# Patient Record
Sex: FEMALE | Race: BLACK OR AFRICAN AMERICAN | NOT HISPANIC OR LATINO | Employment: OTHER | URBAN - METROPOLITAN AREA
[De-identification: names, ages, dates, MRNs, and addresses within clinical notes are randomized per-mention and may not be internally consistent; named-entity substitution may affect disease eponyms.]

---

## 2017-02-13 ENCOUNTER — TELEPHONE (OUTPATIENT)
Dept: INTERNAL MEDICINE | Facility: CLINIC | Age: 62
End: 2017-02-13

## 2017-02-13 ENCOUNTER — LAB VISIT (OUTPATIENT)
Dept: LAB | Facility: OTHER | Age: 62
End: 2017-02-13
Attending: NURSE PRACTITIONER
Payer: COMMERCIAL

## 2017-02-13 DIAGNOSIS — I10 ESSENTIAL HYPERTENSION: Primary | ICD-10-CM

## 2017-02-13 PROCEDURE — 83036 HEMOGLOBIN GLYCOSYLATED A1C: CPT

## 2017-02-13 PROCEDURE — 36415 COLL VENOUS BLD VENIPUNCTURE: CPT

## 2017-02-13 NOTE — TELEPHONE ENCOUNTER
----- Message from Lucy Rodriguez sent at 2/13/2017  7:48 AM CST -----  Contact: MORAIMA LUCAS [[id number]]  x_  1st Request  _  2nd Request  _  3rd Request        Who: MORAIMA LUCAS [[id number]]    Why: Patient says she is at the lab right now and she would like orders for lipid panel added. Please give patient a call back at your earliest convenience. Thanks!    What Number to Call Back: [phone]    When to Expect a call back: (Before the end of the day)   -- if the call is after 12:00, the call back will be tomorrow.

## 2017-02-13 NOTE — TELEPHONE ENCOUNTER
Signed order for over due health maintenance lipid panel per written order guide lines. Lab informed.    Pt states that she saw he ob/gyn in December of 2016 and was informed by her GYN doctor that she does not need a pap smear due to having a hysterotomy. Placed modifier that pt is not a candidate for pap on pt's health maintenance.

## 2017-02-14 LAB
ESTIMATED AVG GLUCOSE: 186 MG/DL
HBA1C MFR BLD HPLC: 8.1 %

## 2017-02-14 NOTE — TELEPHONE ENCOUNTER
----- Message from Dina Weeks sent at 2/14/2017  8:40 AM CST -----  Contact: [id number]  X  1st Request  _  2nd Request  _  3rd Request        Who: MORAIMA LUCAS [[id number]]    Why: Pt was returning the missed call regarding her results.  Please contact pt to further discuss and advise.    What Number to Call Back: [phone]    When to Expect a call back: (Before the end of the day)   -- if the call is after 12:00, the call back will be tomorrow.

## 2017-02-14 NOTE — TELEPHONE ENCOUNTER
Pt has been informed of advice per PCP on lipids. Pt states verbal understanding. Patient has no further questions or concerns.

## 2017-02-14 NOTE — TELEPHONE ENCOUNTER
Discussed A1c result with pt. Scheduled to see Dr. England 2/17. Has increased Amaryl 6 mg qd and takes it in the morning with first meal and Metformin 1000 mg in AM and 500 mg every other PM, vs. 1000 mg due to diarrhea. Reports fasting BS has been <145. May financially be able to afford Januvia or Tradjenta now, especially if she can stop Metformin altogether. Pt to think about recommendation of adding Januvia or Tradjenta and decide at 2/17 appt.

## 2017-03-02 ENCOUNTER — LAB VISIT (OUTPATIENT)
Dept: LAB | Facility: OTHER | Age: 62
End: 2017-03-02
Attending: INTERNAL MEDICINE
Payer: COMMERCIAL

## 2017-03-02 ENCOUNTER — TELEPHONE (OUTPATIENT)
Dept: INTERNAL MEDICINE | Facility: CLINIC | Age: 62
End: 2017-03-02

## 2017-03-02 ENCOUNTER — OFFICE VISIT (OUTPATIENT)
Dept: INTERNAL MEDICINE | Facility: CLINIC | Age: 62
End: 2017-03-02
Payer: COMMERCIAL

## 2017-03-02 VITALS
BODY MASS INDEX: 38.97 KG/M2 | WEIGHT: 193.31 LBS | SYSTOLIC BLOOD PRESSURE: 126 MMHG | DIASTOLIC BLOOD PRESSURE: 80 MMHG | HEIGHT: 59 IN | HEART RATE: 72 BPM

## 2017-03-02 DIAGNOSIS — E78.2 MIXED HYPERLIPIDEMIA: ICD-10-CM

## 2017-03-02 DIAGNOSIS — I10 ESSENTIAL HYPERTENSION: Chronic | ICD-10-CM

## 2017-03-02 DIAGNOSIS — I15.2 HYPERTENSION ASSOCIATED WITH DIABETES: ICD-10-CM

## 2017-03-02 DIAGNOSIS — E11.59 HYPERTENSION ASSOCIATED WITH DIABETES: ICD-10-CM

## 2017-03-02 DIAGNOSIS — E05.00 GRAVES' DISEASE WITH EXOPHTHALMOS: ICD-10-CM

## 2017-03-02 DIAGNOSIS — E23.6 EMPTY SELLA SYNDROME: ICD-10-CM

## 2017-03-02 DIAGNOSIS — G40.319 GENERALIZED CONVULSIVE EPILEPSY WITH INTRACTABLE EPILEPSY: ICD-10-CM

## 2017-03-02 DIAGNOSIS — E66.9 DIABETES MELLITUS TYPE 2 IN OBESE: ICD-10-CM

## 2017-03-02 DIAGNOSIS — E11.69 DIABETES MELLITUS TYPE 2 IN OBESE: ICD-10-CM

## 2017-03-02 DIAGNOSIS — Z00.00 WELLNESS EXAMINATION: ICD-10-CM

## 2017-03-02 DIAGNOSIS — Z00.00 WELLNESS EXAMINATION: Primary | ICD-10-CM

## 2017-03-02 LAB
ALBUMIN SERPL BCP-MCNC: 3.8 G/DL
ALP SERPL-CCNC: 95 U/L
ALT SERPL W/O P-5'-P-CCNC: 28 U/L
ANION GAP SERPL CALC-SCNC: 5 MMOL/L
AST SERPL-CCNC: 22 U/L
BASOPHILS # BLD AUTO: 0.02 K/UL
BASOPHILS NFR BLD: 0.4 %
BILIRUB SERPL-MCNC: 0.3 MG/DL
BUN SERPL-MCNC: 12 MG/DL
CALCIUM SERPL-MCNC: 9.8 MG/DL
CHLORIDE SERPL-SCNC: 102 MMOL/L
CO2 SERPL-SCNC: 31 MMOL/L
CREAT SERPL-MCNC: 0.8 MG/DL
CREAT UR-MCNC: 108 MG/DL
DIFFERENTIAL METHOD: NORMAL
EOSINOPHIL # BLD AUTO: 0.1 K/UL
EOSINOPHIL NFR BLD: 1.3 %
ERYTHROCYTE [DISTWIDTH] IN BLOOD BY AUTOMATED COUNT: 13.8 %
EST. GFR  (AFRICAN AMERICAN): >60 ML/MIN/1.73 M^2
EST. GFR  (NON AFRICAN AMERICAN): >60 ML/MIN/1.73 M^2
GLUCOSE SERPL-MCNC: 158 MG/DL
HCT VFR BLD AUTO: 37.4 %
HGB BLD-MCNC: 12.4 G/DL
LYMPHOCYTES # BLD AUTO: 2.5 K/UL
LYMPHOCYTES NFR BLD: 45.5 %
MCH RBC QN AUTO: 29.2 PG
MCHC RBC AUTO-ENTMCNC: 33.2 %
MCV RBC AUTO: 88 FL
MICROALBUMIN UR DL<=1MG/L-MCNC: 8 UG/ML
MICROALBUMIN/CREATININE RATIO: 7.4 UG/MG
MONOCYTES # BLD AUTO: 0.4 K/UL
MONOCYTES NFR BLD: 7.2 %
NEUTROPHILS # BLD AUTO: 2.5 K/UL
NEUTROPHILS NFR BLD: 45.4 %
PLATELET # BLD AUTO: 296 K/UL
PMV BLD AUTO: 9.7 FL
POTASSIUM SERPL-SCNC: 4.3 MMOL/L
PROT SERPL-MCNC: 7.9 G/DL
RBC # BLD AUTO: 4.24 M/UL
SODIUM SERPL-SCNC: 138 MMOL/L
TSH SERPL DL<=0.005 MIU/L-ACNC: 1.2 UIU/ML
WBC # BLD AUTO: 5.39 K/UL

## 2017-03-02 PROCEDURE — 82570 ASSAY OF URINE CREATININE: CPT

## 2017-03-02 PROCEDURE — 3079F DIAST BP 80-89 MM HG: CPT | Mod: S$GLB,,, | Performed by: INTERNAL MEDICINE

## 2017-03-02 PROCEDURE — 99396 PREV VISIT EST AGE 40-64: CPT | Mod: S$GLB,,, | Performed by: INTERNAL MEDICINE

## 2017-03-02 PROCEDURE — 80053 COMPREHEN METABOLIC PANEL: CPT

## 2017-03-02 PROCEDURE — 36415 COLL VENOUS BLD VENIPUNCTURE: CPT

## 2017-03-02 PROCEDURE — 3074F SYST BP LT 130 MM HG: CPT | Mod: S$GLB,,, | Performed by: INTERNAL MEDICINE

## 2017-03-02 PROCEDURE — 99999 PR PBB SHADOW E&M-EST. PATIENT-LVL III: CPT | Mod: PBBFAC,,, | Performed by: INTERNAL MEDICINE

## 2017-03-02 PROCEDURE — 85025 COMPLETE CBC W/AUTO DIFF WBC: CPT

## 2017-03-02 PROCEDURE — 84443 ASSAY THYROID STIM HORMONE: CPT

## 2017-03-02 RX ORDER — LOSARTAN POTASSIUM 25 MG/1
25 TABLET ORAL DAILY
Qty: 90 TABLET | Refills: 3 | Status: SHIPPED | OUTPATIENT
Start: 2017-03-02 | End: 2018-03-23 | Stop reason: SDUPTHER

## 2017-03-02 NOTE — PROGRESS NOTES
Subjective:       Patient ID: Katey Cerrato is a 61 y.o. female.    Chief Complaint: Annual Exam    HPI Comments: Pt here for annual exam. Pt's BP is well controlled. Tolerating meds well. Pt denies cp/sob/ha/vision or neuro changes. Checking at home and is well controlled. She is not taking lisinopril as she reports cough. She says she has been off this for a few years.     DM2 is not well controlled. She is on metformin 1000mg AM and 500mg PM; she cannot tolerate higher doses due to GI side effects. Also on amaryl 4mg daily. Last A1C was 8.1. No known end organ issues. Other health maint is up to date. No low sugars. Fasting sugars are 130s-150s. Wt loss and exercise goals d/w pt.     HLD is tx with lipitor to 20mg. Tolerating well.     She has h/o Graves dz with exophthalmos. She sees endocrine annually but is overdue. She will schedule. No hyperthyroid state. Due for labs.    She sees Neuro regularly for h/o sz d/o but is over due. She will schedule. This has been well controlled on keppra. No sz in a long while.    Overactive bladder symptoms are not an issue and is no longer on ditropan which had previously worked well for her.     Hypertension   Pertinent negatives include no chest pain, headaches, palpitations or shortness of breath.   Diabetes   Pertinent negatives for hypoglycemia include no dizziness or headaches. Pertinent negatives for diabetes include no chest pain, no fatigue and no weakness.     Review of Systems   Constitutional: Negative for fatigue, fever and unexpected weight change.   HENT: Negative for congestion and sore throat.    Eyes: Negative for visual disturbance.   Respiratory: Negative for shortness of breath.    Cardiovascular: Negative for chest pain, palpitations and leg swelling.   Gastrointestinal: Negative for abdominal pain, blood in stool and diarrhea.   Genitourinary: Negative for dysuria.   Musculoskeletal: Negative for arthralgias.   Neurological: Negative for dizziness,  syncope, weakness and headaches.   Hematological: Negative for adenopathy.   Psychiatric/Behavioral: Negative for dysphoric mood.       Objective:      Physical Exam   Constitutional: She is oriented to person, place, and time. She appears well-developed and well-nourished.   HENT:   Head: Normocephalic and atraumatic.   Right Ear: Tympanic membrane, external ear and ear canal normal.   Left Ear: Tympanic membrane, external ear and ear canal normal.   Nose: Nose normal.   Mouth/Throat: Uvula is midline and oropharynx is clear and moist.   Eyes: Conjunctivae, EOM and lids are normal. Pupils are equal, round, and reactive to light. Right conjunctiva is not injected. Left conjunctiva is not injected.   Neck: Neck supple. No JVD present. Carotid bruit is not present. No thyroid mass and no thyromegaly present.   Cardiovascular: Normal rate, regular rhythm, S1 normal, S2 normal, normal heart sounds and intact distal pulses.  PMI is not displaced.    No murmur heard.  Pulmonary/Chest: Effort normal and breath sounds normal. She has no wheezes. She has no rhonchi. She has no rales.   Abdominal: Soft. Bowel sounds are normal. She exhibits no distension and no abdominal bruit. There is no hepatosplenomegaly. There is no tenderness.   Musculoskeletal: She exhibits no edema.   Lymphadenopathy:        Head (right side): No preauricular and no posterior auricular adenopathy present.        Head (left side): No preauricular and no posterior auricular adenopathy present.     She has no cervical adenopathy.     She has no axillary adenopathy.   Neurological: She is alert and oriented to person, place, and time. She has normal strength. No cranial nerve deficit or sensory deficit.   Skin: Skin is warm. No rash noted.   Psychiatric: She has a normal mood and affect. Her speech is normal and behavior is normal. Judgment and thought content normal.       Assessment:       1. Wellness examination    2. Essential hypertension    3.  Diabetes mellitus type 2 in obese    4. Mixed hyperlipidemia    5. Hypertension associated with diabetes    6. Uncontrolled type 2 diabetes mellitus without complication, without long-term current use of insulin    7. Empty sella syndrome    8. Generalized convulsive epilepsy with intractable epilepsy    9. Graves' disease with exophthalmos        Plan:       1. Appropriate labs  2. Wt loss/exercise  3. Add losartan 25mg daily; home BP monitoring  4. Start januvia  5. Home BS monitoring and f/u with NP in 4 wks  6. Pt will make f/u appts with neuro and endocrine

## 2017-03-03 NOTE — TELEPHONE ENCOUNTER
Pt has been informed of results and advice. States verbal understanding. Patient has no further questions or concerns.

## 2017-03-06 RX ORDER — LEVETIRACETAM 750 MG/1
TABLET ORAL
Qty: 180 TABLET | Refills: 3 | Status: SHIPPED | OUTPATIENT
Start: 2017-03-06 | End: 2018-04-01 | Stop reason: SDUPTHER

## 2017-03-06 RX ORDER — ATORVASTATIN CALCIUM 20 MG/1
TABLET, FILM COATED ORAL
Qty: 90 TABLET | Refills: 3 | Status: SHIPPED | OUTPATIENT
Start: 2017-03-06 | End: 2018-06-04 | Stop reason: SDUPTHER

## 2017-03-11 ENCOUNTER — HOSPITAL ENCOUNTER (EMERGENCY)
Facility: HOSPITAL | Age: 62
Discharge: HOME | End: 2017-03-11
Attending: EMERGENCY MEDICINE
Payer: COMMERCIAL

## 2017-03-11 VITALS
HEIGHT: 64 IN | TEMPERATURE: 98 F | SYSTOLIC BLOOD PRESSURE: 156 MMHG | BODY MASS INDEX: 37.56 KG/M2 | DIASTOLIC BLOOD PRESSURE: 87 MMHG | WEIGHT: 220 LBS | HEART RATE: 70 BPM | OXYGEN SATURATION: 100 % | RESPIRATION RATE: 18 BRPM

## 2017-03-11 DIAGNOSIS — G40.909 SEIZURE DISORDER: Primary | ICD-10-CM

## 2017-03-11 DIAGNOSIS — E11.59 HYPERTENSION ASSOCIATED WITH DIABETES: ICD-10-CM

## 2017-03-11 DIAGNOSIS — I15.2 HYPERTENSION ASSOCIATED WITH DIABETES: ICD-10-CM

## 2017-03-11 LAB
BUN SERPL-MCNC: 15 MG/DL (ref 6–30)
CHLORIDE SERPL-SCNC: 101 MMOL/L (ref 95–110)
CREAT SERPL-MCNC: 0.5 MG/DL (ref 0.5–1.4)
GLUCOSE SERPL-MCNC: 151 MG/DL (ref 70–110)
HCT VFR BLD CALC: 35 %PCV (ref 36–54)
POC IONIZED CALCIUM: 1.05 MMOL/L (ref 1.06–1.42)
POC TCO2 (MEASURED): 27 MMOL/L (ref 23–29)
POTASSIUM BLD-SCNC: 3.7 MMOL/L (ref 3.5–5.1)
SAMPLE: ABNORMAL
SODIUM BLD-SCNC: 139 MMOL/L (ref 136–145)

## 2017-03-11 PROCEDURE — 99285 EMERGENCY DEPT VISIT HI MDM: CPT | Mod: ,,, | Performed by: EMERGENCY MEDICINE

## 2017-03-11 PROCEDURE — 99284 EMERGENCY DEPT VISIT MOD MDM: CPT

## 2017-03-11 PROCEDURE — 80177 DRUG SCRN QUAN LEVETIRACETAM: CPT

## 2017-03-11 PROCEDURE — 25000003 PHARM REV CODE 250: Performed by: EMERGENCY MEDICINE

## 2017-03-11 RX ORDER — LEVETIRACETAM 750 MG/1
750 TABLET ORAL
Status: COMPLETED | OUTPATIENT
Start: 2017-03-11 | End: 2017-03-11

## 2017-03-11 RX ADMIN — LEVETIRACETAM 750 MG: 750 TABLET ORAL at 06:03

## 2017-03-11 NOTE — ED PROVIDER NOTES
"Encounter Date: 3/11/2017       History     Chief Complaint   Patient presents with    Seizures     hx of seizures, missed keppra dose yesterday. seizure witnessed by  lasted ~ 5 min. at this pt is aaox4, reports her last seizure was "years," ago.      Review of patient's allergies indicates:   Allergen Reactions    Codeine Nausea Only     HPI Comments: 60 yo F with PMHx significant for Type II DM, Grave's disease, HTN, HLD, and history of a prior seizure in 2009 who presents from home following what her  describes as "seizure-like" activity.   states that he awoke to patient making "strange noise" during her sleep.  Says she was drooling, breathing rapid and shallow, and not responding to commands.  Episode lasted for about 5 minutes before patient began to regain function.  EMS was then called, and patient was brought to [facility].  Of note,  states that the character of this seizure is similar to her one prior seizure episode in 2009.  Has been well-controlled on Keppra since then and follows with Dr. Bryant in Neurology.  Patient does report that she missed her daily dose of Keppra yesterday, but that she has done so on other occassions in the past without it resulting in a seizure.  Denies missing any other medications.  Denies associated symptoms, including residual weakness, numbness, or tingling.  Denies CP, SOB, nausea, vomiting.      The history is provided by the patient.     Past Medical History:   Diagnosis Date    Cataract     Empty sella syndrome     GHD (growth hormone deficiency)     Glaucoma     Graves' disease with exophthalmos     History of vitamin D deficiency     Hyperlipidemia     Hypertension     Seizures     Thyroid nodule     Thyroid nodule     Type II or unspecified type diabetes mellitus without mention of complication, uncontrolled      Past Surgical History:   Procedure Laterality Date    CHOLECYSTECTOMY      HYSTERECTOMY      one ovary intact "    KNEE SURGERY      LYMPH NODE BIOPSY  3010     Family History   Problem Relation Age of Onset    Cancer Mother     Cataracts Mother     Glaucoma Mother      shunt    Heart disease Mother     Tremor Mother     Breast cancer Mother 78    Heart disease Sister     Heart disease Father     Hypertension Brother     Breast cancer Sister 60     Social History   Substance Use Topics    Smoking status: Never Smoker    Smokeless tobacco: Never Used    Alcohol use No      Comment: none     Review of Systems   Constitutional: Negative for activity change, appetite change, fatigue and fever.   HENT: Negative for sore throat.    Respiratory: Negative for cough, chest tightness and shortness of breath.    Gastrointestinal: Negative for abdominal distention, abdominal pain, constipation, nausea and vomiting.   Genitourinary: Negative for difficulty urinating.   Musculoskeletal: Negative for neck pain and neck stiffness.   Skin: Negative for pallor.   Neurological: Positive for seizures. Negative for dizziness, syncope, facial asymmetry, speech difficulty, weakness, light-headedness, numbness and headaches.   Psychiatric/Behavioral: Negative for agitation.       Physical Exam   Initial Vitals   BP Pulse Resp Temp SpO2   03/11/17 0223 03/11/17 0223 03/11/17 0223 03/11/17 0223 03/11/17 0223   143/75 101 18 97.9 °F (36.6 °C) 100 %     Physical Exam    Constitutional: She appears well-developed and well-nourished. She is not diaphoretic. No distress.   HENT:   Head: Normocephalic and atraumatic.   Eyes: EOM are normal. Pupils are equal, round, and reactive to light.   Neck: Normal range of motion. No thyromegaly present.   Cardiovascular: Normal rate and regular rhythm.   Pulmonary/Chest: Breath sounds normal. No respiratory distress.   Abdominal: Soft. Bowel sounds are normal. She exhibits no distension.   Musculoskeletal: She exhibits no edema or tenderness.   Neurological: She is alert and oriented to person, place,  and time. She has normal strength. No cranial nerve deficit or sensory deficit.   Skin: Skin is warm and dry.         ED Course   Procedures  Labs Reviewed   ISTAT PROCEDURE - Abnormal; Notable for the following:        Result Value    POC Glucose 151 (*)     POC Ionized Calcium 1.05 (*)     POC Hematocrit 35 (*)     All other components within normal limits   LEVETIRACETAM  (KEPPRA) LEVEL   ISTAT CHEM8                          Attending Attestation:   Physician Attestation Statement for Resident:  As the supervising MD   Physician Attestation Statement: I have personally seen and examined this patient.   I agree with the above history. -: Missed last night's dose  Also, not sleeping well recently and stressed out about work.    Feels back to baseline at this time.     As the supervising MD I agree with the above PE.   -: Nad, wdwn  Perrl, eomi  Mmm  No nuchal rigidity  ctab  Rrr, nl s1/2  Aox3, no gross focal deficits   As the supervising MD I agree with the above treatment, course, plan, and disposition.   -: Imp:  Seizure, hx of same, missed dose and poor sleep the likely triggers.  Will check istat and send keppra level for neuro f/u.  Advised call epilepsy clinic for appt on Monday.  No indication for imaging.  Pt indicates understanding/agreement.    I have reviewed and agree with the residents interpretation of the following: lab data.  I have reviewed the following: old records at this facility.                    ED Course     Clinical Impression:   The primary encounter diagnosis was Seizure disorder. A diagnosis of Hypertension associated with diabetes was also pertinent to this visit.          Shay Silva MD  03/11/17 0609

## 2017-03-11 NOTE — ED AVS SNAPSHOT
[facility]  [address] [address]               Katey Cerrato   3/11/2017  4:32 AM   ED    Description:  Female : 1955   Department:  [facility]           Your Care was Coordinated By:     Provider Role From To    Shay Silva MD Attending Provider 17 [id number] --    Ash Khanna MD Resident 17 [id number] --      Reason for Visit     Seizures           Diagnoses this Visit        Comments    Seizure disorder    -  Primary     Hypertension associated with diabetes           ED Disposition     None           To Do List           Follow-up Information     Schedule an appointment as soon as possible for a visit with Baltazar England MD.    Specialty:  Internal Medicine    Contact information:    [address]  [facility] [id number] [phone]          Follow up with [facility] - Neurology Epilepsy. Schedule an appointment as soon as possible for a visit in 1 week.    Specialty:  Neurology    Contact information:    [address]  [facility] [address] [phone]      [facility]     [facility] Nurse Care Line -  Assistance  Registered nurses in the [facility] provide clinical advisement, health education, appointment booking, and other advisory services.  Call for this free service at [phone].             Medications           Message regarding Medications     Verify the changes and/or additions to your medication regime listed below are the same as discussed with your clinician today.  If any of these changes or additions are incorrect, please notify your healthcare provider.        These medications were administered today        Dose Freq    levetiracetam tablet 750 mg 750 mg ED 1 Time    Sig: Take 1 tablet (750 mg total) by mouth ED 1 Time.    Class: Normal    Route: Oral           Verify that the below list of medications is an accurate  "representation of the medications you are currently taking.  If none reported, the list may be blank. If incorrect, please contact your healthcare provider. Carry this list with you in case of emergency.           Current Medications     amlodipine (NORVASC) 10 MG tablet Take 1 tablet (10 mg total) by mouth once daily.    atorvastatin (LIPITOR) 20 MG tablet TAKE 1 TABLET (20 MG TOTAL) BY MOUTH ONCE DAILY.    blood sugar diagnostic (ONETOUCH ULTRA TEST) Strp 1 strip by Misc.(Non-Drug; Combo Route) route once daily.    chlorhexidine (PERIDEX) 0.12 % solution     diclofenac-misoprostol  mg-mcg (ARTHROTEC 75)  mg-mcg per tablet Take 1 tablet by mouth daily as needed.    glimepiride (AMARYL) 4 MG tablet Take 1.5 tablets (6 mg total) by mouth before breakfast.    lancets (ACCU-CHEK SOFTCLIX LANCETS) Misc 1 each by Misc.(Non-Drug; Combo Route) route once daily.    lancets (ONE TOUCH ULTRASOFT LANCETS) Misc Check sugar qAM fasting    latanoprost 0.005 % ophthalmic solution PLACE 1 DROP INTO BOTH EYES EVERY EVENING.    levetiracetam (KEPPRA) 750 MG Tab TAKE 2 TABLETS BY MOUTH EVERY DAY    levetiracetam tablet 750 mg Take 1 tablet (750 mg total) by mouth ED 1 Time.    losartan (COZAAR) 25 MG tablet Take 1 tablet (25 mg total) by mouth once daily.    metformin (GLUCOPHAGE-XR) 500 MG 24 hr tablet TAKE 2 TABLETS BY MOUTH TWICE A DAY WITH MEALS    metoprolol succinate (TOPROL-XL) 50 MG 24 hr tablet TAKE 1 TABLET BY MOUTH EVERY EVENING.    multivitamin-minerals-lutein (CENTRUM SILVER) Tab Take 1 tablet by mouth once daily once daily.    oxybutynin (DITROPAN) 5 MG Tab TAKE 1 TABLET BY MOUTH TWICE A DAY    SITagliptan (JANUVIA) 100 MG Tab Take 1 tablet (100 mg total) by mouth once daily.           Clinical Reference Information           Your Vitals Were     BP Pulse Temp Resp Height Weight    156/87 70 97.9 °F (36.6 °C) (Oral) 18 5' 4" (1.626 m) 99.8 kg (220 lb)    SpO2 BMI             100% 37.76 kg/m2         Allergies " as of 3/11/2017        Reactions    Codeine Nausea Only      Immunizations Administered on Date of Encounter - 3/11/2017     None      ED Micro, Lab, POCT     Start Ordered       Status Ordering Provider    03/11/17 0552 03/11/17 0552  ISTAT PROCEDURE  Once      Final result     03/11/17 0531 03/11/17 0530  ISTAT CHEM8  Once      Acknowledged     03/11/17 0531 03/11/17 0530  Levetiracetam level  Once      In process       ED Imaging Orders     None      Discharge References/Attachments     SEIZURE, RECURRENT (ADULT) (ENGLISH)      Your Scheduled Appointments     Mar 13, 2017  1:45 PM CDT   [facility] with OSWALD, VISUAL PACK   [facility] - Ophthalmology ([facility] )    [address]  [place] [address]   [phone]            Mar 13, 2017  2:15 PM CDT   Established Patient Visit with CEASAR Mortensen - Optometry ([facility] )    [address]  [place] [address]   [phone]            Mar 30, 2017 10:00 AM CDT   Established Patient Visit with Saira Allen NP   [facility]    [address]  [place] [address]   [phone]            Jun 02, 2017 10:20 AM CDT   Established Patient Visit with Baltazar England MD   [facility] Internal Medicine ([facility])    [address]  [place] [address]   [phone]              [facility] Sign-Up     Activating your [facility] account is as easy as 1-2-3!     1) Visit [email or web address], select Sign Up Now, enter this activation code and your date of birth, then select Next.  [email or web address]  Expires: 4/25/2017  6:24 AM      2) Create a username and password to use when you visit [facility] in the future and select a security question in case you lose your password and select Next.    3) Enter your e-mail address and click Sign Up!    Additional Information  If you have questions, please e-mail [email or web address] or call [phone] to talk to our [facility]  staff. Remember, [facility] is NOT to be used for urgent needs. For medical emergencies, dial 911.          [facility] complies with applicable Federal civil rights laws and does not discriminate on the basis of race, color, national origin, age, disability, or sex.        Language Assistance Services     ATTENTION: Language assistance services are available, free of charge. Please call [phone].      ATENCIÓN: Si habla español, tiene a persaud disposición servicios gratuitos de asistencia lingüística. Llame al [phone].     CHÚ Ý: N?u b?n nói Ti?ng Vi?t, có các d?ch v? h? tr? ngôn ng? mi?n phí dành cho b?n. G?i s? [phone].

## 2017-03-11 NOTE — ED NOTES
Two patient identifiers have been checked and are correct.      Appearance: Pt awake, alert & oriented to person, place & time. Pt in no acute distress at present time. Pt is clean and well groomed with clothes appropriately fastened.   Skin: Skin warm, dry & intact. Color consistent with ethnicity. Mucous membranes moist. No breakdown or brusing noted.   Musculoskeletal: Patient moving all extremities well, no obvious swelling or deformities noted.   Respiratory: Respirations spontaneous, even, and non-labored. Visible chest rise noted. Airway is open and patent. No accessory muscle use noted.   Neurologic: Sensation is intact. Speech is clear and appropriate. Eyes open spontaneously, behavior appropriate to situation, follows commands, facial expression symmetrical, bilateral hand grasp equal and even, purposeful motor response noted.  Cardiac: All peripheral pulses present. No Bilateral lower extremity edema. Cap refill is <3 seconds.  Abdomen: Abdomen soft, non-tender to palpation.   : Pt reports no dysuria or hematuria.

## 2017-03-11 NOTE — ED TRIAGE NOTES
reports seizure-like episode lasting approximately 5 minutes. Pt does not recall event and states that she is on keppra but missed last night's dose. Last seizure in 2009. On arrival, patient is AAO x3 and speaking appropriately.

## 2017-03-13 ENCOUNTER — CLINICAL SUPPORT (OUTPATIENT)
Dept: OPHTHALMOLOGY | Facility: CLINIC | Age: 62
End: 2017-03-13
Payer: COMMERCIAL

## 2017-03-13 ENCOUNTER — TELEPHONE (OUTPATIENT)
Dept: INTERNAL MEDICINE | Facility: CLINIC | Age: 62
End: 2017-03-13

## 2017-03-13 ENCOUNTER — OFFICE VISIT (OUTPATIENT)
Dept: OPTOMETRY | Facility: CLINIC | Age: 62
End: 2017-03-13
Payer: COMMERCIAL

## 2017-03-13 ENCOUNTER — TELEPHONE (OUTPATIENT)
Dept: NEUROLOGY | Facility: CLINIC | Age: 62
End: 2017-03-13

## 2017-03-13 DIAGNOSIS — H40.023 OPEN ANGLE WITH BORDERLINE FINDINGS AND HIGH GLAUCOMA RISK IN BOTH EYES: Primary | ICD-10-CM

## 2017-03-13 DIAGNOSIS — H52.203 MYOPIA WITH ASTIGMATISM AND PRESBYOPIA, BILATERAL: ICD-10-CM

## 2017-03-13 DIAGNOSIS — H52.13 MYOPIA WITH ASTIGMATISM AND PRESBYOPIA, BILATERAL: ICD-10-CM

## 2017-03-13 DIAGNOSIS — H40.023 OPEN ANGLE WITH BORDERLINE FINDINGS AND HIGH GLAUCOMA RISK IN BOTH EYES: ICD-10-CM

## 2017-03-13 DIAGNOSIS — H52.4 MYOPIA WITH ASTIGMATISM AND PRESBYOPIA, BILATERAL: ICD-10-CM

## 2017-03-13 LAB — LEVETIRACETAM SERPL-MCNC: <1 UG/ML (ref 3–60)

## 2017-03-13 PROCEDURE — 92083 EXTENDED VISUAL FIELD XM: CPT | Mod: S$GLB,,, | Performed by: OPTOMETRIST

## 2017-03-13 PROCEDURE — 99999 PR PBB SHADOW E&M-EST. PATIENT-LVL III: CPT | Mod: PBBFAC,,, | Performed by: OPTOMETRIST

## 2017-03-13 PROCEDURE — 92012 INTRM OPH EXAM EST PATIENT: CPT | Mod: S$GLB,,, | Performed by: OPTOMETRIST

## 2017-03-13 NOTE — TELEPHONE ENCOUNTER
----- Message from Priyanka Mathis sent at 3/13/2017  7:58 AM CDT -----  _  1st Request  _  2nd Request  _  3rd Request        Who: Patient    Why: pt needs a back to work notice from Dr England    What Number to Call Back:[phone]    When to Expect a call back: (Before the end of the day)   -- if the call is after 12:00, the call back will be tomorrow.

## 2017-03-13 NOTE — TELEPHONE ENCOUNTER
Spoke to pt and scheduled new appt with Dr Boykin to reestablish care in Neurology clinic. Pt states she had seizure on 3/11, went to ED and was advised to f/u this week. Pt states she continues Keppra 750mg (2 tablets QD) however Keppra level 3/11 was <1.0. Pt stated she missed 3/10 dose however usually takes her medicine. Last fill of Keppra 3/6/17 by pcp. Pt confirmed 740am appt 3/15.

## 2017-03-13 NOTE — TELEPHONE ENCOUNTER
Episode with seizure on Saturday. Pt states Friday night she didn't take med(Keppra). Pt went to ER on Saturday morning via ambulance. Pt states she is in need of a work note for Sat/Sun. Pt states she was off Friday and today. Pt states she will see her neurologist tomorrow morning. Pt would like to know if she can get a note. Pt informed to request RTW note from neurologist. If any issues, contact PCP for advice. Patient has no further questions or concerns.

## 2017-03-13 NOTE — TELEPHONE ENCOUNTER
----- Message from Erna Love sent at 3/13/2017  8:02 AM CDT -----  Contact: Patient [phone]  Patient is calling to speak with nurse regarding getting an appt scheduled with Dr. Bryant for seizures as soon as possible, patient will be at [facility] for an appt at 1:45 pm. Please call

## 2017-03-13 NOTE — PROGRESS NOTES
HPI     Glaucoma    Additional comments: 6 month IOP ck/HVF/OCT           Comments   Last eye exam was 9/14/16 with Dr. Rey.  Patient states no vision changes since last exam. Didn't fill glasses rx   yet.  Patient denies diplopia, headaches, flashes/floaters, and pain.    Latanoprost qHS OU       Last edited by Lilibeth Flowers on 3/13/2017  2:51 PM. (History)            Assessment /Plan     For exam results, see Encounter Report.    Open angle with borderline findings and high glaucoma risk in both eyes    Myopia with astigmatism and presbyopia, bilateral            1.  Due to increased c/d ratio OU.  Continue with Latanoprost QHS OU.  All testing normal.  Continue to monitor every 6 months.  2.  Bifocal rx given        RTC 6 months for dilated exam.

## 2017-03-14 ENCOUNTER — OFFICE VISIT (OUTPATIENT)
Dept: NEUROLOGY | Facility: CLINIC | Age: 62
End: 2017-03-14
Payer: COMMERCIAL

## 2017-03-14 ENCOUNTER — TELEPHONE (OUTPATIENT)
Dept: NEUROLOGY | Facility: CLINIC | Age: 62
End: 2017-03-14

## 2017-03-14 VITALS
HEART RATE: 70 BPM | DIASTOLIC BLOOD PRESSURE: 83 MMHG | WEIGHT: 194.25 LBS | BODY MASS INDEX: 33.16 KG/M2 | SYSTOLIC BLOOD PRESSURE: 139 MMHG | HEIGHT: 64 IN

## 2017-03-14 DIAGNOSIS — G40.319 GENERALIZED CONVULSIVE EPILEPSY WITH INTRACTABLE EPILEPSY: Primary | ICD-10-CM

## 2017-03-14 PROCEDURE — 3079F DIAST BP 80-89 MM HG: CPT | Mod: S$GLB,,, | Performed by: PSYCHIATRY & NEUROLOGY

## 2017-03-14 PROCEDURE — 1160F RVW MEDS BY RX/DR IN RCRD: CPT | Mod: S$GLB,,, | Performed by: PSYCHIATRY & NEUROLOGY

## 2017-03-14 PROCEDURE — 99205 OFFICE O/P NEW HI 60 MIN: CPT | Mod: S$GLB,,, | Performed by: PSYCHIATRY & NEUROLOGY

## 2017-03-14 PROCEDURE — 99999 PR PBB SHADOW E&M-EST. PATIENT-LVL III: CPT | Mod: PBBFAC,,, | Performed by: PSYCHIATRY & NEUROLOGY

## 2017-03-14 PROCEDURE — 3075F SYST BP GE 130 - 139MM HG: CPT | Mod: S$GLB,,, | Performed by: PSYCHIATRY & NEUROLOGY

## 2017-03-14 NOTE — TELEPHONE ENCOUNTER
----- Message from Mona Kyle sent at 3/14/2017 10:18 AM CDT -----  Contact: Pt  Pt would like to be called back regarding a fax she was supposed to recieve.      Pt wants to know if you can fax the papers to her home fax at [phone]      Please call pt at [phone].        Thanks!

## 2017-03-14 NOTE — LETTER
March 14, 2017      MONIK Chaparro MD  [address]  [facility] [address]           [facility]  [address]  [facility] [address]  Phone: [phone]  Fax: [phone]          Patient: Katey Cerrato   MR Number: [id number]   YOB: 1955   Date of Visit: 3/14/2017       Dear Dr. MONIK Chaparro:    Thank you for referring Katey Cerrato to me for evaluation. Attached you will find relevant portions of my assessment and plan of care.    If you have questions, please do not hesitate to call me. I look forward to following Katey Cerrato along with you.    Sincerely,    Bethel Boykin MD    Enclosure  CC:  No Recipients    If you would like to receive this communication electronically, please contact [email or web address] or [phone] to request more information on [employer] Link access.    For providers and/or their staff who would like to refer a patient to [facility], please contact us through our one-stop-shop provider referral line, [facility], at [phone].    If you feel you have received this communication in error or would no longer like to receive these types of communications, please e-mail [email or web address]

## 2017-03-14 NOTE — MR AVS SNAPSHOT
[facility] - Neurology  [address]  [facility] [address]  Phone: [phone]  Fax: [phone]                  Katey Cerrato   3/14/2017 7:40 AM   Office Visit    Description:  Female : 1955   Provider:  Bethel Boykin MD   Department:  [facility] - Neurology           Diagnoses this Visit        Comments    Generalized convulsive epilepsy with intractable epilepsy    -  Primary            To Do List           Future Appointments        Provider Department Dept Phone    3/30/2017 10:00 AM Saira Allen NP [facility] Medicine [phone]    2017 10:20 AM Baltazar England MD [facility] Internal Medicine [phone]      Goals (5 Years of Data)     None      Follow-Up and Disposition     Return in about 6 months (around 2017).      [facility]     [facility] On [facility] Line -  Assistance  Registered nurses in the [facility] Center provide clinical advisement, health education, appointment booking, and other advisory services.  Call for this free service at [phone].             Medications           Message regarding Medications     Verify the changes and/or additions to your medication regime listed below are the same as discussed with your clinician today.  If any of these changes or additions are incorrect, please notify your healthcare provider.             Verify that the below list of medications is an accurate representation of the medications you are currently taking.  If none reported, the list may be blank. If incorrect, please contact your healthcare provider. Carry this list with you in case of emergency.           Current Medications     amlodipine (NORVASC) 10 MG tablet Take 1 tablet (10 mg total) by mouth once daily.    atorvastatin (LIPITOR) 20 MG tablet TAKE 1 TABLET (20 MG TOTAL) BY MOUTH ONCE DAILY.    blood sugar diagnostic (ONETOUCH ULTRA TEST) Strp 1 strip by Misc.(Non-Drug; Combo Route) route once daily.     "chlorhexidine (PERIDEX) 0.12 % solution     diclofenac-misoprostol  mg-mcg (ARTHROTEC 75)  mg-mcg per tablet Take 1 tablet by mouth daily as needed.    glimepiride (AMARYL) 4 MG tablet Take 1.5 tablets (6 mg total) by mouth before breakfast.    lancets (ACCU-CHEK SOFTCLIX LANCETS) Misc 1 each by Misc.(Non-Drug; Combo Route) route once daily.    lancets (ONE TOUCH ULTRASOFT LANCETS) Misc Check sugar qAM fasting    latanoprost 0.005 % ophthalmic solution PLACE 1 DROP INTO BOTH EYES EVERY EVENING.    levetiracetam (KEPPRA) 750 MG Tab TAKE 2 TABLETS BY MOUTH EVERY DAY    losartan (COZAAR) 25 MG tablet Take 1 tablet (25 mg total) by mouth once daily.    metformin (GLUCOPHAGE-XR) 500 MG 24 hr tablet TAKE 2 TABLETS BY MOUTH TWICE A DAY WITH MEALS    metoprolol succinate (TOPROL-XL) 50 MG 24 hr tablet TAKE 1 TABLET BY MOUTH EVERY EVENING.    multivitamin-minerals-lutein (CENTRUM SILVER) Tab Take 1 tablet by mouth once daily once daily.    oxybutynin (DITROPAN) 5 MG Tab TAKE 1 TABLET BY MOUTH TWICE A DAY    SITagliptan (JANUVIA) 100 MG Tab Take 1 tablet (100 mg total) by mouth once daily.           Clinical Reference Information           Your Vitals Were     BP Pulse Height Weight BMI    139/83 70 5' 4" (1.626 m) 88.1 kg (194 lb 3.6 oz) 33.34 kg/m2      Blood Pressure          Most Recent Value    BP  139/83      Allergies as of 3/14/2017     Codeine      Immunizations Administered on Date of Encounter - 3/14/2017     None      Orders Placed During Today's Visit     Future Labs/Procedures Expected by Expires    Levetiracetam level  3/14/2017 5/13/2018      [facility] Sign-Up     Activating your [facility] account is as easy as 1-2-3!     1) Visit [email or web address], select Sign Up Now, enter this activation code and your date of birth, then select Next.  [email or web address]  Expires: 4/25/2017  7:24 AM      2) Create a username and password to use when you visit [facility] in the future and select a security question in " case you lose your password and select Next.    3) Enter your e-mail address and click Sign Up!    Additional Information  If you have questions, please e-mail [email or web address] or call [phone] to talk to our [facility] staff. Remember, [facility] is NOT to be used for urgent needs. For medical emergencies, dial 911.         Language Assistance Services     ATTENTION: Language assistance services are available, free of charge. Please call [phone].      ATENCIÓN: Si habla español, tiene a persaud disposición servicios gratuitos de asistencia lingüística. Llame al [phone].     ALIE Ý: N?u b?n nói Ti?ng Vi?t, có các d?ch v? h? tr? ngôn ng? mi?n phí dành cho b?n. G?i s? [phone].         [facility] complies with applicable Federal civil rights laws and does not discriminate on the basis of race, color, national origin, age, disability, or sex.

## 2017-03-14 NOTE — PROGRESS NOTES
Name: Katey Cerrato  MRN: [id number]   CSN: [id number]      Date: 03/14/2017    HISTORY OF PRESENT ILLNESS (HPI)  The patient is a 61 y.o.  woman with DM, HTN, graves disease evaluated by Dr Luis for an episode of loss of consciousness that occurred in March 2009. She was not feeling well in morning and laid back down. Her  took their little boy to school and when he returned 30 mins later and he could not wake her up and her tongue was bleeding. She was brought to ER with no memory of all of this until after admitted. She felt ok when she awoke in the hospital.  put her Keppra 750mg 3 Q HS. She had a EEG Sept.2009 which was abnormal. She has had no furthers seizures after starting on Keppra XR. Last prescription was for 2 tabs a day. She had done well on that dose so the dose was not increased back to the 3 tabs a day. She also ran out of medications last month and did not taking anything for 5 days without experiencing seizures or other problems  MRI showed subcortical ischemic vascular changes and she has high cholesterol (on simvastatin) and hypertension (not treated). She is followed by her PCP for these and was recently put on a new medication for diabetes.    Interim History  Patient presents after second lifetime seizure with ED presentation on 3/22/2017 which occurred from sleep.  She had missed 2 doses of medication but level at that time was 0.  She reports that for 1 month leading up to this event, she was taking diclofenac-misoprostol on a daily basis for knee pain and had been noticing whole LEV tablets in her stool.  Previously took this medicine only rarely for knee pain.    Seizure Seminology  Seizure Type 1  Classification:   Aura -   Ictus  - Nonconv -  - Conv -  - Duration -   Post-ictal  - Symptoms  - Duration  Age of onset   Current Seizure Frequency -    Last Seizure    sz per month 2015 2016 2017 2018 2019 2020   Johnie         Feb         Mar         Apr         May         Jean          Jul         Aug         Sep         Oct         Nov         Dec         Tot           Seizure Triggers  Sleep Deprivation -  None  Other medications -  None  Psych/stress -  None  Photic stimulation -  None  Hyperventilation -  None  Medical Problems -  None  Menses -   No  Sensory Stimulation    Light  No   Sound  No   Problem Solv No   Other  No  Missed dose of Rx None    AED Treatments  Present regimen  levetiracetam (Keppra, LEV) 1500mg daily  -Lvl 12/1/2010 - 44 ug/ml    Prior treatments      Not tried  acetazolamide (Diamox, AZM)  amantadine  carbamazepine (Tegretol, CBZ)  clobezam (Onfi or Frizium, CLB)  ethosuximide (Zarontin, ESM)  eslicarbazine (Aptiom, ESL)  felbamate (felbatol, FBM)  gabapentin (Neurontin, GPN)  lacosamide (Vimpat, LCS)   lamotrigine (Lamictal, LTG)   methsuximide (Celontin, MSM)  methyphenytoin (Mesantion, MHT)  oxcarbazepine (Trileptal OXC)  perampanel (Fycompa, FCP)   phenobarbital (Pb)  phenytoin (Dilantin, PHT)  pregabalin (Lyrica, PGB)  primidone (Mysoline, PRM)  retigabine (Potiga, RTG)  rufinamide (Banzel, RUF)  tiagabine (Gabatril,  TGB)  topiramate (Topamax, TPM)  viagabatrin, (Sabril, VGB)  vagal nerve stimulator (VNS)  valproic acid (Depakote, VPA)  zonisamide (Zonegran, ZNA)  Benzodiazepines  diazepam - rectal (Diastatl)  diazepam - oral (Valium, DZ)  clonazepam (Klonopin, CZP)  clorazepate (Tranxene, CLZ)  Ativan  Brain Stimulation  Vagal Nerve Stimulation-n/a  DBS- n/a    Adherence/Compliance method  Memory - yes  Mom or Spouse - Yes  Pill Box - no  Rafael calendar - no  Turn over medication bottle - no  Phone alarm - no    Seizure Evaluation  Seizure Evaluation  EEG - 2009-09-15 - abnormal with bilateral temporal slowing and right sided spike and sharp waves.  EEG/Video monitoring -   MRI 2009-04-01 - A few punctate areas of t2/flair signal hyperintensity are present in the parietal white matter which are nonspecific though may be sequelae of chronic microvascular  ischemic change. There is no evidence for acute infarction or enhancing lesion.   CT Scan - no  PET Scan N/A  Neuropsychological evaluation N/A    Potential Epilepsy Risk Factors:   Pregnancy/Labor/Delivery - full term uncomplicated pregnancy labor and vaginal delivery  Febrile seizures - none  Head injury  - none  CNS infection - none     Stroke - none  Family Hx of Sz - none    PAST MEDICAL HISTORY:   Active Ambulatory Problems     Diagnosis Date Noted    Generalized convulsive epilepsy with intractable epilepsy 09/07/2012    Hypertension associated with diabetes 09/07/2012    Diabetes mellitus type 2 in obese 09/07/2012    Obesity 09/07/2012    Essential hypertension     Graves' disease with exophthalmos     Empty sella syndrome     Hyperlipidemia     Uncontrolled type 2 diabetes mellitus without complication, without long-term current use of insulin 03/18/2014     Resolved Ambulatory Problems     Diagnosis Date Noted    Neuropathy 08/07/2013     Past Medical History:   Diagnosis Date    Cataract     Empty sella syndrome     GHD (growth hormone deficiency)     Glaucoma     Graves' disease with exophthalmos     History of vitamin D deficiency     Hyperlipidemia     Hypertension     Seizures     Thyroid nodule     Thyroid nodule     Type II or unspecified type diabetes mellitus without mention of complication, uncontrolled         PAST SURGICAL HISTORY:   Past Surgical History:   Procedure Laterality Date    CHOLECYSTECTOMY      HYSTERECTOMY      one ovary intact    KNEE SURGERY      LYMPH NODE BIOPSY  3010        FAMILY HISTORY:   Family History   Problem Relation Age of Onset    Cancer Mother     Cataracts Mother     Glaucoma Mother      shunt    Heart disease Mother     Tremor Mother     Breast cancer Mother 78    Heart disease Sister     Heart disease Father     Hypertension Brother     Breast cancer Sister 60         SOCIAL HISTORY:   Social History     Social History     "Marital status:      Spouse name: N/A    Number of children: N/A    Years of education: N/A     Occupational History     [facility] [employer]     Social History Main Topics    Smoking status: Never Smoker    Smokeless tobacco: Never Used    Alcohol use No      Comment: none    Drug use: No    Sexual activity: Yes     Partners: Male      Comment: m  works in dietary,  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     Other Topics Concern    Not on file     Social History Narrative        SUBSTANCE USE:  Social History     Social History Main Topics    Smoking status: Never Smoker    Smokeless tobacco: Never Used    Alcohol use No      Comment: none    Drug use: No    Sexual activity: Yes     Partners: Male      Comment: m  works in dietary,  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      Social History   Substance Use Topics    Smoking status: Never Smoker    Smokeless tobacco: Never Used    Alcohol use No      Comment: none        ALLERGIES: Codeine     /83  Pulse 70  Ht 5' 4" (1.626 m)  Wt 88.1 kg (194 lb 3.6 oz)  BMI 33.34 kg/m2    Higher Cortical Function:    Patient is a well developed, pleasant, well groomed individual appearing their stated age  Oriented - intact to person, place and time and followed two step instruction correctly.    Fund of knowledge was appropriate.    R-L Orientation - Intact - Impaired  Language - Speech was fluent without evidence for an aphasia.  Agnosias, agraphesthesia, or astereognosis - not present.   Extinction with double simultaneous stimulation:        Proximal-distal stimulation - Not present        Right-left stimulation - Not present  Cranial Nerves II - XII:    EOMs were intact with normal smooth and no nystagmus.    PERRLA. D/C   Funduscopic exam - disc were flat with normal A/V ratio and no exudates or hemorrhages. Visual fields were full to confrontation.    Motor - facial movement was symmetrical and normal.    Facial sensory - Light touch and pin prick " "sensations were normal.    Hearing was normal to finger rub.  Palate moved well and was symmetrical with normal palatal and oral sensation.    Tongue movement was full & the patient could say "la la la" and "Ka Ka Ka" without  difficulty. Patient repeated Religion and Latter day without difficulty. Normal power and bulk was found in the massiter and rotator muscles of the neck.  Motor: Power, bulk and tone were normal in all extremities.  Sensory: Light touch, pin prick, vibration and position senses were normal in all extremities.    Coordination:       Rapid alternating movements and rapid finger tapping - normal.       Finger to nose - nl.       Arm roll - symmetrical.    Gait:  Station, gait and tandem walking were done without difficulty and Romberg was negative.    Deep tendon reflexes:    Reflex L R   Bicpets 1+ 1+   Tricepts 1+ 1+   Brachio-radialis 1+ 1+   Knee 0+ 0+   Ankle 0+ 0+   Babinski No No     Tremor: resting, postural, intentional - none    Pulses    Carotids - strong without bruits    Peripheral - strong and symmetrical      IMPRESSION  1.  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

## 2017-03-30 ENCOUNTER — OFFICE VISIT (OUTPATIENT)
Dept: INTERNAL MEDICINE | Facility: CLINIC | Age: 62
End: 2017-03-30
Payer: COMMERCIAL

## 2017-03-30 VITALS
BODY MASS INDEX: 38.93 KG/M2 | HEART RATE: 79 BPM | WEIGHT: 193.13 LBS | HEIGHT: 59 IN | DIASTOLIC BLOOD PRESSURE: 78 MMHG | SYSTOLIC BLOOD PRESSURE: 138 MMHG

## 2017-03-30 DIAGNOSIS — E66.9 DIABETES MELLITUS TYPE 2 IN OBESE: Primary | ICD-10-CM

## 2017-03-30 DIAGNOSIS — E11.69 DIABETES MELLITUS TYPE 2 IN OBESE: Primary | ICD-10-CM

## 2017-03-30 DIAGNOSIS — E66.9 OBESITY (BMI 30-39.9): ICD-10-CM

## 2017-03-30 PROCEDURE — 3078F DIAST BP <80 MM HG: CPT | Mod: S$GLB,,, | Performed by: NURSE PRACTITIONER

## 2017-03-30 PROCEDURE — 3045F PR MOST RECENT HEMOGLOBIN A1C LEVEL 7.0-9.0%: CPT | Mod: S$GLB,,, | Performed by: NURSE PRACTITIONER

## 2017-03-30 PROCEDURE — 99213 OFFICE O/P EST LOW 20 MIN: CPT | Mod: S$GLB,,, | Performed by: NURSE PRACTITIONER

## 2017-03-30 PROCEDURE — 1160F RVW MEDS BY RX/DR IN RCRD: CPT | Mod: S$GLB,,, | Performed by: NURSE PRACTITIONER

## 2017-03-30 PROCEDURE — 3075F SYST BP GE 130 - 139MM HG: CPT | Mod: S$GLB,,, | Performed by: NURSE PRACTITIONER

## 2017-03-30 PROCEDURE — 4010F ACE/ARB THERAPY RXD/TAKEN: CPT | Mod: S$GLB,,, | Performed by: NURSE PRACTITIONER

## 2017-03-30 PROCEDURE — 99999 PR PBB SHADOW E&M-EST. PATIENT-LVL IV: CPT | Mod: PBBFAC,,, | Performed by: NURSE PRACTITIONER

## 2017-03-30 NOTE — MR AVS SNAPSHOT
[facility]  [address]  [place] [address]  Phone: [phone]  Fax: [phone]                  Katey Cerrato   3/30/2017 10:00 AM   Office Visit    Description:  Female : 1955   Provider:  Saira Allen NP   Department:  [facility]           Reason for Visit     Diabetes                To Do List           Future Appointments        Provider Department Dept Phone    2017 10:20 AM Blatazar England MD [facility] [phone]      Goals (5 Years of Data)     None      [facility]     [facility] On Call Nurse Care Line -  Assistance  Unless otherwise directed by your provider, please contact [facility], our nurse care line that is available for  assistance.     Registered nurses in the [facility] Center provide: appointment scheduling, clinical advisement, health education, and other advisory services.  Call: [phone] (toll free)               Medications           Message regarding Medications     Verify the changes and/or additions to your medication regime listed below are the same as discussed with your clinician today.  If any of these changes or additions are incorrect, please notify your healthcare provider.             Verify that the below list of medications is an accurate representation of the medications you are currently taking.  If none reported, the list may be blank. If incorrect, please contact your healthcare provider. Carry this list with you in case of emergency.           Current Medications     amlodipine (NORVASC) 10 MG tablet Take 1 tablet (10 mg total) by mouth once daily.    atorvastatin (LIPITOR) 20 MG tablet TAKE 1 TABLET (20 MG TOTAL) BY MOUTH ONCE DAILY.    blood sugar diagnostic (ONETOUCH ULTRA TEST) Strp 1 strip by Misc.(Non-Drug; Combo Route) route once daily.    chlorhexidine (PERIDEX) 0.12 % solution     diclofenac-misoprostol  mg-mcg (ARTHROTEC 75)   "mg-mcg per tablet Take 1 tablet by mouth daily as needed.    glimepiride (AMARYL) 4 MG tablet Take 1.5 tablets (6 mg total) by mouth before breakfast.    lancets (ACCU-CHEK SOFTCLIX LANCETS) Misc 1 each by Misc.(Non-Drug; Combo Route) route once daily.    lancets (ONE TOUCH ULTRASOFT LANCETS) Misc Check sugar qAM fasting    latanoprost 0.005 % ophthalmic solution PLACE 1 DROP INTO BOTH EYES EVERY EVENING.    levetiracetam (KEPPRA) 750 MG Tab TAKE 2 TABLETS BY MOUTH EVERY DAY    losartan (COZAAR) 25 MG tablet Take 1 tablet (25 mg total) by mouth once daily.    metformin (GLUCOPHAGE-XR) 500 MG 24 hr tablet TAKE 2 TABLETS BY MOUTH TWICE A DAY WITH MEALS    metoprolol succinate (TOPROL-XL) 50 MG 24 hr tablet TAKE 1 TABLET BY MOUTH EVERY EVENING.    multivitamin-minerals-lutein (CENTRUM SILVER) Tab Take 1 tablet by mouth once daily once daily.    oxybutynin (DITROPAN) 5 MG Tab TAKE 1 TABLET BY MOUTH TWICE A DAY    SITagliptan (JANUVIA) 100 MG Tab Take 1 tablet (100 mg total) by mouth once daily.           Clinical Reference Information           Your Vitals Were     BP Pulse Height Weight BMI    138/78 (BP Location: Left arm, Patient Position: Sitting, BP Method: Manual) 79 4' 11" (1.499 m) 87.6 kg (193 lb 2 oz) 39.01 kg/m2      Blood Pressure          Most Recent Value    BP  138/78      Allergies as of 3/30/2017     Codeine      Immunizations Administered on Date of Encounter - 3/30/2017     None      [facility] Sign-Up     Activating your [facility] account is as easy as 1-2-3!     1) Visit [email or web address], select Sign Up Now, enter this activation code and your date of birth, then select Next.  [email or web address]  Expires: 4/25/2017  7:24 AM      2) Create a username and password to use when you visit [facility] in the future and select a security question in case you lose your password and select Next.    3) Enter your e-mail address and click Sign Up!    Additional Information  If you have questions, please e-mail " [email or web address] or call [phone] to talk to our [facility] staff. Remember, [facility] is NOT to be used for urgent needs. For medical emergencies, dial 911.         Language Assistance Services     ATTENTION: Language assistance services are available, free of charge. Please call [phone].      ATENCIÓN: Si habla español, tiene a persaud disposición servicios gratuitos de asistencia lingüística. Llame al [phone].     CHÚ Ý: N?u b?n nói Ti?ng Vi?t, có các d?ch v? h? tr? ngôn ng? mi?n phí dành cho b?n. G?i s? [phone].         [facility] - [facility] Medicine complies with applicable Federal civil rights laws and does not discriminate on the basis of race, color, national origin, age, disability, or sex.

## 2017-03-30 NOTE — PROGRESS NOTES
Subjective:       Patient ID: Katey Cerrato is a 61 y.o. female.    Chief Complaint: Diabetes    HPI Comments: Katey Cerrato seen in follow-up for DM management after starting Januvia.    DM2 is not well controlled with recent A1c 8.1.  Fasting sugars <130, with two readings 154 and 160 (indulgent dinner).  Currently pt is taking Metformin 1000 mg AM and 500 mg PM (GI side effects), Amaryl 6 mg, and Januvia 100 mg daily (since 3/2). No known end organ signs/symptoms. Denies low sugars or symptoms. Health maint is up to date.     Have not been exercising as d/w Dr. England. Pt reports that she is very active at work and at home, but no dedicated exercise. Says she has a treadmill at home she can use but time is a barrier.             Diabetes   She presents for her follow-up diabetic visit. She has type 2 diabetes mellitus. Her disease course has been improving. Hypoglycemia symptoms include seizures. Pertinent negatives for hypoglycemia include no dizziness or headaches. Pertinent negatives for diabetes include no blurred vision, no chest pain, no fatigue, no foot ulcerations, no visual change and no weakness.     Review of Systems   Constitutional: Negative for fatigue.   HENT: Negative.    Eyes: Negative.  Negative for blurred vision.   Respiratory: Negative.    Cardiovascular: Negative for chest pain.   Gastrointestinal: Negative for diarrhea, nausea and vomiting.   Endocrine: Negative.    Genitourinary: Negative.    Musculoskeletal: Negative.    Allergic/Immunologic: Negative.    Neurological: Positive for seizures. Negative for dizziness, weakness, light-headedness and headaches.        Had seizure 3 weeks ago    Hematological: Negative.    Psychiatric/Behavioral: Negative for dysphoric mood.       Objective:      Physical Exam   Constitutional: She is oriented to person, place, and time. She appears well-developed and well-nourished.   HENT:   Head: Normocephalic and atraumatic.   Eyes: EOM are normal.  Pupils are equal, round, and reactive to light.   Neck: Normal range of motion. Neck supple.   Cardiovascular: Normal rate, regular rhythm, normal heart sounds and intact distal pulses.    Pulmonary/Chest: Effort normal and breath sounds normal.   Musculoskeletal: Normal range of motion.   Neurological: She is alert and oriented to person, place, and time.   Skin: Skin is warm and dry.   Psychiatric: She has a normal mood and affect. Her behavior is normal.       Assessment:       1. Diabetes mellitus type 2 in obese    2. Obesity (BMI 30-39.9)        Plan:       -Continue home BS checks. Keep log  -Continue current regimen   -Repeat A1c May  -Start walking at least 3 times daily 20 - 30 minutes on treadmill at home, increase as tolerated. Emphasized diet/exercise for DM control  -F/U with Dr. England 06/02/2017

## 2017-04-28 RX ORDER — METOPROLOL SUCCINATE 50 MG/1
TABLET, EXTENDED RELEASE ORAL
Qty: 90 TABLET | Refills: 3 | Status: SHIPPED | OUTPATIENT
Start: 2017-04-28 | End: 2018-09-26 | Stop reason: SDUPTHER

## 2017-05-24 ENCOUNTER — LAB VISIT (OUTPATIENT)
Dept: LAB | Facility: OTHER | Age: 62
End: 2017-05-24
Attending: NURSE PRACTITIONER
Payer: COMMERCIAL

## 2017-05-24 DIAGNOSIS — E11.69 DIABETES MELLITUS TYPE 2 IN OBESE: ICD-10-CM

## 2017-05-24 DIAGNOSIS — E66.9 DIABETES MELLITUS TYPE 2 IN OBESE: ICD-10-CM

## 2017-05-24 PROCEDURE — 83036 HEMOGLOBIN GLYCOSYLATED A1C: CPT

## 2017-05-24 PROCEDURE — 36415 COLL VENOUS BLD VENIPUNCTURE: CPT

## 2017-05-25 ENCOUNTER — TELEPHONE (OUTPATIENT)
Dept: INTERNAL MEDICINE | Facility: CLINIC | Age: 62
End: 2017-05-25

## 2017-05-25 LAB
ESTIMATED AVG GLUCOSE: 151 MG/DL
HBA1C MFR BLD HPLC: 6.9 %

## 2017-05-25 NOTE — TELEPHONE ENCOUNTER
Please notify pt that A1c is at goal which show better control of sugars. A1c is 6.9. No changes to current meds. Remind pt of scheduled appt with Dr. England 06/02 for follow-up.

## 2017-06-02 ENCOUNTER — OFFICE VISIT (OUTPATIENT)
Dept: INTERNAL MEDICINE | Facility: CLINIC | Age: 62
End: 2017-06-02
Payer: COMMERCIAL

## 2017-06-02 ENCOUNTER — LAB VISIT (OUTPATIENT)
Dept: LAB | Facility: OTHER | Age: 62
End: 2017-06-02
Attending: PSYCHIATRY & NEUROLOGY
Payer: COMMERCIAL

## 2017-06-02 VITALS
WEIGHT: 191.38 LBS | HEIGHT: 59 IN | BODY MASS INDEX: 38.58 KG/M2 | SYSTOLIC BLOOD PRESSURE: 130 MMHG | OXYGEN SATURATION: 99 % | DIASTOLIC BLOOD PRESSURE: 82 MMHG | HEART RATE: 77 BPM

## 2017-06-02 DIAGNOSIS — E23.6 EMPTY SELLA SYNDROME: ICD-10-CM

## 2017-06-02 DIAGNOSIS — G40.319 GENERALIZED CONVULSIVE EPILEPSY WITH INTRACTABLE EPILEPSY: Primary | ICD-10-CM

## 2017-06-02 DIAGNOSIS — E11.69 DIABETES MELLITUS TYPE 2 IN OBESE: ICD-10-CM

## 2017-06-02 DIAGNOSIS — M79.671 BILATERAL FOOT PAIN: ICD-10-CM

## 2017-06-02 DIAGNOSIS — M79.672 BILATERAL FOOT PAIN: ICD-10-CM

## 2017-06-02 DIAGNOSIS — E05.00 GRAVES' DISEASE WITH EXOPHTHALMOS: ICD-10-CM

## 2017-06-02 DIAGNOSIS — E78.2 MIXED HYPERLIPIDEMIA: ICD-10-CM

## 2017-06-02 DIAGNOSIS — I10 ESSENTIAL HYPERTENSION: Chronic | ICD-10-CM

## 2017-06-02 DIAGNOSIS — G40.319 GENERALIZED CONVULSIVE EPILEPSY WITH INTRACTABLE EPILEPSY: ICD-10-CM

## 2017-06-02 DIAGNOSIS — E66.9 DIABETES MELLITUS TYPE 2 IN OBESE: ICD-10-CM

## 2017-06-02 PROCEDURE — 4010F ACE/ARB THERAPY RXD/TAKEN: CPT | Mod: S$GLB,,, | Performed by: INTERNAL MEDICINE

## 2017-06-02 PROCEDURE — 36415 COLL VENOUS BLD VENIPUNCTURE: CPT

## 2017-06-02 PROCEDURE — 80177 DRUG SCRN QUAN LEVETIRACETAM: CPT

## 2017-06-02 PROCEDURE — 99214 OFFICE O/P EST MOD 30 MIN: CPT | Mod: S$GLB,,, | Performed by: INTERNAL MEDICINE

## 2017-06-02 PROCEDURE — 99999 PR PBB SHADOW E&M-EST. PATIENT-LVL III: CPT | Mod: PBBFAC,,, | Performed by: INTERNAL MEDICINE

## 2017-06-02 PROCEDURE — 3044F HG A1C LEVEL LT 7.0%: CPT | Mod: S$GLB,,, | Performed by: INTERNAL MEDICINE

## 2017-06-02 RX ORDER — MELOXICAM 15 MG/1
15 TABLET ORAL DAILY PRN
Qty: 30 TABLET | Refills: 3 | Status: SHIPPED | OUTPATIENT
Start: 2017-06-02 | End: 2018-01-10 | Stop reason: SDUPTHER

## 2017-06-02 NOTE — PROGRESS NOTES
Subjective:       Patient ID: aKtey Cerrato is a 61 y.o. female.    Chief Complaint: Foot Pain; Diabetes; and Hypertension    Pt here for f/u. Pt's BP is well controlled. Tolerating meds well. Pt denies cp/sob/ha/vision or neuro changes. Checking at home and is well controlled.     DM2 is well controlled. She is on metformin 1000mg AM and 500mg PM; she cannot tolerate higher doses due to GI side effects. Also on amaryl 6mg daily. Last A1C was 6.9. No known end organ issues. Other health maint is up to date. No low sugars. Fasting sugars are 100s-120s. Wt loss and exercise goals d/w pt.     HLD is tx with lipitor to 20mg. Tolerating well.     She has h/o Graves dz with exophthalmos. She sees endocrine annually but is overdue. She will schedule. No hyperthyroid state.     She c/o bilat dorsal foot pain. This is not new and was using arthrotec but had to stop per below. No relief with advil. Has not seen podiatry. Some swelling at times, especially at end of day. Has not tried compression stockings. Denies any known trauma but does report having to stand on feet for long periods of time at work.     She sees Neuro regularly for h/o sz. She had breakthrough sz 3 mos ago and was felt due to inadequate keppra levels resulting from poor absorption while concurrently taking arthrotec. She has since d/c this and keppra level is better. No sz since then. She did not go for repeat keppra level as ordered but will do so today.       Hypertension   Pertinent negatives include no chest pain, headaches, palpitations or shortness of breath.   Diabetes   Pertinent negatives for hypoglycemia include no dizziness or headaches. Pertinent negatives for diabetes include no chest pain, no fatigue and no weakness.   Foot Pain   Pertinent negatives include no abdominal pain, arthralgias, chest pain, congestion, fatigue, fever, headaches, sore throat or weakness.     Review of Systems   Constitutional: Negative for fatigue, fever and  unexpected weight change.   HENT: Negative for congestion and sore throat.    Eyes: Negative for visual disturbance.   Respiratory: Negative for shortness of breath.    Cardiovascular: Negative for chest pain, palpitations and leg swelling.   Gastrointestinal: Negative for abdominal pain, blood in stool and diarrhea.   Genitourinary: Negative for dysuria.   Musculoskeletal: Negative for arthralgias.   Neurological: Negative for dizziness, syncope, weakness and headaches.   Hematological: Negative for adenopathy.   Psychiatric/Behavioral: Negative for dysphoric mood.       Objective:      Physical Exam   Constitutional: She is oriented to person, place, and time. She appears well-developed and well-nourished.   HENT:   Head: Normocephalic and atraumatic.   Right Ear: Tympanic membrane, external ear and ear canal normal.   Left Ear: Tympanic membrane, external ear and ear canal normal.   Nose: Nose normal.   Mouth/Throat: Uvula is midline and oropharynx is clear and moist.   Eyes: Conjunctivae, EOM and lids are normal. Pupils are equal, round, and reactive to light. Right conjunctiva is not injected. Left conjunctiva is not injected.   Neck: Neck supple. No JVD present. Carotid bruit is not present. No thyroid mass and no thyromegaly present.   Cardiovascular: Normal rate, regular rhythm, S1 normal, S2 normal, normal heart sounds and intact distal pulses.  PMI is not displaced.    No murmur heard.  Pulmonary/Chest: Effort normal and breath sounds normal. She has no wheezes. She has no rhonchi. She has no rales.   Abdominal: Soft. Bowel sounds are normal. She exhibits no distension and no abdominal bruit. There is no hepatosplenomegaly. There is no tenderness.   Musculoskeletal: She exhibits no edema.        Right foot: Normal.        Left foot: Normal.   Lymphadenopathy:        Head (right side): No preauricular and no posterior auricular adenopathy present.        Head (left side): No preauricular and no posterior  auricular adenopathy present.     She has no cervical adenopathy.     She has no axillary adenopathy.   Neurological: She is alert and oriented to person, place, and time. She has normal strength. No cranial nerve deficit or sensory deficit.   Skin: Skin is warm. No rash noted.   Psychiatric: She has a normal mood and affect. Her speech is normal and behavior is normal. Judgment and thought content normal.       Assessment:       1. Generalized convulsive epilepsy with intractable epilepsy    2. Essential hypertension    3. Diabetes mellitus type 2 in obese    4. Graves' disease with exophthalmos    5. Uncontrolled type 2 diabetes mellitus without complication, without long-term current use of insulin    6. Mixed hyperlipidemia    7. Empty sella syndrome    8. Bilateral foot pain        Plan:       1. Continue current meds  2. Trial of mobic--proper use d/w pt  3. Home BP and BS monitoring  4. Podiatry referral  5. Keep f/u with specialists

## 2017-06-05 ENCOUNTER — OFFICE VISIT (OUTPATIENT)
Dept: PODIATRY | Facility: CLINIC | Age: 62
End: 2017-06-05
Payer: COMMERCIAL

## 2017-06-05 VITALS
HEART RATE: 66 BPM | HEIGHT: 59 IN | BODY MASS INDEX: 38.93 KG/M2 | SYSTOLIC BLOOD PRESSURE: 154 MMHG | WEIGHT: 193.13 LBS | DIASTOLIC BLOOD PRESSURE: 87 MMHG

## 2017-06-05 DIAGNOSIS — M79.671 BILATERAL FOOT PAIN: Primary | ICD-10-CM

## 2017-06-05 DIAGNOSIS — M79.672 BILATERAL FOOT PAIN: Primary | ICD-10-CM

## 2017-06-05 LAB — LEVETIRACETAM SERPL-MCNC: 18.7 UG/ML (ref 3–60)

## 2017-06-05 PROCEDURE — 3044F HG A1C LEVEL LT 7.0%: CPT | Mod: S$GLB,,, | Performed by: PODIATRIST

## 2017-06-05 PROCEDURE — 99999 PR PBB SHADOW E&M-EST. PATIENT-LVL III: CPT | Mod: PBBFAC,,, | Performed by: PODIATRIST

## 2017-06-05 PROCEDURE — 99204 OFFICE O/P NEW MOD 45 MIN: CPT | Mod: S$GLB,,, | Performed by: PODIATRIST

## 2017-06-05 PROCEDURE — 4010F ACE/ARB THERAPY RXD/TAKEN: CPT | Mod: S$GLB,,, | Performed by: PODIATRIST

## 2017-06-05 NOTE — LETTER
June 5, 2017      Baltazar England MD  [address]  [facility] [address]           [facility] - Podiatry  [address]  [facility] [address]  Phone: [phone]          Patient: Katey Cerrato   MR Number: [id number]   YOB: 1955   Date of Visit: 6/5/2017       Dear Dr. Baltazar England:    Thank you for referring Katey Cerrato to me for evaluation. Attached you will find relevant portions of my assessment and plan of care.    If you have questions, please do not hesitate to call me. I look forward to following Katey Cerrato along with you.    Sincerely,    Arely Petersen DPM    Enclosure  CC:  No Recipients    If you would like to receive this communication electronically, please contact [email or web address] or [phone] to request more information on [employer] access.    For providers and/or their staff who would like to refer a patient to [facility], please contact us through our one-stop-shop provider referral line, [facility], at [phone].    If you feel you have received this communication in error or would no longer like to receive these types of communications, please e-mail [email or web address]

## 2017-06-05 NOTE — PROGRESS NOTES
CC:   bilateral foot pain      HPI:   Katey Cerrato is a 61 y.o. female with complaints of bilateral foot pain.  She reports that she feels like the nerves are throbbing on the top of her feet, for the past several months.  Pain worse with weight bearing and walking.  Certain shoes alleviate the pain.  She was on medications for pain and inflammation but had that discontinue due to seizures and her foot pain recurred.  She just started meloxicam on Friday with relief to the area.          Past Medical History:   Diagnosis Date    Cataract     Empty sella syndrome     GHD (growth hormone deficiency)     Glaucoma     Graves' disease with exophthalmos     History of vitamin D deficiency     Hyperlipidemia     Hypertension     Seizures     Thyroid nodule     Thyroid nodule     Type II or unspecified type diabetes mellitus without mention of complication, uncontrolled          Current Outpatient Prescriptions on File Prior to Visit   Medication Sig Dispense Refill    amlodipine (NORVASC) 10 MG tablet Take 1 tablet (10 mg total) by mouth once daily. 90 tablet 3    atorvastatin (LIPITOR) 20 MG tablet TAKE 1 TABLET (20 MG TOTAL) BY MOUTH ONCE DAILY. 90 tablet 3    blood sugar diagnostic (ONETOUCH ULTRA TEST) Strp 1 strip by Misc.(Non-Drug; Combo Route) route once daily. 100 strip 3    glimepiride (AMARYL) 4 MG tablet Take 1.5 tablets (6 mg total) by mouth before breakfast. 135 tablet 3    lancets (ACCU-CHEK SOFTCLIX LANCETS) Misc 1 each by Misc.(Non-Drug; Combo Route) route once daily. 100 each 3    lancets (ONE TOUCH ULTRASOFT LANCETS) Misc Check sugar qAM fasting 35 each 5    latanoprost 0.005 % ophthalmic solution PLACE 1 DROP INTO BOTH EYES EVERY EVENING. 2.5 mL 6    levetiracetam (KEPPRA) 750 MG Tab TAKE 2 TABLETS BY MOUTH EVERY  tablet 3    losartan (COZAAR) 25 MG tablet Take 1 tablet (25 mg total) by mouth once daily. 90 tablet 3    meloxicam (MOBIC) 15 MG tablet Take 1 tablet (15 mg  "total) by mouth daily as needed for Pain. 30 tablet 3    metformin (GLUCOPHAGE-XR) 500 MG 24 hr tablet TAKE 2 TABLETS BY MOUTH TWICE A DAY WITH MEALS (Patient taking differently: TAKE 2 TABLETS BY MOUTH EVERY AM and 1 TABLET EVERY PM) 360 tablet 0    metoprolol succinate (TOPROL-XL) 50 MG 24 hr tablet TAKE 1 TABLET BY MOUTH EVERY EVENING 90 tablet 3    multivitamin-minerals-lutein (CENTRUM SILVER) Tab Take 1 tablet by mouth once daily once daily.      SITagliptan (JANUVIA) 100 MG Tab Take 1 tablet (100 mg total) by mouth once daily. 90 tablet 3     No current facility-administered medications on file prior to visit.            Review of patient's allergies indicates:   Allergen Reactions    Codeine Nausea Only           ROS:   General ROS: negative for - chills, fever or night sweats  Respiratory ROS: no cough, shortness of breath, or wheezing  Cardiovascular ROS: no chest pain or dyspnea on exertion  Musculoskeletal ROS: negative  Neurological ROS: no TIA or stroke symptoms  Dermatological ROS: negative      EXAM:     Vitals:    06/05/17 1155   BP: (!) 154/87   Pulse: 66   Weight: 87.6 kg (193 lb 1.6 oz)   Height: 4' 11" (1.499 m)        General:  Alert, oriented, no acute distress      Bilateral  Lower extremity exam:    Vascular:   Dorsalis Pedis:  present   Posterior Tibial:  present  capillary refill time:  3 seconds  Temperature of toes cool to touch  Hair on toes:  decreased    Trace and pitting Edema to bilateral foot and lower extremities       Neurological:     sharp dull - B/L normal and light touch - B/L normal  No sensorimotor deficits      Dermatological:   There is intact skin tone, turgor, and temperature.    Wounds: none  Erythema:  none  Toenails normal without lesions      Musculoskeletal:   Metatarsophalangeal, subtalar, and ankle range of motion are 5/5, adequate ROM, adequate strength  Right foot: no gross deformity  Left foot: no gross deformity  Mild tenderness to palpation dorsal " bilateral foot.  No increased temperature.                  ASSESSMENT/PLAN:        I counseled the patient on her conditions, their implications and medical management.        Bilateral foot pain  · Pain improving with meloxicam, continue as needed  · Comfortable shoes  · OTC analgesics (ex Bengay, Aspercreme) prn   · Compression socks    Uncontrolled type 2 diabetes mellitus without complication, without long-term current use of insulin        follow up in a year for foot exam.

## 2017-06-19 ENCOUNTER — TELEPHONE (OUTPATIENT)
Dept: INTERNAL MEDICINE | Facility: CLINIC | Age: 62
End: 2017-06-19

## 2017-06-19 NOTE — TELEPHONE ENCOUNTER
----- Message from Lucy Rodriguez sent at 6/19/2017  7:46 AM CDT -----  Contact: MORAIMA LUCAS [[id number]]  _x  1st Request  _  2nd Request  _  3rd Request        Who: MORAIMA LUCAS [[id number]]    Why: pt would like results says it can be left on voicemail or mailed out to her. Please call, Thanks!    What Number to Call Back: [phone]    When to Expect a call back: (Before the end of the day)   -- if the call is after 12:00, the call back will be tomorrow.

## 2017-06-20 ENCOUNTER — TELEPHONE (OUTPATIENT)
Dept: NEUROLOGY | Facility: CLINIC | Age: 62
End: 2017-06-20

## 2017-06-28 RX ORDER — LANCETS
1 EACH MISCELLANEOUS DAILY
Qty: 100 EACH | Refills: 3 | Status: SHIPPED | OUTPATIENT
Start: 2017-06-28 | End: 2018-09-21 | Stop reason: SDUPTHER

## 2017-06-28 RX ORDER — BLOOD SUGAR DIAGNOSTIC
STRIP MISCELLANEOUS
Qty: 100 STRIP | Refills: 3 | Status: SHIPPED | OUTPATIENT
Start: 2017-06-28 | End: 2018-09-21 | Stop reason: SDUPTHER

## 2017-06-28 NOTE — TELEPHONE ENCOUNTER
----- Message from Virginia Jack sent at 6/28/2017 10:03 AM CDT -----  Contact: Patient herself  X  1st Request  _  2nd Request  _  3rd Request    Who: Katey Cerrato (mrn# [id number])    Medication #1   ACCU-CHEK  Lancets  -  90 day supply    Preferred Pharmacy:  [id number]  -  [place]   -  [id number]  [facility] [phone]  /  Fax#  [phone]    When to Expect a call back: (Before the end of the day)   -- if the call is after 12:00, the call back will be tomorrow.

## 2017-07-03 ENCOUNTER — TELEPHONE (OUTPATIENT)
Dept: INTERNAL MEDICINE | Facility: CLINIC | Age: 62
End: 2017-07-03

## 2017-07-03 NOTE — TELEPHONE ENCOUNTER
Pt informed of 6 mon f/u. Pt scheduled accordingly. Patient has no further questions or concerns.

## 2017-07-03 NOTE — TELEPHONE ENCOUNTER
----- Message from Lucy Rodriguez sent at 7/3/2017  2:40 PM CDT -----  _  1st Request  _  2nd Request  _  3rd Request        Who:  MORAIMA LUCAS [[id number]]    Why: pt would like to speak with clinical staff says she has questions about scheduling next appt to see doctor. Please call    What Number to Call Back: [phone]    When to Expect a call back: (With in 24 hours)

## 2017-08-04 RX ORDER — METFORMIN HYDROCHLORIDE 500 MG/1
TABLET, EXTENDED RELEASE ORAL
Qty: 360 TABLET | Refills: 0 | Status: SHIPPED | OUTPATIENT
Start: 2017-08-04 | End: 2018-05-22 | Stop reason: SDUPTHER

## 2017-08-16 ENCOUNTER — TELEPHONE (OUTPATIENT)
Dept: PODIATRY | Facility: CLINIC | Age: 62
End: 2017-08-16

## 2017-08-16 ENCOUNTER — TELEPHONE (OUTPATIENT)
Dept: INTERNAL MEDICINE | Facility: CLINIC | Age: 62
End: 2017-08-16

## 2017-08-16 NOTE — TELEPHONE ENCOUNTER
Pt informed of advice.  States verbal understanding. Patient has no further questions or concerns.

## 2017-08-16 NOTE — TELEPHONE ENCOUNTER
----- Message from Dina Weeks sent at 8/16/2017  9:26 AM CDT -----  Contact: Pt  X  1st Request  _  2nd Request  _  3rd Request        Who: MORAIMA LUCAS [[id number]]    Why: Pt is calling to see if she can have the Podiatrist fax over her return to work note to the clinic so that she can come in to the clinic to get it.  Pt says that the Podiatrist office is to far and she's here at [facility] [place], operated by [facility] and can get the note easier here.  Please contact pt to further discuss and advise.    What Number to Call Back: [phone]    When to Expect a call back: (With in 24 hours)

## 2017-08-16 NOTE — TELEPHONE ENCOUNTER
Pt states she would like work excuse sent to our office via fax from Podiatrist. Pt given fax info. Pt states   Pt request increase of mobic to 30mg for arthritis in feet. Pt states she will also reach out to podiatrist for advice as well. Please advise/authorize?

## 2017-08-16 NOTE — TELEPHONE ENCOUNTER
----- Message from Greg Tafoya sent at 8/16/2017  8:21 AM CDT -----  Contact: self@home, [phone]  Pt called in requesting a letter excusing her from work for 3 days because she is having spasms in her left foot. Please call and advise    Thank you

## 2017-08-16 NOTE — TELEPHONE ENCOUNTER
Requesting  For  An excuse letter  To be out of work  to   Wednesday 16, 2017 , Thursday 17, 2017 and  Friday18, 2017   And to return back on Monday   Fax to [phone] make  attention  To mrs Carlos Manuel smart

## 2017-08-17 ENCOUNTER — TELEPHONE (OUTPATIENT)
Dept: INTERNAL MEDICINE | Facility: CLINIC | Age: 62
End: 2017-08-17

## 2017-08-17 NOTE — TELEPHONE ENCOUNTER
----- Message from Bonny Sagastume sent at 8/17/2017  8:33 AM CDT -----  Contact: pt  X_  1st Request  _  2nd Request  _  3rd Request    Who:MORAIMA LUCAS [[id number]]    Why: Patient states she would like a call back in regards to a letter left at the  for her.... Please contact patient to further discuss and advise     What Number to Call Back: [phone]    When to Expect a call back: (Before the end of the day)   -- if call after 3:00 call back will be tomorrow.

## 2017-08-17 NOTE — TELEPHONE ENCOUNTER
Pt informed RTW note not received.  States verbal understanding. Patient has no further questions or concerns.

## 2017-09-19 ENCOUNTER — TELEPHONE (OUTPATIENT)
Dept: OBSTETRICS AND GYNECOLOGY | Facility: CLINIC | Age: 62
End: 2017-09-19

## 2017-09-19 DIAGNOSIS — Z12.31 VISIT FOR SCREENING MAMMOGRAM: Primary | ICD-10-CM

## 2017-09-25 ENCOUNTER — OFFICE VISIT (OUTPATIENT)
Dept: NEUROLOGY | Facility: CLINIC | Age: 62
End: 2017-09-25
Payer: COMMERCIAL

## 2017-09-25 VITALS
SYSTOLIC BLOOD PRESSURE: 137 MMHG | HEIGHT: 59 IN | DIASTOLIC BLOOD PRESSURE: 76 MMHG | BODY MASS INDEX: 39.55 KG/M2 | HEART RATE: 73 BPM | WEIGHT: 196.19 LBS

## 2017-09-25 DIAGNOSIS — G40.319 GENERALIZED CONVULSIVE EPILEPSY WITH INTRACTABLE EPILEPSY: Primary | ICD-10-CM

## 2017-09-25 PROCEDURE — 99999 PR PBB SHADOW E&M-EST. PATIENT-LVL III: CPT | Mod: PBBFAC,,, | Performed by: PSYCHIATRY & NEUROLOGY

## 2017-09-25 PROCEDURE — 99214 OFFICE O/P EST MOD 30 MIN: CPT | Mod: S$GLB,,, | Performed by: PSYCHIATRY & NEUROLOGY

## 2017-09-25 PROCEDURE — 3008F BODY MASS INDEX DOCD: CPT | Mod: S$GLB,,, | Performed by: PSYCHIATRY & NEUROLOGY

## 2017-09-25 PROCEDURE — 3075F SYST BP GE 130 - 139MM HG: CPT | Mod: S$GLB,,, | Performed by: PSYCHIATRY & NEUROLOGY

## 2017-09-25 PROCEDURE — 3078F DIAST BP <80 MM HG: CPT | Mod: S$GLB,,, | Performed by: PSYCHIATRY & NEUROLOGY

## 2017-09-25 NOTE — PROGRESS NOTES
Name: Katey Cerrato  MRN: [id number]   CSN: [id number]      Date: 09/25/2017    HISTORY OF PRESENT ILLNESS (HPI)  The patient is a 62 y.o.  woman with DM, HTN, graves disease evaluated by Dr Luis for an episode of loss of consciousness that occurred in March 2009. She was not feeling well in morning and laid back down. Her  took their little boy to school and when he returned 30 mins later and he could not wake her up and her tongue was bleeding. She was brought to ER with no memory of all of this until after admitted. She felt ok when she awoke in the hospital.  put her Keppra 750mg 3 Q HS. She had a EEG Sept.2009 which was abnormal. She has had no furthers seizures after starting on Keppra XR. Last prescription was for 2 tabs a day. She had done well on that dose so the dose was not increased back to the 3 tabs a day. She also ran out of medications last month and did not taking anything for 5 days without experiencing seizures or other problems  MRI showed subcortical ischemic vascular changes and she has high cholesterol (on simvastatin) and hypertension (not treated). She is followed by her PCP for these and was recently put on a new medication for diabetes.    Interim History  No further seizures    3/2017  Patient presents after second lifetime seizure with ED presentation on 3/22/2017 which occurred from sleep.  She had missed 2 doses of medication but level at that time was 0.  She reports that for 1 month leading up to this event, she was taking diclofenac-misoprostol on a daily basis for knee pain and had been noticing whole LEV tablets in her stool.  Previously took this medicine only rarely for knee pain.    Seizure Seminology  Seizure Type 1  Classification:   Aura -   Ictus  - Nonconv -  - Conv -  - Duration -   Post-ictal  - Symptoms  - Duration  Age of onset   Current Seizure Frequency -    Last Seizure    sz per month 2015 2016 2017 2018 2019 2020   Johnie         Feb         Mar          Apr         May         Jean         Jul         Aug         Sep         Oct         Nov         Dec         Tot           Seizure Triggers  Sleep Deprivation -  None  Other medications -  None  Psych/stress -  None  Photic stimulation -  None  Hyperventilation -  None  Medical Problems -  None  Menses -   No  Sensory Stimulation    Light  No   Sound  No   Problem Solv No   Other  No  Missed dose of Rx None    AED Treatments  Present regimen  levetiracetam (Keppra, LEV) 1500mg daily  -Lvl 12/1/2010 - 44 ug/ml    Prior treatments      Not tried  acetazolamide (Diamox, AZM)  amantadine  carbamazepine (Tegretol, CBZ)  clobezam (Onfi or Frizium, CLB)  ethosuximide (Zarontin, ESM)  eslicarbazine (Aptiom, ESL)  felbamate (felbatol, FBM)  gabapentin (Neurontin, GPN)  lacosamide (Vimpat, LCS)   lamotrigine (Lamictal, LTG)   methsuximide (Celontin, MSM)  methyphenytoin (Mesantion, MHT)  oxcarbazepine (Trileptal OXC)  perampanel (Fycompa, FCP)   phenobarbital (Pb)  phenytoin (Dilantin, PHT)  pregabalin (Lyrica, PGB)  primidone (Mysoline, PRM)  retigabine (Potiga, RTG)  rufinamide (Banzel, RUF)  tiagabine (Gabatril,  TGB)  topiramate (Topamax, TPM)  viagabatrin, (Sabril, VGB)  vagal nerve stimulator (VNS)  valproic acid (Depakote, VPA)  zonisamide (Zonegran, ZNA)  Benzodiazepines  diazepam - rectal (Diastatl)  diazepam - oral (Valium, DZ)  clonazepam (Klonopin, CZP)  clorazepate (Tranxene, CLZ)  Ativan  Brain Stimulation  Vagal Nerve Stimulation-n/a  DBS- n/a    Adherence/Compliance method  Memory - yes  Mom or Spouse - Yes  Pill Box - no  Rafael calendar - no  Turn over medication bottle - no  Phone alarm - no    Seizure Evaluation  Seizure Evaluation  EEG - 2009-09-15 - abnormal with bilateral temporal slowing and right sided spike and sharp waves.  EEG/Video monitoring -   MRI 2009-04-01 - A few punctate areas of t2/flair signal hyperintensity are present in the parietal white matter which are nonspecific though may be  sequelae of chronic microvascular ischemic change. There is no evidence for acute infarction or enhancing lesion.   CT Scan - no  PET Scan N/A  Neuropsychological evaluation N/A    Potential Epilepsy Risk Factors:   Pregnancy/Labor/Delivery - full term uncomplicated pregnancy labor and vaginal delivery  Febrile seizures - none  Head injury  - none  CNS infection - none     Stroke - none  Family Hx of Sz - none    PAST MEDICAL HISTORY:   Active Ambulatory Problems     Diagnosis Date Noted    Generalized convulsive epilepsy with intractable epilepsy 09/07/2012    Hypertension associated with diabetes 09/07/2012    Diabetes mellitus type 2 in obese 09/07/2012    Obesity (BMI 30-39.9) 09/07/2012    Essential hypertension     Graves' disease with exophthalmos     Empty sella syndrome     Hyperlipidemia     Uncontrolled type 2 diabetes mellitus without complication, without long-term current use of insulin 03/18/2014     Resolved Ambulatory Problems     Diagnosis Date Noted    Neuropathy 08/07/2013     Past Medical History:   Diagnosis Date    Cataract     Empty sella syndrome     GHD (growth hormone deficiency)     Glaucoma     Graves' disease with exophthalmos     History of vitamin D deficiency     Hyperlipidemia     Hypertension     Seizures     Thyroid nodule     Thyroid nodule     Type II or unspecified type diabetes mellitus without mention of complication, uncontrolled         PAST SURGICAL HISTORY:   Past Surgical History:   Procedure Laterality Date    CHOLECYSTECTOMY      HYSTERECTOMY      one ovary intact    KNEE SURGERY      LYMPH NODE BIOPSY  3010        FAMILY HISTORY:   Family History   Problem Relation Age of Onset    Cancer Mother     Cataracts Mother     Glaucoma Mother      shunt    Heart disease Mother     Tremor Mother     Breast cancer Mother 78    Heart disease Sister     Heart disease Father     Hypertension Brother     Breast cancer Sister 60         SOCIAL  HISTORY:   Social History     Social History    Marital status:      Spouse name: N/A    Number of children: N/A    Years of education: N/A     Occupational History     [employer]     Social History Main Topics    Smoking status: Never Smoker    Smokeless tobacco: Never Used    Alcohol use No      Comment: none    Drug use: No    Sexual activity: Yes     Partners: Male      Comment: m  works in dietary,  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     Other Topics Concern    Not on file     Social History Narrative    No narrative on file        SUBSTANCE USE:  Social History     Social History Main Topics    Smoking status: Never Smoker    Smokeless tobacco: Never Used    Alcohol use No      Comment: none    Drug use: No    Sexual activity: Yes     Partners: Male      Comment: m  works in dietary,  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      Social History   Substance Use Topics    Smoking status: Never Smoker    Smokeless tobacco: Never Used    Alcohol use No      Comment: none        ALLERGIES: Codeine     There were no vitals taken for this visit.    Higher Cortical Function:    Patient is a well developed, pleasant, well groomed individual appearing their stated age  Oriented - intact to person, place and time and followed two step instruction correctly.    Fund of knowledge was appropriate.    R-L Orientation - Intact - Impaired  Language - Speech was fluent without evidence for an aphasia.  Agnosias, agraphesthesia, or astereognosis - not present.   Extinction with double simultaneous stimulation:        Proximal-distal stimulation - Not present        Right-left stimulation - Not present  Cranial Nerves II - XII:    EOMs were intact with normal smooth and no nystagmus.    PERRLA. D/C   Funduscopic exam - disc were flat with normal A/V ratio and no exudates or hemorrhages. Visual fields were full to confrontation.    Motor - facial movement was symmetrical and normal.    Facial sensory -  "Light touch and pin prick sensations were normal.    Hearing was normal to finger rub.  Palate moved well and was symmetrical with normal palatal and oral sensation.    Tongue movement was full & the patient could say "la la la" and "Ka Ka Ka" without  difficulty. Patient repeated Buddhism and Buddhism without difficulty. Normal power and bulk was found in the massiter and rotator muscles of the neck.  Motor: Power, bulk and tone were normal in all extremities.  Sensory: Light touch, pin prick, vibration and position senses were normal in all extremities.    Coordination:       Rapid alternating movements and rapid finger tapping - normal.       Finger to nose - nl.       Arm roll - symmetrical.    Gait:  Station, gait and tandem walking were done without difficulty and Romberg was negative.    Deep tendon reflexes:    Reflex L R   Bicpets 1+ 1+   Tricepts 1+ 1+   Brachio-radialis 1+ 1+   Knee 0+ 0+   Ankle 0+ 0+   Babinski No No     Tremor: resting, postural, intentional - none    Pulses    Carotids - strong without bruits    Peripheral - strong and symmetrical      IMPRESSION  1.  Left temporal lobe epilepsy with break through seizure 3/2017 due to malabsorption of meds in the setting of increased misoprostol intake      DISPOSITION:   Continue LEV 1500mg daily    Follow up in 6 months    2014 EPILEPSY QUALITY MEASUREMENT (AAN)  1 a. Seizure Frequency: noted  1b. Seizure Intervention: yes  2.                   a. Etiology: unknown  b. Seizure Type: CPS w sec GTC sz  c. Epilepsy Syndrome: n/a  3.                   a. Side-effects of AED: no                        b. Intervention for side-effects: n/a  4. Screening for psychiatric or behavioral disorders: yes  5. Personalized epilepsy safety issues and education: yes  6. Counseling for women of child-bearing potential and epilepsy: n/a  7. Referral of treatment-resistant epilepsy to comprehensive epilepsy center (q 2 years): N/A.     The patient was asked to call " me/the clinic 1 week after the test(s) are done to obtain results.  The following issues were  all discussed in detail with the patient and family/caregiver(s):     1. Diagnosis, plans, prognosis, medications and possible side-effects, risks and benefits of treatment, other alternatives to AEDs.  2. Risks related to continued seizures, status epilepticus, SUDEP, driving restrictions and seizure precautions ( no baths but showers are ok, no swimming unsupervised, no use of heavy machinery, no use of sharp moving objects, avoid heights).   3. Issues related to pregnancy, OCP and breast feeding as it relates to epilepsy.  4. The potential of teratogenicity and suicidal risks of anti-epileptic medications.  5.Avoid any activity that compromise patient safety related to seizures.      Questions and concerns raised by the patient and family/care-giver(s) were addressed and they indicated understanding of everything discussed and agreed to plans as above.

## 2017-10-18 ENCOUNTER — OFFICE VISIT (OUTPATIENT)
Dept: OPTOMETRY | Facility: CLINIC | Age: 62
End: 2017-10-18
Payer: COMMERCIAL

## 2017-10-18 DIAGNOSIS — H52.203 MYOPIA WITH ASTIGMATISM AND PRESBYOPIA, BILATERAL: ICD-10-CM

## 2017-10-18 DIAGNOSIS — E05.00 GRAVES' DISEASE WITH EXOPHTHALMOS: ICD-10-CM

## 2017-10-18 DIAGNOSIS — H52.13 MYOPIA WITH ASTIGMATISM AND PRESBYOPIA, BILATERAL: ICD-10-CM

## 2017-10-18 DIAGNOSIS — E11.9 TYPE 2 DIABETES MELLITUS WITHOUT OPHTHALMIC MANIFESTATIONS: ICD-10-CM

## 2017-10-18 DIAGNOSIS — H25.13 NUCLEAR SCLEROSIS, BILATERAL: ICD-10-CM

## 2017-10-18 DIAGNOSIS — H40.023 OPEN ANGLE WITH BORDERLINE FINDINGS AND HIGH GLAUCOMA RISK IN BOTH EYES: Primary | ICD-10-CM

## 2017-10-18 DIAGNOSIS — H52.4 MYOPIA WITH ASTIGMATISM AND PRESBYOPIA, BILATERAL: ICD-10-CM

## 2017-10-18 PROCEDURE — 92014 COMPRE OPH EXAM EST PT 1/>: CPT | Mod: S$GLB,,, | Performed by: OPTOMETRIST

## 2017-10-18 PROCEDURE — 99999 PR PBB SHADOW E&M-EST. PATIENT-LVL II: CPT | Mod: PBBFAC,,, | Performed by: OPTOMETRIST

## 2017-10-18 RX ORDER — LATANOPROST 50 UG/ML
SOLUTION/ DROPS OPHTHALMIC
Qty: 2.5 ML | Refills: 6 | Status: SHIPPED | OUTPATIENT
Start: 2017-10-18 | End: 2019-09-26 | Stop reason: SDUPTHER

## 2017-10-18 NOTE — PROGRESS NOTES
HPI     Patient's last dilated exam was: 9/14/2016  Pt here for 6 month dilated eye exam. Pt is using Latanaprost 0.005% ou   qhs needs refills, would like paper rx copy. Did not update her glasses at   her last visit. Patient denies flashes, floaters, pain and double vision.       Last edited by YANIRA Sanchez on 10/18/2017  8:32 AM.   (History)            Assessment /Plan     For exam results, see Encounter Report.    Open angle with borderline findings and high glaucoma risk in both eyes  -     latanoprost 0.005 % ophthalmic solution; PLACE 1 DROP INTO BOTH EYES EVERY EVENING.  Dispense: 2.5 mL; Refill: 6    Type 2 diabetes mellitus without ophthalmic manifestations    Nuclear sclerosis, bilateral    Graves' disease with exophthalmos    Myopia with astigmatism and presbyopia, bilateral            1.  Due to increased c/d ratio OU and family history.  Continue with Latanoprost QHS OU.  Refills given.    HVF: 3/13/17  OCT: 3/13/17  DFE: 10/28/17  Photos:  Gonio:  Pachy: 562 OD, 554 OS  Initial IOPs: 16 OD, 16 OS  MHx: DM, HTN  FHx: Mother  2. No retinopathy--monitor yearly.  BS control.  3.  Educated on cataracts and affects on vision.  Patient happy with vision.  Monitor.  4.  Longstanding-stable.  5.  Continue w/ current rx              RTC 6 months for IOP check, HVF, and OCT.

## 2017-11-08 ENCOUNTER — OFFICE VISIT (OUTPATIENT)
Dept: OBSTETRICS AND GYNECOLOGY | Facility: CLINIC | Age: 62
End: 2017-11-08
Payer: COMMERCIAL

## 2017-11-08 VITALS
WEIGHT: 195.56 LBS | BODY MASS INDEX: 39.42 KG/M2 | DIASTOLIC BLOOD PRESSURE: 86 MMHG | SYSTOLIC BLOOD PRESSURE: 134 MMHG | HEIGHT: 59 IN

## 2017-11-08 DIAGNOSIS — I10 ESSENTIAL HYPERTENSION: Chronic | ICD-10-CM

## 2017-11-08 DIAGNOSIS — Z12.31 VISIT FOR SCREENING MAMMOGRAM: ICD-10-CM

## 2017-11-08 DIAGNOSIS — E78.2 MIXED HYPERLIPIDEMIA: ICD-10-CM

## 2017-11-08 DIAGNOSIS — G40.319 GENERALIZED CONVULSIVE EPILEPSY WITH INTRACTABLE EPILEPSY: Primary | ICD-10-CM

## 2017-11-08 DIAGNOSIS — I15.2 HYPERTENSION ASSOCIATED WITH DIABETES: ICD-10-CM

## 2017-11-08 DIAGNOSIS — E11.59 HYPERTENSION ASSOCIATED WITH DIABETES: ICD-10-CM

## 2017-11-08 DIAGNOSIS — Z01.419 ENCOUNTER FOR GYNECOLOGICAL EXAMINATION WITHOUT ABNORMAL FINDING: ICD-10-CM

## 2017-11-08 PROCEDURE — 99396 PREV VISIT EST AGE 40-64: CPT | Mod: S$GLB,,, | Performed by: OBSTETRICS & GYNECOLOGY

## 2017-11-08 PROCEDURE — 99999 PR PBB SHADOW E&M-EST. PATIENT-LVL II: CPT | Mod: PBBFAC,,, | Performed by: OBSTETRICS & GYNECOLOGY

## 2017-11-10 NOTE — PROGRESS NOTES
HISTORY OF PRESENT ILLNESS:    Katey Cerrato is a 62 y.o. female,   presents today for her routine visit and has no complaints.      Past Medical History:   Diagnosis Date    Cataract     Empty sella syndrome     GHD (growth hormone deficiency)     Glaucoma     Graves' disease with exophthalmos     History of vitamin D deficiency     Hyperlipidemia     Hypertension     Seizures     Thyroid nodule     Thyroid nodule     Type II or unspecified type diabetes mellitus without mention of complication, uncontrolled        Past Surgical History:   Procedure Laterality Date    CHOLECYSTECTOMY      HYSTERECTOMY      one ovary intact    KNEE SURGERY      LYMPH NODE BIOPSY  3010       MEDICATIONS AND ALLERGIES:      Current Outpatient Prescriptions:     amlodipine (NORVASC) 10 MG tablet, Take 1 tablet (10 mg total) by mouth once daily., Disp: 90 tablet, Rfl: 3    atorvastatin (LIPITOR) 20 MG tablet, TAKE 1 TABLET (20 MG TOTAL) BY MOUTH ONCE DAILY., Disp: 90 tablet, Rfl: 3    glimepiride (AMARYL) 4 MG tablet, Take 1.5 tablets (6 mg total) by mouth before breakfast., Disp: 135 tablet, Rfl: 3    lancets (ACCU-CHEK SOFTCLIX LANCETS) Misc, 1 each by Misc.(Non-Drug; Combo Route) route once daily., Disp: 100 each, Rfl: 3    lancets (ONE TOUCH ULTRASOFT LANCETS) Misc, Check sugar qAM fasting, Disp: 35 each, Rfl: 5    latanoprost 0.005 % ophthalmic solution, PLACE 1 DROP INTO BOTH EYES EVERY EVENING., Disp: 2.5 mL, Rfl: 6    levetiracetam (KEPPRA) 750 MG Tab, TAKE 2 TABLETS BY MOUTH EVERY DAY, Disp: 180 tablet, Rfl: 3    losartan (COZAAR) 25 MG tablet, Take 1 tablet (25 mg total) by mouth once daily., Disp: 90 tablet, Rfl: 3    meloxicam (MOBIC) 15 MG tablet, Take 1 tablet (15 mg total) by mouth daily as needed for Pain., Disp: 30 tablet, Rfl: 3    metformin (GLUCOPHAGE-XR) 500 MG 24 hr tablet, TAKE 2 TABLETS BY MOUTH TWICE A DAY WITH MEALS, Disp: 360 tablet, Rfl: 0    metoprolol succinate  (TOPROL-XL) 50 MG 24 hr tablet, TAKE 1 TABLET BY MOUTH EVERY EVENING, Disp: 90 tablet, Rfl: 3    multivitamin-minerals-lutein (CENTRUM SILVER) Tab, Take 1 tablet by mouth once daily once daily., Disp: , Rfl:     ONETOUCH ULTRA TEST Strp, USE TO TEST BLOOD SUGAR ONCE A DAY, Disp: 100 strip, Rfl: 3    SITagliptan (JANUVIA) 100 MG Tab, Take 1 tablet (100 mg total) by mouth once daily., Disp: 90 tablet, Rfl: 3    Review of patient's allergies indicates:   Allergen Reactions    Codeine Nausea Only       Family History   Problem Relation Age of Onset    Cancer Mother     Cataracts Mother     Glaucoma Mother      shunt    Heart disease Mother     Tremor Mother     Breast cancer Mother 78    Heart disease Sister     Heart disease Father     Hypertension Brother     Breast cancer Sister 60       Social History     Social History    Marital status:      Spouse name: N/A    Number of children: N/A    Years of education: N/A     Occupational History     [employer]     Social History Main Topics    Smoking status: Never Smoker    Smokeless tobacco: Never Used    Alcohol use No      Comment: none    Drug use: No    Sexual activity: Yes     Partners: Male      Comment: m  works in dietary,  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     Other Topics Concern    Not on file     Social History Narrative    No narrative on file     COMPREHENSIVE GYN HISTORY:  PAP History: Denies abnormal Paps.  Infection History: Denies STDs. Denies PID.  Benign History: Denies uterine fibroids. Denies ovarian cysts. Denies endometriosis. Denies other conditions.  Cancer History: Denies cervical cancer. Denies uterine cancer or hyperplasia. Denies ovarian cancer. Denies vulvar cancer or pre-cancer. Denies vaginal cancer or pre-cancer. Denies breast cancer. Denies colon cancer.  Sexual Activity History: Reports currently being sexually active  Menstrual History: Denies menses. Pt is  not on ERT.     ROS:  GENERAL:  "No weight changes. No swelling. No fatigue. No fever.  CARDIOVASCULAR: No chest pain. No shortness of breath. No leg cramps.   NEUROLOGICAL: No headaches. No vision changes.  BREASTS: No pain. No lumps. No discharge.  ABDOMEN: No pain. No nausea. No vomiting. No diarrhea. No constipation.  REPRODUCTIVE: No abnormal bleeding.  VULVA: No pain. No lesions. No itching.  VAGINA: No relaxation. No itching. No odor. No discharge. No lesions.  URINARY: No incontinence. No nocturia. No frequency. No dysuria.    /86   Ht 4' 11" (1.499 m)   Wt 88.7 kg (195 lb 8.8 oz)   BMI 39.50 kg/m²     PE:  APPEARANCE: Well nourished, well developed, in no acute distress.  AFFECT: WNL, alert and oriented x 3.  SKIN: No acne or hirsutism.  NECK: Neck symmetric without masses or thyromegaly.  NODES: No inguinal, cervical, axillary or femoral lymph node enlargement.  CHEST: Good respiratory effort.   ABDOMEN: Soft. No tenderness or masses. No hepatosplenomegaly. No hernias.  BREASTS: Symmetrical, no skin changes or visible lesions. No palpable masses, nipple discharge bilaterally.  PELVIC: ATROPHIC EXTERNAL FEMALE GENITALIA without lesions. Normal hair distribution. Adequate perineal body, normal urethral meatus. VAGINA DRY without lesions or discharge. No significant cystocele or rectocele. Bimanual exam shows CERVIX and UTERUS to be SURGICALLY ABSENT. Adnexa without masses or tenderness.  EXTREMITIES: No edema.    DIAGNOSIS:  1. Generalized convulsive epilepsy with intractable epilepsy    2. Hypertension associated with diabetes    3. Mixed hyperlipidemia    4. Essential hypertension    5. Uncontrolled type 2 diabetes mellitus without complication, without long-term current use of insulin    6. Encounter for gynecological examination without abnormal finding    7. Visit for screening mammogram           COUNSELING:  The patient was counseled today on  -osteoporosis prevention, calcium supplementation, regular weight bearing " exercise.  -ACS PAP guidelines (no paps), with recommendations for yearly pelvic exams as her uterus and cervix were removed for benign reasons and ovaries remain;  -recommendation for yearly mammogram;  -to see her primary care physician for all other health maintenance.    FOLLOW-UP with me for next routine visit.

## 2017-12-11 RX ORDER — GLIMEPIRIDE 4 MG/1
TABLET ORAL
Qty: 135 TABLET | Refills: 0 | OUTPATIENT
Start: 2017-12-11

## 2017-12-12 ENCOUNTER — PATIENT OUTREACH (OUTPATIENT)
Dept: INTERNAL MEDICINE | Facility: CLINIC | Age: 62
End: 2017-12-12

## 2017-12-12 NOTE — PROGRESS NOTES
[facility] is committed to your overall health.  To help you get the most out of each of your visits, we will review your information to make sure you are up to date on all of your recommended tests and/or procedures.       Your PCP  Baltazar Enlgand MD   found that you may be due for:       Health Maintenance Due   Topic Date Due    Hemoglobin A1c  11/24/2017         If you have had any of the above done at another facility, please bring the records or information with you so that your record at [facility] will be complete.  If you would like to schedule any of these, please contact me.     If you are currently taking medication, please bring it with you to your appointment for review.     Also, if you have any type of Advanced Directives, please bring them with you to your office visit so we may scan them into your chart.     Thank you for Choosing [facility] for your healthcare needs.      Additional Information  If you have questions, you can email [email or web address] or call [phone]  to talk to our [facility] staff. Remember, [facility] is NOT to be used for urgent needs. For medical emergencies, dial 911.

## 2017-12-18 ENCOUNTER — HOSPITAL ENCOUNTER (OUTPATIENT)
Dept: RADIOLOGY | Facility: OTHER | Age: 62
Discharge: HOME | End: 2017-12-18
Attending: OBSTETRICS & GYNECOLOGY
Payer: COMMERCIAL

## 2017-12-18 DIAGNOSIS — Z12.31 VISIT FOR SCREENING MAMMOGRAM: ICD-10-CM

## 2017-12-18 PROCEDURE — 77067 SCR MAMMO BI INCL CAD: CPT | Mod: 26,,, | Performed by: RADIOLOGY

## 2017-12-18 PROCEDURE — 77067 SCR MAMMO BI INCL CAD: CPT | Mod: TC

## 2017-12-18 PROCEDURE — 77063 BREAST TOMOSYNTHESIS BI: CPT | Mod: 26,,, | Performed by: RADIOLOGY

## 2017-12-21 RX ORDER — AMLODIPINE BESYLATE 10 MG/1
10 TABLET ORAL DAILY
Qty: 90 TABLET | Refills: 3 | Status: SHIPPED | OUTPATIENT
Start: 2017-12-21 | End: 2018-09-12 | Stop reason: SDUPTHER

## 2017-12-21 RX ORDER — GLIMEPIRIDE 4 MG/1
TABLET ORAL
Qty: 135 TABLET | Refills: 0 | Status: SHIPPED | OUTPATIENT
Start: 2017-12-21 | End: 2018-03-24 | Stop reason: SDUPTHER

## 2017-12-26 ENCOUNTER — LAB VISIT (OUTPATIENT)
Dept: LAB | Facility: OTHER | Age: 62
End: 2017-12-26
Attending: INTERNAL MEDICINE
Payer: COMMERCIAL

## 2017-12-26 ENCOUNTER — OFFICE VISIT (OUTPATIENT)
Dept: INTERNAL MEDICINE | Facility: CLINIC | Age: 62
End: 2017-12-26
Payer: COMMERCIAL

## 2017-12-26 ENCOUNTER — TELEPHONE (OUTPATIENT)
Dept: INTERNAL MEDICINE | Facility: CLINIC | Age: 62
End: 2017-12-26

## 2017-12-26 VITALS
DIASTOLIC BLOOD PRESSURE: 70 MMHG | SYSTOLIC BLOOD PRESSURE: 138 MMHG | HEART RATE: 89 BPM | WEIGHT: 194.88 LBS | BODY MASS INDEX: 39.29 KG/M2 | HEIGHT: 59 IN

## 2017-12-26 DIAGNOSIS — I15.2 HYPERTENSION ASSOCIATED WITH DIABETES: ICD-10-CM

## 2017-12-26 DIAGNOSIS — E23.6 EMPTY SELLA SYNDROME: ICD-10-CM

## 2017-12-26 DIAGNOSIS — I10 ESSENTIAL HYPERTENSION: Chronic | ICD-10-CM

## 2017-12-26 DIAGNOSIS — E05.00 GRAVES' DISEASE WITH EXOPHTHALMOS: ICD-10-CM

## 2017-12-26 DIAGNOSIS — E78.2 MIXED HYPERLIPIDEMIA: Chronic | ICD-10-CM

## 2017-12-26 DIAGNOSIS — G40.319 GENERALIZED CONVULSIVE EPILEPSY WITH INTRACTABLE EPILEPSY: ICD-10-CM

## 2017-12-26 DIAGNOSIS — E11.59 HYPERTENSION ASSOCIATED WITH DIABETES: ICD-10-CM

## 2017-12-26 LAB
ESTIMATED AVG GLUCOSE: 143 MG/DL
HBA1C MFR BLD HPLC: 6.6 %

## 2017-12-26 PROCEDURE — 99214 OFFICE O/P EST MOD 30 MIN: CPT | Mod: S$GLB,,, | Performed by: INTERNAL MEDICINE

## 2017-12-26 PROCEDURE — 99999 PR PBB SHADOW E&M-EST. PATIENT-LVL III: CPT | Mod: PBBFAC,,, | Performed by: INTERNAL MEDICINE

## 2017-12-26 PROCEDURE — 83036 HEMOGLOBIN GLYCOSYLATED A1C: CPT

## 2017-12-26 PROCEDURE — 36415 COLL VENOUS BLD VENIPUNCTURE: CPT

## 2017-12-26 NOTE — PROGRESS NOTES
Subjective:       Patient ID: Katey Cerrato is a 62 y.o. female.    Chief Complaint: Annual Exam (f/u)    Pt here for f/u. Pt's BP is well controlled. Tolerating meds well. Pt denies cp/sob/ha/vision or neuro changes. Checking at home and is well controlled.     DM2 is fairly well controlled with last A1c 6.9. However, fasting sugars are now 140s.She is on metformin 1000mg AM and 500mg PM; she cannot tolerate higher doses due to GI side effects. Also on amaryl 6mg daily and januvia 100mg daily. No known end organ issues. Other health maint is up to date. No low sugars.  Wt loss and exercise goals d/w pt.     HLD is tx with lipitor to 20mg. Tolerating well.     She has h/o Graves dz with exophthalmos. She sees endocrine annually but is overdue. She will schedule. No hyperthyroid state.     She sees neuro regularly for h/o sz. No seizures recently.       Diabetes   Pertinent negatives for hypoglycemia include no dizziness or headaches. Pertinent negatives for diabetes include no chest pain and no polyuria.   Hypertension   Pertinent negatives include no chest pain, headaches, palpitations or shortness of breath.     Review of Systems   Constitutional: Negative for unexpected weight change.   Eyes: Negative for visual disturbance.   Respiratory: Negative for shortness of breath.    Cardiovascular: Negative for chest pain, palpitations and leg swelling.   Gastrointestinal: Negative for blood in stool and diarrhea.   Endocrine: Negative for polyuria.   Genitourinary: Negative for dysuria.   Neurological: Negative for dizziness, syncope and headaches.   Hematological: Negative for adenopathy.   Psychiatric/Behavioral: Negative for dysphoric mood.       Objective:      Physical Exam   Constitutional: She is oriented to person, place, and time. She appears well-developed and well-nourished.   HENT:   Head: Normocephalic and atraumatic.   Right Ear: Tympanic membrane, external ear and ear canal normal.   Left Ear: Tympanic  membrane, external ear and ear canal normal.   Nose: Nose normal.   Mouth/Throat: Uvula is midline and oropharynx is clear and moist.   Eyes: Conjunctivae, EOM and lids are normal. Pupils are equal, round, and reactive to light. Right conjunctiva is not injected. Left conjunctiva is not injected.   Neck: Neck supple. No JVD present. Carotid bruit is not present. No thyroid mass and no thyromegaly present.   Cardiovascular: Normal rate, regular rhythm, S1 normal, S2 normal, normal heart sounds and intact distal pulses.  PMI is not displaced.    No murmur heard.  Pulmonary/Chest: Effort normal and breath sounds normal. She has no wheezes. She has no rhonchi. She has no rales.   Abdominal: Soft. Bowel sounds are normal. She exhibits no distension and no abdominal bruit. There is no hepatosplenomegaly. There is no tenderness.   Musculoskeletal: She exhibits no edema.        Right foot: Normal.        Left foot: Normal.   Lymphadenopathy:        Head (right side): No preauricular and no posterior auricular adenopathy present.        Head (left side): No preauricular and no posterior auricular adenopathy present.     She has no cervical adenopathy.     She has no axillary adenopathy.   Neurological: She is alert and oriented to person, place, and time. She has normal strength. No cranial nerve deficit or sensory deficit.   Skin: Skin is warm. No rash noted.   Psychiatric: She has a normal mood and affect. Her speech is normal and behavior is normal. Judgment and thought content normal.       Assessment:       1. Uncontrolled type 2 diabetes mellitus without complication, without long-term current use of insulin    2. Generalized convulsive epilepsy with intractable epilepsy    3. Essential hypertension    4. Hypertension associated with diabetes    5. Mixed hyperlipidemia    6. Empty sella syndrome    7. Graves' disease with exophthalmos        Plan:       1. Continue current meds  2. Home BP and BS monitoring  3. Update  A1c; pt prefers to work on lifestyle if A1c not at goal; can consider jardiance or similar if needed in future before going to insulin  4. Keep f/u with specialists

## 2017-12-26 NOTE — TELEPHONE ENCOUNTER
Called patient back no answer, left voicemail.  X  1st Request  _  2nd Request  _  3rd Request    Trent Calles MA

## 2018-01-08 ENCOUNTER — TELEPHONE (OUTPATIENT)
Dept: INTERNAL MEDICINE | Facility: CLINIC | Age: 63
End: 2018-01-08

## 2018-01-08 NOTE — TELEPHONE ENCOUNTER
Pt informed of advice. Pt scheduled accordingly. Appt slip mailed. Patient has no further questions or concerns.

## 2018-01-08 NOTE — TELEPHONE ENCOUNTER
Pt informed of lab results and advice.  States verbal understanding.  Pt would like to know rtc timeframe. Please advise/authorize?

## 2018-01-10 RX ORDER — MELOXICAM 15 MG/1
15 TABLET ORAL DAILY PRN
Qty: 30 TABLET | Refills: 3 | Status: SHIPPED | OUTPATIENT
Start: 2018-01-10 | End: 2018-09-22 | Stop reason: SDUPTHER

## 2018-03-15 ENCOUNTER — PATIENT OUTREACH (OUTPATIENT)
Dept: ADMINISTRATIVE | Facility: HOSPITAL | Age: 63
End: 2018-03-15

## 2018-03-15 NOTE — PROGRESS NOTES
[facility] is committed to your overall health.  To help you get the most out of each of your visits, we will review your information to make sure you are up to date on all of your recommended tests and/or procedures.       Your PCP  Baltazar England MD   found that you may be due for:       Health Maintenance Due   Topic Date Due    Lipid Panel  02/13/2018    Foot Exam  03/02/2018    Urine Microalbumin  03/02/2018             If you have had any of the above done at another facility, please bring the records or information with you so that your record at [facility] will be complete.  If you would like to schedule any of these, please contact me.     If you are currently taking medication, please bring it with you to your appointment for review.     Also, if you have any type of Advanced Directives, please bring them with you to your office visit so we may scan them into your chart.       Thank you for Choosing [facility] for your healthcare needs.        Additional Information  If you have questions, you can email [email or web address] or call [phone]  to talk to our [facility] staff. Remember, [employer] is NOT to be used for urgent needs. For medical emergencies, dial 911.

## 2018-03-23 RX ORDER — LOSARTAN POTASSIUM 25 MG/1
25 TABLET ORAL DAILY
Qty: 90 TABLET | Refills: 2 | Status: SHIPPED | OUTPATIENT
Start: 2018-03-23 | End: 2019-05-26 | Stop reason: SDUPTHER

## 2018-03-26 RX ORDER — GLIMEPIRIDE 4 MG/1
TABLET ORAL
Qty: 135 TABLET | Refills: 0 | Status: SHIPPED | OUTPATIENT
Start: 2018-03-26 | End: 2018-08-06 | Stop reason: SDUPTHER

## 2018-03-27 ENCOUNTER — TELEPHONE (OUTPATIENT)
Dept: INTERNAL MEDICINE | Facility: CLINIC | Age: 63
End: 2018-03-27

## 2018-03-27 ENCOUNTER — OFFICE VISIT (OUTPATIENT)
Dept: INTERNAL MEDICINE | Facility: CLINIC | Age: 63
End: 2018-03-27
Payer: COMMERCIAL

## 2018-03-27 ENCOUNTER — LAB VISIT (OUTPATIENT)
Dept: LAB | Facility: OTHER | Age: 63
End: 2018-03-27
Attending: INTERNAL MEDICINE
Payer: COMMERCIAL

## 2018-03-27 VITALS
WEIGHT: 193.31 LBS | DIASTOLIC BLOOD PRESSURE: 80 MMHG | BODY MASS INDEX: 38.97 KG/M2 | HEIGHT: 59 IN | SYSTOLIC BLOOD PRESSURE: 132 MMHG | HEART RATE: 77 BPM

## 2018-03-27 DIAGNOSIS — E05.00 GRAVES' DISEASE WITH EXOPHTHALMOS: ICD-10-CM

## 2018-03-27 DIAGNOSIS — E66.9 DIABETES MELLITUS TYPE 2 IN OBESE: ICD-10-CM

## 2018-03-27 DIAGNOSIS — G40.319 GENERALIZED CONVULSIVE EPILEPSY WITH INTRACTABLE EPILEPSY: ICD-10-CM

## 2018-03-27 DIAGNOSIS — I15.2 HYPERTENSION ASSOCIATED WITH DIABETES: ICD-10-CM

## 2018-03-27 DIAGNOSIS — E11.69 DIABETES MELLITUS TYPE 2 IN OBESE: ICD-10-CM

## 2018-03-27 DIAGNOSIS — Z00.00 WELLNESS EXAMINATION: Primary | ICD-10-CM

## 2018-03-27 DIAGNOSIS — E66.9 OBESITY (BMI 30-39.9): ICD-10-CM

## 2018-03-27 DIAGNOSIS — E78.2 MIXED HYPERLIPIDEMIA: ICD-10-CM

## 2018-03-27 DIAGNOSIS — E23.6 EMPTY SELLA SYNDROME: ICD-10-CM

## 2018-03-27 DIAGNOSIS — Z00.00 WELLNESS EXAMINATION: ICD-10-CM

## 2018-03-27 DIAGNOSIS — I10 ESSENTIAL HYPERTENSION: Chronic | ICD-10-CM

## 2018-03-27 DIAGNOSIS — E11.59 HYPERTENSION ASSOCIATED WITH DIABETES: ICD-10-CM

## 2018-03-27 LAB
ALBUMIN SERPL BCP-MCNC: 3.8 G/DL
ALP SERPL-CCNC: 76 U/L
ALT SERPL W/O P-5'-P-CCNC: 21 U/L
ANION GAP SERPL CALC-SCNC: 6 MMOL/L
AST SERPL-CCNC: 20 U/L
BASOPHILS # BLD AUTO: 0.03 K/UL
BASOPHILS NFR BLD: 0.5 %
BILIRUB SERPL-MCNC: 0.4 MG/DL
BUN SERPL-MCNC: 11 MG/DL
CALCIUM SERPL-MCNC: 9.3 MG/DL
CHLORIDE SERPL-SCNC: 103 MMOL/L
CHOLEST SERPL-MCNC: 184 MG/DL
CHOLEST/HDLC SERPL: 3.4 {RATIO}
CO2 SERPL-SCNC: 28 MMOL/L
CREAT SERPL-MCNC: 0.7 MG/DL
DIFFERENTIAL METHOD: ABNORMAL
EOSINOPHIL # BLD AUTO: 0.1 K/UL
EOSINOPHIL NFR BLD: 2.3 %
ERYTHROCYTE [DISTWIDTH] IN BLOOD BY AUTOMATED COUNT: 14.3 %
EST. GFR  (AFRICAN AMERICAN): >60 ML/MIN/1.73 M^2
EST. GFR  (NON AFRICAN AMERICAN): >60 ML/MIN/1.73 M^2
ESTIMATED AVG GLUCOSE: 151 MG/DL
GLUCOSE SERPL-MCNC: 135 MG/DL
HBA1C MFR BLD HPLC: 6.9 %
HCT VFR BLD AUTO: 36.1 %
HDLC SERPL-MCNC: 54 MG/DL
HDLC SERPL: 29.3 %
HGB BLD-MCNC: 12.1 G/DL
LDLC SERPL CALC-MCNC: 115.6 MG/DL
LYMPHOCYTES # BLD AUTO: 2.5 K/UL
LYMPHOCYTES NFR BLD: 39.6 %
MCH RBC QN AUTO: 29.2 PG
MCHC RBC AUTO-ENTMCNC: 33.5 G/DL
MCV RBC AUTO: 87 FL
MONOCYTES # BLD AUTO: 0.4 K/UL
MONOCYTES NFR BLD: 6.9 %
NEUTROPHILS # BLD AUTO: 3.1 K/UL
NEUTROPHILS NFR BLD: 50.4 %
NONHDLC SERPL-MCNC: 130 MG/DL
PLATELET # BLD AUTO: 306 K/UL
PMV BLD AUTO: 9.3 FL
POTASSIUM SERPL-SCNC: 3.7 MMOL/L
PROT SERPL-MCNC: 7.6 G/DL
RBC # BLD AUTO: 4.15 M/UL
SODIUM SERPL-SCNC: 137 MMOL/L
TRIGL SERPL-MCNC: 72 MG/DL
TSH SERPL DL<=0.005 MIU/L-ACNC: 1.53 UIU/ML
WBC # BLD AUTO: 6.19 K/UL

## 2018-03-27 PROCEDURE — 83036 HEMOGLOBIN GLYCOSYLATED A1C: CPT

## 2018-03-27 PROCEDURE — 36415 COLL VENOUS BLD VENIPUNCTURE: CPT

## 2018-03-27 PROCEDURE — 80061 LIPID PANEL: CPT

## 2018-03-27 PROCEDURE — 85025 COMPLETE CBC W/AUTO DIFF WBC: CPT

## 2018-03-27 PROCEDURE — 80053 COMPREHEN METABOLIC PANEL: CPT

## 2018-03-27 PROCEDURE — 84443 ASSAY THYROID STIM HORMONE: CPT

## 2018-03-27 PROCEDURE — 99999 PR PBB SHADOW E&M-EST. PATIENT-LVL III: CPT | Mod: PBBFAC,,, | Performed by: INTERNAL MEDICINE

## 2018-03-27 PROCEDURE — 99396 PREV VISIT EST AGE 40-64: CPT | Mod: S$GLB,,, | Performed by: INTERNAL MEDICINE

## 2018-03-27 NOTE — PROGRESS NOTES
Subjective:       Patient ID: Katey Cerrato is a 62 y.o. female.    Chief Complaint: Annual Exam    Pt here for annual. Pt's BP is well controlled. Tolerating meds well. Pt denies cp/sob/ha/vision or neuro changes. Checking at home and is well controlled.     DM2 is fairly well controlled with last A1c 6.6. However, fasting sugars are now 120s.She is on metformin 1000mg AM and 500mg PM; she cannot tolerate higher doses due to GI side effects. Also on amaryl 6mg daily and januvia 100mg daily. No known end organ issues. Other health maint is up to date. No low sugars. Wt loss and exercise goals d/w pt.     HLD is tx with lipitor to 20mg. Tolerating well and is stable.    She has h/o Graves dz with exophthalmos. She has seen endocrine but not in a while. No hyperthyroid state. Also incidental finding of empty sella was noted but no symptoms or clinically significant issues associated with this.     She sees neuro regularly for h/o sz. No seizures recently on keppra.      Diabetes   Pertinent negatives for hypoglycemia include no dizziness or headaches. Pertinent negatives for diabetes include no chest pain, no fatigue and no polyuria.   Hypertension   Pertinent negatives include no chest pain, headaches, palpitations or shortness of breath.     Review of Systems   Constitutional: Negative for fatigue, fever and unexpected weight change.   HENT: Negative for congestion and sore throat.    Eyes: Negative for visual disturbance.   Respiratory: Negative for shortness of breath.    Cardiovascular: Negative for chest pain, palpitations and leg swelling.   Gastrointestinal: Negative for abdominal pain, blood in stool, constipation and diarrhea.   Endocrine: Negative for polyuria.   Genitourinary: Negative for dysuria.   Musculoskeletal: Negative for arthralgias.   Skin: Negative for rash.   Neurological: Negative for dizziness, syncope and headaches.   Hematological: Negative for adenopathy.   Psychiatric/Behavioral:  Negative for dysphoric mood.       Objective:      Physical Exam   Constitutional: She is oriented to person, place, and time. She appears well-developed and well-nourished.   HENT:   Head: Normocephalic and atraumatic.   Right Ear: Hearing, tympanic membrane, external ear and ear canal normal.   Left Ear: Hearing, tympanic membrane, external ear and ear canal normal.   Nose: Nose normal.   Mouth/Throat: Uvula is midline and oropharynx is clear and moist.   Eyes: Conjunctivae, EOM and lids are normal. Pupils are equal, round, and reactive to light. Right conjunctiva is not injected. Left conjunctiva is not injected.   Neck: Neck supple. No JVD present. Carotid bruit is not present. No thyroid mass and no thyromegaly present.   Cardiovascular: Normal rate, regular rhythm, S1 normal, S2 normal, normal heart sounds and intact distal pulses.  PMI is not displaced.    No murmur heard.  Pulses:       Posterior tibial pulses are 2+ on the right side, and 2+ on the left side.   Pulmonary/Chest: Effort normal and breath sounds normal. She has no wheezes. She has no rhonchi. She has no rales.   Abdominal: Soft. Bowel sounds are normal. She exhibits no distension and no abdominal bruit. There is no hepatosplenomegaly. There is no tenderness.   Musculoskeletal: She exhibits no edema.        Right foot: Normal. There is no deformity.        Left foot: Normal. There is no deformity.   Feet:   Right Foot:   Protective Sensation: 6 sites tested. 6 sites sensed.   Skin Integrity: Negative for ulcer, blister or skin breakdown.   Left Foot:   Protective Sensation: 6 sites tested. 6 sites sensed.   Skin Integrity: Negative for ulcer, blister or skin breakdown.   Lymphadenopathy:        Head (right side): No preauricular and no posterior auricular adenopathy present.        Head (left side): No preauricular and no posterior auricular adenopathy present.     She has no cervical adenopathy.     She has no axillary adenopathy.    Neurological: She is alert and oriented to person, place, and time. She has normal strength. No cranial nerve deficit or sensory deficit. She displays a negative Romberg sign. Coordination and gait normal.   Skin: Skin is warm. No rash noted.   Psychiatric: She has a normal mood and affect. Her speech is normal and behavior is normal. Judgment and thought content normal.       Assessment:       1. Wellness examination    2. Uncontrolled type 2 diabetes mellitus without complication, without long-term current use of insulin    3. Mixed hyperlipidemia    4. Generalized convulsive epilepsy with intractable epilepsy    5. Essential hypertension    6. Hypertension associated with diabetes    7. Diabetes mellitus type 2 in obese    8. Empty sella syndrome    9. Graves' disease with exophthalmos    10. Obesity (BMI 30-39.9)        Plan:       1. Continue current meds  2. Home BP and BS monitoring  3. Appropriate labs  4. Keep f/u with specialists  5. Counseled on wt loss strategies

## 2018-03-28 NOTE — TELEPHONE ENCOUNTER
----- Message from She Mccurdy sent at 3/28/2018 10:38 AM CDT -----  Contact: pt  _  1st Request  _  2nd Request  _  3rd Request      Who:pt     Why: calling in regards to results     What Number to Call Back: [phone]    When to Expect a call back: (Before the end of the day)   -- if call after 3:00 call back will be tomorrow.

## 2018-04-02 RX ORDER — LEVETIRACETAM 750 MG/1
TABLET ORAL
Qty: 180 TABLET | Refills: 3 | Status: SHIPPED | OUTPATIENT
Start: 2018-04-02 | End: 2018-05-23

## 2018-05-21 RX ORDER — SITAGLIPTIN 100 MG/1
TABLET, FILM COATED ORAL
Qty: 90 TABLET | Refills: 3 | Status: SHIPPED | OUTPATIENT
Start: 2018-05-21 | End: 2018-09-12 | Stop reason: SDUPTHER

## 2018-05-23 RX ORDER — LEVETIRACETAM 750 MG/1
TABLET ORAL
Qty: 180 TABLET | Refills: 3 | Status: SHIPPED | OUTPATIENT
Start: 2018-05-23 | End: 2019-08-16 | Stop reason: SDUPTHER

## 2018-05-23 RX ORDER — METFORMIN HYDROCHLORIDE 500 MG/1
TABLET, EXTENDED RELEASE ORAL
Qty: 360 TABLET | Refills: 0 | Status: SHIPPED | OUTPATIENT
Start: 2018-05-23 | End: 2018-09-21 | Stop reason: SDUPTHER

## 2018-05-23 NOTE — TELEPHONE ENCOUNTER
----- Message from Lucy Rodriugez sent at 5/23/2018  8:03 AM CDT -----  Contact: MORAIMA LUCAS [[id number]]            Name of Who is Calling: MORAIMA LUCAS [[id number]]    What is the request in detail: pt would like nurse to call regarding medication. Please call     Can the clinic reply by [facility]: no    What Number to Call Back if not in [facility]: [phone]

## 2018-06-04 RX ORDER — ATORVASTATIN CALCIUM 20 MG/1
TABLET, FILM COATED ORAL
Qty: 90 TABLET | Refills: 2 | Status: SHIPPED | OUTPATIENT
Start: 2018-06-04 | End: 2019-01-16

## 2018-06-11 ENCOUNTER — LAB VISIT (OUTPATIENT)
Dept: LAB | Facility: OTHER | Age: 63
End: 2018-06-11
Attending: INTERNAL MEDICINE
Payer: COMMERCIAL

## 2018-06-11 ENCOUNTER — OFFICE VISIT (OUTPATIENT)
Dept: INTERNAL MEDICINE | Facility: CLINIC | Age: 63
End: 2018-06-11
Payer: COMMERCIAL

## 2018-06-11 VITALS
WEIGHT: 191.81 LBS | TEMPERATURE: 98 F | HEIGHT: 59 IN | DIASTOLIC BLOOD PRESSURE: 78 MMHG | SYSTOLIC BLOOD PRESSURE: 126 MMHG | BODY MASS INDEX: 38.67 KG/M2 | OXYGEN SATURATION: 98 % | HEART RATE: 75 BPM

## 2018-06-11 DIAGNOSIS — E66.9 DIABETES MELLITUS TYPE 2 IN OBESE: ICD-10-CM

## 2018-06-11 DIAGNOSIS — E11.69 DIABETES MELLITUS TYPE 2 IN OBESE: ICD-10-CM

## 2018-06-11 DIAGNOSIS — A08.4 VIRAL GASTROENTERITIS: ICD-10-CM

## 2018-06-11 DIAGNOSIS — A08.4 VIRAL GASTROENTERITIS: Primary | ICD-10-CM

## 2018-06-11 LAB
ANION GAP SERPL CALC-SCNC: 6 MMOL/L
BUN SERPL-MCNC: 12 MG/DL
CALCIUM SERPL-MCNC: 10 MG/DL
CHLORIDE SERPL-SCNC: 105 MMOL/L
CO2 SERPL-SCNC: 26 MMOL/L
CREAT SERPL-MCNC: 0.8 MG/DL
EST. GFR  (AFRICAN AMERICAN): >60 ML/MIN/1.73 M^2
EST. GFR  (NON AFRICAN AMERICAN): >60 ML/MIN/1.73 M^2
GLUCOSE SERPL-MCNC: 86 MG/DL
MAGNESIUM SERPL-MCNC: 1.6 MG/DL
POTASSIUM SERPL-SCNC: 3.5 MMOL/L
SODIUM SERPL-SCNC: 137 MMOL/L

## 2018-06-11 PROCEDURE — 3008F BODY MASS INDEX DOCD: CPT | Mod: CPTII,S$GLB,, | Performed by: INTERNAL MEDICINE

## 2018-06-11 PROCEDURE — 99213 OFFICE O/P EST LOW 20 MIN: CPT | Mod: S$GLB,,, | Performed by: INTERNAL MEDICINE

## 2018-06-11 PROCEDURE — 3074F SYST BP LT 130 MM HG: CPT | Mod: CPTII,S$GLB,, | Performed by: INTERNAL MEDICINE

## 2018-06-11 PROCEDURE — 80048 BASIC METABOLIC PNL TOTAL CA: CPT

## 2018-06-11 PROCEDURE — 3044F HG A1C LEVEL LT 7.0%: CPT | Mod: CPTII,S$GLB,, | Performed by: INTERNAL MEDICINE

## 2018-06-11 PROCEDURE — 83735 ASSAY OF MAGNESIUM: CPT

## 2018-06-11 PROCEDURE — 36415 COLL VENOUS BLD VENIPUNCTURE: CPT

## 2018-06-11 PROCEDURE — 3078F DIAST BP <80 MM HG: CPT | Mod: CPTII,S$GLB,, | Performed by: INTERNAL MEDICINE

## 2018-06-11 PROCEDURE — 99999 PR PBB SHADOW E&M-EST. PATIENT-LVL III: CPT | Mod: PBBFAC,,, | Performed by: INTERNAL MEDICINE

## 2018-06-11 RX ORDER — LOPERAMIDE HYDROCHLORIDE 2 MG/1
2 CAPSULE ORAL 4 TIMES DAILY PRN
Qty: 10 CAPSULE | Refills: 0
Start: 2018-06-11 | End: 2018-06-14

## 2018-06-11 NOTE — PROGRESS NOTES
Subjective:       Patient ID: Katey Cerrato is a 62 y.o. female with a PMH significant for Seizure Disorder, HTN, HLD T2D, Obesity, Thyroid Disease, Glaucoma who presents today with an acute illness - appointment was scheduled today.  Patient is followed by Dr. England and last seen on 3/27/2018.    Chief Complaint: Establish Care and Diarrhea (pt been experiencing this since Saturday, tired also)    HPI Patient went to a  Saturday and felt exhasted afterwards.  On  felt tired - was able to cook eventually.  Got to work this morning and felt better and went to the dentist, but then stated diarrhea.  Fatigue is better, but now with nausea.  Diarrhea is watery and 6 times so far.  No abdominal pain.  No body aches.  Last took her FS glucose and was 142.  No recent antibiotic use and no recent hospital admission.  Patient denies f/c.  No chest pain or SOB.  No dysuria.  No headaches or change in vision.  No dizziness.   Remaining ROS negative.    Review of Systems   Constitutional: Positive for fatigue. Negative for appetite change, chills, diaphoresis, fever and unexpected weight change.   HENT: Negative for ear pain, hearing loss, sinus pain, tinnitus, trouble swallowing and voice change.    Eyes: Negative for photophobia, pain and visual disturbance.   Respiratory: Negative for chest tightness, shortness of breath and wheezing.    Cardiovascular: Negative for chest pain, palpitations and leg swelling.   Gastrointestinal: Positive for diarrhea and nausea. Negative for abdominal pain, blood in stool, constipation and vomiting.   Endocrine: Negative for cold intolerance, heat intolerance, polydipsia, polyphagia and polyuria.   Genitourinary: Negative for decreased urine volume, difficulty urinating, dysuria, flank pain, hematuria, pelvic pain, vaginal bleeding, vaginal discharge and vaginal pain.   Musculoskeletal: Negative for arthralgias, gait problem, joint swelling, myalgias and neck pain.   Skin:  Negative for rash.   Neurological: Negative for dizziness, tremors, syncope, weakness, numbness and headaches.   Hematological: Does not bruise/bleed easily.   Psychiatric/Behavioral: Negative for agitation, confusion and sleep disturbance. The patient is not nervous/anxious.        Objective:      Physical Exam   Constitutional: She is oriented to person, place, and time. She appears well-developed and well-nourished. No distress.   HENT:   Head: Normocephalic and atraumatic.   Nose: Nose normal.   Mouth/Throat: Oropharynx is clear and moist.   Eyes: Conjunctivae and EOM are normal. Pupils are equal, round, and reactive to light. No scleral icterus.   Neck: Normal range of motion. Neck supple. No JVD present. No thyromegaly present.   Cardiovascular: Normal rate, regular rhythm and intact distal pulses.  Exam reveals no gallop and no friction rub.    No murmur heard.  Pulmonary/Chest: Effort normal and breath sounds normal. No respiratory distress. She has no wheezes. She has no rales.   Abdominal: Soft. Bowel sounds are normal. She exhibits no distension. There is no tenderness. There is no rebound and no guarding.   Musculoskeletal: Normal range of motion. She exhibits no edema.   Lymphadenopathy:     She has no cervical adenopathy.   Neurological: She is alert and oriented to person, place, and time. No cranial nerve deficit or sensory deficit.   Skin: Skin is warm and dry. No rash noted. No erythema.   Psychiatric: She has a normal mood and affect. Her behavior is normal. Thought content normal.       Assessment:       1. Viral gastroenteritis    2. Diabetes mellitus type 2 in obese        Plan:   -Today's Visit - Viral Gastroenteritis - On Sunday felt tired - was able to cook eventually.  Got to work this morning and felt better and went to the dentist, but then stated diarrhea.  Fatigue is better, but now with nausea.  Diarrhea is watery and 6 times so far.  No abdominal pain.  No body aches.  Last took her  FS glucose and was 142.  No recent antibiotic use and no recent hospital admission. Exam today with no abdominal tenderness.  Slightly hyperactive bowel sounds.   BP and HR good.  Advised to keep up oral fluids and electrolytes (sugar free gator-jenn)- Will check BMP and Mag today.  Start Imodium as needed for diarrhea.  Home rest.  If no improvement in 2-3 days to call sooner.  -Endocrine - T2D - FS glucose was normal today at 106.  Continue eating - if unable to keep down food, will add Zofran and most likely hold the Glimepiride.  Follow FS glucose closely at home.  At the moment, her appetite is stable and has only mild nausea.  She had a normal TSH in 3/2018.  -Keep follow up with Dr. England

## 2018-06-11 NOTE — PATIENT INSTRUCTIONS
Your exam was overall normal today.  Your blood pressure was good.  I will order non-routine fasting labs today to check your electrolytes.  Add imodium over the counter for diarrhea - stop after diarrhea resolves.  Increase your oral fluids - consider Pedialyte or sugar-free Gatorade.  If no improvement in 2-3 days or if worsens, please call sooner.

## 2018-06-12 ENCOUNTER — TELEPHONE (OUTPATIENT)
Dept: INTERNAL MEDICINE | Facility: CLINIC | Age: 63
End: 2018-06-12

## 2018-06-12 NOTE — TELEPHONE ENCOUNTER
Spoke with Jonny in regards to Mrs. Del Toro's labs. He had a verbal understanding that her labs look ok and to continue treatment plan discussed.     ----- Message from Octavio Younger MD sent at 6/12/2018  7:12 AM CDT -----  Please let patient know that overall her labs look ok and to continue with treatment plan we discussed.  Thanks.

## 2018-07-30 ENCOUNTER — TELEPHONE (OUTPATIENT)
Dept: OPTOMETRY | Facility: CLINIC | Age: 63
End: 2018-07-30

## 2018-08-06 RX ORDER — GLIMEPIRIDE 4 MG/1
TABLET ORAL
Qty: 135 TABLET | Refills: 0 | Status: SHIPPED | OUTPATIENT
Start: 2018-08-06 | End: 2018-11-03 | Stop reason: SDUPTHER

## 2018-08-16 ENCOUNTER — CLINICAL SUPPORT (OUTPATIENT)
Dept: OPHTHALMOLOGY | Facility: CLINIC | Age: 63
End: 2018-08-16
Payer: COMMERCIAL

## 2018-08-16 ENCOUNTER — OFFICE VISIT (OUTPATIENT)
Dept: OPTOMETRY | Facility: CLINIC | Age: 63
End: 2018-08-16
Payer: COMMERCIAL

## 2018-08-16 DIAGNOSIS — H40.023 OPEN ANGLE WITH BORDERLINE FINDINGS AND HIGH GLAUCOMA RISK IN BOTH EYES: ICD-10-CM

## 2018-08-16 DIAGNOSIS — H40.023 OPEN ANGLE WITH BORDERLINE FINDINGS AND HIGH GLAUCOMA RISK IN BOTH EYES: Primary | ICD-10-CM

## 2018-08-16 PROCEDURE — 99999 PR PBB SHADOW E&M-EST. PATIENT-LVL II: CPT | Mod: PBBFAC,,, | Performed by: OPTOMETRIST

## 2018-08-16 PROCEDURE — 92012 INTRM OPH EXAM EST PATIENT: CPT | Mod: S$GLB,,, | Performed by: OPTOMETRIST

## 2018-08-16 PROCEDURE — 92083 EXTENDED VISUAL FIELD XM: CPT | Mod: S$GLB,,, | Performed by: OPTOMETRIST

## 2018-08-16 PROCEDURE — 92133 CPTRZD OPH DX IMG PST SGM ON: CPT | Mod: S$GLB,,, | Performed by: OPTOMETRIST

## 2018-08-16 PROCEDURE — 92020 GONIOSCOPY: CPT | Mod: S$GLB,,, | Performed by: OPTOMETRIST

## 2018-08-16 RX ORDER — CHLORHEXIDINE GLUCONATE ORAL RINSE 1.2 MG/ML
SOLUTION DENTAL
Refills: 0 | COMMUNITY
Start: 2018-06-05 | End: 2022-07-12

## 2018-08-16 NOTE — PROGRESS NOTES
HPI     Glaucoma Suspect      Additional comments: IOP ck, HVF, and OCT              Comments     Last eye exam was 10/18/17 with Dr. Rey.  Patient states no vision changes since last exam. Sometimes her glaucoma   drops burn her eyes.  Patient denies diplopia, headaches, flashes/floaters, and pain.    Latanoprost QHS OU (needs refills)          Last edited by Lilibeth Flowers on 8/16/2018  9:06 AM. (History)            Assessment /Plan     For exam results, see Encounter Report.    Open angle with borderline findings and high glaucoma risk in both eyes  -     OCT - Optic Nerve            1.  Due to increased c/d ratio OU and family history.  Continue with Latanoprost QHS OU.  All testing stable OU.    HVF: 8/16/18  OCT: 8/16/18  DFE: 10/28/17  Photos:  Gonio: 8/16/18  Pachy: 562 OD, 554 OS  Initial IOPs: 16 OD, 16 OS  MHx: DM, HTN  FHx: Mother                  RTC 6 months for IOP check, HVF, and OCT.

## 2018-08-17 ENCOUNTER — OFFICE VISIT (OUTPATIENT)
Dept: NEUROLOGY | Facility: CLINIC | Age: 63
End: 2018-08-17
Payer: COMMERCIAL

## 2018-08-17 VITALS
BODY MASS INDEX: 39.64 KG/M2 | DIASTOLIC BLOOD PRESSURE: 92 MMHG | SYSTOLIC BLOOD PRESSURE: 139 MMHG | HEIGHT: 59 IN | HEART RATE: 69 BPM | WEIGHT: 196.63 LBS

## 2018-08-17 DIAGNOSIS — G40.319 GENERALIZED CONVULSIVE EPILEPSY WITH INTRACTABLE EPILEPSY: Primary | ICD-10-CM

## 2018-08-17 PROCEDURE — 3080F DIAST BP >= 90 MM HG: CPT | Mod: CPTII,S$GLB,, | Performed by: PSYCHIATRY & NEUROLOGY

## 2018-08-17 PROCEDURE — 3075F SYST BP GE 130 - 139MM HG: CPT | Mod: CPTII,S$GLB,, | Performed by: PSYCHIATRY & NEUROLOGY

## 2018-08-17 PROCEDURE — 99999 PR PBB SHADOW E&M-EST. PATIENT-LVL III: CPT | Mod: PBBFAC,,, | Performed by: PSYCHIATRY & NEUROLOGY

## 2018-08-17 PROCEDURE — 99214 OFFICE O/P EST MOD 30 MIN: CPT | Mod: S$GLB,,, | Performed by: PSYCHIATRY & NEUROLOGY

## 2018-08-17 PROCEDURE — 3008F BODY MASS INDEX DOCD: CPT | Mod: CPTII,S$GLB,, | Performed by: PSYCHIATRY & NEUROLOGY

## 2018-08-17 NOTE — LETTER
August 17, 2018      Baltazar England MD  [address]  [facility] [address]           [facility]  [address]  [facility] [address]  Phone: [phone]  Fax: [phone]          Patient: Katey Cerrato   MR Number: [id number]   YOB: 1955   Date of Visit: 8/17/2018       Dear Dr. Baltazar England:    Thank you for referring Katey Cerrato to me for evaluation. Attached you will find relevant portions of my assessment and plan of care.    If you have questions, please do not hesitate to call me. I look forward to following Katey Cerrato along with you.    Sincerely,    Bethel Boykin MD    Enclosure  CC:  No Recipients    If you would like to receive this communication electronically, please contact [email or web address] or [phone] to request more information on [employer] Link access.    For providers and/or their staff who would like to refer a patient to [facility], please contact us through our one-stop-shop provider referral line, [facility], at [phone].    If you feel you have received this communication in error or would no longer like to receive these types of communications, please e-mail [email or web address]

## 2018-08-17 NOTE — PROGRESS NOTES
Name: Katey Cerrato  MRN: [id number]   CSN: [id number]      Date: 08/17/2018    HISTORY OF PRESENT ILLNESS (HPI)  The patient is a 63 y.o.  woman with DM, HTN, graves disease evaluated by Dr Luis for an episode of loss of consciousness that occurred in March 2009. She was not feeling well in morning and laid back down. Her  took their little boy to school and when he returned 30 mins later and he could not wake her up and her tongue was bleeding. She was brought to ER with no memory of all of this until after admitted. She felt ok when she awoke in the hospital.  put her Keppra 750mg 3 Q HS. She had a EEG Sept.2009 which was abnormal. She has had no furthers seizures after starting on Keppra XR. Last prescription was for 2 tabs a day. She had done well on that dose so the dose was not increased back to the 3 tabs a day. She also ran out of medications last month and did not taking anything for 5 days without experiencing seizures or other problems  MRI showed subcortical ischemic vascular changes and she has high cholesterol (on simvastatin) and hypertension (not treated). She is followed by her PCP for these and was recently put on a new medication for diabetes.    Interim History  Continues to do well without seizures, chronic left knee pain with surgery in 1970's and recurrent injury  9/2017  No further seizures    3/2017  Patient presents after second lifetime seizure with ED presentation on 3/22/2017 which occurred from sleep.  She had missed 2 doses of medication but level at that time was 0.  She reports that for 1 month leading up to this event, she was taking diclofenac-misoprostol on a daily basis for knee pain and had been noticing whole LEV tablets in her stool.  Previously took this medicine only rarely for knee pain.    Seizure Seminology  Seizure Type 1  Classification:   Aura -   Ictus  - Nonconv -  - Conv -  - Duration -   Post-ictal  - Symptoms  - Duration  Age of onset   Current  Seizure Frequency -    Last Seizure    sz per month 2015 2016 2017 2018 2019 2020   Johnie         Feb         Mar         Apr         May         Jean         Jul         Aug         Sep         Oct         Nov         Dec         Tot           Seizure Triggers  Sleep Deprivation -  None  Other medications -  None  Psych/stress -  None  Photic stimulation -  None  Hyperventilation -  None  Medical Problems -  None  Menses -   No  Sensory Stimulation    Light  No   Sound  No   Problem Solv No   Other  No  Missed dose of Rx None    AED Treatments  Present regimen  levetiracetam (Keppra, LEV) 1500mg daily  -Lvl 12/1/2010 - 44 ug/ml    Prior treatments      Not tried  acetazolamide (Diamox, AZM)  amantadine  carbamazepine (Tegretol, CBZ)  clobezam (Onfi or Frizium, CLB)  ethosuximide (Zarontin, ESM)  eslicarbazine (Aptiom, ESL)  felbamate (felbatol, FBM)  gabapentin (Neurontin, GPN)  lacosamide (Vimpat, LCS)   lamotrigine (Lamictal, LTG)   methsuximide (Celontin, MSM)  methyphenytoin (Mesantion, MHT)  oxcarbazepine (Trileptal OXC)  perampanel (Fycompa, FCP)   phenobarbital (Pb)  phenytoin (Dilantin, PHT)  pregabalin (Lyrica, PGB)  primidone (Mysoline, PRM)  retigabine (Potiga, RTG)  rufinamide (Banzel, RUF)  tiagabine (Gabatril,  TGB)  topiramate (Topamax, TPM)  viagabatrin, (Sabril, VGB)  vagal nerve stimulator (VNS)  valproic acid (Depakote, VPA)  zonisamide (Zonegran, ZNA)  Benzodiazepines  diazepam - rectal (Diastatl)  diazepam - oral (Valium, DZ)  clonazepam (Klonopin, CZP)  clorazepate (Tranxene, CLZ)  Ativan  Brain Stimulation  Vagal Nerve Stimulation-n/a  DBS- n/a    Adherence/Compliance method  Memory - yes  Mom or Spouse - Yes  Pill Box - no  Rafael calendar - no  Turn over medication bottle - no  Phone alarm - no    Seizure Evaluation  Seizure Evaluation  EEG - 2009-09-15 - abnormal with bilateral temporal slowing and right sided spike and sharp waves.  EEG/Video monitoring -   MRI 2009-04-01 - A few punctate  areas of t2/flair signal hyperintensity are present in the parietal white matter which are nonspecific though may be sequelae of chronic microvascular ischemic change. There is no evidence for acute infarction or enhancing lesion.   CT Scan - no  PET Scan N/A  Neuropsychological evaluation N/A    Potential Epilepsy Risk Factors:   Pregnancy/Labor/Delivery - full term uncomplicated pregnancy labor and vaginal delivery  Febrile seizures - none  Head injury  - none  CNS infection - none     Stroke - none  Family Hx of Sz - none    PAST MEDICAL HISTORY:   Active Ambulatory Problems     Diagnosis Date Noted    Generalized convulsive epilepsy with intractable epilepsy 09/07/2012    Hypertension associated with diabetes 09/07/2012    Diabetes mellitus type 2 in obese 09/07/2012    Obesity (BMI 30-39.9) 09/07/2012    Essential hypertension     Graves' disease with exophthalmos     Empty sella syndrome     Mixed hyperlipidemia     Uncontrolled type 2 diabetes mellitus without complication, without long-term current use of insulin 03/18/2014     Resolved Ambulatory Problems     Diagnosis Date Noted    Neuropathy 08/07/2013     Past Medical History:   Diagnosis Date    Cataract     Empty sella syndrome     GHD (growth hormone deficiency)     Glaucoma     Graves' disease with exophthalmos     History of vitamin D deficiency     Hyperlipidemia     Hypertension     Seizures     Thyroid nodule     Thyroid nodule     Type II or unspecified type diabetes mellitus without mention of complication, uncontrolled         PAST SURGICAL HISTORY:   Past Surgical History:   Procedure Laterality Date    CHOLECYSTECTOMY      HYSTERECTOMY      one ovary intact    KNEE SURGERY      LYMPH NODE BIOPSY  3010        FAMILY HISTORY:   Family History   Problem Relation Age of Onset    Cancer Mother     Cataracts Mother     Glaucoma Mother         shunt    Heart disease Mother     Tremor Mother     Breast cancer Mother  "78    Heart disease Sister     Heart disease Father     Hypertension Brother     Breast cancer Sister 60         SOCIAL HISTORY:   Social History     Socioeconomic History    Marital status:      Spouse name: Not on file    Number of children: Not on file    Years of education: Not on file    Highest education level: Not on file   Social Needs    Financial resource strain: Not on file    Food insecurity - worry: Not on file    Food insecurity - inability: Not on file    Transportation needs - medical: Not on file    Transportation needs - non-medical: Not on file   Occupational History     Employer: [employer]   Tobacco Use    Smoking status: Never Smoker    Smokeless tobacco: Never Used   Substance and Sexual Activity    Alcohol use: No     Comment: none    Drug use: No    Sexual activity: Yes     Partners: Male     Comment: m  works in dietary,  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   Other Topics Concern    Not on file   Social History Narrative    Not on file        SUBSTANCE USE:  Social History     Tobacco Use    Smoking status: Never Smoker    Smokeless tobacco: Never Used   Substance and Sexual Activity    Alcohol use: No     Comment: none    Drug use: No    Sexual activity: Yes     Partners: Male     Comment: m  works in dietary,  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      Social History     Tobacco Use    Smoking status: Never Smoker    Smokeless tobacco: Never Used   Substance Use Topics    Alcohol use: No     Comment: none        ALLERGIES: Codeine     BP (!) 139/92   Pulse 69   Ht 4' 11" (1.499 m)   Wt 89.2 kg (196 lb 10.4 oz)   BMI 39.72 kg/m²     Higher Cortical Function:    Patient is a well developed, pleasant, well groomed individual appearing their stated age  Oriented - intact to person, place and time and followed two step instruction correctly.    Fund of knowledge was appropriate.    R-L Orientation - Intact - Impaired  Language - Speech was fluent without " "evidence for an aphasia.  Agnosias, agraphesthesia, or astereognosis - not present.   Extinction with double simultaneous stimulation:        Proximal-distal stimulation - Not present        Right-left stimulation - Not present  Cranial Nerves II - XII:    EOMs were intact with normal smooth and no nystagmus.    PERRLA. D/C   Funduscopic exam - disc were flat with normal A/V ratio and no exudates or hemorrhages. Visual fields were full to confrontation.    Motor - facial movement was symmetrical and normal.    Facial sensory - Light touch and pin prick sensations were normal.    Hearing was normal to finger rub.  Palate moved well and was symmetrical with normal palatal and oral sensation.    Tongue movement was full & the patient could say "la la la" and "Ka Ka Ka" without  difficulty. Patient repeated Voodoo and Anabaptism without difficulty. Normal power and bulk was found in the massiter and rotator muscles of the neck.  Motor: Power, bulk and tone were normal in all extremities.  Sensory: Light touch, pin prick, vibration and position senses were normal in all extremities.    Coordination:       Rapid alternating movements and rapid finger tapping - normal.       Finger to nose - nl.       Arm roll - symmetrical.    Gait:  Station, gait and tandem walking were done without difficulty and Romberg was negative.    Deep tendon reflexes:    Reflex L R   Bicpets 1+ 1+   Tricepts 1+ 1+   Brachio-radialis 1+ 1+   Knee 0+ 0+   Ankle 0+ 0+   Babinski No No     Tremor: resting, postural, intentional - none    Pulses    Carotids - strong without bruits    Peripheral - strong and symmetrical      IMPRESSION  1.  Left temporal lobe epilepsy with break through seizure 3/2017 due to malabsorption of meds in the setting of increased misoprostol intake      DISPOSITION:   Continue LEV 1500mg daily    Follow up in 6 months    2014 EPILEPSY QUALITY MEASUREMENT (AAN)  1 a. Seizure Frequency: noted  1b. Seizure Intervention: " yes  2.                   a. Etiology: unknown  b. Seizure Type: CPS w sec GTC sz  c. Epilepsy Syndrome: n/a  3.                   a. Side-effects of AED: no                        b. Intervention for side-effects: n/a  4. Screening for psychiatric or behavioral disorders: yes  5. Personalized epilepsy safety issues and education: yes  6. Counseling for women of child-bearing potential and epilepsy: n/a  7. Referral of treatment-resistant epilepsy to comprehensive epilepsy center (q 2 years): N/A.     The patient was asked to call me/the clinic 1 week after the test(s) are done to obtain results.  The following issues were  all discussed in detail with the patient and family/caregiver(s):     1. Diagnosis, plans, prognosis, medications and possible side-effects, risks and benefits of treatment, other alternatives to AEDs.  2. Risks related to continued seizures, status epilepticus, SUDEP, driving restrictions and seizure precautions ( no baths but showers are ok, no swimming unsupervised, no use of heavy machinery, no use of sharp moving objects, avoid heights).   3. Issues related to pregnancy, OCP and breast feeding as it relates to epilepsy.  4. The potential of teratogenicity and suicidal risks of anti-epileptic medications.  5.Avoid any activity that compromise patient safety related to seizures.      Questions and concerns raised by the patient and family/care-giver(s) were addressed and they indicated understanding of everything discussed and agreed to plans as above.

## 2018-09-12 ENCOUNTER — LAB VISIT (OUTPATIENT)
Dept: LAB | Facility: OTHER | Age: 63
End: 2018-09-12
Attending: INTERNAL MEDICINE
Payer: COMMERCIAL

## 2018-09-12 ENCOUNTER — TELEPHONE (OUTPATIENT)
Dept: INTERNAL MEDICINE | Facility: CLINIC | Age: 63
End: 2018-09-12

## 2018-09-12 ENCOUNTER — OFFICE VISIT (OUTPATIENT)
Dept: INTERNAL MEDICINE | Facility: CLINIC | Age: 63
End: 2018-09-12
Payer: COMMERCIAL

## 2018-09-12 VITALS
HEIGHT: 59 IN | DIASTOLIC BLOOD PRESSURE: 90 MMHG | HEART RATE: 59 BPM | BODY MASS INDEX: 39.11 KG/M2 | WEIGHT: 194 LBS | SYSTOLIC BLOOD PRESSURE: 138 MMHG

## 2018-09-12 DIAGNOSIS — E66.9 DIABETES MELLITUS TYPE 2 IN OBESE: ICD-10-CM

## 2018-09-12 DIAGNOSIS — E23.6 EMPTY SELLA SYNDROME: ICD-10-CM

## 2018-09-12 DIAGNOSIS — I15.2 HYPERTENSION ASSOCIATED WITH DIABETES: ICD-10-CM

## 2018-09-12 DIAGNOSIS — G40.319 GENERALIZED CONVULSIVE EPILEPSY WITH INTRACTABLE EPILEPSY: ICD-10-CM

## 2018-09-12 DIAGNOSIS — E11.69 DIABETES MELLITUS TYPE 2 IN OBESE: ICD-10-CM

## 2018-09-12 DIAGNOSIS — E11.59 HYPERTENSION ASSOCIATED WITH DIABETES: ICD-10-CM

## 2018-09-12 DIAGNOSIS — I10 ESSENTIAL HYPERTENSION: Primary | Chronic | ICD-10-CM

## 2018-09-12 DIAGNOSIS — E05.00 GRAVES' DISEASE WITH EXOPHTHALMOS: ICD-10-CM

## 2018-09-12 DIAGNOSIS — E78.2 MIXED HYPERLIPIDEMIA: Chronic | ICD-10-CM

## 2018-09-12 LAB
ESTIMATED AVG GLUCOSE: 171 MG/DL
HBA1C MFR BLD HPLC: 7.6 %

## 2018-09-12 PROCEDURE — 83036 HEMOGLOBIN GLYCOSYLATED A1C: CPT

## 2018-09-12 PROCEDURE — 36415 COLL VENOUS BLD VENIPUNCTURE: CPT

## 2018-09-12 PROCEDURE — 3008F BODY MASS INDEX DOCD: CPT | Mod: CPTII,S$GLB,, | Performed by: INTERNAL MEDICINE

## 2018-09-12 PROCEDURE — 3075F SYST BP GE 130 - 139MM HG: CPT | Mod: CPTII,S$GLB,, | Performed by: INTERNAL MEDICINE

## 2018-09-12 PROCEDURE — 99214 OFFICE O/P EST MOD 30 MIN: CPT | Mod: S$GLB,,, | Performed by: INTERNAL MEDICINE

## 2018-09-12 PROCEDURE — 99999 PR PBB SHADOW E&M-EST. PATIENT-LVL III: CPT | Mod: PBBFAC,,, | Performed by: INTERNAL MEDICINE

## 2018-09-12 PROCEDURE — 3044F HG A1C LEVEL LT 7.0%: CPT | Mod: CPTII,S$GLB,, | Performed by: INTERNAL MEDICINE

## 2018-09-12 PROCEDURE — 3080F DIAST BP >= 90 MM HG: CPT | Mod: CPTII,S$GLB,, | Performed by: INTERNAL MEDICINE

## 2018-09-12 RX ORDER — AMLODIPINE BESYLATE 10 MG/1
10 TABLET ORAL DAILY
Qty: 90 TABLET | Refills: 3 | Status: SHIPPED | OUTPATIENT
Start: 2018-09-12 | End: 2020-03-05 | Stop reason: SDUPTHER

## 2018-09-12 NOTE — TELEPHONE ENCOUNTER
Inform pt that A1c increased to 7.6. Please restart januvia as we discussed as I believe this will get her sugars back down where they need to be.

## 2018-09-12 NOTE — PROGRESS NOTES
Subjective:       Patient ID: Katey Cerrato is a 63 y.o. female.    Chief Complaint: Diabetes (4 month f/u)    Pt here for f/u. Pt's BP is normally well controlled but high today. Tolerating meds well. Pt denies cp/sob/ha/vision or neuro changes. Not checking at home.     DM2 is fairly well controlled with last A1c 6.9. Fasting sugars are 140s but has been out of januvia for 1 month. She is on metformin 1000mg AM and 500mg PM; she cannot tolerate higher doses due to GI side effects. Also on amaryl 6mg daily and januvia 100mg daily. No known end organ issues. Other health maint is up to date. No low sugars. Wt loss and exercise goals d/w pt.     HLD is tx with lipitor to 20mg. Tolerating well and is stable.    She has h/o Graves dz with exophthalmos. She has seen endocrine but not in a while. No hyperthyroid state. Also incidental finding of empty sella was noted but no symptoms or clinically significant issues associated with this.     She sees neuro regularly for h/o sz. No seizures recently on keppra.      Diabetes   Pertinent negatives for hypoglycemia include no dizziness or headaches. Pertinent negatives for diabetes include no chest pain, no fatigue and no polyuria.   Hypertension   Pertinent negatives include no chest pain, headaches, palpitations or shortness of breath.     Review of Systems   Constitutional: Negative for fatigue, fever and unexpected weight change.   HENT: Negative for congestion and sore throat.    Eyes: Negative for visual disturbance.   Respiratory: Negative for shortness of breath.    Cardiovascular: Negative for chest pain, palpitations and leg swelling.   Gastrointestinal: Negative for abdominal pain, blood in stool, constipation and diarrhea.   Endocrine: Negative for polyuria.   Genitourinary: Negative for dysuria.   Musculoskeletal: Negative for arthralgias.   Skin: Negative for rash.   Neurological: Negative for dizziness, syncope and headaches.   Hematological: Negative for  adenopathy.   Psychiatric/Behavioral: Negative for dysphoric mood.       Objective:      Physical Exam   Constitutional: She is oriented to person, place, and time. She appears well-developed and well-nourished.   HENT:   Head: Normocephalic and atraumatic.   Right Ear: Hearing, tympanic membrane, external ear and ear canal normal.   Left Ear: Hearing, tympanic membrane, external ear and ear canal normal.   Nose: Nose normal.   Mouth/Throat: Uvula is midline and oropharynx is clear and moist.   Eyes: Conjunctivae, EOM and lids are normal. Pupils are equal, round, and reactive to light. Right conjunctiva is not injected. Left conjunctiva is not injected.   Neck: Neck supple. No JVD present. Carotid bruit is not present. No thyroid mass and no thyromegaly present.   Cardiovascular: Normal rate, regular rhythm, S1 normal, S2 normal, normal heart sounds and intact distal pulses. PMI is not displaced.   No murmur heard.  Pulses:       Posterior tibial pulses are 2+ on the right side, and 2+ on the left side.   Pulmonary/Chest: Effort normal and breath sounds normal. She has no wheezes. She has no rhonchi. She has no rales.   Abdominal: Soft. Bowel sounds are normal. She exhibits no distension and no abdominal bruit. There is no hepatosplenomegaly. There is no tenderness.   Musculoskeletal: She exhibits no edema.        Right foot: Normal. There is no deformity.        Left foot: Normal. There is no deformity.   Feet:   Right Foot:   Protective Sensation: 6 sites tested. 6 sites sensed.   Skin Integrity: Negative for ulcer, blister or skin breakdown.   Left Foot:   Protective Sensation: 6 sites tested. 6 sites sensed.   Skin Integrity: Negative for ulcer, blister or skin breakdown.   Lymphadenopathy:        Head (right side): No preauricular and no posterior auricular adenopathy present.        Head (left side): No preauricular and no posterior auricular adenopathy present.     She has no cervical adenopathy.     She has  no axillary adenopathy.   Neurological: She is alert and oriented to person, place, and time. She has normal strength. No cranial nerve deficit or sensory deficit. She displays a negative Romberg sign. Coordination and gait normal.   Skin: Skin is warm. No rash noted.   Psychiatric: She has a normal mood and affect. Her speech is normal and behavior is normal. Judgment and thought content normal.       Assessment:       1. Essential hypertension    2. Generalized convulsive epilepsy with intractable epilepsy    3. Hypertension associated with diabetes    4. Mixed hyperlipidemia    5. Diabetes mellitus type 2 in obese    6. Empty sella syndrome    7. Graves' disease with exophthalmos    8. Uncontrolled type 2 diabetes mellitus without complication, without long-term current use of insulin        Plan:       1. Continue current meds  2. Home BP and BS monitoring  3. Update a1c  4. Keep f/u with specialists  5. Counseled on wt loss strategies  6. Restart januvia  7. F/u 2 wks nursing BP check

## 2018-09-21 RX ORDER — METFORMIN HYDROCHLORIDE 500 MG/1
TABLET, EXTENDED RELEASE ORAL
Qty: 360 TABLET | Refills: 1 | Status: SHIPPED | OUTPATIENT
Start: 2018-09-21 | End: 2019-11-15 | Stop reason: SDUPTHER

## 2018-09-21 RX ORDER — LANCETS
1 EACH MISCELLANEOUS DAILY
Qty: 200 EACH | Refills: 3 | Status: SHIPPED | OUTPATIENT
Start: 2018-09-21 | End: 2019-01-16

## 2018-09-21 NOTE — TELEPHONE ENCOUNTER
----- Message from Anila Santiago sent at 9/21/2018 11:17 AM CDT -----  Contact: MORAIMA LUCAS [[id number]]        Name of Who is Calling: MORAIMA LUCAS [[id number]]      What is the request in detail:   Patient called requesting a call.  Patient refused to disclose as to what her pertains to.  Please give a call back at your earliest convenience.     THANKS!      Can the clinic reply by [facility]:   No      What Number to Call Back: MORAIMA LUCAS / ph# [phone]

## 2018-09-22 RX ORDER — MELOXICAM 15 MG/1
15 TABLET ORAL DAILY PRN
Qty: 30 TABLET | Refills: 3 | Status: SHIPPED | OUTPATIENT
Start: 2018-09-22 | End: 2019-01-17 | Stop reason: SDUPTHER

## 2018-09-26 ENCOUNTER — CLINICAL SUPPORT (OUTPATIENT)
Dept: INTERNAL MEDICINE | Facility: CLINIC | Age: 63
End: 2018-09-26
Payer: COMMERCIAL

## 2018-09-26 VITALS — DIASTOLIC BLOOD PRESSURE: 70 MMHG | OXYGEN SATURATION: 98 % | SYSTOLIC BLOOD PRESSURE: 122 MMHG | HEART RATE: 72 BPM

## 2018-09-26 DIAGNOSIS — I10 ESSENTIAL HYPERTENSION: Primary | ICD-10-CM

## 2018-09-26 PROCEDURE — 99999 PR PBB SHADOW E&M-EST. PATIENT-LVL II: CPT | Mod: PBBFAC,,,

## 2018-09-26 RX ORDER — METOPROLOL SUCCINATE 50 MG/1
50 TABLET, EXTENDED RELEASE ORAL NIGHTLY
Qty: 90 TABLET | Refills: 3 | Status: SHIPPED | OUTPATIENT
Start: 2018-09-26 | End: 2019-12-05 | Stop reason: SDUPTHER

## 2018-09-26 NOTE — PROGRESS NOTES
Katey LEVY Saqib 63 y.o. female is here today for Blood Pressure check.   History of HTN yes.    Review of patient's allergies indicates:   Allergen Reactions    Codeine Nausea Only     Creatinine   Date Value Ref Range Status   06/11/2018 0.8 0.5 - 1.4 mg/dL Final     Sodium   Date Value Ref Range Status   06/11/2018 137 136 - 145 mmol/L Final     Potassium   Date Value Ref Range Status   06/11/2018 3.5 3.5 - 5.1 mmol/L Final   ]  Patient verifies taking blood pressure medications on a regular bases at the same time of the day.     Current Outpatient Medications:     amLODIPine (NORVASC) 10 MG tablet, Take 1 tablet (10 mg total) by mouth once daily., Disp: 90 tablet, Rfl: 3    atorvastatin (LIPITOR) 20 MG tablet, TAKE 1 TABLET (20 MG TOTAL) BY MOUTH ONCE DAILY., Disp: 90 tablet, Rfl: 2    blood sugar diagnostic (ONETOUCH ULTRA BLUE TEST STRIP) Strp, USE TO TEST BLOOD SUGAR ONCE A DAY, Disp: 200 strip, Rfl: 3    chlorhexidine (PERIDEX) 0.12 % solution, SWISWH & SPIT 15 ML BY MOUTH 3 TIMES A DAY FOR 7 DAYS, Disp: , Rfl: 0    glimepiride (AMARYL) 4 MG tablet, TAKE 1 AND 1/2 TABLETS (6 MG TOTAL) BY MOUTH BEFORE BREAKFAST., Disp: 135 tablet, Rfl: 0    hepatitis B virus vacc.rec,PF, (ENGERIX-B) 20 mcg/mL Syrg, Inject into the muscle., Disp: 1 mL, Rfl: 0    lancets (ACCU-CHEK SOFTCLIX LANCETS) Misc, 1 each by Misc.(Non-Drug; Combo Route) route once daily., Disp: 200 each, Rfl: 3    lancets (ONE TOUCH ULTRASOFT LANCETS) Misc, Check sugar qAM fasting, Disp: 35 each, Rfl: 5    latanoprost 0.005 % ophthalmic solution, PLACE 1 DROP INTO BOTH EYES EVERY EVENING., Disp: 2.5 mL, Rfl: 6    levETIRAcetam (KEPPRA) 750 MG Tab, TAKE 2 TABLETS BY MOUTH EVERY DAY, Disp: 180 tablet, Rfl: 3    losartan (COZAAR) 25 MG tablet, TAKE 1 TABLET (25 MG TOTAL) BY MOUTH ONCE DAILY., Disp: 90 tablet, Rfl: 2    meloxicam (MOBIC) 15 MG tablet, TAKE 1 TABLET (15 MG TOTAL) BY MOUTH DAILY AS NEEDED FOR PAIN., Disp: 30 tablet, Rfl: 3     metFORMIN (GLUCOPHAGE-XR) 500 MG 24 hr tablet, TAKE 2 TABLETS BY MOUTH TWICE A DAY WITH MEALS, Disp: 360 tablet, Rfl: 1    metoprolol succinate (TOPROL-XL) 50 MG 24 hr tablet, TAKE 1 TABLET BY MOUTH EVERY EVENING, Disp: 90 tablet, Rfl: 3    multivitamin-minerals-lutein (CENTRUM SILVER) Tab, Take 1 tablet by mouth once daily once daily., Disp: , Rfl:     SITagliptin (JANUVIA) 100 MG Tab, Take 1 tablet (100 mg total) by mouth once daily., Disp: 90 tablet, Rfl: 3  Does patient have record of home blood pressure readings yes. Readings have been averaging 140/83.   Last dose of blood pressure medication was taken at 900 am.  Patient is symptomatic.       BP: 122/70 , Pulse: 72.      Dr. England notified.  Pt comes in for bp check and bp is within normal range

## 2018-11-05 RX ORDER — GLIMEPIRIDE 4 MG/1
TABLET ORAL
Qty: 135 TABLET | Refills: 1 | Status: SHIPPED | OUTPATIENT
Start: 2018-11-05 | End: 2019-05-15

## 2018-11-27 ENCOUNTER — TELEPHONE (OUTPATIENT)
Dept: OBSTETRICS AND GYNECOLOGY | Facility: CLINIC | Age: 63
End: 2018-11-27

## 2018-11-27 DIAGNOSIS — Z12.31 VISIT FOR SCREENING MAMMOGRAM: Primary | ICD-10-CM

## 2018-11-27 NOTE — TELEPHONE ENCOUNTER
----- Message from Gabriella Corona sent at 11/27/2018  8:14 AM CST -----  Contact: awa  Name of Who is Calling: awa      What is the request in detail:Patient is requesting orders for her annual mammogram       Can the clinic reply by [facility]: no      What Number to Call Back if not in [facility]: [phone]

## 2018-11-30 ENCOUNTER — OFFICE VISIT (OUTPATIENT)
Dept: OBSTETRICS AND GYNECOLOGY | Facility: CLINIC | Age: 63
End: 2018-11-30
Payer: COMMERCIAL

## 2018-11-30 VITALS
SYSTOLIC BLOOD PRESSURE: 120 MMHG | WEIGHT: 196.19 LBS | HEIGHT: 59 IN | DIASTOLIC BLOOD PRESSURE: 80 MMHG | BODY MASS INDEX: 39.55 KG/M2

## 2018-11-30 DIAGNOSIS — E78.2 MIXED HYPERLIPIDEMIA: Chronic | ICD-10-CM

## 2018-11-30 DIAGNOSIS — Z01.419 ENCOUNTER FOR GYNECOLOGICAL EXAMINATION WITHOUT ABNORMAL FINDING: ICD-10-CM

## 2018-11-30 DIAGNOSIS — I10 ESSENTIAL HYPERTENSION: Primary | Chronic | ICD-10-CM

## 2018-11-30 PROCEDURE — 3074F SYST BP LT 130 MM HG: CPT | Mod: CPTII,S$GLB,, | Performed by: OBSTETRICS & GYNECOLOGY

## 2018-11-30 PROCEDURE — 3079F DIAST BP 80-89 MM HG: CPT | Mod: CPTII,S$GLB,, | Performed by: OBSTETRICS & GYNECOLOGY

## 2018-11-30 PROCEDURE — 99396 PREV VISIT EST AGE 40-64: CPT | Mod: S$GLB,,, | Performed by: OBSTETRICS & GYNECOLOGY

## 2018-11-30 PROCEDURE — 99999 PR PBB SHADOW E&M-EST. PATIENT-LVL II: CPT | Mod: PBBFAC,,, | Performed by: OBSTETRICS & GYNECOLOGY

## 2018-12-01 NOTE — PROGRESS NOTES
HISTORY OF PRESENT ILLNESS:    Katey Cerrato is a 63 y.o. female,   presents today for her routine visit and has no complaints.      Past Medical History:   Diagnosis Date    Cataract     Empty sella syndrome     GHD (growth hormone deficiency)     Glaucoma     Graves' disease with exophthalmos     History of vitamin D deficiency     Hyperlipidemia     Hypertension     Seizures     Thyroid nodule     Thyroid nodule     Type II or unspecified type diabetes mellitus without mention of complication, uncontrolled        Past Surgical History:   Procedure Laterality Date    CHOLECYSTECTOMY      HYSTERECTOMY      one ovary intact    KNEE SURGERY      LYMPH NODE BIOPSY  3010       MEDICATIONS AND ALLERGIES:      Current Outpatient Medications:     amLODIPine (NORVASC) 10 MG tablet, Take 1 tablet (10 mg total) by mouth once daily., Disp: 90 tablet, Rfl: 3    atorvastatin (LIPITOR) 20 MG tablet, TAKE 1 TABLET (20 MG TOTAL) BY MOUTH ONCE DAILY., Disp: 90 tablet, Rfl: 2    blood sugar diagnostic (ONETOUCH ULTRA BLUE TEST STRIP) Strp, USE TO TEST BLOOD SUGAR ONCE A DAY, Disp: 200 strip, Rfl: 3    chlorhexidine (PERIDEX) 0.12 % solution, SWISWH & SPIT 15 ML BY MOUTH 3 TIMES A DAY FOR 7 DAYS, Disp: , Rfl: 0    glimepiride (AMARYL) 4 MG tablet, TAKE 1 AND 1/2 TABLETS (6 MG TOTAL) BY MOUTH BEFORE BREAKFAST., Disp: 135 tablet, Rfl: 1    hepatitis B virus vacc.rec,PF, (ENGERIX-B) 20 mcg/mL Syrg, Inject into the muscle., Disp: 1 mL, Rfl: 0    lancets (ACCU-CHEK SOFTCLIX LANCETS) Misc, 1 each by Misc.(Non-Drug; Combo Route) route once daily., Disp: 200 each, Rfl: 3    lancets (ONE TOUCH ULTRASOFT LANCETS) Misc, Check sugar qAM fasting, Disp: 35 each, Rfl: 5    latanoprost 0.005 % ophthalmic solution, PLACE 1 DROP INTO BOTH EYES EVERY EVENING., Disp: 2.5 mL, Rfl: 6    levETIRAcetam (KEPPRA) 750 MG Tab, TAKE 2 TABLETS BY MOUTH EVERY DAY, Disp: 180 tablet, Rfl: 3    losartan (COZAAR) 25 MG tablet, TAKE  1 TABLET (25 MG TOTAL) BY MOUTH ONCE DAILY., Disp: 90 tablet, Rfl: 2    meloxicam (MOBIC) 15 MG tablet, TAKE 1 TABLET (15 MG TOTAL) BY MOUTH DAILY AS NEEDED FOR PAIN., Disp: 30 tablet, Rfl: 3    metFORMIN (GLUCOPHAGE-XR) 500 MG 24 hr tablet, TAKE 2 TABLETS BY MOUTH TWICE A DAY WITH MEALS, Disp: 360 tablet, Rfl: 1    metoprolol succinate (TOPROL-XL) 50 MG 24 hr tablet, Take 1 tablet (50 mg total) by mouth every evening., Disp: 90 tablet, Rfl: 3    multivitamin-minerals-lutein (CENTRUM SILVER) Tab, Take 1 tablet by mouth once daily once daily., Disp: , Rfl:     SITagliptin (JANUVIA) 100 MG Tab, Take 1 tablet (100 mg total) by mouth once daily., Disp: 90 tablet, Rfl: 3    Review of patient's allergies indicates:   Allergen Reactions    Codeine Nausea Only       Family History   Problem Relation Age of Onset    Cancer Mother     Cataracts Mother     Glaucoma Mother         shunt    Heart disease Mother     Tremor Mother     Breast cancer Mother 78    Heart disease Sister     Heart disease Father     Hypertension Brother     Breast cancer Sister 60       Social History     Socioeconomic History    Marital status:      Spouse name: Not on file    Number of children: Not on file    Years of education: Not on file    Highest education level: Not on file   Social Needs    Financial resource strain: Not on file    Food insecurity - worry: Not on file    Food insecurity - inability: Not on file    Transportation needs - medical: Not on file    Transportation needs - non-medical: Not on file   Occupational History     Employer: [employer]   Tobacco Use    Smoking status: Never Smoker    Smokeless tobacco: Never Used   Substance and Sexual Activity    Alcohol use: No     Comment: none    Drug use: No    Sexual activity: Yes     Partners: Male     Comment: m  works in dietary,  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   Other Topics Concern    Not on file   Social History Narrative     "Not on file     COMPREHENSIVE GYN HISTORY:  PAP History: Denies abnormal Paps.  Infection History: Denies STDs. Denies PID.  Benign History: Denies uterine fibroids. Denies ovarian cysts. Denies endometriosis. Denies other conditions.  Cancer History: Denies cervical cancer. Denies uterine cancer or hyperplasia. Denies ovarian cancer. Denies vulvar cancer or pre-cancer. Denies vaginal cancer or pre-cancer. Denies breast cancer. Denies colon cancer.  Sexual Activity History: Reports currently being sexually active  Menstrual History: Denies menses. Pt is  not on ERT.     ROS:  GENERAL: No weight changes. No swelling. No fatigue. No fever.  CARDIOVASCULAR: No chest pain. No shortness of breath. No leg cramps.   NEUROLOGICAL: No headaches. No vision changes.  BREASTS: No pain. No lumps. No discharge.  ABDOMEN: No pain. No nausea. No vomiting. No diarrhea. No constipation.  REPRODUCTIVE: No abnormal bleeding.  VULVA: No pain. No lesions. No itching.  VAGINA: No relaxation. No itching. No odor. No discharge. No lesions.  URINARY: No incontinence. No nocturia. No frequency. No dysuria.    /80   Ht 4' 11" (1.499 m)   Wt 89 kg (196 lb 3.4 oz)   BMI 39.63 kg/m²     PE:  APPEARANCE: Well nourished, well developed, in no acute distress.  AFFECT: WNL, alert and oriented x 3.  SKIN: No acne or hirsutism.  NECK: Neck symmetric without masses or thyromegaly.  NODES: No inguinal, cervical, axillary or femoral lymph node enlargement.  CHEST: Good respiratory effort.   ABDOMEN: Soft. No tenderness or masses. No hepatosplenomegaly. No hernias.  BREASTS: Symmetrical, no skin changes or visible lesions. No palpable masses, nipple discharge bilaterally.  PELVIC: ATROPHIC EXTERNAL FEMALE GENITALIA without lesions. Normal hair distribution. Adequate perineal body, normal urethral meatus. VAGINA DRY without lesions or discharge. No significant cystocele or rectocele. Bimanual exam shows CERVIX and UTERUS to be SURGICALLY ABSENT. " Adnexa without masses or tenderness.  EXTREMITIES: No edema.    DIAGNOSIS:  1. Essential hypertension    2. Mixed hyperlipidemia    3. Encounter for gynecological examination without abnormal finding           COUNSELING:  The patient was counseled today on  -osteoporosis prevention, calcium supplementation, regular weight bearing exercise.  -ACS PAP guidelines (no paps), with recommendations for yearly pelvic exams as her uterus and cervix were removed for benign reasons and ovaries remain;  -recommendation for yearly mammogram;  -to see her primary care physician for all other health maintenance.    FOLLOW-UP with me for next routine visit.

## 2018-12-17 ENCOUNTER — TELEPHONE (OUTPATIENT)
Dept: OPTOMETRY | Facility: CLINIC | Age: 63
End: 2018-12-17

## 2018-12-19 ENCOUNTER — HOSPITAL ENCOUNTER (OUTPATIENT)
Dept: RADIOLOGY | Facility: OTHER | Age: 63
Discharge: HOME | End: 2018-12-19
Attending: OBSTETRICS & GYNECOLOGY
Payer: COMMERCIAL

## 2018-12-19 VITALS — HEIGHT: 59 IN | BODY MASS INDEX: 39.51 KG/M2 | WEIGHT: 196 LBS

## 2018-12-19 DIAGNOSIS — Z12.31 VISIT FOR SCREENING MAMMOGRAM: ICD-10-CM

## 2018-12-19 PROCEDURE — 77063 BREAST TOMOSYNTHESIS BI: CPT | Mod: TC

## 2018-12-19 PROCEDURE — 77063 BREAST TOMOSYNTHESIS BI: CPT | Mod: 26,,, | Performed by: INTERNAL MEDICINE

## 2018-12-19 PROCEDURE — 77067 SCR MAMMO BI INCL CAD: CPT | Mod: 26,,, | Performed by: INTERNAL MEDICINE

## 2019-01-16 ENCOUNTER — LAB VISIT (OUTPATIENT)
Dept: LAB | Facility: OTHER | Age: 64
End: 2019-01-16
Attending: INTERNAL MEDICINE
Payer: COMMERCIAL

## 2019-01-16 ENCOUNTER — OFFICE VISIT (OUTPATIENT)
Dept: INTERNAL MEDICINE | Facility: CLINIC | Age: 64
End: 2019-01-16
Payer: COMMERCIAL

## 2019-01-16 ENCOUNTER — TELEPHONE (OUTPATIENT)
Dept: INTERNAL MEDICINE | Facility: CLINIC | Age: 64
End: 2019-01-16

## 2019-01-16 VITALS
HEART RATE: 75 BPM | HEIGHT: 59 IN | SYSTOLIC BLOOD PRESSURE: 130 MMHG | BODY MASS INDEX: 39.46 KG/M2 | WEIGHT: 195.75 LBS | DIASTOLIC BLOOD PRESSURE: 80 MMHG

## 2019-01-16 DIAGNOSIS — I10 ESSENTIAL HYPERTENSION: Chronic | ICD-10-CM

## 2019-01-16 DIAGNOSIS — I10 ESSENTIAL HYPERTENSION: Primary | Chronic | ICD-10-CM

## 2019-01-16 DIAGNOSIS — I15.2 HYPERTENSION ASSOCIATED WITH DIABETES: ICD-10-CM

## 2019-01-16 DIAGNOSIS — E78.2 MIXED HYPERLIPIDEMIA: Chronic | ICD-10-CM

## 2019-01-16 DIAGNOSIS — E11.69 DIABETES MELLITUS TYPE 2 IN OBESE: ICD-10-CM

## 2019-01-16 DIAGNOSIS — E23.6 EMPTY SELLA SYNDROME: ICD-10-CM

## 2019-01-16 DIAGNOSIS — G40.319 GENERALIZED CONVULSIVE EPILEPSY WITH INTRACTABLE EPILEPSY: ICD-10-CM

## 2019-01-16 DIAGNOSIS — E11.59 HYPERTENSION ASSOCIATED WITH DIABETES: ICD-10-CM

## 2019-01-16 DIAGNOSIS — E05.00 GRAVES' DISEASE WITH EXOPHTHALMOS: ICD-10-CM

## 2019-01-16 DIAGNOSIS — E66.9 DIABETES MELLITUS TYPE 2 IN OBESE: ICD-10-CM

## 2019-01-16 LAB
ALBUMIN SERPL BCP-MCNC: 3.8 G/DL
ALP SERPL-CCNC: 77 U/L
ALT SERPL W/O P-5'-P-CCNC: 23 U/L
ANION GAP SERPL CALC-SCNC: 9 MMOL/L
AST SERPL-CCNC: 26 U/L
BASOPHILS # BLD AUTO: 0.02 K/UL
BASOPHILS NFR BLD: 0.4 %
BILIRUB SERPL-MCNC: 0.5 MG/DL
BUN SERPL-MCNC: 16 MG/DL
CALCIUM SERPL-MCNC: 9.6 MG/DL
CHLORIDE SERPL-SCNC: 101 MMOL/L
CHOLEST SERPL-MCNC: 189 MG/DL
CHOLEST/HDLC SERPL: 3.9 {RATIO}
CO2 SERPL-SCNC: 26 MMOL/L
CREAT SERPL-MCNC: 0.8 MG/DL
DIFFERENTIAL METHOD: ABNORMAL
EOSINOPHIL # BLD AUTO: 0.1 K/UL
EOSINOPHIL NFR BLD: 1.9 %
ERYTHROCYTE [DISTWIDTH] IN BLOOD BY AUTOMATED COUNT: 14 %
EST. GFR  (AFRICAN AMERICAN): >60 ML/MIN/1.73 M^2
EST. GFR  (NON AFRICAN AMERICAN): >60 ML/MIN/1.73 M^2
ESTIMATED AVG GLUCOSE: 169 MG/DL
GLUCOSE SERPL-MCNC: 137 MG/DL
HBA1C MFR BLD HPLC: 7.5 %
HCT VFR BLD AUTO: 36.4 %
HDLC SERPL-MCNC: 48 MG/DL
HDLC SERPL: 25.4 %
HGB BLD-MCNC: 12.1 G/DL
LDLC SERPL CALC-MCNC: 124.4 MG/DL
LYMPHOCYTES # BLD AUTO: 2.4 K/UL
LYMPHOCYTES NFR BLD: 45.4 %
MCH RBC QN AUTO: 28.9 PG
MCHC RBC AUTO-ENTMCNC: 33.2 G/DL
MCV RBC AUTO: 87 FL
MONOCYTES # BLD AUTO: 0.5 K/UL
MONOCYTES NFR BLD: 8.7 %
NEUTROPHILS # BLD AUTO: 2.3 K/UL
NEUTROPHILS NFR BLD: 43.4 %
NONHDLC SERPL-MCNC: 141 MG/DL
PLATELET # BLD AUTO: 294 K/UL
PMV BLD AUTO: 9.4 FL
POTASSIUM SERPL-SCNC: 4.4 MMOL/L
PROT SERPL-MCNC: 7.8 G/DL
RBC # BLD AUTO: 4.18 M/UL
SODIUM SERPL-SCNC: 136 MMOL/L
TRIGL SERPL-MCNC: 83 MG/DL
WBC # BLD AUTO: 5.29 K/UL

## 2019-01-16 PROCEDURE — 3045F PR MOST RECENT HEMOGLOBIN A1C LEVEL 7.0-9.0%: ICD-10-PCS | Mod: CPTII,S$GLB,, | Performed by: INTERNAL MEDICINE

## 2019-01-16 PROCEDURE — 80061 LIPID PANEL: CPT

## 2019-01-16 PROCEDURE — 3045F PR MOST RECENT HEMOGLOBIN A1C LEVEL 7.0-9.0%: CPT | Mod: CPTII,S$GLB,, | Performed by: INTERNAL MEDICINE

## 2019-01-16 PROCEDURE — 3079F DIAST BP 80-89 MM HG: CPT | Mod: CPTII,S$GLB,, | Performed by: INTERNAL MEDICINE

## 2019-01-16 PROCEDURE — 3079F PR MOST RECENT DIASTOLIC BLOOD PRESSURE 80-89 MM HG: ICD-10-PCS | Mod: CPTII,S$GLB,, | Performed by: INTERNAL MEDICINE

## 2019-01-16 PROCEDURE — 99214 PR OFFICE/OUTPT VISIT, EST, LEVL IV, 30-39 MIN: ICD-10-PCS | Mod: S$GLB,,, | Performed by: INTERNAL MEDICINE

## 2019-01-16 PROCEDURE — 3075F PR MOST RECENT SYSTOLIC BLOOD PRESS GE 130-139MM HG: ICD-10-PCS | Mod: CPTII,S$GLB,, | Performed by: INTERNAL MEDICINE

## 2019-01-16 PROCEDURE — 36415 COLL VENOUS BLD VENIPUNCTURE: CPT

## 2019-01-16 PROCEDURE — 99999 PR PBB SHADOW E&M-EST. PATIENT-LVL III: ICD-10-PCS | Mod: PBBFAC,,, | Performed by: INTERNAL MEDICINE

## 2019-01-16 PROCEDURE — 3008F BODY MASS INDEX DOCD: CPT | Mod: CPTII,S$GLB,, | Performed by: INTERNAL MEDICINE

## 2019-01-16 PROCEDURE — 85025 COMPLETE CBC W/AUTO DIFF WBC: CPT

## 2019-01-16 PROCEDURE — 99214 OFFICE O/P EST MOD 30 MIN: CPT | Mod: S$GLB,,, | Performed by: INTERNAL MEDICINE

## 2019-01-16 PROCEDURE — 3075F SYST BP GE 130 - 139MM HG: CPT | Mod: CPTII,S$GLB,, | Performed by: INTERNAL MEDICINE

## 2019-01-16 PROCEDURE — 99999 PR PBB SHADOW E&M-EST. PATIENT-LVL III: CPT | Mod: PBBFAC,,, | Performed by: INTERNAL MEDICINE

## 2019-01-16 PROCEDURE — 83036 HEMOGLOBIN GLYCOSYLATED A1C: CPT

## 2019-01-16 PROCEDURE — 80053 COMPREHEN METABOLIC PANEL: CPT

## 2019-01-16 PROCEDURE — 3008F PR BODY MASS INDEX (BMI) DOCUMENTED: ICD-10-PCS | Mod: CPTII,S$GLB,, | Performed by: INTERNAL MEDICINE

## 2019-01-16 RX ORDER — LANCETS
EACH MISCELLANEOUS
Qty: 100 EACH | Refills: 3 | Status: SHIPPED | OUTPATIENT
Start: 2019-01-16 | End: 2020-04-14 | Stop reason: SDUPTHER

## 2019-01-16 RX ORDER — INSULIN PUMP SYRINGE, 3 ML
EACH MISCELLANEOUS
Qty: 1 EACH | Refills: 0 | Status: SHIPPED | OUTPATIENT
Start: 2019-01-16 | End: 2020-04-14 | Stop reason: SDUPTHER

## 2019-01-16 RX ORDER — ATORVASTATIN CALCIUM 40 MG/1
40 TABLET, FILM COATED ORAL DAILY
Qty: 90 TABLET | Refills: 3 | Status: SHIPPED | OUTPATIENT
Start: 2019-01-16 | End: 2020-03-05 | Stop reason: SDUPTHER

## 2019-01-16 NOTE — PROGRESS NOTES
Subjective:       Patient ID: Katey Cerrato is a 63 y.o. female.    Chief Complaint: Diabetes (f/u)    Pt here for f/u. Pt's BP is normally well controlled. Tolerating meds well. Pt denies cp/sob/ha/vision or neuro changes. Not checking at home.     DM2 is not well controlled with last A1c 7.6. Not checking sugars regularly. She is on metformin 1000mg AM and 500mg PM; she cannot tolerate higher doses due to GI side effects. Also on amaryl 6mg daily and januvia 100mg daily. No known end organ issues. Other health maint is up to date. No low sugars. Wt loss and exercise goals d/w pt.     HLD is tx with lipitor to 20mg. Tolerating well and is stable.    She has h/o Graves dz with exophthalmos. She has seen endocrine but not in a while. No hyperthyroid state. Also incidental finding of empty sella was noted but no symptoms or clinically significant issues associated with this.     She sees neuro regularly for h/o sz. No seizures recently on keppra.      Diabetes   Pertinent negatives for hypoglycemia include no dizziness or headaches. Pertinent negatives for diabetes include no chest pain, no fatigue and no polyuria.   Hypertension   Pertinent negatives include no chest pain, headaches, palpitations or shortness of breath.     Review of Systems   Constitutional: Negative for fatigue, fever and unexpected weight change.   HENT: Negative for congestion and sore throat.    Eyes: Negative for visual disturbance.   Respiratory: Negative for shortness of breath.    Cardiovascular: Negative for chest pain, palpitations and leg swelling.   Gastrointestinal: Negative for abdominal pain, blood in stool, constipation and diarrhea.   Endocrine: Negative for polyuria.   Genitourinary: Negative for dysuria.   Musculoskeletal: Negative for arthralgias.   Skin: Negative for rash.   Neurological: Negative for dizziness, syncope and headaches.   Hematological: Negative for adenopathy.   Psychiatric/Behavioral: Negative for dysphoric  mood.       Objective:      Physical Exam   Constitutional: She is oriented to person, place, and time. She appears well-developed and well-nourished.   HENT:   Head: Normocephalic and atraumatic.   Right Ear: Hearing, tympanic membrane, external ear and ear canal normal.   Left Ear: Hearing, tympanic membrane, external ear and ear canal normal.   Nose: Nose normal.   Mouth/Throat: Uvula is midline and oropharynx is clear and moist.   Eyes: Conjunctivae, EOM and lids are normal. Pupils are equal, round, and reactive to light. Right conjunctiva is not injected. Left conjunctiva is not injected.   Neck: Neck supple. No JVD present. Carotid bruit is not present. No thyroid mass and no thyromegaly present.   Cardiovascular: Normal rate, regular rhythm, S1 normal, S2 normal, normal heart sounds and intact distal pulses. PMI is not displaced.   No murmur heard.  Pulmonary/Chest: Effort normal and breath sounds normal. She has no wheezes. She has no rhonchi. She has no rales.   Abdominal: Soft. Bowel sounds are normal. She exhibits no distension and no abdominal bruit. There is no hepatosplenomegaly. There is no tenderness.   Musculoskeletal: She exhibits no edema.        Right foot: Normal.        Left foot: Normal.   Lymphadenopathy:        Head (right side): No preauricular and no posterior auricular adenopathy present.        Head (left side): No preauricular and no posterior auricular adenopathy present.     She has no cervical adenopathy.     She has no axillary adenopathy.   Neurological: She is alert and oriented to person, place, and time. She has normal strength. No cranial nerve deficit or sensory deficit. She displays a negative Romberg sign. Coordination and gait normal.   Skin: Skin is warm. No rash noted.   Psychiatric: She has a normal mood and affect. Her speech is normal and behavior is normal. Judgment and thought content normal.       Assessment:       1. Essential hypertension    2. Hypertension  associated with diabetes    3. Mixed hyperlipidemia    4. Generalized convulsive epilepsy with intractable epilepsy    5. Diabetes mellitus type 2 in obese    6. Empty sella syndrome    7. Graves' disease with exophthalmos    8. Uncontrolled type 2 diabetes mellitus without complication, without long-term current use of insulin        Plan:       1. Continue current meds  2. Home BP and BS monitoring  3. Appropriate labs  4. Keep f/u with specialists  5. Counseled on wt loss strategies

## 2019-01-16 NOTE — TELEPHONE ENCOUNTER
Inform pt that cholesterol is high so I'd like to increase lipitor to 40mg daily. New rx sent to pharmacy but can take 2 of the 20mg tabs daily.     Also, a1c is still elevated at 7.5. I'd like to change januvia to injectable trulicity which is once per week and may help with wt loss. If interested, we can send to pharmacy and set her up with DM educ to get taught how to inject. Let us know.

## 2019-01-17 RX ORDER — MELOXICAM 15 MG/1
15 TABLET ORAL DAILY PRN
Qty: 30 TABLET | Refills: 3 | Status: SHIPPED | OUTPATIENT
Start: 2019-01-17 | End: 2019-04-10 | Stop reason: SDUPTHER

## 2019-01-17 NOTE — TELEPHONE ENCOUNTER
----- Message from Mac Zuñiga sent at 1/17/2019  1:48 PM CST -----  Contact: MORAIMA LUCAS [[id number]]  Name of Who is Calling: MORAIMA LUCAS [[id number]]      What is the request in detail: returning Caddera's call    Can the clinic reply by [facility]: no      What Number [facility] Back [facility]: [phone]

## 2019-01-17 NOTE — TELEPHONE ENCOUNTER
Pt expressed verbal understandings concerning lab results and medication change for Lipitor 40 mg. Pt advised that she would like to think about starting trulicity. Pt advised that she will check with her insurance company on cost. CF

## 2019-01-18 ENCOUNTER — TELEPHONE (OUTPATIENT)
Dept: INTERNAL MEDICINE | Facility: CLINIC | Age: 64
End: 2019-01-18

## 2019-01-18 NOTE — TELEPHONE ENCOUNTER
----- Message from Lucy Rodriguez sent at 1/18/2019  1:39 PM CST -----  Contact: MORAIMA LUCAS [[id number]]  Name of Who is Calling: MORAIMA LUCAS [[id number]]      What is the request in detail: patient is returning a call     Can the clinic reply by [facility]: no    What [facility]: [phone]

## 2019-01-18 NOTE — TELEPHONE ENCOUNTER
Pt advised that she would like to see Diabetic educators to start new medication(lisa) pt is requesting a 90 day supply of medication. CF

## 2019-01-22 ENCOUNTER — HOSPITAL ENCOUNTER (OUTPATIENT)
Dept: DIABETES | Facility: OTHER | Age: 64
Discharge: HOME | End: 2019-01-22
Attending: INTERNAL MEDICINE
Payer: COMMERCIAL

## 2019-01-22 ENCOUNTER — TELEPHONE (OUTPATIENT)
Dept: INTERNAL MEDICINE | Facility: CLINIC | Age: 64
End: 2019-01-22

## 2019-01-22 VITALS — BODY MASS INDEX: 38.96 KG/M2 | WEIGHT: 192.88 LBS

## 2019-01-22 PROCEDURE — G0108 DIAB MANAGE TRN  PER INDIV: HCPCS | Performed by: DIETITIAN, REGISTERED

## 2019-01-22 NOTE — TELEPHONE ENCOUNTER
Pt c/o of nausea, vomiting and dizziness x 1 day. Pt c/o nausea and dizziness on this morning and blood sugars were at 153 fasting. Pt denies fever, chills and body aches and advised OTC medication to tx symptoms. CF

## 2019-01-22 NOTE — TELEPHONE ENCOUNTER
Pt expressed verbal understanding concerning advice on pepto and staying well hydrated. Pt was also advise per Dr. England if symptoms worsen of dizziness, HA and weakness, pt needs to go to ED. CF

## 2019-01-22 NOTE — TELEPHONE ENCOUNTER
Agree with otc meds such as pepto but if needing appt, please assist. Stay well hydrated. If unable to tolerate po or if severe HA, weakness, worsening dizziness, then needs to go to ED

## 2019-01-22 NOTE — TELEPHONE ENCOUNTER
----- Message from Meghan Puri sent at 1/22/2019  8:59 AM CST -----  Name of Who is Calling: #MORAIMA LUCAS [[id number]]##      What is the request in detail: Pt is asking for a Rx for nausea/ vomiting.       Can the clinic reply by [facility]:    No      What Number to Call Back if not in [facility]: [phone]

## 2019-01-24 NOTE — PROGRESS NOTES
Diabetes Education  Author: Vicki Julian RD  Date: 1/24/2019    Diabetes Care Management Summary  Diabetes Education Record Assessment/Progress: Initial         Diabetes Type  Diabetes Type : Type II    Diabetes History  Diabetes Diagnosis: >10 years  Current Treatment: Oral Medication, Injectable    Health Maintenance was reviewed today with patient. Discussed with patient importance of routine eye exams, foot exams/foot care, blood work (i.e.: A1c, microalbumin, and lipid), dental visits, yearly flu vaccine, and pneumonia vaccine as indicated by PCP. Patient verbalized understanding.     Health Maintenance Topics with due status: Not Due       Topic Last Completion Date    Colonoscopy 07/13/2009    TETANUS VACCINE 05/24/2016    Eye Exam 08/16/2018    Mammogram 12/19/2018    Low Dose Statin 01/16/2019    Foot Exam 01/16/2019    Lipid Panel 01/16/2019    Hemoglobin A1c 01/16/2019     There are no preventive care reminders to display for this patient.    Nutrition  Meal Planning: water, 3 meals per day, artificial sweeteners, eats out often(eats ice cream after dinner on every other night, regular jello w/jello. Eats baked chicken, red beans and rice, BBQ chicken, baked pork chops. 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.)  What type of sweetener do you use?: Equal  What type of beverages do you drink?: regular soda/tea, milk, water(Lemonade, soda, some diet tea and diet soda, water, hot tea)  Meal Plan 24 Hour Recall - Breakfast: honey nut cherrios with reduced fat milk, 1/2 cup of yogurt with strawberries later at work  Meal Plan 24 Hour Recall - Lunch: grilled chicken with onions and sweet potato and salad with diet coke  Meal Plan 24 Hour Recall - Dinner: lunchoen meat and cheese sandwich with regular pepsi  Meal Plan 24 Hour Recall - Snack: ice cream, no snacking at work during the day    Monitoring   Monitoring: Other(accucheck S[employer]-pays $75  for a 3 months supplies. )  Self Monitoring : SMBG fastin, 123, 130's  Blood Glucose Logs: No  Do you use a personal continuous glucose monitor?: No  In the last month, how often have you had a low blood sugar reaction?: never  Can you tell when your blood sugar is too high?: no    Exercise   Frequency: Never    Current Diabetes Treatment   Current Treatment: Oral Medication, Injectable    Social History  Preferred Learning Method: Reading Materials  Primary Support: Spouse  Educational Level: GED  Occupation:  at [employer] caf[employer]ia  Smoking Status: Never a Smoker  Alcohol Use: Never            DDS-2 Score  ( > 3 = SIGNIFICANT DISTRESS): 1                   Barriers to Change  Barriers to Change: None  Learning Challenges : None    Readiness to Learn   Readiness to Learn : Acceptance    Cultural Influences  Cultural Influences: No    Diabetes Education Assessment/Progress  Diabetes Disease Process (diabetes disease process and treatment options): Discussion, Needs Instruction, Individual Session    Nutrition (Incorporating nutritional management into one's lifestyle): Discussion, Instructed, Needs Instruction, Individual Session(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 She does eat some ice cream & drink occasional sweet drinks. Instructed her to omit all sweet drinks from her diet. Will need ed on carb counting.)    Physical Activity (incorporating physical activity into one's lifestyle): Discussion, Needs Instruction, Individual Session(Has a treadmil at home, is tired when she gets home from work. )    Medications (states correct name, dose, onset, peak, duration, side effects & timing of meds): Individual Session, Demonstration, Discussion, Return Demonstration, Instructed, Needs Instruction, Demonstrates   Understanding/Competency(verbalizes/demonstrates)(Januvia was d/c'ed and Trulicity started. She has not started the Trulicity. Demoed with teach back proper injection tech. Ed  on proper storage, timing, SE and sharps dispose.)    Monitoring (monitoring blood glucose/other parameters & using results): Discussion, Individual Session, Needs Instruction, Written Materials Provided, Instructed(Ed on target BG ranges. Instructed her to SMBG fasting and one other time during the day. Logs provided to bring to all clinic visits. )    Acute Complications (preventing, detecting, and treating acute complications): Discussion, Individual Session, Needs Instruction    Chronic Complications (preventing, detecting, and treating chronic complications): Discussion, Individual Session, Needs Instruction    Clinical (diabetes, other pertinent medical history, and relevant comorbidities reviewed during visit): Discussion, Instructed, Needs Instruction, Individual Session, Written Materials Provided(Reviewed relevant lab results with goal ranges.)    Cognitive (knowledge of self-management skills, functional health literacy): Discussion, Instructed, Needs Review, Individual Session    Psychosocial (emotional response to diabetes): Discussion, Instructed, Needs Instruction, Individual Session    Diabetes Distress and Support Systems: Discussion, Instructed, Needs Instruction, Individual Session    Behavioral (readiness for change, lifestyle practices, self-care behaviors): Discussion, Instructed, Needs Instruction, Individual Session, Written Materials Provided    Goals  Patient has selected/evaluated goals during today's session: Yes, selected  Healthy Eating: Set(omit all sweet drinks from diet)  Start Date: 01/22/19  Target Date: 02/20/19  Monitoring: Set(bring BG logs to all clinic visits)  Start Date: 01/22/19  Target Date: 02/20/19  Medications: Set(inject Trulicity once a week)  Start Date: 01/26/19  Target Date: 02/20/19         Diabetes Care Plan/Intervention  Education Plan/Intervention: Individual Follow-Up DSMT    Diabetes Meal Plan  Restrictions: Restricted Carbohydrate, Low Fat  Calories:  1500  Carbohydrate Per Meal: 30-45g  Carbohydrate Per Snack : 15-20g  Fat: 60-66g  Protein: 60-68g    Today's Self-Management Care Plan was developed with the patient's input and is based on barriers identified during today's assessment.    The long and short-term goals in the care plan were written with the patient/caregiver's input. The patient has agreed to work toward these goals to improve her overall diabetes control.      The patient received a copy of today's self-management plan and verbalized understanding of the care plan, goals, and all of today's instructions.      The patient was encouraged to communicate with her physician and care team regarding her condition(s) and treatment.  I provided the patient with my contact information today and encouraged her to contact me via phone or patient portal as needed.     Education Units of Time   Time Spent: 30 min

## 2019-01-25 ENCOUNTER — HOSPITAL ENCOUNTER (EMERGENCY)
Facility: OTHER | Age: 64
Discharge: HOME | End: 2019-01-25
Attending: EMERGENCY MEDICINE
Payer: COMMERCIAL

## 2019-01-25 VITALS
HEIGHT: 59 IN | WEIGHT: 195 LBS | HEART RATE: 85 BPM | DIASTOLIC BLOOD PRESSURE: 60 MMHG | SYSTOLIC BLOOD PRESSURE: 123 MMHG | TEMPERATURE: 98 F | RESPIRATION RATE: 20 BRPM | OXYGEN SATURATION: 100 % | BODY MASS INDEX: 39.31 KG/M2

## 2019-01-25 DIAGNOSIS — R11.0 NAUSEA: ICD-10-CM

## 2019-01-25 DIAGNOSIS — R11.2 NAUSEA AND VOMITING, INTRACTABILITY OF VOMITING NOT SPECIFIED, UNSPECIFIED VOMITING TYPE: Primary | ICD-10-CM

## 2019-01-25 DIAGNOSIS — R94.4 DECREASED GFR: ICD-10-CM

## 2019-01-25 DIAGNOSIS — R77.9 ELEVATED BLOOD PROTEIN: ICD-10-CM

## 2019-01-25 LAB
ALBUMIN SERPL BCP-MCNC: 4.4 G/DL
ALP SERPL-CCNC: 83 U/L
ALT SERPL W/O P-5'-P-CCNC: 30 U/L
ANION GAP SERPL CALC-SCNC: 14 MMOL/L
AST SERPL-CCNC: 34 U/L
BASOPHILS # BLD AUTO: 0.03 K/UL
BASOPHILS NFR BLD: 0.4 %
BILIRUB SERPL-MCNC: 0.7 MG/DL
BUN SERPL-MCNC: 17 MG/DL
CALCIUM SERPL-MCNC: 10.2 MG/DL
CHLORIDE SERPL-SCNC: 100 MMOL/L
CO2 SERPL-SCNC: 22 MMOL/L
CREAT SERPL-MCNC: 1.2 MG/DL
DIFFERENTIAL METHOD: NORMAL
EOSINOPHIL # BLD AUTO: 0 K/UL
EOSINOPHIL NFR BLD: 0.2 %
ERYTHROCYTE [DISTWIDTH] IN BLOOD BY AUTOMATED COUNT: 13.8 %
EST. GFR  (AFRICAN AMERICAN): 55 ML/MIN/1.73 M^2
EST. GFR  (NON AFRICAN AMERICAN): 48 ML/MIN/1.73 M^2
GLUCOSE SERPL-MCNC: 126 MG/DL
HCT VFR BLD AUTO: 39.9 %
HGB BLD-MCNC: 13.5 G/DL
LIPASE SERPL-CCNC: 16 U/L
LYMPHOCYTES # BLD AUTO: 2.8 K/UL
LYMPHOCYTES NFR BLD: 34.4 %
MCH RBC QN AUTO: 29.2 PG
MCHC RBC AUTO-ENTMCNC: 33.8 G/DL
MCV RBC AUTO: 86 FL
MONOCYTES # BLD AUTO: 0.6 K/UL
MONOCYTES NFR BLD: 7.6 %
NEUTROPHILS # BLD AUTO: 4.6 K/UL
NEUTROPHILS NFR BLD: 56.9 %
PLATELET # BLD AUTO: 306 K/UL
PMV BLD AUTO: 9.2 FL
POCT GLUCOSE: 123 MG/DL (ref 70–110)
POTASSIUM SERPL-SCNC: 4.6 MMOL/L
PROT SERPL-MCNC: 9.1 G/DL
RBC # BLD AUTO: 4.63 M/UL
SODIUM SERPL-SCNC: 136 MMOL/L
TSH SERPL DL<=0.005 MIU/L-ACNC: 1.17 UIU/ML
WBC # BLD AUTO: 8.12 K/UL

## 2019-01-25 PROCEDURE — 96361 HYDRATE IV INFUSION ADD-ON: CPT

## 2019-01-25 PROCEDURE — 93005 ELECTROCARDIOGRAM TRACING: CPT

## 2019-01-25 PROCEDURE — 25000003 PHARM REV CODE 250: Performed by: PHYSICIAN ASSISTANT

## 2019-01-25 PROCEDURE — 84443 ASSAY THYROID STIM HORMONE: CPT

## 2019-01-25 PROCEDURE — 83690 ASSAY OF LIPASE: CPT

## 2019-01-25 PROCEDURE — 93010 EKG 12-LEAD: ICD-10-PCS | Mod: ,,, | Performed by: INTERNAL MEDICINE

## 2019-01-25 PROCEDURE — 80053 COMPREHEN METABOLIC PANEL: CPT

## 2019-01-25 PROCEDURE — 93010 ELECTROCARDIOGRAM REPORT: CPT | Mod: ,,, | Performed by: INTERNAL MEDICINE

## 2019-01-25 PROCEDURE — 96374 THER/PROPH/DIAG INJ IV PUSH: CPT

## 2019-01-25 PROCEDURE — 82962 GLUCOSE BLOOD TEST: CPT

## 2019-01-25 PROCEDURE — 63600175 PHARM REV CODE 636 W HCPCS: Performed by: PHYSICIAN ASSISTANT

## 2019-01-25 PROCEDURE — 85025 COMPLETE CBC W/AUTO DIFF WBC: CPT

## 2019-01-25 PROCEDURE — 99284 EMERGENCY DEPT VISIT MOD MDM: CPT | Mod: 25

## 2019-01-25 RX ORDER — ONDANSETRON 2 MG/ML
4 INJECTION INTRAMUSCULAR; INTRAVENOUS
Status: COMPLETED | OUTPATIENT
Start: 2019-01-25 | End: 2019-01-25

## 2019-01-25 RX ORDER — ONDANSETRON 4 MG/1
4 TABLET, ORALLY DISINTEGRATING ORAL EVERY 8 HOURS PRN
Qty: 10 TABLET | Refills: 0 | Status: SHIPPED | OUTPATIENT
Start: 2019-01-25 | End: 2019-02-20 | Stop reason: SDUPTHER

## 2019-01-25 RX ADMIN — SODIUM CHLORIDE 1000 ML: 0.9 INJECTION, SOLUTION INTRAVENOUS at 01:01

## 2019-01-25 RX ADMIN — ONDANSETRON 4 MG: 2 INJECTION INTRAMUSCULAR; INTRAVENOUS at 01:01

## 2019-01-25 NOTE — ED TRIAGE NOTES
Pt reports intermittent vomiting and diarrhea since Monday. Pt was able to hold some things down since Monday. Pt denies any abd pain. No other s/s.

## 2019-01-26 NOTE — ED PROVIDER NOTES
Encounter Date: 1/25/2019       History     Chief Complaint   Patient presents with    Nausea     since Monday.  Vomit x1 each day since.  Felt well enough to go to work and ate breakfast this morning and took am meds and vomited about 30 minutes ago. Her doctor's office couldn't see her and she didn't want to see a NP.       Patient is a 63-year-old female with history of hyperlipidemia, hypertension, seizures, glaucoma, and diabetes who presents to the emergency department with nausea and vomiting.  Patient states on Monday she began to feel very nauseated.  She states the nausea has been persistent since Monday.  She reports intermittent episodes of vomiting over the last couple of days.  She denies associated fevers or chills. She does report diarrhea that started on Monday as well. She denies ingestion of raw meat or raw fish.  She denies any sick contacts.  She reports abdominal cramping.  She denies urinary symptoms.  She states recently she was advised by her PCP to stop 1 of her diabetic medications.  She states she is supposed to start a new medication tomorrow, but she is concerned she will be able to with all the vomiting. She states her blood sugars have been running in the 160s at home.  She denies any associated chest pain or shortness of breath. She denies any blood per rectum.  This is the extent of her complaints at this time.      The history is provided by the patient.     Review of patient's allergies indicates:   Allergen Reactions    Codeine Nausea Only     Past Medical History:   Diagnosis Date    Cataract     Empty sella syndrome     GHD (growth hormone deficiency)     Glaucoma     Graves' disease with exophthalmos     History of vitamin D deficiency     Hyperlipidemia     Hypertension     Seizures     Thyroid nodule     Thyroid nodule     Type II or unspecified type diabetes mellitus without mention of complication, uncontrolled      Past Surgical History:   Procedure  Laterality Date    BREAST BIOPSY      CHOLECYSTECTOMY      HYSTERECTOMY      one ovary intact    KNEE SURGERY      LYMPH NODE BIOPSY  3010     Family History   Problem Relation Age of Onset    Cancer Mother     Cataracts Mother     Glaucoma Mother         shunt    Heart disease Mother     Tremor Mother     Breast cancer Mother 78    Heart disease Sister     Heart disease Father     Hypertension Brother     Breast cancer Sister 60     Social History     Tobacco Use    Smoking status: Never Smoker    Smokeless tobacco: Never Used   Substance Use Topics    Alcohol use: No     Comment: none    Drug use: No     Review of Systems   Constitutional: Positive for appetite change. Negative for activity change, chills, fatigue and fever.   HENT: Negative for congestion, ear discharge, ear pain, postnasal drip, rhinorrhea, sore throat and trouble swallowing.    Respiratory: Negative for cough and shortness of breath.    Cardiovascular: Negative for chest pain.   Gastrointestinal: Positive for diarrhea, nausea and vomiting. Negative for abdominal pain, blood in stool and constipation.   Genitourinary: Negative for dysuria, flank pain and hematuria.   Musculoskeletal: Negative for back pain, neck pain and neck stiffness.   Skin: Negative for rash and wound.   Neurological: Negative for dizziness, syncope, speech difficulty, weakness, light-headedness, numbness and headaches.       Physical Exam     Initial Vitals [01/25/19 1314]   BP Pulse Resp Temp SpO2   123/60 85 20 98.3 °F (36.8 °C) 100 %      MAP       --         Physical Exam    Nursing note and vitals reviewed.  Constitutional: She appears well-developed and well-nourished. She is not diaphoretic.  Non-toxic appearance. No distress.   HENT:   Head: Normocephalic.   Right Ear: Hearing and external ear normal.   Left Ear: Hearing and external ear normal.   Nose: Nose normal.   Mouth/Throat: Uvula is midline and oropharynx is clear and moist. Mucous  membranes are dry. No oropharyngeal exudate.   Eyes: Conjunctivae are normal. Pupils are equal, round, and reactive to light.   Exophthalmos   Neck: Normal range of motion.   Cardiovascular: Normal rate and regular rhythm.   No lower extremity edema   Pulmonary/Chest: Breath sounds normal.   Abdominal: Soft. Bowel sounds are normal. She exhibits no distension and no mass. There is no tenderness. There is no rebound, no guarding, no CVA tenderness, no tenderness at McBurney's point and negative Mills's sign.   Lymphadenopathy:     She has no cervical adenopathy.   Neurological: She is alert and oriented to person, place, and time.   Skin: Skin is warm and dry. Capillary refill takes less than 2 seconds.   Psychiatric: She has a normal mood and affect.         ED Course   Procedures  Labs Reviewed   COMPREHENSIVE METABOLIC PANEL - Abnormal; Notable for the following components:       Result Value    CO2 22 (*)     Glucose 126 (*)     Total Protein 9.1 (*)     eGFR if  55 (*)     eGFR if non  48 (*)     All other components within normal limits   POCT GLUCOSE - Abnormal; Notable for the following components:    POCT Glucose 123 (*)     All other components within normal limits   CBC W/ AUTO DIFFERENTIAL   LIPASE   TSH          Imaging Results    None          Medical Decision Making:   Initial Assessment:   Urgent evaluation of a 63-year-old female with history of hyperlipidemia, hypertension, seizures, glaucoma, and diabetes who presents to the emergency department with nausea and vomiting.  Patient is afebrile, nontoxic appearing, and hemodynamically stable.  Benign abdominal exam.  Dry mucous membranes.  No recent antibiotic use.  No recent ingestion of raw fish or raw meat.  Point of care glucose is 123.  Lab work will be obtained to rule out any significant kidney insufficiency or electrolyte disturbance.  Patient will be given IV hydration and antiemetic.  Patient will be observed  and re-evaluated.  Clinical Tests:   Lab Tests: Ordered and Reviewed  ED Management:  Labs reveal no significant leukocytosis or anemia.  No significant electrolyte disturbance.  Creatinine and BUN are normal with a decreased GFR from patient's baseline.  Normal liver enzymes.  Normal pancreatic enzyme.  TSH is within normal limits. On re-evaluation, patient states she is feeling much better.  No episodes of vomiting here in the emergency department.  Patient will be discharged with Zofran.  She is advised to follow up with PCP in 24-48 hours.  I will message PCP to make him aware of patient's visit to the emergency department.  Patient is advised to return to the emergency department with any worsening symptoms or concerns.                      Clinical Impression:   The primary encounter diagnosis was Nausea and vomiting, intractability of vomiting not specified, unspecified vomiting type. Diagnoses of Nausea, Elevated blood protein, and Decreased GFR were also pertinent to this visit.                             Olive Rivera PA-C  01/25/19 2021

## 2019-01-28 ENCOUNTER — TELEPHONE (OUTPATIENT)
Dept: INTERNAL MEDICINE | Facility: CLINIC | Age: 64
End: 2019-01-28

## 2019-01-28 NOTE — TELEPHONE ENCOUNTER
Pt was advised that Dr. England will get a message from the ED visit. Pt also advised that she will contact our office when she's ready to scheduled f/u appointment. CF

## 2019-01-28 NOTE — TELEPHONE ENCOUNTER
----- Message from Karen Jacob sent at 1/28/2019  7:08 AM CST -----  Contact: pt  Name of Who is Calling: Katey      What is the request in detail: pt had to go to ER Friday and would like to receive a call from staff in reference to her visit to ER. Please call and  advise      Can the clinic reply by [facility]: no      What Number to Call Back if not in [facility]: [phone]

## 2019-02-01 ENCOUNTER — TELEPHONE (OUTPATIENT)
Dept: INTERNAL MEDICINE | Facility: CLINIC | Age: 64
End: 2019-02-01

## 2019-02-01 DIAGNOSIS — R77.8 ELEVATED TOTAL PROTEIN: Primary | ICD-10-CM

## 2019-02-01 NOTE — TELEPHONE ENCOUNTER
----- Message from Vickie Key sent at 2/1/2019  9:44 AM CST -----  Contact: Pt   Name of Who is Calling: MORAIMA LUCAS [[id number]]      What is the request in detail: Patient is requesting a call from staff in regards to the results of a test that was done in the ED on last Friday 1/25. Please contact to further discuss and advise      Can the clinic reply by [facility]: No       What Number to Call Back if not in [facility]: [phone]

## 2019-02-01 NOTE — TELEPHONE ENCOUNTER
Pt expressed verbal understanding concerning lab results. Scheduled 2 week labs with pt on 02/14/2019. CF

## 2019-02-01 NOTE — TELEPHONE ENCOUNTER
Labs look ok including liver/kidney/blood counts/thyroid. Total protein was a little elevated and may be due to her illness but we can repeat this in 1-2 weeks. Please schedule. If not feeling better, she'll need appt.

## 2019-02-14 ENCOUNTER — LAB VISIT (OUTPATIENT)
Dept: LAB | Facility: OTHER | Age: 64
End: 2019-02-14
Attending: INTERNAL MEDICINE
Payer: COMMERCIAL

## 2019-02-14 ENCOUNTER — TELEPHONE (OUTPATIENT)
Dept: INTERNAL MEDICINE | Facility: CLINIC | Age: 64
End: 2019-02-14

## 2019-02-14 DIAGNOSIS — R77.8 ELEVATED TOTAL PROTEIN: ICD-10-CM

## 2019-02-14 LAB
ALBUMIN SERPL BCP-MCNC: 3.7 G/DL
ALP SERPL-CCNC: 81 U/L
ALT SERPL W/O P-5'-P-CCNC: 23 U/L
ANION GAP SERPL CALC-SCNC: 12 MMOL/L
AST SERPL-CCNC: 20 U/L
BILIRUB SERPL-MCNC: 0.5 MG/DL
BUN SERPL-MCNC: 10 MG/DL
CALCIUM SERPL-MCNC: 9.4 MG/DL
CHLORIDE SERPL-SCNC: 101 MMOL/L
CO2 SERPL-SCNC: 26 MMOL/L
CREAT SERPL-MCNC: 0.7 MG/DL
EST. GFR  (AFRICAN AMERICAN): >60 ML/MIN/1.73 M^2
EST. GFR  (NON AFRICAN AMERICAN): >60 ML/MIN/1.73 M^2
GLUCOSE SERPL-MCNC: 68 MG/DL
POTASSIUM SERPL-SCNC: 3.8 MMOL/L
PROT SERPL-MCNC: 7.6 G/DL
SODIUM SERPL-SCNC: 139 MMOL/L

## 2019-02-14 PROCEDURE — 80053 COMPREHEN METABOLIC PANEL: CPT

## 2019-02-14 PROCEDURE — 36415 COLL VENOUS BLD VENIPUNCTURE: CPT

## 2019-02-20 ENCOUNTER — HOSPITAL ENCOUNTER (OUTPATIENT)
Dept: DIABETES | Facility: OTHER | Age: 64
Discharge: HOME | End: 2019-02-20
Attending: INTERNAL MEDICINE
Payer: COMMERCIAL

## 2019-02-20 VITALS — WEIGHT: 194.44 LBS | BODY MASS INDEX: 39.27 KG/M2

## 2019-02-20 PROCEDURE — G0108 DIAB MANAGE TRN  PER INDIV: HCPCS | Performed by: DIETITIAN, REGISTERED

## 2019-02-20 RX ORDER — ONDANSETRON 4 MG/1
4 TABLET, ORALLY DISINTEGRATING ORAL EVERY 8 HOURS PRN
Qty: 10 TABLET | Refills: 0 | Status: SHIPPED | OUTPATIENT
Start: 2019-02-20 | End: 2019-03-01 | Stop reason: SDUPTHER

## 2019-02-20 RX ORDER — ATORVASTATIN CALCIUM 20 MG/1
TABLET, FILM COATED ORAL
Qty: 90 TABLET | Refills: 2 | OUTPATIENT
Start: 2019-02-20

## 2019-02-20 NOTE — PROGRESS NOTES
Diabetes Education  Author: Vicki Julian RD  Date: 2019    Diabetes Care Management Summary  Diabetes Education Record Assessment/Progress: Comprehensive/Group         Diabetes Type  Diabetes Type : Type II    Diabetes History  Current Treatment: Injectable, Oral Medication    Health Maintenance was reviewed today with patient. Discussed with patient importance of routine eye exams, foot exams/foot care, blood work (i.e.: A1c, microalbumin, and lipid), dental visits, yearly flu vaccine, and pneumonia vaccine as indicated by PCP. Patient verbalized understanding.     Health Maintenance Topics with due status: Not Due       Topic Last Completion Date    Colonoscopy 2009    TETANUS VACCINE 2016    Eye Exam 2018    Mammogram 2018    Foot Exam 2019    Lipid Panel 2019    Hemoglobin A1c 2019    Low Dose Statin 2019     There are no preventive care reminders to display for this patient.    Nutrition  Meal Planning: water, diet drinks, artificial sweeteners  What type of sweetener do you use?: sugar, Equal(Adds a Tsp of sugar to some foods including cream of wheat)  What type of beverages do you drink?: diet soda/tea  Meal Plan 24 Hour Recall - Breakfast: honey nut cherrios with reduced fat milk, 1/2 cup of yogurt with strawberries later at work, grits and eggs on 3-4 days per week.SMBG f  Meal Plan 24 Hour Recall - Lunch: green beans, carrots, muffin and low sugar tea( mixed a small amt of regular tea with unsweet tea in the fountain drink machine)  Meal Plan 24 Hour Recall - Dinner: turkey necks with mixed vegetables( all non-starchy) with water    Monitoring   Self Monitoring : SMBG Fastin, 96, 75, 78, 115, 70(no symptoms), 74, 117, 104,118,122,83,102,134, 140, 120, 129,, 93, 104,88, PP(163)  Blood Glucose Logs: Yes  Do you use a personal continuous glucose monitor?: No  In the last month, how often have you had a low blood sugar reaction?: never  Can you  tell when your blood sugar is too high?: no    Exercise   Frequency: Never    Current Diabetes Treatment   Current Treatment: Injectable, Oral Medication      Barriers to Change  Barriers to Change: None  Learning Challenges : None    Readiness to Learn   Readiness to Learn : Acceptance         Diabetes Education Assessment/Progress  Nutrition (Incorporating nutritional management into one's lifestyle): Discussion, Demonstration, Return Demonstration, Instructed, Needs Review, Individual Session, Written Materials Provided(pt has made efforts to cut back on sweet beverages. She is using equal in her hot tea, a small amt of regular tea in a glass of unsweet tea. Ed on carb counting and label reading. Instructed her to eat 30g of carbs at most meals and may 45g if she feels very hungry.    Physical Activity (incorporating physical activity into one's lifestyle): Discussion, Instructed, Needs Instruction, Written Materials Provided, Individual Session(No formal exercise however, cleans the hospital kitchen at work. She is exhausted when she gets home. She does have a treadmil at home and agrees to walk on it for 30 mins once a week on her day off. )    Medications (states correct name, dose, onset, peak, duration, side effects & timing of meds): Discussion, Instructed, Needs Review, Individual Session(She is injecting the Trulicity once a week. She reports no SE. Taking metformin and glimiperide as prescribed. Discussed possibly reducing the Glimiperide since the Trulicity is working well to controll her BG. )    Monitoring (monitoring blood glucose/other parameters & using results): Discussion, Instructed, Needs Review, Individual Session, Written Materials Provided(She presents with logs. Is SMBG fasting. All BG WNL. Had one 70 with no symptoms. Reviewed target BG ranges. )    Diabetes Distress and Support Systems: Discussion, Instructed, Needs Instruction, Individual Session, Written Materials Provided(She is  accepting of her DM diagnosis. She did discuss family related stress that she said has improved. She states that she avoids some of her family members to avoid the stress. She is raising her teenage nephew. )    Behavioral (readiness for change, lifestyle practices, self-care behaviors): Discussion, Instructed, Needs Review, Individual Session, Written Materials Provided    Goals  Patient has selected/evaluated goals during today's session: Yes, evaluated  Healthy Eating: In Progress  Monitoring: % Met  Met Percentage : 100%  Medications: % Met  Met Percentage : 100%         Diabetes Care Plan/Intervention  Education Plan/Intervention: Individual Follow-Up DSMT    Diabetes Meal Plan  Restrictions: Restricted Carbohydrate, Low Fat  Calories: 1500  Carbohydrate Per Meal: 30-45g  Carbohydrate Per Snack : 15-20g  Fat: 60-66g  Protein: 60-68g    Today's Self-Management Care Plan was developed with the patient's input and is based on barriers identified during today's assessment.    The long and short-term goals in the care plan were written with the patient/caregiver's input. The patient has agreed to work toward these goals to improve her overall diabetes control.      The patient received a copy of today's self-management plan and verbalized understanding of the care plan, goals, and all of today's instructions.      The patient was encouraged to communicate with her physician and care team regarding her condition(s) and treatment.  I provided the patient with my contact information today and encouraged her to contact me via phone or patient portal as needed.     Education Units of Time   Time Spent: 45 min

## 2019-03-01 RX ORDER — ONDANSETRON 4 MG/1
4 TABLET, ORALLY DISINTEGRATING ORAL EVERY 8 HOURS PRN
Qty: 10 TABLET | Refills: 0 | Status: SHIPPED | OUTPATIENT
Start: 2019-03-01 | End: 2019-08-21 | Stop reason: SDUPTHER

## 2019-03-01 NOTE — TELEPHONE ENCOUNTER
----- Message from Lucy Rodriguez sent at 3/1/2019  7:49 AM CST -----  Contact: MORAIMA LUCAS [[id number]]  Name of Who is Calling: MORAIMA LUCAS [[id number]]      What is the request in detail: patient would like a call back regarding update on medication being sent to pharmacy.Please call     Can the clinic reply by [facility]: no      What Number to Call Back if not in [facility]: [phone]

## 2019-04-08 RX ORDER — DULAGLUTIDE 0.75 MG/.5ML
INJECTION, SOLUTION SUBCUTANEOUS
Qty: 2 SYRINGE | Refills: 0 | Status: SHIPPED | OUTPATIENT
Start: 2019-04-08 | End: 2019-05-06 | Stop reason: SDUPTHER

## 2019-04-10 RX ORDER — MELOXICAM 15 MG/1
15 TABLET ORAL DAILY PRN
Qty: 30 TABLET | Refills: 2 | Status: SHIPPED | OUTPATIENT
Start: 2019-04-10 | End: 2019-07-06 | Stop reason: SDUPTHER

## 2019-04-17 DIAGNOSIS — E11.8 TYPE 2 DIABETES MELLITUS WITH COMPLICATION, UNSPECIFIED WHETHER LONG TERM INSULIN USE: Primary | ICD-10-CM

## 2019-05-01 ENCOUNTER — PATIENT OUTREACH (OUTPATIENT)
Dept: ADMINISTRATIVE | Facility: HOSPITAL | Age: 64
End: 2019-05-01

## 2019-05-03 DIAGNOSIS — E11.9 TYPE 2 DIABETES MELLITUS WITHOUT COMPLICATION: ICD-10-CM

## 2019-05-06 RX ORDER — DULAGLUTIDE 0.75 MG/.5ML
INJECTION, SOLUTION SUBCUTANEOUS
Qty: 2 SYRINGE | Refills: 0 | Status: SHIPPED | OUTPATIENT
Start: 2019-05-06 | End: 2019-05-15

## 2019-05-10 ENCOUNTER — TELEPHONE (OUTPATIENT)
Dept: INTERNAL MEDICINE | Facility: CLINIC | Age: 64
End: 2019-05-10

## 2019-05-10 ENCOUNTER — LAB VISIT (OUTPATIENT)
Dept: LAB | Facility: OTHER | Age: 64
End: 2019-05-10
Attending: INTERNAL MEDICINE
Payer: COMMERCIAL

## 2019-05-10 DIAGNOSIS — E11.9 TYPE 2 DIABETES MELLITUS WITHOUT COMPLICATION: ICD-10-CM

## 2019-05-10 LAB
ALBUMIN/CREAT UR: 7.5 UG/MG (ref 0–30)
CREAT UR-MCNC: 80.5 MG/DL (ref 15–325)
ESTIMATED AVG GLUCOSE: 134 MG/DL (ref 68–131)
HBA1C MFR BLD HPLC: 6.3 % (ref 4–5.6)
MICROALBUMIN UR DL<=1MG/L-MCNC: 6 UG/ML

## 2019-05-10 PROCEDURE — 36415 COLL VENOUS BLD VENIPUNCTURE: CPT

## 2019-05-10 PROCEDURE — 83036 HEMOGLOBIN GLYCOSYLATED A1C: CPT

## 2019-05-10 PROCEDURE — 82043 UR ALBUMIN QUANTITATIVE: CPT

## 2019-05-15 ENCOUNTER — OFFICE VISIT (OUTPATIENT)
Dept: INTERNAL MEDICINE | Facility: CLINIC | Age: 64
End: 2019-05-15
Payer: COMMERCIAL

## 2019-05-15 VITALS
HEART RATE: 75 BPM | WEIGHT: 192.88 LBS | BODY MASS INDEX: 38.88 KG/M2 | HEIGHT: 59 IN | DIASTOLIC BLOOD PRESSURE: 80 MMHG | SYSTOLIC BLOOD PRESSURE: 116 MMHG

## 2019-05-15 DIAGNOSIS — E66.9 DIABETES MELLITUS TYPE 2 IN OBESE: ICD-10-CM

## 2019-05-15 DIAGNOSIS — E23.6 EMPTY SELLA SYNDROME: ICD-10-CM

## 2019-05-15 DIAGNOSIS — E11.59 HYPERTENSION ASSOCIATED WITH DIABETES: ICD-10-CM

## 2019-05-15 DIAGNOSIS — G40.319 GENERALIZED CONVULSIVE EPILEPSY WITH INTRACTABLE EPILEPSY: ICD-10-CM

## 2019-05-15 DIAGNOSIS — E11.69 DIABETES MELLITUS TYPE 2 IN OBESE: ICD-10-CM

## 2019-05-15 DIAGNOSIS — Z00.00 WELLNESS EXAMINATION: Primary | ICD-10-CM

## 2019-05-15 DIAGNOSIS — E78.2 MIXED HYPERLIPIDEMIA: Chronic | ICD-10-CM

## 2019-05-15 DIAGNOSIS — I10 ESSENTIAL HYPERTENSION: Chronic | ICD-10-CM

## 2019-05-15 DIAGNOSIS — I15.2 HYPERTENSION ASSOCIATED WITH DIABETES: ICD-10-CM

## 2019-05-15 DIAGNOSIS — E66.9 OBESITY (BMI 30-39.9): ICD-10-CM

## 2019-05-15 DIAGNOSIS — G56.01 RIGHT CARPAL TUNNEL SYNDROME: ICD-10-CM

## 2019-05-15 DIAGNOSIS — E05.00 GRAVES' DISEASE WITH EXOPHTHALMOS: ICD-10-CM

## 2019-05-15 PROCEDURE — 99396 PREV VISIT EST AGE 40-64: CPT | Mod: S$GLB,,, | Performed by: INTERNAL MEDICINE

## 2019-05-15 PROCEDURE — 99999 PR PBB SHADOW E&M-EST. PATIENT-LVL III: CPT | Mod: PBBFAC,,, | Performed by: INTERNAL MEDICINE

## 2019-05-15 PROCEDURE — 3079F PR MOST RECENT DIASTOLIC BLOOD PRESSURE 80-89 MM HG: ICD-10-PCS | Mod: CPTII,S$GLB,, | Performed by: INTERNAL MEDICINE

## 2019-05-15 PROCEDURE — 3044F HG A1C LEVEL LT 7.0%: CPT | Mod: CPTII,S$GLB,, | Performed by: INTERNAL MEDICINE

## 2019-05-15 PROCEDURE — 3044F PR MOST RECENT HEMOGLOBIN A1C LEVEL <7.0%: ICD-10-PCS | Mod: CPTII,S$GLB,, | Performed by: INTERNAL MEDICINE

## 2019-05-15 PROCEDURE — 3079F DIAST BP 80-89 MM HG: CPT | Mod: CPTII,S$GLB,, | Performed by: INTERNAL MEDICINE

## 2019-05-15 PROCEDURE — 3074F SYST BP LT 130 MM HG: CPT | Mod: CPTII,S$GLB,, | Performed by: INTERNAL MEDICINE

## 2019-05-15 PROCEDURE — 3074F PR MOST RECENT SYSTOLIC BLOOD PRESSURE < 130 MM HG: ICD-10-PCS | Mod: CPTII,S$GLB,, | Performed by: INTERNAL MEDICINE

## 2019-05-15 PROCEDURE — 99396 PR PREVENTIVE VISIT,EST,40-64: ICD-10-PCS | Mod: S$GLB,,, | Performed by: INTERNAL MEDICINE

## 2019-05-15 PROCEDURE — 99999 PR PBB SHADOW E&M-EST. PATIENT-LVL III: ICD-10-PCS | Mod: PBBFAC,,, | Performed by: INTERNAL MEDICINE

## 2019-05-15 NOTE — PROGRESS NOTES
Subjective:       Patient ID: Katey Cerrato is a 63 y.o. female.    Chief Complaint: Annual Exam    Pt here for annual. Pt's BP is well controlled. Tolerating meds well. Pt denies cp/sob/ha/vision or neuro changes. Not checking at home.     DM2 is well controlled with last A1c 6.3. Fasting sugars are 90s-110s. She is on metformin 1000mg AM and 500mg PM; she cannot tolerate higher doses due to GI side effects. Also on amaryl 6mg daily and trulicity 0.75mg weekly. No known end organ issues. Other health maint is up to date. No low sugars. Wt loss and exercise goals d/w pt.     HLD is tx with lipitor; tolerating well and is stable.    She has h/o Graves dz with exophthalmos. She has seen endocrine but not in a while. No hyperthyroid state. Also incidental finding of empty sella was noted but no symptoms or clinically significant issues associated with this.     She sees neuro regularly for h/o sz. No seizures recently on keppra.    She has had some tingling at night in 1st-3rd digits of R hand. No weakness. She works on cash register most of the day. No neck pain and no tingling proximal to wrist. No pain. She has taken mobic which has helped. No L sided symptoms and no feet symptoms.     Diabetes   Pertinent negatives for hypoglycemia include no dizziness or headaches. Pertinent negatives for diabetes include no chest pain, no fatigue and no polyuria.   Hypertension   Pertinent negatives include no chest pain, headaches, palpitations or shortness of breath.     Review of Systems   Constitutional: Negative for fatigue, fever and unexpected weight change.   HENT: Negative for congestion and sore throat.    Eyes: Negative for visual disturbance.   Respiratory: Negative for shortness of breath.    Cardiovascular: Negative for chest pain, palpitations and leg swelling.   Gastrointestinal: Negative for abdominal pain, blood in stool, constipation and diarrhea.   Endocrine: Negative for polyuria.   Genitourinary: Negative  for dysuria.   Musculoskeletal: Negative for arthralgias.   Skin: Negative for rash.   Neurological: Negative for dizziness, syncope and headaches.   Hematological: Negative for adenopathy.   Psychiatric/Behavioral: Negative for dysphoric mood.       Objective:      Physical Exam   Constitutional: She is oriented to person, place, and time. She appears well-developed and well-nourished.   HENT:   Head: Normocephalic and atraumatic.   Right Ear: Hearing, tympanic membrane, external ear and ear canal normal.   Left Ear: Hearing, tympanic membrane, external ear and ear canal normal.   Nose: Nose normal.   Mouth/Throat: Uvula is midline and oropharynx is clear and moist.   Eyes: Pupils are equal, round, and reactive to light. Conjunctivae, EOM and lids are normal. Right conjunctiva is not injected. Left conjunctiva is not injected.   Neck: Neck supple. No JVD present. Carotid bruit is not present. No thyroid mass and no thyromegaly present.   Cardiovascular: Normal rate, regular rhythm, S1 normal, S2 normal, normal heart sounds and intact distal pulses. PMI is not displaced.   No murmur heard.  Pulses:       Posterior tibial pulses are 2+ on the right side, and 2+ on the left side.   Pulmonary/Chest: Effort normal and breath sounds normal. She has no wheezes. She has no rhonchi. She has no rales.   Abdominal: Soft. Bowel sounds are normal. She exhibits no distension and no abdominal bruit. There is no hepatosplenomegaly. There is no tenderness.   Musculoskeletal: She exhibits no edema.        Right foot: Normal.        Left foot: Normal.   Positive Tinel sign on R wrist; no atrophy noted in hands and strength is intact   Lymphadenopathy:        Head (right side): No preauricular and no posterior auricular adenopathy present.        Head (left side): No preauricular and no posterior auricular adenopathy present.     She has no cervical adenopathy.     She has no axillary adenopathy.   Neurological: She is alert and  oriented to person, place, and time. She has normal strength. No cranial nerve deficit or sensory deficit. She displays a negative Romberg sign. Coordination and gait normal.   Skin: Skin is warm. No rash noted.   Psychiatric: She has a normal mood and affect. Her speech is normal and behavior is normal. Judgment and thought content normal.       Assessment:       1. Wellness examination    2. Uncontrolled type 2 diabetes mellitus without complication, without long-term current use of insulin    3. Graves' disease with exophthalmos    4. Obesity (BMI 30-39.9)    5. Empty sella syndrome    6. Diabetes mellitus type 2 in obese    7. Mixed hyperlipidemia    8. Hypertension associated with diabetes    9. Essential hypertension    10. Generalized convulsive epilepsy with intractable epilepsy    11. Right carpal tunnel syndrome        Plan:       1. Stop amaryl and increase trulicity to 1.5mg weekly  2. Home BP and BS monitoring  3. Recent labs reviewed  4. Keep f/u with specialists  5. Counseled on wt loss strategies  6. Likely carpal tunnel on R hand; wrist brace and prn mobic cautiously; if persists, then ortho hand referral--she will let me know  7. F/u 3 mos with A1c prior

## 2019-05-26 RX ORDER — LOSARTAN POTASSIUM 25 MG/1
25 TABLET ORAL DAILY
Qty: 90 TABLET | Refills: 3 | Status: SHIPPED | OUTPATIENT
Start: 2019-05-26 | End: 2020-09-09

## 2019-05-29 DIAGNOSIS — E66.9 DIABETES MELLITUS TYPE 2 IN OBESE: ICD-10-CM

## 2019-05-29 DIAGNOSIS — E11.69 DIABETES MELLITUS TYPE 2 IN OBESE: ICD-10-CM

## 2019-05-29 NOTE — TELEPHONE ENCOUNTER
----- Message from Beverly Vasquez sent at 5/29/2019  9:28 AM CDT -----  Contact: Self  Name of Who is Calling:       What is the request in detail: pt wants to know if she can get a 3 month supply of dulaglutide (TRULICITY) 1.5 mg/0.5 mL instead of a one month, pt states its recommended by her insurance. Please send to [id number] - [place], [place] - [address] [phone] (Phone)  [phone] (Fax)     Please contact to further discuss and advise.      Can the clinic reply by [facility]: N      What [facility] to [facility] Back [facility] not [facility]: [phone]

## 2019-06-03 ENCOUNTER — TELEPHONE (OUTPATIENT)
Dept: INTERNAL MEDICINE | Facility: CLINIC | Age: 64
End: 2019-06-03

## 2019-06-03 DIAGNOSIS — G56.01 RIGHT CARPAL TUNNEL SYNDROME: Primary | ICD-10-CM

## 2019-06-03 NOTE — TELEPHONE ENCOUNTER
----- Message from Arabella Kyle sent at 5/31/2019 10:33 AM CDT -----  Contact: pt  Name of Who is Calling: MORAIMA LUCAS [[id number]]    What is the request in detail:Patient is requesting a call back regarding a referral she needs for the hand clinic.........  Please contact to further discuss and advise      Can the clinic reply by [facility]:No     [facility]: [phone]

## 2019-06-05 ENCOUNTER — TELEPHONE (OUTPATIENT)
Dept: INTERNAL MEDICINE | Facility: CLINIC | Age: 64
End: 2019-06-05

## 2019-06-05 NOTE — TELEPHONE ENCOUNTER
Patient called to schedule appointment with Hand clinic from referral.  Patient stated the injury was as a result of the work she does.  Patient advised to report injury to her supervisor to get claim set up.  Patient verbally acknowledged understanding.  Kain NIELSEN

## 2019-06-07 ENCOUNTER — TELEPHONE (OUTPATIENT)
Dept: INTERNAL MEDICINE | Facility: CLINIC | Age: 64
End: 2019-06-07

## 2019-06-07 NOTE — TELEPHONE ENCOUNTER
----- Message from Karen Jacob sent at 6/7/2019  7:42 AM CDT -----  Contact: pt  Name of Who is Calling: Katey      What is the request in detail: Pt states that she really needs to speak with Josiah concerning something else she called for. Pt declined to give any other info      Can the clinic reply by [facility]: no    What Number to Call Back [facility] not [facility]: [phone]

## 2019-06-10 ENCOUNTER — TELEPHONE (OUTPATIENT)
Dept: INTERNAL MEDICINE | Facility: CLINIC | Age: 64
End: 2019-06-10

## 2019-06-10 NOTE — TELEPHONE ENCOUNTER
Pt stated someone by the name of Kain from the scheduling dept informed her if she could not go through worker's comp, that she could not see the hand specialist.    Called pt to set up appt with Dr. Galvan. Pt stated she will call in the AM to schedule

## 2019-06-10 NOTE — TELEPHONE ENCOUNTER
----- Message from Karen Jacob sent at 6/7/2019  7:42 AM CDT -----  Contact: pt  Name of Who is Calling: Katey      What is the request in detail: Pt states that she really needs to speak with Josiah concerning something else she called for. Pt declined to give any other info      Can the clinic reply by [facility]: no    What [facility] Back [facility]: [phone]

## 2019-07-06 RX ORDER — MELOXICAM 15 MG/1
TABLET ORAL
Qty: 90 TABLET | Refills: 0 | Status: SHIPPED | OUTPATIENT
Start: 2019-07-06 | End: 2019-10-08 | Stop reason: SDUPTHER

## 2019-07-31 DIAGNOSIS — E66.9 DIABETES MELLITUS TYPE 2 IN OBESE: ICD-10-CM

## 2019-07-31 DIAGNOSIS — E11.69 DIABETES MELLITUS TYPE 2 IN OBESE: ICD-10-CM

## 2019-08-07 ENCOUNTER — PATIENT OUTREACH (OUTPATIENT)
Dept: ADMINISTRATIVE | Facility: HOSPITAL | Age: 64
End: 2019-08-07

## 2019-08-07 DIAGNOSIS — M94.9 DISORDER OF BONE AND ARTICULAR CARTILAGE: ICD-10-CM

## 2019-08-07 DIAGNOSIS — M89.9 DISORDER OF BONE AND ARTICULAR CARTILAGE: ICD-10-CM

## 2019-08-07 DIAGNOSIS — E11.9 DIABETES MELLITUS WITHOUT COMPLICATION: ICD-10-CM

## 2019-08-07 DIAGNOSIS — Z12.11 SCREENING FOR COLON CANCER: Primary | ICD-10-CM

## 2019-08-14 ENCOUNTER — LAB VISIT (OUTPATIENT)
Dept: LAB | Facility: OTHER | Age: 64
End: 2019-08-14
Attending: INTERNAL MEDICINE
Payer: COMMERCIAL

## 2019-08-14 DIAGNOSIS — E11.69 DIABETES MELLITUS TYPE 2 IN OBESE: ICD-10-CM

## 2019-08-14 DIAGNOSIS — E66.9 DIABETES MELLITUS TYPE 2 IN OBESE: ICD-10-CM

## 2019-08-14 LAB
ESTIMATED AVG GLUCOSE: 131 MG/DL (ref 68–131)
HBA1C MFR BLD HPLC: 6.2 % (ref 4–5.6)

## 2019-08-14 PROCEDURE — 83036 HEMOGLOBIN GLYCOSYLATED A1C: CPT

## 2019-08-14 PROCEDURE — 36415 COLL VENOUS BLD VENIPUNCTURE: CPT

## 2019-08-16 DIAGNOSIS — E11.9 TYPE 2 DIABETES MELLITUS WITHOUT COMPLICATION, UNSPECIFIED WHETHER LONG TERM INSULIN USE: ICD-10-CM

## 2019-08-17 RX ORDER — LEVETIRACETAM 750 MG/1
TABLET ORAL
Qty: 180 TABLET | Refills: 3 | Status: SHIPPED | OUTPATIENT
Start: 2019-08-17 | End: 2019-08-19 | Stop reason: SDUPTHER

## 2019-08-19 DIAGNOSIS — G40.319 GENERALIZED CONVULSIVE EPILEPSY WITH INTRACTABLE EPILEPSY: Primary | ICD-10-CM

## 2019-08-19 RX ORDER — LEVETIRACETAM 750 MG/1
1500 TABLET ORAL DAILY
Qty: 180 TABLET | Refills: 3 | Status: SHIPPED | OUTPATIENT
Start: 2019-08-19 | End: 2019-09-18 | Stop reason: SDUPTHER

## 2019-08-19 NOTE — TELEPHONE ENCOUNTER
----- Message from Lucy Rodriguez sent at 8/19/2019  7:36 AM CDT -----  Contact: MORAIMA LUCAS [[id number]]  Name of Who is Calling: MORAIMA LUCAS [[id number]]    What is the request in detail: patient would like a call back from staff. Patient did not say what the call was regarding. Please call to discuss     Can the clinic reply by [facility]: no    What Number to Call Back if not in [facility]: [phone]

## 2019-08-21 ENCOUNTER — OFFICE VISIT (OUTPATIENT)
Dept: INTERNAL MEDICINE | Facility: CLINIC | Age: 64
End: 2019-08-21
Payer: COMMERCIAL

## 2019-08-21 ENCOUNTER — TELEPHONE (OUTPATIENT)
Dept: INTERNAL MEDICINE | Facility: CLINIC | Age: 64
End: 2019-08-21

## 2019-08-21 ENCOUNTER — HOSPITAL ENCOUNTER (OUTPATIENT)
Dept: RADIOLOGY | Facility: OTHER | Age: 64
Discharge: HOME | End: 2019-08-21
Attending: INTERNAL MEDICINE
Payer: COMMERCIAL

## 2019-08-21 VITALS
HEIGHT: 59 IN | DIASTOLIC BLOOD PRESSURE: 80 MMHG | SYSTOLIC BLOOD PRESSURE: 134 MMHG | WEIGHT: 191.13 LBS | BODY MASS INDEX: 38.53 KG/M2 | HEART RATE: 75 BPM

## 2019-08-21 DIAGNOSIS — R11.0 NAUSEA: ICD-10-CM

## 2019-08-21 DIAGNOSIS — Z12.11 COLON CANCER SCREENING: ICD-10-CM

## 2019-08-21 DIAGNOSIS — M89.9 DISORDER OF BONE AND ARTICULAR CARTILAGE: ICD-10-CM

## 2019-08-21 DIAGNOSIS — I15.2 HYPERTENSION ASSOCIATED WITH DIABETES: ICD-10-CM

## 2019-08-21 DIAGNOSIS — E78.2 MIXED HYPERLIPIDEMIA: ICD-10-CM

## 2019-08-21 DIAGNOSIS — E11.59 HYPERTENSION ASSOCIATED WITH DIABETES: ICD-10-CM

## 2019-08-21 DIAGNOSIS — Z23 IMMUNIZATION DUE: Primary | ICD-10-CM

## 2019-08-21 DIAGNOSIS — E11.69 DIABETES MELLITUS TYPE 2 IN OBESE: Primary | ICD-10-CM

## 2019-08-21 DIAGNOSIS — M94.9 DISORDER OF BONE AND ARTICULAR CARTILAGE: ICD-10-CM

## 2019-08-21 DIAGNOSIS — E66.9 DIABETES MELLITUS TYPE 2 IN OBESE: Primary | ICD-10-CM

## 2019-08-21 PROCEDURE — 3044F HG A1C LEVEL LT 7.0%: CPT | Mod: CPTII,S$GLB,, | Performed by: INTERNAL MEDICINE

## 2019-08-21 PROCEDURE — 99213 PR OFFICE/OUTPT VISIT, EST, LEVL III, 20-29 MIN: ICD-10-PCS | Mod: S$GLB,,, | Performed by: INTERNAL MEDICINE

## 2019-08-21 PROCEDURE — 99213 OFFICE O/P EST LOW 20 MIN: CPT | Mod: S$GLB,,, | Performed by: INTERNAL MEDICINE

## 2019-08-21 PROCEDURE — 77080 DXA BONE DENSITY AXIAL: CPT | Mod: TC

## 2019-08-21 PROCEDURE — 99999 PR PBB SHADOW E&M-EST. PATIENT-LVL IV: CPT | Mod: PBBFAC,,, | Performed by: INTERNAL MEDICINE

## 2019-08-21 PROCEDURE — 1101F PT FALLS ASSESS-DOCD LE1/YR: CPT | Mod: CPTII,S$GLB,, | Performed by: INTERNAL MEDICINE

## 2019-08-21 PROCEDURE — 99999 PR PBB SHADOW E&M-EST. PATIENT-LVL IV: ICD-10-PCS | Mod: PBBFAC,,, | Performed by: INTERNAL MEDICINE

## 2019-08-21 PROCEDURE — 3008F PR BODY MASS INDEX (BMI) DOCUMENTED: ICD-10-PCS | Mod: CPTII,S$GLB,, | Performed by: INTERNAL MEDICINE

## 2019-08-21 PROCEDURE — 3075F PR MOST RECENT SYSTOLIC BLOOD PRESS GE 130-139MM HG: ICD-10-PCS | Mod: CPTII,S$GLB,, | Performed by: INTERNAL MEDICINE

## 2019-08-21 PROCEDURE — 77080 DXA BONE DENSITY AXIAL: CPT | Mod: 26,,, | Performed by: RADIOLOGY

## 2019-08-21 PROCEDURE — 3079F PR MOST RECENT DIASTOLIC BLOOD PRESSURE 80-89 MM HG: ICD-10-PCS | Mod: CPTII,S$GLB,, | Performed by: INTERNAL MEDICINE

## 2019-08-21 PROCEDURE — 3075F SYST BP GE 130 - 139MM HG: CPT | Mod: CPTII,S$GLB,, | Performed by: INTERNAL MEDICINE

## 2019-08-21 PROCEDURE — 77080 DEXA BONE DENSITY SPINE HIP: ICD-10-PCS | Mod: 26,,, | Performed by: RADIOLOGY

## 2019-08-21 PROCEDURE — 3008F BODY MASS INDEX DOCD: CPT | Mod: CPTII,S$GLB,, | Performed by: INTERNAL MEDICINE

## 2019-08-21 PROCEDURE — 3044F PR MOST RECENT HEMOGLOBIN A1C LEVEL <7.0%: ICD-10-PCS | Mod: CPTII,S$GLB,, | Performed by: INTERNAL MEDICINE

## 2019-08-21 PROCEDURE — 3079F DIAST BP 80-89 MM HG: CPT | Mod: CPTII,S$GLB,, | Performed by: INTERNAL MEDICINE

## 2019-08-21 PROCEDURE — 1101F PR PT FALLS ASSESS DOC 0-1 FALLS W/OUT INJ PAST YR: ICD-10-PCS | Mod: CPTII,S$GLB,, | Performed by: INTERNAL MEDICINE

## 2019-08-21 RX ORDER — ONDANSETRON 4 MG/1
4 TABLET, ORALLY DISINTEGRATING ORAL EVERY 8 HOURS PRN
Qty: 10 TABLET | Refills: 0 | Status: SHIPPED | OUTPATIENT
Start: 2019-08-21 | End: 2020-07-07 | Stop reason: SDUPTHER

## 2019-08-21 NOTE — PROGRESS NOTES
Subjective:       Patient ID: Katey Cerrato is a 64 y.o. female.    Chief Complaint: Diabetes    DM2 is well controlled with last A1c 6.2. Fasting sugars are 90s-110s. She is on metformin 1000mg AM and 500mg PM; she cannot tolerate higher doses due to GI side effects. Also on trulicity 1.5mg weekly. No known end organ issues. Other health maint is up to date. No low sugars. Wt loss and exercise goals d/w pt.     R/b of diff screening modalities for colon ca d/w pt and she would like to proceed with fitkit.    Review of Systems   Constitutional: Negative for unexpected weight change.   Respiratory: Negative for shortness of breath.    Cardiovascular: Negative for chest pain.   Endocrine: Negative for polyuria.   Psychiatric/Behavioral: Negative for dysphoric mood.       Objective:      Physical Exam   Constitutional: She is oriented to person, place, and time. She appears well-developed and well-nourished.   Neck: Neck supple. No thyromegaly present.   Cardiovascular: Normal rate, regular rhythm and normal heart sounds.   Pulmonary/Chest: Effort normal and breath sounds normal.   Musculoskeletal: She exhibits no edema.   Lymphadenopathy:     She has no cervical adenopathy.   Neurological: She is alert and oriented to person, place, and time. No cranial nerve deficit.   Psychiatric: She has a normal mood and affect. Her behavior is normal.       Assessment:       1. Diabetes mellitus type 2 in obese    2. Uncontrolled type 2 diabetes mellitus without complication, without long-term current use of insulin    3. Colon cancer screening    4. Mixed hyperlipidemia    5. Hypertension associated with diabetes    6. Nausea        Plan:       1. fitkit  2. Home BS monitoring

## 2019-08-23 ENCOUNTER — LAB VISIT (OUTPATIENT)
Dept: LAB | Facility: OTHER | Age: 64
End: 2019-08-23
Attending: INTERNAL MEDICINE
Payer: COMMERCIAL

## 2019-08-23 DIAGNOSIS — Z12.11 COLON CANCER SCREENING: ICD-10-CM

## 2019-08-23 PROCEDURE — 82274 ASSAY TEST FOR BLOOD FECAL: CPT

## 2019-08-27 LAB — HEMOCCULT STL QL IA: NEGATIVE

## 2019-09-13 ENCOUNTER — TELEPHONE (OUTPATIENT)
Dept: NEUROLOGY | Facility: CLINIC | Age: 64
End: 2019-09-13

## 2019-09-13 NOTE — TELEPHONE ENCOUNTER
Pt sees Dr Boykin. Rescheduled appointment from Krysta to Mike Hokos. Estephania will speak to scheduling regarding the error in scheduling

## 2019-09-13 NOTE — TELEPHONE ENCOUNTER
----- Message from Naomi Sheffield sent at 9/13/2019  8:28 AM CDT -----  Contact: self  Type:  Patient states need to speak with nurse  Patient states would like to have EEG appointment on 9/18/2019 with other appointment    Name of Caller:   Patient   When is the first available appointment?  Symptoms:  Best Call Back Number:[phone]  Additional Information:

## 2019-09-16 ENCOUNTER — TELEPHONE (OUTPATIENT)
Dept: NEUROLOGY | Facility: CLINIC | Age: 64
End: 2019-09-16

## 2019-09-16 NOTE — TELEPHONE ENCOUNTER
----- Message from Toma Greene MA sent at 9/13/2019  4:07 PM CDT -----  Contact: pt @ [phone]      ----- Message -----  From: Raphael Cline  Sent: 9/13/2019   4:03 PM  To: [facility]ryst[facility] Wheeler Staff    Pt is returning Estephania's call

## 2019-09-17 NOTE — TELEPHONE ENCOUNTER
----- Message from Car Sin sent at 9/17/2019  3:55 PM CDT -----  Contact: Pt. [phone]  The patient would like to speak to you regarding her appointment for tomorrow. She's requesting to see Dr. Bryant. She was forced to see Dr. Boykin due to Dr. Bryant's schedule being full. She prefers to see Dr. Bryant. Please contact the patient to discuss further.

## 2019-09-18 ENCOUNTER — OFFICE VISIT (OUTPATIENT)
Dept: NEUROLOGY | Facility: CLINIC | Age: 64
End: 2019-09-18
Payer: COMMERCIAL

## 2019-09-18 VITALS
WEIGHT: 191.56 LBS | HEART RATE: 77 BPM | DIASTOLIC BLOOD PRESSURE: 90 MMHG | HEIGHT: 59 IN | BODY MASS INDEX: 38.62 KG/M2 | SYSTOLIC BLOOD PRESSURE: 138 MMHG

## 2019-09-18 DIAGNOSIS — I15.2 HYPERTENSION ASSOCIATED WITH DIABETES: ICD-10-CM

## 2019-09-18 DIAGNOSIS — E11.59 HYPERTENSION ASSOCIATED WITH DIABETES: ICD-10-CM

## 2019-09-18 DIAGNOSIS — G40.319 GENERALIZED CONVULSIVE EPILEPSY WITH INTRACTABLE EPILEPSY: ICD-10-CM

## 2019-09-18 DIAGNOSIS — I10 ESSENTIAL HYPERTENSION: Primary | Chronic | ICD-10-CM

## 2019-09-18 PROCEDURE — 3008F PR BODY MASS INDEX (BMI) DOCUMENTED: ICD-10-PCS | Mod: CPTII,S$GLB,, | Performed by: PSYCHIATRY & NEUROLOGY

## 2019-09-18 PROCEDURE — 3044F HG A1C LEVEL LT 7.0%: CPT | Mod: CPTII,S$GLB,, | Performed by: PSYCHIATRY & NEUROLOGY

## 2019-09-18 PROCEDURE — 99214 OFFICE O/P EST MOD 30 MIN: CPT | Mod: S$GLB,,, | Performed by: PSYCHIATRY & NEUROLOGY

## 2019-09-18 PROCEDURE — 99999 PR PBB SHADOW E&M-EST. PATIENT-LVL III: CPT | Mod: PBBFAC,,, | Performed by: PSYCHIATRY & NEUROLOGY

## 2019-09-18 PROCEDURE — 1101F PT FALLS ASSESS-DOCD LE1/YR: CPT | Mod: CPTII,S$GLB,, | Performed by: PSYCHIATRY & NEUROLOGY

## 2019-09-18 PROCEDURE — 1101F PR PT FALLS ASSESS DOC 0-1 FALLS W/OUT INJ PAST YR: ICD-10-PCS | Mod: CPTII,S$GLB,, | Performed by: PSYCHIATRY & NEUROLOGY

## 2019-09-18 PROCEDURE — 3080F PR MOST RECENT DIASTOLIC BLOOD PRESSURE >= 90 MM HG: ICD-10-PCS | Mod: CPTII,S$GLB,, | Performed by: PSYCHIATRY & NEUROLOGY

## 2019-09-18 PROCEDURE — 99999 PR PBB SHADOW E&M-EST. PATIENT-LVL III: ICD-10-PCS | Mod: PBBFAC,,, | Performed by: PSYCHIATRY & NEUROLOGY

## 2019-09-18 PROCEDURE — 3075F SYST BP GE 130 - 139MM HG: CPT | Mod: CPTII,S$GLB,, | Performed by: PSYCHIATRY & NEUROLOGY

## 2019-09-18 PROCEDURE — 3080F DIAST BP >= 90 MM HG: CPT | Mod: CPTII,S$GLB,, | Performed by: PSYCHIATRY & NEUROLOGY

## 2019-09-18 PROCEDURE — 3008F BODY MASS INDEX DOCD: CPT | Mod: CPTII,S$GLB,, | Performed by: PSYCHIATRY & NEUROLOGY

## 2019-09-18 PROCEDURE — 3075F PR MOST RECENT SYSTOLIC BLOOD PRESS GE 130-139MM HG: ICD-10-PCS | Mod: CPTII,S$GLB,, | Performed by: PSYCHIATRY & NEUROLOGY

## 2019-09-18 PROCEDURE — 99214 PR OFFICE/OUTPT VISIT, EST, LEVL IV, 30-39 MIN: ICD-10-PCS | Mod: S$GLB,,, | Performed by: PSYCHIATRY & NEUROLOGY

## 2019-09-18 PROCEDURE — 3044F PR MOST RECENT HEMOGLOBIN A1C LEVEL <7.0%: ICD-10-PCS | Mod: CPTII,S$GLB,, | Performed by: PSYCHIATRY & NEUROLOGY

## 2019-09-18 RX ORDER — LEVETIRACETAM 750 MG/1
1500 TABLET ORAL DAILY
Qty: 180 TABLET | Refills: 3 | Status: SHIPPED | OUTPATIENT
Start: 2019-09-18 | End: 2020-11-16

## 2019-09-18 NOTE — PROGRESS NOTES
Name: Katey Cerrato  MRN: [id number]   CSN: [id number]      Date: 09/18/2019    HISTORY OF PRESENT ILLNESS (HPI)    2019/09/18  Patient has experienced two total seizures in her life.  First was Mar 2009 and the second was in Mar 2017.  Keppra level at that time was zero and malabsorption due to arthotec.  She has done well since that time.      2012/09/07    The patient is a 64 y.o. yo Right handed  female referred for consultation by Dr. Nazanin Luis.  The patient was evaluated by Dr Luis for an episode of loss of consciousness that occurred in march 2009. She was not feeling well in morning and laid back down.   Her  took their little boy to school and when he returned 30 mins later and he could not wake her up and  her tongue was bleeding. She was brought to ER with no memory of all of this until after admitted. She felt ok when she awoke in the hospital.   put her Keppra  750mg 3 Q HS. She had a EEG Sept.2009 which was abnormal. She has had no furthers seizures after starting on Keppra XR.  Last prescription was for 2 tabs a day.  She had done well on that dose so the dose was not increased back to the 3 tabs a day.  She also ran out of medications last month and did not taking anything for 5 days without experiencing seizures or other problems     MRI showed subcortical ischemic vascular changes and she has high cholesterol (on simvostatin) and hypertension (not treated).  She is followed by her PCP for these and was recently put on a new medication for diabetes.   She remains seizure free and has no side effects to the Keppra.     Seizure Seminology  Seizure Type 1  Classification:   Aura -   Ictus (Unknown - found unresponsive in bed with bleeding tongue)  - Nonconv -  - Conv -   - Duration -   Post-ictal  - Symptoms  - Duration  Seizure Frequency -  Single sz  Age of onset - 54 yo    Seizure Triggers  Sleep Deprivation - None  Other medicaitons - None  Psych/stress -  None  Photic stimulation - None  Hyperventilation - None  Medical Problems - None  Menses - No    AED Treatments  Present regimen  levetiracetam (Keppra, LEV)     Blood levels     - 2010/12/01 - 44 ug/ml  Prior treatments    Not tried  acetazolamide (Diamox, AZM)  carbamazepine (Tegretol, CBZ)  ethosuximide (Zarontin, ESM)  gabapentin (Neurontin, GPN)  lacosamide (Vimpat, LCS)   lamotrigine (Lamictal, LTG)   methsuximide (Celontin, MSM)  methyphenytoin (Mesantion, MHT)  oxcarbazepine (Trileptal OXC)  phenobarbital (Pb)  phenytoin (Dilantin, PHT)  pregabalin (Lyrica, PGB)   primidone (Mysoline, PRM)  retigabine (Potiga, RTG)  rufinamide (Banzel, RUF)  tiagabine (Gabatril,  TGB)  topiramate (Topamax, TPM)  viagabatrin, (Sabril, VGB)  vagal nerve stimulator (VNS)  valproic acid (Depakote, VPA)  zonisamide (Zonegran, ZNA)  Benzodiazepines  diazepam - rectal (Diastatl)  diazepam - oral (Valium, DZ)  clonazepam (Klonopin, CZP)  clorazepate (Tranxene, CLZ)  clobezam (Frizium, CLB)  Ativan  Brain Stimulation  Vagal Nerve Stimulato  DBS    Seizure Evaluation  Seizure Evaluation  EEG - 2009-09-15 - abnormal with bilateral temporal slowing and right sided spike and sharp waves.  EEG/Video monitoring -   MRI 2009-04-01 - A few punctate areas of t2/flair signal hyperintensity are present in the parietal white matter which are nonspecific though may be sequelae of chronic microvascular ischemic change.  There is no evidence for acute infarction or enhancing lesion.    CT Scan - no  PET Scan N/A  Neuropsychological evaluation  N/A    Potential Epilepsy Risk Factors:  Pregnancy/Labor/Delivery - none  Febrile seizures -  none  Head injury  - none  CNS infection - none   Cerebrovascular disease - no strokes but has hypertension and hypercholesterolemia   Family Hx of Seizures - None      PAST MEDICAL HISTORY:   Active Ambulatory Problems     Diagnosis Date Noted    Generalized convulsive epilepsy with intractable epilepsy 09/07/2012     Hypertension associated with diabetes 09/07/2012    Diabetes mellitus type 2 in obese 09/07/2012    Obesity (BMI 30-39.9) 09/07/2012    Essential hypertension     Graves' disease with exophthalmos     Empty sella syndrome     Mixed hyperlipidemia     Uncontrolled type 2 diabetes mellitus without complication, without long-term current use of insulin 03/18/2014     Resolved Ambulatory Problems     Diagnosis Date Noted    Neuropathy 08/07/2013     Past Medical History:   Diagnosis Date    Cataract     Empty sella syndrome     GHD (growth hormone deficiency)     Glaucoma     Graves' disease with exophthalmos     History of vitamin D deficiency     Hyperlipidemia     Hypertension     Seizures     Thyroid nodule     Thyroid nodule     Type II or unspecified type diabetes mellitus without mention of complication, uncontrolled         PAST PSYCHIATRIC HISTORY:  no    PAST SURGICAL HISTORY including Epilepsy surgery:   Past Surgical History:   Procedure Laterality Date    BREAST BIOPSY      CHOLECYSTECTOMY      HYSTERECTOMY      one ovary intact    KNEE SURGERY      LYMPH NODE BIOPSY  3010        FAMILY HISTORY:   Family History   Problem Relation Age of Onset    Cancer Mother     Cataracts Mother     Glaucoma Mother         shunt    Heart disease Mother     Tremor Mother     Breast cancer Mother 78    Heart disease Sister     Heart disease Father     Hypertension Brother     Breast cancer Sister 60         SOCIAL HISTORY:   Social History     Socioeconomic History    Marital status:      Spouse name: Not on file    Number of children: Not on file    Years of education: Not on file    Highest education level: Not on file   Occupational History     Employer: [employer]   Social Needs    Financial resource strain: Not on file    Food insecurity:     Worry: Not on file     Inability: Not on file    Transportation needs:     Medical: Not on file      "Non-medical: Not on file   Tobacco Use    Smoking status: Never Smoker    Smokeless tobacco: Never Used   Substance and Sexual Activity    Alcohol use: No     Comment: none    Drug use: No    Sexual activity: Yes     Partners: Male     Comment: m  works in dietary,  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   Lifestyle    Physical activity:     Days per week: Not on file     Minutes per session: Not on file    Stress: Not on file   Relationships    Social connections:     Talks on phone: Not on file     Gets together: Not on file     Attends Orthodoxy service: Not on file     Active member of club or organization: Not on file     Attends meetings of clubs or organizations: Not on file     Relationship status: Not on file   Other Topics Concern    Not on file   Social History Narrative    Not on file      a) Marital status:                                                     b) Living situation: patient lives with family  c) Employed/Unemployed/Other: Employed full time    DRIVING HISTORY:  Currently driving: Yes      LEVEL OF EDUCATION: high school diploma/GED    SUBSTANCE USE:  Social History     Tobacco Use    Smoking status: Never Smoker    Smokeless tobacco: Never Used   Substance and Sexual Activity    Alcohol use: No     Comment: none    Drug use: No    Sexual activity: Yes     Partners: Male     Comment: m  works in dietary,  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      Social History     Tobacco Use    Smoking status: Never Smoker    Smokeless tobacco: Never Used   Substance Use Topics    Alcohol use: No     Comment: none        ALLERGIES: Codeine     BP (!) 138/90   Pulse 77   Ht 4' 11" (1.499 m)   Wt 86.9 kg (191 lb 9.3 oz)   BMI 38.69 kg/m²     Higher Cortical Function:    Oriented - intact to person, place and time and followed two step instruction correctly.    Fund of knowledge was appropriate.    Language - Speech was fluent without evidence for an aphasia.  No agnosias, agraphesthesia, or " "astereognosis was present.  Extinction - not found with double simultaneous stimulation present in a proximal-distal or right-left distrubution.  Cranial Nerves II - XII:    EOMs were intact with normal smooth and no nystagmus.    PERRLA. D/C   Funduscopic exam was unremarkable.   Motor - facial movement was symmetrical and normal.    Sensory - Light touch and pin prick sensations were normal.    Palate moved well and was symmetrical with normal palatal and oral sensation.    Tongue movement was full & the patient could say "la la la" and "Ka Ka Ka" without  difficulty.  Patient repeated Shinto and Holiness without difficulty.  Normal power and bulk was found in the massiter and rotator muscles of the neck.  Motor:  Bulk, tone and power was normal in the extremities.  Sensory: Light touch, pin prick, vibration and position senses were normal in all extremities.    Coordination:  Rapid alternating movements and rapid finger tapping were normal.  Finger to  nose - nl.  Arm roll was symmetrical.    Gait:  Station, gait and tandem walking were done without difficulty and Romberg was negative.  Deep tendon reflexes:  Biceps - 1+ sym; Triceps - 1+ sym; brachioradialis - 1+ sym; Knee - 1+  sym; Ankle - 1+ sym;  Tremor: resting, postural, intentional - none  Frontal Release signs: Glabellar - neg; Snout - neg; Root - neg; palmomental - neg; grasp - neg  Pulses      Carotids - no bruits      Peripheral pulses - strong and symmetrical     IMPRESSION  Single convulsion with risk factor of cerebrovascular disease and epileptic activity on EEG.  Left temporal spikes on EEG two years ago.  Hypertension - pressure high today   Hypercholesterolemia   5. Vit B1 deficient    DISPOSITION:    Continue on Keppra  mg 2 tabs QD which she takes before bed time.   She is at increased risk for stroke and cardiovascular events due to hypertension and high cholesterol.    She is to see PCP for adjustment of treatments for diabetes " and blood pressure.   4. Continue Vitamin B1 supplementation 100 mg QD  5. RTC 1 year

## 2019-09-25 ENCOUNTER — PATIENT OUTREACH (OUTPATIENT)
Dept: ADMINISTRATIVE | Facility: OTHER | Age: 64
End: 2019-09-25

## 2019-09-26 ENCOUNTER — CLINICAL SUPPORT (OUTPATIENT)
Dept: OPHTHALMOLOGY | Facility: CLINIC | Age: 64
End: 2019-09-26
Payer: COMMERCIAL

## 2019-09-26 ENCOUNTER — OFFICE VISIT (OUTPATIENT)
Dept: OPTOMETRY | Facility: CLINIC | Age: 64
End: 2019-09-26
Payer: COMMERCIAL

## 2019-09-26 DIAGNOSIS — H40.023 OPEN ANGLE WITH BORDERLINE FINDINGS AND HIGH GLAUCOMA RISK IN BOTH EYES: ICD-10-CM

## 2019-09-26 DIAGNOSIS — H52.13 MYOPIA WITH ASTIGMATISM AND PRESBYOPIA, BILATERAL: Primary | ICD-10-CM

## 2019-09-26 DIAGNOSIS — H52.203 MYOPIA WITH ASTIGMATISM AND PRESBYOPIA, BILATERAL: Primary | ICD-10-CM

## 2019-09-26 DIAGNOSIS — H52.4 MYOPIA WITH ASTIGMATISM AND PRESBYOPIA, BILATERAL: Primary | ICD-10-CM

## 2019-09-26 PROCEDURE — 92014 PR EYE EXAM, EST PATIENT,COMPREHESV: ICD-10-PCS | Mod: S$PBB,,, | Performed by: OPTOMETRIST

## 2019-09-26 PROCEDURE — 92133 POSTERIOR SEGMENT OCT OPTIC NERVE(OCULAR COHERENCE TOMOGRAPHY) - OU - BOTH EYES: ICD-10-PCS | Mod: 26,S$PBB,, | Performed by: OPTOMETRIST

## 2019-09-26 PROCEDURE — 92014 COMPRE OPH EXAM EST PT 1/>: CPT | Mod: S$PBB,,, | Performed by: OPTOMETRIST

## 2019-09-26 PROCEDURE — 92083 HUMPHREY VISUAL FIELD - OU - BOTH EYES: ICD-10-PCS | Mod: 26,S$PBB,, | Performed by: OPTOMETRIST

## 2019-09-26 PROCEDURE — 99999 PR PBB SHADOW E&M-EST. PATIENT-LVL II: CPT | Mod: PBBFAC,,, | Performed by: OPTOMETRIST

## 2019-09-26 PROCEDURE — 92133 CPTRZD OPH DX IMG PST SGM ON: CPT | Mod: PBBFAC | Performed by: OPTOMETRIST

## 2019-09-26 PROCEDURE — 92083 EXTENDED VISUAL FIELD XM: CPT | Mod: PBBFAC | Performed by: OPTOMETRIST

## 2019-09-26 PROCEDURE — 99999 PR PBB SHADOW E&M-EST. PATIENT-LVL II: ICD-10-PCS | Mod: PBBFAC,,, | Performed by: OPTOMETRIST

## 2019-09-26 RX ORDER — LATANOPROST 50 UG/ML
SOLUTION/ DROPS OPHTHALMIC
Qty: 2.5 ML | Refills: 6 | Status: SHIPPED | OUTPATIENT
Start: 2019-09-26 | End: 2019-12-17 | Stop reason: SDUPTHER

## 2019-09-26 NOTE — PROGRESS NOTES
HPI     Last eye exam was 8/16/18 with     Pt uses latanoprost QHS OU, last used last night. Pt states she does miss   it occasionally.  Patient denies diplopia, headaches, flashes/floaters, and pain.        Last edited by Evelia Jacob on 9/26/2019 10:01 AM. (History)            Assessment /Plan     For exam results, see Encounter Report.    Myopia with astigmatism and presbyopia, bilateral    Open angle with borderline findings and high glaucoma risk in both eyes  -     latanoprost 0.005 % ophthalmic solution; PLACE 1 DROP INTO BOTH EYES EVERY EVENING.  Dispense: 2.5 mL; Refill: 6            1.  Due to increased c/d ratio OU and family history.  Continue with Latanoprost QHS OU-good response, refills sent to pharmacy.  All testing stable OU.    HVF: 9/26/19  OCT:9/26/19  DFE: 9/26/19  Photos:  Gonio: 8/16/18  Pachy: 562 OD, 554 OS  Initial IOPs: 16 OD, 16 OS  MHx: DM, HTN  FHx: Mother  2.  Bifocal rx given                RTC 6 months for IOP check and DFE.

## 2019-10-08 RX ORDER — MELOXICAM 15 MG/1
TABLET ORAL
Qty: 90 TABLET | Refills: 0 | Status: SHIPPED | OUTPATIENT
Start: 2019-10-08 | End: 2020-01-06

## 2019-10-28 ENCOUNTER — TELEPHONE (OUTPATIENT)
Dept: OBSTETRICS AND GYNECOLOGY | Facility: CLINIC | Age: 64
End: 2019-10-28

## 2019-10-28 NOTE — TELEPHONE ENCOUNTER
----- Message from Rafael Garcia sent at 10/28/2019  9:18 AM CDT -----  Please call pt  told pt she needs to see a specialist she has a appt on 12/03 @ 8:30 can you schedule it on the same day [phone]

## 2019-10-29 ENCOUNTER — TELEPHONE (OUTPATIENT)
Dept: OBSTETRICS AND GYNECOLOGY | Facility: CLINIC | Age: 64
End: 2019-10-29

## 2019-10-29 DIAGNOSIS — Z12.31 VISIT FOR SCREENING MAMMOGRAM: Primary | ICD-10-CM

## 2019-10-29 NOTE — TELEPHONE ENCOUNTER
----- Message from Beverly Vasquez sent at 10/29/2019  8:05 AM CDT -----  Contact: pt   Name of Who is Calling: Katey Cerrato      What is the request in detail: pt would like to speak with staff in regards to seeing another doctor. Please contact to further discuss and advise.      Can the clinic reply by [facility]: n      What Number to Call Back if not [facility]: [phone]

## 2019-11-15 RX ORDER — METFORMIN HYDROCHLORIDE 500 MG/1
TABLET, EXTENDED RELEASE ORAL
Qty: 360 TABLET | Refills: 0 | Status: SHIPPED | OUTPATIENT
Start: 2019-11-15 | End: 2020-02-14

## 2019-12-01 ENCOUNTER — PATIENT OUTREACH (OUTPATIENT)
Dept: ADMINISTRATIVE | Facility: OTHER | Age: 64
End: 2019-12-01

## 2019-12-03 ENCOUNTER — OFFICE VISIT (OUTPATIENT)
Dept: OBSTETRICS AND GYNECOLOGY | Facility: CLINIC | Age: 64
End: 2019-12-03
Payer: COMMERCIAL

## 2019-12-03 ENCOUNTER — OFFICE VISIT (OUTPATIENT)
Dept: UROGYNECOLOGY | Facility: CLINIC | Age: 64
End: 2019-12-03
Payer: COMMERCIAL

## 2019-12-03 VITALS
DIASTOLIC BLOOD PRESSURE: 70 MMHG | HEIGHT: 59 IN | BODY MASS INDEX: 38.67 KG/M2 | SYSTOLIC BLOOD PRESSURE: 120 MMHG | WEIGHT: 191.81 LBS

## 2019-12-03 VITALS
WEIGHT: 191 LBS | BODY MASS INDEX: 38.51 KG/M2 | DIASTOLIC BLOOD PRESSURE: 78 MMHG | SYSTOLIC BLOOD PRESSURE: 122 MMHG | HEIGHT: 59 IN

## 2019-12-03 DIAGNOSIS — N99.3 VAGINAL VAULT PROLAPSE, POSTHYSTERECTOMY: Primary | ICD-10-CM

## 2019-12-03 DIAGNOSIS — E66.9 OBESITY (BMI 30-39.9): ICD-10-CM

## 2019-12-03 DIAGNOSIS — G40.319 GENERALIZED CONVULSIVE EPILEPSY WITH INTRACTABLE EPILEPSY: ICD-10-CM

## 2019-12-03 DIAGNOSIS — R35.0 URINARY FREQUENCY: ICD-10-CM

## 2019-12-03 DIAGNOSIS — Z01.419 ENCOUNTER FOR GYNECOLOGICAL EXAMINATION WITHOUT ABNORMAL FINDING: Primary | ICD-10-CM

## 2019-12-03 DIAGNOSIS — Z12.31 VISIT FOR SCREENING MAMMOGRAM: ICD-10-CM

## 2019-12-03 DIAGNOSIS — E11.69 DIABETES MELLITUS TYPE 2 IN OBESE: ICD-10-CM

## 2019-12-03 DIAGNOSIS — E66.9 DIABETES MELLITUS TYPE 2 IN OBESE: ICD-10-CM

## 2019-12-03 DIAGNOSIS — N39.41 URGE INCONTINENCE: ICD-10-CM

## 2019-12-03 DIAGNOSIS — E78.2 MIXED HYPERLIPIDEMIA: Chronic | ICD-10-CM

## 2019-12-03 DIAGNOSIS — I10 ESSENTIAL HYPERTENSION: Chronic | ICD-10-CM

## 2019-12-03 LAB
BILIRUB UR QL STRIP: NEGATIVE
GLUCOSE UR QL STRIP: NEGATIVE
KETONES UR QL STRIP: NEGATIVE
LEUKOCYTE ESTERASE UR QL STRIP: NEGATIVE
PH, POC UA: 6
POC BLOOD, URINE: NEGATIVE
POC NITRATES, URINE: NEGATIVE
PROT UR QL STRIP: NEGATIVE
SP GR UR STRIP: 1 (ref 1–1.03)
UROBILINOGEN UR STRIP-ACNC: 0.1 (ref 0.1–1.1)

## 2019-12-03 PROCEDURE — 3078F PR MOST RECENT DIASTOLIC BLOOD PRESSURE < 80 MM HG: ICD-10-PCS | Mod: CPTII,S$GLB,, | Performed by: OBSTETRICS & GYNECOLOGY

## 2019-12-03 PROCEDURE — 3044F PR MOST RECENT HEMOGLOBIN A1C LEVEL <7.0%: ICD-10-PCS | Mod: CPTII,S$GLB,, | Performed by: OBSTETRICS & GYNECOLOGY

## 2019-12-03 PROCEDURE — 3044F HG A1C LEVEL LT 7.0%: CPT | Mod: CPTII,S$GLB,, | Performed by: OBSTETRICS & GYNECOLOGY

## 2019-12-03 PROCEDURE — 99397 PER PM REEVAL EST PAT 65+ YR: CPT | Mod: S$GLB,,, | Performed by: OBSTETRICS & GYNECOLOGY

## 2019-12-03 PROCEDURE — 99999 PR PBB SHADOW E&M-EST. PATIENT-LVL II: CPT | Mod: PBBFAC,,, | Performed by: OBSTETRICS & GYNECOLOGY

## 2019-12-03 PROCEDURE — 99999 PR PBB SHADOW E&M-EST. PATIENT-LVL III: CPT | Mod: PBBFAC,,, | Performed by: OBSTETRICS & GYNECOLOGY

## 2019-12-03 PROCEDURE — 99397 PR PREVENTIVE VISIT,EST,65 & OVER: ICD-10-PCS | Mod: S$GLB,,, | Performed by: OBSTETRICS & GYNECOLOGY

## 2019-12-03 PROCEDURE — 3078F DIAST BP <80 MM HG: CPT | Mod: CPTII,S$GLB,, | Performed by: OBSTETRICS & GYNECOLOGY

## 2019-12-03 PROCEDURE — 99999 PR PBB SHADOW E&M-EST. PATIENT-LVL III: ICD-10-PCS | Mod: PBBFAC,,, | Performed by: OBSTETRICS & GYNECOLOGY

## 2019-12-03 PROCEDURE — 3074F SYST BP LT 130 MM HG: CPT | Mod: CPTII,S$GLB,, | Performed by: OBSTETRICS & GYNECOLOGY

## 2019-12-03 PROCEDURE — 99215 PR OFFICE/OUTPT VISIT, EST, LEVL V, 40-54 MIN: ICD-10-PCS | Mod: S$GLB,,, | Performed by: OBSTETRICS & GYNECOLOGY

## 2019-12-03 PROCEDURE — 99999 PR PBB SHADOW E&M-EST. PATIENT-LVL II: ICD-10-PCS | Mod: PBBFAC,,, | Performed by: OBSTETRICS & GYNECOLOGY

## 2019-12-03 PROCEDURE — 3074F PR MOST RECENT SYSTOLIC BLOOD PRESSURE < 130 MM HG: ICD-10-PCS | Mod: CPTII,S$GLB,, | Performed by: OBSTETRICS & GYNECOLOGY

## 2019-12-03 PROCEDURE — 99215 OFFICE O/P EST HI 40 MIN: CPT | Mod: S$GLB,,, | Performed by: OBSTETRICS & GYNECOLOGY

## 2019-12-03 NOTE — PROGRESS NOTES
Subjective:       Patient ID: Katey Cerrato is a 64 y.o. female.    Chief Complaint:  Consult; Vaginal Prolapse; Nocturia; and Urinary Urgency      History of Present Illness  HPI 64 Y O F  has a past medical history of Cataract, Empty sella syndrome, GHD (growth hormone deficiency), Glaucoma, Graves' disease with exophthalmos, History of vitamin D deficiency, Hyperlipidemia, Hypertension, Seizures, Thyroid nodule, Thyroid nodule, and Type II or unspecified type diabetes mellitus without mention of complication, uncontrolled.Referred by Dr. Najera for evaluation of urinary urgency and prolapse. She had similar issue in 2006 which was initially managed with a pessary. The pessary fell out immediately and she has managed it on her own with pads.     [facility]     Question 12/3/2019 10:41 AM CST - Filed by Sebastian Arora MA on 12/3/2019   General Urogynecology: Are you experiencing the following?    Dysuria (painful urination) No   Nocturia:  waking up at night to empty your bladder  Yes   If you answered yes to the previous question, how many times does this happen per night? 3-5, bothersome worsening over the years    Enuresis (urine loss during sleep) No   Dribbling urine after you urinate Yes   Hematuria (urine appears red) No   Type of stream Interrupted   Urinary frequency: How often a day are you going to the bathroom per day?  Less than 10   Urinary Tract Infections: How many Urinary Tract Infections have you had in the past year? I have not had a UTI in the past year   If you have had a UTI in the past year, what treatments have you had so far?  I have not had a UTI in the past year   Urinary Incontinence (General): Are you experiencing the following?    Past consultation for incontinence: Have you ever seen someone for the evaluation of incontinence? Yes   If you answered yes to the previous question, please select all the therapies you have tried.  Anticholinergics ( medications to help with  "urinary frequency and urgency)  oxybutynin and detrol somewhat helpful    Other   Please note the effectiveness of the therapies. Ineffective   Need to wear protection to keep clothes dry  Yes   If you answered yes to the previous question, please trell the protection you use.  Pads    Poise    Pad and depends    If you wear protection, how much wetness is typically on each pad? Light   If you wear protection, how often do you have to change per day, if applicable?  2   Stress Symptoms: Are you experiencing the following?    Leakage of urine with cough, laugh and/or sneeze No   If you answered yes to the previous question, what is the frequency in days, weeks and/or months? Never   Leakage of urine with sex No   Leakage of urine with bending/ lifting No   Leakage of urine with briskly walking or jogging No   If you lose urine for any other reason not previously mentioned, please note it below, if applicable.     Urge Symptoms: Are you experiencing the following?    Urgency ("got to go" feeling) Yes   Urge: How frequently do you feel an urge to urinate (feeling like you "gotta go" to the bathroom and can't wait) Several times a day   Do you experience a leakage of urine when you have a feeling of urgency?  Yes   Leakage of urine when unaware Yes   Past use of anticholinergics (medications used to treat overactive bladder) Yes   If you answered yes to the previous question, please trell the anticholinergics you have used:  Oxybutynin   Have you ever used Mirbetriq (aka Mirabegron)?  No   Prolapse Symptoms: Are you experiencing any of the following?     Falling out/ Bulging/ Heaviness in the vagina Yes   Vaginal/ Abdominal Pain/ Pressure Yes   Need to strain/ Push to void Yes   Need to wait on the toilet before you void Yes   Unusual position to urinate (using your hands to push back the vaginal bulge) No   Sensation of incomplete emptying Yes   Past use of pessary device Yes   If you answered yes to the previous " question, please list the devices you have used below.  Pessary, used in the past ring with support     Bowel Symptoms: Are you experiencing any of the following?    Constipation No   Diarrhea  Yes- since metformin    Hematochezia (bloody stool) No   Incomplete evacuation of stool No   Involuntary loss of formed stool No   Fecal smearing/urgency No   Involuntary loss of gas No   Vaginal Symptoms: Are you experiencing any of the following?     Abnormal vaginal bleeding  No   Vaginal dryness No   Sexually active  No   Dyspareunia (painful intercourse) No   Estrogen use  Yes     GYN & OB History  No LMP recorded. Patient has had a hysterectomy.   Date of Last Pap: No result found    OB History    Para Term  AB Living   1 1 1     1   SAB TAB Ectopic Multiple Live Births                  # Outcome Date GA Lbr Jeremy/2nd Weight Sex Delivery Anes PTL Lv   1 Term              OB  Largest: VD ( 7# )  Forceps: no   Episiotomy: yes    GYN  Menarche: [age]  Menstrual cycle: n/a  Menopause: 50's  Hysterectomy: CECE COLLADO () - AUB  Ovaries: one ovary present    Past Medical History:   Diagnosis Date    Cataract     Empty sella syndrome     GHD (growth hormone deficiency)     Glaucoma     Graves' disease with exophthalmos     History of vitamin D deficiency     Hyperlipidemia     Hypertension     Seizures     Thyroid nodule     Thyroid nodule     Type II or unspecified type diabetes mellitus without mention of complication, uncontrolled      Past Surgical History:   Procedure Laterality Date    BREAST BIOPSY      CHOLECYSTECTOMY      HYSTERECTOMY      one ovary intact    KNEE SURGERY      LYMPH NODE BIOPSY  3010    OOPHORECTOMY       Review of patient's allergies indicates:   Allergen Reactions    Codeine Nausea Only       Current Outpatient Medications:     amLODIPine (NORVASC) 10 MG tablet, Take 1 tablet (10 mg total) by mouth once daily., Disp: 90 tablet, Rfl: 3    atorvastatin (LIPITOR) 40 MG  tablet, Take 1 tablet (40 mg total) by mouth once daily., Disp: 90 tablet, Rfl: 3    blood sugar diagnostic Strp, To check BG 1 times daily, to use with insurance preferred meter, Disp: 100 each, Rfl: 3    blood-glucose meter kit, To check BG 1 times daily, to use with insurance preferred meter, Disp: 1 each, Rfl: 0    chlorhexidine (PERIDEX) 0.12 % solution, SWISWH & SPIT 15 ML BY MOUTH 3 TIMES A DAY FOR 7 DAYS, Disp: , Rfl: 0    dulaglutide (TRULICITY) 1.5 mg/0.5 mL PnIj, Inject 1.5 mg into the skin every 7 days., Disp: 6 mL, Rfl: 0    lancets Misc, To check BG 1 times daily, to use with insurance preferred meter, Disp: 100 each, Rfl: 3    latanoprost 0.005 % ophthalmic solution, PLACE 1 DROP INTO BOTH EYES EVERY EVENING., Disp: 2.5 mL, Rfl: 6    levETIRAcetam (KEPPRA) 750 MG Tab, Take 2 tablets (1,500 mg total) by mouth once daily., Disp: 180 tablet, Rfl: 3    losartan (COZAAR) 25 MG tablet, TAKE 1 TABLET (25 MG TOTAL) BY MOUTH ONCE DAILY., Disp: 90 tablet, Rfl: 3    meloxicam (MOBIC) 15 MG tablet, TAKE 1 TABLE BY MOUTH DAILY AS NEEDED FOR PAIN., Disp: 90 tablet, Rfl: 0    metFORMIN (GLUCOPHAGE-XR) 500 MG 24 hr tablet, TAKE 2 TABLETS BY MOUTH TWICE A DAY WITH MEALS, Disp: 360 tablet, Rfl: 0    metoprolol succinate (TOPROL-XL) 50 MG 24 hr tablet, Take 1 tablet (50 mg total) by mouth every evening., Disp: 90 tablet, Rfl: 3    multivitamin-minerals-lutein (CENTRUM SILVER) Tab, Take 1 tablet by mouth once daily once daily., Disp: , Rfl:     ondansetron (ZOFRAN-ODT) 4 MG TbDL, Take 1 tablet (4 mg total) by mouth every 8 (eight) hours as needed (nausea)., Disp: 10 tablet, Rfl: 0    OPW TEST CLAIM - DO NOT FILL, OPW test claim. Do not fill., Disp: 1 each, Rfl: 0    pneumoc 13-carrie conj-dip cr,PF, (PREVNAR 13, PF,) 0.5 mL Syrg, Inject into the muscle., Disp: 0.5 mL, Rfl: 0    Review of Systems  Review of Systems   Constitutional: Negative.  Negative for activity change, appetite change, chills, diaphoresis,  fatigue, fever and unexpected weight change.   HENT: Negative.  Negative for sore throat.    Eyes: Negative.    Respiratory: Negative.  Negative for apnea, cough and wheezing.    Cardiovascular: Negative.  Negative for chest pain and palpitations.   Gastrointestinal: Positive for abdominal pain and diarrhea. Negative for abdominal distention, anal bleeding, blood in stool, constipation, nausea, rectal pain and vomiting.   Endocrine: Negative.    Genitourinary: Positive for frequency and urgency. Negative for decreased urine volume, difficulty urinating, dyspareunia, dysuria, enuresis, flank pain, genital sores, hematuria, menstrual problem, pelvic pain, vaginal bleeding, vaginal discharge and vaginal pain.   Musculoskeletal: Negative for back pain and gait problem.   Skin: Negative for color change, pallor, rash and wound.   Allergic/Immunologic: Negative for immunocompromised state.   Neurological: Negative.  Negative for dizziness, speech difficulty and headaches.   Hematological: Negative for adenopathy.   Psychiatric/Behavioral: Negative for agitation, behavioral problems, confusion and sleep disturbance.           Objective:     Physical Exam   Constitutional: She is oriented to person, place, and time. She appears well-developed.   HENT:   Head: Normocephalic and atraumatic.   Eyes: Conjunctivae and EOM are normal.   Neck: Normal range of motion. Neck supple.   Cardiovascular: Normal rate, regular rhythm, S1 normal, S2 normal, normal heart sounds and intact distal pulses.   Pulmonary/Chest: Effort normal and breath sounds normal. She exhibits no tenderness.   Abdominal: Soft. Bowel sounds are normal. She exhibits no distension and no mass. There is no hepatosplenomegaly, splenomegaly or hepatomegaly. There is no tenderness. There is no rigidity, no rebound, no guarding and no CVA tenderness. Hernia confirmed negative in the right inguinal area and confirmed negative in the left inguinal area.   Genitourinary:  Pelvic exam was performed with patient supine. Rectum normal, vagina normal, skenes normal and bartholins normal. Right labia normal and left labia normal. Urethra exhibits hypermobility. Urethra exhibits no urethral caruncle, no urethral diverticulum and no urethral mass. Right bartholin is not enlarged and not tender. Left bartholin is not enlarged and not tender. Rectal exam shows resting tone normal and active tone normal. Rectal exam shows no external hemorrhoid, no fissure, no tenderness, anal tone normal and no dovetailing. Guaiac negative stool. There is a rectocele, a cystocele and atrophy in the vagina. No foreign body, tenderness, bleeding, unspecified prolapse of vaginal walls, fistula, mesh exposure or lavator tenderness in the vagina. No vaginal discharge found. Right adnexum displays no tenderness. Left adnexum displays no tenderness. Cervix exhibits absence. Uterus is absent.   PVR: 75 ML  Empty cough stress test: Negative.  Kegel: 2/5    POP-Q  Aa: 3 Ba: 4 C: 4   GH: 5 PB: 2 TVL: 9   Ap: 1 Bp: 1 D:                      Musculoskeletal: Normal range of motion.   Lymphadenopathy:     She has no axillary adenopathy.        Right: No inguinal adenopathy present.        Left: No inguinal adenopathy present.   Neurological: She is alert and oriented to person, place, and time. She has normal strength and normal reflexes. Cranial nerves II through XII intact. No cranial nerve deficit.   Skin: Skin is warm, dry and intact.   Psychiatric: She has a normal mood and affect. Her speech is normal and behavior is normal. Judgment normal. Cognition and memory are normal.             HCM  Pap's:- normal, HVP- negative   Mammo: normal 2019  Colonoscopy: 2010- FOBT: negative   Dexa: 8/2019: No evidence of significant bone density loss    Assessment:        1. Vaginal vault prolapse, posthysterectomy    2. Urge incontinence    3. Urinary frequency               Plan:      1.  Urinary incontinence, urge :   --Bladder  diary for 3-7 days, write the number of times you go to the rest room and associated symptoms of urgency or leakage.   --Empty bladder every 3 hours.  Empty well: wait a minute, lean forward on toilet.    --Avoid dietary irritants (see sheet).  Keep diary x 3-5 days to determine your irritants.  --KEGELS: do 10 in AM and 10 in PM, holding each x 10 seconds.  When you feel urge to go, STOP, KEGEL, and when urge has passed, then go to bathroom.  Start  PT in future.    --URGE: Consider Ditropan 10 mg daily or Myrbetriq 50 mg daily.  SE profile reviewed. Takes 2-4 weeks to see if will have effect.  For dry mouth: get sour, sugar free lozenge or gum.      2. Prolapse: Stage 3  apical prolapse, Stage 3 anterior and posterior vaginal wall prolapse   --Pessary, declined   --Daily pelvic floor exercises as assigned  --Non surgical options discussed with patient    --Surgical options discussed such as anterior/ posterior colporrhaphy, SSF, USLS and SCP. Risks/ benefits/ alternatives/ succuss and failure rates were reviewed. She was also given web site information for: www.augs.org and www.Bubblsforpfd.org and http://www.fda.gov/MedicalDevices/UroGynSurgicalMesh/default.htm to review the details of the surgical options discussed and the use of mesh in surgery. She will review the information given and we will discuss further at the follow up visit.     3. Discussed the importance of nutritional management and weight loss     Approximately  45 min were spent in consult, 90 % in discussion.     Thank you for requesting consultation of your patient.  I look forward to participating in her care.    Kylie Barrios DO  Female Pelvic Medicine and Reconstructive Surgery  [facility] [place], [place]

## 2019-12-03 NOTE — PATIENT INSTRUCTIONS
1.  Mixed urinary incontinence, urge > stress:   --Bladder diary for 3-7 days, write the number of times you go to the rest room and associated symptoms of urgency or leakage.   --Empty bladder every 3 hours.  Empty well: wait a minute, lean forward on toilet.    --Avoid dietary irritants (see sheet).  Keep diary x 3-5 days to determine your irritants.  --KEGELS: do 10 in AM and 10 in PM, holding each x 10 seconds.  When you feel urge to go, STOP, KEGEL, and when urge has passed, then go to bathroom.  Start  PT in future.    --URGE: Consider Ditropan 10 mg daily or Myrbetriq 50 mg daily.  SE profile reviewed. Takes 2-4 weeks to see if will have effect.  For dry mouth: get sour, sugar free lozenge or gum.    --STRESS:  Pessary vs. Sling.     2. Prolapse: Stage 3  apical prolapse, Stage 3 anterior and posterior vaginal wall prolapse   --Pessary , declined   --Daily pelvic floor exercises as assigned  --Non surgical options discussed with patient    --Surgical options discussed such as anterior/ posterior colporrhaphy, SSF, USLS and SCP. Risks/ benefits/ alternatives/ succuss and failure rates were reviewed. She was also given web site information for: www.augs.org and www.[employer] [email or web address] and http://www.fda.gov/MedicalDevices/UroGynSurgicalMesh/default.htm to review the details of the surgical options discussed and the use of mesh in surgery. She will review the information given and we will discuss further at the follow up visit.

## 2019-12-03 NOTE — PROGRESS NOTES
HISTORY OF PRESENT ILLNESS:    Katey Cerrato is a 64 y.o. female,   presents today for her routine visit and has no complaints.      Past Medical History:   Diagnosis Date    Cataract     Empty sella syndrome     GHD (growth hormone deficiency)     Glaucoma     Graves' disease with exophthalmos     History of vitamin D deficiency     Hyperlipidemia     Hypertension     Seizures     Thyroid nodule     Thyroid nodule     Type II or unspecified type diabetes mellitus without mention of complication, uncontrolled        Past Surgical History:   Procedure Laterality Date    BREAST BIOPSY      CHOLECYSTECTOMY      HYSTERECTOMY      one ovary intact    KNEE SURGERY      LYMPH NODE BIOPSY  3010    OOPHORECTOMY         MEDICATIONS AND ALLERGIES:      Current Outpatient Medications:     amLODIPine (NORVASC) 10 MG tablet, Take 1 tablet (10 mg total) by mouth once daily., Disp: 90 tablet, Rfl: 3    atorvastatin (LIPITOR) 40 MG tablet, Take 1 tablet (40 mg total) by mouth once daily., Disp: 90 tablet, Rfl: 3    blood sugar diagnostic Strp, To check BG 1 times daily, to use with insurance preferred meter, Disp: 100 each, Rfl: 3    blood-glucose meter kit, To check BG 1 times daily, to use with insurance preferred meter, Disp: 1 each, Rfl: 0    chlorhexidine (PERIDEX) 0.12 % solution, SWISWH & SPIT 15 ML BY MOUTH 3 TIMES A DAY FOR 7 DAYS, Disp: , Rfl: 0    dulaglutide (TRULICITY) 1.5 mg/0.5 mL PnIj, Inject 1.5 mg into the skin every 7 days., Disp: 6 mL, Rfl: 0    lancets Misc, To check BG 1 times daily, to use with insurance preferred meter, Disp: 100 each, Rfl: 3    latanoprost 0.005 % ophthalmic solution, PLACE 1 DROP INTO BOTH EYES EVERY EVENING., Disp: 2.5 mL, Rfl: 6    levETIRAcetam (KEPPRA) 750 MG Tab, Take 2 tablets (1,500 mg total) by mouth once daily., Disp: 180 tablet, Rfl: 3    losartan (COZAAR) 25 MG tablet, TAKE 1 TABLET (25 MG TOTAL) BY MOUTH ONCE DAILY., Disp: 90 tablet, Rfl:  3    meloxicam (MOBIC) 15 MG tablet, TAKE 1 TABLE BY MOUTH DAILY AS NEEDED FOR PAIN., Disp: 90 tablet, Rfl: 0    metFORMIN (GLUCOPHAGE-XR) 500 MG 24 hr tablet, TAKE 2 TABLETS BY MOUTH TWICE A DAY WITH MEALS, Disp: 360 tablet, Rfl: 0    metoprolol succinate (TOPROL-XL) 50 MG 24 hr tablet, Take 1 tablet (50 mg total) by mouth every evening., Disp: 90 tablet, Rfl: 3    multivitamin-minerals-lutein (CENTRUM SILVER) Tab, Take 1 tablet by mouth once daily once daily., Disp: , Rfl:     ondansetron (ZOFRAN-ODT) 4 MG TbDL, Take 1 tablet (4 mg total) by mouth every 8 (eight) hours as needed (nausea)., Disp: 10 tablet, Rfl: 0    OPW TEST CLAIM - DO NOT FILL, OPW test claim. Do not fill., Disp: 1 each, Rfl: 0    pneumoc 13-carrie conj-dip cr,PF, (PREVNAR 13, PF,) 0.5 mL Syrg, Inject into the muscle., Disp: 0.5 mL, Rfl: 0    Review of patient's allergies indicates:   Allergen Reactions    Codeine Nausea Only       Family History   Problem Relation Age of Onset    Cancer Mother     Cataracts Mother     Glaucoma Mother         shunt    Heart disease Mother     Tremor Mother     Breast cancer Mother 78    Heart disease Sister     Heart disease Father     Hypertension Brother     Breast cancer Sister 60       Social History     Socioeconomic History    Marital status:      Spouse name: Not on file    Number of children: Not on file    Years of education: Not on file    Highest education level: Not on file   Occupational History     Employer: [employer]   Social Needs    Financial resource strain: Not on file    Food insecurity:     Worry: Not on file     Inability: Not on file    Transportation needs:     Medical: Not on file     Non-medical: Not on file   Tobacco Use    Smoking status: Never Smoker    Smokeless tobacco: Never Used   Substance and Sexual Activity    Alcohol use: No     Comment: none    Drug use: No    Sexual activity: Yes     Partners: Male     Comment: m  works  "in dietary,  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   Lifestyle    Physical activity:     Days per week: Not on file     Minutes per session: Not on file    Stress: Not on file   Relationships    Social connections:     Talks on phone: Not on file     Gets together: Not on file     Attends Restorationism service: Not on file     Active member of club or organization: Not on file     Attends meetings of clubs or organizations: Not on file     Relationship status: Not on file   Other Topics Concern    Not on file   Social History Narrative    Not on file     COMPREHENSIVE GYN HISTORY:  PAP History: Denies abnormal Paps.  Infection History: Denies STDs. Denies PID.  Benign History: Denies uterine fibroids. Denies ovarian cysts. Denies endometriosis. Denies other conditions.  Cancer History: Denies cervical cancer. Denies uterine cancer or hyperplasia. Denies ovarian cancer. Denies vulvar cancer or pre-cancer. Denies vaginal cancer or pre-cancer. Denies breast cancer. Denies colon cancer.  Sexual Activity History: Reports currently being sexually active  Menstrual History: Denies menses. Pt is  not on ERT.     ROS:  GENERAL: No weight changes. No swelling. No fatigue. No fever.  CARDIOVASCULAR: No chest pain. No shortness of breath. No leg cramps.   NEUROLOGICAL: No headaches. No vision changes.  BREASTS: No pain. No lumps. No discharge.  ABDOMEN: No pain. No nausea. No vomiting. No diarrhea. No constipation.  REPRODUCTIVE: No abnormal bleeding.  VULVA: No pain. No lesions. No itching.  VAGINA: No relaxation. No itching. No odor. No discharge. No lesions.  URINARY: No incontinence. No nocturia. No frequency. No dysuria.    /70   Ht 4' 11" (1.499 m)   Wt 87 kg (191 lb 12.8 oz)   BMI 38.74 kg/m²     PE:  APPEARANCE: Well nourished, well developed, in no acute distress.  AFFECT: WNL, alert and oriented x 3.  SKIN: No acne or hirsutism.  NECK: Neck symmetric without masses or thyromegaly.  NODES: No inguinal, cervical, " axillary or femoral lymph node enlargement.  CHEST: Good respiratory effort.   ABDOMEN: Soft. No tenderness or masses. No hepatosplenomegaly. No hernias.  BREASTS: Symmetrical, no skin changes or visible lesions. No palpable masses, nipple discharge bilaterally.  PELVIC: ATROPHIC EXTERNAL FEMALE GENITALIA without lesions. Normal hair distribution. Adequate perineal body, normal urethral meatus. VAGINA DRY without lesions or discharge. No significant cystocele or rectocele. Bimanual exam shows CERVIX and UTERUS to be SURGICALLY ABSENT. Adnexa without masses or tenderness.  EXTREMITIES: No edema.    DIAGNOSIS:  1. Encounter for gynecological examination without abnormal finding    2. Visit for screening mammogram    3. Generalized convulsive epilepsy with intractable epilepsy    4. Essential hypertension    5. Mixed hyperlipidemia    6. Diabetes mellitus type 2 in obese    7. Obesity (BMI 30-39.9)    8. Uncontrolled type 2 diabetes mellitus without complication, without long-term current use of insulin           COUNSELING:  The patient was counseled today on  -osteoporosis prevention, calcium supplementation, regular weight bearing exercise.  -ACS PAP guidelines (no paps), with recommendations for yearly pelvic exams as her uterus and cervix were removed for benign reasons and ovaries remain;  -recommendation for yearly mammogram;  -to see her primary care physician for all other health maintenance.    FOLLOW-UP with me for next routine visit.   Seeing Urology today for incontinence

## 2019-12-03 NOTE — LETTER
December 3, 2019      Bonita Najera MD  [address]  [address]  [place] [address]           [facility] [place] [facility]   [address]  [facility] [address]  Phone: [phone]          Patient: Katey Cerrato   MR Number: [id number]   YOB: 1955   Date of Visit: 12/3/2019       Dear Dr. Bonita Najera:    Thank you for referring Katey Cerrato to me for evaluation. Attached you will find relevant portions of my assessment and plan of care.    If you have questions, please do not hesitate to call me. I look forward to following Katey Cerrato along with you.    Sincerely,    DO Neha Clay  CC:  No Recipients    If you would like to receive this communication electronically, please contact [email or web address] or [phone] to request more information on [employer] access.    For providers and/or their staff who would like to refer a patient to [facility], please contact us through our one-stop-shop provider referral line, [facility] , at [phone].    If you feel you have received this communication in error or would no longer like to receive these types of communications, please e-mail [email or web address]

## 2019-12-05 DIAGNOSIS — I10 ESSENTIAL HYPERTENSION: ICD-10-CM

## 2019-12-05 RX ORDER — METOPROLOL SUCCINATE 50 MG/1
50 TABLET, EXTENDED RELEASE ORAL NIGHTLY
Qty: 90 TABLET | Refills: 2 | Status: SHIPPED | OUTPATIENT
Start: 2019-12-05 | End: 2021-11-04 | Stop reason: SDUPTHER

## 2019-12-17 DIAGNOSIS — H40.023 OPEN ANGLE WITH BORDERLINE FINDINGS AND HIGH GLAUCOMA RISK IN BOTH EYES: ICD-10-CM

## 2019-12-17 RX ORDER — LATANOPROST 50 UG/ML
SOLUTION/ DROPS OPHTHALMIC
Qty: 10 ML | Refills: 1 | Status: SHIPPED | OUTPATIENT
Start: 2019-12-17 | End: 2020-12-31 | Stop reason: SDUPTHER

## 2019-12-20 ENCOUNTER — HOSPITAL ENCOUNTER (OUTPATIENT)
Dept: RADIOLOGY | Facility: OTHER | Age: 64
Discharge: HOME | End: 2019-12-20
Attending: OBSTETRICS & GYNECOLOGY
Payer: COMMERCIAL

## 2019-12-20 DIAGNOSIS — Z12.31 VISIT FOR SCREENING MAMMOGRAM: ICD-10-CM

## 2019-12-20 PROCEDURE — 77067 SCR MAMMO BI INCL CAD: CPT | Mod: 26,,, | Performed by: INTERNAL MEDICINE

## 2019-12-20 PROCEDURE — 77063 MAMMO DIGITAL SCREENING BILAT WITH TOMOSYNTHESIS_CAD: ICD-10-PCS | Mod: 26,,, | Performed by: INTERNAL MEDICINE

## 2019-12-20 PROCEDURE — 77067 SCR MAMMO BI INCL CAD: CPT | Mod: TC

## 2019-12-20 PROCEDURE — 77067 MAMMO DIGITAL SCREENING BILAT WITH TOMOSYNTHESIS_CAD: ICD-10-PCS | Mod: 26,,, | Performed by: INTERNAL MEDICINE

## 2019-12-20 PROCEDURE — 77063 BREAST TOMOSYNTHESIS BI: CPT | Mod: 26,,, | Performed by: INTERNAL MEDICINE

## 2019-12-23 ENCOUNTER — TELEPHONE (OUTPATIENT)
Dept: ORTHOPEDICS | Facility: CLINIC | Age: 64
End: 2019-12-23

## 2019-12-23 ENCOUNTER — CLINICAL SUPPORT (OUTPATIENT)
Dept: REHABILITATION | Facility: OTHER | Age: 64
End: 2019-12-23
Attending: OBSTETRICS & GYNECOLOGY
Payer: COMMERCIAL

## 2019-12-23 DIAGNOSIS — N81.84 DISUSE ATROPHY OF PELVIC MUSCLES AND ANAL SPHINCTER: ICD-10-CM

## 2019-12-23 DIAGNOSIS — R27.9 LACK OF COORDINATION: ICD-10-CM

## 2019-12-23 PROCEDURE — 97162 PT EVAL MOD COMPLEX 30 MIN: CPT

## 2019-12-23 PROCEDURE — 97530 THERAPEUTIC ACTIVITIES: CPT

## 2019-12-23 PROCEDURE — 97112 NEUROMUSCULAR REEDUCATION: CPT

## 2019-12-23 NOTE — PATIENT INSTRUCTIONS
([email or web address])    Initially it may be useful to practice squeezing at different layers of your pelvic floor muscles to help you find them, as each layer plays an important role in pelvic floor function. Eventually you want to be doing your kegel contractions to include squeezing at all 3 layers, and this should become one smooth movement.    To isolate layer 1: think about closing your openings (around vagina and anus) and nodding the clitoris    To isolate layer 2: imagine a drawstring in your urethra that you are pulling up inside or that you are telescoping the urethra in on itself.     To isolate layer 3: pull your tailbone up and forward towards your pubic bone    When putting it all together, it can be helpful to think about closing your openings and lifting up and in.    Focus on right side, front.    Quick Flicks    Contract pelvic floor muscles as described above. Try not to use abdominal or buttocks muscles, and do not hold your breath. Relax completely after each contraction.     Repeat 10 times. Perform 3 sets/day. Laying or sitting.    Long Holds    Contract pelvic floor muscles with about 50% of your maximal effort. Hold each squeeze for 10 seconds.     Repeat 10 times. Perform 2 sets/day.

## 2019-12-23 NOTE — TELEPHONE ENCOUNTER
Appointment made on 1/8 with Dr Salazar at the [facility] location. Patient is aware of date and time.

## 2019-12-23 NOTE — TELEPHONE ENCOUNTER
----- Message from Arabella Kyle sent at 12/23/2019 11:30 AM CST -----  Contact: Pt   Name of Who is Calling: MORAIMA LUCAS [[id number]]    What is the request in detail: Pt is requesting a call back regards to being seen for carpal tunnel ........................................................................Please contact to further discuss and advise      Can the clinic reply by [facility]: No      [facility]  [phone] (home)

## 2019-12-26 NOTE — PLAN OF CARE
OUTPATIENT PHYSICAL THERAPY EVALUATION        Name: Katey LEVY [facility]  Clinic Number: [id number]    Therapy Diagnosis: No diagnosis found.  Physician: Kylie Barrios,*    Physician Orders: PT Eval and Treat   Medical Diagnosis: vaginal vault prolapse, posthysterectomy  Evaluation Date: 12/23/2019  Authorization: 10  Plan of Care Certification Period: 12/23/2019 to 3/21/20    Today's Date: 12/23/2019  Visit #: 1/10  Time In: 1500  Time Out: 1600  Total Billable Time: 60 minutes    Precautions: universal      HISTORY      Katey is a 64 y.o. female evaluated on 12/23/2019    Physician:  Kylie Barrios,*   Diagnosis: No diagnosis found.   Treatment ordered: Physical Therapy  Medical History:   Past Medical History:   Diagnosis Date    Cataract     Empty sella syndrome     GHD (growth hormone deficiency)     Glaucoma     Graves' disease with exophthalmos     History of vitamin D deficiency     Hyperlipidemia     Hypertension     Seizures     Thyroid nodule     Thyroid nodule     Type II or unspecified type diabetes mellitus without mention of complication, uncontrolled       Surgical History:   Past Surgical History:   Procedure Laterality Date    BREAST BIOPSY      CHOLECYSTECTOMY      HYSTERECTOMY      one ovary intact    KNEE SURGERY      LYMPH NODE BIOPSY  3010    OOPHORECTOMY        Medications:   Current Outpatient Medications   Medication Sig    amLODIPine (NORVASC) 10 MG tablet Take 1 tablet (10 mg total) by mouth once daily.    atorvastatin (LIPITOR) 40 MG tablet Take 1 tablet (40 mg total) by mouth once daily.    blood sugar diagnostic Strp To check BG 1 times daily, to use with insurance preferred meter    blood-glucose meter kit To check BG 1 times daily, to use with insurance preferred meter    chlorhexidine (PERIDEX) 0.12 % solution SWISWH & SPIT 15 ML BY MOUTH 3 TIMES A DAY FOR 7 DAYS    dulaglutide (TRULICITY) 1.5 mg/0.5 mL PnIj Inject 1.5 mg into the skin every 7  days.    lancets Misc To check BG 1 times daily, to use with insurance preferred meter    latanoprost 0.005 % ophthalmic solution PLACE 1 DROP INTO BOTH EYES EVERY EVENING.    levETIRAcetam (KEPPRA) 750 MG Tab Take 2 tablets (1,500 mg total) by mouth once daily.    losartan (COZAAR) 25 MG tablet TAKE 1 TABLET (25 MG TOTAL) BY MOUTH ONCE DAILY.    meloxicam (MOBIC) 15 MG tablet TAKE 1 TABLE BY MOUTH DAILY AS NEEDED FOR PAIN.    metFORMIN (GLUCOPHAGE-XR) 500 MG 24 hr tablet TAKE 2 TABLETS BY MOUTH TWICE A DAY WITH MEALS    metoprolol succinate (TOPROL-XL) 50 MG 24 hr tablet TAKE 1 TABLET (50 MG TOTAL) BY MOUTH EVERY EVENING.    multivitamin-minerals-lutein (CENTRUM SILVER) Tab Take 1 tablet by mouth once daily once daily.    ondansetron (ZOFRAN-ODT) 4 MG TbDL Take 1 tablet (4 mg total) by mouth every 8 (eight) hours as needed (nausea).    OPW TEST CLAIM - DO NOT FILL OPW test claim. Do not fill.    pneumoc 13-carrie conj-dip cr,PF, (PREVNAR 13, PF,) 0.5 mL Syrg Inject into the muscle.     No current facility-administered medications for this visit.        Allergies:   Review of patient's allergies indicates:   Allergen Reactions    Codeine Nausea Only      OB/GYN History:   childbirth vaginal delivery      Surgical History: hysterectomy  due to heavy bleeding and fibroid tumors, one ovary removed      SUBJECTIVE     Date of onset:   History of current complaint: Started with urinary urgency and frequency in . Doctor explained that bladder had fallen after hysterectomy and that she was not emptying all the way. Symptoms are good in the daytime with less urgency - though has increased urgency and can have UUI right at toilet. Nighttime frequency high - every 2 hours. Wears pull up at night, just in case. Most of the times she makes it but other times leaks a little bit on the way. Does not drink enough water. Drinks soda and green tea. Used to take Premarin but stopped b/c she is worried about  SEs. Also has vaginal pressure and can see bladder in introital opening. Seems worse now than 2006.    Activities that cause symptoms: nighttime    Previous treatment included none    Reproductive Symptoms  Sexually active: No    Bladder/Bowel History: trouble emptying bladder completely and urinary incontinence  Frequency of urination:   Daytime: >2 hours           Nighttime: every 2 hours  Difficulty initiating urine stream: Yes - pushes to empty  Urine stream: weak, splayed and interrupted  Complete emptying: No  Bladder leakage: Yes  Frequency of incidents: daily  Amount leaked (urine): small squirt   Urinary Urgency: Yes  - mostly at night, Able to delay the urge for at least seconds    Frequency of bowel movements: once a day - 2-3x/day  Difficulty initiating BM: No  Colon leakage: No  Bowel Urgency: Yes - food dependent    Form of protection: pad during day, pull up at night  Number of pads required in 24 hours: 1/1    Pain:  Location: back pain  Aggravating Factors: Prolonged standing    Diet / Behavior  Types of fluid intake: water - 2-3 cups, soda - 1 day, juice - lemonade once/ week, and tea - 1 cup every other day  Diet:high fiber, veg - variable  Current exercise:no (has TM at home)    Social History  Previous treatment included medical management. No PT this calendar year.      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    Home Environment: Pt lives with their spouse and 17 y/o.     PLOF: Pt was independent with all ADLs without reports of incontinence or pain.    Pts goals: to sleep better, to avoid surgery      OBJECTIVE     ORTHO SCREEN  Posture: WNL  Pelvic alignment: no sign of deviations noted in supine  Pubic symphysis: WNL    Palpation:   Observation: increased abdominal adipose tissue, difficulty with SLS     Lumbar Range of Motion:    Degrees Pain   Flexion Near full   no         Extension limited   no             Lower Extremity Strength  Right LE  Left LE    Hip abduction: 3-/5 Hip abduction: 3-/5       ABDOMINALS  Scarring: none  Diastasis: none width at the umbilicus, none width 2 inches above umbilicus, none width 2 inches below umbilicus   Palpation:  Abdominal strength: Rectus: 2-/5     TA: 2-/5  Chest breath pattern, oblique dominance, dysfunctional activation patterns abdominals with increased caudally directed pressures    VAGINAL PELVIC FLOOR EXAM    EXTERNAL ASSESSMENT  Introitus: WNL  Skin condition: WNL  Scarring: none   Sensation: WNL   Pain:  none  Pelvic Clock Assessment: normal  Voluntary contraction: visible lift  Quality of contraction: WNL   Specificity: WNL   Voluntary relaxation: visible drop  Involuntary contraction: bulge  Bearing down: bulge  Perineal descent: absent  Comments: none    INTERNAL ASSESSMENT  Pain: none   Sensation: able to localize pressure appropriately  Vaginal vault: roomy   Muscle Bulk: atrophy   Muscle Power: 3/5  Muscle Endurance: 10 sec  # Reps To Fatigue: 1    Fast Contractions: 5     Quality of contraction: decreased hold   Specificity: WNL   Coordination: tends to hold breath during PFM contration   Prolapse check:Grade 3 cystocele  Comments: blanching occurs at anterior wall skin but no signs of breakdown    TREATMENT    Treatment Time In: 1530  Treatment Time Out: 1600  Total Treatment time separate from Evaluation: 30 minutes    Therapeutic Exercise to develop  strength, endurance, posture and core stabilization for 00 minutes including: n/a    Neuromuscular Re-education to develop Coordination, Control and Proprioception for 15 minutes including: pelvic floor mm contractions focus on sequentially activated layers, full relaxation, QFs and LHs    Manual Therapy to develop flexibility and extensibility for 00 minutes including: n/a    Therapeutic Activity Patient participated in dynamic functional therapeutic activities to improve  functional performance for 15 minutes. Including: prolapse management with support from below and decreasing pressures coming down from above. Education as described below.     Education provided:   - Instructed on general anatomy/physiology  - Role of therapy in multi-disciplinary team  - Instructed in purpose of physical therapy and the benefits/risks of treatment  - Instructed in alternative methods of treatment  - Risks of refusing treatment  - POC and goals for therapy  - Instructed on general anatomy/physiology of urinary/bowel system     Also educated in: anatomy/physiology of pelvic floor and vulvar irritants    Patient/guardian agreed to treatment plan.     No spiritual or educational barriers to learning provided    Written Home Exercises Provided:   Pt was provided with a written copy of exercises to perform at home. Katey LEVY demonstrated good  understanding of the education provided.     See EMR under Patient Instructions for exercises provided 12/23/2019.    ASSESSMENT      This is a 64 y.o. female referred to outpatient physical therapy and presents with a medical diagnosis of vaginal vault prolpase. The patient reports chronic and worsening difficulty emptying bladder with urinary urgency, frequency, UUI and nocturia that is affecting ADLs and social participation. Examination reveals pelvic floor dysfunction including weakness, decreased endurance and coordination deficits along with dysfunctional activation pattern of abdominals and poor management of intraabdominal pressures. Pt is also noted to have abdominal wall and lower extremity weakness. The patient is expected to benefit from skilled intervention to work towards elimination of urinary symptoms needed to improve quality of life and ADLs participation and return towards prior level of function.        Educational/Spiritual/Cultural needs: none  Pt's spiritual, cultural and educational needs considered and pt agreeable to plan of care and  "goals as stated below:     Abuse/Neglect: no signs  Nutritional Status: WDWN   Fall Risk: No    Anticipated Barriers for therapy: none    Medical Necessity is demonstrated by the following  History  Co-morbidities and personal factors that may impact the plan of care Co-morbidities:   high BMI and prior pelvic surgery    Personal Factors:   no deficits     low   Examination  Body Structures and Functions, activity limitations and participation restrictions that may impact the plan of care Body Regions/Systems/Functions:  poor knowledge of body mechanics and posture, decreased pelvic muscle strength, increased frequency of urination, increased nocturia, poor fluid intake and dysfunctional voiding     Activity Limitations:  delaying urge to urinate, full bladder emptying and Participating in social activities and ADLs due to pelvic pressure    Participation Restrictions:  all ADLs/iADLs uninterrupted by urinary incontinence/urgency/frequency         moderate   Clinical Presentation stable and uncomplicated low   Decision Making/ Complexity Score: moderate     Short Term Goals: 4 weeks   Pt to be edu pelvic muscle bracing and be able to consistently perform correctly and quickly to help decrease incontinence with cough/laugh/sneeze.  Pt to perform "the knack" prior to coughing, laughing or sneezing to decrease risk of incontinence.  Pt to be able to perform a 5 second kegel x 10 reps to demonstrate improving strength and endurance needed for continence.  Pt to demonstrate being able to correctly and consistently perform a kegel which is needed  to increase pelvic floor muscle coordination and strength needed for continence.  Pt to report a decrease in pelvic pressure and fullness to no more than 50% of the time to demonstrate improving pelvic support of pelvic structures.  Pt to voice understanding of the role that diet plays on urinary urgency.      Long Term Goals: 8 weeks   Pt to be discharged with home plan for " carry over after discharge.    Pt to be able to perform a 10 second kegel x 10 reps to demonstrate improving strength and endurance needed for continence.  Pt to report a decrease in pad usage to 0 pads a day to demonstrate improving pelvic floor muscle controls as evidenced by decreased episodes of incontinence needed to improve confidence in social situations.  Pt to report elimination of incontinence with ADLs to demonstrate improved pelvic floor muscle strength and coordination.  Pt to report a decrease in pelvic pressure and fullness to no more than 10% of the time to demonstrate improving pelvic support of pelvic structures.  Pt to report a decrease in nocturia to no more than 1x/night for improved restorative sleep.      PLAN     Certification Period: 12/23/2019 to 3/21/20    Outpatient Physical Therapy 1 time(s) every 1 week(s) for 8 weeks.     Physical therapy will include: therapeutic exercises, therapeutic activity, neuromuscular re-education, manual therapy, patient/family education and self care/home management to achieve established goals.     At next visit: review PFME, diaries, TrA, breathing        Therapist: Tamy Hemphill, PT  12/23/2019

## 2019-12-28 DIAGNOSIS — E11.69 DIABETES MELLITUS TYPE 2 IN OBESE: ICD-10-CM

## 2019-12-28 DIAGNOSIS — E66.9 DIABETES MELLITUS TYPE 2 IN OBESE: ICD-10-CM

## 2019-12-30 RX ORDER — DULAGLUTIDE 1.5 MG/.5ML
INJECTION, SOLUTION SUBCUTANEOUS
Qty: 6 SYRINGE | Refills: 0 | Status: SHIPPED | OUTPATIENT
Start: 2019-12-30 | End: 2020-03-23

## 2020-01-06 RX ORDER — MELOXICAM 15 MG/1
TABLET ORAL
Qty: 90 TABLET | Refills: 0 | Status: SHIPPED | OUTPATIENT
Start: 2020-01-06 | End: 2020-04-09

## 2020-01-08 ENCOUNTER — OFFICE VISIT (OUTPATIENT)
Dept: ORTHOPEDICS | Facility: CLINIC | Age: 65
End: 2020-01-08
Attending: ORTHOPAEDIC SURGERY
Payer: COMMERCIAL

## 2020-01-08 DIAGNOSIS — G56.01 CARPAL TUNNEL SYNDROME OF RIGHT WRIST: ICD-10-CM

## 2020-01-08 PROCEDURE — 99203 OFFICE O/P NEW LOW 30 MIN: CPT | Mod: 25,S$GLB,, | Performed by: ORTHOPAEDIC SURGERY

## 2020-01-08 PROCEDURE — 99203 PR OFFICE/OUTPT VISIT, NEW, LEVL III, 30-44 MIN: ICD-10-PCS | Mod: 25,S$GLB,, | Performed by: ORTHOPAEDIC SURGERY

## 2020-01-08 PROCEDURE — 20526 THER INJECTION CARP TUNNEL: CPT | Mod: RT,S$GLB,, | Performed by: ORTHOPAEDIC SURGERY

## 2020-01-08 PROCEDURE — 99999 PR PBB SHADOW E&M-EST. PATIENT-LVL III: ICD-10-PCS | Mod: PBBFAC,,, | Performed by: ORTHOPAEDIC SURGERY

## 2020-01-08 PROCEDURE — 1101F PR PT FALLS ASSESS DOC 0-1 FALLS W/OUT INJ PAST YR: ICD-10-PCS | Mod: CPTII,S$GLB,, | Performed by: ORTHOPAEDIC SURGERY

## 2020-01-08 PROCEDURE — 20526 PR INJECT CARPAL TUNNEL: ICD-10-PCS | Mod: RT,S$GLB,, | Performed by: ORTHOPAEDIC SURGERY

## 2020-01-08 PROCEDURE — 99999 PR PBB SHADOW E&M-EST. PATIENT-LVL III: CPT | Mod: PBBFAC,,, | Performed by: ORTHOPAEDIC SURGERY

## 2020-01-08 PROCEDURE — 1101F PT FALLS ASSESS-DOCD LE1/YR: CPT | Mod: CPTII,S$GLB,, | Performed by: ORTHOPAEDIC SURGERY

## 2020-01-08 RX ORDER — TRIAMCINOLONE ACETONIDE 40 MG/ML
20 INJECTION, SUSPENSION INTRA-ARTICULAR; INTRAMUSCULAR
Status: COMPLETED | OUTPATIENT
Start: 2020-01-08 | End: 2020-01-08

## 2020-01-08 RX ADMIN — TRIAMCINOLONE ACETONIDE 20 MG: 40 INJECTION, SUSPENSION INTRA-ARTICULAR; INTRAMUSCULAR at 01:01

## 2020-01-08 NOTE — PROGRESS NOTES
Subjective:      Patient ID: Katey Cerrato is a 64 y.o. female.    Chief Complaint: Pain of the Right Hand      HPI  Katey Cerrato is a  64 y.o. female presenting today for an hand pain and numbness.  There was not a history of trauma.  Onset of symptoms began several months ago  She has tried a wrist brace with only slight improvement.      Review of patient's allergies indicates:   Allergen Reactions    Codeine Nausea Only         Current Outpatient Medications   Medication Sig Dispense Refill    amLODIPine (NORVASC) 10 MG tablet Take 1 tablet (10 mg total) by mouth once daily. 90 tablet 3    atorvastatin (LIPITOR) 40 MG tablet Take 1 tablet (40 mg total) by mouth once daily. 90 tablet 3    blood sugar diagnostic Strp To check BG 1 times daily, to use with insurance preferred meter 100 each 3    blood-glucose meter kit To check BG 1 times daily, to use with insurance preferred meter 1 each 0    chlorhexidine (PERIDEX) 0.12 % solution SWISWH & SPIT 15 ML BY MOUTH 3 TIMES A DAY FOR 7 DAYS  0    lancets Misc To check BG 1 times daily, to use with insurance preferred meter 100 each 3    latanoprost 0.005 % ophthalmic solution PLACE 1 DROP INTO BOTH EYES EVERY EVENING. 10 mL 1    levETIRAcetam (KEPPRA) 750 MG Tab Take 2 tablets (1,500 mg total) by mouth once daily. 180 tablet 3    losartan (COZAAR) 25 MG tablet TAKE 1 TABLET (25 MG TOTAL) BY MOUTH ONCE DAILY. 90 tablet 3    meloxicam (MOBIC) 15 MG tablet TAKE 1 TABLE BY MOUTH DAILY AS NEEDED FOR PAIN. 90 tablet 0    metFORMIN (GLUCOPHAGE-XR) 500 MG 24 hr tablet TAKE 2 TABLETS BY MOUTH TWICE A DAY WITH MEALS 360 tablet 0    metoprolol succinate (TOPROL-XL) 50 MG 24 hr tablet TAKE 1 TABLET (50 MG TOTAL) BY MOUTH EVERY EVENING. 90 tablet 2    multivitamin-minerals-lutein (CENTRUM SILVER) Tab Take 1 tablet by mouth once daily once daily.      ondansetron (ZOFRAN-ODT) 4 MG TbDL Take 1 tablet (4 mg total) by mouth every 8 (eight) hours as needed (nausea).  10 tablet 0    OPW TEST CLAIM - DO NOT FILL OPW test claim. Do not fill. 1 each 0    pneumoc 13-carrie conj-dip cr,PF, (PREVNAR 13, PF,) 0.5 mL Syrg Inject into the muscle. 0.5 mL 0    TRULICITY 1.5 mg/0.5 mL PnIj INJECT 1.5 MG INTO THE SKIN EVERY 7 DAYS. 6 Syringe 0     No current facility-administered medications for this visit.        Past Medical History:   Diagnosis Date    Cataract     Empty sella syndrome     GHD (growth hormone deficiency)     Glaucoma     Graves' disease with exophthalmos     History of vitamin D deficiency     Hyperlipidemia     Hypertension     Seizures     Thyroid nodule     Thyroid nodule     Type II or unspecified type diabetes mellitus without mention of complication, uncontrolled        Past Surgical History:   Procedure Laterality Date    BREAST BIOPSY      CHOLECYSTECTOMY      HYSTERECTOMY      one ovary intact    KNEE SURGERY      LYMPH NODE BIOPSY  3010    OOPHORECTOMY         Review of Systems:  ROS    OBJECTIVE:     PHYSICAL EXAM:       Vitals:    01/08/20 1312   PainSc:   5     Well developed, well nourished female in no acute distress  Alert and oriented x 3  HEENT- Normal exam  Lungs- Clear to auscultation  Heart- Regular rate and rhythm  Abdomen- Soft nontender  Extremity exam- examination right hand mild swelling positive Tinel sign positive Phalen's test  Range of motion wrist fingers full  Sensation intact  No atrophy noted      RADIOGRAPHS:  None  Comments: I have personally reviewed the imaging and I agree with the above radiologist's report.    ASSESSMENT/PLAN:     IMPRESSION:  Right carpal tunnel syndrome    PLAN:  I explained the nature of the problem discussed options including injection versus surgery  Patient would like try injection today  After pause for time-out identified the right wrist injected right carpal tunnel with combination Kenalog 20 mg 0.5 cc xylocaine sterile technique  Tolerated the procedure well without complication  Continue  wrist splint at night  Follow-up 3-4 weeks for recheck  If symptoms persist we may consider surgical treatment       - We talked at length about the anatomy and pathophysiology of No diagnosis found.        Disclaimer: This note has been generated using voice-recognition software. There may be typographical errors that have been missed during proof-reading.

## 2020-01-08 NOTE — LETTER
January 8, 2020      Baltazar England MD  [address]  [place] [address]           [facility] [place] [facility] [address]  [address], [address]  [facility] [address]  Phone: [phone]          Patient: Katey Cerrato   MR Number: [id number]   YOB: 1955   Date of Visit: 1/8/2020       Dear Dr. Baltazar England:    Thank you for referring Katey Cerrato to me for evaluation. Attached you will find relevant portions of my assessment and plan of care.    If you have questions, please do not hesitate to call me. I look forward to following Katey Cerrato along with you.    Sincerely,    Mamadou Salazar Jr., MD    Enclosure  CC:  No Recipients    If you would like to receive this communication electronically, please contact [email or web address] or [phone] to request more information on [employer] access.    For providers and/or their staff who would like to refer a patient to [facility], please contact us through our one-stop-shop provider referral line, [facility], at [phone].    If you feel you have received this communication in error or would no longer like to receive these types of communications, please e-mail [email or web address]

## 2020-02-11 ENCOUNTER — TELEPHONE (OUTPATIENT)
Dept: INTERNAL MEDICINE | Facility: CLINIC | Age: 65
End: 2020-02-11

## 2020-02-14 RX ORDER — METFORMIN HYDROCHLORIDE 500 MG/1
TABLET, EXTENDED RELEASE ORAL
Qty: 360 TABLET | Refills: 0 | Status: SHIPPED | OUTPATIENT
Start: 2020-02-14 | End: 2020-03-05 | Stop reason: SDUPTHER

## 2020-02-19 ENCOUNTER — OFFICE VISIT (OUTPATIENT)
Dept: ORTHOPEDICS | Facility: CLINIC | Age: 65
End: 2020-02-19
Attending: ORTHOPAEDIC SURGERY
Payer: COMMERCIAL

## 2020-02-19 VITALS — HEIGHT: 59 IN | BODY MASS INDEX: 38.51 KG/M2 | WEIGHT: 191 LBS

## 2020-02-19 DIAGNOSIS — G56.01 CARPAL TUNNEL SYNDROME OF RIGHT WRIST: Primary | ICD-10-CM

## 2020-02-19 PROCEDURE — 1101F PR PT FALLS ASSESS DOC 0-1 FALLS W/OUT INJ PAST YR: ICD-10-PCS | Mod: CPTII,S$GLB,, | Performed by: ORTHOPAEDIC SURGERY

## 2020-02-19 PROCEDURE — 3008F PR BODY MASS INDEX (BMI) DOCUMENTED: ICD-10-PCS | Mod: CPTII,S$GLB,, | Performed by: ORTHOPAEDIC SURGERY

## 2020-02-19 PROCEDURE — 99999 PR PBB SHADOW E&M-EST. PATIENT-LVL III: CPT | Mod: PBBFAC,,, | Performed by: ORTHOPAEDIC SURGERY

## 2020-02-19 PROCEDURE — 99999 PR PBB SHADOW E&M-EST. PATIENT-LVL III: ICD-10-PCS | Mod: PBBFAC,,, | Performed by: ORTHOPAEDIC SURGERY

## 2020-02-19 PROCEDURE — 99213 OFFICE O/P EST LOW 20 MIN: CPT | Mod: S$GLB,,, | Performed by: ORTHOPAEDIC SURGERY

## 2020-02-19 PROCEDURE — 3008F BODY MASS INDEX DOCD: CPT | Mod: CPTII,S$GLB,, | Performed by: ORTHOPAEDIC SURGERY

## 2020-02-19 PROCEDURE — 99213 PR OFFICE/OUTPT VISIT, EST, LEVL III, 20-29 MIN: ICD-10-PCS | Mod: S$GLB,,, | Performed by: ORTHOPAEDIC SURGERY

## 2020-02-19 PROCEDURE — 1101F PT FALLS ASSESS-DOCD LE1/YR: CPT | Mod: CPTII,S$GLB,, | Performed by: ORTHOPAEDIC SURGERY

## 2020-02-19 NOTE — PROGRESS NOTES
Subjective:      Patient ID: Katey Cerrato is a 64 y.o. female.  Chief Complaint: Follow-up of the Right Hand      HPI  Katey Cerrato is a  64 y.o. female presenting today for follow up of right carpal tunnel syndrome.  She reports that she is much improved after the injection last visit  Just occasional symptoms in the hand  She does wear the brace at night.    Review of patient's allergies indicates:   Allergen Reactions    Codeine Nausea Only         Current Outpatient Medications   Medication Sig Dispense Refill    amLODIPine (NORVASC) 10 MG tablet Take 1 tablet (10 mg total) by mouth once daily. 90 tablet 3    atorvastatin (LIPITOR) 40 MG tablet Take 1 tablet (40 mg total) by mouth once daily. 90 tablet 3    blood sugar diagnostic Strp To check BG 1 times daily, to use with insurance preferred meter 100 each 3    blood-glucose meter kit To check BG 1 times daily, to use with insurance preferred meter 1 each 0    chlorhexidine (PERIDEX) 0.12 % solution SWISWH & SPIT 15 ML BY MOUTH 3 TIMES A DAY FOR 7 DAYS  0    lancets Misc To check BG 1 times daily, to use with insurance preferred meter 100 each 3    latanoprost 0.005 % ophthalmic solution PLACE 1 DROP INTO BOTH EYES EVERY EVENING. 10 mL 1    levETIRAcetam (KEPPRA) 750 MG Tab Take 2 tablets (1,500 mg total) by mouth once daily. 180 tablet 3    losartan (COZAAR) 25 MG tablet TAKE 1 TABLET (25 MG TOTAL) BY MOUTH ONCE DAILY. 90 tablet 3    meloxicam (MOBIC) 15 MG tablet TAKE 1 TABLE BY MOUTH DAILY AS NEEDED FOR PAIN. 90 tablet 0    metFORMIN (GLUCOPHAGE-XR) 500 MG XR 24hr tablet TAKE 2 TABLETS BY MOUTH TWICE A DAY WITH MEALS 360 tablet 0    metoprolol succinate (TOPROL-XL) 50 MG 24 hr tablet TAKE 1 TABLET (50 MG TOTAL) BY MOUTH EVERY EVENING. 90 tablet 2    multivitamin-minerals-lutein (CENTRUM SILVER) Tab Take 1 tablet by mouth once daily once daily.      mv-mn/iron/folic acid/herb 190 (VITAMIN D3 COMPLETE ORAL) Take by mouth.      TRULICITY  "1.5 mg/0.5 mL PnIj INJECT 1.5 MG INTO THE SKIN EVERY 7 DAYS. 6 Syringe 0    VITAMIN B COMPLEX ORAL Take by mouth.      ondansetron (ZOFRAN-ODT) 4 MG TbDL Take 1 tablet (4 mg total) by mouth every 8 (eight) hours as needed (nausea). (Patient not taking: Reported on 2/19/2020) 10 tablet 0    OPW TEST CLAIM - DO NOT FILL OPW test claim. Do not fill. (Patient not taking: Reported on 2/19/2020) 1 each 0    pneumoc 13-carrie conj-dip cr,PF, (PREVNAR 13, PF,) 0.5 mL Syrg Inject into the muscle. (Patient not taking: Reported on 2/19/2020) 0.5 mL 0     No current facility-administered medications for this visit.        Past Medical History:   Diagnosis Date    Cataract     Empty sella syndrome     GHD (growth hormone deficiency)     Glaucoma     Graves' disease with exophthalmos     History of vitamin D deficiency     Hyperlipidemia     Hypertension     Seizures     Thyroid nodule     Thyroid nodule     Type II or unspecified type diabetes mellitus without mention of complication, uncontrolled        Past Surgical History:   Procedure Laterality Date    BREAST BIOPSY      CHOLECYSTECTOMY      HYSTERECTOMY      one ovary intact    KNEE SURGERY      LYMPH NODE BIOPSY  3010    OOPHORECTOMY         OBJECTIVE:   PHYSICAL EXAM:  Height: 4' 11" (149.9 cm) Weight: 86.6 kg (191 lb)  Vitals:    02/19/20 1320   Weight: 86.6 kg (191 lb)   Height: 4' 11" (1.499 m)   PainSc: 0-No pain     Ortho/SPM Exam  Examination right    RAD negative range of motion wrist fingers full IOGRAPHS:  None  Comments: I have personally reviewed the imaging and I agree with the above radiologist's report.    ASSESSMENT/PLAN:     IMPRESSION:  Right carpal tunnel syndrome improved    PLAN:  Continue splinting at night  Keep an eye on symptoms      FOLLOW UP:  2-3 months or as needed    Disclaimer: This note has been generated using voice-recognition software. There may be typographical errors that have been missed during proof-reading.  "

## 2020-03-02 ENCOUNTER — LAB VISIT (OUTPATIENT)
Dept: LAB | Facility: OTHER | Age: 65
End: 2020-03-02
Attending: INTERNAL MEDICINE
Payer: COMMERCIAL

## 2020-03-02 DIAGNOSIS — E11.69 DIABETES MELLITUS TYPE 2 IN OBESE: ICD-10-CM

## 2020-03-02 DIAGNOSIS — E11.59 HYPERTENSION ASSOCIATED WITH DIABETES: ICD-10-CM

## 2020-03-02 DIAGNOSIS — E78.2 MIXED HYPERLIPIDEMIA: ICD-10-CM

## 2020-03-02 DIAGNOSIS — I15.2 HYPERTENSION ASSOCIATED WITH DIABETES: ICD-10-CM

## 2020-03-02 DIAGNOSIS — E66.9 DIABETES MELLITUS TYPE 2 IN OBESE: ICD-10-CM

## 2020-03-02 LAB
ALBUMIN SERPL BCP-MCNC: 3.9 G/DL (ref 3.5–5.2)
ALP SERPL-CCNC: 76 U/L (ref 55–135)
ALT SERPL W/O P-5'-P-CCNC: 23 U/L (ref 10–44)
ANION GAP SERPL CALC-SCNC: 10 MMOL/L (ref 8–16)
AST SERPL-CCNC: 25 U/L (ref 10–40)
BASOPHILS # BLD AUTO: 0.04 K/UL (ref 0–0.2)
BASOPHILS NFR BLD: 0.8 % (ref 0–1.9)
BILIRUB SERPL-MCNC: 0.4 MG/DL (ref 0.1–1)
BUN SERPL-MCNC: 11 MG/DL (ref 8–23)
CALCIUM SERPL-MCNC: 9.3 MG/DL (ref 8.7–10.5)
CHLORIDE SERPL-SCNC: 104 MMOL/L (ref 95–110)
CHOLEST SERPL-MCNC: 207 MG/DL (ref 120–199)
CHOLEST/HDLC SERPL: 4 {RATIO} (ref 2–5)
CO2 SERPL-SCNC: 25 MMOL/L (ref 23–29)
CREAT SERPL-MCNC: 0.7 MG/DL (ref 0.5–1.4)
DIFFERENTIAL METHOD: ABNORMAL
EOSINOPHIL # BLD AUTO: 0.1 K/UL (ref 0–0.5)
EOSINOPHIL NFR BLD: 1.8 % (ref 0–8)
ERYTHROCYTE [DISTWIDTH] IN BLOOD BY AUTOMATED COUNT: 14.4 % (ref 11.5–14.5)
EST. GFR  (AFRICAN AMERICAN): >60 ML/MIN/1.73 M^2
EST. GFR  (NON AFRICAN AMERICAN): >60 ML/MIN/1.73 M^2
ESTIMATED AVG GLUCOSE: 140 MG/DL (ref 68–131)
GLUCOSE SERPL-MCNC: 74 MG/DL (ref 70–110)
HBA1C MFR BLD HPLC: 6.5 % (ref 4–5.6)
HCT VFR BLD AUTO: 38.6 % (ref 37–48.5)
HDLC SERPL-MCNC: 52 MG/DL (ref 40–75)
HDLC SERPL: 25.1 % (ref 20–50)
HGB BLD-MCNC: 12.3 G/DL (ref 12–16)
IMM GRANULOCYTES # BLD AUTO: 0.01 K/UL (ref 0–0.04)
IMM GRANULOCYTES NFR BLD AUTO: 0.2 % (ref 0–0.5)
LDLC SERPL CALC-MCNC: 138.4 MG/DL (ref 63–159)
LYMPHOCYTES # BLD AUTO: 2.1 K/UL (ref 1–4.8)
LYMPHOCYTES NFR BLD: 43 % (ref 18–48)
MCH RBC QN AUTO: 28.5 PG (ref 27–31)
MCHC RBC AUTO-ENTMCNC: 31.9 G/DL (ref 32–36)
MCV RBC AUTO: 90 FL (ref 82–98)
MONOCYTES # BLD AUTO: 0.4 K/UL (ref 0.3–1)
MONOCYTES NFR BLD: 7.6 % (ref 4–15)
NEUTROPHILS # BLD AUTO: 2.3 K/UL (ref 1.8–7.7)
NEUTROPHILS NFR BLD: 46.6 % (ref 38–73)
NONHDLC SERPL-MCNC: 155 MG/DL
NRBC BLD-RTO: 0 /100 WBC
PLATELET # BLD AUTO: 241 K/UL (ref 150–350)
PMV BLD AUTO: 10.3 FL (ref 9.2–12.9)
POTASSIUM SERPL-SCNC: 3.7 MMOL/L (ref 3.5–5.1)
PROT SERPL-MCNC: 7.7 G/DL (ref 6–8.4)
RBC # BLD AUTO: 4.31 M/UL (ref 4–5.4)
SODIUM SERPL-SCNC: 139 MMOL/L (ref 136–145)
TRIGL SERPL-MCNC: 83 MG/DL (ref 30–150)
WBC # BLD AUTO: 4.88 K/UL (ref 3.9–12.7)

## 2020-03-02 PROCEDURE — 83036 HEMOGLOBIN GLYCOSYLATED A1C: CPT

## 2020-03-02 PROCEDURE — 80053 COMPREHEN METABOLIC PANEL: CPT

## 2020-03-02 PROCEDURE — 36415 COLL VENOUS BLD VENIPUNCTURE: CPT

## 2020-03-02 PROCEDURE — 85025 COMPLETE CBC W/AUTO DIFF WBC: CPT

## 2020-03-02 PROCEDURE — 80061 LIPID PANEL: CPT

## 2020-03-05 ENCOUNTER — OFFICE VISIT (OUTPATIENT)
Dept: INTERNAL MEDICINE | Facility: CLINIC | Age: 65
End: 2020-03-05
Payer: COMMERCIAL

## 2020-03-05 VITALS
BODY MASS INDEX: 38.8 KG/M2 | HEIGHT: 59 IN | DIASTOLIC BLOOD PRESSURE: 84 MMHG | OXYGEN SATURATION: 98 % | SYSTOLIC BLOOD PRESSURE: 144 MMHG | HEART RATE: 79 BPM | WEIGHT: 192.44 LBS

## 2020-03-05 DIAGNOSIS — E66.9 OBESITY (BMI 30-39.9): ICD-10-CM

## 2020-03-05 DIAGNOSIS — I15.2 HYPERTENSION ASSOCIATED WITH DIABETES: ICD-10-CM

## 2020-03-05 DIAGNOSIS — G40.319 GENERALIZED CONVULSIVE EPILEPSY WITH INTRACTABLE EPILEPSY: ICD-10-CM

## 2020-03-05 DIAGNOSIS — E66.9 DIABETES MELLITUS TYPE 2 IN OBESE: ICD-10-CM

## 2020-03-05 DIAGNOSIS — E11.59 HYPERTENSION ASSOCIATED WITH DIABETES: ICD-10-CM

## 2020-03-05 DIAGNOSIS — E78.2 MIXED HYPERLIPIDEMIA: Chronic | ICD-10-CM

## 2020-03-05 DIAGNOSIS — I10 ESSENTIAL HYPERTENSION: Primary | Chronic | ICD-10-CM

## 2020-03-05 DIAGNOSIS — E05.00 GRAVES' DISEASE WITH EXOPHTHALMOS: ICD-10-CM

## 2020-03-05 DIAGNOSIS — E23.6 EMPTY SELLA SYNDROME: ICD-10-CM

## 2020-03-05 DIAGNOSIS — N39.41 URGE INCONTINENCE: ICD-10-CM

## 2020-03-05 DIAGNOSIS — E11.69 DIABETES MELLITUS TYPE 2 IN OBESE: ICD-10-CM

## 2020-03-05 DIAGNOSIS — Z11.4 ENCOUNTER FOR SCREENING FOR HIV: ICD-10-CM

## 2020-03-05 PROCEDURE — 1101F PR PT FALLS ASSESS DOC 0-1 FALLS W/OUT INJ PAST YR: ICD-10-PCS | Mod: CPTII,S$GLB,, | Performed by: INTERNAL MEDICINE

## 2020-03-05 PROCEDURE — 99214 PR OFFICE/OUTPT VISIT, EST, LEVL IV, 30-39 MIN: ICD-10-PCS | Mod: S$GLB,,, | Performed by: INTERNAL MEDICINE

## 2020-03-05 PROCEDURE — 3079F DIAST BP 80-89 MM HG: CPT | Mod: CPTII,S$GLB,, | Performed by: INTERNAL MEDICINE

## 2020-03-05 PROCEDURE — 3077F PR MOST RECENT SYSTOLIC BLOOD PRESSURE >= 140 MM HG: ICD-10-PCS | Mod: CPTII,S$GLB,, | Performed by: INTERNAL MEDICINE

## 2020-03-05 PROCEDURE — 3077F SYST BP >= 140 MM HG: CPT | Mod: CPTII,S$GLB,, | Performed by: INTERNAL MEDICINE

## 2020-03-05 PROCEDURE — 99999 PR PBB SHADOW E&M-EST. PATIENT-LVL III: CPT | Mod: PBBFAC,,, | Performed by: INTERNAL MEDICINE

## 2020-03-05 PROCEDURE — 3008F PR BODY MASS INDEX (BMI) DOCUMENTED: ICD-10-PCS | Mod: CPTII,S$GLB,, | Performed by: INTERNAL MEDICINE

## 2020-03-05 PROCEDURE — 3008F BODY MASS INDEX DOCD: CPT | Mod: CPTII,S$GLB,, | Performed by: INTERNAL MEDICINE

## 2020-03-05 PROCEDURE — 1101F PT FALLS ASSESS-DOCD LE1/YR: CPT | Mod: CPTII,S$GLB,, | Performed by: INTERNAL MEDICINE

## 2020-03-05 PROCEDURE — 3044F HG A1C LEVEL LT 7.0%: CPT | Mod: CPTII,S$GLB,, | Performed by: INTERNAL MEDICINE

## 2020-03-05 PROCEDURE — 99999 PR PBB SHADOW E&M-EST. PATIENT-LVL III: ICD-10-PCS | Mod: PBBFAC,,, | Performed by: INTERNAL MEDICINE

## 2020-03-05 PROCEDURE — 3079F PR MOST RECENT DIASTOLIC BLOOD PRESSURE 80-89 MM HG: ICD-10-PCS | Mod: CPTII,S$GLB,, | Performed by: INTERNAL MEDICINE

## 2020-03-05 PROCEDURE — 3044F PR MOST RECENT HEMOGLOBIN A1C LEVEL <7.0%: ICD-10-PCS | Mod: CPTII,S$GLB,, | Performed by: INTERNAL MEDICINE

## 2020-03-05 PROCEDURE — 99214 OFFICE O/P EST MOD 30 MIN: CPT | Mod: S$GLB,,, | Performed by: INTERNAL MEDICINE

## 2020-03-05 RX ORDER — ATORVASTATIN CALCIUM 40 MG/1
40 TABLET, FILM COATED ORAL DAILY
Qty: 90 TABLET | Refills: 3 | Status: SHIPPED | OUTPATIENT
Start: 2020-03-05 | End: 2020-03-05

## 2020-03-05 RX ORDER — AMLODIPINE BESYLATE 10 MG/1
10 TABLET ORAL DAILY
Qty: 90 TABLET | Refills: 3 | Status: SHIPPED | OUTPATIENT
Start: 2020-03-05 | End: 2021-02-10 | Stop reason: SDUPTHER

## 2020-03-05 RX ORDER — ATORVASTATIN CALCIUM 80 MG/1
80 TABLET, FILM COATED ORAL DAILY
Qty: 90 TABLET | Refills: 3 | Status: SHIPPED | OUTPATIENT
Start: 2020-03-05 | End: 2021-03-10

## 2020-03-05 RX ORDER — METFORMIN HYDROCHLORIDE 500 MG/1
500 TABLET, EXTENDED RELEASE ORAL DAILY
Qty: 90 TABLET | Refills: 1
Start: 2020-03-05 | End: 2021-02-10 | Stop reason: SDUPTHER

## 2020-03-05 NOTE — PROGRESS NOTES
Subjective:       Patient ID: Katey Cerrato is a 64 y.o. female.    Chief Complaint: No chief complaint on file.    Pt here for f/u. Pt's BP is normally well controlled but high today b/c she is upset about her job. Tolerating meds well. Pt denies cp/sob/ha/vision or neuro changes. Not checking at home.     DM2 is well controlled with last A1c 6.5. Fasting sugars are 90s-110s. She stopped metformin which she was previously taking la3999iu AM and 500mg PM; she cannot tolerate higher doses due to GI side effects so is willing to restart at 500mg every aM. Also on trulicity 1.5mg weekly. No known end organ issues. Other health maint is up to date. No low sugars. Wt loss and exercise goals d/w pt.     HLD is tx with lipitor; tolerating well and is stable.    She has h/o Graves dz with exophthalmos. She has seen endocrine but not in a while. No hyperthyroid state. Also incidental finding of empty sella was noted but no symptoms or clinically significant issues associated with this.     She sees neuro regularly for h/o sz. No seizures recently on keppra.    She has urge incontinence. Saw urogyn and is working on pelvic exercises. Holding off on surgery and meds for now. This is stable.     Diabetes   Pertinent negatives for hypoglycemia include no dizziness or headaches. Pertinent negatives for diabetes include no chest pain, no fatigue and no polyuria.   Hypertension   Pertinent negatives include no chest pain, headaches, palpitations or shortness of breath.     Review of Systems   Constitutional: Negative for fatigue, fever and unexpected weight change.   HENT: Negative for congestion and sore throat.    Eyes: Negative for visual disturbance.   Respiratory: Negative for shortness of breath.    Cardiovascular: Negative for chest pain, palpitations and leg swelling.   Gastrointestinal: Negative for abdominal pain, blood in stool, constipation and diarrhea.   Endocrine: Negative for polyuria.   Genitourinary: Negative  for dysuria.   Musculoskeletal: Negative for arthralgias.   Skin: Negative for rash.   Neurological: Negative for dizziness, syncope and headaches.   Hematological: Negative for adenopathy.   Psychiatric/Behavioral: Negative for dysphoric mood.       Objective:      Physical Exam   Constitutional: She is oriented to person, place, and time. She appears well-developed and well-nourished.   HENT:   Head: Normocephalic and atraumatic.   Right Ear: Hearing, tympanic membrane, external ear and ear canal normal.   Left Ear: Hearing, tympanic membrane, external ear and ear canal normal.   Nose: Nose normal.   Mouth/Throat: Uvula is midline and oropharynx is clear and moist.   Eyes: Pupils are equal, round, and reactive to light. Conjunctivae, EOM and lids are normal. Right conjunctiva is not injected. Left conjunctiva is not injected.   Neck: Neck supple. No JVD present. Carotid bruit is not present. No thyroid mass and no thyromegaly present.   Cardiovascular: Normal rate, regular rhythm, S1 normal, S2 normal, normal heart sounds and intact distal pulses. PMI is not displaced.   No murmur heard.  Pulses:       Posterior tibial pulses are 2+ on the right side, and 2+ on the left side.   Pulmonary/Chest: Effort normal and breath sounds normal. She has no wheezes. She has no rhonchi. She has no rales.   Abdominal: Soft. Bowel sounds are normal. She exhibits no distension and no abdominal bruit. There is no hepatosplenomegaly. There is no tenderness.   Musculoskeletal: She exhibits no edema.        Right foot: Normal. There is no deformity.        Left foot: Normal. There is no deformity.   Feet:   Right Foot:   Protective Sensation: 6 sites tested. 6 sites sensed.   Skin Integrity: Negative for ulcer, blister or skin breakdown.   Left Foot:   Protective Sensation: 6 sites tested. 6 sites sensed.   Skin Integrity: Negative for ulcer, blister or skin breakdown.   Lymphadenopathy:        Head (right side): No preauricular and  no posterior auricular adenopathy present.        Head (left side): No preauricular and no posterior auricular adenopathy present.     She has no cervical adenopathy.     She has no axillary adenopathy.   Neurological: She is alert and oriented to person, place, and time. She has normal strength. No cranial nerve deficit or sensory deficit. She displays a negative Romberg sign. Coordination and gait normal.   Skin: Skin is warm. No rash noted.   Psychiatric: She has a normal mood and affect. Her speech is normal and behavior is normal. Judgment and thought content normal.       Assessment:       1. Essential hypertension    2. Hypertension associated with diabetes    3. Mixed hyperlipidemia    4. Generalized convulsive epilepsy with intractable epilepsy    5. Diabetes mellitus type 2 in obese    6. Empty sella syndrome    7. Graves' disease with exophthalmos    8. Obesity (BMI 30-39.9)    9. Uncontrolled type 2 diabetes mellitus without complication, without long-term current use of insulin    10. Urge incontinence        Plan:       1. Increase lipitor to 80mg daily  2. Home BP and BS monitoring  3. Recent labs reviewed  4. Keep f/u with specialists  5. Counseled on wt loss strategies  6. Restart metformin at 500mg daily  7. 2 wks nursing BP check  8. F/u 4 mos with A1c prior

## 2020-03-16 ENCOUNTER — TELEPHONE (OUTPATIENT)
Dept: INTERNAL MEDICINE | Facility: CLINIC | Age: 65
End: 2020-03-16

## 2020-03-16 ENCOUNTER — HOSPITAL ENCOUNTER (EMERGENCY)
Facility: OTHER | Age: 65
Discharge: HOME | End: 2020-03-16
Attending: EMERGENCY MEDICINE
Payer: COMMERCIAL

## 2020-03-16 VITALS
SYSTOLIC BLOOD PRESSURE: 128 MMHG | OXYGEN SATURATION: 98 % | WEIGHT: 192 LBS | DIASTOLIC BLOOD PRESSURE: 65 MMHG | RESPIRATION RATE: 18 BRPM | HEIGHT: 59 IN | TEMPERATURE: 100 F | BODY MASS INDEX: 38.71 KG/M2 | HEART RATE: 80 BPM

## 2020-03-16 DIAGNOSIS — B34.9 VIRAL SYNDROME: Primary | ICD-10-CM

## 2020-03-16 LAB
CTP QC/QA: YES
POC MOLECULAR INFLUENZA A AGN: NEGATIVE
POC MOLECULAR INFLUENZA B AGN: NEGATIVE

## 2020-03-16 PROCEDURE — U0002 COVID-19 LAB TEST NON-CDC: HCPCS

## 2020-03-16 PROCEDURE — 99284 EMERGENCY DEPT VISIT MOD MDM: CPT

## 2020-03-16 RX ORDER — ONDANSETRON 4 MG/1
4 TABLET, FILM COATED ORAL EVERY 6 HOURS PRN
Qty: 12 TABLET | Refills: 0 | Status: SHIPPED | OUTPATIENT
Start: 2020-03-16 | End: 2022-01-06

## 2020-03-16 RX ORDER — IBUPROFEN 800 MG/1
800 TABLET ORAL EVERY 6 HOURS PRN
Qty: 30 TABLET | Refills: 0 | Status: SHIPPED | OUTPATIENT
Start: 2020-03-16 | End: 2020-07-07

## 2020-03-16 RX ORDER — BENZONATATE 100 MG/1
100 CAPSULE ORAL 3 TIMES DAILY PRN
Qty: 20 CAPSULE | Refills: 0 | Status: SHIPPED | OUTPATIENT
Start: 2020-03-16 | End: 2020-03-26

## 2020-03-16 NOTE — ED NOTES
Pt to ED with c/o fever, bodyaches, and cough x5 days, reports she thought it was her allergies bu ts/s did not resolve. Denies exposure to sick individuals, recent travel.     Two patient identifiers have been checked and are correct.      Appearance: Pt awake, alert & oriented to person, place & time. Pt in no acute distress at present time. Pt is clean and well groomed with clothes appropriately fastened.   Skin: Skin warm, dry & intact. Color consistent with ethnicity. Mucous membranes moist. No breakdown or brusing noted.   Musculoskeletal: Patient moving all extremities well, no obvious swelling or deformities noted.   Respiratory: Respirations spontaneous, even, and non-labored. Visible chest rise noted. Airway is open and patent. No accessory muscle use noted.   Neurologic: Sensation is intact. Speech is clear and appropriate. Eyes open spontaneously, behavior appropriate to situation, follows commands, facial expression symmetrical, bilateral hand grasp equal and even, purposeful motor response noted.  Cardiac: All peripheral pulses present. No Bilateral lower extremity edema. Cap refill is <3 seconds.  Abdomen: Abdomen soft, non-tender to palpation.   : Pt reports no dysuria or hematuria.

## 2020-03-16 NOTE — DISCHARGE INSTRUCTIONS
You should maintain home quarantine per the provided CDC informational handout until the results of the state Covid-19 (Corona) virus testing is complete.

## 2020-03-16 NOTE — TELEPHONE ENCOUNTER
----- Message from Tri Chavez sent at 3/16/2020  1:39 PM CDT -----  Contact: self  [phone]  .Type: Patient Call Back    Who called: self     What is the request in detail: Pt stated that she was seen in the ER to be tested for the corona virus  and would like to come in for a Dr. Note to return to work in 3 -5 days     Can the clinic reply by [facility]? Call back     Would the patient rather a call back or a response via [facility]?  Call back     Best call back number: [phone]

## 2020-03-20 ENCOUNTER — TELEPHONE (OUTPATIENT)
Dept: ADMINISTRATIVE | Facility: HOSPITAL | Age: 65
End: 2020-03-20

## 2020-03-20 NOTE — TELEPHONE ENCOUNTER
The patient was phoned about an upcoming appointment and the expectations of the pre-visit. The patient has a banner and is a possible R/O for Covid-19. I did call and her voice mail has not been set up.     Roslyn ERNST LPN  Clinical Care Coordinator  Internal Medicine  [facility]  [phone]

## 2020-03-23 ENCOUNTER — DOCUMENTATION ONLY (OUTPATIENT)
Dept: REHABILITATION | Facility: OTHER | Age: 65
End: 2020-03-23

## 2020-03-23 DIAGNOSIS — E66.9 DIABETES MELLITUS TYPE 2 IN OBESE: ICD-10-CM

## 2020-03-23 DIAGNOSIS — E11.69 DIABETES MELLITUS TYPE 2 IN OBESE: ICD-10-CM

## 2020-03-23 PROBLEM — N81.84 DISUSE ATROPHY OF PELVIC MUSCLES AND ANAL SPHINCTER: Status: RESOLVED | Noted: 2019-12-23 | Resolved: 2020-03-23

## 2020-03-23 PROBLEM — R27.9 LACK OF COORDINATION: Status: RESOLVED | Noted: 2019-12-23 | Resolved: 2020-03-23

## 2020-03-23 RX ORDER — DULAGLUTIDE 1.5 MG/.5ML
INJECTION, SOLUTION SUBCUTANEOUS
Qty: 6 SYRINGE | Refills: 0 | Status: SHIPPED | OUTPATIENT
Start: 2020-03-23 | End: 2020-06-11

## 2020-03-23 NOTE — TELEPHONE ENCOUNTER
----- Message from Jeri Weaver sent at 3/23/2020  7:23 AM CDT -----    Name of Who is Calling:MORAIMA LUCAS [[id number]]    What is the request in detail: patient would like a call back regarding test results. Please contact to further discuss and advise     Can the clinic reply by [facility]: No    What Number to Call Back if not in [facility]: [phone]

## 2020-03-23 NOTE — PROGRESS NOTES
Outpatient Therapy Discharge Summary     Name: Katey LEVY [facility] Number: [id number]    Therapy Diagnosis: No diagnosis found.  Physician: No ref. provider found    Physician Orders: PT Eval and Treat   Medical Diagnosis: vaginal vault prolapse, posthysterectomy  Evaluation Date: 12/23/2019    Date of Last visit: 12/23/19  Total Visits Received: 1      Assessment    Goals: final goal status unknown      Discharge reason: eval only, did not return for f/u    Plan   This patient is discharged from PT

## 2020-03-25 ENCOUNTER — TELEPHONE (OUTPATIENT)
Dept: INTERNAL MEDICINE | Facility: CLINIC | Age: 65
End: 2020-03-25

## 2020-03-25 NOTE — TELEPHONE ENCOUNTER
----- Message from Jerrica Suarez sent at 3/25/2020  8:59 AM CDT -----  Contact: pt  Name of Who is Calling: pt    What is the request in detail: calling in regards to her test results from 03/16/20. Please contact to further discuss and advise      Can the clinic reply by [facility]: no    What Number to Call Back if not in [facility]: [phone]

## 2020-03-27 ENCOUNTER — TELEPHONE (OUTPATIENT)
Dept: INTERNAL MEDICINE | Facility: CLINIC | Age: 65
End: 2020-03-27

## 2020-03-27 LAB
SARS-COV-2 RNA RESP QL NAA+PROBE: DETECTED
SPECIMEN SOURCE: ABNORMAL

## 2020-03-27 NOTE — TELEPHONE ENCOUNTER
Informed pt of the positive covid as follows; she is already feeling better and doesn't feel she has fever any longer. No SOB.     Your test was POSITIVE for COVID-19 (coronavirus).     [facility]  Preventing the Spread of Coronavirus Disease 2019 in Homes and Residential Communities      Prevention steps for people with confirmed or suspected COVID-19 (including persons under investigation) who do not need to be hospitalized and people with confirmed COVID-19 who were hospitalized and determined to be medically stable to go home.    Your healthcare provider and public health staff will evaluate whether you can be cared for at home.   Stay home except to get medical care.   Separate yourself from other people and animals in your home   Call ahead before visiting your doctor.   Wear a facemask.   Cover your coughs and sneezes.   Clean your hands often.   Avoid sharing personal household items.   Clean all high-touch surfaces every day.   Monitor your symptoms. Seek prompt medical attention if your illness is worsening (e.g., difficulty breathing). Before seeking care, call your healthcare provider.   If you have a medical emergency and need to call 911, notify the dispatch personnel that you have, or are being evaluated for COVID-19. If possible, put on a facemask before emergency medical services arrive.   Discontinuing home isolation. Call your provider about guidance to discontinue home isolation.    Recommended precautions for household members, intimate partners, and caregivers in a nonhealthcare setting of a patient with symptomatic laboratory-confirmed COVID-19 or a patient under investigation.  Household members, intimate partners, and caregivers in a nonhealthcare setting may have close contact with a person with symptomatic, laboratory-confirmed COVID-19 or a person under investigation. Close contacts should monitor their health; they should call their  healthcare provider right away if they develop symptoms suggestive of COVID-19 (e.g., fever, cough, shortness of breath).    Close contacts should also follow these recommendations:   Make sure that you understand and can help the patient follow their healthcare provider's instructions for medication(s) and care. You should help the patient with basic needs in the home and provide support for getting groceries, prescriptions, and other personal needs.   Monitor the patient's symptoms. If the patient is getting sicker, call his or her healthcare provider and tell them that the patient has laboratory-confirmed COVID-19. This will help the healthcare provider's office take steps to keep other people in the office or waiting room from getting infected. Ask the healthcare provider to call the local or state health department for additional guidance. If the patient has a medical emergency and you need to call 911, notify the dispatch personnel that the patient has, or is being evaluated for COVID-19.   Household members should stay in another room or be  from the patient as much as possible. Household members should use a separate bedroom and bathroom, if available.   Prohibit visitors who do not have an essential need to be in the home.   Household members should care for any pets in the home. Do not handle pets or other animals while sick.   Make sure that shared spaces in the home have good air flow, such as by an air conditioner or an opened window, weather permitting.   Perform hand hygiene frequently. Wash your hands often with soap and water for at least 20 seconds or use an alcohol-based hand  that contains 60 to 95% alcohol, covering all surfaces of your hands and rubbing them together until they feel dry. Soap and water should be used preferentially if hands are visibly dirty.   Avoid touching your eyes, nose, and mouth with unwashed hands.   The patient should wear a facemask when you  are around other people. If the patient is not able to wear a facemask (for example, because it causes trouble breathing), you, as the caregiver should wear a mask when you are in the same room as the patient.   Wear a disposable facemask and gloves when you touch or have contact with the patient's blood, stool, or body fluids, such as saliva, sputum, nasal mucus, vomit, urine.  o Throw out disposable facemasks and gloves after using them. Do not reuse.  o When removing personal protective equipment, first remove and dispose of gloves. Then, immediately clean your hands with soap and water or alcohol-based hand . Next, remove and dispose of facemask, and immediately clean your hands again with soap and water or alcohol-based hand .   Avoid sharing household items with the patient. You should not share dishes, drinking glasses, cups, eating utensils, towels, bedding, or other items. After the patient uses these items, you should wash them thoroughly (see below Wash laundry thoroughly).   Clean all high-touch surfaces, such as counters, tabletops, doorknobs, bathroom fixtures, toilets, phones, keyboards, tablets, and bedside tables, every day. Also, clean any surfaces that may have blood, stool, or body fluids on them.   Use a household cleaning spray or wipe, according to the label instructions. Labels contain instructions for safe and effective use of the cleaning product including precautions you should take when applying the product, such as wearing gloves and making sure you have good ventilation during use of the product.   Wash laundry thoroughly.  o Immediately remove and wash clothes or bedding that have blood, stool, or body fluids on them.  o Wear disposable gloves while handling soiled items and keep soiled items away from your body. Clean your hands (with soap and water or an alcohol-based hand ) immediately after removing your gloves.  o Read and follow directions on  labels of laundry or clothing items and detergent. In general, using a normal laundry detergent according to washing machine instructions and dry thoroughly using the warmest temperatures recommended on the clothing label.   Place all used disposable gloves, facemasks, and other contaminated items in a lined container before disposing of them with other household waste. Clean your hands (with soap and water or an alcohol-based hand ) immediately after handling these items. Soap and water should be used preferentially if hands are visibly dirty.   Discuss any additional questions with your state or local health department or healthcare provider. Check available hours when contacting your local health department.    For more information see CDC link below.      https://www.cdc.gov/coronavirus/2019-ncov/hcp/guidance-prevent-spread.html#precautions              If your symptoms worsen or if you have any other concerns, please contact [facility] at [phone].

## 2020-03-27 NOTE — TELEPHONE ENCOUNTER
----- Message from Sandy Jackson MA sent at 3/27/2020  9:37 AM CDT -----  Contact: MORAIMA LUCAS [[id number]]  Please advise of covid results.      ----- Message -----  From: Lucy Rodriguez  Sent: 3/27/2020   7:29 AM CDT  To: [facility] Staff    Name of Who is Calling: MORAIMA LUCAS [[id number]]      What is the request in detail: patient would like to discuss results. Please call    Can the clinic reply by [facility]: no    What Number to Call Back if not in [facility]: [phone] (home)

## 2020-04-09 RX ORDER — MELOXICAM 15 MG/1
TABLET ORAL
Qty: 90 TABLET | Refills: 0 | Status: SHIPPED | OUTPATIENT
Start: 2020-04-09 | End: 2020-07-07

## 2020-04-14 RX ORDER — INSULIN PUMP SYRINGE, 3 ML
EACH MISCELLANEOUS
Qty: 1 EACH | Refills: 0 | Status: SHIPPED | OUTPATIENT
Start: 2020-04-14 | End: 2023-02-03

## 2020-04-14 RX ORDER — LANCETS
EACH MISCELLANEOUS
Qty: 100 EACH | Refills: 3 | Status: SHIPPED | OUTPATIENT
Start: 2020-04-14

## 2020-04-14 RX ORDER — ISOPROPYL ALCOHOL 70 ML/100ML
100 SWAB TOPICAL
Qty: 100 EACH | Refills: 2 | Status: SHIPPED | OUTPATIENT
Start: 2020-04-14 | End: 2020-07-13

## 2020-05-22 ENCOUNTER — TELEPHONE (OUTPATIENT)
Dept: INTERNAL MEDICINE | Facility: CLINIC | Age: 65
End: 2020-05-22

## 2020-05-22 NOTE — TELEPHONE ENCOUNTER
Contacted patient to reschedule nurse visit. States she would like to see provider. Scheduled appointment with provider.

## 2020-06-17 ENCOUNTER — TELEPHONE (OUTPATIENT)
Dept: INTERNAL MEDICINE | Facility: CLINIC | Age: 65
End: 2020-06-17

## 2020-06-22 ENCOUNTER — TELEPHONE (OUTPATIENT)
Dept: INTERNAL MEDICINE | Facility: CLINIC | Age: 65
End: 2020-06-22

## 2020-06-22 NOTE — TELEPHONE ENCOUNTER
Received fax from [employer] application that pt did not include documentation of household income in application. Pt will also receive letter to this regard from [employer] application for Trulicity.

## 2020-06-23 ENCOUNTER — PATIENT OUTREACH (OUTPATIENT)
Dept: ADMINISTRATIVE | Facility: HOSPITAL | Age: 65
End: 2020-06-23

## 2020-06-23 DIAGNOSIS — E11.9 DIABETES MELLITUS WITHOUT COMPLICATION: Primary | ICD-10-CM

## 2020-07-02 ENCOUNTER — LAB VISIT (OUTPATIENT)
Dept: LAB | Facility: OTHER | Age: 65
End: 2020-07-02
Attending: INTERNAL MEDICINE
Payer: MEDICARE

## 2020-07-02 DIAGNOSIS — E66.9 DIABETES MELLITUS TYPE 2 IN OBESE: ICD-10-CM

## 2020-07-02 DIAGNOSIS — E11.9 DIABETES MELLITUS WITHOUT COMPLICATION: ICD-10-CM

## 2020-07-02 DIAGNOSIS — E11.69 DIABETES MELLITUS TYPE 2 IN OBESE: ICD-10-CM

## 2020-07-02 DIAGNOSIS — Z11.4 ENCOUNTER FOR SCREENING FOR HIV: ICD-10-CM

## 2020-07-02 LAB
ALBUMIN/CREAT UR: 18.1 UG/MG (ref 0–30)
CREAT UR-MCNC: 127.4 MG/DL (ref 15–325)
MICROALBUMIN UR DL<=1MG/L-MCNC: 23 UG/ML

## 2020-07-02 PROCEDURE — 83036 HEMOGLOBIN GLYCOSYLATED A1C: CPT

## 2020-07-02 PROCEDURE — 82043 UR ALBUMIN QUANTITATIVE: CPT

## 2020-07-02 PROCEDURE — 36415 COLL VENOUS BLD VENIPUNCTURE: CPT

## 2020-07-02 PROCEDURE — 86703 HIV-1/HIV-2 1 RESULT ANTBDY: CPT

## 2020-07-03 LAB
ESTIMATED AVG GLUCOSE: 140 MG/DL (ref 68–131)
HBA1C MFR BLD HPLC: 6.5 % (ref 4–5.6)
HIV 1+2 AB+HIV1 P24 AG SERPL QL IA: NEGATIVE

## 2020-07-07 ENCOUNTER — OFFICE VISIT (OUTPATIENT)
Dept: INTERNAL MEDICINE | Facility: CLINIC | Age: 65
End: 2020-07-07
Payer: COMMERCIAL

## 2020-07-07 VITALS
DIASTOLIC BLOOD PRESSURE: 82 MMHG | HEIGHT: 59 IN | HEART RATE: 80 BPM | WEIGHT: 196.88 LBS | OXYGEN SATURATION: 98 % | SYSTOLIC BLOOD PRESSURE: 130 MMHG | BODY MASS INDEX: 39.69 KG/M2

## 2020-07-07 DIAGNOSIS — I10 ESSENTIAL HYPERTENSION: Primary | Chronic | ICD-10-CM

## 2020-07-07 DIAGNOSIS — E05.00 GRAVES' DISEASE WITH EXOPHTHALMOS: ICD-10-CM

## 2020-07-07 PROCEDURE — 3008F PR BODY MASS INDEX (BMI) DOCUMENTED: ICD-10-PCS | Mod: CPTII,S$GLB,, | Performed by: INTERNAL MEDICINE

## 2020-07-07 PROCEDURE — 99999 PR PBB SHADOW E&M-EST. PATIENT-LVL V: ICD-10-PCS | Mod: PBBFAC,,, | Performed by: INTERNAL MEDICINE

## 2020-07-07 PROCEDURE — 99499 UNLISTED E&M SERVICE: CPT | Mod: S$GLB,,, | Performed by: INTERNAL MEDICINE

## 2020-07-07 PROCEDURE — 99213 PR OFFICE/OUTPT VISIT, EST, LEVL III, 20-29 MIN: ICD-10-PCS | Mod: S$GLB,,, | Performed by: INTERNAL MEDICINE

## 2020-07-07 PROCEDURE — 99999 PR PBB SHADOW E&M-EST. PATIENT-LVL V: CPT | Mod: PBBFAC,,, | Performed by: INTERNAL MEDICINE

## 2020-07-07 PROCEDURE — 99499 RISK ADDL DX/OHS AUDIT: ICD-10-PCS | Mod: S$GLB,,, | Performed by: INTERNAL MEDICINE

## 2020-07-07 PROCEDURE — 99213 OFFICE O/P EST LOW 20 MIN: CPT | Mod: S$GLB,,, | Performed by: INTERNAL MEDICINE

## 2020-07-07 PROCEDURE — 3079F DIAST BP 80-89 MM HG: CPT | Mod: CPTII,S$GLB,, | Performed by: INTERNAL MEDICINE

## 2020-07-07 PROCEDURE — 1101F PR PT FALLS ASSESS DOC 0-1 FALLS W/OUT INJ PAST YR: ICD-10-PCS | Mod: CPTII,S$GLB,, | Performed by: INTERNAL MEDICINE

## 2020-07-07 PROCEDURE — 3044F HG A1C LEVEL LT 7.0%: CPT | Mod: CPTII,S$GLB,, | Performed by: INTERNAL MEDICINE

## 2020-07-07 PROCEDURE — 3075F SYST BP GE 130 - 139MM HG: CPT | Mod: CPTII,S$GLB,, | Performed by: INTERNAL MEDICINE

## 2020-07-07 PROCEDURE — 3008F BODY MASS INDEX DOCD: CPT | Mod: CPTII,S$GLB,, | Performed by: INTERNAL MEDICINE

## 2020-07-07 PROCEDURE — 3044F PR MOST RECENT HEMOGLOBIN A1C LEVEL <7.0%: ICD-10-PCS | Mod: CPTII,S$GLB,, | Performed by: INTERNAL MEDICINE

## 2020-07-07 PROCEDURE — 1101F PT FALLS ASSESS-DOCD LE1/YR: CPT | Mod: CPTII,S$GLB,, | Performed by: INTERNAL MEDICINE

## 2020-07-07 PROCEDURE — 3075F PR MOST RECENT SYSTOLIC BLOOD PRESS GE 130-139MM HG: ICD-10-PCS | Mod: CPTII,S$GLB,, | Performed by: INTERNAL MEDICINE

## 2020-07-07 PROCEDURE — 3079F PR MOST RECENT DIASTOLIC BLOOD PRESSURE 80-89 MM HG: ICD-10-PCS | Mod: CPTII,S$GLB,, | Performed by: INTERNAL MEDICINE

## 2020-07-07 NOTE — PROGRESS NOTES
Subjective:       Patient ID: Katey Cerrato is a 64 y.o. female.    Chief Complaint: Diabetes and Hypertension    Pt here for f/u. Pt's BP is well controlled. Tolerating meds well. Pt denies cp/sob/ha/vision or neuro changes. Not checking at home.      DM2 is well controlled with last A1c 6.5. Fasting sugars are 90s-110s. She is on metformin 500mg daily. Also on trulicity 1.5mg weekly. No known end organ issues. Other health maint is up to date. No low sugars. Wt loss and exercise goals d/w pt.     Review of Systems   Constitutional: Negative for unexpected weight change.   Respiratory: Negative for shortness of breath.    Cardiovascular: Negative for chest pain.   Endocrine: Negative for polyuria.   Psychiatric/Behavioral: Negative for dysphoric mood.         Objective:      Physical Exam  Constitutional:       Appearance: She is well-developed.   Neck:      Musculoskeletal: Neck supple.      Thyroid: No thyromegaly.   Cardiovascular:      Rate and Rhythm: Normal rate and regular rhythm.      Heart sounds: Normal heart sounds.   Pulmonary:      Effort: Pulmonary effort is normal.      Breath sounds: Normal breath sounds.   Lymphadenopathy:      Cervical: No cervical adenopathy.   Neurological:      Mental Status: She is alert and oriented to person, place, and time.      Cranial Nerves: No cranial nerve deficit.   Psychiatric:         Behavior: Behavior normal.         Assessment:       1. Essential hypertension    2. Uncontrolled type 2 diabetes mellitus without complication, without long-term current use of insulin        Plan:       1. Recent labs reviewed  2. Home BP and BS monitoring

## 2020-09-09 ENCOUNTER — TELEPHONE (OUTPATIENT)
Dept: INTERNAL MEDICINE | Facility: CLINIC | Age: 65
End: 2020-09-09

## 2020-09-09 DIAGNOSIS — I10 ESSENTIAL HYPERTENSION: Primary | ICD-10-CM

## 2020-09-09 NOTE — TELEPHONE ENCOUNTER
----- Message from Tri Scott sent at 9/9/2020 12:22 PM CDT -----  Regarding: self  [phone]  .Type: RX Refill Request    Who Called:  self     Have you contacted your pharmacy: yes     Refill or New Rx: refill     RX Name and Strength losartan (COZAAR) 25 MG tablet    Is this a 30 day or 90 day RX: 90 day    Preferred Pharmacy with phone number: .  [facility]/pharm[facility] #[id number] - [place], [place] - [address]  [address]  [place] [address]  Phone: [phone] Fax: [phone]    Local or Mail Order: local    Would the patient rather a call back or a response via [facility]?  Call back     Best Call Back Number: [phone]

## 2020-09-15 DIAGNOSIS — Z23 IMMUNIZATION DUE: Primary | ICD-10-CM

## 2020-09-15 NOTE — TELEPHONE ENCOUNTER
----- Message from Mona Mendez sent at 9/14/2020  1:18 PM CDT -----  Contact: Patient [phone]  Type: Patient Call Back    Who called: Patient    What is the request in detail: Left a message last week to find out if it was too soon to get a flu shot, but didn't get a call back. If it's not too soon for the flu shot, she need the nurse to send the orders to [facility] on [address]. Pt states she normally get her flu shots in October. Would like to speak to nurse about this. Please call.     Would the patient rather a call back or a response via [facility]? Call back    Best call back number: [phone]

## 2020-11-09 ENCOUNTER — TELEPHONE (OUTPATIENT)
Dept: OBSTETRICS AND GYNECOLOGY | Facility: CLINIC | Age: 65
End: 2020-11-09

## 2020-11-09 DIAGNOSIS — Z12.31 ENCOUNTER FOR SCREENING MAMMOGRAM FOR BREAST CANCER: Primary | ICD-10-CM

## 2020-11-09 NOTE — TELEPHONE ENCOUNTER
----- Message from Aletha Gonzalez sent at 11/9/2020 10:01 AM CST -----  Name of Who is Calling: MORAIMA LUCAS [[id number]]      What is the request in detail: Pt is calling to speak to staff in regards to having mammo orders placed in the system .... Please call to further assist .       Can the clinic reply by [facility]: N      What Number to Call Back if not in [facility]: [phone]

## 2020-11-09 NOTE — TELEPHONE ENCOUNTER
Called patient, no answer, could not leave a message due to voice mail not being set up. I was returning patient's call.

## 2020-11-24 ENCOUNTER — TELEPHONE (OUTPATIENT)
Dept: INTERNAL MEDICINE | Facility: CLINIC | Age: 65
End: 2020-11-24

## 2020-11-24 DIAGNOSIS — Z03.818 ENCOUNTER FOR OBSERVATION FOR SUSPECTED EXPOSURE TO OTHER BIOLOGICAL AGENTS RULED OUT: ICD-10-CM

## 2020-11-24 NOTE — TELEPHONE ENCOUNTER
----- Message from Yuki Phillips sent at 11/24/2020  3:20 PM CST -----  Regarding: Patient Call Back  Who Called: MORAIMA LUCAS [[id number]]    What is the request in detail:Would like a call back in regards to a question about covid states she came into contact with someone tested positive would like a call back as soon as possible.    Can the clinic reply by  [facility]? Yes      Best Call Back Number: [phone]

## 2020-11-24 NOTE — TELEPHONE ENCOUNTER
----- Message from Ramez Simental sent at 11/24/2020  3:58 PM CST -----  Name of Who is Calling: MORAIMA LUCAS  What is the request in detail: The patient is calling to see if the doctor can write her an order for an covid testing that the patient would like to have done at [facility]. Please advise Can the clinic reply by [facility]: Yes What Number to Call Back if not [facility]: [phone]

## 2020-11-25 NOTE — TELEPHONE ENCOUNTER
----- Message from Ramez Simental sent at 11/25/2020  2:04 PM CST -----  Name of Who is Calling: MORAIMA LUCAS What is the request in detail: The patient is calling to speak to the nurse in regards to something very urgent. Please advise Can the clinic reply by [facility]: Yes What Number to Call Back [facility] not [facility]: [phone]

## 2020-12-22 ENCOUNTER — HOSPITAL ENCOUNTER (OUTPATIENT)
Dept: RADIOLOGY | Facility: OTHER | Age: 65
Discharge: HOME | End: 2020-12-22
Attending: OBSTETRICS & GYNECOLOGY
Payer: MEDICARE

## 2020-12-22 DIAGNOSIS — Z12.31 ENCOUNTER FOR SCREENING MAMMOGRAM FOR BREAST CANCER: ICD-10-CM

## 2020-12-22 PROCEDURE — 77067 SCR MAMMO BI INCL CAD: CPT | Mod: 26,,, | Performed by: RADIOLOGY

## 2020-12-22 PROCEDURE — 77067 SCR MAMMO BI INCL CAD: CPT | Mod: TC

## 2020-12-22 PROCEDURE — 77067 MAMMO DIGITAL SCREENING BILAT WITH TOMO: ICD-10-PCS | Mod: 26,,, | Performed by: RADIOLOGY

## 2020-12-22 PROCEDURE — 77063 MAMMO DIGITAL SCREENING BILAT WITH TOMO: ICD-10-PCS | Mod: 26,,, | Performed by: RADIOLOGY

## 2020-12-22 PROCEDURE — 77063 BREAST TOMOSYNTHESIS BI: CPT | Mod: 26,,, | Performed by: RADIOLOGY

## 2020-12-27 ENCOUNTER — PATIENT OUTREACH (OUTPATIENT)
Dept: ADMINISTRATIVE | Facility: OTHER | Age: 65
End: 2020-12-27

## 2020-12-27 DIAGNOSIS — Z12.11 ENCOUNTER FOR FIT (FECAL IMMUNOCHEMICAL TEST) SCREENING: Primary | ICD-10-CM

## 2020-12-27 NOTE — PROGRESS NOTES
Care Everywhere: updated  Immunization: updated  Health Maintenance: updated  Media Review: review for outside colon cancer report  Legacy Review: review 2009 colonoscopy report, normal   Order placed: fit kit   Upcoming appts:optometry 12.31, hemoglobin 1.4.2021

## 2020-12-30 ENCOUNTER — LAB VISIT (OUTPATIENT)
Dept: LAB | Facility: OTHER | Age: 65
End: 2020-12-30
Attending: INTERNAL MEDICINE
Payer: MEDICARE

## 2020-12-30 ENCOUNTER — OFFICE VISIT (OUTPATIENT)
Dept: OBSTETRICS AND GYNECOLOGY | Facility: CLINIC | Age: 65
End: 2020-12-30
Payer: MEDICARE

## 2020-12-30 VITALS — BODY MASS INDEX: 40.97 KG/M2 | WEIGHT: 202.81 LBS

## 2020-12-30 DIAGNOSIS — E05.00 GRAVES' DISEASE WITH EXOPHTHALMOS: ICD-10-CM

## 2020-12-30 DIAGNOSIS — G40.319 GENERALIZED CONVULSIVE EPILEPSY WITH INTRACTABLE EPILEPSY: ICD-10-CM

## 2020-12-30 DIAGNOSIS — I15.2 HYPERTENSION ASSOCIATED WITH DIABETES: ICD-10-CM

## 2020-12-30 DIAGNOSIS — E78.2 MIXED HYPERLIPIDEMIA: Chronic | ICD-10-CM

## 2020-12-30 DIAGNOSIS — E11.69 DIABETES MELLITUS TYPE 2 IN OBESE: ICD-10-CM

## 2020-12-30 DIAGNOSIS — Z12.31 VISIT FOR SCREENING MAMMOGRAM: ICD-10-CM

## 2020-12-30 DIAGNOSIS — E66.9 DIABETES MELLITUS TYPE 2 IN OBESE: ICD-10-CM

## 2020-12-30 DIAGNOSIS — E11.59 HYPERTENSION ASSOCIATED WITH DIABETES: ICD-10-CM

## 2020-12-30 DIAGNOSIS — E66.9 OBESITY (BMI 30-39.9): ICD-10-CM

## 2020-12-30 DIAGNOSIS — Z01.419 ENCOUNTER FOR GYNECOLOGICAL EXAMINATION WITHOUT ABNORMAL FINDING: Primary | ICD-10-CM

## 2020-12-30 LAB
ANION GAP SERPL CALC-SCNC: 12 MMOL/L (ref 8–16)
BUN SERPL-MCNC: 15 MG/DL (ref 8–23)
CALCIUM SERPL-MCNC: 9.6 MG/DL (ref 8.7–10.5)
CHLORIDE SERPL-SCNC: 101 MMOL/L (ref 95–110)
CO2 SERPL-SCNC: 27 MMOL/L (ref 23–29)
CREAT SERPL-MCNC: 0.8 MG/DL (ref 0.5–1.4)
EST. GFR  (AFRICAN AMERICAN): >60 ML/MIN/1.73 M^2
EST. GFR  (NON AFRICAN AMERICAN): >60 ML/MIN/1.73 M^2
ESTIMATED AVG GLUCOSE: 114 MG/DL (ref 68–131)
GLUCOSE SERPL-MCNC: 120 MG/DL (ref 70–110)
HBA1C MFR BLD HPLC: 5.6 % (ref 4–5.6)
POTASSIUM SERPL-SCNC: 4.1 MMOL/L (ref 3.5–5.1)
SODIUM SERPL-SCNC: 140 MMOL/L (ref 136–145)
TSH SERPL DL<=0.005 MIU/L-ACNC: 1.49 UIU/ML (ref 0.4–4)

## 2020-12-30 PROCEDURE — 99499 RISK ADDL DX/OHS AUDIT: ICD-10-PCS | Mod: S$GLB,,, | Performed by: OBSTETRICS & GYNECOLOGY

## 2020-12-30 PROCEDURE — 3044F PR MOST RECENT HEMOGLOBIN A1C LEVEL <7.0%: ICD-10-PCS | Mod: CPTII,S$GLB,, | Performed by: OBSTETRICS & GYNECOLOGY

## 2020-12-30 PROCEDURE — 1101F PT FALLS ASSESS-DOCD LE1/YR: CPT | Mod: CPTII,S$GLB,, | Performed by: OBSTETRICS & GYNECOLOGY

## 2020-12-30 PROCEDURE — 3288F FALL RISK ASSESSMENT DOCD: CPT | Mod: CPTII,S$GLB,, | Performed by: OBSTETRICS & GYNECOLOGY

## 2020-12-30 PROCEDURE — 3288F PR FALLS RISK ASSESSMENT DOCUMENTED: ICD-10-PCS | Mod: CPTII,S$GLB,, | Performed by: OBSTETRICS & GYNECOLOGY

## 2020-12-30 PROCEDURE — 1126F PR PAIN SEVERITY QUANTIFIED, NO PAIN PRESENT: ICD-10-PCS | Mod: S$GLB,,, | Performed by: OBSTETRICS & GYNECOLOGY

## 2020-12-30 PROCEDURE — 83036 HEMOGLOBIN GLYCOSYLATED A1C: CPT

## 2020-12-30 PROCEDURE — 3008F BODY MASS INDEX DOCD: CPT | Mod: CPTII,S$GLB,, | Performed by: OBSTETRICS & GYNECOLOGY

## 2020-12-30 PROCEDURE — 84443 ASSAY THYROID STIM HORMONE: CPT

## 2020-12-30 PROCEDURE — G0101 PR CA SCREEN;PELVIC/BREAST EXAM: ICD-10-PCS | Mod: S$GLB,,, | Performed by: OBSTETRICS & GYNECOLOGY

## 2020-12-30 PROCEDURE — 1126F AMNT PAIN NOTED NONE PRSNT: CPT | Mod: S$GLB,,, | Performed by: OBSTETRICS & GYNECOLOGY

## 2020-12-30 PROCEDURE — 99999 PR PBB SHADOW E&M-EST. PATIENT-LVL III: CPT | Mod: PBBFAC,,, | Performed by: OBSTETRICS & GYNECOLOGY

## 2020-12-30 PROCEDURE — 3008F PR BODY MASS INDEX (BMI) DOCUMENTED: ICD-10-PCS | Mod: CPTII,S$GLB,, | Performed by: OBSTETRICS & GYNECOLOGY

## 2020-12-30 PROCEDURE — 99999 PR PBB SHADOW E&M-EST. PATIENT-LVL III: ICD-10-PCS | Mod: PBBFAC,,, | Performed by: OBSTETRICS & GYNECOLOGY

## 2020-12-30 PROCEDURE — G0101 CA SCREEN;PELVIC/BREAST EXAM: HCPCS | Mod: S$GLB,,, | Performed by: OBSTETRICS & GYNECOLOGY

## 2020-12-30 PROCEDURE — 80048 BASIC METABOLIC PNL TOTAL CA: CPT

## 2020-12-30 PROCEDURE — 36415 COLL VENOUS BLD VENIPUNCTURE: CPT

## 2020-12-30 PROCEDURE — 99499 UNLISTED E&M SERVICE: CPT | Mod: S$GLB,,, | Performed by: OBSTETRICS & GYNECOLOGY

## 2020-12-30 PROCEDURE — 3044F HG A1C LEVEL LT 7.0%: CPT | Mod: CPTII,S$GLB,, | Performed by: OBSTETRICS & GYNECOLOGY

## 2020-12-30 PROCEDURE — 1101F PR PT FALLS ASSESS DOC 0-1 FALLS W/OUT INJ PAST YR: ICD-10-PCS | Mod: CPTII,S$GLB,, | Performed by: OBSTETRICS & GYNECOLOGY

## 2020-12-30 NOTE — PROGRESS NOTES
HISTORY OF PRESENT ILLNESS:    Katey Cerrato is a 65 y.o. female,   presents today for her routine visit and has no complaints.      Past Medical History:   Diagnosis Date    Cataract     Empty sella syndrome     GHD (growth hormone deficiency)     Glaucoma     Graves' disease with exophthalmos     History of vitamin D deficiency     Hyperlipidemia     Hypertension     Seizures     Thyroid nodule     Thyroid nodule     Type II or unspecified type diabetes mellitus without mention of complication, uncontrolled        Past Surgical History:   Procedure Laterality Date    BREAST BIOPSY      CHOLECYSTECTOMY      HYSTERECTOMY      one ovary intact    KNEE SURGERY      LYMPH NODE BIOPSY  3010    OOPHORECTOMY         MEDICATIONS AND ALLERGIES:      Current Outpatient Medications:     amLODIPine (NORVASC) 10 MG tablet, Take 1 tablet (10 mg total) by mouth once daily., Disp: 90 tablet, Rfl: 3    atorvastatin (LIPITOR) 80 MG tablet, Take 1 tablet (80 mg total) by mouth once daily., Disp: 90 tablet, Rfl: 3    blood sugar diagnostic Strp, To check BG 1 times daily, to use with insurance preferred meter, Disp: 100 each, Rfl: 3    blood-glucose meter kit, To check BG 1 times daily, to use with insurance preferred meter, Disp: 1 each, Rfl: 0    chlorhexidine (PERIDEX) 0.12 % solution, SWISWH & SPIT 15 ML BY MOUTH 3 TIMES A DAY FOR 7 DAYS, Disp: , Rfl: 0    lancets Misc, To check BG 1 times daily, to use with insurance preferred meter, Disp: 100 each, Rfl: 3    latanoprost 0.005 % ophthalmic solution, PLACE 1 DROP INTO BOTH EYES EVERY EVENING., Disp: 10 mL, Rfl: 1    levETIRAcetam (KEPPRA) 750 MG Tab, TAKE 2 TABLETS BY MOUTH EVERY DAY, Disp: 180 tablet, Rfl: 2    losartan (COZAAR) 25 MG tablet, TAKE 1 TABLET BY MOUTH EVERY DAY, Disp: 90 tablet, Rfl: 3    meloxicam (MOBIC) 15 MG tablet, TAKE 1 TABLE BY MOUTH DAILY AS NEEDED FOR PAIN., Disp: 90 tablet, Rfl: 0    metFORMIN (GLUCOPHAGE-XR) 500 MG  XR 24hr tablet, Take 1 tablet (500 mg total) by mouth once daily., Disp: 90 tablet, Rfl: 1    metoprolol succinate (TOPROL-XL) 50 MG 24 hr tablet, TAKE 1 TABLET (50 MG TOTAL) BY MOUTH EVERY EVENING., Disp: 90 tablet, Rfl: 2    multivitamin-minerals-lutein (CENTRUM SILVER) Tab, Take 1 tablet by mouth once daily once daily., Disp: , Rfl:     mv-mn/iron/folic acid/herb 190 (VITAMIN D3 COMPLETE ORAL), Take by mouth., Disp: , Rfl:     ondansetron (ZOFRAN) 4 MG tablet, Take 1 tablet (4 mg total) by mouth every 6 (six) hours as needed for Nausea., Disp: 12 tablet, Rfl: 0    TRULICITY 1.5 mg/0.5 mL PnIj, INJECT 1.5 MG INTO THE SKIN EVERY 7 DAYS., Disp: 6 mL, Rfl: 0    VITAMIN B COMPLEX ORAL, Take by mouth., Disp: , Rfl:     Review of patient's allergies indicates:   Allergen Reactions    Codeine Nausea Only       Family History   Problem Relation Age of Onset    Cancer Mother     Cataracts Mother     Glaucoma Mother         shunt    Heart disease Mother     Tremor Mother     Breast cancer Mother 78    Heart disease Sister     Heart disease Father     Hypertension Brother     Breast cancer Sister 60       Social History     Socioeconomic History    Marital status:      Spouse name: Not on file    Number of children: Not on file    Years of education: Not on file    Highest education level: Not on file   Occupational History     Employer: [employer]   Social Needs    Financial resource strain: Not on file    Food insecurity     Worry: Not on file     Inability: Not on file    Transportation needs     Medical: Not on file     Non-medical: Not on file   Tobacco Use    Smoking status: Never Smoker    Smokeless tobacco: Never Used   Substance and Sexual Activity    Alcohol use: No     Comment: none    Drug use: No    Sexual activity: Yes     Partners: Male     Comment: m  works in dietary,  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   Lifestyle    Physical activity     Days per week: Not  on file     Minutes per session: Not on file    Stress: Not on file   Relationships    Social connections     Talks on phone: Not on file     Gets together: Not on file     Attends Scientologist service: Not on file     Active member of club or organization: Not on file     Attends meetings of clubs or organizations: Not on file     Relationship status: Not on file   Other Topics Concern    Not on file   Social History Narrative    Not on file     COMPREHENSIVE GYN HISTORY:  PAP History: Denies abnormal Paps.  Infection History: Denies STDs. Denies PID.  Benign History: Denies uterine fibroids. Denies ovarian cysts. Denies endometriosis. Denies other conditions.  Cancer History: Denies cervical cancer. Denies uterine cancer or hyperplasia. Denies ovarian cancer. Denies vulvar cancer or pre-cancer. Denies vaginal cancer or pre-cancer. Denies breast cancer. Denies colon cancer.  Sexual Activity History: Reports currently being sexually active  Menstrual History: Denies menses. Pt is  not on ERT.     ROS:  GENERAL: No weight changes. No swelling. No fatigue. No fever.  CARDIOVASCULAR: No chest pain. No shortness of breath. No leg cramps.   NEUROLOGICAL: No headaches. No vision changes.  BREASTS: No pain. No lumps. No discharge.  ABDOMEN: No pain. No nausea. No vomiting. No diarrhea. No constipation.  REPRODUCTIVE: No abnormal bleeding.  VULVA: No pain. No lesions. No itching.  VAGINA: No relaxation. No itching. No odor. No discharge. No lesions.  URINARY: No incontinence. No nocturia. No frequency. No dysuria.    Wt 92 kg (202 lb 13.2 oz)   BMI 40.97 kg/m²     PE:  APPEARANCE: Well nourished, well developed, in no acute distress.  AFFECT: WNL, alert and oriented x 3.  SKIN: No acne or hirsutism.  NECK: Neck symmetric without masses or thyromegaly.  NODES: No inguinal, cervical, axillary or femoral lymph node enlargement.  CHEST: Good respiratory effort.   ABDOMEN: Soft. No tenderness or masses. No  hepatosplenomegaly. No hernias.  BREASTS: Symmetrical, no skin changes or visible lesions. No palpable masses, nipple discharge bilaterally.  PELVIC: ATROPHIC EXTERNAL FEMALE GENITALIA without lesions. Normal hair distribution. Adequate perineal body, normal urethral meatus. VAGINA DRY without lesions or discharge. No significant cystocele or rectocele. Bimanual exam shows CERVIX and UTERUS to be SURGICALLY ABSENT. Adnexa without masses or tenderness.  EXTREMITIES: No edema.    DIAGNOSIS:  1. Encounter for gynecological examination without abnormal finding    2. Visit for screening mammogram    3. Generalized convulsive epilepsy with intractable epilepsy    4. Mixed hyperlipidemia    5. Hypertension associated with diabetes    6. Obesity (BMI 30-39.9)    7. Diabetes mellitus type 2 in obese      COUNSELING:  The patient was counseled today on  -osteoporosis prevention, calcium supplementation, regular weight bearing exercise.  -ACS PAP guidelines (no paps), with recommendations for yearly pelvic exams as her uterus and cervix were removed for benign reasons and ovaries remain;  -recommendation for yearly mammogram;  -to see her primary care physician for all other health maintenance.    FOLLOW-UP with me for next routine visit.

## 2020-12-31 ENCOUNTER — OFFICE VISIT (OUTPATIENT)
Dept: OPTOMETRY | Facility: CLINIC | Age: 65
End: 2020-12-31
Payer: MEDICARE

## 2020-12-31 DIAGNOSIS — E11.9 TYPE 2 DIABETES MELLITUS WITHOUT OPHTHALMIC MANIFESTATIONS: ICD-10-CM

## 2020-12-31 DIAGNOSIS — H52.203 MYOPIA WITH ASTIGMATISM AND PRESBYOPIA, BILATERAL: ICD-10-CM

## 2020-12-31 DIAGNOSIS — H26.9 CORTICAL CATARACT OF BOTH EYES: ICD-10-CM

## 2020-12-31 DIAGNOSIS — H40.023 OPEN ANGLE WITH BORDERLINE FINDINGS AND HIGH GLAUCOMA RISK IN BOTH EYES: Primary | ICD-10-CM

## 2020-12-31 DIAGNOSIS — H25.13 NUCLEAR SCLEROSIS OF BOTH EYES: ICD-10-CM

## 2020-12-31 DIAGNOSIS — H52.4 MYOPIA WITH ASTIGMATISM AND PRESBYOPIA, BILATERAL: ICD-10-CM

## 2020-12-31 DIAGNOSIS — H52.13 MYOPIA WITH ASTIGMATISM AND PRESBYOPIA, BILATERAL: ICD-10-CM

## 2020-12-31 PROCEDURE — 1126F PR PAIN SEVERITY QUANTIFIED, NO PAIN PRESENT: ICD-10-PCS | Mod: S$GLB,,, | Performed by: OPTOMETRIST

## 2020-12-31 PROCEDURE — 1126F AMNT PAIN NOTED NONE PRSNT: CPT | Mod: S$GLB,,, | Performed by: OPTOMETRIST

## 2020-12-31 PROCEDURE — 92014 PR EYE EXAM, EST PATIENT,COMPREHESV: ICD-10-PCS | Mod: S$GLB,,, | Performed by: OPTOMETRIST

## 2020-12-31 PROCEDURE — 3288F FALL RISK ASSESSMENT DOCD: CPT | Mod: CPTII,S$GLB,, | Performed by: OPTOMETRIST

## 2020-12-31 PROCEDURE — 92133 POSTERIOR SEGMENT OCT OPTIC NERVE(OCULAR COHERENCE TOMOGRAPHY) - OU - BOTH EYES: ICD-10-PCS | Mod: S$GLB,,, | Performed by: OPTOMETRIST

## 2020-12-31 PROCEDURE — 3288F PR FALLS RISK ASSESSMENT DOCUMENTED: ICD-10-PCS | Mod: CPTII,S$GLB,, | Performed by: OPTOMETRIST

## 2020-12-31 PROCEDURE — 2023F PR DILATED RETINAL EXAM W/O EVID OF RETINOPATHY: ICD-10-PCS | Mod: S$GLB,,, | Performed by: OPTOMETRIST

## 2020-12-31 PROCEDURE — 92133 CPTRZD OPH DX IMG PST SGM ON: CPT | Mod: S$GLB,,, | Performed by: OPTOMETRIST

## 2020-12-31 PROCEDURE — 92083 HUMPHREY VISUAL FIELD - OU - BOTH EYES: ICD-10-PCS | Mod: S$GLB,,, | Performed by: OPTOMETRIST

## 2020-12-31 PROCEDURE — 1101F PT FALLS ASSESS-DOCD LE1/YR: CPT | Mod: CPTII,S$GLB,, | Performed by: OPTOMETRIST

## 2020-12-31 PROCEDURE — 2023F DILAT RTA XM W/O RTNOPTHY: CPT | Mod: S$GLB,,, | Performed by: OPTOMETRIST

## 2020-12-31 PROCEDURE — 99999 PR PBB SHADOW E&M-EST. PATIENT-LVL III: ICD-10-PCS | Mod: PBBFAC,,, | Performed by: OPTOMETRIST

## 2020-12-31 PROCEDURE — 1101F PR PT FALLS ASSESS DOC 0-1 FALLS W/OUT INJ PAST YR: ICD-10-PCS | Mod: CPTII,S$GLB,, | Performed by: OPTOMETRIST

## 2020-12-31 PROCEDURE — 92014 COMPRE OPH EXAM EST PT 1/>: CPT | Mod: S$GLB,,, | Performed by: OPTOMETRIST

## 2020-12-31 PROCEDURE — 99999 PR PBB SHADOW E&M-EST. PATIENT-LVL III: CPT | Mod: PBBFAC,,, | Performed by: OPTOMETRIST

## 2020-12-31 PROCEDURE — 92083 EXTENDED VISUAL FIELD XM: CPT | Mod: S$GLB,,, | Performed by: OPTOMETRIST

## 2020-12-31 RX ORDER — LATANOPROST 50 UG/ML
1 SOLUTION/ DROPS OPHTHALMIC NIGHTLY
Qty: 3 BOTTLE | Refills: 3 | Status: SHIPPED | OUTPATIENT
Start: 2020-12-31 | End: 2021-10-25

## 2020-12-31 NOTE — PROGRESS NOTES
HPI     Last eye exam was 9/26/19 with Dr. Rey.  Patient states distance vision is better without glasses. Feels bifocal   could be stronger. Didn't update glasses after last exam.  Patient denies diplopia, headaches, flashes/floaters, and pain.    Latanoprost QHS OU    Last edited by Lilibeth Flowers on 12/31/2020  2:41 PM. (History)            Assessment /Plan     For exam results, see Encounter Report.    Open angle with borderline findings and high glaucoma risk in both eyes  -     latanoprost 0.005 % ophthalmic solution; Place 1 drop into both eyes every evening.  Dispense: 3 Bottle; Refill: 3  -     Oswald Visual Field - OU - Extended - Both Eyes  -     OCT - Optic Nerve    Nuclear sclerosis of both eyes    Cortical cataract of both eyes    Type 2 diabetes mellitus without ophthalmic manifestations    Myopia with astigmatism and presbyopia, bilateral            1.  Due to increased c/d ratio OU and family history.  Continue with Latanoprost QHS OU-refills sent to pharmacy.  All testing stable OU.    HVF: 12/31/20  OCT 12/31/20  DFE: 12/31/20  Photos:  Gonio: 8/16/18  Pachy: 562 OD, 554 OS  Initial IOPs: 16 OD, 16 OS  MHx: DM, HTN  FHx: Mother  2-3.  Educated on cataracts and affects on vision.  Consult Dr. Caban for cataract surgery  4.  No retinopathy--monitor yearly.  BS control.  5.  Continue w/ current rx

## 2021-01-05 ENCOUNTER — IMMUNIZATION (OUTPATIENT)
Dept: PHARMACY | Facility: CLINIC | Age: 66
End: 2021-01-05
Payer: MEDICARE

## 2021-01-05 ENCOUNTER — PATIENT MESSAGE (OUTPATIENT)
Dept: ADMINISTRATIVE | Facility: HOSPITAL | Age: 66
End: 2021-01-05

## 2021-01-05 ENCOUNTER — OFFICE VISIT (OUTPATIENT)
Dept: INTERNAL MEDICINE | Facility: CLINIC | Age: 66
End: 2021-01-05
Payer: MEDICARE

## 2021-01-05 VITALS
SYSTOLIC BLOOD PRESSURE: 136 MMHG | OXYGEN SATURATION: 98 % | DIASTOLIC BLOOD PRESSURE: 78 MMHG | WEIGHT: 202.81 LBS | HEART RATE: 85 BPM | HEIGHT: 59 IN | BODY MASS INDEX: 40.88 KG/M2

## 2021-01-05 DIAGNOSIS — E23.6 EMPTY SELLA SYNDROME: ICD-10-CM

## 2021-01-05 DIAGNOSIS — E78.2 MIXED HYPERLIPIDEMIA: Chronic | ICD-10-CM

## 2021-01-05 DIAGNOSIS — Z12.11 COLON CANCER SCREENING: ICD-10-CM

## 2021-01-05 DIAGNOSIS — N39.41 URGE INCONTINENCE: ICD-10-CM

## 2021-01-05 DIAGNOSIS — E11.69 DIABETES MELLITUS TYPE 2 IN OBESE: ICD-10-CM

## 2021-01-05 DIAGNOSIS — I10 ESSENTIAL HYPERTENSION: Primary | Chronic | ICD-10-CM

## 2021-01-05 DIAGNOSIS — E66.9 DIABETES MELLITUS TYPE 2 IN OBESE: ICD-10-CM

## 2021-01-05 DIAGNOSIS — E05.00 GRAVES' DISEASE WITH EXOPHTHALMOS: ICD-10-CM

## 2021-01-05 DIAGNOSIS — I15.2 HYPERTENSION ASSOCIATED WITH DIABETES: ICD-10-CM

## 2021-01-05 DIAGNOSIS — E11.59 HYPERTENSION ASSOCIATED WITH DIABETES: ICD-10-CM

## 2021-01-05 DIAGNOSIS — G40.319 GENERALIZED CONVULSIVE EPILEPSY WITH INTRACTABLE EPILEPSY: ICD-10-CM

## 2021-01-05 DIAGNOSIS — E11.9 TYPE 2 DIABETES MELLITUS WITHOUT COMPLICATION, WITHOUT LONG-TERM CURRENT USE OF INSULIN: ICD-10-CM

## 2021-01-05 PROCEDURE — 3075F PR MOST RECENT SYSTOLIC BLOOD PRESS GE 130-139MM HG: ICD-10-PCS | Mod: CPTII,S$GLB,, | Performed by: INTERNAL MEDICINE

## 2021-01-05 PROCEDURE — 3075F SYST BP GE 130 - 139MM HG: CPT | Mod: CPTII,S$GLB,, | Performed by: INTERNAL MEDICINE

## 2021-01-05 PROCEDURE — 99999 PR PBB SHADOW E&M-EST. PATIENT-LVL IV: CPT | Mod: PBBFAC,,, | Performed by: INTERNAL MEDICINE

## 2021-01-05 PROCEDURE — 99499 UNLISTED E&M SERVICE: CPT | Mod: S$GLB,,, | Performed by: INTERNAL MEDICINE

## 2021-01-05 PROCEDURE — 3288F PR FALLS RISK ASSESSMENT DOCUMENTED: ICD-10-PCS | Mod: CPTII,S$GLB,, | Performed by: INTERNAL MEDICINE

## 2021-01-05 PROCEDURE — 1101F PR PT FALLS ASSESS DOC 0-1 FALLS W/OUT INJ PAST YR: ICD-10-PCS | Mod: CPTII,S$GLB,, | Performed by: INTERNAL MEDICINE

## 2021-01-05 PROCEDURE — 3078F DIAST BP <80 MM HG: CPT | Mod: CPTII,S$GLB,, | Performed by: INTERNAL MEDICINE

## 2021-01-05 PROCEDURE — 1125F PR PAIN SEVERITY QUANTIFIED, PAIN PRESENT: ICD-10-PCS | Mod: S$GLB,,, | Performed by: INTERNAL MEDICINE

## 2021-01-05 PROCEDURE — 1159F PR MEDICATION LIST DOCUMENTED IN MEDICAL RECORD: ICD-10-PCS | Mod: S$GLB,,, | Performed by: INTERNAL MEDICINE

## 2021-01-05 PROCEDURE — 3288F FALL RISK ASSESSMENT DOCD: CPT | Mod: CPTII,S$GLB,, | Performed by: INTERNAL MEDICINE

## 2021-01-05 PROCEDURE — 99999 PR PBB SHADOW E&M-EST. PATIENT-LVL IV: ICD-10-PCS | Mod: PBBFAC,,, | Performed by: INTERNAL MEDICINE

## 2021-01-05 PROCEDURE — 99214 OFFICE O/P EST MOD 30 MIN: CPT | Mod: S$GLB,,, | Performed by: INTERNAL MEDICINE

## 2021-01-05 PROCEDURE — 99214 PR OFFICE/OUTPT VISIT, EST, LEVL IV, 30-39 MIN: ICD-10-PCS | Mod: S$GLB,,, | Performed by: INTERNAL MEDICINE

## 2021-01-05 PROCEDURE — 3044F PR MOST RECENT HEMOGLOBIN A1C LEVEL <7.0%: ICD-10-PCS | Mod: CPTII,S$GLB,, | Performed by: INTERNAL MEDICINE

## 2021-01-05 PROCEDURE — 3044F HG A1C LEVEL LT 7.0%: CPT | Mod: CPTII,S$GLB,, | Performed by: INTERNAL MEDICINE

## 2021-01-05 PROCEDURE — 3078F PR MOST RECENT DIASTOLIC BLOOD PRESSURE < 80 MM HG: ICD-10-PCS | Mod: CPTII,S$GLB,, | Performed by: INTERNAL MEDICINE

## 2021-01-05 PROCEDURE — 3072F LOW RISK FOR RETINOPATHY: CPT | Mod: S$GLB,,, | Performed by: INTERNAL MEDICINE

## 2021-01-05 PROCEDURE — 99499 RISK ADDL DX/OHS AUDIT: ICD-10-PCS | Mod: S$GLB,,, | Performed by: INTERNAL MEDICINE

## 2021-01-05 PROCEDURE — 1125F AMNT PAIN NOTED PAIN PRSNT: CPT | Mod: S$GLB,,, | Performed by: INTERNAL MEDICINE

## 2021-01-05 PROCEDURE — 1101F PT FALLS ASSESS-DOCD LE1/YR: CPT | Mod: CPTII,S$GLB,, | Performed by: INTERNAL MEDICINE

## 2021-01-05 PROCEDURE — 1159F MED LIST DOCD IN RCRD: CPT | Mod: S$GLB,,, | Performed by: INTERNAL MEDICINE

## 2021-01-05 PROCEDURE — 3008F BODY MASS INDEX DOCD: CPT | Mod: CPTII,S$GLB,, | Performed by: INTERNAL MEDICINE

## 2021-01-05 PROCEDURE — 3008F PR BODY MASS INDEX (BMI) DOCUMENTED: ICD-10-PCS | Mod: CPTII,S$GLB,, | Performed by: INTERNAL MEDICINE

## 2021-01-05 PROCEDURE — 3072F PR LOW RISK FOR RETINOPATHY: ICD-10-PCS | Mod: S$GLB,,, | Performed by: INTERNAL MEDICINE

## 2021-01-19 ENCOUNTER — PATIENT OUTREACH (OUTPATIENT)
Dept: ADMINISTRATIVE | Facility: HOSPITAL | Age: 66
End: 2021-01-19

## 2021-02-09 ENCOUNTER — OFFICE VISIT (OUTPATIENT)
Dept: OPHTHALMOLOGY | Facility: CLINIC | Age: 66
End: 2021-02-09
Attending: OPHTHALMOLOGY
Payer: MEDICARE

## 2021-02-09 DIAGNOSIS — H25.13 NUCLEAR SCLEROSIS, BILATERAL: Primary | ICD-10-CM

## 2021-02-09 DIAGNOSIS — H43.822 VITREOMACULAR TRACTION SYNDROME OF LEFT EYE: ICD-10-CM

## 2021-02-09 PROCEDURE — 3288F FALL RISK ASSESSMENT DOCD: CPT | Mod: CPTII,S$GLB,, | Performed by: OPHTHALMOLOGY

## 2021-02-09 PROCEDURE — 1159F PR MEDICATION LIST DOCUMENTED IN MEDICAL RECORD: ICD-10-PCS | Mod: S$GLB,,, | Performed by: OPHTHALMOLOGY

## 2021-02-09 PROCEDURE — 1101F PT FALLS ASSESS-DOCD LE1/YR: CPT | Mod: CPTII,S$GLB,, | Performed by: OPHTHALMOLOGY

## 2021-02-09 PROCEDURE — 99999 PR PBB SHADOW E&M-EST. PATIENT-LVL III: ICD-10-PCS | Mod: PBBFAC,,, | Performed by: OPHTHALMOLOGY

## 2021-02-09 PROCEDURE — 3288F PR FALLS RISK ASSESSMENT DOCUMENTED: ICD-10-PCS | Mod: CPTII,S$GLB,, | Performed by: OPHTHALMOLOGY

## 2021-02-09 PROCEDURE — 99999 PR PBB SHADOW E&M-EST. PATIENT-LVL III: CPT | Mod: PBBFAC,,, | Performed by: OPHTHALMOLOGY

## 2021-02-09 PROCEDURE — 92136 OPHTHALMIC BIOMETRY: CPT | Mod: RT,S$GLB,, | Performed by: OPHTHALMOLOGY

## 2021-02-09 PROCEDURE — 1159F MED LIST DOCD IN RCRD: CPT | Mod: S$GLB,,, | Performed by: OPHTHALMOLOGY

## 2021-02-09 PROCEDURE — 1101F PR PT FALLS ASSESS DOC 0-1 FALLS W/OUT INJ PAST YR: ICD-10-PCS | Mod: CPTII,S$GLB,, | Performed by: OPHTHALMOLOGY

## 2021-02-09 PROCEDURE — 99204 PR OFFICE/OUTPT VISIT, NEW, LEVL IV, 45-59 MIN: ICD-10-PCS | Mod: S$GLB,,, | Performed by: OPHTHALMOLOGY

## 2021-02-09 PROCEDURE — 1126F AMNT PAIN NOTED NONE PRSNT: CPT | Mod: S$GLB,,, | Performed by: OPHTHALMOLOGY

## 2021-02-09 PROCEDURE — 99204 OFFICE O/P NEW MOD 45 MIN: CPT | Mod: S$GLB,,, | Performed by: OPHTHALMOLOGY

## 2021-02-09 PROCEDURE — 1126F PR PAIN SEVERITY QUANTIFIED, NO PAIN PRESENT: ICD-10-PCS | Mod: S$GLB,,, | Performed by: OPHTHALMOLOGY

## 2021-02-09 PROCEDURE — 92136 IOL MASTER - OU - BOTH EYES: ICD-10-PCS | Mod: RT,S$GLB,, | Performed by: OPHTHALMOLOGY

## 2021-02-09 RX ORDER — TETRACAINE HYDROCHLORIDE 5 MG/ML
1 SOLUTION OPHTHALMIC
Status: CANCELLED | OUTPATIENT
Start: 2021-02-09

## 2021-02-09 RX ORDER — SODIUM CHLORIDE 0.9 % (FLUSH) 0.9 %
10 SYRINGE (ML) INJECTION
Status: DISCONTINUED | OUTPATIENT
Start: 2021-02-09 | End: 2021-04-30

## 2021-02-09 RX ORDER — PREDNISOLONE ACETATE-GATIFLOXACIN-BROMFENAC .75; 5; 1 MG/ML; MG/ML; MG/ML
1 SUSPENSION/ DROPS OPHTHALMIC 3 TIMES DAILY
Qty: 5 ML | Refills: 3 | Status: SHIPPED | OUTPATIENT
Start: 2021-02-09 | End: 2021-04-30 | Stop reason: ALTCHOICE

## 2021-02-09 RX ORDER — TROPICAMIDE 10 MG/ML
1 SOLUTION/ DROPS OPHTHALMIC
Status: CANCELLED | OUTPATIENT
Start: 2021-02-09

## 2021-02-09 RX ORDER — MOXIFLOXACIN 5 MG/ML
1 SOLUTION/ DROPS OPHTHALMIC
Status: CANCELLED | OUTPATIENT
Start: 2021-02-09

## 2021-02-09 RX ORDER — PHENYLEPHRINE HYDROCHLORIDE 25 MG/ML
1 SOLUTION/ DROPS OPHTHALMIC
Status: CANCELLED | OUTPATIENT
Start: 2021-02-09

## 2021-02-10 RX ORDER — AMLODIPINE BESYLATE 10 MG/1
10 TABLET ORAL DAILY
Qty: 90 TABLET | Refills: 3 | Status: SHIPPED | OUTPATIENT
Start: 2021-02-10 | End: 2022-04-04

## 2021-02-10 RX ORDER — METFORMIN HYDROCHLORIDE 500 MG/1
500 TABLET, EXTENDED RELEASE ORAL DAILY
Qty: 90 TABLET | Refills: 1 | Status: SHIPPED | OUTPATIENT
Start: 2021-02-10 | End: 2021-07-26

## 2021-02-10 RX ORDER — MELOXICAM 15 MG/1
15 TABLET ORAL DAILY
Qty: 90 TABLET | Refills: 0 | Status: SHIPPED | OUTPATIENT
Start: 2021-02-10 | End: 2021-07-29

## 2021-02-15 ENCOUNTER — TELEPHONE (OUTPATIENT)
Dept: OPHTHALMOLOGY | Facility: CLINIC | Age: 66
End: 2021-02-15

## 2021-02-22 DIAGNOSIS — H25.11 NUCLEAR SCLEROTIC CATARACT OF RIGHT EYE: Primary | ICD-10-CM

## 2021-03-01 DIAGNOSIS — H25.12 NUCLEAR SCLEROTIC CATARACT OF LEFT EYE: Primary | ICD-10-CM

## 2021-03-09 ENCOUNTER — TELEPHONE (OUTPATIENT)
Dept: OPHTHALMOLOGY | Facility: CLINIC | Age: 66
End: 2021-03-09

## 2021-03-09 ENCOUNTER — PATIENT MESSAGE (OUTPATIENT)
Dept: INTERNAL MEDICINE | Facility: CLINIC | Age: 66
End: 2021-03-09

## 2021-03-11 ENCOUNTER — TELEPHONE (OUTPATIENT)
Dept: OPHTHALMOLOGY | Facility: CLINIC | Age: 66
End: 2021-03-11

## 2021-03-15 ENCOUNTER — ANESTHESIA (OUTPATIENT)
Dept: SURGERY | Facility: OTHER | Age: 66
End: 2021-03-15
Payer: MEDICARE

## 2021-03-15 ENCOUNTER — ANESTHESIA EVENT (OUTPATIENT)
Dept: SURGERY | Facility: OTHER | Age: 66
End: 2021-03-15
Payer: MEDICARE

## 2021-03-15 ENCOUNTER — HOSPITAL ENCOUNTER (OUTPATIENT)
Facility: OTHER | Age: 66
Discharge: HOME | End: 2021-03-15
Attending: OPHTHALMOLOGY | Admitting: OPHTHALMOLOGY
Payer: MEDICARE

## 2021-03-15 VITALS
BODY MASS INDEX: 39.51 KG/M2 | RESPIRATION RATE: 18 BRPM | HEIGHT: 59 IN | OXYGEN SATURATION: 100 % | HEART RATE: 68 BPM | WEIGHT: 196 LBS | DIASTOLIC BLOOD PRESSURE: 75 MMHG | SYSTOLIC BLOOD PRESSURE: 130 MMHG | TEMPERATURE: 98 F

## 2021-03-15 DIAGNOSIS — H25.13 NUCLEAR SCLEROSIS, BILATERAL: ICD-10-CM

## 2021-03-15 DIAGNOSIS — H25.013 CORTICAL AGE-RELATED CATARACT OF BOTH EYES: Primary | ICD-10-CM

## 2021-03-15 LAB — POCT GLUCOSE: 111 MG/DL (ref 70–110)

## 2021-03-15 PROCEDURE — 63600175 PHARM REV CODE 636 W HCPCS: Performed by: NURSE ANESTHETIST, CERTIFIED REGISTERED

## 2021-03-15 PROCEDURE — 25000003 PHARM REV CODE 250: Performed by: OPHTHALMOLOGY

## 2021-03-15 PROCEDURE — 36000707: Performed by: OPHTHALMOLOGY

## 2021-03-15 PROCEDURE — 37000009 HC ANESTHESIA EA ADD 15 MINS: Performed by: OPHTHALMOLOGY

## 2021-03-15 PROCEDURE — 71000015 HC POSTOP RECOV 1ST HR: Performed by: OPHTHALMOLOGY

## 2021-03-15 PROCEDURE — 66984 PR REMOVAL, CATARACT, W/INSRT INTRAOC LENS, W/O ENDO CYCLO: ICD-10-PCS | Mod: RT,,, | Performed by: OPHTHALMOLOGY

## 2021-03-15 PROCEDURE — V2632 POST CHMBR INTRAOCULAR LENS: HCPCS | Performed by: OPHTHALMOLOGY

## 2021-03-15 PROCEDURE — 36000706: Performed by: OPHTHALMOLOGY

## 2021-03-15 PROCEDURE — 82962 GLUCOSE BLOOD TEST: CPT | Performed by: OPHTHALMOLOGY

## 2021-03-15 PROCEDURE — 66984 XCAPSL CTRC RMVL W/O ECP: CPT | Mod: RT,,, | Performed by: OPHTHALMOLOGY

## 2021-03-15 PROCEDURE — 37000008 HC ANESTHESIA 1ST 15 MINUTES: Performed by: OPHTHALMOLOGY

## 2021-03-15 DEVICE — LENS IOL ITEC PRELOAD 18.0D: Type: IMPLANTABLE DEVICE | Site: EYE | Status: FUNCTIONAL

## 2021-03-15 RX ORDER — LIDOCAINE HYDROCHLORIDE 40 MG/ML
INJECTION, SOLUTION RETROBULBAR
Status: DISCONTINUED | OUTPATIENT
Start: 2021-03-15 | End: 2021-03-15 | Stop reason: HOSPADM

## 2021-03-15 RX ORDER — MIDAZOLAM HYDROCHLORIDE 1 MG/ML
INJECTION INTRAMUSCULAR; INTRAVENOUS
Status: DISCONTINUED | OUTPATIENT
Start: 2021-03-15 | End: 2021-03-15

## 2021-03-15 RX ORDER — TETRACAINE HYDROCHLORIDE 5 MG/ML
1 SOLUTION OPHTHALMIC
Status: COMPLETED | OUTPATIENT
Start: 2021-03-15 | End: 2021-03-15

## 2021-03-15 RX ORDER — TETRACAINE HYDROCHLORIDE 5 MG/ML
SOLUTION OPHTHALMIC
Status: DISCONTINUED | OUTPATIENT
Start: 2021-03-15 | End: 2021-03-15 | Stop reason: HOSPADM

## 2021-03-15 RX ORDER — ACETAMINOPHEN 325 MG/1
650 TABLET ORAL EVERY 4 HOURS PRN
Status: DISCONTINUED | OUTPATIENT
Start: 2021-03-15 | End: 2021-03-15 | Stop reason: HOSPADM

## 2021-03-15 RX ORDER — MOXIFLOXACIN 5 MG/ML
1 SOLUTION/ DROPS OPHTHALMIC
Status: COMPLETED | OUTPATIENT
Start: 2021-03-15 | End: 2021-03-15

## 2021-03-15 RX ORDER — MOXIFLOXACIN 5 MG/ML
1 SOLUTION/ DROPS OPHTHALMIC
Status: DISCONTINUED | OUTPATIENT
Start: 2021-03-15 | End: 2021-03-15 | Stop reason: HOSPADM

## 2021-03-15 RX ORDER — TROPICAMIDE 10 MG/ML
1 SOLUTION/ DROPS OPHTHALMIC
Status: COMPLETED | OUTPATIENT
Start: 2021-03-15 | End: 2021-03-15

## 2021-03-15 RX ORDER — PHENYLEPHRINE HYDROCHLORIDE 25 MG/ML
1 SOLUTION/ DROPS OPHTHALMIC
Status: COMPLETED | OUTPATIENT
Start: 2021-03-15 | End: 2021-03-15

## 2021-03-15 RX ORDER — PROPARACAINE HYDROCHLORIDE 5 MG/ML
1 SOLUTION/ DROPS OPHTHALMIC
Status: DISCONTINUED | OUTPATIENT
Start: 2021-03-15 | End: 2021-03-15 | Stop reason: HOSPADM

## 2021-03-15 RX ORDER — MOXIFLOXACIN 5 MG/ML
SOLUTION/ DROPS OPHTHALMIC
Status: DISCONTINUED | OUTPATIENT
Start: 2021-03-15 | End: 2021-03-15 | Stop reason: HOSPADM

## 2021-03-15 RX ADMIN — PHENYLEPHRINE HYDROCHLORIDE 1 DROP: 25 SOLUTION/ DROPS OPHTHALMIC at 09:03

## 2021-03-15 RX ADMIN — MOXIFLOXACIN: 5 SOLUTION/ DROPS OPHTHALMIC at 10:03

## 2021-03-15 RX ADMIN — TETRACAINE HYDROCHLORIDE 1 DROP: 5 SOLUTION OPHTHALMIC at 09:03

## 2021-03-15 RX ADMIN — TROPICAMIDE 1 DROP: 10 SOLUTION/ DROPS OPHTHALMIC at 09:03

## 2021-03-15 RX ADMIN — TETRACAINE HYDROCHLORIDE 1 DROP: 5 SOLUTION OPHTHALMIC at 08:03

## 2021-03-15 RX ADMIN — TROPICAMIDE 1 DROP: 10 SOLUTION/ DROPS OPHTHALMIC at 08:03

## 2021-03-15 RX ADMIN — MOXIFLOXACIN 1 DROP: 5 SOLUTION/ DROPS OPHTHALMIC at 10:03

## 2021-03-15 RX ADMIN — MOXIFLOXACIN 1 DROP: 5 SOLUTION/ DROPS OPHTHALMIC at 08:03

## 2021-03-15 RX ADMIN — MIDAZOLAM HYDROCHLORIDE 2 MG: 1 INJECTION, SOLUTION INTRAMUSCULAR; INTRAVENOUS at 10:03

## 2021-03-15 RX ADMIN — PHENYLEPHRINE HYDROCHLORIDE 1 DROP: 25 SOLUTION/ DROPS OPHTHALMIC at 08:03

## 2021-03-15 RX ADMIN — MOXIFLOXACIN 1 DROP: 5 SOLUTION/ DROPS OPHTHALMIC at 09:03

## 2021-03-16 ENCOUNTER — OFFICE VISIT (OUTPATIENT)
Dept: OPHTHALMOLOGY | Facility: CLINIC | Age: 66
End: 2021-03-16
Attending: OPHTHALMOLOGY
Payer: MEDICARE

## 2021-03-16 DIAGNOSIS — Z98.890 POST-OPERATIVE STATE: Primary | ICD-10-CM

## 2021-03-16 DIAGNOSIS — H25.11 NUCLEAR SCLEROTIC CATARACT OF RIGHT EYE: ICD-10-CM

## 2021-03-16 PROCEDURE — 1126F AMNT PAIN NOTED NONE PRSNT: CPT | Mod: S$GLB,,, | Performed by: OPHTHALMOLOGY

## 2021-03-16 PROCEDURE — 99999 PR PBB SHADOW E&M-EST. PATIENT-LVL III: CPT | Mod: PBBFAC,,, | Performed by: OPHTHALMOLOGY

## 2021-03-16 PROCEDURE — 99024 POSTOP FOLLOW-UP VISIT: CPT | Mod: S$GLB,,, | Performed by: OPHTHALMOLOGY

## 2021-03-16 PROCEDURE — 99024 PR POST-OP FOLLOW-UP VISIT: ICD-10-PCS | Mod: S$GLB,,, | Performed by: OPHTHALMOLOGY

## 2021-03-16 PROCEDURE — 3288F FALL RISK ASSESSMENT DOCD: CPT | Mod: CPTII,S$GLB,, | Performed by: OPHTHALMOLOGY

## 2021-03-16 PROCEDURE — 1101F PT FALLS ASSESS-DOCD LE1/YR: CPT | Mod: CPTII,S$GLB,, | Performed by: OPHTHALMOLOGY

## 2021-03-16 PROCEDURE — 1126F PR PAIN SEVERITY QUANTIFIED, NO PAIN PRESENT: ICD-10-PCS | Mod: S$GLB,,, | Performed by: OPHTHALMOLOGY

## 2021-03-16 PROCEDURE — 1101F PR PT FALLS ASSESS DOC 0-1 FALLS W/OUT INJ PAST YR: ICD-10-PCS | Mod: CPTII,S$GLB,, | Performed by: OPHTHALMOLOGY

## 2021-03-16 PROCEDURE — 3288F PR FALLS RISK ASSESSMENT DOCUMENTED: ICD-10-PCS | Mod: CPTII,S$GLB,, | Performed by: OPHTHALMOLOGY

## 2021-03-16 PROCEDURE — 99999 PR PBB SHADOW E&M-EST. PATIENT-LVL III: ICD-10-PCS | Mod: PBBFAC,,, | Performed by: OPHTHALMOLOGY

## 2021-03-23 ENCOUNTER — OFFICE VISIT (OUTPATIENT)
Dept: OPHTHALMOLOGY | Facility: CLINIC | Age: 66
End: 2021-03-23
Attending: OPHTHALMOLOGY
Payer: MEDICARE

## 2021-03-23 DIAGNOSIS — H25.13 NUCLEAR SCLEROSIS, BILATERAL: ICD-10-CM

## 2021-03-23 DIAGNOSIS — Z98.890 POST-OPERATIVE STATE: Primary | ICD-10-CM

## 2021-03-23 PROCEDURE — 1126F PR PAIN SEVERITY QUANTIFIED, NO PAIN PRESENT: ICD-10-PCS | Mod: S$GLB,,, | Performed by: OPHTHALMOLOGY

## 2021-03-23 PROCEDURE — 99024 POSTOP FOLLOW-UP VISIT: CPT | Mod: S$GLB,,, | Performed by: OPHTHALMOLOGY

## 2021-03-23 PROCEDURE — 99999 PR PBB SHADOW E&M-EST. PATIENT-LVL III: CPT | Mod: PBBFAC,,, | Performed by: OPHTHALMOLOGY

## 2021-03-23 PROCEDURE — 92136 OPHTHALMIC BIOMETRY: CPT | Mod: 26,LT,S$GLB, | Performed by: OPHTHALMOLOGY

## 2021-03-23 PROCEDURE — 92136 IOL MASTER - OU - BOTH EYES: ICD-10-PCS | Mod: 26,LT,S$GLB, | Performed by: OPHTHALMOLOGY

## 2021-03-23 PROCEDURE — 99024 PR POST-OP FOLLOW-UP VISIT: ICD-10-PCS | Mod: S$GLB,,, | Performed by: OPHTHALMOLOGY

## 2021-03-23 PROCEDURE — 99999 PR PBB SHADOW E&M-EST. PATIENT-LVL III: ICD-10-PCS | Mod: PBBFAC,,, | Performed by: OPHTHALMOLOGY

## 2021-03-23 PROCEDURE — 1126F AMNT PAIN NOTED NONE PRSNT: CPT | Mod: S$GLB,,, | Performed by: OPHTHALMOLOGY

## 2021-03-23 RX ORDER — TROPICAMIDE 10 MG/ML
1 SOLUTION/ DROPS OPHTHALMIC
Status: CANCELLED | OUTPATIENT
Start: 2021-03-23

## 2021-03-23 RX ORDER — TETRACAINE HYDROCHLORIDE 5 MG/ML
1 SOLUTION OPHTHALMIC
Status: CANCELLED | OUTPATIENT
Start: 2021-03-23

## 2021-03-23 RX ORDER — PHENYLEPHRINE HYDROCHLORIDE 25 MG/ML
1 SOLUTION/ DROPS OPHTHALMIC
Status: CANCELLED | OUTPATIENT
Start: 2021-03-23

## 2021-03-23 RX ORDER — SODIUM CHLORIDE 0.9 % (FLUSH) 0.9 %
10 SYRINGE (ML) INJECTION
Status: DISCONTINUED | OUTPATIENT
Start: 2021-03-23 | End: 2021-04-30

## 2021-03-23 RX ORDER — MOXIFLOXACIN 5 MG/ML
1 SOLUTION/ DROPS OPHTHALMIC
Status: CANCELLED | OUTPATIENT
Start: 2021-03-23

## 2021-03-25 ENCOUNTER — TELEPHONE (OUTPATIENT)
Dept: OPHTHALMOLOGY | Facility: CLINIC | Age: 66
End: 2021-03-25

## 2021-03-29 ENCOUNTER — ANESTHESIA (OUTPATIENT)
Dept: SURGERY | Facility: OTHER | Age: 66
End: 2021-03-29
Payer: MEDICARE

## 2021-03-29 ENCOUNTER — HOSPITAL ENCOUNTER (OUTPATIENT)
Facility: OTHER | Age: 66
Discharge: HOME | End: 2021-03-29
Attending: OPHTHALMOLOGY | Admitting: OPHTHALMOLOGY
Payer: MEDICARE

## 2021-03-29 ENCOUNTER — ANESTHESIA EVENT (OUTPATIENT)
Dept: SURGERY | Facility: OTHER | Age: 66
End: 2021-03-29
Payer: MEDICARE

## 2021-03-29 VITALS
RESPIRATION RATE: 18 BRPM | DIASTOLIC BLOOD PRESSURE: 74 MMHG | OXYGEN SATURATION: 100 % | HEIGHT: 59 IN | BODY MASS INDEX: 39.51 KG/M2 | HEART RATE: 68 BPM | SYSTOLIC BLOOD PRESSURE: 138 MMHG | WEIGHT: 196 LBS | TEMPERATURE: 98 F

## 2021-03-29 DIAGNOSIS — Z98.890 POST-OPERATIVE STATE: ICD-10-CM

## 2021-03-29 DIAGNOSIS — H25.13 NUCLEAR SCLEROSIS, BILATERAL: ICD-10-CM

## 2021-03-29 DIAGNOSIS — H25.12 NUCLEAR SCLEROTIC CATARACT OF LEFT EYE: Primary | ICD-10-CM

## 2021-03-29 LAB — POCT GLUCOSE: 129 MG/DL (ref 70–110)

## 2021-03-29 PROCEDURE — 25000003 PHARM REV CODE 250: Performed by: OPHTHALMOLOGY

## 2021-03-29 PROCEDURE — 36000706: Performed by: OPHTHALMOLOGY

## 2021-03-29 PROCEDURE — 63600175 PHARM REV CODE 636 W HCPCS: Performed by: NURSE ANESTHETIST, CERTIFIED REGISTERED

## 2021-03-29 PROCEDURE — 66984 XCAPSL CTRC RMVL W/O ECP: CPT | Mod: 79,LT,, | Performed by: OPHTHALMOLOGY

## 2021-03-29 PROCEDURE — 71000015 HC POSTOP RECOV 1ST HR: Performed by: OPHTHALMOLOGY

## 2021-03-29 PROCEDURE — 37000009 HC ANESTHESIA EA ADD 15 MINS: Performed by: OPHTHALMOLOGY

## 2021-03-29 PROCEDURE — 66984 PR REMOVAL, CATARACT, W/INSRT INTRAOC LENS, W/O ENDO CYCLO: ICD-10-PCS | Mod: 79,LT,, | Performed by: OPHTHALMOLOGY

## 2021-03-29 PROCEDURE — 36000707: Performed by: OPHTHALMOLOGY

## 2021-03-29 PROCEDURE — 37000008 HC ANESTHESIA 1ST 15 MINUTES: Performed by: OPHTHALMOLOGY

## 2021-03-29 PROCEDURE — V2632 POST CHMBR INTRAOCULAR LENS: HCPCS | Performed by: OPHTHALMOLOGY

## 2021-03-29 DEVICE — IMPLANTABLE DEVICE: Type: IMPLANTABLE DEVICE | Site: EYE | Status: FUNCTIONAL

## 2021-03-29 RX ORDER — PHENYLEPHRINE HYDROCHLORIDE 25 MG/ML
1 SOLUTION/ DROPS OPHTHALMIC
Status: COMPLETED | OUTPATIENT
Start: 2021-03-29 | End: 2021-03-29

## 2021-03-29 RX ORDER — MOXIFLOXACIN 5 MG/ML
SOLUTION/ DROPS OPHTHALMIC
Status: DISCONTINUED | OUTPATIENT
Start: 2021-03-29 | End: 2021-03-29 | Stop reason: HOSPADM

## 2021-03-29 RX ORDER — MOXIFLOXACIN 5 MG/ML
1 SOLUTION/ DROPS OPHTHALMIC
Status: COMPLETED | OUTPATIENT
Start: 2021-03-29 | End: 2021-03-29

## 2021-03-29 RX ORDER — MIDAZOLAM HYDROCHLORIDE 1 MG/ML
INJECTION INTRAMUSCULAR; INTRAVENOUS
Status: DISCONTINUED | OUTPATIENT
Start: 2021-03-29 | End: 2021-03-29

## 2021-03-29 RX ORDER — LIDOCAINE HYDROCHLORIDE 40 MG/ML
INJECTION, SOLUTION RETROBULBAR
Status: DISCONTINUED | OUTPATIENT
Start: 2021-03-29 | End: 2021-03-29 | Stop reason: HOSPADM

## 2021-03-29 RX ORDER — TETRACAINE HYDROCHLORIDE 5 MG/ML
SOLUTION OPHTHALMIC
Status: DISCONTINUED | OUTPATIENT
Start: 2021-03-29 | End: 2021-03-29 | Stop reason: HOSPADM

## 2021-03-29 RX ORDER — PROPARACAINE HYDROCHLORIDE 5 MG/ML
1 SOLUTION/ DROPS OPHTHALMIC
Status: DISCONTINUED | OUTPATIENT
Start: 2021-03-29 | End: 2021-03-29 | Stop reason: HOSPADM

## 2021-03-29 RX ORDER — ACETAMINOPHEN 325 MG/1
650 TABLET ORAL EVERY 4 HOURS PRN
Status: DISCONTINUED | OUTPATIENT
Start: 2021-03-29 | End: 2021-03-29 | Stop reason: HOSPADM

## 2021-03-29 RX ORDER — TROPICAMIDE 10 MG/ML
1 SOLUTION/ DROPS OPHTHALMIC
Status: COMPLETED | OUTPATIENT
Start: 2021-03-29 | End: 2021-03-29

## 2021-03-29 RX ORDER — TETRACAINE HYDROCHLORIDE 5 MG/ML
1 SOLUTION OPHTHALMIC
Status: COMPLETED | OUTPATIENT
Start: 2021-03-29 | End: 2021-03-29

## 2021-03-29 RX ADMIN — TROPICAMIDE 1 DROP: 10 SOLUTION/ DROPS OPHTHALMIC at 07:03

## 2021-03-29 RX ADMIN — TETRACAINE HYDROCHLORIDE 1 DROP: 5 SOLUTION OPHTHALMIC at 07:03

## 2021-03-29 RX ADMIN — MOXIFLOXACIN 1 DROP: 5 SOLUTION/ DROPS OPHTHALMIC at 09:03

## 2021-03-29 RX ADMIN — MOXIFLOXACIN 1 DROP: 5 SOLUTION/ DROPS OPHTHALMIC at 07:03

## 2021-03-29 RX ADMIN — MIDAZOLAM HYDROCHLORIDE 2 MG: 1 INJECTION, SOLUTION INTRAMUSCULAR; INTRAVENOUS at 08:03

## 2021-03-29 RX ADMIN — PHENYLEPHRINE HYDROCHLORIDE 1 DROP: 25 SOLUTION/ DROPS OPHTHALMIC at 07:03

## 2021-03-29 RX ADMIN — MOXIFLOXACIN 1 DROP: 5 SOLUTION/ DROPS OPHTHALMIC at 08:03

## 2021-03-30 ENCOUNTER — OFFICE VISIT (OUTPATIENT)
Dept: OPHTHALMOLOGY | Facility: CLINIC | Age: 66
End: 2021-03-30
Attending: OPHTHALMOLOGY
Payer: MEDICARE

## 2021-03-30 DIAGNOSIS — H25.12 NUCLEAR SCLEROTIC CATARACT OF LEFT EYE: ICD-10-CM

## 2021-03-30 DIAGNOSIS — Z98.890 POST-OPERATIVE STATE: Primary | ICD-10-CM

## 2021-03-30 PROCEDURE — 99024 POSTOP FOLLOW-UP VISIT: CPT | Mod: S$GLB,,, | Performed by: OPHTHALMOLOGY

## 2021-03-30 PROCEDURE — 99999 PR PBB SHADOW E&M-EST. PATIENT-LVL III: ICD-10-PCS | Mod: PBBFAC,,, | Performed by: OPHTHALMOLOGY

## 2021-03-30 PROCEDURE — 1126F AMNT PAIN NOTED NONE PRSNT: CPT | Mod: S$GLB,,, | Performed by: OPHTHALMOLOGY

## 2021-03-30 PROCEDURE — 1126F PR PAIN SEVERITY QUANTIFIED, NO PAIN PRESENT: ICD-10-PCS | Mod: S$GLB,,, | Performed by: OPHTHALMOLOGY

## 2021-03-30 PROCEDURE — 1101F PR PT FALLS ASSESS DOC 0-1 FALLS W/OUT INJ PAST YR: ICD-10-PCS | Mod: CPTII,S$GLB,, | Performed by: OPHTHALMOLOGY

## 2021-03-30 PROCEDURE — 1101F PT FALLS ASSESS-DOCD LE1/YR: CPT | Mod: CPTII,S$GLB,, | Performed by: OPHTHALMOLOGY

## 2021-03-30 PROCEDURE — 99024 PR POST-OP FOLLOW-UP VISIT: ICD-10-PCS | Mod: S$GLB,,, | Performed by: OPHTHALMOLOGY

## 2021-03-30 PROCEDURE — 3288F FALL RISK ASSESSMENT DOCD: CPT | Mod: CPTII,S$GLB,, | Performed by: OPHTHALMOLOGY

## 2021-03-30 PROCEDURE — 3288F PR FALLS RISK ASSESSMENT DOCUMENTED: ICD-10-PCS | Mod: CPTII,S$GLB,, | Performed by: OPHTHALMOLOGY

## 2021-03-30 PROCEDURE — 99999 PR PBB SHADOW E&M-EST. PATIENT-LVL III: CPT | Mod: PBBFAC,,, | Performed by: OPHTHALMOLOGY

## 2021-04-05 ENCOUNTER — PATIENT MESSAGE (OUTPATIENT)
Dept: OPTOMETRY | Facility: CLINIC | Age: 66
End: 2021-04-05

## 2021-04-05 ENCOUNTER — PATIENT MESSAGE (OUTPATIENT)
Dept: ADMINISTRATIVE | Facility: HOSPITAL | Age: 66
End: 2021-04-05

## 2021-04-30 ENCOUNTER — OFFICE VISIT (OUTPATIENT)
Dept: OPTOMETRY | Facility: CLINIC | Age: 66
End: 2021-04-30
Payer: MEDICARE

## 2021-04-30 DIAGNOSIS — Z98.42 S/P BILATERAL CATARACT EXTRACTION: Primary | ICD-10-CM

## 2021-04-30 DIAGNOSIS — Z98.41 S/P BILATERAL CATARACT EXTRACTION: Primary | ICD-10-CM

## 2021-04-30 PROCEDURE — 99024 PR POST-OP FOLLOW-UP VISIT: ICD-10-PCS | Mod: S$GLB,,, | Performed by: OPTOMETRIST

## 2021-04-30 PROCEDURE — 3288F PR FALLS RISK ASSESSMENT DOCUMENTED: ICD-10-PCS | Mod: CPTII,S$GLB,, | Performed by: OPTOMETRIST

## 2021-04-30 PROCEDURE — 3288F FALL RISK ASSESSMENT DOCD: CPT | Mod: CPTII,S$GLB,, | Performed by: OPTOMETRIST

## 2021-04-30 PROCEDURE — 2023F PR DILATED RETINAL EXAM W/O EVID OF RETINOPATHY: ICD-10-PCS | Mod: S$GLB,,, | Performed by: OPTOMETRIST

## 2021-04-30 PROCEDURE — 99999 PR PBB SHADOW E&M-EST. PATIENT-LVL III: ICD-10-PCS | Mod: PBBFAC,,, | Performed by: OPTOMETRIST

## 2021-04-30 PROCEDURE — 2023F DILAT RTA XM W/O RTNOPTHY: CPT | Mod: S$GLB,,, | Performed by: OPTOMETRIST

## 2021-04-30 PROCEDURE — 99999 PR PBB SHADOW E&M-EST. PATIENT-LVL III: CPT | Mod: PBBFAC,,, | Performed by: OPTOMETRIST

## 2021-04-30 PROCEDURE — 99024 POSTOP FOLLOW-UP VISIT: CPT | Mod: S$GLB,,, | Performed by: OPTOMETRIST

## 2021-04-30 PROCEDURE — 1126F AMNT PAIN NOTED NONE PRSNT: CPT | Mod: S$GLB,,, | Performed by: OPTOMETRIST

## 2021-04-30 PROCEDURE — 1126F PR PAIN SEVERITY QUANTIFIED, NO PAIN PRESENT: ICD-10-PCS | Mod: S$GLB,,, | Performed by: OPTOMETRIST

## 2021-04-30 PROCEDURE — 1101F PR PT FALLS ASSESS DOC 0-1 FALLS W/OUT INJ PAST YR: ICD-10-PCS | Mod: CPTII,S$GLB,, | Performed by: OPTOMETRIST

## 2021-04-30 PROCEDURE — 1101F PT FALLS ASSESS-DOCD LE1/YR: CPT | Mod: CPTII,S$GLB,, | Performed by: OPTOMETRIST

## 2021-05-03 ENCOUNTER — PATIENT MESSAGE (OUTPATIENT)
Dept: INTERNAL MEDICINE | Facility: CLINIC | Age: 66
End: 2021-05-03

## 2021-05-10 ENCOUNTER — PATIENT MESSAGE (OUTPATIENT)
Dept: INTERNAL MEDICINE | Facility: CLINIC | Age: 66
End: 2021-05-10

## 2021-07-06 ENCOUNTER — OFFICE VISIT (OUTPATIENT)
Dept: INTERNAL MEDICINE | Facility: CLINIC | Age: 66
End: 2021-07-06
Payer: MEDICARE

## 2021-07-06 ENCOUNTER — LAB VISIT (OUTPATIENT)
Dept: LAB | Facility: OTHER | Age: 66
End: 2021-07-06
Attending: INTERNAL MEDICINE
Payer: MEDICARE

## 2021-07-06 VITALS
BODY MASS INDEX: 40.88 KG/M2 | WEIGHT: 202.81 LBS | SYSTOLIC BLOOD PRESSURE: 150 MMHG | HEIGHT: 59 IN | OXYGEN SATURATION: 99 % | DIASTOLIC BLOOD PRESSURE: 96 MMHG | HEART RATE: 81 BPM

## 2021-07-06 DIAGNOSIS — I10 ESSENTIAL HYPERTENSION: Primary | ICD-10-CM

## 2021-07-06 DIAGNOSIS — E05.00 GRAVES' DISEASE WITH EXOPHTHALMOS: ICD-10-CM

## 2021-07-06 DIAGNOSIS — E78.2 MIXED HYPERLIPIDEMIA: ICD-10-CM

## 2021-07-06 DIAGNOSIS — N39.41 URGE INCONTINENCE: ICD-10-CM

## 2021-07-06 DIAGNOSIS — E78.2 MIXED HYPERLIPIDEMIA: Chronic | ICD-10-CM

## 2021-07-06 DIAGNOSIS — E11.59 HYPERTENSION ASSOCIATED WITH DIABETES: ICD-10-CM

## 2021-07-06 DIAGNOSIS — E11.69 DIABETES MELLITUS TYPE 2 IN OBESE: ICD-10-CM

## 2021-07-06 DIAGNOSIS — E11.9 TYPE 2 DIABETES MELLITUS WITHOUT COMPLICATION, WITHOUT LONG-TERM CURRENT USE OF INSULIN: ICD-10-CM

## 2021-07-06 DIAGNOSIS — E23.6 EMPTY SELLA SYNDROME: ICD-10-CM

## 2021-07-06 DIAGNOSIS — E66.9 OBESITY (BMI 30-39.9): ICD-10-CM

## 2021-07-06 DIAGNOSIS — G40.319 GENERALIZED CONVULSIVE EPILEPSY WITH INTRACTABLE EPILEPSY: ICD-10-CM

## 2021-07-06 DIAGNOSIS — I15.2 HYPERTENSION ASSOCIATED WITH DIABETES: ICD-10-CM

## 2021-07-06 DIAGNOSIS — I10 ESSENTIAL HYPERTENSION: Chronic | ICD-10-CM

## 2021-07-06 DIAGNOSIS — E66.9 DIABETES MELLITUS TYPE 2 IN OBESE: ICD-10-CM

## 2021-07-06 LAB
ALBUMIN SERPL BCP-MCNC: 3.9 G/DL (ref 3.5–5.2)
ALP SERPL-CCNC: 93 U/L (ref 55–135)
ALT SERPL W/O P-5'-P-CCNC: 33 U/L (ref 10–44)
ANION GAP SERPL CALC-SCNC: 12 MMOL/L (ref 8–16)
AST SERPL-CCNC: 29 U/L (ref 10–40)
BASOPHILS # BLD AUTO: 0.04 K/UL (ref 0–0.2)
BASOPHILS NFR BLD: 0.6 % (ref 0–1.9)
BILIRUB SERPL-MCNC: 0.5 MG/DL (ref 0.1–1)
BUN SERPL-MCNC: 10 MG/DL (ref 8–23)
CALCIUM SERPL-MCNC: 9.9 MG/DL (ref 8.7–10.5)
CHLORIDE SERPL-SCNC: 100 MMOL/L (ref 95–110)
CHOLEST SERPL-MCNC: 142 MG/DL (ref 120–199)
CHOLEST/HDLC SERPL: 2.7 {RATIO} (ref 2–5)
CO2 SERPL-SCNC: 25 MMOL/L (ref 23–29)
CREAT SERPL-MCNC: 0.8 MG/DL (ref 0.5–1.4)
DIFFERENTIAL METHOD: NORMAL
EOSINOPHIL # BLD AUTO: 0.1 K/UL (ref 0–0.5)
EOSINOPHIL NFR BLD: 1.4 % (ref 0–8)
ERYTHROCYTE [DISTWIDTH] IN BLOOD BY AUTOMATED COUNT: 14 % (ref 11.5–14.5)
EST. GFR  (AFRICAN AMERICAN): >60 ML/MIN/1.73 M^2
EST. GFR  (NON AFRICAN AMERICAN): >60 ML/MIN/1.73 M^2
ESTIMATED AVG GLUCOSE: 169 MG/DL (ref 68–131)
GLUCOSE SERPL-MCNC: 146 MG/DL (ref 70–110)
HBA1C MFR BLD: 7.5 % (ref 4–5.6)
HCT VFR BLD AUTO: 37.4 % (ref 37–48.5)
HDLC SERPL-MCNC: 52 MG/DL (ref 40–75)
HDLC SERPL: 36.6 % (ref 20–50)
HGB BLD-MCNC: 12.3 G/DL (ref 12–16)
IMM GRANULOCYTES # BLD AUTO: 0.02 K/UL (ref 0–0.04)
IMM GRANULOCYTES NFR BLD AUTO: 0.3 % (ref 0–0.5)
LDLC SERPL CALC-MCNC: 77 MG/DL (ref 63–159)
LYMPHOCYTES # BLD AUTO: 3.1 K/UL (ref 1–4.8)
LYMPHOCYTES NFR BLD: 43.3 % (ref 18–48)
MCH RBC QN AUTO: 28.8 PG (ref 27–31)
MCHC RBC AUTO-ENTMCNC: 32.9 G/DL (ref 32–36)
MCV RBC AUTO: 88 FL (ref 82–98)
MONOCYTES # BLD AUTO: 0.6 K/UL (ref 0.3–1)
MONOCYTES NFR BLD: 8.2 % (ref 4–15)
NEUTROPHILS # BLD AUTO: 3.3 K/UL (ref 1.8–7.7)
NEUTROPHILS NFR BLD: 46.2 % (ref 38–73)
NONHDLC SERPL-MCNC: 90 MG/DL
NRBC BLD-RTO: 0 /100 WBC
PLATELET # BLD AUTO: 297 K/UL (ref 150–450)
PMV BLD AUTO: 9.3 FL (ref 9.2–12.9)
POTASSIUM SERPL-SCNC: 3.5 MMOL/L (ref 3.5–5.1)
PROT SERPL-MCNC: 7.8 G/DL (ref 6–8.4)
RBC # BLD AUTO: 4.27 M/UL (ref 4–5.4)
SODIUM SERPL-SCNC: 137 MMOL/L (ref 136–145)
TRIGL SERPL-MCNC: 65 MG/DL (ref 30–150)
TSH SERPL DL<=0.005 MIU/L-ACNC: 1.26 UIU/ML (ref 0.4–4)
WBC # BLD AUTO: 7.09 K/UL (ref 3.9–12.7)

## 2021-07-06 PROCEDURE — 3077F PR MOST RECENT SYSTOLIC BLOOD PRESSURE >= 140 MM HG: ICD-10-PCS | Mod: CPTII,S$GLB,, | Performed by: INTERNAL MEDICINE

## 2021-07-06 PROCEDURE — 3080F PR MOST RECENT DIASTOLIC BLOOD PRESSURE >= 90 MM HG: ICD-10-PCS | Mod: CPTII,S$GLB,, | Performed by: INTERNAL MEDICINE

## 2021-07-06 PROCEDURE — 99214 PR OFFICE/OUTPT VISIT, EST, LEVL IV, 30-39 MIN: ICD-10-PCS | Mod: S$GLB,,, | Performed by: INTERNAL MEDICINE

## 2021-07-06 PROCEDURE — 1159F PR MEDICATION LIST DOCUMENTED IN MEDICAL RECORD: ICD-10-PCS | Mod: S$GLB,,, | Performed by: INTERNAL MEDICINE

## 2021-07-06 PROCEDURE — 3077F SYST BP >= 140 MM HG: CPT | Mod: CPTII,S$GLB,, | Performed by: INTERNAL MEDICINE

## 2021-07-06 PROCEDURE — 3008F PR BODY MASS INDEX (BMI) DOCUMENTED: ICD-10-PCS | Mod: CPTII,S$GLB,, | Performed by: INTERNAL MEDICINE

## 2021-07-06 PROCEDURE — 99999 PR PBB SHADOW E&M-EST. PATIENT-LVL V: ICD-10-PCS | Mod: PBBFAC,,, | Performed by: INTERNAL MEDICINE

## 2021-07-06 PROCEDURE — 1101F PR PT FALLS ASSESS DOC 0-1 FALLS W/OUT INJ PAST YR: ICD-10-PCS | Mod: CPTII,S$GLB,, | Performed by: INTERNAL MEDICINE

## 2021-07-06 PROCEDURE — 1125F PR PAIN SEVERITY QUANTIFIED, PAIN PRESENT: ICD-10-PCS | Mod: S$GLB,,, | Performed by: INTERNAL MEDICINE

## 2021-07-06 PROCEDURE — 80053 COMPREHEN METABOLIC PANEL: CPT | Performed by: INTERNAL MEDICINE

## 2021-07-06 PROCEDURE — 3008F BODY MASS INDEX DOCD: CPT | Mod: CPTII,S$GLB,, | Performed by: INTERNAL MEDICINE

## 2021-07-06 PROCEDURE — 99999 PR PBB SHADOW E&M-EST. PATIENT-LVL V: CPT | Mod: PBBFAC,,, | Performed by: INTERNAL MEDICINE

## 2021-07-06 PROCEDURE — 99214 OFFICE O/P EST MOD 30 MIN: CPT | Mod: S$GLB,,, | Performed by: INTERNAL MEDICINE

## 2021-07-06 PROCEDURE — 3072F PR LOW RISK FOR RETINOPATHY: ICD-10-PCS | Mod: S$GLB,,, | Performed by: INTERNAL MEDICINE

## 2021-07-06 PROCEDURE — 36415 COLL VENOUS BLD VENIPUNCTURE: CPT | Performed by: INTERNAL MEDICINE

## 2021-07-06 PROCEDURE — 1159F MED LIST DOCD IN RCRD: CPT | Mod: S$GLB,,, | Performed by: INTERNAL MEDICINE

## 2021-07-06 PROCEDURE — 1125F AMNT PAIN NOTED PAIN PRSNT: CPT | Mod: S$GLB,,, | Performed by: INTERNAL MEDICINE

## 2021-07-06 PROCEDURE — 3288F PR FALLS RISK ASSESSMENT DOCUMENTED: ICD-10-PCS | Mod: CPTII,S$GLB,, | Performed by: INTERNAL MEDICINE

## 2021-07-06 PROCEDURE — 83036 HEMOGLOBIN GLYCOSYLATED A1C: CPT | Performed by: INTERNAL MEDICINE

## 2021-07-06 PROCEDURE — 3072F LOW RISK FOR RETINOPATHY: CPT | Mod: S$GLB,,, | Performed by: INTERNAL MEDICINE

## 2021-07-06 PROCEDURE — 3288F FALL RISK ASSESSMENT DOCD: CPT | Mod: CPTII,S$GLB,, | Performed by: INTERNAL MEDICINE

## 2021-07-06 PROCEDURE — 80061 LIPID PANEL: CPT | Performed by: INTERNAL MEDICINE

## 2021-07-06 PROCEDURE — 99499 RISK ADDL DX/OHS AUDIT: ICD-10-PCS | Mod: S$GLB,,, | Performed by: INTERNAL MEDICINE

## 2021-07-06 PROCEDURE — 3080F DIAST BP >= 90 MM HG: CPT | Mod: CPTII,S$GLB,, | Performed by: INTERNAL MEDICINE

## 2021-07-06 PROCEDURE — 99499 UNLISTED E&M SERVICE: CPT | Mod: S$GLB,,, | Performed by: INTERNAL MEDICINE

## 2021-07-06 PROCEDURE — 84443 ASSAY THYROID STIM HORMONE: CPT | Performed by: INTERNAL MEDICINE

## 2021-07-06 PROCEDURE — 85025 COMPLETE CBC W/AUTO DIFF WBC: CPT | Performed by: INTERNAL MEDICINE

## 2021-07-06 PROCEDURE — 1101F PT FALLS ASSESS-DOCD LE1/YR: CPT | Mod: CPTII,S$GLB,, | Performed by: INTERNAL MEDICINE

## 2021-07-06 RX ORDER — LOSARTAN POTASSIUM 50 MG/1
50 TABLET ORAL DAILY
Qty: 90 TABLET | Refills: 3 | Status: SHIPPED | OUTPATIENT
Start: 2021-07-06 | End: 2021-07-20

## 2021-07-07 ENCOUNTER — IMMUNIZATION (OUTPATIENT)
Dept: PHARMACY | Facility: CLINIC | Age: 66
End: 2021-07-07
Payer: MEDICARE

## 2021-07-07 ENCOUNTER — PATIENT MESSAGE (OUTPATIENT)
Dept: INTERNAL MEDICINE | Facility: CLINIC | Age: 66
End: 2021-07-07

## 2021-07-07 RX ORDER — DULAGLUTIDE 3 MG/.5ML
3 INJECTION, SOLUTION SUBCUTANEOUS
Qty: 12 PEN | Refills: 1 | Status: SHIPPED | OUTPATIENT
Start: 2021-07-07 | End: 2021-07-08 | Stop reason: SDUPTHER

## 2021-07-08 ENCOUNTER — PATIENT MESSAGE (OUTPATIENT)
Dept: INTERNAL MEDICINE | Facility: CLINIC | Age: 66
End: 2021-07-08

## 2021-07-08 RX ORDER — DULAGLUTIDE 3 MG/.5ML
3 INJECTION, SOLUTION SUBCUTANEOUS
Qty: 12 PEN | Refills: 1 | Status: SHIPPED | OUTPATIENT
Start: 2021-07-08 | End: 2021-07-09 | Stop reason: SDUPTHER

## 2021-07-09 RX ORDER — DULAGLUTIDE 3 MG/.5ML
3 INJECTION, SOLUTION SUBCUTANEOUS
Qty: 12 PEN | Refills: 1 | Status: SHIPPED | OUTPATIENT
Start: 2021-07-09 | End: 2021-07-09 | Stop reason: SDUPTHER

## 2021-07-09 RX ORDER — DULAGLUTIDE 3 MG/.5ML
3 INJECTION, SOLUTION SUBCUTANEOUS
Qty: 12 PEN | Refills: 1 | Status: SHIPPED | OUTPATIENT
Start: 2021-07-09 | End: 2021-11-29 | Stop reason: SDUPTHER

## 2021-07-20 ENCOUNTER — TELEPHONE (OUTPATIENT)
Dept: INTERNAL MEDICINE | Facility: CLINIC | Age: 66
End: 2021-07-20

## 2021-07-20 ENCOUNTER — CLINICAL SUPPORT (OUTPATIENT)
Dept: INTERNAL MEDICINE | Facility: CLINIC | Age: 66
End: 2021-07-20
Payer: MEDICARE

## 2021-07-20 VITALS — OXYGEN SATURATION: 99 % | HEART RATE: 73 BPM | SYSTOLIC BLOOD PRESSURE: 140 MMHG | DIASTOLIC BLOOD PRESSURE: 80 MMHG

## 2021-07-20 DIAGNOSIS — I10 ESSENTIAL HYPERTENSION, BENIGN: Primary | ICD-10-CM

## 2021-07-20 PROCEDURE — 99999 PR PBB SHADOW E&M-EST. PATIENT-LVL II: CPT | Mod: PBBFAC,,,

## 2021-07-20 PROCEDURE — 99999 PR PBB SHADOW E&M-EST. PATIENT-LVL II: ICD-10-PCS | Mod: PBBFAC,,,

## 2021-07-20 RX ORDER — LOSARTAN POTASSIUM 100 MG/1
100 TABLET ORAL DAILY
Qty: 90 TABLET | Refills: 3 | Status: SHIPPED | OUTPATIENT
Start: 2021-07-20 | End: 2022-08-05

## 2021-08-03 ENCOUNTER — CLINICAL SUPPORT (OUTPATIENT)
Dept: INTERNAL MEDICINE | Facility: CLINIC | Age: 66
End: 2021-08-03
Payer: MEDICARE

## 2021-08-03 ENCOUNTER — LAB VISIT (OUTPATIENT)
Dept: LAB | Facility: OTHER | Age: 66
End: 2021-08-03
Attending: INTERNAL MEDICINE
Payer: MEDICARE

## 2021-08-03 ENCOUNTER — TELEPHONE (OUTPATIENT)
Dept: OBSTETRICS AND GYNECOLOGY | Facility: CLINIC | Age: 66
End: 2021-08-03

## 2021-08-03 VITALS — DIASTOLIC BLOOD PRESSURE: 86 MMHG | HEART RATE: 79 BPM | SYSTOLIC BLOOD PRESSURE: 132 MMHG

## 2021-08-03 DIAGNOSIS — I10 ESSENTIAL HYPERTENSION, BENIGN: ICD-10-CM

## 2021-08-03 DIAGNOSIS — Z12.31 SCREENING MAMMOGRAM, ENCOUNTER FOR: Primary | ICD-10-CM

## 2021-08-03 LAB
ANION GAP SERPL CALC-SCNC: 11 MMOL/L (ref 8–16)
BUN SERPL-MCNC: 21 MG/DL (ref 8–23)
CALCIUM SERPL-MCNC: 9.5 MG/DL (ref 8.7–10.5)
CHLORIDE SERPL-SCNC: 101 MMOL/L (ref 95–110)
CO2 SERPL-SCNC: 29 MMOL/L (ref 23–29)
CREAT SERPL-MCNC: 1.1 MG/DL (ref 0.5–1.4)
EST. GFR  (AFRICAN AMERICAN): >60 ML/MIN/1.73 M^2
EST. GFR  (NON AFRICAN AMERICAN): 52 ML/MIN/1.73 M^2
GLUCOSE SERPL-MCNC: 123 MG/DL (ref 70–110)
POTASSIUM SERPL-SCNC: 3.8 MMOL/L (ref 3.5–5.1)
SODIUM SERPL-SCNC: 141 MMOL/L (ref 136–145)

## 2021-08-03 PROCEDURE — 36415 COLL VENOUS BLD VENIPUNCTURE: CPT | Performed by: INTERNAL MEDICINE

## 2021-08-03 PROCEDURE — 80048 BASIC METABOLIC PNL TOTAL CA: CPT | Performed by: INTERNAL MEDICINE

## 2021-08-04 ENCOUNTER — TELEPHONE (OUTPATIENT)
Dept: INTERNAL MEDICINE | Facility: CLINIC | Age: 66
End: 2021-08-04

## 2021-09-10 ENCOUNTER — PATIENT OUTREACH (OUTPATIENT)
Dept: ADMINISTRATIVE | Facility: HOSPITAL | Age: 66
End: 2021-09-10

## 2021-10-25 ENCOUNTER — PES CALL (OUTPATIENT)
Dept: ADMINISTRATIVE | Facility: CLINIC | Age: 66
End: 2021-10-25
Payer: MEDICARE

## 2021-11-04 ENCOUNTER — PES CALL (OUTPATIENT)
Dept: ADMINISTRATIVE | Facility: CLINIC | Age: 66
End: 2021-11-04
Payer: MEDICARE

## 2021-11-15 ENCOUNTER — IMMUNIZATION (OUTPATIENT)
Dept: PHARMACY | Facility: CLINIC | Age: 66
End: 2021-11-15
Payer: MEDICARE

## 2021-11-19 ENCOUNTER — TELEPHONE (OUTPATIENT)
Dept: INTERNAL MEDICINE | Facility: CLINIC | Age: 66
End: 2021-11-19
Payer: MEDICARE

## 2021-11-22 ENCOUNTER — TELEPHONE (OUTPATIENT)
Dept: INTERNAL MEDICINE | Facility: CLINIC | Age: 66
End: 2021-11-22
Payer: MEDICARE

## 2021-11-29 DIAGNOSIS — E11.9 TYPE 2 DIABETES MELLITUS WITHOUT COMPLICATION, WITHOUT LONG-TERM CURRENT USE OF INSULIN: Primary | ICD-10-CM

## 2021-11-29 RX ORDER — DULAGLUTIDE 3 MG/.5ML
3 INJECTION, SOLUTION SUBCUTANEOUS
Qty: 12 PEN | Refills: 4 | Status: SHIPPED | OUTPATIENT
Start: 2021-11-29 | End: 2021-12-03 | Stop reason: SDUPTHER

## 2021-11-30 ENCOUNTER — PATIENT MESSAGE (OUTPATIENT)
Dept: INTERNAL MEDICINE | Facility: CLINIC | Age: 66
End: 2021-11-30
Payer: MEDICARE

## 2021-11-30 DIAGNOSIS — E11.9 TYPE 2 DIABETES MELLITUS WITHOUT COMPLICATION, WITHOUT LONG-TERM CURRENT USE OF INSULIN: ICD-10-CM

## 2021-12-03 RX ORDER — DULAGLUTIDE 3 MG/.5ML
3 INJECTION, SOLUTION SUBCUTANEOUS
Qty: 12 PEN | Refills: 4 | Status: SHIPPED | OUTPATIENT
Start: 2021-12-03 | End: 2023-03-07 | Stop reason: SDUPTHER

## 2021-12-07 ENCOUNTER — TELEPHONE (OUTPATIENT)
Dept: INTERNAL MEDICINE | Facility: CLINIC | Age: 66
End: 2021-12-07
Payer: MEDICARE

## 2021-12-23 ENCOUNTER — HOSPITAL ENCOUNTER (OUTPATIENT)
Dept: RADIOLOGY | Facility: OTHER | Age: 66
Discharge: HOME | End: 2021-12-23
Attending: OBSTETRICS & GYNECOLOGY
Payer: MEDICARE

## 2021-12-23 DIAGNOSIS — Z12.31 SCREENING MAMMOGRAM, ENCOUNTER FOR: ICD-10-CM

## 2021-12-23 PROCEDURE — 77067 SCR MAMMO BI INCL CAD: CPT | Mod: TC

## 2021-12-23 PROCEDURE — 77067 SCR MAMMO BI INCL CAD: CPT | Mod: 26,,, | Performed by: RADIOLOGY

## 2021-12-23 PROCEDURE — 77063 BREAST TOMOSYNTHESIS BI: CPT | Mod: 26,,, | Performed by: RADIOLOGY

## 2021-12-23 PROCEDURE — 77063 MAMMO DIGITAL SCREENING BILAT WITH TOMO: ICD-10-PCS | Mod: 26,,, | Performed by: RADIOLOGY

## 2021-12-23 PROCEDURE — 77067 MAMMO DIGITAL SCREENING BILAT WITH TOMO: ICD-10-PCS | Mod: 26,,, | Performed by: RADIOLOGY

## 2021-12-30 ENCOUNTER — OFFICE VISIT (OUTPATIENT)
Dept: OBSTETRICS AND GYNECOLOGY | Facility: CLINIC | Age: 66
End: 2021-12-30
Payer: MEDICARE

## 2021-12-30 VITALS
WEIGHT: 198 LBS | BODY MASS INDEX: 39.92 KG/M2 | SYSTOLIC BLOOD PRESSURE: 150 MMHG | HEIGHT: 59 IN | DIASTOLIC BLOOD PRESSURE: 80 MMHG

## 2021-12-30 DIAGNOSIS — E23.6 EMPTY SELLA SYNDROME: ICD-10-CM

## 2021-12-30 DIAGNOSIS — Z01.419 ENCOUNTER FOR GYNECOLOGICAL EXAMINATION WITHOUT ABNORMAL FINDING: Primary | ICD-10-CM

## 2021-12-30 DIAGNOSIS — E66.9 OBESITY (BMI 30-39.9): ICD-10-CM

## 2021-12-30 DIAGNOSIS — E11.69 DIABETES MELLITUS TYPE 2 IN OBESE: ICD-10-CM

## 2021-12-30 DIAGNOSIS — G40.319 GENERALIZED CONVULSIVE EPILEPSY WITH INTRACTABLE EPILEPSY: ICD-10-CM

## 2021-12-30 DIAGNOSIS — I10 ESSENTIAL HYPERTENSION: Chronic | ICD-10-CM

## 2021-12-30 DIAGNOSIS — Z12.31 VISIT FOR SCREENING MAMMOGRAM: ICD-10-CM

## 2021-12-30 DIAGNOSIS — E78.2 MIXED HYPERLIPIDEMIA: Chronic | ICD-10-CM

## 2021-12-30 DIAGNOSIS — E66.9 DIABETES MELLITUS TYPE 2 IN OBESE: ICD-10-CM

## 2021-12-30 DIAGNOSIS — E11.9 TYPE 2 DIABETES MELLITUS WITHOUT COMPLICATION, WITHOUT LONG-TERM CURRENT USE OF INSULIN: ICD-10-CM

## 2021-12-30 PROCEDURE — 3077F PR MOST RECENT SYSTOLIC BLOOD PRESSURE >= 140 MM HG: ICD-10-PCS | Mod: CPTII,S$GLB,, | Performed by: OBSTETRICS & GYNECOLOGY

## 2021-12-30 PROCEDURE — 3079F DIAST BP 80-89 MM HG: CPT | Mod: CPTII,S$GLB,, | Performed by: OBSTETRICS & GYNECOLOGY

## 2021-12-30 PROCEDURE — 3066F NEPHROPATHY DOC TX: CPT | Mod: CPTII,S$GLB,, | Performed by: OBSTETRICS & GYNECOLOGY

## 2021-12-30 PROCEDURE — 3079F PR MOST RECENT DIASTOLIC BLOOD PRESSURE 80-89 MM HG: ICD-10-PCS | Mod: CPTII,S$GLB,, | Performed by: OBSTETRICS & GYNECOLOGY

## 2021-12-30 PROCEDURE — G0101 PR CA SCREEN;PELVIC/BREAST EXAM: ICD-10-PCS | Mod: S$GLB,,, | Performed by: OBSTETRICS & GYNECOLOGY

## 2021-12-30 PROCEDURE — 1125F AMNT PAIN NOTED PAIN PRSNT: CPT | Mod: CPTII,S$GLB,, | Performed by: OBSTETRICS & GYNECOLOGY

## 2021-12-30 PROCEDURE — 99999 PR PBB SHADOW E&M-EST. PATIENT-LVL III: ICD-10-PCS | Mod: PBBFAC,,, | Performed by: OBSTETRICS & GYNECOLOGY

## 2021-12-30 PROCEDURE — 3072F PR LOW RISK FOR RETINOPATHY: ICD-10-PCS | Mod: CPTII,S$GLB,, | Performed by: OBSTETRICS & GYNECOLOGY

## 2021-12-30 PROCEDURE — 3008F BODY MASS INDEX DOCD: CPT | Mod: CPTII,S$GLB,, | Performed by: OBSTETRICS & GYNECOLOGY

## 2021-12-30 PROCEDURE — 1125F PR PAIN SEVERITY QUANTIFIED, PAIN PRESENT: ICD-10-PCS | Mod: CPTII,S$GLB,, | Performed by: OBSTETRICS & GYNECOLOGY

## 2021-12-30 PROCEDURE — G0101 CA SCREEN;PELVIC/BREAST EXAM: HCPCS | Mod: S$GLB,,, | Performed by: OBSTETRICS & GYNECOLOGY

## 2021-12-30 PROCEDURE — 3008F PR BODY MASS INDEX (BMI) DOCUMENTED: ICD-10-PCS | Mod: CPTII,S$GLB,, | Performed by: OBSTETRICS & GYNECOLOGY

## 2021-12-30 PROCEDURE — 3288F FALL RISK ASSESSMENT DOCD: CPT | Mod: CPTII,S$GLB,, | Performed by: OBSTETRICS & GYNECOLOGY

## 2021-12-30 PROCEDURE — 99499 UNLISTED E&M SERVICE: CPT | Mod: S$GLB,,, | Performed by: OBSTETRICS & GYNECOLOGY

## 2021-12-30 PROCEDURE — 1101F PR PT FALLS ASSESS DOC 0-1 FALLS W/OUT INJ PAST YR: ICD-10-PCS | Mod: CPTII,S$GLB,, | Performed by: OBSTETRICS & GYNECOLOGY

## 2021-12-30 PROCEDURE — 3051F HG A1C>EQUAL 7.0%<8.0%: CPT | Mod: CPTII,S$GLB,, | Performed by: OBSTETRICS & GYNECOLOGY

## 2021-12-30 PROCEDURE — 1159F PR MEDICATION LIST DOCUMENTED IN MEDICAL RECORD: ICD-10-PCS | Mod: CPTII,S$GLB,, | Performed by: OBSTETRICS & GYNECOLOGY

## 2021-12-30 PROCEDURE — 1159F MED LIST DOCD IN RCRD: CPT | Mod: CPTII,S$GLB,, | Performed by: OBSTETRICS & GYNECOLOGY

## 2021-12-30 PROCEDURE — 3288F PR FALLS RISK ASSESSMENT DOCUMENTED: ICD-10-PCS | Mod: CPTII,S$GLB,, | Performed by: OBSTETRICS & GYNECOLOGY

## 2021-12-30 PROCEDURE — 3051F PR MOST RECENT HEMOGLOBIN A1C LEVEL 7.0 - < 8.0%: ICD-10-PCS | Mod: CPTII,S$GLB,, | Performed by: OBSTETRICS & GYNECOLOGY

## 2021-12-30 PROCEDURE — 3061F PR NEG MICROALBUMINURIA RESULT DOCUMENTED/REVIEW: ICD-10-PCS | Mod: CPTII,S$GLB,, | Performed by: OBSTETRICS & GYNECOLOGY

## 2021-12-30 PROCEDURE — 99999 PR PBB SHADOW E&M-EST. PATIENT-LVL III: CPT | Mod: PBBFAC,,, | Performed by: OBSTETRICS & GYNECOLOGY

## 2021-12-30 PROCEDURE — 3072F LOW RISK FOR RETINOPATHY: CPT | Mod: CPTII,S$GLB,, | Performed by: OBSTETRICS & GYNECOLOGY

## 2021-12-30 PROCEDURE — 1160F RVW MEDS BY RX/DR IN RCRD: CPT | Mod: CPTII,S$GLB,, | Performed by: OBSTETRICS & GYNECOLOGY

## 2021-12-30 PROCEDURE — 4010F ACE/ARB THERAPY RXD/TAKEN: CPT | Mod: CPTII,S$GLB,, | Performed by: OBSTETRICS & GYNECOLOGY

## 2021-12-30 PROCEDURE — 4010F PR ACE/ARB THEARPY RXD/TAKEN: ICD-10-PCS | Mod: CPTII,S$GLB,, | Performed by: OBSTETRICS & GYNECOLOGY

## 2021-12-30 PROCEDURE — 1101F PT FALLS ASSESS-DOCD LE1/YR: CPT | Mod: CPTII,S$GLB,, | Performed by: OBSTETRICS & GYNECOLOGY

## 2021-12-30 PROCEDURE — 99499 RISK ADDL DX/OHS AUDIT: ICD-10-PCS | Mod: S$GLB,,, | Performed by: OBSTETRICS & GYNECOLOGY

## 2021-12-30 PROCEDURE — 3066F PR DOCUMENTATION OF TREATMENT FOR NEPHROPATHY: ICD-10-PCS | Mod: CPTII,S$GLB,, | Performed by: OBSTETRICS & GYNECOLOGY

## 2021-12-30 PROCEDURE — 3077F SYST BP >= 140 MM HG: CPT | Mod: CPTII,S$GLB,, | Performed by: OBSTETRICS & GYNECOLOGY

## 2021-12-30 PROCEDURE — 3061F NEG MICROALBUMINURIA REV: CPT | Mod: CPTII,S$GLB,, | Performed by: OBSTETRICS & GYNECOLOGY

## 2021-12-30 PROCEDURE — 1160F PR REVIEW ALL MEDS BY PRESCRIBER/CLIN PHARMACIST DOCUMENTED: ICD-10-PCS | Mod: CPTII,S$GLB,, | Performed by: OBSTETRICS & GYNECOLOGY

## 2022-01-04 ENCOUNTER — LAB VISIT (OUTPATIENT)
Dept: LAB | Facility: OTHER | Age: 67
End: 2022-01-04
Attending: INTERNAL MEDICINE
Payer: MEDICARE

## 2022-01-04 DIAGNOSIS — E05.00 GRAVES' DISEASE WITH EXOPHTHALMOS: ICD-10-CM

## 2022-01-04 DIAGNOSIS — E11.9 TYPE 2 DIABETES MELLITUS WITHOUT COMPLICATION, WITHOUT LONG-TERM CURRENT USE OF INSULIN: ICD-10-CM

## 2022-01-04 LAB
ALBUMIN SERPL BCP-MCNC: 4 G/DL (ref 3.5–5.2)
ALP SERPL-CCNC: 95 U/L (ref 55–135)
ALT SERPL W/O P-5'-P-CCNC: 23 U/L (ref 10–44)
ANION GAP SERPL CALC-SCNC: 10 MMOL/L (ref 8–16)
AST SERPL-CCNC: 21 U/L (ref 10–40)
BILIRUB SERPL-MCNC: 0.5 MG/DL (ref 0.1–1)
BUN SERPL-MCNC: 16 MG/DL (ref 8–23)
CALCIUM SERPL-MCNC: 9.9 MG/DL (ref 8.7–10.5)
CHLORIDE SERPL-SCNC: 101 MMOL/L (ref 95–110)
CO2 SERPL-SCNC: 28 MMOL/L (ref 23–29)
CREAT SERPL-MCNC: 0.9 MG/DL (ref 0.5–1.4)
EST. GFR  (AFRICAN AMERICAN): >60 ML/MIN/1.73 M^2
EST. GFR  (NON AFRICAN AMERICAN): >60 ML/MIN/1.73 M^2
ESTIMATED AVG GLUCOSE: 143 MG/DL (ref 68–131)
GLUCOSE SERPL-MCNC: 116 MG/DL (ref 70–110)
HBA1C MFR BLD: 6.6 % (ref 4–5.6)
POTASSIUM SERPL-SCNC: 3.7 MMOL/L (ref 3.5–5.1)
PROT SERPL-MCNC: 7.6 G/DL (ref 6–8.4)
SODIUM SERPL-SCNC: 139 MMOL/L (ref 136–145)
TSH SERPL DL<=0.005 MIU/L-ACNC: 1.42 UIU/ML (ref 0.4–4)

## 2022-01-04 PROCEDURE — 80053 COMPREHEN METABOLIC PANEL: CPT | Performed by: INTERNAL MEDICINE

## 2022-01-04 PROCEDURE — 36415 COLL VENOUS BLD VENIPUNCTURE: CPT | Performed by: INTERNAL MEDICINE

## 2022-01-04 PROCEDURE — 83036 HEMOGLOBIN GLYCOSYLATED A1C: CPT | Performed by: INTERNAL MEDICINE

## 2022-01-04 PROCEDURE — 84443 ASSAY THYROID STIM HORMONE: CPT | Performed by: INTERNAL MEDICINE

## 2022-01-06 ENCOUNTER — OFFICE VISIT (OUTPATIENT)
Dept: INTERNAL MEDICINE | Facility: CLINIC | Age: 67
End: 2022-01-06
Payer: MEDICARE

## 2022-01-06 VITALS
WEIGHT: 198.44 LBS | HEART RATE: 74 BPM | DIASTOLIC BLOOD PRESSURE: 78 MMHG | SYSTOLIC BLOOD PRESSURE: 118 MMHG | BODY MASS INDEX: 40 KG/M2 | HEIGHT: 59 IN | OXYGEN SATURATION: 99 %

## 2022-01-06 DIAGNOSIS — E11.59 HYPERTENSION ASSOCIATED WITH DIABETES: ICD-10-CM

## 2022-01-06 DIAGNOSIS — E23.6 EMPTY SELLA SYNDROME: ICD-10-CM

## 2022-01-06 DIAGNOSIS — E11.69 DIABETES MELLITUS TYPE 2 IN OBESE: ICD-10-CM

## 2022-01-06 DIAGNOSIS — G40.319 GENERALIZED CONVULSIVE EPILEPSY WITH INTRACTABLE EPILEPSY: ICD-10-CM

## 2022-01-06 DIAGNOSIS — I15.2 HYPERTENSION ASSOCIATED WITH DIABETES: ICD-10-CM

## 2022-01-06 DIAGNOSIS — E05.00 GRAVES' DISEASE WITH EXOPHTHALMOS: ICD-10-CM

## 2022-01-06 DIAGNOSIS — N39.41 URGE INCONTINENCE: ICD-10-CM

## 2022-01-06 DIAGNOSIS — E11.9 TYPE 2 DIABETES MELLITUS WITHOUT COMPLICATION, WITHOUT LONG-TERM CURRENT USE OF INSULIN: ICD-10-CM

## 2022-01-06 DIAGNOSIS — I10 ESSENTIAL HYPERTENSION: Primary | ICD-10-CM

## 2022-01-06 DIAGNOSIS — E78.2 MIXED HYPERLIPIDEMIA: ICD-10-CM

## 2022-01-06 DIAGNOSIS — E66.9 DIABETES MELLITUS TYPE 2 IN OBESE: ICD-10-CM

## 2022-01-06 PROCEDURE — 3008F PR BODY MASS INDEX (BMI) DOCUMENTED: ICD-10-PCS | Mod: CPTII,S$GLB,, | Performed by: INTERNAL MEDICINE

## 2022-01-06 PROCEDURE — 99499 RISK ADDL DX/OHS AUDIT: ICD-10-PCS | Mod: S$GLB,,, | Performed by: INTERNAL MEDICINE

## 2022-01-06 PROCEDURE — 1159F MED LIST DOCD IN RCRD: CPT | Mod: CPTII,S$GLB,, | Performed by: INTERNAL MEDICINE

## 2022-01-06 PROCEDURE — 1160F PR REVIEW ALL MEDS BY PRESCRIBER/CLIN PHARMACIST DOCUMENTED: ICD-10-PCS | Mod: CPTII,S$GLB,, | Performed by: INTERNAL MEDICINE

## 2022-01-06 PROCEDURE — 1159F PR MEDICATION LIST DOCUMENTED IN MEDICAL RECORD: ICD-10-PCS | Mod: CPTII,S$GLB,, | Performed by: INTERNAL MEDICINE

## 2022-01-06 PROCEDURE — 1101F PT FALLS ASSESS-DOCD LE1/YR: CPT | Mod: CPTII,S$GLB,, | Performed by: INTERNAL MEDICINE

## 2022-01-06 PROCEDURE — 99499 UNLISTED E&M SERVICE: CPT | Mod: S$GLB,,, | Performed by: INTERNAL MEDICINE

## 2022-01-06 PROCEDURE — 3288F PR FALLS RISK ASSESSMENT DOCUMENTED: ICD-10-PCS | Mod: CPTII,S$GLB,, | Performed by: INTERNAL MEDICINE

## 2022-01-06 PROCEDURE — 3074F SYST BP LT 130 MM HG: CPT | Mod: CPTII,S$GLB,, | Performed by: INTERNAL MEDICINE

## 2022-01-06 PROCEDURE — 1101F PR PT FALLS ASSESS DOC 0-1 FALLS W/OUT INJ PAST YR: ICD-10-PCS | Mod: CPTII,S$GLB,, | Performed by: INTERNAL MEDICINE

## 2022-01-06 PROCEDURE — 99999 PR PBB SHADOW E&M-EST. PATIENT-LVL IV: CPT | Mod: PBBFAC,,, | Performed by: INTERNAL MEDICINE

## 2022-01-06 PROCEDURE — 1125F AMNT PAIN NOTED PAIN PRSNT: CPT | Mod: CPTII,S$GLB,, | Performed by: INTERNAL MEDICINE

## 2022-01-06 PROCEDURE — 3288F FALL RISK ASSESSMENT DOCD: CPT | Mod: CPTII,S$GLB,, | Performed by: INTERNAL MEDICINE

## 2022-01-06 PROCEDURE — 3078F PR MOST RECENT DIASTOLIC BLOOD PRESSURE < 80 MM HG: ICD-10-PCS | Mod: CPTII,S$GLB,, | Performed by: INTERNAL MEDICINE

## 2022-01-06 PROCEDURE — 3074F PR MOST RECENT SYSTOLIC BLOOD PRESSURE < 130 MM HG: ICD-10-PCS | Mod: CPTII,S$GLB,, | Performed by: INTERNAL MEDICINE

## 2022-01-06 PROCEDURE — 3044F PR MOST RECENT HEMOGLOBIN A1C LEVEL <7.0%: ICD-10-PCS | Mod: CPTII,S$GLB,, | Performed by: INTERNAL MEDICINE

## 2022-01-06 PROCEDURE — 1160F RVW MEDS BY RX/DR IN RCRD: CPT | Mod: CPTII,S$GLB,, | Performed by: INTERNAL MEDICINE

## 2022-01-06 PROCEDURE — 3008F BODY MASS INDEX DOCD: CPT | Mod: CPTII,S$GLB,, | Performed by: INTERNAL MEDICINE

## 2022-01-06 PROCEDURE — 99999 PR PBB SHADOW E&M-EST. PATIENT-LVL IV: ICD-10-PCS | Mod: PBBFAC,,, | Performed by: INTERNAL MEDICINE

## 2022-01-06 PROCEDURE — 1125F PR PAIN SEVERITY QUANTIFIED, PAIN PRESENT: ICD-10-PCS | Mod: CPTII,S$GLB,, | Performed by: INTERNAL MEDICINE

## 2022-01-06 PROCEDURE — 99214 PR OFFICE/OUTPT VISIT, EST, LEVL IV, 30-39 MIN: ICD-10-PCS | Mod: S$GLB,,, | Performed by: INTERNAL MEDICINE

## 2022-01-06 PROCEDURE — 3044F HG A1C LEVEL LT 7.0%: CPT | Mod: CPTII,S$GLB,, | Performed by: INTERNAL MEDICINE

## 2022-01-06 PROCEDURE — 3078F DIAST BP <80 MM HG: CPT | Mod: CPTII,S$GLB,, | Performed by: INTERNAL MEDICINE

## 2022-01-06 PROCEDURE — 99214 OFFICE O/P EST MOD 30 MIN: CPT | Mod: S$GLB,,, | Performed by: INTERNAL MEDICINE

## 2022-01-06 NOTE — PROGRESS NOTES
Subjective:       Patient ID: Katey Cerrato is a 66 y.o. female.    Chief Complaint: Hypertension    Pt here for f/u.     Pt's BP is well controlled. Tolerating meds well. Pt denies cp/sob/ha/vision or neuro changes. Not checking at home.     DM2 is well controlled with last A1c 6.6. Fasting sugars are 120s. No low sugars. She is on metformin 500mg daily. Also on trulicity 3mg weekly. No known end organ issues. Other health maint is up to date except eye exam which is scheduled.      HLD is tx with lipitor; tolerating well and is stable.    She has h/o Graves dz with exophthalmos. She has seen endocrine but not in a while. No hyperthyroid state. Also incidental finding of empty sella was noted but no symptoms or clinically significant issues associated with this.     She sees neuro regularly for h/o sz. On Keppra and no seizures recently.    She has urge incontinence. Saw urogyn and is working on pelvic exercises. Holding off on surgery and meds for now. This is stable.     Diabetes  Pertinent negatives for hypoglycemia include no dizziness or headaches. Pertinent negatives for diabetes include no chest pain, no fatigue and no polyuria.   Hypertension  Pertinent negatives include no chest pain, headaches, palpitations or shortness of breath.     Review of Systems   Constitutional: Negative for fatigue, fever and unexpected weight change.   HENT: Negative for congestion and sore throat.    Eyes: Negative for visual disturbance.   Respiratory: Negative for shortness of breath.    Cardiovascular: Negative for chest pain, palpitations and leg swelling.   Gastrointestinal: Negative for abdominal pain, blood in stool, constipation and diarrhea.   Endocrine: Negative for polyuria.   Genitourinary: Negative for dysuria.   Musculoskeletal: Negative for arthralgias.   Skin: Negative for rash.   Neurological: Negative for dizziness, syncope and headaches.   Hematological: Negative for adenopathy.   Psychiatric/Behavioral:  Negative for dysphoric mood.       Objective:      Physical Exam  Constitutional:       Appearance: She is well-developed.   HENT:      Head: Normocephalic and atraumatic.      Right Ear: Hearing, tympanic membrane, ear canal and external ear normal.      Left Ear: Hearing, tympanic membrane, ear canal and external ear normal.      Nose: Nose normal.      Mouth/Throat:      Pharynx: Uvula midline.   Eyes:      General: Lids are normal.      Conjunctiva/sclera: Conjunctivae normal.      Right eye: Right conjunctiva is not injected.      Left eye: Left conjunctiva is not injected.      Pupils: Pupils are equal, round, and reactive to light.   Neck:      Thyroid: No thyroid mass or thyromegaly.      Vascular: No carotid bruit or JVD.   Cardiovascular:      Rate and Rhythm: Normal rate and regular rhythm.      Chest Wall: PMI is not displaced.      Pulses:           Posterior tibial pulses are 2+ on the right side and 2+ on the left side.      Heart sounds: Normal heart sounds, S1 normal and S2 normal. No murmur heard.      Pulmonary:      Effort: Pulmonary effort is normal.      Breath sounds: Normal breath sounds. No wheezing, rhonchi or rales.   Abdominal:      General: Bowel sounds are normal. There is no distension or abdominal bruit.      Palpations: Abdomen is soft.      Tenderness: There is no abdominal tenderness.   Musculoskeletal:      Cervical back: Neck supple.      Right foot: Normal. No deformity.      Left foot: Normal. No deformity.   Feet:      Right foot:      Protective Sensation: 6 sites tested. 6 sites sensed.      Skin integrity: No ulcer, blister or skin breakdown.      Left foot:      Protective Sensation: 6 sites tested. 6 sites sensed.      Skin integrity: No ulcer, blister or skin breakdown.   Lymphadenopathy:      Head:      Right side of head: No preauricular or posterior auricular adenopathy.      Left side of head: No preauricular or posterior auricular adenopathy.      Cervical: No  cervical adenopathy.   Skin:     General: Skin is warm.      Findings: No rash.   Neurological:      Mental Status: She is alert and oriented to person, place, and time.      Cranial Nerves: No cranial nerve deficit.      Sensory: No sensory deficit.      Coordination: Coordination normal.      Gait: Gait normal.   Psychiatric:         Speech: Speech normal.         Behavior: Behavior normal.         Thought Content: Thought content normal.         Judgment: Judgment normal.         Assessment:       1. Essential hypertension    2. Hypertension associated with diabetes    3. Diabetes mellitus type 2 in obese    4. Type 2 diabetes mellitus without complication, without long-term current use of insulin    5. Mixed hyperlipidemia    6. Urge incontinence    7. Empty sella syndrome    8. Generalized convulsive epilepsy with intractable epilepsy    9. Graves' disease with exophthalmos        Plan:       1. Recent labs reviewed   2. Home BP and BS monitoring; reviewed hypoglycemia plan with pt  3. Keep f/u with specialists

## 2022-01-06 NOTE — PROGRESS NOTES
Patient, Katey Cerrato (MRN #[id number]), presented with a recorded BMI of 40.07 kg/m^2 consistent with the definition of morbid obesity (ICD-10 E66.01). The patient's morbid obesity was monitored, evaluated, addressed and/or treated. This addendum to the medical record is made on 01/06/2022.

## 2022-01-18 ENCOUNTER — PES CALL (OUTPATIENT)
Dept: ADMINISTRATIVE | Facility: CLINIC | Age: 67
End: 2022-01-18
Payer: MEDICARE

## 2022-01-21 ENCOUNTER — TELEPHONE (OUTPATIENT)
Dept: INTERNAL MEDICINE | Facility: CLINIC | Age: 67
End: 2022-01-21
Payer: MEDICARE

## 2022-01-21 NOTE — TELEPHONE ENCOUNTER
Pt calling to sort through some [employer] paperwork for her and her . Hers I handled, but her 's has had some scratch outs and errors that has kept him form being approved. After lengthy discussion, made appt in person for pt's  to bring paperwork in to be filled out.

## 2022-01-21 NOTE — TELEPHONE ENCOUNTER
----- Message from Prakash Banegas sent at 1/21/2022  4:52 PM CST -----  Regarding: Patient Req a Call back  Name of Who is Calling:MORAIMA LUCAS [[id number]]    What is the request in detail:Pt is req a call regarding applications Dr. England had to fill in.Pt is req a call back asap.     Can the clinic reply by [facility]:Yes    What Number to Call Back if not in [facility]:[phone]

## 2022-01-27 DIAGNOSIS — R52 PAIN: Primary | ICD-10-CM

## 2022-01-27 RX ORDER — MELOXICAM 15 MG/1
TABLET ORAL
Qty: 90 TABLET | Refills: 0 | Status: SHIPPED | OUTPATIENT
Start: 2022-01-27 | End: 2022-04-26

## 2022-01-27 NOTE — TELEPHONE ENCOUNTER
Spoke to provider who states he listed it as that quantity so we would have room to increase dosage over next year if needed. Spoke w/ pt again who states lainey mccauley won't accept the form like that and we need to have it be the current maximum amount. I let pt know we will correct it for her when she can stop by, as she doesn't want to send it in the portal.  She states she will drop it off and pick it up Wednesday  Pt verbalized understanding and had no further concerns or questions.

## 2022-01-27 NOTE — TELEPHONE ENCOUNTER
Pt states for the [employer] form for trulicity was filled out incorrectly. She states Dr. England put 4.5 for maximum dose per week and it should be 3.0     Routing to provider to advise   Statement Selected

## 2022-01-27 NOTE — TELEPHONE ENCOUNTER
----- Message from Ramez Simental sent at 1/27/2022  2:13 PM CST -----  Name of Who is Calling: MORAIMA LUCAS          What is the request in detail: The patient is calling to speak to the nurse in regards to a few questions. Please advise          Can the clinic reply by [facility]: No         What Number to Call Back if not in [facility]: [phone]

## 2022-01-29 NOTE — TELEPHONE ENCOUNTER
No new care gaps identified.  Powered by [employer]. Reference number: [id number].   1/29/2022 4:13:42 PM CST

## 2022-02-01 ENCOUNTER — PATIENT OUTREACH (OUTPATIENT)
Dept: ADMINISTRATIVE | Facility: OTHER | Age: 67
End: 2022-02-01
Payer: MEDICARE

## 2022-02-02 ENCOUNTER — OFFICE VISIT (OUTPATIENT)
Dept: OPTOMETRY | Facility: CLINIC | Age: 67
End: 2022-02-02
Payer: MEDICARE

## 2022-02-02 DIAGNOSIS — H52.13 MYOPIA WITH ASTIGMATISM AND PRESBYOPIA, BILATERAL: ICD-10-CM

## 2022-02-02 DIAGNOSIS — H52.203 MYOPIA WITH ASTIGMATISM AND PRESBYOPIA, BILATERAL: ICD-10-CM

## 2022-02-02 DIAGNOSIS — H40.023 OPEN ANGLE WITH BORDERLINE FINDINGS AND HIGH GLAUCOMA RISK IN BOTH EYES: ICD-10-CM

## 2022-02-02 DIAGNOSIS — H52.4 MYOPIA WITH ASTIGMATISM AND PRESBYOPIA, BILATERAL: ICD-10-CM

## 2022-02-02 DIAGNOSIS — E11.9 TYPE 2 DIABETES MELLITUS WITHOUT OPHTHALMIC MANIFESTATIONS: Primary | ICD-10-CM

## 2022-02-02 PROCEDURE — 99499 RISK ADDL DX/OHS AUDIT: ICD-10-PCS | Mod: S$GLB,,, | Performed by: OPTOMETRIST

## 2022-02-02 PROCEDURE — 3288F PR FALLS RISK ASSESSMENT DOCUMENTED: ICD-10-PCS | Mod: CPTII,S$GLB,, | Performed by: OPTOMETRIST

## 2022-02-02 PROCEDURE — 92015 DETERMINE REFRACTIVE STATE: CPT | Mod: S$GLB,,, | Performed by: OPTOMETRIST

## 2022-02-02 PROCEDURE — 92014 PR EYE EXAM, EST PATIENT,COMPREHESV: ICD-10-PCS | Mod: S$GLB,,, | Performed by: OPTOMETRIST

## 2022-02-02 PROCEDURE — 99999 PR PBB SHADOW E&M-EST. PATIENT-LVL III: CPT | Mod: PBBFAC,,, | Performed by: OPTOMETRIST

## 2022-02-02 PROCEDURE — 92083 HUMPHREY VISUAL FIELD - OU - BOTH EYES: ICD-10-PCS | Mod: S$GLB,,, | Performed by: OPTOMETRIST

## 2022-02-02 PROCEDURE — 3044F HG A1C LEVEL LT 7.0%: CPT | Mod: CPTII,S$GLB,, | Performed by: OPTOMETRIST

## 2022-02-02 PROCEDURE — 92014 COMPRE OPH EXAM EST PT 1/>: CPT | Mod: S$GLB,,, | Performed by: OPTOMETRIST

## 2022-02-02 PROCEDURE — 3288F FALL RISK ASSESSMENT DOCD: CPT | Mod: CPTII,S$GLB,, | Performed by: OPTOMETRIST

## 2022-02-02 PROCEDURE — 92015 PR REFRACTION: ICD-10-PCS | Mod: S$GLB,,, | Performed by: OPTOMETRIST

## 2022-02-02 PROCEDURE — 99499 UNLISTED E&M SERVICE: CPT | Mod: S$GLB,,, | Performed by: OPTOMETRIST

## 2022-02-02 PROCEDURE — 3044F PR MOST RECENT HEMOGLOBIN A1C LEVEL <7.0%: ICD-10-PCS | Mod: CPTII,S$GLB,, | Performed by: OPTOMETRIST

## 2022-02-02 PROCEDURE — 92083 EXTENDED VISUAL FIELD XM: CPT | Mod: S$GLB,,, | Performed by: OPTOMETRIST

## 2022-02-02 PROCEDURE — 1101F PR PT FALLS ASSESS DOC 0-1 FALLS W/OUT INJ PAST YR: ICD-10-PCS | Mod: CPTII,S$GLB,, | Performed by: OPTOMETRIST

## 2022-02-02 PROCEDURE — 2023F DILAT RTA XM W/O RTNOPTHY: CPT | Mod: CPTII,S$GLB,, | Performed by: OPTOMETRIST

## 2022-02-02 PROCEDURE — 92133 CPTRZD OPH DX IMG PST SGM ON: CPT | Mod: S$GLB,,, | Performed by: OPTOMETRIST

## 2022-02-02 PROCEDURE — 1159F PR MEDICATION LIST DOCUMENTED IN MEDICAL RECORD: ICD-10-PCS | Mod: CPTII,S$GLB,, | Performed by: OPTOMETRIST

## 2022-02-02 PROCEDURE — 1126F PR PAIN SEVERITY QUANTIFIED, NO PAIN PRESENT: ICD-10-PCS | Mod: CPTII,S$GLB,, | Performed by: OPTOMETRIST

## 2022-02-02 PROCEDURE — 2023F PR DILATED RETINAL EXAM W/O EVID OF RETINOPATHY: ICD-10-PCS | Mod: CPTII,S$GLB,, | Performed by: OPTOMETRIST

## 2022-02-02 PROCEDURE — 1101F PT FALLS ASSESS-DOCD LE1/YR: CPT | Mod: CPTII,S$GLB,, | Performed by: OPTOMETRIST

## 2022-02-02 PROCEDURE — 1126F AMNT PAIN NOTED NONE PRSNT: CPT | Mod: CPTII,S$GLB,, | Performed by: OPTOMETRIST

## 2022-02-02 PROCEDURE — 99999 PR PBB SHADOW E&M-EST. PATIENT-LVL III: ICD-10-PCS | Mod: PBBFAC,,, | Performed by: OPTOMETRIST

## 2022-02-02 PROCEDURE — 92133 POSTERIOR SEGMENT OCT OPTIC NERVE(OCULAR COHERENCE TOMOGRAPHY) - OU - BOTH EYES: ICD-10-PCS | Mod: S$GLB,,, | Performed by: OPTOMETRIST

## 2022-02-02 PROCEDURE — 1159F MED LIST DOCD IN RCRD: CPT | Mod: CPTII,S$GLB,, | Performed by: OPTOMETRIST

## 2022-02-02 NOTE — PROGRESS NOTES
HPI     Last eye exam was 4/30/21 with Dr. Rey.  Patient states filled glasses for reading only but feels she needs   something to wear all the time.   Patient denies diplopia, headaches, flashes/floaters, and pain.    Latanoprost QHS OU    Hemoglobin A1C       Date                     Value               Ref Range             Status                01/04/2022               6.6 (H)             4.0 - 5.6 %           Final                  Last edited by Lilibeth Flowers MA on 2/2/2022 10:16 AM. (History)            Assessment /Plan     For exam results, see Encounter Report.    Type 2 diabetes mellitus without ophthalmic manifestations    Open angle with borderline findings and high glaucoma risk in both eyes  -     Oswald Visual Field - OU - Extended - Both Eyes  -     OCT - Optic Nerve    Myopia with astigmatism and presbyopia, bilateral          1.  No retinopathy--monitor yearly.  BS control.  Eye health normal OU.  2.  Due to increased c/d ratio OU and family history.  Continue with Latanoprost QHS OU.  All testing stable/normal OU.    HVF: 02/02/22  OCT: 02/02/22  DFE: 02/02/22  Photos:  Gonio: 8/16/18  Pachy: 562 OD, 554 OS  Initial IOPs: 16 OD, 16 OS  MHx: DM, HTN  FHx: Mother  3.  Bifocal rx given.   Retina flat and intact OU--no holes, tears, breaks, or RDs.

## 2022-02-02 NOTE — PROGRESS NOTES
Care Everywhere: updated  Immunization:   Health Maintenance: updated  Media Review:   Legacy Review:   DIS:  Order placed:   Upcoming appts:optometry 2.2  EFAX:  Task Tickets:  Referrals:

## 2022-02-03 ENCOUNTER — TELEPHONE (OUTPATIENT)
Dept: INTERNAL MEDICINE | Facility: CLINIC | Age: 67
End: 2022-02-03
Payer: MEDICARE

## 2022-02-08 RX ORDER — METFORMIN HYDROCHLORIDE 500 MG/1
TABLET, EXTENDED RELEASE ORAL
Qty: 90 TABLET | Refills: 1 | Status: SHIPPED | OUTPATIENT
Start: 2022-02-08 | End: 2022-08-29

## 2022-02-08 NOTE — TELEPHONE ENCOUNTER
Refill Authorization Note   Katey Cerrato  is requesting a refill authorization.  Brief Assessment and Rationale for Refill:  Approve     Medication Therapy Plan:       Medication Reconciliation Completed: No   Comments:   --->Care Gap information included below if applicable.   Orders Placed This Encounter    metFORMIN (GLUCOPHAGE-XR) 500 MG ER 24hr tablet      Requested Prescriptions   Signed Prescriptions Disp Refills    metFORMIN (GLUCOPHAGE-XR) 500 MG ER 24hr tablet 90 tablet 1     Sig: TAKE 1 TABLET BY MOUTH EVERY DAY       Endocrinology:  Diabetes - Biguanides Passed - 2/8/2022  8:46 AM        Passed - Patient is at least 18 years old        Passed - Valid encounter within last 15 months     Recent Visits  Date Type Provider Dept   01/06/22 Office Visit Baltazar England MD [facility]   07/06/21 Office Visit Baltazar England MD [facility]   01/05/21 Office Visit Baltazar England MD [facility]   07/07/20 Office Visit Baltazar England MD [facility]   03/05/20 Office Visit Baltazar England MD [facility]   Showing recent visits within past 720 days and meeting all other requirements  Future Appointments  No visits were found meeting these conditions.  Showing future appointments within next 150 days and meeting all other requirements      Future Appointments              In 4 months SPECIMEN, [facility]    In 4 months LAB, [facility]    In 5 months Baltazar England MD [facility]                Passed - Cr is 1.39 or below and within 360 days     Lab Results   Component Value Date    CREATININE 0.9 01/04/2022    CREATININE 1.1 08/03/2021    CREATININE 0.8 07/06/2021    POCCRE 0.5 03/11/2017              Passed - HBA1C within 180 days     Lab Results   Component Value Date    HGBA1C 6.6 (H) 01/04/2022    HGBA1C 7.5 (H) 07/06/2021    HGBA1C 5.6 12/30/2020               Passed - eGFR is 45 or above and within 360 days     Lab Results   Component Value Date    EGFRNONAA >60 01/04/2022    EGFRNONAA 52 (A) 08/03/2021    EGFRNONAA >60 07/06/2021                    Appointments  past 12m or future 3m with PCP    Date Provider   Last Visit   1/6/2022 Baltazar England MD   Next Visit   2/3/2022 Baltazar England MD   ED visits in past 90 days: 0     Note composed:8:49 AM 02/08/2022

## 2022-02-17 ENCOUNTER — PATIENT MESSAGE (OUTPATIENT)
Dept: INTERNAL MEDICINE | Facility: CLINIC | Age: 67
End: 2022-02-17
Payer: MEDICARE

## 2022-03-29 ENCOUNTER — PATIENT MESSAGE (OUTPATIENT)
Dept: RESEARCH | Facility: HOSPITAL | Age: 67
End: 2022-03-29
Payer: MEDICARE

## 2022-04-03 NOTE — TELEPHONE ENCOUNTER
Care Due:                  Date            Visit Type   Department     Provider  --------------------------------------------------------------------------------                                EP -                              PRIMARY      [facility] INTERNAL  Last Visit: 01-      CARE ([facility])   KEITH England                              EP -                              PRIMARY      [facility] INTERNAL  Next Visit: 07-      CARE ([facility])   [facility]       Baltazar England                                                            Last  Test          Frequency    Reason                     Performed    Due Date  --------------------------------------------------------------------------------    Lipid Panel.  12 months..  atorvastatin.............  07- 07-    Powered by [employer] by [employer]. Reference number: [id number].   4/03/2022 1:16:45 AM CDT

## 2022-04-04 RX ORDER — AMLODIPINE BESYLATE 10 MG/1
TABLET ORAL
Qty: 90 TABLET | Refills: 3 | Status: SHIPPED | OUTPATIENT
Start: 2022-04-04 | End: 2023-10-05

## 2022-04-05 NOTE — TELEPHONE ENCOUNTER
Refill Authorization Note   Katey Cerrato  is requesting a refill authorization.  Brief Assessment and Rationale for Refill:  Approve     Medication Therapy Plan:  07/05/2022    Medication Reconciliation Completed: No   Comments:     No Care Gaps recommended.     Note composed:7:43 PM 04/04/2022

## 2022-04-15 RX ORDER — ATORVASTATIN CALCIUM 80 MG/1
TABLET, FILM COATED ORAL
Qty: 90 TABLET | Refills: 0 | Status: SHIPPED | OUTPATIENT
Start: 2022-04-15 | End: 2022-07-19

## 2022-04-15 NOTE — TELEPHONE ENCOUNTER
Refill Authorization Note   Katey Cerrato  is requesting a refill authorization.  Brief Assessment and Rationale for Refill:  Approve     Medication Therapy Plan:       Medication Reconciliation Completed: No   Comments:     No Care Gaps recommended.     Note composed:1:30 PM 04/15/2022

## 2022-04-15 NOTE — TELEPHONE ENCOUNTER
Care Due:                  Date            Visit Type   Department     Provider  --------------------------------------------------------------------------------                                EP -                              PRIMARY      [facility] INTERNAL  Last Visit: 01-      CARE ([facility])   KEITH England                              EP -                              PRIMARY      [facility] INTERNAL  Next Visit: 07-      CARE ([facility])   KEITH England                                                            Last  Test          Frequency    Reason                     Performed    Due Date  --------------------------------------------------------------------------------    HBA1C.......  6 months...  dulaglutide, metFORMIN...  01- 07-    Powered by [employer]. Reference number: [id number].   4/15/2022 12:22:39 AM CDT

## 2022-07-06 ENCOUNTER — LAB VISIT (OUTPATIENT)
Dept: LAB | Facility: OTHER | Age: 67
End: 2022-07-06
Attending: INTERNAL MEDICINE
Payer: MEDICARE

## 2022-07-06 DIAGNOSIS — E66.9 DIABETES MELLITUS TYPE 2 IN OBESE: ICD-10-CM

## 2022-07-06 DIAGNOSIS — E11.69 DIABETES MELLITUS TYPE 2 IN OBESE: ICD-10-CM

## 2022-07-06 LAB
ALBUMIN/CREAT UR: 8.3 UG/MG (ref 0–30)
CREAT UR-MCNC: 156.8 MG/DL (ref 15–325)
MICROALBUMIN UR DL<=1MG/L-MCNC: 13 UG/ML

## 2022-07-06 PROCEDURE — 82043 UR ALBUMIN QUANTITATIVE: CPT | Performed by: INTERNAL MEDICINE

## 2022-07-06 PROCEDURE — 82570 ASSAY OF URINE CREATININE: CPT | Performed by: INTERNAL MEDICINE

## 2022-07-08 NOTE — PROGRESS NOTES
Subjective:       Patient ID: Katey Cerrato is a 66 y.o. female.    Chief Complaint: Hypertension    Pt here for f/u. Counseled on exercise and wt loss goals.     Pt's BP is normally well controlled but high here today as she is in some pain. Tolerating meds well. Pt denies cp/sob/ha/vision or neuro changes. Home readings have been controlled prior to her recent stumble and accompanying low back/hip/knee pain.    Pt stumbled while walking in kitchen 4 days ago. No actual fall. Went to ED b/c of increase in bilat hip pain and L knee pain. Record reviewed. xrays showed arthritis but no other concerns. She has used mobic with limited relief. Was given oxycodone but doesn't like the way it makes her feel so stopped. She wants to avoid narcotics. She admits to not being active since this injury. Some mild low back and L buttock pain but no saddle anesthesia/paresthesias/weakness/bowel or bladder issues.     DM2 is well controlled with last A1c 6.3. Fasting sugars are 120s. No low sugars. She is on metformin 500mg daily. Also on trulicity 3mg weekly. No known end organ issues. Other health maint is up to date.      HLD is tx with lipitor; tolerating well and is stable.    She has h/o Graves dz with exophthalmos. She has seen endocrine but not in a while. No hyperthyroid state. Also incidental finding of empty sella was noted but no symptoms or clinically significant issues associated with this.     She sees neuro regularly for h/o sz. On Keppra and no seizures recently.    She has urge incontinence. Saw urogyn and is working on pelvic exercises. Holding off on surgery and meds for now. This is stable.     Diabetes  Pertinent negatives for hypoglycemia include no dizziness or headaches. Pertinent negatives for diabetes include no chest pain, no fatigue, no polyuria and no weakness.   Hypertension  Pertinent negatives include no chest pain, headaches, palpitations or shortness of breath.     Review of Systems    Constitutional: Negative for fatigue, fever and unexpected weight change.   HENT: Negative for congestion and sore throat.    Eyes: Negative for visual disturbance.   Respiratory: Negative for shortness of breath.    Cardiovascular: Negative for chest pain, palpitations and leg swelling.   Gastrointestinal: Negative for abdominal pain, blood in stool, constipation and diarrhea.   Endocrine: Negative for polyuria.   Genitourinary: Negative for dysuria.   Musculoskeletal: Positive for back pain. Negative for arthralgias.   Skin: Negative for rash.   Neurological: Negative for dizziness, syncope, weakness, numbness and headaches.   Hematological: Negative for adenopathy.   Psychiatric/Behavioral: Negative for dysphoric mood.       Objective:      Physical Exam  Constitutional:       Appearance: Normal appearance. She is well-developed.   HENT:      Head: Normocephalic and atraumatic.      Right Ear: Hearing, tympanic membrane, ear canal and external ear normal.      Left Ear: Hearing, tympanic membrane, ear canal and external ear normal.      Nose: Nose normal.      Mouth/Throat:      Pharynx: Uvula midline.   Eyes:      General: Lids are normal.      Conjunctiva/sclera: Conjunctivae normal.      Right eye: Right conjunctiva is not injected.      Left eye: Left conjunctiva is not injected.      Pupils: Pupils are equal, round, and reactive to light.   Neck:      Thyroid: No thyroid mass or thyromegaly.      Vascular: No carotid bruit or JVD.   Cardiovascular:      Rate and Rhythm: Normal rate and regular rhythm.      Chest Wall: PMI is not displaced.      Pulses:           Posterior tibial pulses are 2+ on the right side and 2+ on the left side.      Heart sounds: Normal heart sounds, S1 normal and S2 normal. No murmur heard.  Pulmonary:      Effort: Pulmonary effort is normal.      Breath sounds: Normal breath sounds. No wheezing, rhonchi or rales.   Abdominal:      General: Bowel sounds are normal. There is no  distension or abdominal bruit.      Palpations: Abdomen is soft.      Tenderness: There is no abdominal tenderness.   Musculoskeletal:      Cervical back: Normal range of motion and neck supple.      Lumbar back: Normal. No tenderness or bony tenderness. Normal range of motion.      Right foot: Normal. No deformity.      Left foot: Normal. No deformity.   Feet:      Right foot:      Protective Sensation: 6 sites tested. 6 sites sensed.      Skin integrity: No ulcer, blister or skin breakdown.      Left foot:      Protective Sensation: 6 sites tested. 6 sites sensed.      Skin integrity: No ulcer, blister or skin breakdown.   Lymphadenopathy:      Head:      Right side of head: No preauricular or posterior auricular adenopathy.      Left side of head: No preauricular or posterior auricular adenopathy.      Cervical: No cervical adenopathy.   Skin:     General: Skin is warm.      Findings: No rash.   Neurological:      General: No focal deficit present.      Mental Status: She is alert and oriented to person, place, and time.      Cranial Nerves: No cranial nerve deficit.      Sensory: Sensation is intact. No sensory deficit.      Motor: No weakness.      Coordination: Coordination normal.      Gait: Gait normal.      Deep Tendon Reflexes:      Reflex Scores:       Patellar reflexes are 2+ on the right side and 2+ on the left side.       Achilles reflexes are 2+ on the right side and 2+ on the left side.  Psychiatric:         Mood and Affect: Mood normal.         Speech: Speech normal.         Behavior: Behavior normal.         Thought Content: Thought content normal.         Judgment: Judgment normal.         Assessment:       1. Essential hypertension    2. Mixed hyperlipidemia    3. Diabetes mellitus type 2 in obese    4. Type 2 diabetes mellitus without complication, without long-term current use of insulin    5. Hypertension associated with diabetes    6. Graves' disease with exophthalmos    7. Empty sella  syndrome    8. Generalized convulsive epilepsy with intractable epilepsy    9. Urge incontinence    10. Obesity (BMI 30-39.9)    11. Severe obesity        Plan:       1. Recent labs reviewed   2. Home BP and BS monitoring; reviewed hypoglycemia plan with pt; call for home BP readings in 1 week  3. Keep f/u with specialists  4. voltaren gel prn; low back and hip ROM exercises; if not improving over next 2-4 weeks, she will let us know and can refer to ortho or back/spine depending on evolution of symptoms

## 2022-07-10 ENCOUNTER — HOSPITAL ENCOUNTER (EMERGENCY)
Facility: OTHER | Age: 67
Discharge: HOME | End: 2022-07-10
Attending: EMERGENCY MEDICINE
Payer: MEDICARE

## 2022-07-10 VITALS
TEMPERATURE: 97 F | HEART RATE: 80 BPM | WEIGHT: 195 LBS | BODY MASS INDEX: 39.31 KG/M2 | RESPIRATION RATE: 18 BRPM | DIASTOLIC BLOOD PRESSURE: 81 MMHG | HEIGHT: 59 IN | SYSTOLIC BLOOD PRESSURE: 142 MMHG | OXYGEN SATURATION: 97 %

## 2022-07-10 DIAGNOSIS — R06.09 DOE (DYSPNEA ON EXERTION): ICD-10-CM

## 2022-07-10 DIAGNOSIS — M25.559 HIP PAIN: ICD-10-CM

## 2022-07-10 DIAGNOSIS — M25.569 KNEE PAIN, ACUTE: ICD-10-CM

## 2022-07-10 PROCEDURE — 25000003 PHARM REV CODE 250: Performed by: EMERGENCY MEDICINE

## 2022-07-10 PROCEDURE — 63600175 PHARM REV CODE 636 W HCPCS: Performed by: EMERGENCY MEDICINE

## 2022-07-10 PROCEDURE — 96372 THER/PROPH/DIAG INJ SC/IM: CPT | Performed by: EMERGENCY MEDICINE

## 2022-07-10 PROCEDURE — 99284 EMERGENCY DEPT VISIT MOD MDM: CPT | Mod: 25

## 2022-07-10 RX ORDER — KETOROLAC TROMETHAMINE 30 MG/ML
15 INJECTION, SOLUTION INTRAMUSCULAR; INTRAVENOUS
Status: COMPLETED | OUTPATIENT
Start: 2022-07-10 | End: 2022-07-10

## 2022-07-10 RX ORDER — ONDANSETRON 4 MG/1
4 TABLET, ORALLY DISINTEGRATING ORAL
Status: COMPLETED | OUTPATIENT
Start: 2022-07-10 | End: 2022-07-10

## 2022-07-10 RX ORDER — OXYCODONE HYDROCHLORIDE 5 MG/1
5 TABLET ORAL EVERY 6 HOURS PRN
Qty: 12 TABLET | Refills: 0 | Status: SHIPPED | OUTPATIENT
Start: 2022-07-10 | End: 2022-07-12

## 2022-07-10 RX ADMIN — KETOROLAC TROMETHAMINE 15 MG: 30 INJECTION, SOLUTION INTRAMUSCULAR; INTRAVENOUS at 08:07

## 2022-07-10 RX ADMIN — ONDANSETRON 4 MG: 4 TABLET, ORALLY DISINTEGRATING ORAL at 08:07

## 2022-07-10 NOTE — ED TRIAGE NOTES
67 y/o female presents to ED with L sided hip pain radiating down to knee, reports difficulty walking with SOB. Pt states she slipped and twisted her L leg inward, denies falling.

## 2022-07-10 NOTE — ED PROVIDER NOTES
"Encounter Date: 7/10/2022    SCRIBE #1 NOTE: I, Linda Padilla, am scribing for, and in the presence of,  Doc Bahena MD. I have scribed the following portions of the note - Other sections scribed: HPI, ROS, PE.       History     Chief Complaint   Patient presents with    Hip Pain     Bilateral hip pain 10/10 worst left hip that radiates to left knee after "stumble" on Friday. Denies fall .-LOC      Time seen by provider: 9:00 AM    This is a 66 y.o. female, with a PMHx of HTN, DM, seizures, and Grave's disease, who presents with complaint of bilateral hip pain with onset 2 days ago.  Patient reports that she was turning into the kitchen when she twisted her knee, braced herself and did not fall. She felt pain in her left knee immediately after the incident, which radiated to both hips. She has had difficulty ambulating since the incident and notes that she cannot sleep on her sides because of the hip pain. She has used ice and heat packs with no relief.  She has had a previous knee surgery for a torn ligament. She notes experiencing SOB on exertion, but she denies any chest pain or back pain. She is experiencing nausea due the pain, so she has not eaten recently. She denies any cough.    The history is provided by the patient.     Review of patient's allergies indicates:   Allergen Reactions    Codeine Nausea Only     Past Medical History:   Diagnosis Date    Cataract     Empty sella syndrome     GHD (growth hormone deficiency)     Glaucoma     Graves' disease with exophthalmos     History of vitamin D deficiency     Hyperlipidemia     Hypertension     Seizures     Thyroid nodule     Thyroid nodule     Type II or unspecified type diabetes mellitus without mention of complication, uncontrolled      Past Surgical History:   Procedure Laterality Date    BREAST BIOPSY      CATARACT EXTRACTION W/  INTRAOCULAR LENS IMPLANT Right 3/15/2021    Procedure: EXTRACTION, CATARACT, WITH IOL INSERTION;  " Surgeon: Cj Caban MD;  Location: [facility] OR;  Service: Ophthalmology;  Laterality: Right;    CATARACT EXTRACTION W/  INTRAOCULAR LENS IMPLANT Left 3/29/2021    Procedure: EXTRACTION, CATARACT, WITH IOL INSERTION;  Surgeon: Cj Caban MD;  Location: [facility] OR;  Service: Ophthalmology;  Laterality: Left;    CHOLECYSTECTOMY      HYSTERECTOMY      one ovary intact    KNEE SURGERY      LYMPH NODE BIOPSY  3010    OOPHORECTOMY       Family History   Problem Relation Age of Onset    Cancer Mother     Cataracts Mother     Glaucoma Mother         shunt    Heart disease Mother     Tremor Mother     Breast cancer Mother 78    Heart disease Sister     Heart disease Father     Hypertension Brother     Breast cancer Sister 60     Social History     Tobacco Use    Smoking status: Never Smoker    Smokeless tobacco: Never Used   Substance Use Topics    Alcohol use: No     Comment: none    Drug use: No     Review of Systems   Constitutional: Negative for fever.   HENT: Negative for congestion.    Eyes: Negative for redness.   Respiratory: Positive for shortness of breath. Negative for cough.    Cardiovascular: Negative for chest pain.   Gastrointestinal: Positive for nausea. Negative for abdominal pain.   Genitourinary: Negative for dysuria.   Musculoskeletal: Positive for arthralgias and gait problem. Negative for back pain.   Skin: Negative for rash.   Neurological: Negative for headaches.   Psychiatric/Behavioral: Negative for confusion.       Physical Exam     Initial Vitals [07/10/22 0732]   BP Pulse Resp Temp SpO2   (!) 179/85 98 18 98.3 °F (36.8 °C) 96 %      MAP       --         Physical Exam    Nursing note and vitals reviewed.  Constitutional: She appears well-developed and well-nourished. She is not diaphoretic. No distress.   HENT:   Head: Normocephalic and atraumatic.   Eyes: Conjunctivae are normal. No scleral icterus.   Neck: Neck supple.   Normal range of motion.  Cardiovascular: Normal rate,  regular rhythm, normal heart sounds and intact distal pulses.   No murmur heard.  Pulmonary/Chest: Breath sounds normal. No respiratory distress. She has no wheezes. She has no rhonchi. She has no rales.   Abdominal: Abdomen is soft. There is no abdominal tenderness. There is no rebound and no guarding.   Musculoskeletal:         General: Tenderness present. No edema.      Cervical back: Normal range of motion and neck supple.      Comments: Diffuse left knee tenderness with no effusion. Mild bilateral hip and lower back tenderness. Hip ROM intact     Neurological: She is alert and oriented to person, place, and time.   Skin: Skin is warm and dry.   Psychiatric: She has a normal mood and affect.         ED Course   Procedures  Labs Reviewed - No data to display       Imaging Results          X-Ray Knee 3 View Left (Final result)  Result time 07/10/22 09:41:46    Final result by Fadi Edwards MD (07/10/22 09:41:46)                 Impression:      Tricompartmental degenerative changes and postoperative findings.  No acute displaced fracture.      Electronically signed by: Fadi Edwards MD  Date:    07/10/2022  Time:    09:41             Narrative:    EXAMINATION:  XR KNEE 3 VIEW LEFT    CLINICAL HISTORY:  Pain in unspecified knee.    TECHNIQUE:  AP, lateral, and Merchant views of the left knee were performed.    COMPARISON:  None.    FINDINGS:  Postoperative changes noted in the medial femoral condyle.  Moderate to advanced tricompartmental degenerative changes, most pronounced in the medial and patellofemoral compartments.  No acute fracture or dislocation.  No large joint effusion.  No unexpected radiopaque foreign body.                               X-Ray Hips Bilateral 2 View Incl AP Pelvis (Final result)  Result time 07/10/22 09:37:18    Final result by Fadi Edwards MD (07/10/22 09:37:18)                 Impression:      No displaced fracture.      Electronically signed by: Fadi Edwards  "MD  Date:    07/10/2022  Time:    09:37             Narrative:    EXAMINATION:  XR HIPS BILATERAL 2 VIEW INCL AP PELVIS    CLINICAL HISTORY:  Pain in unspecified hip    TECHNIQUE:  AP view of the pelvis and frogleg lateral views of both hips were performed.    COMPARISON:  None.    FINDINGS:  No acute fracture or dislocation.  Mild degenerative changes in both hips.  Soft tissues are symmetric.  No unexpected radiopaque foreign body.                               X-Ray Chest 1 View (Final result)  Result time 07/10/22 09:36:25    Final result by Fadi Edwards MD (07/10/22 09:36:25)                 Impression:      No acute cardiopulmonary finding identified on this single view.      Electronically signed by: Fadi Edwards MD  Date:    07/10/2022  Time:    09:36             Narrative:    EXAMINATION:  XR CHEST 1 VIEW    CLINICAL HISTORY:  Provided history is "  Dyspnea, unspecified".    TECHNIQUE:  One view of the chest.    COMPARISON:  02/26/2010.    FINDINGS:  Cardiomediastinal silhouette is magnified by portable technique and is likely at the upper limits of normal in size.  No focal consolidation.  No sizable pleural effusion.  No pneumothorax.                                 Medications   ketorolac injection 15 mg (15 mg Intramuscular Given 7/10/22 0857)   ondansetron disintegrating tablet 4 mg (4 mg Oral Given 7/10/22 0856)     Medical Decision Making:   History:   Old Medical Records: I decided to obtain old medical records.  Initial Assessment:       66-year-old female with history of HTN, dm, seizures, Graves disease presents for evaluation of worsening left knee and bilateral hip pain that started 3 days ago.  She was turning in the kitchen and twisted her left knee, patient herself and did not fall but afterwards has had significant left knee and bilateral hip pain as well as some low back pain.  She has had issues with her knee before since distant fracture and ligament repair, has been taking " Mobic with no relief.  She still able to walk but with significant pain, denies any other injuries.  On exam patient with mild diffuse left knee tenderness but no joint effusion and ROM intact, and mild bilateral hip and lower back tenderness.  She could have left knee strain related to arthritis and previous injuries, and possible low back strain that may be radiating to her hips.  She has normal hip ROM, low suspicion for fracture.  X-rays of affected areas checked with no acute findings except for knee arthritis.  Patient treated with Toradol with some improvement but residual pain.  I discussed with her taking something stronger since Mobic has been ineffective, but she states that she has allergies to other pain medications due to a component of them that previously caused a seizure because it reduced the Keppra levels in her blood.  Per allergy list, patient is only allergic to codeine, but she is still reluctant to take other pain medications.  I offered a prescription for oxycodone and she will discuss with her PCP whether it is safe for her to take, and she has follow-up appointment in 2 days.    She also complained of some mild ROSALES over the past few days since her injury.  I suspect this is a pain component since she has normal lung exam and O2 sat on room air, no associated chest pain, cough, or fever.  I reviewed her lab work done recently on PCP visit with no acute findings.  Chest x-ray done with no evidence for pneumonia or CHF.  No SOB noted while in ED, no indication for further emergent workup.    I had extensive discussion about workup results and patient is comfortable with discharge and follow-up plan, and understands to return to the ED for any worsening symptoms or other concerns.    Independently Interpreted Test(s):   I have ordered and independently interpreted X-rays - see prior notes.  Clinical Tests:   Radiological Study: Ordered and Reviewed          Scribe Attestation:   Scribe #1: I  performed the above scribed service and the documentation accurately describes the services I performed. I attest to the accuracy of the note.               I, Dr. Doc Bahena, personally performed the services described in this documentation. All medical record entries made by the scribe were at my direction and in my presence.  I have reviewed the chart and agree that the record reflects my personal performance and is accurate and complete. Doc Bahena MD.  9:32 PM 07/10/2022        Clinical Impression:   Final diagnoses:  [M25.569] Knee pain, acute  [M25.559] Hip pain  [R06.00] ROSALES (dyspnea on exertion)          ED Disposition Condition    Discharge Stable        ED Prescriptions     Medication Sig Dispense Start Date End Date Auth. Provider    oxyCODONE (ROXICODONE) 5 MG immediate release tablet Take 1 tablet (5 mg total) by mouth every 6 (six) hours as needed for Pain. 12 tablet 7/10/2022  Doc Bahena MD        Follow-up Information     Follow up With Specialties Details Why Contact Info    Baltazar England MD Internal Medicine Go in 2 days  [address] [facility] [address]  [phone]             Doc Bahena MD  07/10/22 4925

## 2022-07-10 NOTE — ED NOTES
S: Pt reports a stumble 2 days prior, denies ground level fall, reports twisting motion leaving her with right sided hip pain and left leg pain.

## 2022-07-11 ENCOUNTER — TELEPHONE (OUTPATIENT)
Dept: INTERNAL MEDICINE | Facility: CLINIC | Age: 67
End: 2022-07-11
Payer: MEDICARE

## 2022-07-11 NOTE — TELEPHONE ENCOUNTER
Called pt per Alee's note. Pt states she went to ER for hip pain rad to knee. She was given Roxicodone and is c/o nausea when she takes pain medication. Pt states she tried her Meloxicam and it didn't help.    Discussed that sometimes nausea meds will help to make pain meds tolerable, pt does not want to add nausea medication - but if she can have recommendation for something that might not make her nauseated but alberto help pain, she appreciates that.    Pt states otherwise, she will take her Meloxicam and some in to see Dr. England tomorrow to discuss further.    Pt's pharm:  [id number] - [place] - [address]

## 2022-07-11 NOTE — TELEPHONE ENCOUNTER
----- Message from Carmen Carter sent at 7/11/2022 10:54 AM CDT -----  Type: Patient Call Back    Who called:self    What is the request in detail:Pt usually sees Dr. England, however since he is out she would like to speak with Dr. Juarez. Pt would like to speak with nurse to relay message to doctor.    Can the clinic reply by [facility]?no    Would the patient rather a call back or a response via [facility]? call    Best call back number:[phone] (H)

## 2022-07-11 NOTE — TELEPHONE ENCOUNTER
----- Message from Alee Saunders sent at 7/11/2022 10:32 AM CDT -----  Regarding: Medication  Good morning,    I was confirming patient's for  for tomorrow. Patient  MRN# [id number] states she is unable to make her appointment on tomorrow due to extreme pain she is having. She went to the ER over the weekend and they gave her medication that's causing her to vomit.Therefore the pain isn't going away.    She would like to know if Dr. England can call her something in so she can make her appointment. Please call pt to advise her on what can be done.    Thank you.

## 2022-07-12 ENCOUNTER — OFFICE VISIT (OUTPATIENT)
Dept: INTERNAL MEDICINE | Facility: CLINIC | Age: 67
End: 2022-07-12
Payer: MEDICARE

## 2022-07-12 VITALS
BODY MASS INDEX: 38 KG/M2 | WEIGHT: 188.5 LBS | HEIGHT: 59 IN | DIASTOLIC BLOOD PRESSURE: 92 MMHG | HEART RATE: 83 BPM | OXYGEN SATURATION: 95 % | SYSTOLIC BLOOD PRESSURE: 158 MMHG

## 2022-07-12 DIAGNOSIS — E11.69 DIABETES MELLITUS TYPE 2 IN OBESE: ICD-10-CM

## 2022-07-12 DIAGNOSIS — E66.01 SEVERE OBESITY: ICD-10-CM

## 2022-07-12 DIAGNOSIS — E23.6 EMPTY SELLA SYNDROME: ICD-10-CM

## 2022-07-12 DIAGNOSIS — E05.00 GRAVES' DISEASE WITH EXOPHTHALMOS: ICD-10-CM

## 2022-07-12 DIAGNOSIS — G40.319 GENERALIZED CONVULSIVE EPILEPSY WITH INTRACTABLE EPILEPSY: ICD-10-CM

## 2022-07-12 DIAGNOSIS — E11.59 HYPERTENSION ASSOCIATED WITH DIABETES: ICD-10-CM

## 2022-07-12 DIAGNOSIS — I15.2 HYPERTENSION ASSOCIATED WITH DIABETES: ICD-10-CM

## 2022-07-12 DIAGNOSIS — E11.9 TYPE 2 DIABETES MELLITUS WITHOUT COMPLICATION, WITHOUT LONG-TERM CURRENT USE OF INSULIN: ICD-10-CM

## 2022-07-12 DIAGNOSIS — N39.41 URGE INCONTINENCE: ICD-10-CM

## 2022-07-12 DIAGNOSIS — E66.9 DIABETES MELLITUS TYPE 2 IN OBESE: ICD-10-CM

## 2022-07-12 DIAGNOSIS — I10 ESSENTIAL HYPERTENSION: Primary | ICD-10-CM

## 2022-07-12 DIAGNOSIS — E78.2 MIXED HYPERLIPIDEMIA: ICD-10-CM

## 2022-07-12 DIAGNOSIS — E66.9 OBESITY (BMI 30-39.9): ICD-10-CM

## 2022-07-12 PROCEDURE — 1125F PR PAIN SEVERITY QUANTIFIED, PAIN PRESENT: ICD-10-PCS | Mod: CPTII,S$GLB,, | Performed by: INTERNAL MEDICINE

## 2022-07-12 PROCEDURE — 3077F PR MOST RECENT SYSTOLIC BLOOD PRESSURE >= 140 MM HG: ICD-10-PCS | Mod: CPTII,S$GLB,, | Performed by: INTERNAL MEDICINE

## 2022-07-12 PROCEDURE — 3288F FALL RISK ASSESSMENT DOCD: CPT | Mod: CPTII,S$GLB,, | Performed by: INTERNAL MEDICINE

## 2022-07-12 PROCEDURE — 3080F PR MOST RECENT DIASTOLIC BLOOD PRESSURE >= 90 MM HG: ICD-10-PCS | Mod: CPTII,S$GLB,, | Performed by: INTERNAL MEDICINE

## 2022-07-12 PROCEDURE — 3008F PR BODY MASS INDEX (BMI) DOCUMENTED: ICD-10-PCS | Mod: CPTII,S$GLB,, | Performed by: INTERNAL MEDICINE

## 2022-07-12 PROCEDURE — 1160F RVW MEDS BY RX/DR IN RCRD: CPT | Mod: CPTII,S$GLB,, | Performed by: INTERNAL MEDICINE

## 2022-07-12 PROCEDURE — 3044F HG A1C LEVEL LT 7.0%: CPT | Mod: CPTII,S$GLB,, | Performed by: INTERNAL MEDICINE

## 2022-07-12 PROCEDURE — 3061F PR NEG MICROALBUMINURIA RESULT DOCUMENTED/REVIEW: ICD-10-PCS | Mod: CPTII,S$GLB,, | Performed by: INTERNAL MEDICINE

## 2022-07-12 PROCEDURE — 4010F ACE/ARB THERAPY RXD/TAKEN: CPT | Mod: CPTII,S$GLB,, | Performed by: INTERNAL MEDICINE

## 2022-07-12 PROCEDURE — 1125F AMNT PAIN NOTED PAIN PRSNT: CPT | Mod: CPTII,S$GLB,, | Performed by: INTERNAL MEDICINE

## 2022-07-12 PROCEDURE — 1159F MED LIST DOCD IN RCRD: CPT | Mod: CPTII,S$GLB,, | Performed by: INTERNAL MEDICINE

## 2022-07-12 PROCEDURE — 3077F SYST BP >= 140 MM HG: CPT | Mod: CPTII,S$GLB,, | Performed by: INTERNAL MEDICINE

## 2022-07-12 PROCEDURE — 99999 PR PBB SHADOW E&M-EST. PATIENT-LVL IV: ICD-10-PCS | Mod: PBBFAC,,, | Performed by: INTERNAL MEDICINE

## 2022-07-12 PROCEDURE — 3066F PR DOCUMENTATION OF TREATMENT FOR NEPHROPATHY: ICD-10-PCS | Mod: CPTII,S$GLB,, | Performed by: INTERNAL MEDICINE

## 2022-07-12 PROCEDURE — 99214 PR OFFICE/OUTPT VISIT, EST, LEVL IV, 30-39 MIN: ICD-10-PCS | Mod: S$GLB,,, | Performed by: INTERNAL MEDICINE

## 2022-07-12 PROCEDURE — 3080F DIAST BP >= 90 MM HG: CPT | Mod: CPTII,S$GLB,, | Performed by: INTERNAL MEDICINE

## 2022-07-12 PROCEDURE — 1160F PR REVIEW ALL MEDS BY PRESCRIBER/CLIN PHARMACIST DOCUMENTED: ICD-10-PCS | Mod: CPTII,S$GLB,, | Performed by: INTERNAL MEDICINE

## 2022-07-12 PROCEDURE — 1159F PR MEDICATION LIST DOCUMENTED IN MEDICAL RECORD: ICD-10-PCS | Mod: CPTII,S$GLB,, | Performed by: INTERNAL MEDICINE

## 2022-07-12 PROCEDURE — 99214 OFFICE O/P EST MOD 30 MIN: CPT | Mod: S$GLB,,, | Performed by: INTERNAL MEDICINE

## 2022-07-12 PROCEDURE — 3061F NEG MICROALBUMINURIA REV: CPT | Mod: CPTII,S$GLB,, | Performed by: INTERNAL MEDICINE

## 2022-07-12 PROCEDURE — 99999 PR PBB SHADOW E&M-EST. PATIENT-LVL IV: CPT | Mod: PBBFAC,,, | Performed by: INTERNAL MEDICINE

## 2022-07-12 PROCEDURE — 1101F PT FALLS ASSESS-DOCD LE1/YR: CPT | Mod: CPTII,S$GLB,, | Performed by: INTERNAL MEDICINE

## 2022-07-12 PROCEDURE — 3044F PR MOST RECENT HEMOGLOBIN A1C LEVEL <7.0%: ICD-10-PCS | Mod: CPTII,S$GLB,, | Performed by: INTERNAL MEDICINE

## 2022-07-12 PROCEDURE — 4010F PR ACE/ARB THEARPY RXD/TAKEN: ICD-10-PCS | Mod: CPTII,S$GLB,, | Performed by: INTERNAL MEDICINE

## 2022-07-12 PROCEDURE — 3288F PR FALLS RISK ASSESSMENT DOCUMENTED: ICD-10-PCS | Mod: CPTII,S$GLB,, | Performed by: INTERNAL MEDICINE

## 2022-07-12 PROCEDURE — 1101F PR PT FALLS ASSESS DOC 0-1 FALLS W/OUT INJ PAST YR: ICD-10-PCS | Mod: CPTII,S$GLB,, | Performed by: INTERNAL MEDICINE

## 2022-07-12 PROCEDURE — 3066F NEPHROPATHY DOC TX: CPT | Mod: CPTII,S$GLB,, | Performed by: INTERNAL MEDICINE

## 2022-07-12 PROCEDURE — 3008F BODY MASS INDEX DOCD: CPT | Mod: CPTII,S$GLB,, | Performed by: INTERNAL MEDICINE

## 2022-07-12 NOTE — PROGRESS NOTES
Patient, Katey Cerrato (MRN #[id number]), presented with a recorded BMI of 38.07 kg/m^2 and a documented comorbidity(s):  - Diabetes Mellitus Type 2  - Hypertension  - Hyperlipidemia  to which the severe obesity is a contributing factor. This is consistent with the definition of severe obesity (BMI 35.0-39.9) with comorbidity (ICD-10 E66.01, Z68.35). The patient's severe obesity was monitored, evaluated, addressed and/or treated. This addendum to the medical record is made on 07/12/2022.

## 2022-07-19 RX ORDER — ATORVASTATIN CALCIUM 80 MG/1
TABLET, FILM COATED ORAL
Qty: 90 TABLET | Refills: 3 | Status: SHIPPED | OUTPATIENT
Start: 2022-07-19 | End: 2023-07-16

## 2022-07-19 NOTE — TELEPHONE ENCOUNTER
No new care gaps identified.  [facility] Embedded Care Gaps. Reference number: [id number]. 7/19/2022   9:14:49 AM INDIAT

## 2022-07-19 NOTE — TELEPHONE ENCOUNTER
Refill Decision Note   Katey Saqib  is requesting a refill authorization.  Brief Assessment and Rationale for Refill:  Approve     Medication Therapy Plan:       Medication Reconciliation Completed: No   Comments:     No Care Gaps recommended.     Note composed:9:40 AM 07/19/2022

## 2022-07-27 ENCOUNTER — TELEPHONE (OUTPATIENT)
Dept: INTERNAL MEDICINE | Facility: CLINIC | Age: 67
End: 2022-07-27
Payer: MEDICARE

## 2022-08-02 DIAGNOSIS — I10 ESSENTIAL HYPERTENSION: ICD-10-CM

## 2022-08-02 DIAGNOSIS — G40.319 GENERALIZED CONVULSIVE EPILEPSY WITH INTRACTABLE EPILEPSY: ICD-10-CM

## 2022-08-02 RX ORDER — LEVETIRACETAM 750 MG/1
TABLET ORAL
Qty: 180 TABLET | Refills: 3 | Status: SHIPPED | OUTPATIENT
Start: 2022-08-02 | End: 2023-08-14

## 2022-08-02 RX ORDER — METOPROLOL SUCCINATE 50 MG/1
TABLET, EXTENDED RELEASE ORAL
Qty: 90 TABLET | Refills: 3 | Status: SHIPPED | OUTPATIENT
Start: 2022-08-02 | End: 2023-10-05

## 2022-08-02 NOTE — TELEPHONE ENCOUNTER
No new care gaps identified.  [facility] Embedded Care Gaps. Reference number: [id number]. 8/02/2022   12:14:30 AM CDT

## 2022-08-02 NOTE — TELEPHONE ENCOUNTER
Refill Routing Note   Medication(s) are not appropriate for processing by [facility] for the following reason(s):      - Outside of protocol  - Required vitals are abnormal    [facility] action(s):  Defer  Route       Medication Therapy Plan: Levetiracetam outside of protocol. Metoprolol abnormal BP  Medication reconciliation completed: No     Appointments  past 12m or future 3m with PCP    Date Provider   Last Visit   7/12/2022 Baltazar England MD   Next Visit   1/11/2023 Baltazar England MD   ED visits in past 90 days: 1        Note composed:12:49 PM 08/02/2022

## 2022-08-05 ENCOUNTER — PATIENT MESSAGE (OUTPATIENT)
Dept: INTERNAL MEDICINE | Facility: CLINIC | Age: 67
End: 2022-08-05
Payer: MEDICARE

## 2022-08-05 DIAGNOSIS — I10 ESSENTIAL HYPERTENSION: Primary | ICD-10-CM

## 2022-08-05 RX ORDER — VALSARTAN 320 MG/1
320 TABLET ORAL DAILY
Qty: 90 TABLET | Refills: 3 | Status: SHIPPED | OUTPATIENT
Start: 2022-08-05 | End: 2023-05-17

## 2022-08-08 ENCOUNTER — PATIENT OUTREACH (OUTPATIENT)
Dept: ADMINISTRATIVE | Facility: HOSPITAL | Age: 67
End: 2022-08-08
Payer: MEDICARE

## 2022-08-08 ENCOUNTER — TELEPHONE (OUTPATIENT)
Dept: INTERNAL MEDICINE | Facility: CLINIC | Age: 67
End: 2022-08-08
Payer: MEDICARE

## 2022-08-08 VITALS — SYSTOLIC BLOOD PRESSURE: 116 MMHG | DIASTOLIC BLOOD PRESSURE: 75 MMHG

## 2022-08-08 DIAGNOSIS — Z78.0 POSTMENOPAUSAL: Primary | ICD-10-CM

## 2022-08-08 NOTE — TELEPHONE ENCOUNTER
Pt reports bp taken on 08/04/22.    Josiah Frausto  Panel Care Coordinator  [facility]  P: [phone]  F: [phone]

## 2022-08-08 NOTE — TELEPHONE ENCOUNTER
Messaged pt and selected option to be notified if not read by tomorrow  Waiting to set reminder for two wk BP check until I hear from pt.

## 2022-08-09 NOTE — TELEPHONE ENCOUNTER
Called pt spoke to her  Reviewed Dr. England's note w/ her   scheduled lab   Pt verbalized understanding and had no further concerns or questions.

## 2022-08-11 ENCOUNTER — HOSPITAL ENCOUNTER (OUTPATIENT)
Dept: RADIOLOGY | Facility: OTHER | Age: 67
Discharge: HOME | End: 2022-08-11
Attending: INTERNAL MEDICINE
Payer: MEDICARE

## 2022-08-11 DIAGNOSIS — Z78.0 POSTMENOPAUSAL: ICD-10-CM

## 2022-08-11 PROCEDURE — 77080 DXA BONE DENSITY AXIAL: CPT | Mod: 26,,, | Performed by: RADIOLOGY

## 2022-08-11 PROCEDURE — 77080 DEXA BONE DENSITY SPINE HIP: ICD-10-PCS | Mod: 26,,, | Performed by: RADIOLOGY

## 2022-08-11 PROCEDURE — 77080 DXA BONE DENSITY AXIAL: CPT | Mod: TC

## 2022-08-12 ENCOUNTER — PES CALL (OUTPATIENT)
Dept: ADMINISTRATIVE | Facility: CLINIC | Age: 67
End: 2022-08-12
Payer: MEDICARE

## 2022-08-23 ENCOUNTER — LAB VISIT (OUTPATIENT)
Dept: LAB | Facility: OTHER | Age: 67
End: 2022-08-23
Attending: INTERNAL MEDICINE
Payer: MEDICARE

## 2022-08-23 DIAGNOSIS — I10 ESSENTIAL HYPERTENSION: ICD-10-CM

## 2022-08-23 LAB
ANION GAP SERPL CALC-SCNC: 8 MMOL/L (ref 8–16)
BUN SERPL-MCNC: 13 MG/DL (ref 8–23)
CALCIUM SERPL-MCNC: 10 MG/DL (ref 8.7–10.5)
CHLORIDE SERPL-SCNC: 104 MMOL/L (ref 95–110)
CO2 SERPL-SCNC: 28 MMOL/L (ref 23–29)
CREAT SERPL-MCNC: 0.8 MG/DL (ref 0.5–1.4)
EST. GFR  (NO RACE VARIABLE): >60 ML/MIN/1.73 M^2
GLUCOSE SERPL-MCNC: 94 MG/DL (ref 70–110)
POTASSIUM SERPL-SCNC: 4.1 MMOL/L (ref 3.5–5.1)
SODIUM SERPL-SCNC: 140 MMOL/L (ref 136–145)

## 2022-08-23 PROCEDURE — 80048 BASIC METABOLIC PNL TOTAL CA: CPT | Performed by: INTERNAL MEDICINE

## 2022-08-23 PROCEDURE — 36415 COLL VENOUS BLD VENIPUNCTURE: CPT | Performed by: INTERNAL MEDICINE

## 2022-08-24 ENCOUNTER — TELEPHONE (OUTPATIENT)
Dept: OBSTETRICS AND GYNECOLOGY | Facility: CLINIC | Age: 67
End: 2022-08-24
Payer: MEDICARE

## 2022-08-24 DIAGNOSIS — Z12.31 VISIT FOR SCREENING MAMMOGRAM: Primary | ICD-10-CM

## 2022-08-24 NOTE — TELEPHONE ENCOUNTER
----- Message from Anila Santiago sent at 8/24/2022 12:43 PM CDT -----  Regarding: Appointment Access              Name of Who is Calling: MORAIMA LUCAS    Who Left The Message: MORAIMA LUCAS      What is the request in detail:        Patient called requesting to schedule her annual mammogram; please put the Orders in at your earliest convenience and further advise.   Thank you!       Reply by [facility]:  No      Preferred Call Back :    [phone] (G)

## 2022-09-21 ENCOUNTER — TELEPHONE (OUTPATIENT)
Dept: INTERNAL MEDICINE | Facility: CLINIC | Age: 67
End: 2022-09-21
Payer: MEDICARE

## 2022-09-21 NOTE — TELEPHONE ENCOUNTER
I received a fax from N w/ Progress Notes     Dr. England reviewed it  I sent it to be scanned to the medical record

## 2022-09-26 ENCOUNTER — PATIENT OUTREACH (OUTPATIENT)
Dept: ADMINISTRATIVE | Facility: HOSPITAL | Age: 67
End: 2022-09-26
Payer: MEDICARE

## 2022-10-17 ENCOUNTER — PES CALL (OUTPATIENT)
Dept: ADMINISTRATIVE | Facility: CLINIC | Age: 67
End: 2022-10-17
Payer: MEDICARE

## 2022-11-04 ENCOUNTER — TELEPHONE (OUTPATIENT)
Dept: INTERNAL MEDICINE | Facility: CLINIC | Age: 67
End: 2022-11-04
Payer: MEDICARE

## 2022-11-04 NOTE — TELEPHONE ENCOUNTER
Received a Ranjana Black faxed to be signed by Dr. England   Left on his desk for his return and will fax back once signed then send it to be scanned to chart

## 2022-11-04 NOTE — TELEPHONE ENCOUNTER
----- Message from Lakeisha Shepherd sent at 11/4/2022  1:55 PM CDT -----  Regarding: [facility]  Type: RX Refill Request    Who Called: pharmacy     Have you contacted your pharmacy: yes    Refill or New Rx: refill    RX Name and Strength:dulaglutide (TRULICITY) 3 mg/0.5 mL pen injector    Preferred Pharmacy with phone number:  [facility] [place] [address]  [address]  [facility] [address]  Phone: [phone] Fax: [phone]    Local or Mail Order:mail

## 2022-11-08 ENCOUNTER — TELEPHONE (OUTPATIENT)
Dept: INTERNAL MEDICINE | Facility: CLINIC | Age: 67
End: 2022-11-08
Payer: MEDICARE

## 2022-11-08 ENCOUNTER — PES CALL (OUTPATIENT)
Dept: ADMINISTRATIVE | Facility: CLINIC | Age: 67
End: 2022-11-08
Payer: MEDICARE

## 2022-11-08 NOTE — TELEPHONE ENCOUNTER
Faxed more cares trulicity form.    Patient notified via call of BYRON Erazo recommendations     \"Adjust as follows then:     45 units at breakfast--->47 units     35 units at lunch ---->37 units     45 units at dinner ---->50 units at dinner\"    Patient verbalized understanding of the information given.    Med list is updated.

## 2022-11-17 ENCOUNTER — OFFICE VISIT (OUTPATIENT)
Dept: OPTOMETRY | Facility: CLINIC | Age: 67
End: 2022-11-17
Payer: MEDICARE

## 2022-11-17 DIAGNOSIS — H40.023 OPEN ANGLE WITH BORDERLINE FINDINGS AND HIGH GLAUCOMA RISK IN BOTH EYES: ICD-10-CM

## 2022-11-17 PROCEDURE — 4010F PR ACE/ARB THEARPY RXD/TAKEN: ICD-10-PCS | Mod: CPTII,S$GLB,, | Performed by: OPTOMETRIST

## 2022-11-17 PROCEDURE — 1101F PT FALLS ASSESS-DOCD LE1/YR: CPT | Mod: CPTII,S$GLB,, | Performed by: OPTOMETRIST

## 2022-11-17 PROCEDURE — 3044F PR MOST RECENT HEMOGLOBIN A1C LEVEL <7.0%: ICD-10-PCS | Mod: CPTII,S$GLB,, | Performed by: OPTOMETRIST

## 2022-11-17 PROCEDURE — 92012 INTRM OPH EXAM EST PATIENT: CPT | Mod: S$GLB,,, | Performed by: OPTOMETRIST

## 2022-11-17 PROCEDURE — 1126F PR PAIN SEVERITY QUANTIFIED, NO PAIN PRESENT: ICD-10-PCS | Mod: CPTII,S$GLB,, | Performed by: OPTOMETRIST

## 2022-11-17 PROCEDURE — 3288F FALL RISK ASSESSMENT DOCD: CPT | Mod: CPTII,S$GLB,, | Performed by: OPTOMETRIST

## 2022-11-17 PROCEDURE — 1101F PR PT FALLS ASSESS DOC 0-1 FALLS W/OUT INJ PAST YR: ICD-10-PCS | Mod: CPTII,S$GLB,, | Performed by: OPTOMETRIST

## 2022-11-17 PROCEDURE — 99999 PR PBB SHADOW E&M-EST. PATIENT-LVL III: ICD-10-PCS | Mod: PBBFAC,,, | Performed by: OPTOMETRIST

## 2022-11-17 PROCEDURE — 1126F AMNT PAIN NOTED NONE PRSNT: CPT | Mod: CPTII,S$GLB,, | Performed by: OPTOMETRIST

## 2022-11-17 PROCEDURE — 3066F PR DOCUMENTATION OF TREATMENT FOR NEPHROPATHY: ICD-10-PCS | Mod: CPTII,S$GLB,, | Performed by: OPTOMETRIST

## 2022-11-17 PROCEDURE — 1159F PR MEDICATION LIST DOCUMENTED IN MEDICAL RECORD: ICD-10-PCS | Mod: CPTII,S$GLB,, | Performed by: OPTOMETRIST

## 2022-11-17 PROCEDURE — 4010F ACE/ARB THERAPY RXD/TAKEN: CPT | Mod: CPTII,S$GLB,, | Performed by: OPTOMETRIST

## 2022-11-17 PROCEDURE — 92012 PR EYE EXAM, EST PATIENT,INTERMED: ICD-10-PCS | Mod: S$GLB,,, | Performed by: OPTOMETRIST

## 2022-11-17 PROCEDURE — 3061F PR NEG MICROALBUMINURIA RESULT DOCUMENTED/REVIEW: ICD-10-PCS | Mod: CPTII,S$GLB,, | Performed by: OPTOMETRIST

## 2022-11-17 PROCEDURE — 1159F MED LIST DOCD IN RCRD: CPT | Mod: CPTII,S$GLB,, | Performed by: OPTOMETRIST

## 2022-11-17 PROCEDURE — 3061F NEG MICROALBUMINURIA REV: CPT | Mod: CPTII,S$GLB,, | Performed by: OPTOMETRIST

## 2022-11-17 PROCEDURE — 99999 PR PBB SHADOW E&M-EST. PATIENT-LVL III: CPT | Mod: PBBFAC,,, | Performed by: OPTOMETRIST

## 2022-11-17 PROCEDURE — 3044F HG A1C LEVEL LT 7.0%: CPT | Mod: CPTII,S$GLB,, | Performed by: OPTOMETRIST

## 2022-11-17 PROCEDURE — 3288F PR FALLS RISK ASSESSMENT DOCUMENTED: ICD-10-PCS | Mod: CPTII,S$GLB,, | Performed by: OPTOMETRIST

## 2022-11-17 PROCEDURE — 3066F NEPHROPATHY DOC TX: CPT | Mod: CPTII,S$GLB,, | Performed by: OPTOMETRIST

## 2022-11-17 RX ORDER — LATANOPROST 50 UG/ML
1 SOLUTION/ DROPS OPHTHALMIC NIGHTLY
Qty: 7.5 ML | Refills: 3 | Status: SHIPPED | OUTPATIENT
Start: 2022-11-17 | End: 2023-11-27 | Stop reason: SDUPTHER

## 2022-11-17 NOTE — PROGRESS NOTES
HPI     Glaucoma Suspect     Additional comments: 9 month IOP ck           Comments    Last eye exam was 2/2/22 with Dr. Rey.  Patient states slight decrease in vision since last exam-wasn't happy with   the optical that made her glasses.  Patient denies diplopia, headaches, flashes/floaters, and pain.    Latanoprost QHS OU (needs refills)          Last edited by Lilibeth Flowers MA on 11/17/2022  1:11 PM.            Assessment /Plan     For exam results, see Encounter Report.    Open angle with borderline findings and high glaucoma risk in both eyes  -     latanoprost 0.005 % ophthalmic solution; Place 1 drop into both eyes every evening.  Dispense: 7.5 mL; Refill: 3    HPI     Glaucoma Suspect     Additional comments: 9 month IOP ck           Comments    Last eye exam was 2/2/22 with Dr. Rey.  Patient states slight decrease in vision since last exam-wasn't happy with   the optical that made her glasses.  Patient denies diplopia, headaches, flashes/floaters, and pain.    Latanoprost QHS OU (needs refills)          Last edited by Lilibeth Flowers MA on 11/17/2022  1:11 PM.            Assessment /Plan     For exam results, see Encounter Report.    Open angle with borderline findings and high glaucoma risk in both eyes  -     latanoprost 0.005 % ophthalmic solution; Place 1 drop into both eyes every evening.  Dispense: 7.5 mL; Refill: 3          Due to increased c/d ratio OU and family history.  Continue with Latanoprost QHS OU--refills sent to pharmacy.  Past testing stable/normal OU.    HVF: 02/02/22  OCT: 02/02/22  DFE: 11/17/22  Photos:  Gonio: 8/16/18  Pachy: 562 OD, 554 OS  Initial IOPs: 16 OD, 16 OS  MHx: DM, HTN  FHx: Mother                  RTC 6 months for routine with hvf and oct

## 2022-11-18 ENCOUNTER — TELEPHONE (OUTPATIENT)
Dept: INTERNAL MEDICINE | Facility: CLINIC | Age: 67
End: 2022-11-18
Payer: MEDICARE

## 2022-11-18 NOTE — TELEPHONE ENCOUNTER
Pt dropped off more cares forms  Filled out  Left for Dr. England to sign upon return next wk  Will call pt for  at that time

## 2022-11-22 ENCOUNTER — TELEPHONE (OUTPATIENT)
Dept: INTERNAL MEDICINE | Facility: CLINIC | Age: 67
End: 2022-11-22
Payer: MEDICARE

## 2022-11-25 NOTE — TELEPHONE ENCOUNTER
Called pt as second attempt  Spoke to pt   She asked me to leave the paperwork at the    Then she asked about her husbands paperwork   I checked his chart and was unable to find a note about it. Routing this to myself and Kenzie NGUYEN to sort that out, as pt wants to wait to pick her forms up until her husbands are ready too.

## 2022-11-28 NOTE — TELEPHONE ENCOUNTER
I put her and her husbands more cares forms at desk for     Called pt and let her know  Pt verbalized understanding and had no further concerns or questions.

## 2022-12-06 ENCOUNTER — PES CALL (OUTPATIENT)
Dept: ADMINISTRATIVE | Facility: CLINIC | Age: 67
End: 2022-12-06
Payer: MEDICARE

## 2022-12-08 ENCOUNTER — TELEPHONE (OUTPATIENT)
Dept: INTERNAL MEDICINE | Facility: CLINIC | Age: 67
End: 2022-12-08
Payer: MEDICARE

## 2022-12-08 NOTE — TELEPHONE ENCOUNTER
I received a fax from [employer] that pt is enrolled until end of 2023 calendar year     Dr. England reviewed it  I sent it to be scanned to the medical record

## 2022-12-29 ENCOUNTER — HOSPITAL ENCOUNTER (OUTPATIENT)
Dept: RADIOLOGY | Facility: OTHER | Age: 67
Discharge: HOME | End: 2022-12-29
Attending: OBSTETRICS & GYNECOLOGY
Payer: MEDICARE

## 2022-12-29 DIAGNOSIS — Z12.31 VISIT FOR SCREENING MAMMOGRAM: ICD-10-CM

## 2022-12-29 PROCEDURE — 77067 MAMMO DIGITAL SCREENING BILAT WITH TOMO: ICD-10-PCS | Mod: 26,,, | Performed by: RADIOLOGY

## 2022-12-29 PROCEDURE — 77067 SCR MAMMO BI INCL CAD: CPT | Mod: 26,,, | Performed by: RADIOLOGY

## 2022-12-29 PROCEDURE — 77063 BREAST TOMOSYNTHESIS BI: CPT | Mod: 26,,, | Performed by: RADIOLOGY

## 2022-12-29 PROCEDURE — 77063 BREAST TOMOSYNTHESIS BI: CPT | Mod: TC

## 2022-12-29 PROCEDURE — 77063 MAMMO DIGITAL SCREENING BILAT WITH TOMO: ICD-10-PCS | Mod: 26,,, | Performed by: RADIOLOGY

## 2023-01-03 ENCOUNTER — TELEPHONE (OUTPATIENT)
Dept: INTERNAL MEDICINE | Facility: CLINIC | Age: 68
End: 2023-01-03
Payer: MEDICARE

## 2023-01-03 NOTE — TELEPHONE ENCOUNTER
"Spoke with pt stating:  so sorry for the inconvenience, but Dr. England's schedule is changing a bit and we needed to move your upcoming appointment with him. Please check the details under the "visits" section of your portal and let us know if you need anything adjusted. If you feel you need to be seen sooner please let me know and we can try to find something with another provider in our practice.       labs rescheduled prior to new appointment date   "

## 2023-01-30 ENCOUNTER — LAB VISIT (OUTPATIENT)
Dept: LAB | Facility: OTHER | Age: 68
End: 2023-01-30
Attending: INTERNAL MEDICINE
Payer: MEDICARE

## 2023-01-30 DIAGNOSIS — E11.9 TYPE 2 DIABETES MELLITUS WITHOUT COMPLICATION, WITHOUT LONG-TERM CURRENT USE OF INSULIN: ICD-10-CM

## 2023-01-30 LAB
ALBUMIN SERPL BCP-MCNC: 3.9 G/DL (ref 3.5–5.2)
ALP SERPL-CCNC: 73 U/L (ref 55–135)
ALT SERPL W/O P-5'-P-CCNC: 21 U/L (ref 10–44)
ANION GAP SERPL CALC-SCNC: 8 MMOL/L (ref 8–16)
AST SERPL-CCNC: 23 U/L (ref 10–40)
BILIRUB SERPL-MCNC: 0.5 MG/DL (ref 0.1–1)
BUN SERPL-MCNC: 14 MG/DL (ref 8–23)
CALCIUM SERPL-MCNC: 9.7 MG/DL (ref 8.7–10.5)
CHLORIDE SERPL-SCNC: 104 MMOL/L (ref 95–110)
CO2 SERPL-SCNC: 27 MMOL/L (ref 23–29)
CREAT SERPL-MCNC: 0.8 MG/DL (ref 0.5–1.4)
EST. GFR  (NO RACE VARIABLE): >60 ML/MIN/1.73 M^2
ESTIMATED AVG GLUCOSE: 131 MG/DL (ref 68–131)
GLUCOSE SERPL-MCNC: 120 MG/DL (ref 70–110)
HBA1C MFR BLD: 6.2 % (ref 4–5.6)
POTASSIUM SERPL-SCNC: 3.9 MMOL/L (ref 3.5–5.1)
PROT SERPL-MCNC: 7.9 G/DL (ref 6–8.4)
SODIUM SERPL-SCNC: 139 MMOL/L (ref 136–145)

## 2023-01-30 PROCEDURE — 83036 HEMOGLOBIN GLYCOSYLATED A1C: CPT | Performed by: INTERNAL MEDICINE

## 2023-01-30 PROCEDURE — 80053 COMPREHEN METABOLIC PANEL: CPT | Performed by: INTERNAL MEDICINE

## 2023-01-30 PROCEDURE — 36415 COLL VENOUS BLD VENIPUNCTURE: CPT | Performed by: INTERNAL MEDICINE

## 2023-01-31 ENCOUNTER — TELEPHONE (OUTPATIENT)
Dept: INTERNAL MEDICINE | Facility: CLINIC | Age: 68
End: 2023-01-31

## 2023-01-31 ENCOUNTER — OFFICE VISIT (OUTPATIENT)
Dept: INTERNAL MEDICINE | Facility: CLINIC | Age: 68
End: 2023-01-31
Payer: MEDICARE

## 2023-01-31 VITALS
WEIGHT: 178 LBS | HEIGHT: 59 IN | SYSTOLIC BLOOD PRESSURE: 131 MMHG | DIASTOLIC BLOOD PRESSURE: 78 MMHG | BODY MASS INDEX: 35.88 KG/M2

## 2023-01-31 DIAGNOSIS — E11.9 TYPE 2 DIABETES MELLITUS WITHOUT COMPLICATION, WITHOUT LONG-TERM CURRENT USE OF INSULIN: ICD-10-CM

## 2023-01-31 DIAGNOSIS — I10 ESSENTIAL HYPERTENSION: Primary | Chronic | ICD-10-CM

## 2023-01-31 DIAGNOSIS — E78.2 MIXED HYPERLIPIDEMIA: Chronic | ICD-10-CM

## 2023-01-31 DIAGNOSIS — E66.9 DIABETES MELLITUS TYPE 2 IN OBESE: ICD-10-CM

## 2023-01-31 DIAGNOSIS — E11.69 DIABETES MELLITUS TYPE 2 IN OBESE: ICD-10-CM

## 2023-01-31 DIAGNOSIS — E05.00 GRAVES' DISEASE WITH EXOPHTHALMOS: ICD-10-CM

## 2023-01-31 DIAGNOSIS — E66.01 SEVERE OBESITY: ICD-10-CM

## 2023-01-31 DIAGNOSIS — N39.41 URGE INCONTINENCE: ICD-10-CM

## 2023-01-31 DIAGNOSIS — E11.59 HYPERTENSION ASSOCIATED WITH DIABETES: ICD-10-CM

## 2023-01-31 DIAGNOSIS — I15.2 HYPERTENSION ASSOCIATED WITH DIABETES: ICD-10-CM

## 2023-01-31 DIAGNOSIS — E23.6 EMPTY SELLA SYNDROME: ICD-10-CM

## 2023-01-31 DIAGNOSIS — G40.319 GENERALIZED CONVULSIVE EPILEPSY WITH INTRACTABLE EPILEPSY: ICD-10-CM

## 2023-01-31 PROCEDURE — 1159F MED LIST DOCD IN RCRD: CPT | Mod: CPTII,95,, | Performed by: INTERNAL MEDICINE

## 2023-01-31 PROCEDURE — 3044F PR MOST RECENT HEMOGLOBIN A1C LEVEL <7.0%: ICD-10-PCS | Mod: CPTII,95,, | Performed by: INTERNAL MEDICINE

## 2023-01-31 PROCEDURE — 3288F FALL RISK ASSESSMENT DOCD: CPT | Mod: CPTII,95,, | Performed by: INTERNAL MEDICINE

## 2023-01-31 PROCEDURE — 3075F SYST BP GE 130 - 139MM HG: CPT | Mod: CPTII,95,, | Performed by: INTERNAL MEDICINE

## 2023-01-31 PROCEDURE — 1159F PR MEDICATION LIST DOCUMENTED IN MEDICAL RECORD: ICD-10-PCS | Mod: CPTII,95,, | Performed by: INTERNAL MEDICINE

## 2023-01-31 PROCEDURE — 1160F RVW MEDS BY RX/DR IN RCRD: CPT | Mod: CPTII,95,, | Performed by: INTERNAL MEDICINE

## 2023-01-31 PROCEDURE — 3288F PR FALLS RISK ASSESSMENT DOCUMENTED: ICD-10-PCS | Mod: CPTII,95,, | Performed by: INTERNAL MEDICINE

## 2023-01-31 PROCEDURE — 3072F LOW RISK FOR RETINOPATHY: CPT | Mod: CPTII,95,, | Performed by: INTERNAL MEDICINE

## 2023-01-31 PROCEDURE — 1125F PR PAIN SEVERITY QUANTIFIED, PAIN PRESENT: ICD-10-PCS | Mod: CPTII,95,, | Performed by: INTERNAL MEDICINE

## 2023-01-31 PROCEDURE — 1101F PR PT FALLS ASSESS DOC 0-1 FALLS W/OUT INJ PAST YR: ICD-10-PCS | Mod: CPTII,95,, | Performed by: INTERNAL MEDICINE

## 2023-01-31 PROCEDURE — 99441 PR PHYSICIAN TELEPHONE EVALUATION 5-10 MIN: ICD-10-PCS | Mod: 95,,, | Performed by: INTERNAL MEDICINE

## 2023-01-31 PROCEDURE — 3044F HG A1C LEVEL LT 7.0%: CPT | Mod: CPTII,95,, | Performed by: INTERNAL MEDICINE

## 2023-01-31 PROCEDURE — 3075F PR MOST RECENT SYSTOLIC BLOOD PRESS GE 130-139MM HG: ICD-10-PCS | Mod: CPTII,95,, | Performed by: INTERNAL MEDICINE

## 2023-01-31 PROCEDURE — 3078F DIAST BP <80 MM HG: CPT | Mod: CPTII,95,, | Performed by: INTERNAL MEDICINE

## 2023-01-31 PROCEDURE — 3078F PR MOST RECENT DIASTOLIC BLOOD PRESSURE < 80 MM HG: ICD-10-PCS | Mod: CPTII,95,, | Performed by: INTERNAL MEDICINE

## 2023-01-31 PROCEDURE — 1160F PR REVIEW ALL MEDS BY PRESCRIBER/CLIN PHARMACIST DOCUMENTED: ICD-10-PCS | Mod: CPTII,95,, | Performed by: INTERNAL MEDICINE

## 2023-01-31 PROCEDURE — 3008F PR BODY MASS INDEX (BMI) DOCUMENTED: ICD-10-PCS | Mod: CPTII,95,, | Performed by: INTERNAL MEDICINE

## 2023-01-31 PROCEDURE — 3008F BODY MASS INDEX DOCD: CPT | Mod: CPTII,95,, | Performed by: INTERNAL MEDICINE

## 2023-01-31 PROCEDURE — 1125F AMNT PAIN NOTED PAIN PRSNT: CPT | Mod: CPTII,95,, | Performed by: INTERNAL MEDICINE

## 2023-01-31 PROCEDURE — 3072F PR LOW RISK FOR RETINOPATHY: ICD-10-PCS | Mod: CPTII,95,, | Performed by: INTERNAL MEDICINE

## 2023-01-31 PROCEDURE — 1101F PT FALLS ASSESS-DOCD LE1/YR: CPT | Mod: CPTII,95,, | Performed by: INTERNAL MEDICINE

## 2023-01-31 PROCEDURE — 99441 PR PHYSICIAN TELEPHONE EVALUATION 5-10 MIN: CPT | Mod: 95,,, | Performed by: INTERNAL MEDICINE

## 2023-02-03 ENCOUNTER — OFFICE VISIT (OUTPATIENT)
Dept: OBSTETRICS AND GYNECOLOGY | Facility: CLINIC | Age: 68
End: 2023-02-03
Payer: MEDICARE

## 2023-02-03 VITALS
BODY MASS INDEX: 38.38 KG/M2 | SYSTOLIC BLOOD PRESSURE: 140 MMHG | WEIGHT: 190.06 LBS | DIASTOLIC BLOOD PRESSURE: 84 MMHG

## 2023-02-03 DIAGNOSIS — E11.69 DIABETES MELLITUS TYPE 2 IN OBESE: ICD-10-CM

## 2023-02-03 DIAGNOSIS — Z12.31 VISIT FOR SCREENING MAMMOGRAM: ICD-10-CM

## 2023-02-03 DIAGNOSIS — E66.9 DIABETES MELLITUS TYPE 2 IN OBESE: ICD-10-CM

## 2023-02-03 DIAGNOSIS — E78.2 MIXED HYPERLIPIDEMIA: Chronic | ICD-10-CM

## 2023-02-03 DIAGNOSIS — E11.9 TYPE 2 DIABETES MELLITUS WITHOUT COMPLICATION, WITHOUT LONG-TERM CURRENT USE OF INSULIN: ICD-10-CM

## 2023-02-03 DIAGNOSIS — I10 ESSENTIAL HYPERTENSION: Chronic | ICD-10-CM

## 2023-02-03 DIAGNOSIS — Z01.419 ENCOUNTER FOR GYNECOLOGICAL EXAMINATION WITHOUT ABNORMAL FINDING: Primary | ICD-10-CM

## 2023-02-03 DIAGNOSIS — Z84.81 FAMILY HISTORY OF BREAST CANCER GENE MUTATION IN FIRST DEGREE RELATIVE: ICD-10-CM

## 2023-02-03 PROCEDURE — 3044F HG A1C LEVEL LT 7.0%: CPT | Mod: CPTII,S$GLB,, | Performed by: OBSTETRICS & GYNECOLOGY

## 2023-02-03 PROCEDURE — 3008F PR BODY MASS INDEX (BMI) DOCUMENTED: ICD-10-PCS | Mod: CPTII,S$GLB,, | Performed by: OBSTETRICS & GYNECOLOGY

## 2023-02-03 PROCEDURE — 1160F PR REVIEW ALL MEDS BY PRESCRIBER/CLIN PHARMACIST DOCUMENTED: ICD-10-PCS | Mod: CPTII,S$GLB,, | Performed by: OBSTETRICS & GYNECOLOGY

## 2023-02-03 PROCEDURE — 3072F LOW RISK FOR RETINOPATHY: CPT | Mod: CPTII,S$GLB,, | Performed by: OBSTETRICS & GYNECOLOGY

## 2023-02-03 PROCEDURE — 3288F FALL RISK ASSESSMENT DOCD: CPT | Mod: CPTII,S$GLB,, | Performed by: OBSTETRICS & GYNECOLOGY

## 2023-02-03 PROCEDURE — 1160F RVW MEDS BY RX/DR IN RCRD: CPT | Mod: CPTII,S$GLB,, | Performed by: OBSTETRICS & GYNECOLOGY

## 2023-02-03 PROCEDURE — 1101F PR PT FALLS ASSESS DOC 0-1 FALLS W/OUT INJ PAST YR: ICD-10-PCS | Mod: CPTII,S$GLB,, | Performed by: OBSTETRICS & GYNECOLOGY

## 2023-02-03 PROCEDURE — 99999 PR PBB SHADOW E&M-EST. PATIENT-LVL II: ICD-10-PCS | Mod: PBBFAC,,, | Performed by: OBSTETRICS & GYNECOLOGY

## 2023-02-03 PROCEDURE — 3079F DIAST BP 80-89 MM HG: CPT | Mod: CPTII,S$GLB,, | Performed by: OBSTETRICS & GYNECOLOGY

## 2023-02-03 PROCEDURE — 3072F PR LOW RISK FOR RETINOPATHY: ICD-10-PCS | Mod: CPTII,S$GLB,, | Performed by: OBSTETRICS & GYNECOLOGY

## 2023-02-03 PROCEDURE — 1159F MED LIST DOCD IN RCRD: CPT | Mod: CPTII,S$GLB,, | Performed by: OBSTETRICS & GYNECOLOGY

## 2023-02-03 PROCEDURE — 3288F PR FALLS RISK ASSESSMENT DOCUMENTED: ICD-10-PCS | Mod: CPTII,S$GLB,, | Performed by: OBSTETRICS & GYNECOLOGY

## 2023-02-03 PROCEDURE — G0101 CA SCREEN;PELVIC/BREAST EXAM: HCPCS | Mod: GZ,S$GLB,, | Performed by: OBSTETRICS & GYNECOLOGY

## 2023-02-03 PROCEDURE — 1101F PT FALLS ASSESS-DOCD LE1/YR: CPT | Mod: CPTII,S$GLB,, | Performed by: OBSTETRICS & GYNECOLOGY

## 2023-02-03 PROCEDURE — 3077F SYST BP >= 140 MM HG: CPT | Mod: CPTII,S$GLB,, | Performed by: OBSTETRICS & GYNECOLOGY

## 2023-02-03 PROCEDURE — 99999 PR PBB SHADOW E&M-EST. PATIENT-LVL II: CPT | Mod: PBBFAC,,, | Performed by: OBSTETRICS & GYNECOLOGY

## 2023-02-03 PROCEDURE — 3079F PR MOST RECENT DIASTOLIC BLOOD PRESSURE 80-89 MM HG: ICD-10-PCS | Mod: CPTII,S$GLB,, | Performed by: OBSTETRICS & GYNECOLOGY

## 2023-02-03 PROCEDURE — 3044F PR MOST RECENT HEMOGLOBIN A1C LEVEL <7.0%: ICD-10-PCS | Mod: CPTII,S$GLB,, | Performed by: OBSTETRICS & GYNECOLOGY

## 2023-02-03 PROCEDURE — 1159F PR MEDICATION LIST DOCUMENTED IN MEDICAL RECORD: ICD-10-PCS | Mod: CPTII,S$GLB,, | Performed by: OBSTETRICS & GYNECOLOGY

## 2023-02-03 PROCEDURE — G0101 PR CA SCREEN;PELVIC/BREAST EXAM: ICD-10-PCS | Mod: GZ,S$GLB,, | Performed by: OBSTETRICS & GYNECOLOGY

## 2023-02-03 PROCEDURE — 3077F PR MOST RECENT SYSTOLIC BLOOD PRESSURE >= 140 MM HG: ICD-10-PCS | Mod: CPTII,S$GLB,, | Performed by: OBSTETRICS & GYNECOLOGY

## 2023-02-03 PROCEDURE — 3008F BODY MASS INDEX DOCD: CPT | Mod: CPTII,S$GLB,, | Performed by: OBSTETRICS & GYNECOLOGY

## 2023-02-06 NOTE — PROGRESS NOTES
HISTORY OF PRESENT ILLNESS:    Katey Cerrato is a 67 y.o. female,   presents today for her routine visit and has no complaints.      Past Medical History:   Diagnosis Date    Cataract     Empty sella syndrome     GHD (growth hormone deficiency)     Glaucoma     Graves' disease with exophthalmos     History of vitamin D deficiency     Hyperlipidemia     Hypertension     Seizures     Thyroid nodule     Thyroid nodule     Type II or unspecified type diabetes mellitus without mention of complication, uncontrolled        Past Surgical History:   Procedure Laterality Date    BREAST BIOPSY      CATARACT EXTRACTION W/  INTRAOCULAR LENS IMPLANT Right 3/15/2021    Procedure: EXTRACTION, CATARACT, WITH IOL INSERTION;  Surgeon: Cj Caban MD;  Location: [facility];  Service: Ophthalmology;  Laterality: Right;    CATARACT EXTRACTION W/  INTRAOCULAR LENS IMPLANT Left 3/29/2021    Procedure: EXTRACTION, CATARACT, WITH IOL INSERTION;  Surgeon: Cj Caban MD;  Location: [facility];  Service: Ophthalmology;  Laterality: Left;    CHOLECYSTECTOMY      HYSTERECTOMY      one ovary intact    KNEE SURGERY      LYMPH NODE BIOPSY  3010    OOPHORECTOMY         MEDICATIONS AND ALLERGIES:      Current Outpatient Medications:     amLODIPine (NORVASC) 10 MG tablet, TAKE 1 TABLET BY MOUTH EVERY DAY, Disp: 90 tablet, Rfl: 3    atorvastatin (LIPITOR) 80 MG tablet, TAKE 1 TABLET BY MOUTH EVERY DAY, Disp: 90 tablet, Rfl: 3    blood sugar diagnostic Strp, To check BG 1 times daily, to use with insurance preferred meter, Disp: 100 each, Rfl: 3    blood-glucose meter kit, To check BG 1 times daily, to use with insurance preferred meter, Disp: 1 each, Rfl: 0    lancets Misc, To check BG 1 times daily, to use with insurance preferred meter, Disp: 100 each, Rfl: 3    latanoprost 0.005 % ophthalmic solution, Place 1 drop into both eyes every evening., Disp: 7.5 mL, Rfl: 3    levETIRAcetam (KEPPRA) 750 MG Tab, TAKE 2 TABLETS BY MOUTH EVERY DAY,  Disp: 180 tablet, Rfl: 3    meloxicam (MOBIC) 15 MG tablet, TAKE 1 TABLET BY MOUTH EVERY DAY AS NEEDED FOR PAIN, Disp: 30 tablet, Rfl: 0    metFORMIN (GLUCOPHAGE-XR) 500 MG ER 24hr tablet, TAKE 1 TABLET BY MOUTH EVERY DAY, Disp: 90 tablet, Rfl: 1    metoprolol succinate (TOPROL-XL) 50 MG 24 hr tablet, TAKE 1 TABLET BY MOUTH EVERY EVENING., Disp: 90 tablet, Rfl: 3    multivitamin-minerals-lutein Tab, Take 1 tablet by mouth once daily once daily., Disp: , Rfl:     mv-mn/iron/folic acid/herb 190 (VITAMIN D3 COMPLETE ORAL), Take by mouth., Disp: , Rfl:     valsartan (DIOVAN) 320 MG tablet, Take 1 tablet (320 mg total) by mouth once daily., Disp: 90 tablet, Rfl: 3    VITAMIN B COMPLEX ORAL, Take by mouth., Disp: , Rfl:     Review of patient's allergies indicates:   Allergen Reactions    Codeine Nausea Only       Family History   Problem Relation Age of Onset    Cancer Mother     Cataracts Mother     Glaucoma Mother         shunt    Heart disease Mother     Tremor Mother     Breast cancer Mother 78    Heart disease Sister     Heart disease Father     Hypertension Brother     Breast cancer Sister 60       Social History     Socioeconomic History    Marital status:    Occupational History     Employer: [employer]   Tobacco Use    Smoking status: Never    Smokeless tobacco: Never   Substance and Sexual Activity    Alcohol use: No     Comment: none    Drug use: No    Sexual activity: Yes     Partners: Male     Comment: mark  works in dietary,  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     COMPREHENSIVE GYN HISTORY:  PAP History: Denies abnormal Paps.  Infection History: Denies STDs. Denies PID.  Benign History: Denies uterine fibroids. Denies ovarian cysts. Denies endometriosis. Denies other conditions.  Cancer History: Denies cervical cancer. Denies uterine cancer or hyperplasia. Denies ovarian cancer. Denies vulvar cancer or pre-cancer. Denies vaginal cancer or pre-cancer. Denies breast cancer. Denies colon  cancer.  Sexual Activity History: Reports currently being sexually active  Menstrual History: Denies menses. Pt is  not on ERT.     ROS:  GENERAL: No weight changes. No swelling. No fatigue. No fever.  CARDIOVASCULAR: No chest pain. No shortness of breath. No leg cramps.   NEUROLOGICAL: No headaches. No vision changes.  BREASTS: No pain. No lumps. No discharge.  ABDOMEN: No pain. No nausea. No vomiting. No diarrhea. No constipation.  REPRODUCTIVE: No abnormal bleeding.  VULVA: No pain. No lesions. No itching.  VAGINA: No relaxation. No itching. No odor. No discharge. No lesions.  URINARY: No incontinence. No nocturia. No frequency. No dysuria.    BP (!) 140/84 (BP Location: Right arm, Patient Position: Sitting)   Wt 86.2 kg (190 lb 0.6 oz)   BMI 38.38 kg/m²     PE:  APPEARANCE: Well nourished, well developed, in no acute distress.  AFFECT: WNL, alert and oriented x 3.  SKIN: No acne or hirsutism.  NECK: Neck symmetric without masses or thyromegaly.  NODES: No inguinal, cervical, axillary or femoral lymph node enlargement.  CHEST: Good respiratory effort.   ABDOMEN: Soft. No tenderness or masses. No hepatosplenomegaly. No hernias.  BREASTS: Symmetrical, no skin changes or visible lesions. No palpable masses, nipple discharge bilaterally.  PELVIC: ATROPHIC EXTERNAL FEMALE GENITALIA without lesions. Normal hair distribution. Adequate perineal body, normal urethral meatus. VAGINA DRY without lesions or discharge. 2nd-3rd degree cystocele, no rectocele. Bimanual exam shows CERVIX and UTERUS to be SURGICALLY ABSENT. Adnexa without masses or tenderness.  EXTREMITIES: No edema.    DIAGNOSIS:  1. Encounter for gynecological examination without abnormal finding    2. Visit for screening mammogram    3. Mixed hyperlipidemia    4. Essential hypertension    5. Diabetes mellitus type 2 in obese    6. Type 2 diabetes mellitus without complication, without long-term current use of insulin    7. Family history of breast cancer  gene mutation in first degree relative      COUNSELING:  The patient was counseled today on  -osteoporosis prevention, calcium supplementation, regular weight bearing exercise.  -ACS PAP guidelines (no paps), with recommendations for yearly pelvic exams as her uterus and cervix were removed for benign reasons and ovaries remain;  -recommendation for yearly mammogram;  -to see her primary care physician for all other health maintenance.    FOLLOW-UP with me for next routine visit.

## 2023-03-06 ENCOUNTER — PATIENT MESSAGE (OUTPATIENT)
Dept: OPTOMETRY | Facility: CLINIC | Age: 68
End: 2023-03-06
Payer: MEDICARE

## 2023-03-07 DIAGNOSIS — E11.9 TYPE 2 DIABETES MELLITUS WITHOUT COMPLICATION, WITHOUT LONG-TERM CURRENT USE OF INSULIN: ICD-10-CM

## 2023-03-07 RX ORDER — DULAGLUTIDE 3 MG/.5ML
3 INJECTION, SOLUTION SUBCUTANEOUS
Qty: 12 PEN | Refills: 1 | OUTPATIENT
Start: 2023-03-07 | End: 2023-03-09 | Stop reason: SDUPTHER

## 2023-03-07 NOTE — TELEPHONE ENCOUNTER
----- Message from Anila Santiago sent at 3/7/2023  7:27 AM CST -----  Regarding: MEDICATION  REFILL  REQUEST          MEDICATION  REFILL  REQUEST        Reply in [facility]: NO      Please refill the medication listed below. Please call the patient:  [phone] (h)         Medication      dulaglutide (TRULICITY) 3 mg/0.5 mL pen injector                            Sig - Route: Inject 3 mg into the skin every 7 days. - Subcutaneous         Preferred Pharmacy:   [facility] [place] [address]   Phone: [phone]  Fax:  [phone]

## 2023-03-07 NOTE — TELEPHONE ENCOUNTER
No new care gaps identified.  [facility] Embedded Care Gaps. Reference number: [id number]. 3/07/2023   7:59:40 AM CST

## 2023-03-07 NOTE — TELEPHONE ENCOUNTER
Refill Encounter    PCP Visits: Recent Visits  Date Type Provider Dept   01/31/23 Office Visit Baltazar England MD [facility] Internal Medicine   07/12/22 Office Visit Baltazar England MD [facility] Internal Medicine   Showing recent visits within past 360 days and meeting all other requirements  Future Appointments  No visits were found meeting these conditions.  Showing future appointments within next 720 days and meeting all other requirements     Last 3 Blood Pressure:   BP Readings from Last 3 Encounters:   02/03/23 (!) 140/84   01/31/23 131/78   08/08/22 116/75     Preferred Pharmacy:   [id number] - [place], [place] - [address]  [address]  [place] [address]  Phone: [phone] Fax: [phone]    [facility] [place], [place] - [address]  [address]  [facility] [address]  Phone: [phone] Fax: [phone]    [place], [place] - [address] [place] [address] [address]  [address] [place] [address] [address]  [facility] [address]  Phone: [phone] Fax: [phone]    [facility] [place], [place] - [address] [facility]  [address] [facility]  [facility] [address]  Phone: [phone] Fax: [phone]    [facility] [place], [place] - [address] [facility] [address]  [address] [facility] [address]  [facility] [address]  Phone: [phone] Fax: [phone]    Requested RX:  Requested Prescriptions     Pending Prescriptions Disp Refills    dulaglutide (TRULICITY) 3 mg/0.5 mL pen injector 12 pen 4     Sig: Inject 3 mg into the skin every 7 days.      RX Route: Normal

## 2023-03-09 DIAGNOSIS — E11.9 TYPE 2 DIABETES MELLITUS WITHOUT COMPLICATION, WITHOUT LONG-TERM CURRENT USE OF INSULIN: ICD-10-CM

## 2023-03-09 RX ORDER — DULAGLUTIDE 3 MG/.5ML
3 INJECTION, SOLUTION SUBCUTANEOUS
Qty: 12 PEN | Refills: 1 | Status: SHIPPED | OUTPATIENT
Start: 2023-03-09 | End: 2023-07-27

## 2023-03-09 NOTE — TELEPHONE ENCOUNTER
Received fax from [facility] saying they need another prescription for pt's trulicity bc the one they received was not complete  Pended/routing med

## 2023-03-09 NOTE — TELEPHONE ENCOUNTER
No new care gaps identified.  [facility] Embedded Care Gaps. Reference number: [id number]. 3/09/2023   3:20:10 PM CST

## 2023-03-10 ENCOUNTER — TELEPHONE (OUTPATIENT)
Dept: OPTOMETRY | Facility: CLINIC | Age: 68
End: 2023-03-10
Payer: MEDICARE

## 2023-03-10 NOTE — TELEPHONE ENCOUNTER
----- Message from Zacarias Weeks sent at 3/9/2023  5:27 PM CST -----  Pt Requesting Call Back    Who called: pt  Who called for pt:  Best call back #: [phone]  Add notes: pt said it's regarding an insurance question

## 2023-03-16 RX ORDER — METFORMIN HYDROCHLORIDE 500 MG/1
TABLET, EXTENDED RELEASE ORAL
Qty: 90 TABLET | Refills: 1 | Status: SHIPPED | OUTPATIENT
Start: 2023-03-16 | End: 2023-10-27 | Stop reason: SDUPTHER

## 2023-03-16 NOTE — TELEPHONE ENCOUNTER
No new care gaps identified.  [facility] Embedded Care Gaps. Reference number: [id number]. 3/16/2023   12:35:29 AM INDIAT

## 2023-03-16 NOTE — TELEPHONE ENCOUNTER
Refill Decision Note   Katey Saqib  is requesting a refill authorization.  Brief Assessment and Rationale for Refill:  Approve     Medication Therapy Plan:       Medication Reconciliation Completed: No   Comments:     No Care Gaps recommended.     Note composed:11:03 AM 03/16/2023

## 2023-05-17 DIAGNOSIS — R52 PAIN: ICD-10-CM

## 2023-05-17 RX ORDER — VALSARTAN 320 MG/1
TABLET ORAL
Qty: 90 TABLET | Refills: 2 | Status: SHIPPED | OUTPATIENT
Start: 2023-05-17

## 2023-05-17 RX ORDER — MELOXICAM 15 MG/1
TABLET ORAL
Qty: 30 TABLET | Refills: 0 | Status: SHIPPED | OUTPATIENT
Start: 2023-05-17 | End: 2023-06-21 | Stop reason: SDUPTHER

## 2023-05-17 NOTE — TELEPHONE ENCOUNTER
Patient refused to stop smoking. Not interested at this time on information that was given. Refill Routing Note   Refill Routing Note   Medication(s) are not appropriate for processing by [facility] for the following reason(s):      Required vitals abnormal    [facility] action(s):  Defer None identified     Medication Therapy Plan: FLOS; BP  2/03/23 (!) 140/84  Medication reconciliation completed: No     Appointments  past 12m or future 3m with PCP    Date Provider   Last Visit   1/31/2023 Baltazar England MD   Next Visit   Visit date not found Baltazar England MD   ED visits in past 90 days: 0        Note composed:5:47 PM 05/17/2023

## 2023-05-17 NOTE — TELEPHONE ENCOUNTER
Refill Encounter    PCP Visits: Recent Visits  Date Type Provider Dept   01/31/23 Office Visit Baltazar England MD [facility] Internal Medicine   07/12/22 Office Visit Baltazar England MD [facility] Internal Medicine   Showing recent visits within past 360 days and meeting all other requirements  Future Appointments  No visits were found meeting these conditions.  Showing future appointments within next 720 days and meeting all other requirements     Last 3 Blood Pressure:   BP Readings from Last 3 Encounters:   02/03/23 (!) 140/84   01/31/23 131/78   08/08/22 116/75     Preferred Pharmacy:   [id number] - [place], [place] - [address]  [address]  [place] [address]  Phone: [phone] Fax: [phone]    [facility] [place], [place] - [address]  [address]  [facility] [address]  Phone: [phone] Fax: [phone]    [place], [place] - [address] [place] [address] [address]  [address] [place] [address] [address]  [facility] [address]  Phone: [phone] Fax: [phone]    [facility] [place], [place] - [address] [facility] [address]  [address] [facility] [address]  [facility] [address]  Phone: [phone] Fax: [phone]    [facility] [place], [place] - [address] [facility] [address]  [address] [facility] [address]  [facility] [address]  Phone: [phone] Fax: [phone]    Requested RX:  Requested Prescriptions     Pending Prescriptions Disp Refills    meloxicam (MOBIC) 15 MG tablet [Pharmacy Med Name: MELOXICAM 15 MG TABLET] 30 tablet 0     Sig: TAKE 1 TABLET BY MOUTH EVERY DAY AS NEEDED FOR PAIN      RX Route: Normal

## 2023-05-17 NOTE — TELEPHONE ENCOUNTER
Care Due:                  Date            Visit Type   Department     Provider  --------------------------------------------------------------------------------                                VIRTUAL      [facility] INTERNAL  Last Visit: 01-      AUDIO ONLY   MEDICINE       Baltazar England  Next Visit: None Scheduled  None         None Found                                                            Last  Test          Frequency    Reason                     Performed    Due Date  --------------------------------------------------------------------------------    HBA1C.......  6 months...  dulaglutide, metFORMIN...  01- 07-    Lipid Panel.  12 months..  atorvastatin.............  07- 07-    [facility] Embedded Care Due Messages. Reference number: [id number].   5/17/2023 4:35:54 PM CDT

## 2023-05-18 ENCOUNTER — OFFICE VISIT (OUTPATIENT)
Dept: OPTOMETRY | Facility: CLINIC | Age: 68
End: 2023-05-18
Payer: MEDICARE

## 2023-05-18 DIAGNOSIS — H52.203 MYOPIA WITH ASTIGMATISM AND PRESBYOPIA, BILATERAL: ICD-10-CM

## 2023-05-18 DIAGNOSIS — Z96.1 PSEUDOPHAKIA: ICD-10-CM

## 2023-05-18 DIAGNOSIS — H40.023 OPEN ANGLE WITH BORDERLINE FINDINGS AND HIGH GLAUCOMA RISK IN BOTH EYES: Primary | ICD-10-CM

## 2023-05-18 DIAGNOSIS — H52.4 MYOPIA WITH ASTIGMATISM AND PRESBYOPIA, BILATERAL: ICD-10-CM

## 2023-05-18 DIAGNOSIS — E11.9 TYPE 2 DIABETES MELLITUS WITHOUT OPHTHALMIC MANIFESTATIONS: ICD-10-CM

## 2023-05-18 DIAGNOSIS — H52.13 MYOPIA WITH ASTIGMATISM AND PRESBYOPIA, BILATERAL: ICD-10-CM

## 2023-05-18 PROCEDURE — 3044F PR MOST RECENT HEMOGLOBIN A1C LEVEL <7.0%: ICD-10-PCS | Mod: CPTII,,, | Performed by: OPTOMETRIST

## 2023-05-18 PROCEDURE — 1101F PT FALLS ASSESS-DOCD LE1/YR: CPT | Mod: CPTII,,, | Performed by: OPTOMETRIST

## 2023-05-18 PROCEDURE — 4010F ACE/ARB THERAPY RXD/TAKEN: CPT | Mod: CPTII,,, | Performed by: OPTOMETRIST

## 2023-05-18 PROCEDURE — 2023F PR DILATED RETINAL EXAM W/O EVID OF RETINOPATHY: ICD-10-PCS | Mod: CPTII,,, | Performed by: OPTOMETRIST

## 2023-05-18 PROCEDURE — 2023F DILAT RTA XM W/O RTNOPTHY: CPT | Mod: CPTII,,, | Performed by: OPTOMETRIST

## 2023-05-18 PROCEDURE — 99213 OFFICE O/P EST LOW 20 MIN: CPT | Mod: PO | Performed by: OPTOMETRIST

## 2023-05-18 PROCEDURE — 4010F PR ACE/ARB THEARPY RXD/TAKEN: ICD-10-PCS | Mod: CPTII,,, | Performed by: OPTOMETRIST

## 2023-05-18 PROCEDURE — 92015 DETERMINE REFRACTIVE STATE: CPT | Mod: ,,, | Performed by: OPTOMETRIST

## 2023-05-18 PROCEDURE — 92015 PR REFRACTION: ICD-10-PCS | Mod: ,,, | Performed by: OPTOMETRIST

## 2023-05-18 PROCEDURE — 1159F PR MEDICATION LIST DOCUMENTED IN MEDICAL RECORD: ICD-10-PCS | Mod: CPTII,,, | Performed by: OPTOMETRIST

## 2023-05-18 PROCEDURE — 1126F PR PAIN SEVERITY QUANTIFIED, NO PAIN PRESENT: ICD-10-PCS | Mod: CPTII,,, | Performed by: OPTOMETRIST

## 2023-05-18 PROCEDURE — 1101F PR PT FALLS ASSESS DOC 0-1 FALLS W/OUT INJ PAST YR: ICD-10-PCS | Mod: CPTII,,, | Performed by: OPTOMETRIST

## 2023-05-18 PROCEDURE — 99214 OFFICE O/P EST MOD 30 MIN: CPT | Mod: ,,, | Performed by: OPTOMETRIST

## 2023-05-18 PROCEDURE — 1126F AMNT PAIN NOTED NONE PRSNT: CPT | Mod: CPTII,,, | Performed by: OPTOMETRIST

## 2023-05-18 PROCEDURE — 99999 PR PBB SHADOW E&M-EST. PATIENT-LVL III: CPT | Mod: PBBFAC,,, | Performed by: OPTOMETRIST

## 2023-05-18 PROCEDURE — 99214 PR OFFICE/OUTPT VISIT, EST, LEVL IV, 30-39 MIN: ICD-10-PCS | Mod: ,,, | Performed by: OPTOMETRIST

## 2023-05-18 PROCEDURE — 1159F MED LIST DOCD IN RCRD: CPT | Mod: CPTII,,, | Performed by: OPTOMETRIST

## 2023-05-18 PROCEDURE — 1160F RVW MEDS BY RX/DR IN RCRD: CPT | Mod: CPTII,,, | Performed by: OPTOMETRIST

## 2023-05-18 PROCEDURE — 99999 PR PBB SHADOW E&M-EST. PATIENT-LVL III: ICD-10-PCS | Mod: PBBFAC,,, | Performed by: OPTOMETRIST

## 2023-05-18 PROCEDURE — 3288F FALL RISK ASSESSMENT DOCD: CPT | Mod: CPTII,,, | Performed by: OPTOMETRIST

## 2023-05-18 PROCEDURE — 3044F HG A1C LEVEL LT 7.0%: CPT | Mod: CPTII,,, | Performed by: OPTOMETRIST

## 2023-05-18 PROCEDURE — 3288F PR FALLS RISK ASSESSMENT DOCUMENTED: ICD-10-PCS | Mod: CPTII,,, | Performed by: OPTOMETRIST

## 2023-05-18 PROCEDURE — 1160F PR REVIEW ALL MEDS BY PRESCRIBER/CLIN PHARMACIST DOCUMENTED: ICD-10-PCS | Mod: CPTII,,, | Performed by: OPTOMETRIST

## 2023-05-18 NOTE — PROGRESS NOTES
HPI    TIESHA: 11/22 with Dr. Rey  Chief complaint (CC): Patient is here for a follow up with an IOP check   today.  Patient hasn't noticed any vision changes since the last exam.    Patient states her eyes sometimes pop out when she rubs them and stay open   and she has to relax to get her lid to close again.  Patient states she   doesn't have thyroid problems but they're watching it.  Glasses? +  Contacts? -  H/o eye surgery, injections or laser: PC IOL OU  H/o eye injury: -  Known eye conditions? See above  Family h/o eye conditions? Mother with glaucoma  Eye gtts? Using Latanoprost OU Q HS, last used      (-) Flashes (-)  Floaters (-) Mucous   (-)  Tearing (-) Itching (-) Burning   (-) Headaches (-) Eye Pain/discomfort (-) Irritation   (-)  Redness (-) Double vision (-) Blurry vision    Diabetic? +BS  A1c? Hemoglobin A1C       Date                     Value               Ref Range             Status                01/30/2023               6.2 (H)             4.0 - 5.6 %           Final                 07/06/2022               6.3 (H)             4.0 - 5.6 %           Final                01/04/2022               6.6 (H)             4.0 - 5.6 %           Final                  Last edited by Vandana Marroquin on 5/18/2023  9:34 AM.            Assessment /Plan     For exam results, see Encounter Report.      Open angle with borderline findings and high glaucoma risk in both eyes  -     Oswald Visual Field - OU - Extended - Both Eyes; Future  -     Posterior Segment OCT Optic Nerve- Both eyes; Future  (+) fHx- mother. IOP 12 OD, OS. C/d 0.7 OD, 0.8 OS w/pallor OU. Prev pt of Dr Rey w/testing in 2/2022. Educated pt on findings w/understanding. Cont Latanoprost QHS OU. RTC 2 mo IOP/OCT/24-2 [place] faster Hvf. Pt will like for herself and her  to be scheduled together in Nov for his routine and her pressure check then.    Type 2 diabetes mellitus without ophthalmic manifestations  BS control. No signs of  diabetic retinopathy. Monitor with annual exam.     Myopia with astigmatism and presbyopia, bilateral  SRx released to patient. Patient educated on lens options. Normal ocular health. RTC 1 year for routine exam.     Pseudophakia  Good result.

## 2023-06-05 ENCOUNTER — PES CALL (OUTPATIENT)
Dept: ADMINISTRATIVE | Facility: CLINIC | Age: 68
End: 2023-06-05
Payer: MEDICARE

## 2023-06-21 DIAGNOSIS — R52 PAIN: ICD-10-CM

## 2023-06-21 RX ORDER — MELOXICAM 15 MG/1
TABLET ORAL
Qty: 30 TABLET | Refills: 0 | Status: SHIPPED | OUTPATIENT
Start: 2023-06-21 | End: 2023-07-21

## 2023-07-15 NOTE — TELEPHONE ENCOUNTER
No care due was identified.  [facility] Embedded Care Due Messages. Reference number: [id number].   7/15/2023 1:11:01 AM CDT

## 2023-07-15 NOTE — TELEPHONE ENCOUNTER
Refill Routing Note   Medication(s) are not appropriate for processing by [facility] for the following reason(s):      Required labs outdated    [facility] action(s):  Defer None identified          Appointments  past 12m or future 3m with PCP    Date Provider   Last Visit   1/31/2023 Baltazar England MD   Next Visit   Visit date not found Baltazar England MD   ED visits in past 90 days: 0        Note composed:12:53 PM 07/15/2023

## 2023-07-16 RX ORDER — ATORVASTATIN CALCIUM 80 MG/1
TABLET, FILM COATED ORAL
Qty: 90 TABLET | Refills: 3 | Status: SHIPPED | OUTPATIENT
Start: 2023-07-16

## 2023-07-21 DIAGNOSIS — R52 PAIN: ICD-10-CM

## 2023-07-21 RX ORDER — MELOXICAM 15 MG/1
TABLET ORAL
Qty: 30 TABLET | Refills: 0 | Status: SHIPPED | OUTPATIENT
Start: 2023-07-21 | End: 2023-09-25

## 2023-07-26 ENCOUNTER — OFFICE VISIT (OUTPATIENT)
Dept: OPTOMETRY | Facility: CLINIC | Age: 68
End: 2023-07-26
Payer: MEDICARE

## 2023-07-26 ENCOUNTER — CLINICAL SUPPORT (OUTPATIENT)
Dept: OPHTHALMOLOGY | Facility: CLINIC | Age: 68
End: 2023-07-26
Payer: MEDICARE

## 2023-07-26 DIAGNOSIS — H40.023 OPEN ANGLE WITH BORDERLINE FINDINGS AND HIGH GLAUCOMA RISK IN BOTH EYES: ICD-10-CM

## 2023-07-26 DIAGNOSIS — E11.9 TYPE 2 DIABETES MELLITUS WITHOUT OPHTHALMIC MANIFESTATIONS: ICD-10-CM

## 2023-07-26 DIAGNOSIS — H40.023 OPEN ANGLE WITH BORDERLINE FINDINGS AND HIGH GLAUCOMA RISK IN BOTH EYES: Primary | ICD-10-CM

## 2023-07-26 PROCEDURE — 1126F PR PAIN SEVERITY QUANTIFIED, NO PAIN PRESENT: ICD-10-PCS | Mod: CPTII,S$GLB,, | Performed by: OPTOMETRIST

## 2023-07-26 PROCEDURE — 1160F PR REVIEW ALL MEDS BY PRESCRIBER/CLIN PHARMACIST DOCUMENTED: ICD-10-PCS | Mod: CPTII,S$GLB,, | Performed by: OPTOMETRIST

## 2023-07-26 PROCEDURE — 1101F PR PT FALLS ASSESS DOC 0-1 FALLS W/OUT INJ PAST YR: ICD-10-PCS | Mod: CPTII,S$GLB,, | Performed by: OPTOMETRIST

## 2023-07-26 PROCEDURE — 99999 PR PBB SHADOW E&M-EST. PATIENT-LVL III: CPT | Mod: PBBFAC,,, | Performed by: OPTOMETRIST

## 2023-07-26 PROCEDURE — 4010F ACE/ARB THERAPY RXD/TAKEN: CPT | Mod: CPTII,S$GLB,, | Performed by: OPTOMETRIST

## 2023-07-26 PROCEDURE — 3288F PR FALLS RISK ASSESSMENT DOCUMENTED: ICD-10-PCS | Mod: CPTII,S$GLB,, | Performed by: OPTOMETRIST

## 2023-07-26 PROCEDURE — 99214 OFFICE O/P EST MOD 30 MIN: CPT | Mod: S$GLB,,, | Performed by: OPTOMETRIST

## 2023-07-26 PROCEDURE — 99214 PR OFFICE/OUTPT VISIT, EST, LEVL IV, 30-39 MIN: ICD-10-PCS | Mod: S$GLB,,, | Performed by: OPTOMETRIST

## 2023-07-26 PROCEDURE — 1126F AMNT PAIN NOTED NONE PRSNT: CPT | Mod: CPTII,S$GLB,, | Performed by: OPTOMETRIST

## 2023-07-26 PROCEDURE — 1159F MED LIST DOCD IN RCRD: CPT | Mod: CPTII,S$GLB,, | Performed by: OPTOMETRIST

## 2023-07-26 PROCEDURE — 1160F RVW MEDS BY RX/DR IN RCRD: CPT | Mod: CPTII,S$GLB,, | Performed by: OPTOMETRIST

## 2023-07-26 PROCEDURE — 99999 PR PBB SHADOW E&M-EST. PATIENT-LVL III: ICD-10-PCS | Mod: PBBFAC,,, | Performed by: OPTOMETRIST

## 2023-07-26 PROCEDURE — 3288F FALL RISK ASSESSMENT DOCD: CPT | Mod: CPTII,S$GLB,, | Performed by: OPTOMETRIST

## 2023-07-26 PROCEDURE — 4010F PR ACE/ARB THEARPY RXD/TAKEN: ICD-10-PCS | Mod: CPTII,S$GLB,, | Performed by: OPTOMETRIST

## 2023-07-26 PROCEDURE — 1159F PR MEDICATION LIST DOCUMENTED IN MEDICAL RECORD: ICD-10-PCS | Mod: CPTII,S$GLB,, | Performed by: OPTOMETRIST

## 2023-07-26 PROCEDURE — 1101F PT FALLS ASSESS-DOCD LE1/YR: CPT | Mod: CPTII,S$GLB,, | Performed by: OPTOMETRIST

## 2023-07-26 PROCEDURE — 3044F HG A1C LEVEL LT 7.0%: CPT | Mod: CPTII,S$GLB,, | Performed by: OPTOMETRIST

## 2023-07-26 PROCEDURE — 3044F PR MOST RECENT HEMOGLOBIN A1C LEVEL <7.0%: ICD-10-PCS | Mod: CPTII,S$GLB,, | Performed by: OPTOMETRIST

## 2023-07-26 NOTE — PROGRESS NOTES
OCT/HVF done ou./rel/fix/coop. good ou./chart checked for latex allergy./ -.75 + 1.00 x 115/od -.50 + .75 x 135/os-h

## 2023-07-27 DIAGNOSIS — E11.9 TYPE 2 DIABETES MELLITUS WITHOUT COMPLICATION, WITHOUT LONG-TERM CURRENT USE OF INSULIN: ICD-10-CM

## 2023-07-27 RX ORDER — DULAGLUTIDE 3 MG/.5ML
3 INJECTION, SOLUTION SUBCUTANEOUS
Qty: 12 PEN | Refills: 0 | Status: SHIPPED | OUTPATIENT
Start: 2023-07-27 | End: 2024-02-08 | Stop reason: SDUPTHER

## 2023-07-27 NOTE — TELEPHONE ENCOUNTER
Care Due:                  Date            Visit Type   Department     Provider  --------------------------------------------------------------------------------                                VIRTUAL      [facility] INTERNAL  Last Visit: 01-      AUDIO ONLY   MEDICINE       Baltazar England                              EP -                              PRIMARY      [facility] INTERNAL  Rene Thapa  Next Visit: 08-      CARE ([facility])   MEDICINE       [facility]                                                            Last  Test          Frequency    Reason                     Performed    Due Date  --------------------------------------------------------------------------------    HBA1C.......  6 months...  metFORMIN................  01- 07-    Lipid Panel.  12 months..  atorvastatin.............  07- 07-    [facility] Embedded Care Due Messages. Reference number: [id number].   7/27/2023 11:38:51 AM CDT

## 2023-07-27 NOTE — TELEPHONE ENCOUNTER
Provider Staff:  Action required for this patient     Please see care gap opportunities below in Care Due Message.    Thanks!  [facility]     Appointments      Date Provider   Last Visit   1/31/2023 Baltazar England MD   Next Visit   Visit date not found Baltazar England MD     Refill Decision Note   Katey Cerrato  is requesting a refill authorization.  Brief Assessment and Rationale for Refill:  Approve     Medication Therapy Plan:         Comments:     Note composed:11:59 AM 07/27/2023             Appointments     Last Visit   1/31/2023 Baltazar England MD   Next Visit   Visit date not found Baltazar England MD

## 2023-07-27 NOTE — PROGRESS NOTES
HPI    Dls: 5/18/23 Dr. Puri    67 y/o female presents today for review hvf, oct and iop ck.  Pt states no changes in vision. Pt wears pal's.     No pain  No floaters  No flashes    POHX:  Glaucoma   PC IOL OU 2021 Dr. Caban    FOHX:  Glaucoma (mother)    Eye meds  Latanoprost OU Q HS last dose last night   Last edited by Rebekah Simmons MA on 7/26/2023  2:55 PM.            Assessment /Plan     For exam results, see Encounter Report.    Open angle with borderline findings and high glaucoma risk in both eyes      (+) fHx- mother. IOP 18 OD, 16 OS. Last 12 OD, OS. C/d 0.7 OD, 0.8 OS w/pallor OU. Prev pt of Dr Rye w/testing in 2/2022.   7/26/2023 OCT WNL OU  7/26/2023 HVF OD excessive high false positives, 59% false positives, abnormally high sensitivity, sup and inf defects, OS excessive high false positives, 40% false positives, abnormally high sensitivity, inf defects  Unreliable HVFs. Pt likely on drops due to h/o high IOP. Pt reports she has been on drops since about 2005.   Educated pt on findings w/understanding.   Cont Latanoprost QHS OU.   RTC 4 mo IOP

## 2023-07-28 ENCOUNTER — TELEPHONE (OUTPATIENT)
Dept: OBSTETRICS AND GYNECOLOGY | Facility: CLINIC | Age: 68
End: 2023-07-28
Payer: MEDICARE

## 2023-07-28 DIAGNOSIS — Z12.31 SCREENING MAMMOGRAM, ENCOUNTER FOR: Primary | ICD-10-CM

## 2023-07-28 NOTE — TELEPHONE ENCOUNTER
----- Message from Estephania Vega sent at 7/27/2023  4:57 PM CDT -----  Regarding: order  Name of Who is Calling: Katey           What is the request in detail: Patient is requesting to have a mammogram order put in and call when done.            Can the clinic reply by [facility]: No           What Number to Call Back if not in [facility]: [phone]

## 2023-08-03 ENCOUNTER — LAB VISIT (OUTPATIENT)
Dept: LAB | Facility: OTHER | Age: 68
End: 2023-08-03
Attending: INTERNAL MEDICINE
Payer: MEDICARE

## 2023-08-03 DIAGNOSIS — E05.00 GRAVES' DISEASE WITH EXOPHTHALMOS: ICD-10-CM

## 2023-08-03 DIAGNOSIS — E66.9 DIABETES MELLITUS TYPE 2 IN OBESE: ICD-10-CM

## 2023-08-03 DIAGNOSIS — I10 ESSENTIAL HYPERTENSION: Chronic | ICD-10-CM

## 2023-08-03 DIAGNOSIS — E78.2 MIXED HYPERLIPIDEMIA: Chronic | ICD-10-CM

## 2023-08-03 DIAGNOSIS — E11.69 DIABETES MELLITUS TYPE 2 IN OBESE: ICD-10-CM

## 2023-08-03 LAB
ALBUMIN SERPL BCP-MCNC: 4 G/DL (ref 3.5–5.2)
ALP SERPL-CCNC: 83 U/L (ref 55–135)
ALT SERPL W/O P-5'-P-CCNC: 22 U/L (ref 10–44)
ANION GAP SERPL CALC-SCNC: 7 MMOL/L (ref 8–16)
AST SERPL-CCNC: 23 U/L (ref 10–40)
BASOPHILS # BLD AUTO: 0.04 K/UL (ref 0–0.2)
BASOPHILS NFR BLD: 0.6 % (ref 0–1.9)
BILIRUB SERPL-MCNC: 0.4 MG/DL (ref 0.1–1)
BUN SERPL-MCNC: 14 MG/DL (ref 8–23)
CALCIUM SERPL-MCNC: 10.4 MG/DL (ref 8.7–10.5)
CHLORIDE SERPL-SCNC: 103 MMOL/L (ref 95–110)
CHOLEST SERPL-MCNC: 161 MG/DL (ref 120–199)
CHOLEST/HDLC SERPL: 3 {RATIO} (ref 2–5)
CO2 SERPL-SCNC: 28 MMOL/L (ref 23–29)
CREAT SERPL-MCNC: 0.9 MG/DL (ref 0.5–1.4)
DIFFERENTIAL METHOD: ABNORMAL
EOSINOPHIL # BLD AUTO: 0.2 K/UL (ref 0–0.5)
EOSINOPHIL NFR BLD: 2.4 % (ref 0–8)
ERYTHROCYTE [DISTWIDTH] IN BLOOD BY AUTOMATED COUNT: 14.1 % (ref 11.5–14.5)
EST. GFR  (NO RACE VARIABLE): >60 ML/MIN/1.73 M^2
ESTIMATED AVG GLUCOSE: 134 MG/DL (ref 68–131)
GLUCOSE SERPL-MCNC: 128 MG/DL (ref 70–110)
HBA1C MFR BLD: 6.3 % (ref 4–5.6)
HCT VFR BLD AUTO: 35.8 % (ref 37–48.5)
HDLC SERPL-MCNC: 54 MG/DL (ref 40–75)
HDLC SERPL: 33.5 % (ref 20–50)
HGB BLD-MCNC: 11.7 G/DL (ref 12–16)
IMM GRANULOCYTES # BLD AUTO: 0.01 K/UL (ref 0–0.04)
IMM GRANULOCYTES NFR BLD AUTO: 0.2 % (ref 0–0.5)
LDLC SERPL CALC-MCNC: 96.2 MG/DL (ref 63–159)
LYMPHOCYTES # BLD AUTO: 2.9 K/UL (ref 1–4.8)
LYMPHOCYTES NFR BLD: 46.2 % (ref 18–48)
MCH RBC QN AUTO: 28.9 PG (ref 27–31)
MCHC RBC AUTO-ENTMCNC: 32.7 G/DL (ref 32–36)
MCV RBC AUTO: 88 FL (ref 82–98)
MONOCYTES # BLD AUTO: 0.5 K/UL (ref 0.3–1)
MONOCYTES NFR BLD: 7.4 % (ref 4–15)
NEUTROPHILS # BLD AUTO: 2.8 K/UL (ref 1.8–7.7)
NEUTROPHILS NFR BLD: 43.2 % (ref 38–73)
NONHDLC SERPL-MCNC: 107 MG/DL
NRBC BLD-RTO: 0 /100 WBC
PLATELET # BLD AUTO: 288 K/UL (ref 150–450)
PMV BLD AUTO: 9 FL (ref 9.2–12.9)
POTASSIUM SERPL-SCNC: 4.5 MMOL/L (ref 3.5–5.1)
PROT SERPL-MCNC: 7.8 G/DL (ref 6–8.4)
RBC # BLD AUTO: 4.05 M/UL (ref 4–5.4)
SODIUM SERPL-SCNC: 138 MMOL/L (ref 136–145)
TRIGL SERPL-MCNC: 54 MG/DL (ref 30–150)
TSH SERPL DL<=0.005 MIU/L-ACNC: 1.54 UIU/ML (ref 0.4–4)
WBC # BLD AUTO: 6.37 K/UL (ref 3.9–12.7)

## 2023-08-03 PROCEDURE — 80053 COMPREHEN METABOLIC PANEL: CPT | Performed by: INTERNAL MEDICINE

## 2023-08-03 PROCEDURE — 80061 LIPID PANEL: CPT | Performed by: INTERNAL MEDICINE

## 2023-08-03 PROCEDURE — 84443 ASSAY THYROID STIM HORMONE: CPT | Performed by: INTERNAL MEDICINE

## 2023-08-03 PROCEDURE — 85025 COMPLETE CBC W/AUTO DIFF WBC: CPT | Performed by: INTERNAL MEDICINE

## 2023-08-03 PROCEDURE — 83036 HEMOGLOBIN GLYCOSYLATED A1C: CPT | Performed by: INTERNAL MEDICINE

## 2023-08-03 PROCEDURE — 36415 COLL VENOUS BLD VENIPUNCTURE: CPT | Performed by: INTERNAL MEDICINE

## 2023-08-08 ENCOUNTER — OFFICE VISIT (OUTPATIENT)
Dept: INTERNAL MEDICINE | Facility: CLINIC | Age: 68
End: 2023-08-08
Attending: FAMILY MEDICINE
Payer: MEDICARE

## 2023-08-08 VITALS
OXYGEN SATURATION: 99 % | WEIGHT: 192.38 LBS | DIASTOLIC BLOOD PRESSURE: 82 MMHG | HEART RATE: 69 BPM | BODY MASS INDEX: 38.78 KG/M2 | HEIGHT: 59 IN | SYSTOLIC BLOOD PRESSURE: 134 MMHG

## 2023-08-08 DIAGNOSIS — E11.69 DIABETES MELLITUS TYPE 2 IN OBESE: ICD-10-CM

## 2023-08-08 DIAGNOSIS — E66.9 DIABETES MELLITUS TYPE 2 IN OBESE: ICD-10-CM

## 2023-08-08 DIAGNOSIS — I15.2 HYPERTENSION ASSOCIATED WITH DIABETES: Primary | ICD-10-CM

## 2023-08-08 DIAGNOSIS — E11.9 TYPE 2 DIABETES MELLITUS WITHOUT COMPLICATION, WITHOUT LONG-TERM CURRENT USE OF INSULIN: ICD-10-CM

## 2023-08-08 DIAGNOSIS — I10 ESSENTIAL HYPERTENSION: Chronic | ICD-10-CM

## 2023-08-08 DIAGNOSIS — E11.59 HYPERTENSION ASSOCIATED WITH DIABETES: Primary | ICD-10-CM

## 2023-08-08 PROCEDURE — 1101F PR PT FALLS ASSESS DOC 0-1 FALLS W/OUT INJ PAST YR: ICD-10-PCS | Mod: CPTII,S$GLB,, | Performed by: FAMILY MEDICINE

## 2023-08-08 PROCEDURE — 4010F ACE/ARB THERAPY RXD/TAKEN: CPT | Mod: CPTII,S$GLB,, | Performed by: FAMILY MEDICINE

## 2023-08-08 PROCEDURE — 3044F HG A1C LEVEL LT 7.0%: CPT | Mod: CPTII,S$GLB,, | Performed by: FAMILY MEDICINE

## 2023-08-08 PROCEDURE — 3072F LOW RISK FOR RETINOPATHY: CPT | Mod: CPTII,S$GLB,, | Performed by: FAMILY MEDICINE

## 2023-08-08 PROCEDURE — 3075F PR MOST RECENT SYSTOLIC BLOOD PRESS GE 130-139MM HG: ICD-10-PCS | Mod: CPTII,S$GLB,, | Performed by: FAMILY MEDICINE

## 2023-08-08 PROCEDURE — 1160F RVW MEDS BY RX/DR IN RCRD: CPT | Mod: CPTII,S$GLB,, | Performed by: FAMILY MEDICINE

## 2023-08-08 PROCEDURE — 1101F PT FALLS ASSESS-DOCD LE1/YR: CPT | Mod: CPTII,S$GLB,, | Performed by: FAMILY MEDICINE

## 2023-08-08 PROCEDURE — 99214 PR OFFICE/OUTPT VISIT, EST, LEVL IV, 30-39 MIN: ICD-10-PCS | Mod: S$GLB,,, | Performed by: FAMILY MEDICINE

## 2023-08-08 PROCEDURE — 3288F PR FALLS RISK ASSESSMENT DOCUMENTED: ICD-10-PCS | Mod: CPTII,S$GLB,, | Performed by: FAMILY MEDICINE

## 2023-08-08 PROCEDURE — 99999 PR PBB SHADOW E&M-EST. PATIENT-LVL IV: ICD-10-PCS | Mod: PBBFAC,,, | Performed by: FAMILY MEDICINE

## 2023-08-08 PROCEDURE — 3066F PR DOCUMENTATION OF TREATMENT FOR NEPHROPATHY: ICD-10-PCS | Mod: CPTII,S$GLB,, | Performed by: FAMILY MEDICINE

## 2023-08-08 PROCEDURE — 3066F NEPHROPATHY DOC TX: CPT | Mod: CPTII,S$GLB,, | Performed by: FAMILY MEDICINE

## 2023-08-08 PROCEDURE — 3061F PR NEG MICROALBUMINURIA RESULT DOCUMENTED/REVIEW: ICD-10-PCS | Mod: CPTII,S$GLB,, | Performed by: FAMILY MEDICINE

## 2023-08-08 PROCEDURE — 1160F PR REVIEW ALL MEDS BY PRESCRIBER/CLIN PHARMACIST DOCUMENTED: ICD-10-PCS | Mod: CPTII,S$GLB,, | Performed by: FAMILY MEDICINE

## 2023-08-08 PROCEDURE — 4010F PR ACE/ARB THEARPY RXD/TAKEN: ICD-10-PCS | Mod: CPTII,S$GLB,, | Performed by: FAMILY MEDICINE

## 2023-08-08 PROCEDURE — 3075F SYST BP GE 130 - 139MM HG: CPT | Mod: CPTII,S$GLB,, | Performed by: FAMILY MEDICINE

## 2023-08-08 PROCEDURE — 3008F BODY MASS INDEX DOCD: CPT | Mod: CPTII,S$GLB,, | Performed by: FAMILY MEDICINE

## 2023-08-08 PROCEDURE — 3072F PR LOW RISK FOR RETINOPATHY: ICD-10-PCS | Mod: CPTII,S$GLB,, | Performed by: FAMILY MEDICINE

## 2023-08-08 PROCEDURE — 99214 OFFICE O/P EST MOD 30 MIN: CPT | Mod: S$GLB,,, | Performed by: FAMILY MEDICINE

## 2023-08-08 PROCEDURE — 3079F PR MOST RECENT DIASTOLIC BLOOD PRESSURE 80-89 MM HG: ICD-10-PCS | Mod: CPTII,S$GLB,, | Performed by: FAMILY MEDICINE

## 2023-08-08 PROCEDURE — 3044F PR MOST RECENT HEMOGLOBIN A1C LEVEL <7.0%: ICD-10-PCS | Mod: CPTII,S$GLB,, | Performed by: FAMILY MEDICINE

## 2023-08-08 PROCEDURE — 1159F MED LIST DOCD IN RCRD: CPT | Mod: CPTII,S$GLB,, | Performed by: FAMILY MEDICINE

## 2023-08-08 PROCEDURE — 3079F DIAST BP 80-89 MM HG: CPT | Mod: CPTII,S$GLB,, | Performed by: FAMILY MEDICINE

## 2023-08-08 PROCEDURE — 3061F NEG MICROALBUMINURIA REV: CPT | Mod: CPTII,S$GLB,, | Performed by: FAMILY MEDICINE

## 2023-08-08 PROCEDURE — 3288F FALL RISK ASSESSMENT DOCD: CPT | Mod: CPTII,S$GLB,, | Performed by: FAMILY MEDICINE

## 2023-08-08 PROCEDURE — 1159F PR MEDICATION LIST DOCUMENTED IN MEDICAL RECORD: ICD-10-PCS | Mod: CPTII,S$GLB,, | Performed by: FAMILY MEDICINE

## 2023-08-08 PROCEDURE — 99999 PR PBB SHADOW E&M-EST. PATIENT-LVL IV: CPT | Mod: PBBFAC,,, | Performed by: FAMILY MEDICINE

## 2023-08-08 PROCEDURE — 3008F PR BODY MASS INDEX (BMI) DOCUMENTED: ICD-10-PCS | Mod: CPTII,S$GLB,, | Performed by: FAMILY MEDICINE

## 2023-08-08 NOTE — PROGRESS NOTES
"CHIEF COMPLAINT: Establish a primary care physician    HISTORY OF PRESENT ILLNESS: The patient is a chronically ill 68-year-old black female.  Her previous PCP is working for the [facility].  The patient wishes to establish a new primary care physician.  The patient is up-to-date regarding blood work.    She has a long history of well-controlled hypertension and diabetes    REVIEW OF SYSTEMS:  GENERAL: No fever, chills, fatigability or weight loss.  SKIN: No rashes, itching or changes in color or texture of skin.  HEAD: No headaches or recent head trauma.  EYES: Visual acuity fine. No photophobia, ocular pain or diplopia.  EARS: Denies ear pain, discharge or vertigo.  NOSE: No loss of smell, no epistaxis or postnasal drip.  MOUTH & THROAT: No hoarseness or change in voice. No excessive gum bleeding.  NODES: Denies swollen glands.  CHEST: Denies ROSALES, cyanosis, wheezing, cough and sputum production.  CARDIOVASCULAR: Denies chest pain, PND, orthopnea or reduced exercise tolerance.  ABDOMEN: Appetite fine. No weight loss. Denies diarrhea, abdominal pain, hematemesis or blood in stool.  URINARY: No flank pain, dysuria or hematuria.  PERIPHERAL VASCULAR: No claudication or cyanosis.  MUSCULOSKELETAL: No joint stiffness or swelling. Denies back pain.  NEUROLOGIC: No history of seizures, paralysis, alteration of gait or coordination.    SOCIAL HISTORY: The patient does not smoke.  The patient consumes alcohol socially.  The patient is single.    PHYSICAL EXAMINATION:   Blood pressure 134/82, pulse 69, height 4' 11" (1.499 m), weight 87.2 kg (192 lb 5.6 oz), SpO2 99 %.    APPEARANCE: Well nourished, well developed, in no acute distress.    HEAD: Normocephalic, atraumatic.  EYES: PERRL. EOMI.  Conjunctivae without injection and  anicteric  NOSE: Mucosa pink. Airway clear.  MOUTH & THROAT: No tonsillar enlargement. No pharyngeal erythema or exudate. No stridor.  NECK: Supple.   NODES: No cervical, axillary or inguinal lymph node " enlargement.  CHEST: Lungs clear to auscultation.  No retractions are noted.  No rales or rhonchi are present.  CARDIOVASCULAR: Normal S1, S2. No rubs, murmurs or gallops.  ABDOMEN: Bowel sounds normal. Not distended. Soft. No tenderness or masses.  No ascites is noted.  MUSCULOSKELETAL:  There is no clubbing, cyanosis, or edema of the extremities x4.  There is full range of motion of the lumbar spine.  There is full range of motion of the extremities x4.  There is no deformity noted.    NEUROLOGIC:       Normal speech development.      Hearing normal.      Normal gait.      Motor and sensory exams grossly normal.  PSYCHIATRIC: Patient is alert and oriented x3.  Thought processes are all normal.  There is no homicidality.  There is no suicidality.  There is no evidence of psychosis.    LABORATORY/RADIOLOGY:   Chart reviewed.  Good BW recently    ASSESSMENT:   Annual  T2DM  HTN    PLAN:  A1c in 6 months  Return to clinic in one year.

## 2023-08-12 DIAGNOSIS — G40.319 GENERALIZED CONVULSIVE EPILEPSY WITH INTRACTABLE EPILEPSY: ICD-10-CM

## 2023-08-14 RX ORDER — LEVETIRACETAM 750 MG/1
TABLET ORAL
Qty: 180 TABLET | Refills: 3 | Status: SHIPPED | OUTPATIENT
Start: 2023-08-14 | End: 2023-10-16 | Stop reason: SDUPTHER

## 2023-09-22 DIAGNOSIS — R52 PAIN: ICD-10-CM

## 2023-09-25 RX ORDER — MELOXICAM 15 MG/1
TABLET ORAL
Qty: 30 TABLET | Refills: 0 | Status: SHIPPED | OUTPATIENT
Start: 2023-09-25 | End: 2023-10-23

## 2023-10-05 DIAGNOSIS — I10 ESSENTIAL HYPERTENSION: ICD-10-CM

## 2023-10-05 RX ORDER — AMLODIPINE BESYLATE 10 MG/1
TABLET ORAL
Qty: 90 TABLET | Refills: 3 | Status: SHIPPED | OUTPATIENT
Start: 2023-10-05

## 2023-10-05 RX ORDER — METOPROLOL SUCCINATE 50 MG/1
50 TABLET, EXTENDED RELEASE ORAL NIGHTLY
Qty: 90 TABLET | Refills: 3 | Status: SHIPPED | OUTPATIENT
Start: 2023-10-05

## 2023-10-05 NOTE — TELEPHONE ENCOUNTER
Refill Routing Note   Medication(s) are not appropriate for processing by [facility] for the following reason(s):      No active prescription written by provider  Responsible provider unclear    [facility] action(s):  Defer Care Due:  None identified            Appointments  past 12m or future 3m with PCP    Date Provider   Last Visit   8/8/2023 Rene Juarez MD   Next Visit   2/8/2024 Rene Juarez MD   ED visits in past 90 days: 0        Note composed:11:18 AM 10/05/2023

## 2023-10-05 NOTE — TELEPHONE ENCOUNTER
No care due was identified.  [facility] Embedded Care Due Messages. Reference number: [id number].   10/05/2023 10:03:49 AM CDT

## 2023-10-11 ENCOUNTER — TELEPHONE (OUTPATIENT)
Dept: NEUROLOGY | Facility: CLINIC | Age: 68
End: 2023-10-11
Payer: MEDICARE

## 2023-10-11 NOTE — TELEPHONE ENCOUNTER
----- Message from Cherelle Aaron sent at 10/11/2023  1:10 PM CDT -----  Regarding: Appt  Contact: pt [phone]  Pt is calling to reestablish care and testing, dx: seizures, please call pt @[phone]

## 2023-10-12 ENCOUNTER — TELEPHONE (OUTPATIENT)
Dept: NEUROLOGY | Facility: CLINIC | Age: 68
End: 2023-10-12
Payer: MEDICARE

## 2023-10-12 NOTE — TELEPHONE ENCOUNTER
----- Message from Seb Martinez sent at 10/12/2023 11:25 AM CDT -----  Regarding: appt access  Contact: pt [phone]  PATIENT CALL    Pt called regarding yesterday's appt request. States no one has called her back yet and would like to speak w/ someone today. Please call back at [phone].

## 2023-10-16 ENCOUNTER — TELEPHONE (OUTPATIENT)
Dept: INTERNAL MEDICINE | Facility: CLINIC | Age: 68
End: 2023-10-16
Payer: MEDICARE

## 2023-10-16 DIAGNOSIS — G40.319 GENERALIZED CONVULSIVE EPILEPSY WITH INTRACTABLE EPILEPSY: ICD-10-CM

## 2023-10-16 DIAGNOSIS — G40.319 GENERALIZED CONVULSIVE EPILEPSY WITH INTRACTABLE EPILEPSY: Primary | ICD-10-CM

## 2023-10-16 RX ORDER — LEVETIRACETAM 750 MG/1
1500 TABLET ORAL DAILY
Qty: 180 TABLET | Refills: 3 | Status: SHIPPED | OUTPATIENT
Start: 2023-10-16 | End: 2024-01-02 | Stop reason: SDUPTHER

## 2023-10-16 NOTE — TELEPHONE ENCOUNTER
----- Message from Tomas Lanza sent at 10/16/2023  1:41 PM CDT -----  Type: Patient Call Back    Who called:Self     What is the request in detail: PT haven't seen neurology since 2019 , PT need a referral says Dr Burdick before he left told her she need to see Neurology again / PT asking if she can get a referral to see Dr Crowell over at [facility]     Can the clinic reply by [facility]? No     Would the patient rather a call back or a response via [facility]? Call     Best call back number: [phone] (home)

## 2023-10-17 NOTE — TELEPHONE ENCOUNTER
Called and informed patient neurology referral as been approved and signed off on by Dr. Juarez and neurology department will call her to schedule an appointment with their office. Patient verbalized understanding.

## 2023-10-23 ENCOUNTER — TELEPHONE (OUTPATIENT)
Dept: NEUROLOGY | Facility: CLINIC | Age: 68
End: 2023-10-23
Payer: MEDICARE

## 2023-10-23 DIAGNOSIS — R52 PAIN: ICD-10-CM

## 2023-10-23 RX ORDER — MELOXICAM 15 MG/1
TABLET ORAL
Qty: 30 TABLET | Refills: 0 | Status: SHIPPED | OUTPATIENT
Start: 2023-10-23 | End: 2023-11-27

## 2023-10-23 NOTE — TELEPHONE ENCOUNTER
Refill Encounter    PCP Visits: Recent Visits  Date Type Provider Dept   08/08/23 Office Visit Rene Juarez MD [facility] Internal Medicine   01/31/23 Office Visit Baltazar England MD [facility] Internal Medicine   Showing recent visits within past 360 days and meeting all other requirements  Future Appointments  Date Type Provider Dept   02/08/24 Appointment Rene Juarez MD [facility] Internal Medicine   Showing future appointments within next 720 days and meeting all other requirements     Last 3 Blood Pressure:   BP Readings from Last 3 Encounters:   08/08/23 134/82   02/03/23 (!) 140/84   01/31/23 131/78     Preferred Pharmacy:   [facility]/pharmacy #[id number] - [place], [place] - [address]  [address] [address]  Phone: [phone] Fax: [phone]    Requested RX:  Requested Prescriptions     Pending Prescriptions Disp Refills    meloxicam (MOBIC) 15 MG tablet [Pharmacy Med Name: MELOXICAM 15 MG TABLET] 30 tablet 0     Sig: TAKE 1 TABLET BY MOUTH EVERY DAY AS NEEDED FOR PAIN      RX Route: Normal

## 2023-10-23 NOTE — TELEPHONE ENCOUNTER
Refill Routing Note   Medication(s) are not appropriate for processing by [facility] for the following reason(s):      Medication outside of protocol    [facility] action(s):  Route Care Due:  None identified            Appointments  past 12m or future 3m with PCP    Date Provider   Last Visit   8/8/2023 Rene Juarez MD   Next Visit   2/8/2024 Rene Juarez MD   ED visits in past 90 days: 0        Note composed:10:10 AM 10/23/2023

## 2023-10-27 DIAGNOSIS — E11.9 TYPE 2 DIABETES MELLITUS WITHOUT COMPLICATION, WITHOUT LONG-TERM CURRENT USE OF INSULIN: Primary | ICD-10-CM

## 2023-10-27 RX ORDER — METFORMIN HYDROCHLORIDE 500 MG/1
500 TABLET, EXTENDED RELEASE ORAL DAILY
Qty: 90 TABLET | Refills: 1 | Status: SHIPPED | OUTPATIENT
Start: 2023-10-27

## 2023-10-27 NOTE — TELEPHONE ENCOUNTER
No care due was identified.  [facility] Embedded Care Due Messages. Reference number: [id number].   10/27/2023 10:09:15 AM CDT

## 2023-10-27 NOTE — TELEPHONE ENCOUNTER
Refill Encounter    PCP Visits: Recent Visits  Date Type Provider Dept   08/08/23 Office Visit Rene Juarez MD [facility] Internal Medicine   01/31/23 Office Visit Baltazar England MD [facility] Internal Medicine   Showing recent visits within past 360 days and meeting all other requirements  Future Appointments  Date Type Provider Dept   02/08/24 Appointment Rene Juarez MD [facility] Internal Medicine   Showing future appointments within next 720 days and meeting all other requirements     Last 3 Blood Pressure:   BP Readings from Last 3 Encounters:   08/08/23 134/82   02/03/23 (!) 140/84   01/31/23 131/78     Preferred Pharmacy:   [facility]/pharmacy #[id number] - [place], [place] - [address]  [address] [address]  Phone: [phone] Fax: [phone]      Requested RX:  Requested Prescriptions      No prescriptions requested or ordered in this encounter      RX Route: Normal

## 2023-10-27 NOTE — TELEPHONE ENCOUNTER
Refill Routing Note   Medication(s) are not appropriate for processing by [facility] for the following reason(s):      No active prescription written by provider    [facility] action(s):  Defer Care Due:  None identified            Appointments  past 12m or future 3m with PCP    Date Provider   Last Visit   8/8/2023 Rene Juarez MD   Next Visit   2/8/2024 Rene Juarez MD   ED visits in past 90 days: 0        Note composed:10:19 AM 10/27/2023

## 2023-11-15 ENCOUNTER — TELEPHONE (OUTPATIENT)
Dept: INTERNAL MEDICINE | Facility: CLINIC | Age: 68
End: 2023-11-15
Payer: MEDICARE

## 2023-11-15 NOTE — TELEPHONE ENCOUNTER
Tried to call the pt. VM is not set up. I have her and her 's paperwork. Dr Juarez is out until Monday. I will contact her next week once it is completed.

## 2023-11-15 NOTE — TELEPHONE ENCOUNTER
----- Message from Robertania Asaf sent at 11/14/2023  3:38 PM CST -----  Contact: Patient  Type:  Patient Call          Who Called: patient         Does the patient know what this is regarding?: requesting a call back pt said that she dropped off a envelope with paperwork enclosed  for her and her  on last week and she would like to know if it was received on 11/8 ; please advise           Would the patient rather a call back or a response via [facility]? Call           Best Call Back Number: [phone]             Additional Information:

## 2023-11-16 ENCOUNTER — TELEPHONE (OUTPATIENT)
Dept: INTERNAL MEDICINE | Facility: CLINIC | Age: 68
End: 2023-11-16
Payer: MEDICARE

## 2023-11-26 DIAGNOSIS — R52 PAIN: ICD-10-CM

## 2023-11-27 ENCOUNTER — OFFICE VISIT (OUTPATIENT)
Dept: OPTOMETRY | Facility: CLINIC | Age: 68
End: 2023-11-27
Payer: MEDICARE

## 2023-11-27 DIAGNOSIS — H40.023 OPEN ANGLE WITH BORDERLINE FINDINGS AND HIGH GLAUCOMA RISK IN BOTH EYES: Primary | ICD-10-CM

## 2023-11-27 PROCEDURE — 99999 PR PBB SHADOW E&M-EST. PATIENT-LVL III: ICD-10-PCS | Mod: PBBFAC,,, | Performed by: OPTOMETRIST

## 2023-11-27 PROCEDURE — 3066F NEPHROPATHY DOC TX: CPT | Mod: CPTII,S$GLB,, | Performed by: OPTOMETRIST

## 2023-11-27 PROCEDURE — 3061F PR NEG MICROALBUMINURIA RESULT DOCUMENTED/REVIEW: ICD-10-PCS | Mod: CPTII,S$GLB,, | Performed by: OPTOMETRIST

## 2023-11-27 PROCEDURE — 4010F PR ACE/ARB THEARPY RXD/TAKEN: ICD-10-PCS | Mod: CPTII,S$GLB,, | Performed by: OPTOMETRIST

## 2023-11-27 PROCEDURE — 3066F PR DOCUMENTATION OF TREATMENT FOR NEPHROPATHY: ICD-10-PCS | Mod: CPTII,S$GLB,, | Performed by: OPTOMETRIST

## 2023-11-27 PROCEDURE — 4010F ACE/ARB THERAPY RXD/TAKEN: CPT | Mod: CPTII,S$GLB,, | Performed by: OPTOMETRIST

## 2023-11-27 PROCEDURE — 99214 OFFICE O/P EST MOD 30 MIN: CPT | Mod: S$GLB,,, | Performed by: OPTOMETRIST

## 2023-11-27 PROCEDURE — 1160F RVW MEDS BY RX/DR IN RCRD: CPT | Mod: CPTII,S$GLB,, | Performed by: OPTOMETRIST

## 2023-11-27 PROCEDURE — 1159F PR MEDICATION LIST DOCUMENTED IN MEDICAL RECORD: ICD-10-PCS | Mod: CPTII,S$GLB,, | Performed by: OPTOMETRIST

## 2023-11-27 PROCEDURE — 3044F HG A1C LEVEL LT 7.0%: CPT | Mod: CPTII,S$GLB,, | Performed by: OPTOMETRIST

## 2023-11-27 PROCEDURE — 1160F PR REVIEW ALL MEDS BY PRESCRIBER/CLIN PHARMACIST DOCUMENTED: ICD-10-PCS | Mod: CPTII,S$GLB,, | Performed by: OPTOMETRIST

## 2023-11-27 PROCEDURE — 3061F NEG MICROALBUMINURIA REV: CPT | Mod: CPTII,S$GLB,, | Performed by: OPTOMETRIST

## 2023-11-27 PROCEDURE — 1101F PR PT FALLS ASSESS DOC 0-1 FALLS W/OUT INJ PAST YR: ICD-10-PCS | Mod: CPTII,S$GLB,, | Performed by: OPTOMETRIST

## 2023-11-27 PROCEDURE — 3044F PR MOST RECENT HEMOGLOBIN A1C LEVEL <7.0%: ICD-10-PCS | Mod: CPTII,S$GLB,, | Performed by: OPTOMETRIST

## 2023-11-27 PROCEDURE — 1159F MED LIST DOCD IN RCRD: CPT | Mod: CPTII,S$GLB,, | Performed by: OPTOMETRIST

## 2023-11-27 PROCEDURE — 1101F PT FALLS ASSESS-DOCD LE1/YR: CPT | Mod: CPTII,S$GLB,, | Performed by: OPTOMETRIST

## 2023-11-27 PROCEDURE — 99999 PR PBB SHADOW E&M-EST. PATIENT-LVL III: CPT | Mod: PBBFAC,,, | Performed by: OPTOMETRIST

## 2023-11-27 PROCEDURE — 99214 PR OFFICE/OUTPT VISIT, EST, LEVL IV, 30-39 MIN: ICD-10-PCS | Mod: S$GLB,,, | Performed by: OPTOMETRIST

## 2023-11-27 PROCEDURE — 3288F FALL RISK ASSESSMENT DOCD: CPT | Mod: CPTII,S$GLB,, | Performed by: OPTOMETRIST

## 2023-11-27 PROCEDURE — 3288F PR FALLS RISK ASSESSMENT DOCUMENTED: ICD-10-PCS | Mod: CPTII,S$GLB,, | Performed by: OPTOMETRIST

## 2023-11-27 RX ORDER — LATANOPROST 50 UG/ML
1 SOLUTION/ DROPS OPHTHALMIC NIGHTLY
Qty: 7.5 ML | Refills: 3 | Status: SHIPPED | OUTPATIENT
Start: 2023-11-27

## 2023-11-27 RX ORDER — MELOXICAM 15 MG/1
TABLET ORAL
Qty: 30 TABLET | Refills: 0 | Status: SHIPPED | OUTPATIENT
Start: 2023-11-27 | End: 2023-12-27

## 2023-11-27 NOTE — PROGRESS NOTES
HPI    TIESHA: 7/26/2023  Chief complaint (CC): PT here today for IOP check   Glasses? +  Contacts? -  H/o eye surgery, injections or laser: PC IOL OU 2021   H/o eye injury: NONE  Known eye conditions? GLAUCOMA   Family h/o eye conditions? GLAUCOMA (FATHER)  Eye gtts? LATANOPROST OU QHS      (-) Flashes (-)  Floaters (-) Mucous   (-)  Tearing (-) Itching (-) Burning   (-) Headaches (-) Eye Pain/discomfort (-) Irritation   (-)  Redness (-) Double vision (-) Blurry vision    Diabetic? -  A1c? -      Last edited by Jackie Pisano on 11/27/2023  9:07 AM.            Assessment /Plan     For exam results, see Encounter Report.    Open angle with borderline findings and high glaucoma risk in both eyes      (+) fHx- mother. IOP 15 OD, OS. Last 18 OD, 16 OS.  C/d 0.7 OD, 0.8 OS w/pallor OU. Prev pt of Dr Rey w/testing in 2/2022.   7/26/2023 OCT WNL OU  7/26/2023 HVF OD excessive high false positives, 59% false positives, abnormally high sensitivity, sup and inf defects, OS excessive high false positives, 40% false positives, abnormally high sensitivity, inf defects  Unreliable HVFs. Pt likely on drops due to h/o high IOP. Pt reports she has been on drops since about 2005.   Educated pt on findings w/understanding.   Cont Latanoprost QHS OU.   RTC 4 mo IOP

## 2023-11-27 NOTE — TELEPHONE ENCOUNTER
No care due was identified.  [facility] Embedded Care Due Messages. Reference number: [id number].   11/26/2023 12:43:57 AM CST  
Refill Encounter    PCP Visits: Recent Visits  Date Type Provider Dept   08/08/23 Office Visit Rene Juarez MD [facility] Internal Medicine   01/31/23 Office Visit Baltazar England MD [facility] Internal Medicine   Showing recent visits within past 360 days and meeting all other requirements  Future Appointments  Date Type Provider Dept   02/08/24 Appointment Rene Juarez MD [facility] Internal Medicine   Showing future appointments within next 720 days and meeting all other requirements     Last 3 Blood Pressure:   BP Readings from Last 3 Encounters:   08/08/23 134/82   02/03/23 (!) 140/84   01/31/23 131/78     Preferred Pharmacy:   [facility]/pharmacy #[id number] - [place], [place] - [address]  [address] [address]  Phone: [phone] Fax: [phone]      Requested RX:  Requested Prescriptions     Pending Prescriptions Disp Refills    meloxicam (MOBIC) 15 MG tablet [Pharmacy Med Name: MELOXICAM 15 MG TABLET] 30 tablet 0     Sig: TAKE 1 TABLET BY MOUTH EVERY DAY AS NEEDED FOR PAIN      RX Route: Normal   
Refill Routing Note   Medication(s) are not appropriate for processing by [facility] for the following reason(s):        Outside of protocol    [facility] action(s):  Route               Appointments  past 12m or future 3m with PCP    Date Provider   Last Visit   8/8/2023 Rene Juarez MD   Next Visit   2/8/2024 Rene Juarez MD   ED visits in past 90 days: 0        Note composed:9:22 AM 11/27/2023          
Yes

## 2023-12-04 ENCOUNTER — OFFICE VISIT (OUTPATIENT)
Dept: PODIATRY | Facility: CLINIC | Age: 68
End: 2023-12-04
Payer: MEDICARE

## 2023-12-04 VITALS
BODY MASS INDEX: 39.07 KG/M2 | HEIGHT: 59 IN | WEIGHT: 193.81 LBS | DIASTOLIC BLOOD PRESSURE: 78 MMHG | SYSTOLIC BLOOD PRESSURE: 130 MMHG | HEART RATE: 72 BPM

## 2023-12-04 DIAGNOSIS — G89.29 CHRONIC PAIN OF LEFT KNEE: Primary | ICD-10-CM

## 2023-12-04 DIAGNOSIS — E11.9 TYPE 2 DIABETES MELLITUS WITHOUT COMPLICATION, WITHOUT LONG-TERM CURRENT USE OF INSULIN: ICD-10-CM

## 2023-12-04 DIAGNOSIS — M25.562 CHRONIC PAIN OF LEFT KNEE: Primary | ICD-10-CM

## 2023-12-04 DIAGNOSIS — R60.0 EDEMA OF FOOT: ICD-10-CM

## 2023-12-04 DIAGNOSIS — E11.9 ENCOUNTER FOR DIABETIC FOOT EXAM: ICD-10-CM

## 2023-12-04 PROCEDURE — 99204 PR OFFICE/OUTPT VISIT, NEW, LEVL IV, 45-59 MIN: ICD-10-PCS | Mod: S$GLB,,, | Performed by: PODIATRIST

## 2023-12-04 PROCEDURE — 3044F PR MOST RECENT HEMOGLOBIN A1C LEVEL <7.0%: ICD-10-PCS | Mod: CPTII,S$GLB,, | Performed by: PODIATRIST

## 2023-12-04 PROCEDURE — 3078F PR MOST RECENT DIASTOLIC BLOOD PRESSURE < 80 MM HG: ICD-10-PCS | Mod: CPTII,S$GLB,, | Performed by: PODIATRIST

## 2023-12-04 PROCEDURE — 3044F HG A1C LEVEL LT 7.0%: CPT | Mod: CPTII,S$GLB,, | Performed by: PODIATRIST

## 2023-12-04 PROCEDURE — 4010F PR ACE/ARB THEARPY RXD/TAKEN: ICD-10-PCS | Mod: CPTII,S$GLB,, | Performed by: PODIATRIST

## 2023-12-04 PROCEDURE — 3075F SYST BP GE 130 - 139MM HG: CPT | Mod: CPTII,S$GLB,, | Performed by: PODIATRIST

## 2023-12-04 PROCEDURE — 3078F DIAST BP <80 MM HG: CPT | Mod: CPTII,S$GLB,, | Performed by: PODIATRIST

## 2023-12-04 PROCEDURE — 4010F ACE/ARB THERAPY RXD/TAKEN: CPT | Mod: CPTII,S$GLB,, | Performed by: PODIATRIST

## 2023-12-04 PROCEDURE — 1159F MED LIST DOCD IN RCRD: CPT | Mod: CPTII,S$GLB,, | Performed by: PODIATRIST

## 2023-12-04 PROCEDURE — 1126F AMNT PAIN NOTED NONE PRSNT: CPT | Mod: CPTII,S$GLB,, | Performed by: PODIATRIST

## 2023-12-04 PROCEDURE — 1160F RVW MEDS BY RX/DR IN RCRD: CPT | Mod: CPTII,S$GLB,, | Performed by: PODIATRIST

## 2023-12-04 PROCEDURE — 3008F BODY MASS INDEX DOCD: CPT | Mod: CPTII,S$GLB,, | Performed by: PODIATRIST

## 2023-12-04 PROCEDURE — 3061F PR NEG MICROALBUMINURIA RESULT DOCUMENTED/REVIEW: ICD-10-PCS | Mod: CPTII,S$GLB,, | Performed by: PODIATRIST

## 2023-12-04 PROCEDURE — 99999 PR PBB SHADOW E&M-EST. PATIENT-LVL IV: CPT | Mod: PBBFAC,,, | Performed by: PODIATRIST

## 2023-12-04 PROCEDURE — 99204 OFFICE O/P NEW MOD 45 MIN: CPT | Mod: S$GLB,,, | Performed by: PODIATRIST

## 2023-12-04 PROCEDURE — 1160F PR REVIEW ALL MEDS BY PRESCRIBER/CLIN PHARMACIST DOCUMENTED: ICD-10-PCS | Mod: CPTII,S$GLB,, | Performed by: PODIATRIST

## 2023-12-04 PROCEDURE — 3066F PR DOCUMENTATION OF TREATMENT FOR NEPHROPATHY: ICD-10-PCS | Mod: CPTII,S$GLB,, | Performed by: PODIATRIST

## 2023-12-04 PROCEDURE — 3061F NEG MICROALBUMINURIA REV: CPT | Mod: CPTII,S$GLB,, | Performed by: PODIATRIST

## 2023-12-04 PROCEDURE — 99999 PR PBB SHADOW E&M-EST. PATIENT-LVL IV: ICD-10-PCS | Mod: PBBFAC,,, | Performed by: PODIATRIST

## 2023-12-04 PROCEDURE — 1159F PR MEDICATION LIST DOCUMENTED IN MEDICAL RECORD: ICD-10-PCS | Mod: CPTII,S$GLB,, | Performed by: PODIATRIST

## 2023-12-04 PROCEDURE — 1126F PR PAIN SEVERITY QUANTIFIED, NO PAIN PRESENT: ICD-10-PCS | Mod: CPTII,S$GLB,, | Performed by: PODIATRIST

## 2023-12-04 PROCEDURE — 3066F NEPHROPATHY DOC TX: CPT | Mod: CPTII,S$GLB,, | Performed by: PODIATRIST

## 2023-12-04 PROCEDURE — 3008F PR BODY MASS INDEX (BMI) DOCUMENTED: ICD-10-PCS | Mod: CPTII,S$GLB,, | Performed by: PODIATRIST

## 2023-12-04 PROCEDURE — 3075F PR MOST RECENT SYSTOLIC BLOOD PRESS GE 130-139MM HG: ICD-10-PCS | Mod: CPTII,S$GLB,, | Performed by: PODIATRIST

## 2023-12-04 RX ORDER — INFLUENZA A VIRUS A/VICTORIA/4897/2022 IVR-238 (H1N1) ANTIGEN (FORMALDEHYDE INACTIVATED), INFLUENZA A VIRUS A/DARWIN/9/2021 SAN-010 (H3N2) ANTIGEN (FORMALDEHYDE INACTIVATED), INFLUENZA B VIRUS B/PHUKET/3073/2013 ANTIGEN (FORMALDEHYDE INACTIVATED), AND INFLUENZA B VIRUS B/MICHIGAN/01/2021 ANTIGEN (FORMALDEHYDE INACTIVATED) 60; 60; 60; 60 UG/.7ML; UG/.7ML; UG/.7ML; UG/.7ML
INJECTION, SUSPENSION INTRAMUSCULAR
COMMUNITY
Start: 2023-08-16

## 2023-12-04 RX ORDER — COVID-19 VACCINE, MRNA 50 UG/.5ML
INJECTION, SUSPENSION INTRAMUSCULAR
COMMUNITY
Start: 2023-09-28

## 2023-12-04 NOTE — PROGRESS NOTES
Subjective:      Patient ID: Katey Cerrato is a 68 y.o. female.    Chief Complaint:   Diabetic Foot Exam (PCP- 08/08/2023/Rene Juarez MD)    Katey is a 68 y.o. female who presents to the clinic for evaluation and treatment of high risk feet. Katey has a past medical history of Cataract, Empty sella syndrome, GHD (growth hormone deficiency), Glaucoma, Graves' disease with exophthalmos, History of vitamin D deficiency, Hyperlipidemia, Hypertension, Seizures, Thyroid nodule, Thyroid nodule, and Type II or unspecified type diabetes mellitus without mention of complication, uncontrolled.      Patient relates she is here for diabetic foot exam  Previous saw podiatry for that were swollen 2017  Has recently retired  Wears new balance shoes no numbness no tingling  Tries to cut own toenails    No smoking   Aveeno lotion    2004 she had a left hammertoe surgery    She will get occasional pain in the ball of her left foot    Discusses her previous knee injury surgery    PCP: Rene Juarez MD    Date Last Seen by PCP: 8/8/23    Current shoe gear:  Affected Foot: Tennis shoes     Unaffected Foot: Tennis shoes    Hemoglobin A1C   Date Value Ref Range Status   08/03/2023 6.3 (H) 4.0 - 5.6 % Final     Comment:     ADA Screening Guidelines:  5.7-6.4%  Consistent with prediabetes  >or=6.5%  Consistent with diabetes    High levels of fetal hemoglobin interfere with the HbA1C  assay. Heterozygous hemoglobin variants (HbS, HgC, etc)do  not significantly interfere with this assay.   However, presence of multiple variants may affect accuracy.     01/30/2023 6.2 (H) 4.0 - 5.6 % Final     Comment:     ADA Screening Guidelines:  5.7-6.4%  Consistent with prediabetes  >or=6.5%  Consistent with diabetes    High levels of fetal hemoglobin interfere with the HbA1C  assay. Heterozygous hemoglobin variants (HbS, HgC, etc)do  not significantly interfere with this assay.   However, presence of multiple variants may affect  accuracy.     07/06/2022 6.3 (H) 4.0 - 5.6 % Final     Comment:     ADA Screening Guidelines:  5.7-6.4%  Consistent with prediabetes  >or=6.5%  Consistent with diabetes    High levels of fetal hemoglobin interfere with the HbA1C  assay. Heterozygous hemoglobin variants (HbS, HgC, etc)do  not significantly interfere with this assay.   However, presence of multiple variants may affect accuracy.          Past Medical History:   Diagnosis Date    Cataract     Empty sella syndrome     GHD (growth hormone deficiency)     Glaucoma     Graves' disease with exophthalmos     History of vitamin D deficiency     Hyperlipidemia     Hypertension     Seizures     Thyroid nodule     Thyroid nodule     Type II or unspecified type diabetes mellitus without mention of complication, uncontrolled      Past Surgical History:   Procedure Laterality Date    BREAST BIOPSY      CATARACT EXTRACTION W/  INTRAOCULAR LENS IMPLANT Right 3/15/2021    Procedure: EXTRACTION, CATARACT, WITH IOL INSERTION;  Surgeon: Cj Caban MD;  Location: [facility] OR;  Service: Ophthalmology;  Laterality: Right;    CATARACT EXTRACTION W/  INTRAOCULAR LENS IMPLANT Left 3/29/2021    Procedure: EXTRACTION, CATARACT, WITH IOL INSERTION;  Surgeon: Cj Caban MD;  Location: [facility] OR;  Service: Ophthalmology;  Laterality: Left;    CHOLECYSTECTOMY      HYSTERECTOMY      one ovary intact    KNEE SURGERY      LYMPH NODE BIOPSY  3010    OOPHORECTOMY       Current Outpatient Medications on File Prior to Visit   Medication Sig Dispense Refill    amLODIPine (NORVASC) 10 MG tablet TAKE 1 TABLET BY MOUTH EVERY DAY 90 tablet 3    atorvastatin (LIPITOR) 80 MG tablet TAKE 1 TABLET BY MOUTH EVERY DAY 90 tablet 3    blood sugar diagnostic Strp To check BG 1 times daily, to use with insurance preferred meter 100 each 3    dulaglutide (TRULICITY) 3 mg/0.5 mL pen injector Inject 3 mg into the skin every 7 days. 12 pen 0    FLUZONE HIGHDOSE QUAD 23-24  mcg/0.7 mL Syrg        lancets Misc To check BG 1 times daily, to use with insurance preferred meter 100 each 3    latanoprost 0.005 % ophthalmic solution Place 1 drop into both eyes every evening. 7.5 mL 3    levETIRAcetam (KEPPRA) 750 MG Tab Take 2 tablets (1,500 mg total) by mouth once daily. 180 tablet 3    meloxicam (MOBIC) 15 MG tablet TAKE 1 TABLET BY MOUTH EVERY DAY AS NEEDED FOR PAIN 30 tablet 0    metFORMIN (GLUCOPHAGE-XR) 500 MG ER 24hr tablet Take 1 tablet (500 mg total) by mouth once daily. 90 tablet 1    metoprolol succinate (TOPROL-XL) 50 MG 24 hr tablet TAKE 1 TABLET BY MOUTH EVERY DAY IN THE EVENING 90 tablet 3    multivitamin-minerals-lutein Tab Take 1 tablet by mouth once daily once daily.      mv-mn/iron/folic acid/herb 190 (VITAMIN D3 COMPLETE ORAL) Take by mouth.      SPIKEVAX 5170-5432,12Y UP,,PF, 50 mcg/0.5 mL injection       valsartan (DIOVAN) 320 MG tablet TAKE 1 TABLET BY MOUTH ONCE DAILY. 90 tablet 2    VITAMIN B COMPLEX ORAL Take by mouth.      blood-glucose meter kit To check BG 1 times daily, to use with insurance preferred meter 1 each 0     No current facility-administered medications on file prior to visit.     Review of patient's allergies indicates:   Allergen Reactions    Codeine Nausea Only       Review of Systems   Constitutional: Negative for chills, decreased appetite, fever, malaise/fatigue, night sweats, weight gain and weight loss.   Cardiovascular:  Positive for leg swelling. Negative for chest pain, claudication, dyspnea on exertion, palpitations and syncope.   Respiratory:  Negative for cough and shortness of breath.    Endocrine: Negative for cold intolerance and heat intolerance.   Hematologic/Lymphatic: Negative for bleeding problem. Does not bruise/bleed easily.   Skin:  Positive for nail changes. Negative for color change, dry skin, flushing, itching, poor wound healing, rash, skin cancer, suspicious lesions and unusual hair distribution.   Musculoskeletal:  Positive for arthritis, joint  "pain and stiffness. Negative for back pain, falls, gout, joint swelling, muscle cramps, muscle weakness, myalgias and neck pain.   Gastrointestinal:  Negative for diarrhea, nausea and vomiting.   Neurological:  Negative for dizziness, focal weakness, light-headedness, numbness, paresthesias, tremors, vertigo and weakness.   Psychiatric/Behavioral:  Negative for altered mental status and depression. The patient does not have insomnia.    Allergic/Immunologic: Negative.            Objective:       Vitals:    23 1430   BP: 130/78   Pulse: 72   Weight: 87.9 kg (193 lb 12.6 oz)   Height: 4' 11" (1.499 m)   PainSc: 0-No pain   87.9 kg (193 lb 12.6 oz)     Physical Exam  Vitals reviewed.   Constitutional:       General: She is not in acute distress.     Appearance: She is well-developed. She is not ill-appearing, toxic-appearing or diaphoretic.      Comments: Proper supportive shoegear      Cardiovascular:      Pulses:           Dorsalis pedis pulses are 2+ on the right side and 2+ on the left side.        Posterior tibial pulses are 1+ on the right side and 1+ on the left side.      Comments: Decreased hair growth to lower shins with evidence of chronic venous stasis dz.       Musculoskeletal:         General: No tenderness.      Right lower le+ Edema present.      Left lower le+ Edema present.      Right ankle: Normal.      Right Achilles Tendon: Normal.      Left ankle: Normal.      Left Achilles Tendon: Normal.      Right foot: Decreased range of motion. Deformity, bunion and prominent metatarsal heads present. No tenderness or bony tenderness.      Left foot: Decreased range of motion. Deformity, bunion and prominent metatarsal heads present. No tenderness or bony tenderness.      Comments: Reducible extensor and flexor contractures at the MTPJ and PIPJ of toes 2-5, bilat.      No pop    Strength 5/5    Feet:      Right foot:      Protective Sensation: 10 sites tested.  10 sites sensed.      Skin " integrity: Dry skin present. No ulcer, blister, skin breakdown, erythema, warmth or callus.      Toenail Condition: Right toenails are abnormally thick.      Left foot:      Protective Sensation: 10 sites tested.  10 sites sensed.      Skin integrity: Dry skin present. No ulcer, blister, skin breakdown, erythema, warmth or callus.      Toenail Condition: Left toenails are abnormally thick.      Comments: SWM intact  Skin:     General: Skin is warm and dry.      Capillary Refill: Capillary refill takes 2 to 3 seconds.      Coloration: Skin is not pale.      Findings: No erythema or rash.   Neurological:      Mental Status: She is alert and oriented to person, place, and time.      Sensory: Sensory deficit present.      Gait: Gait abnormal.   Psychiatric:         Attention and Perception: Attention normal.         Mood and Affect: Mood normal.         Speech: Speech normal.         Behavior: Behavior normal.         Thought Content: Thought content normal.         Cognition and Memory: Cognition normal.         Judgment: Judgment normal.               Assessment:       Encounter Diagnoses   Name Primary?    Chronic pain of left knee Yes    Type 2 diabetes mellitus without complication, without long-term current use of insulin     Encounter for diabetic foot exam     Edema of foot          Plan:       Katey was seen today for diabetic foot exam.    Diagnoses and all orders for this visit:    Chronic pain of left knee  -     Ambulatory referral/consult to Orthopedics; Future    Type 2 diabetes mellitus without complication, without long-term current use of insulin    Encounter for diabetic foot exam    Edema of foot      I counseled the patient on her conditions, their implications and medical management.        - Shoe inspection. Diabetic Foot Education. Patient reminded of the importance of good nutrition and blood sugar control to help prevent podiatric complications of diabetes. Patient instructed on proper foot  hygeine. We discussed wearing proper shoe gear, daily foot inspections, never walking without protective shoe gear, never putting sharp instruments to feet, routine podiatric nail visits every 2-3 months.      - With patient's permission, nails were aggressively reduced and debrided x 10 to their soft tissue attachment mechanically removing all offending nail and debris. Patient relates relief following the procedure. She will continue to monitor the areas daily, inspect her feet, wear protective shoe gear when ambulatory, moisturizer to maintain skin integrity and follow in this office in approximately 2-3 months, sooner p.r.n.    Continue proper shoe gear    Consider [facility] referral    Referred to Orthopedics as patient has left knee pain; not followed by a current provider          Follow up in about 1 year (around 12/4/2024).

## 2023-12-05 ENCOUNTER — TELEPHONE (OUTPATIENT)
Dept: PODIATRY | Facility: CLINIC | Age: 68
End: 2023-12-05
Payer: MEDICARE

## 2023-12-05 NOTE — TELEPHONE ENCOUNTER
Please let patient know per her foot exam yesterday she does not qualify for diabetic shoes   She has good blood flow and good sensation in her feet     thanks         Left detail message on patient voicemail the message above from Dr. Puri

## 2023-12-14 DIAGNOSIS — M25.562 LEFT KNEE PAIN, UNSPECIFIED CHRONICITY: Primary | ICD-10-CM

## 2023-12-15 ENCOUNTER — OFFICE VISIT (OUTPATIENT)
Dept: ORTHOPEDICS | Facility: CLINIC | Age: 68
End: 2023-12-15
Payer: MEDICARE

## 2023-12-15 ENCOUNTER — HOSPITAL ENCOUNTER (OUTPATIENT)
Dept: RADIOLOGY | Facility: HOSPITAL | Age: 68
Discharge: HOME | End: 2023-12-15
Attending: ORTHOPAEDIC SURGERY
Payer: MEDICARE

## 2023-12-15 VITALS — HEIGHT: 59 IN | WEIGHT: 193.81 LBS | BODY MASS INDEX: 39.07 KG/M2

## 2023-12-15 DIAGNOSIS — M17.32 POST-TRAUMATIC OSTEOARTHRITIS OF LEFT KNEE: Primary | ICD-10-CM

## 2023-12-15 DIAGNOSIS — M25.562 CHRONIC PAIN OF LEFT KNEE: ICD-10-CM

## 2023-12-15 DIAGNOSIS — M25.562 LEFT KNEE PAIN, UNSPECIFIED CHRONICITY: ICD-10-CM

## 2023-12-15 DIAGNOSIS — G89.29 CHRONIC PAIN OF LEFT KNEE: ICD-10-CM

## 2023-12-15 PROCEDURE — 3044F HG A1C LEVEL LT 7.0%: CPT | Mod: CPTII,S$GLB,, | Performed by: ORTHOPAEDIC SURGERY

## 2023-12-15 PROCEDURE — 3061F NEG MICROALBUMINURIA REV: CPT | Mod: CPTII,S$GLB,, | Performed by: ORTHOPAEDIC SURGERY

## 2023-12-15 PROCEDURE — 73562 XR KNEE ORTHO LEFT WITH FLEXION: ICD-10-PCS | Mod: 26,59,RT, | Performed by: RADIOLOGY

## 2023-12-15 PROCEDURE — 1159F MED LIST DOCD IN RCRD: CPT | Mod: CPTII,S$GLB,, | Performed by: ORTHOPAEDIC SURGERY

## 2023-12-15 PROCEDURE — 1159F PR MEDICATION LIST DOCUMENTED IN MEDICAL RECORD: ICD-10-PCS | Mod: CPTII,S$GLB,, | Performed by: ORTHOPAEDIC SURGERY

## 2023-12-15 PROCEDURE — 1125F AMNT PAIN NOTED PAIN PRSNT: CPT | Mod: CPTII,S$GLB,, | Performed by: ORTHOPAEDIC SURGERY

## 2023-12-15 PROCEDURE — 3288F PR FALLS RISK ASSESSMENT DOCUMENTED: ICD-10-PCS | Mod: CPTII,S$GLB,, | Performed by: ORTHOPAEDIC SURGERY

## 2023-12-15 PROCEDURE — 99999 PR PBB SHADOW E&M-EST. PATIENT-LVL IV: CPT | Mod: PBBFAC,,, | Performed by: ORTHOPAEDIC SURGERY

## 2023-12-15 PROCEDURE — 3008F BODY MASS INDEX DOCD: CPT | Mod: CPTII,S$GLB,, | Performed by: ORTHOPAEDIC SURGERY

## 2023-12-15 PROCEDURE — 4010F ACE/ARB THERAPY RXD/TAKEN: CPT | Mod: CPTII,S$GLB,, | Performed by: ORTHOPAEDIC SURGERY

## 2023-12-15 PROCEDURE — 1160F PR REVIEW ALL MEDS BY PRESCRIBER/CLIN PHARMACIST DOCUMENTED: ICD-10-PCS | Mod: CPTII,S$GLB,, | Performed by: ORTHOPAEDIC SURGERY

## 2023-12-15 PROCEDURE — 1101F PR PT FALLS ASSESS DOC 0-1 FALLS W/OUT INJ PAST YR: ICD-10-PCS | Mod: CPTII,S$GLB,, | Performed by: ORTHOPAEDIC SURGERY

## 2023-12-15 PROCEDURE — 4010F PR ACE/ARB THEARPY RXD/TAKEN: ICD-10-PCS | Mod: CPTII,S$GLB,, | Performed by: ORTHOPAEDIC SURGERY

## 2023-12-15 PROCEDURE — 1160F RVW MEDS BY RX/DR IN RCRD: CPT | Mod: CPTII,S$GLB,, | Performed by: ORTHOPAEDIC SURGERY

## 2023-12-15 PROCEDURE — 3061F PR NEG MICROALBUMINURIA RESULT DOCUMENTED/REVIEW: ICD-10-PCS | Mod: CPTII,S$GLB,, | Performed by: ORTHOPAEDIC SURGERY

## 2023-12-15 PROCEDURE — 73564 X-RAY EXAM KNEE 4 OR MORE: CPT | Mod: 26,LT,, | Performed by: RADIOLOGY

## 2023-12-15 PROCEDURE — 73562 X-RAY EXAM OF KNEE 3: CPT | Mod: 26,59,RT, | Performed by: RADIOLOGY

## 2023-12-15 PROCEDURE — 3288F FALL RISK ASSESSMENT DOCD: CPT | Mod: CPTII,S$GLB,, | Performed by: ORTHOPAEDIC SURGERY

## 2023-12-15 PROCEDURE — 3008F PR BODY MASS INDEX (BMI) DOCUMENTED: ICD-10-PCS | Mod: CPTII,S$GLB,, | Performed by: ORTHOPAEDIC SURGERY

## 2023-12-15 PROCEDURE — 3066F NEPHROPATHY DOC TX: CPT | Mod: CPTII,S$GLB,, | Performed by: ORTHOPAEDIC SURGERY

## 2023-12-15 PROCEDURE — 73562 X-RAY EXAM OF KNEE 3: CPT | Mod: 59,TC,RT

## 2023-12-15 PROCEDURE — 99999 PR PBB SHADOW E&M-EST. PATIENT-LVL IV: ICD-10-PCS | Mod: PBBFAC,,, | Performed by: ORTHOPAEDIC SURGERY

## 2023-12-15 PROCEDURE — 99203 OFFICE O/P NEW LOW 30 MIN: CPT | Mod: S$GLB,,, | Performed by: ORTHOPAEDIC SURGERY

## 2023-12-15 PROCEDURE — 99203 PR OFFICE/OUTPT VISIT, NEW, LEVL III, 30-44 MIN: ICD-10-PCS | Mod: S$GLB,,, | Performed by: ORTHOPAEDIC SURGERY

## 2023-12-15 PROCEDURE — 73564 XR KNEE ORTHO LEFT WITH FLEXION: ICD-10-PCS | Mod: 26,LT,, | Performed by: RADIOLOGY

## 2023-12-15 PROCEDURE — 3066F PR DOCUMENTATION OF TREATMENT FOR NEPHROPATHY: ICD-10-PCS | Mod: CPTII,S$GLB,, | Performed by: ORTHOPAEDIC SURGERY

## 2023-12-15 PROCEDURE — 1101F PT FALLS ASSESS-DOCD LE1/YR: CPT | Mod: CPTII,S$GLB,, | Performed by: ORTHOPAEDIC SURGERY

## 2023-12-15 PROCEDURE — 1125F PR PAIN SEVERITY QUANTIFIED, PAIN PRESENT: ICD-10-PCS | Mod: CPTII,S$GLB,, | Performed by: ORTHOPAEDIC SURGERY

## 2023-12-15 PROCEDURE — 3044F PR MOST RECENT HEMOGLOBIN A1C LEVEL <7.0%: ICD-10-PCS | Mod: CPTII,S$GLB,, | Performed by: ORTHOPAEDIC SURGERY

## 2023-12-21 PROBLEM — M17.32 POST-TRAUMATIC OSTEOARTHRITIS OF LEFT KNEE: Status: ACTIVE | Noted: 2023-12-21

## 2023-12-21 NOTE — PROGRESS NOTES
Subjective:       Patient ID: Katey Cerrato is a 68 y.o. female.    Chief Complaint:   Pain of the Left Knee  She comes in for chronic pain of the left knee, up to 8/10.  She had to retire from work because of this pain which goes from the thigh to the toes.  She got Synvisc injections in the knee a long time ago which did not really help her.  She broke her leg when she was 11 years old.  She had MCL surgery for reconstructing the ligament in 1976 at [facility].  She is on daily meloxicam without relief.  No groin pain, distal numbness or tingling.    Past Medical History:   Diagnosis Date    Cataract     Empty sella syndrome     GHD (growth hormone deficiency)     Glaucoma     Graves' disease with exophthalmos     History of vitamin D deficiency     Hyperlipidemia     Hypertension     Seizures     Thyroid nodule     Thyroid nodule     Type II or unspecified type diabetes mellitus without mention of complication, uncontrolled      Past Surgical History:   Procedure Laterality Date    BREAST BIOPSY      CATARACT EXTRACTION W/  INTRAOCULAR LENS IMPLANT Right 3/15/2021    Procedure: EXTRACTION, CATARACT, WITH IOL INSERTION;  Surgeon: Cj Caban MD;  Location: [facility] OR;  Service: Ophthalmology;  Laterality: Right;    CATARACT EXTRACTION W/  INTRAOCULAR LENS IMPLANT Left 3/29/2021    Procedure: EXTRACTION, CATARACT, WITH IOL INSERTION;  Surgeon: Cj Caban MD;  Location: [facility];  Service: Ophthalmology;  Laterality: Left;    CHOLECYSTECTOMY      HYSTERECTOMY      one ovary intact    KNEE SURGERY      LYMPH NODE BIOPSY  3010    OOPHORECTOMY       Family History   Problem Relation Age of Onset    Cancer Mother     Cataracts Mother     Glaucoma Mother         shunt    Heart disease Mother     Tremor Mother     Breast cancer Mother 78    Heart disease Father     Heart disease Sister     No Known Problems Sister     Breast cancer Sister 60    Hypertension Brother     No Known Problems Brother     No Known  Problems Maternal Aunt     No Known Problems Maternal Uncle     No Known Problems Paternal Aunt     No Known Problems Paternal Uncle     No Known Problems Maternal Grandmother     No Known Problems Maternal Grandfather     No Known Problems Paternal Grandmother     No Known Problems Paternal Grandfather     No Known Problems Son     No Known Problems Other      Social History     Socioeconomic History    Marital status:    Occupational History     Employer: [employer]   Tobacco Use    Smoking status: Never    Smokeless tobacco: Never   Substance and Sexual Activity    Alcohol use: No     Comment: none    Drug use: No    Sexual activity: Yes     Partners: Male     Comment: m  works in dietary,  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     Social Determinants of Health     Financial Resource Strain: Low Risk  (1/2/2021)    Overall Financial Resource Strain (CARDIA)     Difficulty of Paying Living Expenses: Not hard at all   Food Insecurity: No Food Insecurity (1/2/2021)    Hunger Vital Sign     Worried About Running Out of Food in the Last Year: Never true     Ran Out of Food in the Last Year: Never true   Transportation Needs: No Transportation Needs (1/2/2021)    PRAPARE - Transportation     Lack of Transportation (Medical): No     Lack of Transportation (Non-Medical): No   Physical Activity: Insufficiently Active (1/2/2021)    Exercise Vital Sign     Days of Exercise per Week: 1 day     Minutes of Exercise per Session: 30 min   Stress: No Stress Concern Present (1/2/2021)    [employer] [place] [employer] - Occupational Stress Questionnaire     Feeling of Stress : Not at all   Social Connections: Unknown (1/2/2021)    Social Connection and Isolation Panel [NHANES]     Frequency of Communication with Friends and Family: More than three times a week     Frequency of Social Gatherings with Friends and Family: Once a week     Active Member of Clubs or Organizations: Yes     Attends Club or  "Organization Meetings: More than 4 times per year     Marital Status:        Current Outpatient Medications   Medication Sig Dispense Refill    amLODIPine (NORVASC) 10 MG tablet TAKE 1 TABLET BY MOUTH EVERY DAY 90 tablet 3    atorvastatin (LIPITOR) 80 MG tablet TAKE 1 TABLET BY MOUTH EVERY DAY 90 tablet 3    blood sugar diagnostic Strp To check BG 1 times daily, to use with insurance preferred meter 100 each 3    dulaglutide (TRULICITY) 3 mg/0.5 mL pen injector Inject 3 mg into the skin every 7 days. 12 pen 0    FLUZONE HIGHDOSE QUAD 23-24  mcg/0.7 mL Syrg       lancets Misc To check BG 1 times daily, to use with insurance preferred meter 100 each 3    latanoprost 0.005 % ophthalmic solution Place 1 drop into both eyes every evening. 7.5 mL 3    levETIRAcetam (KEPPRA) 750 MG Tab Take 2 tablets (1,500 mg total) by mouth once daily. 180 tablet 3    meloxicam (MOBIC) 15 MG tablet TAKE 1 TABLET BY MOUTH EVERY DAY AS NEEDED FOR PAIN 30 tablet 0    metFORMIN (GLUCOPHAGE-XR) 500 MG ER 24hr tablet Take 1 tablet (500 mg total) by mouth once daily. 90 tablet 1    metoprolol succinate (TOPROL-XL) 50 MG 24 hr tablet TAKE 1 TABLET BY MOUTH EVERY DAY IN THE EVENING 90 tablet 3    multivitamin-minerals-lutein Tab Take 1 tablet by mouth once daily once daily.      mv-mn/iron/folic acid/herb 190 (VITAMIN D3 COMPLETE ORAL) Take by mouth.      SPIKEVAX 9053-7892,12Y UP,,PF, 50 mcg/0.5 mL injection       valsartan (DIOVAN) 320 MG tablet TAKE 1 TABLET BY MOUTH ONCE DAILY. 90 tablet 2    VITAMIN B COMPLEX ORAL Take by mouth.      blood-glucose meter kit To check BG 1 times daily, to use with insurance preferred meter 1 each 0     No current facility-administered medications for this visit.     Review of patient's allergies indicates:   Allergen Reactions    Codeine Nausea Only         Objective:      Vitals:    12/15/23 1024   Weight: 87.9 kg (193 lb 12.6 oz)   Height: 4' 11" (1.499 m)     Physical Exam  There is significant " varus deformity, which is partially passively correctable.  Active range of motion is 0-90 degrees.  Anterior crepitus with active extension.  Patellar mobility is limited.  Boggy synovitis without effusion.  Medial joint line tenderness predominates.  No instability to varus/valgus/Lachman's stressing.  No pain in the groin with flexion and internal rotation of the hip which is not limited.  Skin intact.  Distal neurovascular intact.  Flip test negative.    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  Only mild degenerative changes on the right.  Assessment:   End-stage DJD, posttraumatic, left knee  Plan:       She is not interested in proceeding with knee replacement.  We will send her to the Pain Management Service to see what sort of interventions they wished to offer, and she will let me know when she wants to discuss surgery.   I explained to the patient that stiff knees make stiff knee replacements, and that they should not expect to achieve more flexion postoperatively than they have preoperatively.    The patient's pathophysiology was explained in detail with reference to x-rays, models, other visual aids as appropriate.  Treatment options were discussed in detail.  Questions were invited and answered to the patient's satisfaction.    Shay Barr MD  Orthopaedic Surgery

## 2023-12-22 ENCOUNTER — TELEPHONE (OUTPATIENT)
Dept: NEUROLOGY | Facility: CLINIC | Age: 68
End: 2023-12-22
Payer: MEDICARE

## 2023-12-27 DIAGNOSIS — R52 PAIN: ICD-10-CM

## 2023-12-27 RX ORDER — MELOXICAM 15 MG/1
TABLET ORAL
Qty: 30 TABLET | Refills: 0 | Status: SHIPPED | OUTPATIENT
Start: 2023-12-27 | End: 2024-01-25

## 2023-12-27 NOTE — TELEPHONE ENCOUNTER
Refill Encounter    PCP Visits: Recent Visits  Date Type Provider Dept   08/08/23 Office Visit Rene Juarez MD [facility] Internal Medicine   01/31/23 Office Visit Baltazar England MD [facility] Internal Medicine   Showing recent visits within past 360 days and meeting all other requirements  Future Appointments  Date Type Provider Dept   02/08/24 Appointment Rene Juarez MD [facility] Internal Medicine   Showing future appointments within next 720 days and meeting all other requirements     Last 3 Blood Pressure:   BP Readings from Last 3 Encounters:   12/04/23 130/78   08/08/23 134/82   02/03/23 (!) 140/84     Preferred Pharmacy:   [facility]/pharmacy #[id number] - [place], [place] - [address]  [address] [address]  Phone: [phone] Fax: [phone]      Requested RX:  Requested Prescriptions     Pending Prescriptions Disp Refills    meloxicam (MOBIC) 15 MG tablet [Pharmacy Med Name: MELOXICAM 15 MG TABLET] 30 tablet 0     Sig: TAKE 1 TABLET BY MOUTH EVERY DAY AS NEEDED FOR PAIN      RX Route: Normal

## 2023-12-27 NOTE — TELEPHONE ENCOUNTER
No care due was identified.  [facility] Embedded Care Due Messages. Reference number: [id number].   12/27/2023 12:15:09 AM CST

## 2023-12-27 NOTE — TELEPHONE ENCOUNTER
Refill Routing Note   Medication(s) are not appropriate for processing by [facility] for the following reason(s):        Outside of protocol    [facility] action(s):  Route             Appointments  past 12m or future 3m with PCP    Date Provider   Last Visit   8/8/2023 Rene Juarez MD   Next Visit   2/8/2024 Rene Juarez MD   ED visits in past 90 days: 0        Note composed:8:32 AM 12/27/2023

## 2024-01-02 ENCOUNTER — OFFICE VISIT (OUTPATIENT)
Dept: NEUROLOGY | Facility: CLINIC | Age: 69
End: 2024-01-02
Payer: MEDICARE

## 2024-01-02 ENCOUNTER — HOSPITAL ENCOUNTER (OUTPATIENT)
Dept: RADIOLOGY | Facility: OTHER | Age: 69
Discharge: HOME | End: 2024-01-02
Attending: OBSTETRICS & GYNECOLOGY
Payer: MEDICARE

## 2024-01-02 VITALS — HEIGHT: 59 IN | WEIGHT: 192.56 LBS | BODY MASS INDEX: 38.82 KG/M2

## 2024-01-02 DIAGNOSIS — E66.01 SEVERE OBESITY: ICD-10-CM

## 2024-01-02 DIAGNOSIS — E11.69 DIABETES MELLITUS TYPE 2 IN OBESE: ICD-10-CM

## 2024-01-02 DIAGNOSIS — E66.9 DIABETES MELLITUS TYPE 2 IN OBESE: ICD-10-CM

## 2024-01-02 DIAGNOSIS — Z12.31 SCREENING MAMMOGRAM, ENCOUNTER FOR: ICD-10-CM

## 2024-01-02 DIAGNOSIS — G40.319 GENERALIZED CONVULSIVE EPILEPSY WITH INTRACTABLE EPILEPSY: ICD-10-CM

## 2024-01-02 PROCEDURE — 99205 OFFICE O/P NEW HI 60 MIN: CPT | Mod: S$GLB,,, | Performed by: PSYCHIATRY & NEUROLOGY

## 2024-01-02 PROCEDURE — 77067 SCR MAMMO BI INCL CAD: CPT | Mod: TC

## 2024-01-02 PROCEDURE — 77067 SCR MAMMO BI INCL CAD: CPT | Mod: 26,,, | Performed by: RADIOLOGY

## 2024-01-02 PROCEDURE — 77063 BREAST TOMOSYNTHESIS BI: CPT | Mod: 26,,, | Performed by: RADIOLOGY

## 2024-01-02 PROCEDURE — 99999 PR PBB SHADOW E&M-EST. PATIENT-LVL II: CPT | Mod: PBBFAC,,, | Performed by: PSYCHIATRY & NEUROLOGY

## 2024-01-02 RX ORDER — LEVETIRACETAM 750 MG/1
1500 TABLET ORAL DAILY
Qty: 180 TABLET | Refills: 3 | Status: SHIPPED | OUTPATIENT
Start: 2024-01-02

## 2024-01-02 NOTE — PROGRESS NOTES
Name: Katey Cerrato  MRN: [id number]   [facility]: [id number]      Date: 01/02/2024    HISTORY OF PRESENT ILLNESS (HPI)  The patient is a 68 y.o.  woman with DM, HTN, graves disease evaluated by Dr Luis for an episode of loss of consciousness that occurred in March 2009. She was not feeling well in morning and laid back down. Her  took their little boy to school and when he returned 30 mins later and he could not wake her up and her tongue was bleeding. She was brought to ER with no memory of all of this until after admitted. She felt ok when she awoke in the hospital.  put her Keppra 750mg 3 Q HS. She had a EEG Sept.2009 which was abnormal. She has had no furthers seizures after starting on Keppra XR. Last prescription was for 2 tabs a day. She had done well on that dose so the dose was not increased back to the 3 tabs a day. She also ran out of medications last month and did not taking anything for 5 days without experiencing seizures or other problems  MRI showed subcortical ischemic vascular changes and she has high cholesterol (on simvastatin) and hypertension (not treated). She is followed by her PCP for these and was recently put on a new medication for diabetes.    Interim History    Continues to do well without seizures    8/2018  Continues to do well without seizures, chronic left knee pain with surgery in 1970's and recurrent injury  9/2017  No further seizures  3/2017  Patient presents after second lifetime seizure with ED presentation on 3/22/2017 which occurred from sleep.  She had missed 2 doses of medication but level at that time was 0.  She reports that for 1 month leading up to this event, she was taking diclofenac-misoprostol on a daily basis for knee pain and had been noticing whole LEV tablets in her stool.  Previously took this medicine only rarely for knee pain.    Seizure Seminology  Seizure Type 1  Classification:   Aura -   Ictus  - Nonconv -  - Conv -  - Duration -    Post-ictal  - Symptoms  - Duration  Age of onset   Current Seizure Frequency -    Last Seizure    sz per month 2015 2016 2017 2018 2019 2020   Johnie         Feb         Mar         Apr         May         Jean         Jul         Aug         Sep         Oct         Nov         Dec         Tot           Seizure Triggers  Sleep Deprivation -  None  Other medications -  None  Psych/stress -  None  Photic stimulation -  None  Hyperventilation -  None  Medical Problems -  None  Menses -   No  Sensory Stimulation    Light  No   Sound  No   Problem Solv No   Other  No  Missed dose of Rx None    AED Treatments  Present regimen  levetiracetam (Keppra, LEV) 1500mg daily    Prior treatments      Not tried  acetazolamide (Diamox, AZM)  amantadine  carbamazepine (Tegretol, CBZ)  clobezam (Onfi or Frizium, CLB)  ethosuximide (Zarontin, ESM)  eslicarbazine (Aptiom, ESL)  felbamate (felbatol, FBM)  gabapentin (Neurontin, GPN)  lacosamide (Vimpat, LCS)   lamotrigine (Lamictal, LTG)   methsuximide (Celontin, MSM)  methyphenytoin (Mesantion, MHT)  oxcarbazepine (Trileptal OXC)  perampanel (Fycompa, FCP)   phenobarbital (Pb)  phenytoin (Dilantin, PHT)  pregabalin (Lyrica, PGB)  primidone (Mysoline, PRM)  retigabine (Potiga, RTG)  rufinamide (Banzel, RUF)  tiagabine (Gabatril,  TGB)  topiramate (Topamax, TPM)  viagabatrin, (Sabril, VGB)  vagal nerve stimulator (VNS)  valproic acid (Depakote, VPA)  zonisamide (Zonegran, ZNA)  Benzodiazepines  diazepam - rectal (Diastatl)  diazepam - oral (Valium, DZ)  clonazepam (Klonopin, CZP)  clorazepate (Tranxene, CLZ)  Ativan  Brain Stimulation  Vagal Nerve Stimulation-n/a  DBS- n/a    Adherence/Compliance method  Memory - yes  Mom or Spouse - Yes  Pill Box - no  Rafael calendar - no  Turn over medication bottle - no  Phone alarm - no    Seizure Evaluation  Seizure Evaluation  EEG - 2009-09-15 - abnormal with bilateral temporal slowing and right sided spike and sharp waves.  EEG/Video monitoring -    MRI 2009-04-01 - A few punctate areas of t2/flair signal hyperintensity are present in the parietal white matter which are nonspecific though may be sequelae of chronic microvascular ischemic change. There is no evidence for acute infarction or enhancing lesion.   CT Scan - no  PET Scan N/A  Neuropsychological evaluation N/A    Potential Epilepsy Risk Factors:   Pregnancy/Labor/Delivery - full term uncomplicated pregnancy labor and vaginal delivery  Febrile seizures - none  Head injury  - none  CNS infection - none     Stroke - none  Family Hx of Sz - none    PAST MEDICAL HISTORY:   Active Ambulatory Problems     Diagnosis Date Noted    Generalized convulsive epilepsy with intractable epilepsy 09/07/2012    Hypertension associated with diabetes 09/07/2012    Diabetes mellitus type 2 in obese 09/07/2012    Obesity (BMI 30-39.9) 09/07/2012    Essential hypertension     Graves' disease with exophthalmos     Empty sella syndrome     Mixed hyperlipidemia     Type 2 diabetes mellitus without complication, without long-term current use of insulin 03/18/2014    Vaginal vault prolapse, posthysterectomy 12/03/2019    Urge incontinence 12/03/2019    Carpal tunnel syndrome of right wrist 01/08/2020    Nuclear sclerosis, bilateral 03/15/2021    Post-operative state 03/29/2021    Nuclear sclerotic cataract of left eye 03/29/2021    Severe obesity 07/12/2022    Post-traumatic osteoarthritis of left knee 12/21/2023     Resolved Ambulatory Problems     Diagnosis Date Noted    Neuropathy 08/07/2013    Lack of coordination 12/23/2019    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 12/23/2019     Past Medical History:   Diagnosis Date    Cataract     GHD (growth hormone deficiency)     Glaucoma     History of vitamin D deficiency     Hyperlipidemia     Hypertension     Seizures     Thyroid nodule     Thyroid nodule     Type II or unspecified type diabetes mellitus without mention of complication, uncontrolled         PAST  SURGICAL HISTORY:   Past Surgical History:   Procedure Laterality Date    BREAST BIOPSY      CATARACT EXTRACTION W/  INTRAOCULAR LENS IMPLANT Right 3/15/2021    Procedure: EXTRACTION, CATARACT, WITH IOL INSERTION;  Surgeon: Cj Caban MD;  Location: [facility];  Service: Ophthalmology;  Laterality: Right;    CATARACT EXTRACTION W/  INTRAOCULAR LENS IMPLANT Left 3/29/2021    Procedure: EXTRACTION, CATARACT, WITH IOL INSERTION;  Surgeon: Cj Caban MD;  Location: [facility] OR;  Service: Ophthalmology;  Laterality: Left;    CHOLECYSTECTOMY      HYSTERECTOMY      one ovary intact    KNEE SURGERY      LYMPH NODE BIOPSY  3010    OOPHORECTOMY          FAMILY HISTORY:   Family History   Problem Relation Age of Onset    Cancer Mother     Cataracts Mother     Glaucoma Mother         shunt    Heart disease Mother     Tremor Mother     Breast cancer Mother 78    Heart disease Father     Heart disease Sister     No Known Problems Sister     Breast cancer Sister 60    Hypertension Brother     No Known Problems Brother     No Known Problems Maternal Aunt     No Known Problems Maternal Uncle     No Known Problems Paternal Aunt     No Known Problems Paternal Uncle     No Known Problems Maternal Grandmother     No Known Problems Maternal Grandfather     No Known Problems Paternal Grandmother     No Known Problems Paternal Grandfather     No Known Problems Son     No Known Problems Other          SOCIAL HISTORY:   Social History     Socioeconomic History    Marital status:    Occupational History     Employer: [employer]   Tobacco Use    Smoking status: Never    Smokeless tobacco: Never   Substance and Sexual Activity    Alcohol use: No     Comment: none    Drug use: No    Sexual activity: Yes     Partners: Male     Comment: m  works in dietary,  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     Social Determinants of Health     Financial Resource Strain: Low Risk  (1/2/2021)    Overall Financial Resource Strain (CARDIA)      "Difficulty of Paying Living Expenses: Not hard at all   Food Insecurity: No Food Insecurity (1/2/2021)    Hunger Vital Sign     Worried About Running Out of Food in the Last Year: Never true     Ran Out of Food in the Last Year: Never true   Transportation Needs: No Transportation Needs (1/2/2021)    PRAPARE - Transportation     Lack of Transportation (Medical): No     Lack of Transportation (Non-Medical): No   Physical Activity: Insufficiently Active (1/2/2021)    Exercise Vital Sign     Days of Exercise per Week: 1 day     Minutes of Exercise per Session: 30 min   Stress: No Stress Concern Present (1/2/2021)    [place] [place] [employer] - Occupational Stress Questionnaire     Feeling of Stress : Not at all   Social Connections: Unknown (1/2/2021)    Social Connection and Isolation Panel [NHANES]     Frequency of Communication with Friends and Family: More than three times a week     Frequency of Social Gatherings with Friends and Family: Once a week     Active Member of Clubs or Organizations: Yes     Attends Club or Organization Meetings: More than 4 times per year     Marital Status:         SUBSTANCE USE:  Social History     Tobacco Use    Smoking status: Never    Smokeless tobacco: Never   Substance and Sexual Activity    Alcohol use: No     Comment: none    Drug use: No    Sexual activity: Yes     Partners: Male     Comment: m  works in dietary,  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      Social History     Tobacco Use    Smoking status: Never    Smokeless tobacco: Never   Substance Use Topics    Alcohol use: No     Comment: none        ALLERGIES: Codeine     Ht 4' 11" (1.499 m)   Wt 87.4 kg (192 lb 9.2 oz)   BMI 38.89 kg/m²     Higher Cortical Function:    Patient is a well developed, pleasant, well groomed individual appearing their stated age  Oriented - intact to person, place and time and followed two step instruction correctly.    Fund of knowledge was appropriate.    R-L Orientation - " Intact - Impaired  Language - Speech was fluent without evidence for an aphasia.  Cranial Nerves II - XII:    EOMs were intact with normal smooth and no nystagmus.    PERRLA. Visual fields were full to confrontation.    Motor - facial movement was symmetrical and normal.    Facial sensory - Light touch sensations were normal.    Hearing was normal to finger rub.  Palate moved well and was symmetrical with normal palatal and oral sensation.    Tongue movement was full   Normal power and bulk was found in the massiter and rotator muscles of the neck.  Motor: Power, bulk and tone were normal in all extremities.  Sensory: Light touch senses were normal in all extremities.    Coordination:       Rapid alternating movements and rapid finger tapping - normal.       Finger to nose - nl.       Arm roll - symmetrical.    Gait:  Station, gait walking were done without difficulty and Romberg was negative.  Tremor: resting, postural, intentional - none     IMPRESSION  1.  Left temporal lobe epilepsy with break through seizure 3/2017 due to malabsorption of meds in the setting of increased misoprostol intake, no further EEG indicated as long as seizures remain controlled    DISPOSITION:   Continue LEV 1500mg daily    Follow up in 12 months or as needed    Greater than 60 minutes spent in chart review, documentation, independent review of imaging, and face to face time with patient    2014 EPILEPSY QUALITY MEASUREMENT (AAN)  1 a. Seizure Frequency: noted  1b. Seizure Intervention: yes  2.                   a. Etiology: unknown  b. Seizure Type: CPS w sec GTC sz  c. Epilepsy Syndrome: n/a  3.                   a. Side-effects of AED: no                        b. Intervention for side-effects: n/a  4. Screening for psychiatric or behavioral disorders: yes  5. Personalized epilepsy safety issues and education: yes  6. Counseling for women of child-bearing potential and epilepsy: n/a  7. Referral of treatment-resistant epilepsy to  [facility] epilepsy center (q 2 years): N/A.     The patient was asked to call me/the clinic 1 week after the test(s) are done to obtain results.  The following issues were  all discussed in detail with the patient and family/caregiver(s):     1. Diagnosis, plans, prognosis, medications and possible side-effects, risks and benefits of treatment, other alternatives to AEDs.  2. Risks related to continued seizures, status epilepticus, SUDEP, driving restrictions and seizure precautions ( no baths but showers are ok, no swimming unsupervised, no use of heavy machinery, no use of sharp moving objects, avoid heights).   3. Issues related to pregnancy, OCP and breast feeding as it relates to epilepsy.  4. The potential of teratogenicity and suicidal risks of anti-epileptic medications.  5.Avoid any activity that compromise patient safety related to seizures.      Questions and concerns raised by the patient and family/care-giver(s) were addressed and they indicated understanding of everything discussed and agreed to plans as above.

## 2024-01-11 ENCOUNTER — PATIENT MESSAGE (OUTPATIENT)
Dept: PAIN MEDICINE | Facility: OTHER | Age: 69
End: 2024-01-11
Payer: MEDICARE

## 2024-01-11 ENCOUNTER — OFFICE VISIT (OUTPATIENT)
Dept: PAIN MEDICINE | Facility: CLINIC | Age: 69
End: 2024-01-11
Payer: MEDICARE

## 2024-01-11 VITALS
WEIGHT: 190.94 LBS | TEMPERATURE: 98 F | BODY MASS INDEX: 38.56 KG/M2 | OXYGEN SATURATION: 98 % | RESPIRATION RATE: 18 BRPM | HEART RATE: 81 BPM | SYSTOLIC BLOOD PRESSURE: 142 MMHG | DIASTOLIC BLOOD PRESSURE: 89 MMHG

## 2024-01-11 DIAGNOSIS — M17.32 POST-TRAUMATIC OSTEOARTHRITIS OF LEFT KNEE: Primary | ICD-10-CM

## 2024-01-11 DIAGNOSIS — M17.32 POST-TRAUMATIC OSTEOARTHRITIS OF LEFT KNEE: ICD-10-CM

## 2024-01-11 PROCEDURE — 99204 OFFICE O/P NEW MOD 45 MIN: CPT | Mod: S$GLB,,, | Performed by: ANESTHESIOLOGY

## 2024-01-11 PROCEDURE — 99999 PR PBB SHADOW E&M-EST. PATIENT-LVL IV: CPT | Mod: PBBFAC,,, | Performed by: ANESTHESIOLOGY

## 2024-01-11 NOTE — PROGRESS NOTES
PCP: Rene Juarez MD    REFERRING PHYSICIAN: Shay Barr MD    CHIEF COMPLAINT: Left Knee Pain    Original HISTORY OF PRESENT ILLNESS: Katey Cerrato presents to the clinic for the evaluation of the above pain. The pain started in approximately 2004 which worsened in 2020. She does have a history of prior knee surgery.     Original Pain Description:  The pain is located in the left medial knee. The pain is described as aching. Exacerbating factors: sitting. Mitigating factors walking. Symptoms interfere with daily activity. The patient feels like symptoms have been worsening.     Original PAIN SCORES:  Best: Pain is 5  Worst: Pain is 8  Current: Pain is 7        1/11/2024     9:03 AM   Last 3 PDI Scores   Pain Disability Index (PDI) 56       INTERVAL HISTORY: (Newest visit at the bottom)   Interval History (Date):       6 weeks of Conservative therapy:  PT: 2004 (Must include dates)  Chiro: No  HEP: No      Treatments / Medications: (Ice/Heat/NSAIDS/APAP/etc):  TENS  Arthotec - helped a lot but interfered with her Keppra  Mobic - helps  Voltaren - helps  IcyHot    Interventional Pain Procedures: (Previous injections)  None    Past Medical History:   Diagnosis Date    Cataract     Empty sella syndrome     GHD (growth hormone deficiency)     Glaucoma     Graves' disease with exophthalmos     History of vitamin D deficiency     Hyperlipidemia     Hypertension     Seizures     Thyroid nodule     Thyroid nodule     Type II or unspecified type diabetes mellitus without mention of complication, uncontrolled      Past Surgical History:   Procedure Laterality Date    BREAST BIOPSY      CATARACT EXTRACTION W/  INTRAOCULAR LENS IMPLANT Right 3/15/2021    Procedure: EXTRACTION, CATARACT, WITH IOL INSERTION;  Surgeon: Cj Caban MD;  Location: [facility];  Service: Ophthalmology;  Laterality: Right;    CATARACT EXTRACTION W/  INTRAOCULAR LENS IMPLANT Left 3/29/2021    Procedure: EXTRACTION, CATARACT, WITH  IOL INSERTION;  Surgeon: Cj Caban MD;  Location: [facility];  Service: Ophthalmology;  Laterality: Left;    CHOLECYSTECTOMY      HYSTERECTOMY      one ovary intact    KNEE SURGERY      LYMPH NODE BIOPSY  3010    OOPHORECTOMY       Social History     Socioeconomic History    Marital status:    Occupational History     Employer: [employer]   Tobacco Use    Smoking status: Never    Smokeless tobacco: Never   Substance and Sexual Activity    Alcohol use: No     Comment: none    Drug use: No    Sexual activity: Yes     Partners: Male     Comment: m  works in dietary,  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     Social Determinants of Health     Financial Resource Strain: Low Risk  (1/2/2021)    Overall Financial Resource Strain (CARDIA)     Difficulty of Paying Living Expenses: Not hard at all   Food Insecurity: No Food Insecurity (1/2/2021)    Hunger Vital Sign     Worried About Running Out of Food in the Last Year: Never true     Ran Out of Food in the Last Year: Never true   Transportation Needs: No Transportation Needs (1/2/2021)    PRAPARE - Transportation     Lack of Transportation (Medical): No     Lack of Transportation (Non-Medical): No   Physical Activity: Insufficiently Active (1/2/2021)    Exercise Vital Sign     Days of Exercise per Week: 1 day     Minutes of Exercise per Session: 30 min   Stress: No Stress Concern Present (1/2/2021)    [employer] [place] [employer] - Occupational Stress Questionnaire     Feeling of Stress : Not at all   Social Connections: Unknown (1/2/2021)    Social Connection and Isolation Panel [NHANES]     Frequency of Communication with Friends and Family: More than three times a week     Frequency of Social Gatherings with Friends and Family: Once a week     Active Member of Clubs or Organizations: Yes     Attends Club or Organization Meetings: More than 4 times per year     Marital Status:      Family History   Problem Relation Age of Onset     Cancer Mother     Cataracts Mother     Glaucoma Mother         shunt    Heart disease Mother     Tremor Mother     Breast cancer Mother 78    Heart disease Father     Heart disease Sister     No Known Problems Sister     Breast cancer Sister 60    Hypertension Brother     No Known Problems Brother     No Known Problems Maternal Aunt     No Known Problems Maternal Uncle     No Known Problems Paternal Aunt     No Known Problems Paternal Uncle     No Known Problems Maternal Grandmother     No Known Problems Maternal Grandfather     No Known Problems Paternal Grandmother     No Known Problems Paternal Grandfather     No Known Problems Son     No Known Problems Other        Review of patient's allergies indicates:   Allergen Reactions    Codeine Nausea Only       Current Outpatient Medications   Medication Sig    amLODIPine (NORVASC) 10 MG tablet TAKE 1 TABLET BY MOUTH EVERY DAY    atorvastatin (LIPITOR) 80 MG tablet TAKE 1 TABLET BY MOUTH EVERY DAY    dulaglutide (TRULICITY) 3 mg/0.5 mL pen injector Inject 3 mg into the skin every 7 days.    lancets Misc To check BG 1 times daily, to use with insurance preferred meter    latanoprost 0.005 % ophthalmic solution Place 1 drop into both eyes every evening.    levETIRAcetam (KEPPRA) 750 MG Tab Take 2 tablets (1,500 mg total) by mouth once daily.    meloxicam (MOBIC) 15 MG tablet TAKE 1 TABLET BY MOUTH EVERY DAY AS NEEDED FOR PAIN    metFORMIN (GLUCOPHAGE-XR) 500 MG ER 24hr tablet Take 1 tablet (500 mg total) by mouth once daily.    metoprolol succinate (TOPROL-XL) 50 MG 24 hr tablet TAKE 1 TABLET BY MOUTH EVERY DAY IN THE EVENING    multivitamin-minerals-lutein Tab Take 1 tablet by mouth once daily once daily.    mv-mn/iron/folic acid/herb 190 (VITAMIN D3 COMPLETE ORAL) Take by mouth.    SPIKEVAX 5481-8956,12Y UP,,PF, 50 mcg/0.5 mL injection     valsartan (DIOVAN) 320 MG tablet TAKE 1 TABLET BY MOUTH ONCE DAILY.    VITAMIN B COMPLEX ORAL Take by mouth.    blood sugar  diagnostic Strp To check BG 1 times daily, to use with insurance preferred meter (Patient not taking: Reported on 1/11/2024)    blood-glucose meter kit To check BG 1 times daily, to use with insurance preferred meter    FLUZONE HIGHDOSE QUAD 23-24  mcg/0.7 mL Syrg      No current facility-administered medications for this visit.       ROS:  GENERAL: No fever. No chills. No fatigue. Denies weight loss. Denies weight gain.  HEENT: Denies headaches. Denies vision change. Denies eye pain. Denies double vision. Denies ear pain.   CV: Denies chest pain.   PULM: Denies of shortness of breath.  GI: Denies constipation. No diarrhea. No abdominal pain. Denies nausea. Denies vomiting. No blood in stool.  HEME: Denies bleeding problems.  : Denies urgency. No painful urination. No blood in urine.  MS: Denies joint stiffness. Denies joint swelling.  Denies back pain.  SKIN: Denies rash.   NEURO: Denies seizures. No weakness.  PSYCH:  Denies difficulty sleeping. No anxiety. Denies depression. No suicidal thoughts.       VITALS:   Vitals:    01/11/24 0858   BP: (!) 142/89   Pulse: 81   Resp: 18   Temp: 98.4 °F (36.9 °C)   SpO2: 98%   Weight: 86.6 kg (190 lb 14.7 oz)   PainSc:   8   PainLoc: Knee         PHYSICAL EXAM:   GENERAL: Well appearing, in no acute distress, alert and oriented x3.  PSYCH:  Mood and affect appropriate.  SKIN: Skin color, texture, turgor normal, no rashes or lesions.  HEENT:  Normocephalic, atraumatic. Cranial nerves grossly intact.  NECK: No pain to palpation over the cervical paraspinous muscles. No pain to palpation over facets. No pain with neck flexion, extension, or lateral flexion.   PULM: No evidence of respiratory difficulty, symmetric chest rise.  GI:  Non-distended  BACK: Normal range of motion. No pain to palpation over the spinous processes. No pain to palpation over facet joints. There is no pain with palpation over the sacroiliac joints bilaterally.   EXTREMITIES: No deformities, edema,  or skin discoloration.   MUSCULOSKELETAL: Lt. Knee: S-shaped incision. Decreased flexion/extension with palpable crepitus. Some lateral flexibility. Pain with manipulation of patella and mainly with medial joint line.   NEURO: Sensation is equal and appropriate bilaterally. Bilateral upper and lower extremity strength is normal and symmetric. Bilateral upper and lower extremity coordination and muscle stretch reflexes are physiologic and symmetric. Plantar response are downgoing. Straight leg raising in the supine position is negative to radicular pain.   GAIT: normal.      LABS:      IMAGING:    XR KNEE ORTHO LEFT WITH FLEXION     CLINICAL HISTORY:  . Pain in left knee     TECHNIQUE:  AP standing view of both knees, PA flexion standing views of both knees, and Merchant views of both knees were performed. A lateral view of the left knee was also performed.     COMPARISON:  July 2022     FINDINGS:  Postop probable repair left medial collateral ligament.  Advanced degenerative changes at the left medial femoral tibial compartment.  There may be mild narrowing medially on the right on the standing flexion view.  No significant effusion on the left.  Degenerative change left patellofemoral joint.     Impression:     Postop and degenerative changes above.        Electronically signed by: Aj Reardon MD  Date:                                            12/15/2023  Time:                                           11:24    ASSESSMENT: 68 y.o. year old female with pain, consistent with:    Encounter Diagnosis   Name Primary?    Post-traumatic osteoarthritis of left knee        DISCUSSION: Katey LEVY Jessicalena is retired from [employer] She comes to us from Dr. Barr with left knee pain which is worst with standing/walking. Imaging shows severe loss of medial joint space. Exam shows pain reproduced at the patella and medial joint line.     PLAN:  Reviewed images with MsCarlos Manuel Cerrato  Schedule left knee injection with  Xray  Follow up after injection  If ineffective will get her back in to see Dr. Barr      I would like to thank Shay Barr MD for the opportunity to assist in the care of this patient. We had a very nice visit and I look forward to continuing their care. Please let me know if I can be of further assistance.     She Schwartz  01/11/2024

## 2024-01-11 NOTE — H&P (VIEW-ONLY)
PCP: Rene Juarez MD    REFERRING PHYSICIAN: Shay Barr MD    CHIEF COMPLAINT: Left Knee Pain    Original HISTORY OF PRESENT ILLNESS: Katey Cerrato presents to the clinic for the evaluation of the above pain. The pain started in approximately 2004 which worsened in 2020. She does have a history of prior knee surgery.     Original Pain Description:  The pain is located in the left medial knee. The pain is described as aching. Exacerbating factors: sitting. Mitigating factors walking. Symptoms interfere with daily activity. The patient feels like symptoms have been worsening.     Original PAIN SCORES:  Best: Pain is 5  Worst: Pain is 8  Current: Pain is 7        1/11/2024     9:03 AM   Last 3 PDI Scores   Pain Disability Index (PDI) 56       INTERVAL HISTORY: (Newest visit at the bottom)   Interval History (Date):       6 weeks of Conservative therapy:  PT: 2004 (Must include dates)  Chiro: No  HEP: No      Treatments / Medications: (Ice/Heat/NSAIDS/APAP/etc):  TENS  Arthotec - helped a lot but interfered with her Keppra  Mobic - helps  Voltaren - helps  IcyHot    Interventional Pain Procedures: (Previous injections)  None    Past Medical History:   Diagnosis Date    Cataract     Empty sella syndrome     GHD (growth hormone deficiency)     Glaucoma     Graves' disease with exophthalmos     History of vitamin D deficiency     Hyperlipidemia     Hypertension     Seizures     Thyroid nodule     Thyroid nodule     Type II or unspecified type diabetes mellitus without mention of complication, uncontrolled      Past Surgical History:   Procedure Laterality Date    BREAST BIOPSY      CATARACT EXTRACTION W/  INTRAOCULAR LENS IMPLANT Right 3/15/2021    Procedure: EXTRACTION, CATARACT, WITH IOL INSERTION;  Surgeon: Cj Caban MD;  Location: [facility];  Service: Ophthalmology;  Laterality: Right;    CATARACT EXTRACTION W/  INTRAOCULAR LENS IMPLANT Left 3/29/2021    Procedure: EXTRACTION, CATARACT, WITH  IOL INSERTION;  Surgeon: Cj Caban MD;  Location: [facility];  Service: Ophthalmology;  Laterality: Left;    CHOLECYSTECTOMY      HYSTERECTOMY      one ovary intact    KNEE SURGERY      LYMPH NODE BIOPSY  3010    OOPHORECTOMY       Social History     Socioeconomic History    Marital status:    Occupational History     Employer: [employer]   Tobacco Use    Smoking status: Never    Smokeless tobacco: Never   Substance and Sexual Activity    Alcohol use: No     Comment: none    Drug use: No    Sexual activity: Yes     Partners: Male     Comment: m  works in dietary,  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     Social Determinants of Health     Financial Resource Strain: Low Risk  (1/2/2021)    Overall Financial Resource Strain (CARDIA)     Difficulty of Paying Living Expenses: Not hard at all   Food Insecurity: No Food Insecurity (1/2/2021)    Hunger Vital Sign     Worried About Running Out of Food in the Last Year: Never true     Ran Out of Food in the Last Year: Never true   Transportation Needs: No Transportation Needs (1/2/2021)    PRAPARE - Transportation     Lack of Transportation (Medical): No     Lack of Transportation (Non-Medical): No   Physical Activity: Insufficiently Active (1/2/2021)    Exercise Vital Sign     Days of Exercise per Week: 1 day     Minutes of Exercise per Session: 30 min   Stress: No Stress Concern Present (1/2/2021)    [place] [place] [employer] - Occupational Stress Questionnaire     Feeling of Stress : Not at all   Social Connections: Unknown (1/2/2021)    Social Connection and Isolation Panel [NHANES]     Frequency of Communication with Friends and Family: More than three times a week     Frequency of Social Gatherings with Friends and Family: Once a week     Active Member of Clubs or Organizations: Yes     Attends Club or Organization Meetings: More than 4 times per year     Marital Status:      Family History   Problem Relation Age of Onset     Cancer Mother     Cataracts Mother     Glaucoma Mother         shunt    Heart disease Mother     Tremor Mother     Breast cancer Mother 78    Heart disease Father     Heart disease Sister     No Known Problems Sister     Breast cancer Sister 60    Hypertension Brother     No Known Problems Brother     No Known Problems Maternal Aunt     No Known Problems Maternal Uncle     No Known Problems Paternal Aunt     No Known Problems Paternal Uncle     No Known Problems Maternal Grandmother     No Known Problems Maternal Grandfather     No Known Problems Paternal Grandmother     No Known Problems Paternal Grandfather     No Known Problems Son     No Known Problems Other        Review of patient's allergies indicates:   Allergen Reactions    Codeine Nausea Only       Current Outpatient Medications   Medication Sig    amLODIPine (NORVASC) 10 MG tablet TAKE 1 TABLET BY MOUTH EVERY DAY    atorvastatin (LIPITOR) 80 MG tablet TAKE 1 TABLET BY MOUTH EVERY DAY    dulaglutide (TRULICITY) 3 mg/0.5 mL pen injector Inject 3 mg into the skin every 7 days.    lancets Misc To check BG 1 times daily, to use with insurance preferred meter    latanoprost 0.005 % ophthalmic solution Place 1 drop into both eyes every evening.    levETIRAcetam (KEPPRA) 750 MG Tab Take 2 tablets (1,500 mg total) by mouth once daily.    meloxicam (MOBIC) 15 MG tablet TAKE 1 TABLET BY MOUTH EVERY DAY AS NEEDED FOR PAIN    metFORMIN (GLUCOPHAGE-XR) 500 MG ER 24hr tablet Take 1 tablet (500 mg total) by mouth once daily.    metoprolol succinate (TOPROL-XL) 50 MG 24 hr tablet TAKE 1 TABLET BY MOUTH EVERY DAY IN THE EVENING    multivitamin-minerals-lutein Tab Take 1 tablet by mouth once daily once daily.    mv-mn/iron/folic acid/herb 190 (VITAMIN D3 COMPLETE ORAL) Take by mouth.    SPIKEVAX 8192-9179,12Y UP,,PF, 50 mcg/0.5 mL injection     valsartan (DIOVAN) 320 MG tablet TAKE 1 TABLET BY MOUTH ONCE DAILY.    VITAMIN B COMPLEX ORAL Take by mouth.    blood sugar  diagnostic Strp To check BG 1 times daily, to use with insurance preferred meter (Patient not taking: Reported on 1/11/2024)    blood-glucose meter kit To check BG 1 times daily, to use with insurance preferred meter    FLUZONE HIGHDOSE QUAD 23-24  mcg/0.7 mL Syrg      No current facility-administered medications for this visit.       ROS:  GENERAL: No fever. No chills. No fatigue. Denies weight loss. Denies weight gain.  HEENT: Denies headaches. Denies vision change. Denies eye pain. Denies double vision. Denies ear pain.   CV: Denies chest pain.   PULM: Denies of shortness of breath.  GI: Denies constipation. No diarrhea. No abdominal pain. Denies nausea. Denies vomiting. No blood in stool.  HEME: Denies bleeding problems.  : Denies urgency. No painful urination. No blood in urine.  MS: Denies joint stiffness. Denies joint swelling.  Denies back pain.  SKIN: Denies rash.   NEURO: Denies seizures. No weakness.  PSYCH:  Denies difficulty sleeping. No anxiety. Denies depression. No suicidal thoughts.       VITALS:   Vitals:    01/11/24 0858   BP: (!) 142/89   Pulse: 81   Resp: 18   Temp: 98.4 °F (36.9 °C)   SpO2: 98%   Weight: 86.6 kg (190 lb 14.7 oz)   PainSc:   8   PainLoc: Knee         PHYSICAL EXAM:   GENERAL: Well appearing, in no acute distress, alert and oriented x3.  PSYCH:  Mood and affect appropriate.  SKIN: Skin color, texture, turgor normal, no rashes or lesions.  HEENT:  Normocephalic, atraumatic. Cranial nerves grossly intact.  NECK: No pain to palpation over the cervical paraspinous muscles. No pain to palpation over facets. No pain with neck flexion, extension, or lateral flexion.   PULM: No evidence of respiratory difficulty, symmetric chest rise.  GI:  Non-distended  BACK: Normal range of motion. No pain to palpation over the spinous processes. No pain to palpation over facet joints. There is no pain with palpation over the sacroiliac joints bilaterally.   EXTREMITIES: No deformities, edema,  or skin discoloration.   MUSCULOSKELETAL: Lt. Knee: S-shaped incision. Decreased flexion/extension with palpable crepitus. Some lateral flexibility. Pain with manipulation of patella and mainly with medial joint line.   NEURO: Sensation is equal and appropriate bilaterally. Bilateral upper and lower extremity strength is normal and symmetric. Bilateral upper and lower extremity coordination and muscle stretch reflexes are physiologic and symmetric. Plantar response are downgoing. Straight leg raising in the supine position is negative to radicular pain.   GAIT: normal.      LABS:      IMAGING:    XR KNEE ORTHO LEFT WITH FLEXION     CLINICAL HISTORY:  . Pain in left knee     TECHNIQUE:  AP standing view of both knees, PA flexion standing views of both knees, and Merchant views of both knees were performed. A lateral view of the left knee was also performed.     COMPARISON:  July 2022     FINDINGS:  Postop probable repair left medial collateral ligament.  Advanced degenerative changes at the left medial femoral tibial compartment.  There may be mild narrowing medially on the right on the standing flexion view.  No significant effusion on the left.  Degenerative change left patellofemoral joint.     Impression:     Postop and degenerative changes above.        Electronically signed by: Aj Reardon MD  Date:                                            12/15/2023  Time:                                           11:24    ASSESSMENT: 68 y.o. year old female with pain, consistent with:    Encounter Diagnosis   Name Primary?    Post-traumatic osteoarthritis of left knee        DISCUSSION: Katey LEVY Jessicalena is retired from [employer]. She comes to us from Dr. Barr with left knee pain which is worst with standing/walking. Imaging shows severe loss of medial joint space. Exam shows pain reproduced at the patella and medial joint line.     PLAN:  Reviewed images with MsCarlos Manuel Cerrato  Schedule left knee injection with  Xray  Follow up after injection  If ineffective will get her back in to see Dr. Barr      I would like to thank Shay Barr MD for the opportunity to assist in the care of this patient. We had a very nice visit and I look forward to continuing their care. Please let me know if I can be of further assistance.     She Schwartz  01/11/2024

## 2024-01-25 ENCOUNTER — PATIENT OUTREACH (OUTPATIENT)
Dept: ADMINISTRATIVE | Facility: HOSPITAL | Age: 69
End: 2024-01-25
Payer: MEDICARE

## 2024-01-25 DIAGNOSIS — R52 PAIN: ICD-10-CM

## 2024-01-25 DIAGNOSIS — Z12.12 SCREENING FOR COLORECTAL CANCER: ICD-10-CM

## 2024-01-25 DIAGNOSIS — E11.69 DIABETES MELLITUS TYPE 2 IN OBESE: Primary | ICD-10-CM

## 2024-01-25 DIAGNOSIS — Z12.11 SCREENING FOR COLORECTAL CANCER: ICD-10-CM

## 2024-01-25 DIAGNOSIS — E66.9 DIABETES MELLITUS TYPE 2 IN OBESE: Primary | ICD-10-CM

## 2024-01-25 RX ORDER — MELOXICAM 15 MG/1
TABLET ORAL
Qty: 30 TABLET | Refills: 0 | Status: SHIPPED | OUTPATIENT
Start: 2024-01-25 | End: 2024-02-26

## 2024-01-25 NOTE — TELEPHONE ENCOUNTER
Refill Routing Note   Medication(s) are not appropriate for processing by [facility] for the following reason(s):        Outside of protocol    [facility] action(s):  Route             Appointments  past 12m or future 3m with PCP    Date Provider   Last Visit   8/8/2023 Rene Juarez MD   Next Visit   2/8/2024 Rene Juarez MD   ED visits in past 90 days: 0        Note composed:8:10 AM 01/25/2024

## 2024-01-25 NOTE — TELEPHONE ENCOUNTER
No care due was identified.  [facility] Embedded Care Due Messages. Reference number: [id number].   1/25/2024 12:28:22 AM CST

## 2024-01-25 NOTE — TELEPHONE ENCOUNTER
Refill Encounter    PCP Visits: Recent Visits  Date Type Provider Dept   08/08/23 Office Visit Rene Juarez MD [facility]   01/31/23 Office Visit Baltazar England MD [facility]   Showing recent visits within past 360 days and meeting all other requirements  Future Appointments  Date Type Provider Dept   02/08/24 Appointment Rene Juarez MD [facility]   Showing future appointments within next 720 days and meeting all other requirements     Last 3 Blood Pressure:   BP Readings from Last 3 Encounters:   01/11/24 (!) 142/89   12/04/23 130/78   08/08/23 134/82     Preferred Pharmacy:   [id number] - [place], [place] - [address]  [address] [address]  Phone: [phone] Fax: [phone]    Requested RX:  Requested Prescriptions     Pending Prescriptions Disp Refills    meloxicam (MOBIC) 15 MG tablet [Pharmacy Med Name: MELOXICAM 15 MG TABLET] 30 tablet 0     Sig: TAKE 1 TABLET BY MOUTH EVERY DAY AS NEEDED FOR PAIN      RX Route: Normal

## 2024-01-30 ENCOUNTER — PATIENT MESSAGE (OUTPATIENT)
Dept: ADMINISTRATIVE | Facility: OTHER | Age: 69
End: 2024-01-30
Payer: MEDICARE

## 2024-02-02 ENCOUNTER — PATIENT MESSAGE (OUTPATIENT)
Dept: PAIN MEDICINE | Facility: OTHER | Age: 69
End: 2024-02-02
Payer: MEDICARE

## 2024-02-06 ENCOUNTER — HOSPITAL ENCOUNTER (OUTPATIENT)
Facility: OTHER | Age: 69
Discharge: HOME | End: 2024-02-06
Attending: ANESTHESIOLOGY | Admitting: ANESTHESIOLOGY
Payer: MEDICARE

## 2024-02-06 VITALS
DIASTOLIC BLOOD PRESSURE: 90 MMHG | OXYGEN SATURATION: 97 % | BODY MASS INDEX: 37.3 KG/M2 | TEMPERATURE: 98 F | HEART RATE: 74 BPM | SYSTOLIC BLOOD PRESSURE: 183 MMHG | RESPIRATION RATE: 16 BRPM | HEIGHT: 60 IN | WEIGHT: 190 LBS

## 2024-02-06 DIAGNOSIS — M17.9 KNEE OSTEOARTHRITIS: ICD-10-CM

## 2024-02-06 DIAGNOSIS — M17.32 POST-TRAUMATIC OSTEOARTHRITIS OF LEFT KNEE: Primary | ICD-10-CM

## 2024-02-06 DIAGNOSIS — M25.562 CHRONIC PAIN OF LEFT KNEE: ICD-10-CM

## 2024-02-06 DIAGNOSIS — G89.29 CHRONIC PAIN OF LEFT KNEE: ICD-10-CM

## 2024-02-06 LAB — POCT GLUCOSE: 93 MG/DL (ref 70–110)

## 2024-02-06 PROCEDURE — 25500020 PHARM REV CODE 255: Performed by: ANESTHESIOLOGY

## 2024-02-06 PROCEDURE — 63600175 PHARM REV CODE 636 W HCPCS: Mod: JZ,JG | Performed by: ANESTHESIOLOGY

## 2024-02-06 PROCEDURE — 82947 ASSAY GLUCOSE BLOOD QUANT: CPT | Performed by: ANESTHESIOLOGY

## 2024-02-06 PROCEDURE — 20610 DRAIN/INJ JOINT/BURSA W/O US: CPT | Mod: LT,,, | Performed by: ANESTHESIOLOGY

## 2024-02-06 PROCEDURE — 20610 DRAIN/INJ JOINT/BURSA W/O US: CPT | Mod: LT | Performed by: ANESTHESIOLOGY

## 2024-02-06 PROCEDURE — 25000003 PHARM REV CODE 250: Performed by: ANESTHESIOLOGY

## 2024-02-06 RX ORDER — TRIAMCINOLONE ACETONIDE 40 MG/ML
INJECTION, SUSPENSION INTRA-ARTICULAR; INTRAMUSCULAR
Status: DISCONTINUED | OUTPATIENT
Start: 2024-02-06 | End: 2024-02-06 | Stop reason: HOSPADM

## 2024-02-06 RX ORDER — LIDOCAINE HYDROCHLORIDE 20 MG/ML
INJECTION, SOLUTION INFILTRATION; PERINEURAL
Status: DISCONTINUED | OUTPATIENT
Start: 2024-02-06 | End: 2024-02-06 | Stop reason: HOSPADM

## 2024-02-06 RX ORDER — SODIUM CHLORIDE 9 MG/ML
INJECTION, SOLUTION INTRAVENOUS CONTINUOUS
Status: DISCONTINUED | OUTPATIENT
Start: 2024-02-06 | End: 2024-02-06 | Stop reason: HOSPADM

## 2024-02-06 RX ORDER — BUPIVACAINE HYDROCHLORIDE 2.5 MG/ML
INJECTION, SOLUTION EPIDURAL; INFILTRATION; INTRACAUDAL
Status: DISCONTINUED | OUTPATIENT
Start: 2024-02-06 | End: 2024-02-06 | Stop reason: HOSPADM

## 2024-02-06 NOTE — DISCHARGE SUMMARY
Discharge Note  Short Stay      SUMMARY     Admit Date: 2/6/2024    Attending Physician: Flako Odell      Discharge Physician: Flako Odell      Discharge Date: 2/6/2024 08:50 AM    Procedure(s) (LRB):  INJECTION, JOINT LEFT KNEE WITH STEROID (Left)    Final Diagnosis: Post-traumatic osteoarthritis of left knee [M17.32]    Disposition: Home or self care    Patient Instructions:   Current Discharge Medication List        CONTINUE these medications which have NOT CHANGED    Details   amLODIPine (NORVASC) 10 MG tablet TAKE 1 TABLET BY MOUTH EVERY DAY  Qty: 90 tablet, Refills: 3    Comments: .      atorvastatin (LIPITOR) 80 MG tablet TAKE 1 TABLET BY MOUTH EVERY DAY  Qty: 90 tablet, Refills: 3      blood sugar diagnostic Strp To check BG 1 times daily, to use with insurance preferred meter  Qty: 100 each, Refills: 3    Associated Diagnoses: Uncontrolled type 2 diabetes mellitus without complication, without long-term current use of insulin      blood-glucose meter kit To check BG 1 times daily, to use with insurance preferred meter  Qty: 1 each, Refills: 0    Associated Diagnoses: Uncontrolled type 2 diabetes mellitus without complication, without long-term current use of insulin      dulaglutide (TRULICITY) 3 mg/0.5 mL pen injector Inject 3 mg into the skin every 7 days.  Qty: 12 pen , Refills: 0    Associated Diagnoses: Type 2 diabetes mellitus without complication, without long-term current use of insulin      FLUZONE HIGHDOSE QUAD 23-24  mcg/0.7 mL Syrg       lancets Misc To check BG 1 times daily, to use with insurance preferred meter  Qty: 100 each, Refills: 3    Associated Diagnoses: Uncontrolled type 2 diabetes mellitus without complication, without long-term current use of insulin      latanoprost 0.005 % ophthalmic solution Place 1 drop into both eyes every evening.  Qty: 7.5 mL, Refills: 3    Associated Diagnoses: Open angle with borderline findings and high glaucoma risk in both eyes       levETIRAcetam (KEPPRA) 750 MG Tab Take 2 tablets (1,500 mg total) by mouth once daily.  Qty: 180 tablet, Refills: 3    Associated Diagnoses: Generalized convulsive epilepsy with intractable epilepsy      meloxicam (MOBIC) 15 MG tablet TAKE 1 TABLET BY MOUTH EVERY DAY AS NEEDED FOR PAIN  Qty: 30 tablet, Refills: 0    Associated Diagnoses: Pain      metFORMIN (GLUCOPHAGE-XR) 500 MG ER 24hr tablet Take 1 tablet (500 mg total) by mouth once daily.  Qty: 90 tablet, Refills: 1    Associated Diagnoses: Type 2 diabetes mellitus without complication, without long-term current use of insulin      metoprolol succinate (TOPROL-XL) 50 MG 24 hr tablet TAKE 1 TABLET BY MOUTH EVERY DAY IN THE EVENING  Qty: 90 tablet, Refills: 3    Comments: .  Associated Diagnoses: Essential hypertension      multivitamin-minerals-lutein Tab Take 1 tablet by mouth once daily once daily.      mv-mn/iron/folic acid/herb 190 (VITAMIN D3 COMPLETE ORAL) Take by mouth.      SPIKEVAX 8232-8616,12Y UP,,PF, 50 mcg/0.5 mL injection       valsartan (DIOVAN) 320 MG tablet TAKE 1 TABLET BY MOUTH ONCE DAILY.  Qty: 90 tablet, Refills: 2      VITAMIN B COMPLEX ORAL Take by mouth.                 Discharge Diagnosis: Post-traumatic osteoarthritis of left knee [M17.32]  Condition on Discharge: Stable with no complications to procedure   Diet on Discharge: Same as before.  Activity: as per instruction sheet.  Discharge to: Home with a responsible adult.  Follow up: 2-4 weeks       Please call my office or pager at [phone] if experienced any weakness or loss of sensation, fever > 101.5, pain uncontrolled with oral medications, persistent nausea/vomiting/or diarrhea, redness or drainage from the incisions, or any other worrisome concerns. If physician on call was not reached or could not communicate with our office for any reason please go to the nearest emergency department

## 2024-02-06 NOTE — DISCHARGE INSTRUCTIONS

## 2024-02-06 NOTE — OP NOTE
Knee Steroid Injection under Fluoroscopic Guidance    The procedure, risks, benefits, and options were discussed with the patient. There are no contraindications to the procedure. The patent expressed understanding and agreed to the procedure. Informed written consent was obtained prior to the start of the procedure and can be found in the patient's chart.    PATIENT NAME: Katey Cerrato   MRN: [id number]     DATE OF PROCEDURE: 02/06/2024    PROCEDURE: Left Knee Steroid Injection under Fluoroscopic Guidance    PRE-OP DIAGNOSIS: Post-traumatic osteoarthritis of left knee [M17.32]    POST-OP DIAGNOSIS: Same    PHYSICIAN: She Schwartz MD    ASSISTANTS: Flako Odell, [facility] Fellow      MEDICATIONS INJECTED: Preservative-free Kenalog 40mg with 3cc of Bupivacine 0.25%     LOCAL ANESTHETIC INJECTED: Xylocaine 2%     SEDATION: No sedation.     ESTIMATED BLOOD LOSS: None    COMPLICATIONS: None    TECHNIQUE: Time-out was performed to identify the patient and procedure to be performed. With the patient laying in a supine position, the surgical area was prepped and draped in the usual sterile fashion using ChloraPrep and a fenestrated drape. The area overlying the knee joint was determined under fluoroscopy guidance. Skin anesthesia was achieved by injecting Lidocaine 2% over the injection sites. A 25 gauge 1/2 inch needle was used to localize and enter the left knee joint. Once the needle tip was in the area of the joint, and there was no blood aspiration,  Contrast dye  Omnipaque (300mg/mL) was injected to confirm placement and there was no vascular runoff. 4 mL of the medication mixture listed above was injected slowly. The needles were removed and bleeding was nil. A sterile dressing was applied. No specimens collected. The patient tolerated the procedure well.    The patient was monitored after the procedure in the recovery area. They were given post-procedure and discharge instructions to follow at home.  The patient was discharged in a stable condition.    I reviewed and edited the fellow's note. I conducted my own interview and physical examination. I agree with the findings. I was present and supervising all critical portions of the procedure.    Flako Odell MD

## 2024-02-06 NOTE — INTERVAL H&P NOTE
HPI  Patient presenting for Procedure(s) (LRB):  INJECTION, JOINT LEFT GENICULAR WITH STEROID (Left)     Patient on Anti-coagulation  Patient has held ASA 81 mg for 7 days.     No health changes since previous encounter    Past Medical History:   Diagnosis Date    Cataract     Empty sella syndrome     GHD (growth hormone deficiency)     Glaucoma     Graves' disease with exophthalmos     History of vitamin D deficiency     Hyperlipidemia     Hypertension     Seizures     Thyroid nodule     Thyroid nodule     Type II or unspecified type diabetes mellitus without mention of complication, uncontrolled      Past Surgical History:   Procedure Laterality Date    BREAST BIOPSY      CATARACT EXTRACTION W/  INTRAOCULAR LENS IMPLANT Right 3/15/2021    Procedure: EXTRACTION, CATARACT, WITH IOL INSERTION;  Surgeon: Cj Caban MD;  Location: [facility];  Service: Ophthalmology;  Laterality: Right;    CATARACT EXTRACTION W/  INTRAOCULAR LENS IMPLANT Left 3/29/2021    Procedure: EXTRACTION, CATARACT, WITH IOL INSERTION;  Surgeon: Cj Caban MD;  Location: [facility];  Service: Ophthalmology;  Laterality: Left;    CHOLECYSTECTOMY      HYSTERECTOMY      one ovary intact    KNEE SURGERY      LYMPH NODE BIOPSY  3010    OOPHORECTOMY       Review of patient's allergies indicates:   Allergen Reactions    Codeine Nausea Only      Current Facility-Administered Medications   Medication    0.9%  NaCl infusion       PMHx, PSHx, Allergies, Medications reviewed in epic    ROS negative except pain complaints in HPI    OBJECTIVE:    Breastfeeding No     PHYSICAL EXAMINATION:    GENERAL: Well appearing, in no acute distress, alert and oriented x3.  PSYCH:  Mood and affect appropriate.  SKIN: Skin color, texture, turgor normal, no rashes or lesions which will impact the procedure.  CV: RRR with palpation of the radial artery.  PULM: No evidence of respiratory difficulty, symmetric chest rise. Clear to auscultation.  NEURO: Cranial nerves grossly  intact.    Plan:    Proceed with procedure as planned Procedure(s) (LRB):  INJECTION, JOINT LEFT GENICULAR WITH STEROID (Left)    Flako Odell  02/06/2024

## 2024-02-08 ENCOUNTER — OFFICE VISIT (OUTPATIENT)
Dept: INTERNAL MEDICINE | Facility: CLINIC | Age: 69
End: 2024-02-08
Attending: FAMILY MEDICINE
Payer: MEDICARE

## 2024-02-08 VITALS
WEIGHT: 188.63 LBS | BODY MASS INDEX: 37.03 KG/M2 | SYSTOLIC BLOOD PRESSURE: 130 MMHG | HEART RATE: 66 BPM | HEIGHT: 60 IN | DIASTOLIC BLOOD PRESSURE: 82 MMHG | OXYGEN SATURATION: 100 %

## 2024-02-08 DIAGNOSIS — I15.2 HYPERTENSION ASSOCIATED WITH DIABETES: ICD-10-CM

## 2024-02-08 DIAGNOSIS — E23.6 EMPTY SELLA SYNDROME: ICD-10-CM

## 2024-02-08 DIAGNOSIS — E11.59 HYPERTENSION ASSOCIATED WITH DIABETES: ICD-10-CM

## 2024-02-08 DIAGNOSIS — I10 ESSENTIAL HYPERTENSION: Primary | Chronic | ICD-10-CM

## 2024-02-08 DIAGNOSIS — E11.9 TYPE 2 DIABETES MELLITUS WITHOUT COMPLICATION, WITHOUT LONG-TERM CURRENT USE OF INSULIN: ICD-10-CM

## 2024-02-08 DIAGNOSIS — I73.9 PERIPHERAL VASCULAR DISEASE, UNSPECIFIED: ICD-10-CM

## 2024-02-08 PROCEDURE — 99214 OFFICE O/P EST MOD 30 MIN: CPT | Mod: S$GLB,,, | Performed by: FAMILY MEDICINE

## 2024-02-08 PROCEDURE — 99999 PR PBB SHADOW E&M-EST. PATIENT-LVL III: CPT | Mod: PBBFAC,,, | Performed by: FAMILY MEDICINE

## 2024-02-08 RX ORDER — DULAGLUTIDE 3 MG/.5ML
3 INJECTION, SOLUTION SUBCUTANEOUS
Qty: 12 PEN | Refills: 3 | Status: SHIPPED | OUTPATIENT
Start: 2024-02-08

## 2024-02-08 RX ORDER — DULAGLUTIDE 3 MG/.5ML
3 INJECTION, SOLUTION SUBCUTANEOUS
Qty: 12 PEN | Refills: 3 | Status: SHIPPED | OUTPATIENT
Start: 2024-02-08 | End: 2024-02-08 | Stop reason: SDUPTHER

## 2024-02-08 NOTE — PROGRESS NOTES
CHIEF COMPLAINT:  DM    HISTORY OF PRESENT ILLNESS: The patient is a chronically ill 68-year-old black female.  Her previous PCP is working for [facility].  The patient is up-to-date regarding blood work.  A1c 6.1    She has a long history of well-controlled hypertension and diabetes    REVIEW OF SYSTEMS:  GENERAL: No fever, chills, fatigability or weight loss.  SKIN: No rashes, itching or changes in color or texture of skin.  HEAD: No headaches or recent head trauma.  EYES: Visual acuity fine. No photophobia, ocular pain or diplopia.  EARS: Denies ear pain, discharge or vertigo.  NOSE: No loss of smell, no epistaxis or postnasal drip.  MOUTH & THROAT: No hoarseness or change in voice. No excessive gum bleeding.  NODES: Denies swollen glands.  CHEST: Denies ROSALES, cyanosis, wheezing, cough and sputum production.  CARDIOVASCULAR: Denies chest pain, PND, orthopnea or reduced exercise tolerance.  ABDOMEN: Appetite fine. No weight loss. Denies diarrhea, abdominal pain, hematemesis or blood in stool.  URINARY: No flank pain, dysuria or hematuria.  PERIPHERAL VASCULAR: No claudication or cyanosis.  MUSCULOSKELETAL: No joint stiffness or swelling. Denies back pain.  NEUROLOGIC: No history of seizures, paralysis, alteration of gait or coordination.    SOCIAL HISTORY: The patient does not smoke.  The patient consumes alcohol socially.  The patient is .    PHYSICAL EXAMINATION:   Blood pressure 130/82, pulse 66, height 5' (1.524 m), weight 85.6 kg (188 lb 9.7 oz), SpO2 100 %.    APPEARANCE: Well nourished, well developed, in no acute distress.    HEAD: Normocephalic, atraumatic.  EYES: PERRL. EOMI.  Conjunctivae without injection and  anicteric  NOSE: Mucosa pink. Airway clear.  MOUTH & THROAT: No tonsillar enlargement. No pharyngeal erythema or exudate. No stridor.  NECK: Supple.   NODES: No cervical, axillary or inguinal lymph node enlargement.  CHEST: Lungs clear to auscultation.  No retractions are noted.  No rales or  rhonchi are present.  CARDIOVASCULAR: Normal S1, S2. No rubs, murmurs or gallops.  ABDOMEN: Bowel sounds normal. Not distended. Soft. No tenderness or masses.  No ascites is noted.  MUSCULOSKELETAL:  There is no clubbing, cyanosis, or edema of the extremities x4.  There is full range of motion of the lumbar spine.  There is full range of motion of the extremities x4.  There is no deformity noted.    NEUROLOGIC:       Normal speech development.      Hearing normal.      Normal gait.      Motor and sensory exams grossly normal.  PSYCHIATRIC: Patient is alert and oriented x3.  Thought processes are all normal.  There is no homicidality.  There is no suicidality.  There is no evidence of psychosis.    LABORATORY/RADIOLOGY:   Chart reviewed.  Good BW recently    ASSESSMENT:   Annual  T2DM  HTN    PLAN:  Wants to stop metformin  Stop metformin given recent A1c  A1c in 6 months  Return to clinic in one year.

## 2024-02-15 LAB — NONINV COLON CA DNA+OCC BLD SCRN STL QL: NEGATIVE

## 2024-02-25 DIAGNOSIS — R52 PAIN: ICD-10-CM

## 2024-02-25 NOTE — TELEPHONE ENCOUNTER
No care due was identified.  [facility] Embedded Care Due Messages. Reference number: [id number].   2/25/2024 12:42:58 AM CST

## 2024-02-26 RX ORDER — MELOXICAM 15 MG/1
TABLET ORAL
Qty: 30 TABLET | Refills: 0 | Status: SHIPPED | OUTPATIENT
Start: 2024-02-26

## 2024-02-27 ENCOUNTER — OFFICE VISIT (OUTPATIENT)
Dept: OBSTETRICS AND GYNECOLOGY | Facility: CLINIC | Age: 69
End: 2024-02-27
Payer: MEDICARE

## 2024-02-27 VITALS
HEIGHT: 61 IN | SYSTOLIC BLOOD PRESSURE: 113 MMHG | WEIGHT: 184.31 LBS | DIASTOLIC BLOOD PRESSURE: 83 MMHG | BODY MASS INDEX: 34.8 KG/M2

## 2024-02-27 DIAGNOSIS — Z01.419 ENCOUNTER FOR GYNECOLOGICAL EXAMINATION WITHOUT ABNORMAL FINDING: Primary | ICD-10-CM

## 2024-02-27 DIAGNOSIS — I10 ESSENTIAL HYPERTENSION: Chronic | ICD-10-CM

## 2024-02-27 DIAGNOSIS — E05.00 GRAVES' DISEASE WITH EXOPHTHALMOS: ICD-10-CM

## 2024-02-27 DIAGNOSIS — N39.41 URGE INCONTINENCE: ICD-10-CM

## 2024-02-27 DIAGNOSIS — E11.9 TYPE 2 DIABETES MELLITUS WITHOUT COMPLICATION, WITHOUT LONG-TERM CURRENT USE OF INSULIN: ICD-10-CM

## 2024-02-27 DIAGNOSIS — Z12.31 VISIT FOR SCREENING MAMMOGRAM: ICD-10-CM

## 2024-02-27 DIAGNOSIS — E66.9 DIABETES MELLITUS TYPE 2 IN OBESE: ICD-10-CM

## 2024-02-27 DIAGNOSIS — E78.2 MIXED HYPERLIPIDEMIA: Chronic | ICD-10-CM

## 2024-02-27 DIAGNOSIS — E66.9 OBESITY (BMI 30-39.9): ICD-10-CM

## 2024-02-27 DIAGNOSIS — E11.69 DIABETES MELLITUS TYPE 2 IN OBESE: ICD-10-CM

## 2024-02-27 PROCEDURE — 99999 PR PBB SHADOW E&M-EST. PATIENT-LVL III: CPT | Mod: PBBFAC,,, | Performed by: OBSTETRICS & GYNECOLOGY

## 2024-02-27 PROCEDURE — 99397 PER PM REEVAL EST PAT 65+ YR: CPT | Mod: S$GLB,,, | Performed by: OBSTETRICS & GYNECOLOGY

## 2024-02-27 NOTE — PROGRESS NOTES
HISTORY OF PRESENT ILLNESS:    Katey Cerrato is a 68 y.o. female,   presents today for her routine visit and has no complaints.      Past Medical History:   Diagnosis Date    Cataract     Empty sella syndrome     GHD (growth hormone deficiency)     Glaucoma     Graves' disease with exophthalmos     History of vitamin D deficiency     Hyperlipidemia     Hypertension     Seizures     Thyroid nodule     Thyroid nodule     Type II or unspecified type diabetes mellitus without mention of complication, uncontrolled        Past Surgical History:   Procedure Laterality Date    BREAST BIOPSY      CATARACT EXTRACTION W/  INTRAOCULAR LENS IMPLANT Right 3/15/2021    Procedure: EXTRACTION, CATARACT, WITH IOL INSERTION;  Surgeon: Cj Caban MD;  Location: [facility];  Service: Ophthalmology;  Laterality: Right;    CATARACT EXTRACTION W/  INTRAOCULAR LENS IMPLANT Left 3/29/2021    Procedure: EXTRACTION, CATARACT, WITH IOL INSERTION;  Surgeon: Cj Caban MD;  Location: [facility] OR;  Service: Ophthalmology;  Laterality: Left;    CHOLECYSTECTOMY      HYSTERECTOMY      one ovary intact    INJECTION OF JOINT Left 2024    Procedure: INJECTION, JOINT LEFT KNEE WITH STEROID;  Surgeon: She Schwartz MD;  Location: [facility]IN [facility]GT;  Service: Pain Management;  Laterality: Left;  [phone]    KNEE SURGERY      LYMPH NODE BIOPSY  3010    OOPHORECTOMY         MEDICATIONS AND ALLERGIES:      Current Outpatient Medications:     amLODIPine (NORVASC) 10 MG tablet, TAKE 1 TABLET BY MOUTH EVERY DAY, Disp: 90 tablet, Rfl: 3    atorvastatin (LIPITOR) 80 MG tablet, TAKE 1 TABLET BY MOUTH EVERY DAY, Disp: 90 tablet, Rfl: 3    dulaglutide (TRULICITY) 3 mg/0.5 mL pen injector, Inject 3 mg into the skin every 7 days., Disp: 12 pen , Rfl: 3    FLUZONE HIGHDOSE QUAD 23-24  mcg/0.7 mL Syrg, , Disp: , Rfl:     lancets Misc, To check BG 1 times daily, to use with insurance preferred meter, Disp: 100 each, Rfl: 3    latanoprost 0.005 %  ophthalmic solution, Place 1 drop into both eyes every evening., Disp: 7.5 mL, Rfl: 3    levETIRAcetam (KEPPRA) 750 MG Tab, Take 2 tablets (1,500 mg total) by mouth once daily., Disp: 180 tablet, Rfl: 3    meloxicam (MOBIC) 15 MG tablet, TAKE 1 TABLET BY MOUTH EVERY DAY AS NEEDED FOR PAIN, Disp: 30 tablet, Rfl: 0    metoprolol succinate (TOPROL-XL) 50 MG 24 hr tablet, TAKE 1 TABLET BY MOUTH EVERY DAY IN THE EVENING, Disp: 90 tablet, Rfl: 3    multivitamin-minerals-lutein Tab, Take 1 tablet by mouth once daily once daily., Disp: , Rfl:     mv-mn/iron/folic acid/herb 190 (VITAMIN D3 COMPLETE ORAL), Take by mouth., Disp: , Rfl:     SPIKEVAX [id number],12Y UP,,PF, 50 mcg/0.5 mL injection, , Disp: , Rfl:     valsartan (DIOVAN) 320 MG tablet, TAKE 1 TABLET BY MOUTH ONCE DAILY., Disp: 90 tablet, Rfl: 2    VITAMIN B COMPLEX ORAL, Take by mouth., Disp: , Rfl:     blood sugar diagnostic Strp, To check BG 1 times daily, to use with insurance preferred meter (Patient not taking: Reported on 1/11/2024), Disp: 100 each, Rfl: 3    blood-glucose meter kit, To check BG 1 times daily, to use with insurance preferred meter, Disp: 1 each, Rfl: 0    metFORMIN (GLUCOPHAGE-XR) 500 MG ER 24hr tablet, Take 1 tablet (500 mg total) by mouth once daily., Disp: 90 tablet, Rfl: 1    Review of patient's allergies indicates:   Allergen Reactions    Codeine Nausea Only       Family History   Problem Relation Age of Onset    Cancer Mother     Cataracts Mother     Glaucoma Mother         shunt    Heart disease Mother     Tremor Mother     Breast cancer Mother 78    Heart disease Father     Heart disease Sister     No Known Problems Sister     Breast cancer Sister 60    Hypertension Brother     No Known Problems Brother     No Known Problems Maternal Aunt     No Known Problems Maternal Uncle     No Known Problems Paternal Aunt     No Known Problems Paternal Uncle     No Known Problems Maternal Grandmother     No Known Problems Maternal Grandfather      No Known Problems Paternal Grandmother     No Known Problems Paternal Grandfather     No Known Problems Son     No Known Problems Other        Social History     Socioeconomic History    Marital status:    Occupational History     Employer: [employer]   Tobacco Use    Smoking status: Never    Smokeless tobacco: Never   Substance and Sexual Activity    Alcohol use: No     Comment: none    Drug use: No    Sexual activity: Yes     Partners: Male     Comment: m  works in dietary,  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     Social Determinants of Health     Financial Resource Strain: Low Risk  (1/2/2021)    Overall Financial Resource Strain (CARDIA)     Difficulty of Paying Living Expenses: Not hard at all   Food Insecurity: No Food Insecurity (1/2/2021)    Hunger Vital Sign     Worried About Running Out of Food in the Last Year: Never true     Ran Out of Food in the Last Year: Never true   Transportation Needs: No Transportation Needs (1/2/2021)    PRAPARE - Transportation     Lack of Transportation (Medical): No     Lack of Transportation (Non-Medical): No   Physical Activity: Insufficiently Active (1/2/2021)    Exercise Vital Sign     Days of Exercise per Week: 1 day     Minutes of Exercise per Session: 30 min   Stress: No Stress Concern Present (1/2/2021)    [place] [employer] - Occupational Stress Questionnaire     Feeling of Stress : Not at all   Social Connections: Unknown (1/2/2021)    Social Connection and Isolation Panel [NHANES]     Frequency of Communication with Friends and Family: More than three times a week     Frequency of Social Gatherings with Friends and Family: Once a week     Active Member of Clubs or Organizations: Yes     Attends Club or Organization Meetings: More than 4 times per year     Marital Status:      COMPREHENSIVE GYN HISTORY:  PAP History: Denies abnormal Paps.  Infection History: Denies STDs. Denies PID.  Benign History: Denies uterine  "fibroids. Denies ovarian cysts. Denies endometriosis. Denies other conditions.  Cancer History: Denies cervical cancer. Denies uterine cancer or hyperplasia. Denies ovarian cancer. Denies vulvar cancer or pre-cancer. Denies vaginal cancer or pre-cancer. Denies breast cancer. Denies colon cancer.  Sexual Activity History: Reports currently being sexually active  Menstrual History: Denies menses. Pt is  not on ERT.     ROS:  GENERAL: No weight changes. No swelling. No fatigue. No fever.  CARDIOVASCULAR: No chest pain. No shortness of breath. No leg cramps.   NEUROLOGICAL: No headaches. No vision changes.  BREASTS: No pain. No lumps. No discharge.  ABDOMEN: No pain. No nausea. No vomiting. No diarrhea. No constipation.  REPRODUCTIVE: No abnormal bleeding.  VULVA: No pain. No lesions. No itching.  VAGINA: No relaxation. No itching. No odor. No discharge. No lesions.  URINARY: No incontinence. No nocturia. No frequency. No dysuria.    /83   Ht 5' 1" (1.549 m)   Wt 83.6 kg (184 lb 4.9 oz)   BMI 34.82 kg/m²     PE:  APPEARANCE: Well nourished, well developed, in no acute distress.  AFFECT: WNL, alert and oriented x 3.  SKIN: No acne or hirsutism.  NECK: Neck symmetric without masses or thyromegaly.  NODES: No inguinal, cervical, axillary or femoral lymph node enlargement.  CHEST: Good respiratory effort.   ABDOMEN: Soft. No tenderness or masses. No hepatosplenomegaly. No hernias.  BREASTS: Symmetrical, no skin changes or visible lesions. No palpable masses, nipple discharge bilaterally.  PELVIC: ATROPHIC EXTERNAL FEMALE GENITALIA without lesions. Normal hair distribution. Adequate perineal body, normal urethral meatus. VAGINA DRY without lesions or discharge. 2nd-3rd degree cystocele, no rectocele. Bimanual exam shows CERVIX and UTERUS to be SURGICALLY ABSENT. Adnexa without masses or tenderness.  EXTREMITIES: No edema.    DIAGNOSIS:  1. Encounter for gynecological examination without abnormal finding    2. " Visit for screening mammogram    3. Essential hypertension    4. Mixed hyperlipidemia    5. Urge incontinence    6. Diabetes mellitus type 2 in obese    7. Graves' disease with exophthalmos    8. Obesity (BMI 30-39.9)    9. Type 2 diabetes mellitus without complication, without long-term current use of insulin      COUNSELING:  The patient was counseled today on  -osteoporosis prevention, calcium supplementation, regular weight bearing exercise.  -ACS PAP guidelines (no paps), with recommendations for yearly pelvic exams as her uterus and cervix were removed for benign reasons and ovaries remain;  -recommendation for yearly mammogram;  -to see her primary care physician for all other health maintenance.    FOLLOW-UP with me for next routine visit.

## 2024-03-14 ENCOUNTER — HOSPITAL ENCOUNTER (EMERGENCY)
Facility: OTHER | Age: 69
Discharge: HOME | End: 2024-03-14
Attending: EMERGENCY MEDICINE
Payer: MEDICARE

## 2024-03-14 VITALS
DIASTOLIC BLOOD PRESSURE: 72 MMHG | SYSTOLIC BLOOD PRESSURE: 133 MMHG | WEIGHT: 176 LBS | OXYGEN SATURATION: 96 % | TEMPERATURE: 98 F | HEART RATE: 83 BPM | RESPIRATION RATE: 18 BRPM | BODY MASS INDEX: 33.25 KG/M2

## 2024-03-14 DIAGNOSIS — R06.00 DYSPNEA: ICD-10-CM

## 2024-03-14 DIAGNOSIS — R11.2 NAUSEA AND VOMITING, UNSPECIFIED VOMITING TYPE: Primary | ICD-10-CM

## 2024-03-14 DIAGNOSIS — E87.1 HYPONATREMIA: ICD-10-CM

## 2024-03-14 DIAGNOSIS — E86.0 DEHYDRATION: ICD-10-CM

## 2024-03-14 LAB
ALBUMIN SERPL BCP-MCNC: 3.4 G/DL (ref 3.5–5.2)
ALP SERPL-CCNC: 85 U/L (ref 55–135)
ALT SERPL W/O P-5'-P-CCNC: 24 U/L (ref 10–44)
ANION GAP SERPL CALC-SCNC: 9 MMOL/L (ref 8–16)
AST SERPL-CCNC: 32 U/L (ref 10–40)
BASOPHILS # BLD AUTO: 0.01 K/UL (ref 0–0.2)
BASOPHILS NFR BLD: 0.2 % (ref 0–1.9)
BILIRUB SERPL-MCNC: 0.6 MG/DL (ref 0.1–1)
BUN SERPL-MCNC: 12 MG/DL (ref 8–23)
CALCIUM SERPL-MCNC: 9.5 MG/DL (ref 8.7–10.5)
CHLORIDE SERPL-SCNC: 104 MMOL/L (ref 95–110)
CO2 SERPL-SCNC: 22 MMOL/L (ref 23–29)
CREAT SERPL-MCNC: 1.1 MG/DL (ref 0.5–1.4)
CTP QC/QA: YES
CTP QC/QA: YES
DIFFERENTIAL METHOD BLD: ABNORMAL
EOSINOPHIL # BLD AUTO: 0 K/UL (ref 0–0.5)
EOSINOPHIL NFR BLD: 0 % (ref 0–8)
ERYTHROCYTE [DISTWIDTH] IN BLOOD BY AUTOMATED COUNT: 13.7 % (ref 11.5–14.5)
EST. GFR  (NO RACE VARIABLE): 54 ML/MIN/1.73 M^2
GLUCOSE SERPL-MCNC: 127 MG/DL (ref 70–110)
HCT VFR BLD AUTO: 35.8 % (ref 37–48.5)
HGB BLD-MCNC: 11.9 G/DL (ref 12–16)
IMM GRANULOCYTES # BLD AUTO: 0.01 K/UL (ref 0–0.04)
IMM GRANULOCYTES NFR BLD AUTO: 0.2 % (ref 0–0.5)
LIPASE SERPL-CCNC: 30 U/L (ref 4–60)
LYMPHOCYTES # BLD AUTO: 1.5 K/UL (ref 1–4.8)
LYMPHOCYTES NFR BLD: 38 % (ref 18–48)
MCH RBC QN AUTO: 28.5 PG (ref 27–31)
MCHC RBC AUTO-ENTMCNC: 33.2 G/DL (ref 32–36)
MCV RBC AUTO: 86 FL (ref 82–98)
MONOCYTES # BLD AUTO: 0.5 K/UL (ref 0.3–1)
MONOCYTES NFR BLD: 11.9 % (ref 4–15)
NEUTROPHILS # BLD AUTO: 2 K/UL (ref 1.8–7.7)
NEUTROPHILS NFR BLD: 49.7 % (ref 38–73)
NRBC BLD-RTO: 0 /100 WBC
PLATELET # BLD AUTO: 209 K/UL (ref 150–450)
PMV BLD AUTO: 9.3 FL (ref 9.2–12.9)
POC MOLECULAR INFLUENZA A AGN: NEGATIVE
POC MOLECULAR INFLUENZA B AGN: NEGATIVE
POTASSIUM SERPL-SCNC: 4.2 MMOL/L (ref 3.5–5.1)
PROT SERPL-MCNC: 8.1 G/DL (ref 6–8.4)
RBC # BLD AUTO: 4.18 M/UL (ref 4–5.4)
SARS-COV-2 RDRP RESP QL NAA+PROBE: NEGATIVE
SODIUM SERPL-SCNC: 135 MMOL/L (ref 136–145)
WBC # BLD AUTO: 4.03 K/UL (ref 3.9–12.7)

## 2024-03-14 PROCEDURE — 83690 ASSAY OF LIPASE: CPT | Performed by: EMERGENCY MEDICINE

## 2024-03-14 PROCEDURE — 85025 COMPLETE CBC W/AUTO DIFF WBC: CPT | Performed by: EMERGENCY MEDICINE

## 2024-03-14 PROCEDURE — 96374 THER/PROPH/DIAG INJ IV PUSH: CPT

## 2024-03-14 PROCEDURE — 96361 HYDRATE IV INFUSION ADD-ON: CPT

## 2024-03-14 PROCEDURE — 87635 SARS-COV-2 COVID-19 AMP PRB: CPT | Performed by: EMERGENCY MEDICINE

## 2024-03-14 PROCEDURE — 63600175 PHARM REV CODE 636 W HCPCS: Performed by: EMERGENCY MEDICINE

## 2024-03-14 PROCEDURE — 25000003 PHARM REV CODE 250: Performed by: EMERGENCY MEDICINE

## 2024-03-14 PROCEDURE — 80053 COMPREHEN METABOLIC PANEL: CPT | Performed by: EMERGENCY MEDICINE

## 2024-03-14 PROCEDURE — 99284 EMERGENCY DEPT VISIT MOD MDM: CPT | Mod: 25

## 2024-03-14 RX ORDER — ONDANSETRON 4 MG/1
4 TABLET, ORALLY DISINTEGRATING ORAL EVERY 6 HOURS PRN
Qty: 15 TABLET | Refills: 0 | Status: SHIPPED | OUTPATIENT
Start: 2024-03-14

## 2024-03-14 RX ORDER — ONDANSETRON HYDROCHLORIDE 2 MG/ML
4 INJECTION, SOLUTION INTRAVENOUS
Status: COMPLETED | OUTPATIENT
Start: 2024-03-14 | End: 2024-03-14

## 2024-03-14 RX ADMIN — SODIUM CHLORIDE 1000 ML: 0.9 INJECTION, SOLUTION INTRAVENOUS at 09:03

## 2024-03-14 RX ADMIN — ONDANSETRON 4 MG: 2 INJECTION INTRAMUSCULAR; INTRAVENOUS at 08:03

## 2024-03-14 RX ADMIN — SODIUM CHLORIDE 1000 ML: 9 INJECTION, SOLUTION INTRAVENOUS at 08:03

## 2024-03-14 NOTE — ED TRIAGE NOTES
67 yo female reports to ed with co n/v, subjective fevers, fatigue, and a HA x 1 week. Reports temp has not gone  above 99.pt aaox4. Reports negative covid swab at home but pt states she's unsure if her tests are .

## 2024-03-14 NOTE — ED PROVIDER NOTES
Encounter Date: 3/14/2024    SCRIBE #1 NOTE: I, Shekhar Perry am scribing for, and in the presence of,  Jamarcus Diaz II, MD. I have scribed the following portions of the note - Other sections scribed: HPI, ROS, PE.       History     Chief Complaint   Patient presents with    Emesis     N/V, subjective fever, and HA x 1 wk.      Time seen by provider: 7:53 AM    This is a 68 y.o. female who presents with complaint of vomiting since yesterday. Patient has experienced three days of feeling febrile at night, appetite loss, a mild cough, and dyspnea on exertion. She denies any abdominal pain, diarrhea, or difficulties with urination. PMHx of DM which is stable per her most recent check two days ago. She denies any history of asthma, thyroid problems, or need for blood transfusions. PSHx of cholecystectomy but otherwise no GI or  surgeries. No Shx of tobacco smoking, She is up-to-date on both COVID and flu vaccinations. This is the extent of the patient's complaints at this time.    The history is provided by the patient.     Review of patient's allergies indicates:   Allergen Reactions    Codeine Nausea Only     Past Medical History:   Diagnosis Date    Cataract     Empty sella syndrome     GHD (growth hormone deficiency)     Glaucoma     Graves' disease with exophthalmos     History of vitamin D deficiency     Hyperlipidemia     Hypertension     Seizures     Thyroid nodule     Thyroid nodule     Type II or unspecified type diabetes mellitus without mention of complication, uncontrolled      Past Surgical History:   Procedure Laterality Date    BREAST BIOPSY      CATARACT EXTRACTION W/  INTRAOCULAR LENS IMPLANT Right 3/15/2021    Procedure: EXTRACTION, CATARACT, WITH IOL INSERTION;  Surgeon: Cj Caban MD;  Location: [facility];  Service: Ophthalmology;  Laterality: Right;    CATARACT EXTRACTION W/  INTRAOCULAR LENS IMPLANT Left 3/29/2021    Procedure: EXTRACTION, CATARACT, WITH IOL INSERTION;  Surgeon: Cj  SUJATA Caban MD;  Location: [facility] OR;  Service: Ophthalmology;  Laterality: Left;    CHOLECYSTECTOMY      HYSTERECTOMY      one ovary intact    INJECTION OF JOINT Left 2/6/2024    Procedure: INJECTION, JOINT LEFT KNEE WITH STEROID;  Surgeon: She Schwartz MD;  Location: [facility];  Service: Pain Management;  Laterality: Left;  [phone]    KNEE SURGERY      LYMPH NODE BIOPSY  3010    OOPHORECTOMY       Family History   Problem Relation Age of Onset    Cancer Mother     Cataracts Mother     Glaucoma Mother         shunt    Heart disease Mother     Tremor Mother     Breast cancer Mother 78    Heart disease Father     Heart disease Sister     No Known Problems Sister     Breast cancer Sister 60    Hypertension Brother     No Known Problems Brother     No Known Problems Maternal Aunt     No Known Problems Maternal Uncle     No Known Problems Paternal Aunt     No Known Problems Paternal Uncle     No Known Problems Maternal Grandmother     No Known Problems Maternal Grandfather     No Known Problems Paternal Grandmother     No Known Problems Paternal Grandfather     No Known Problems Son     No Known Problems Other      Social History     Tobacco Use    Smoking status: Never    Smokeless tobacco: Never   Substance Use Topics    Alcohol use: No     Comment: none    Drug use: No     Review of Systems  See ROS.    Physical Exam     Initial Vitals [03/14/24 0708]   BP Pulse Resp Temp SpO2   116/66 92 18 98.4 °F (36.9 °C) 98 %      MAP       --         Physical Exam    Nursing note and vitals reviewed.  Constitutional: She appears well-developed and well-nourished.   HENT:   Head: Normocephalic and atraumatic.   Dry mucous membranes.   Eyes: Conjunctivae are normal.   Mild exophthalmos. Mild pallor no icterus.     Neck: Neck supple.   Cardiovascular:  Normal rate, regular rhythm and normal heart sounds.     Exam reveals no gallop and no friction rub.       No murmur heard.  Pulmonary/Chest: Breath sounds normal. No  respiratory distress. She has no wheezes. She has no rhonchi. She has no rales.   Abdominal: Abdomen is soft. She exhibits no distension. There is no abdominal tenderness. There is no rebound, no guarding and negative Mills's sign.   Musculoskeletal:         General: No edema.      Cervical back: Neck supple.     Neurological: She is alert and oriented to person, place, and time.   Skin: Skin is warm and dry.   Psychiatric: She has a normal mood and affect.         ED Course   Procedures  Labs Reviewed   CBC W/ AUTO DIFFERENTIAL - Abnormal; Notable for the following components:       Result Value    Hemoglobin 11.9 (*)     Hematocrit 35.8 (*)     All other components within normal limits   COMPREHENSIVE METABOLIC PANEL - Abnormal; Notable for the following components:    Sodium 135 (*)     CO2 22 (*)     Glucose 127 (*)     Albumin 3.4 (*)     eGFR 54 (*)     All other components within normal limits   LIPASE   SARS-COV-2 RDRP GENE   POCT INFLUENZA A/B MOLECULAR          Imaging Results              X-Ray Chest PA And Lateral (Final result)  Result time 03/14/24 08:22:17      Final result by Moises Leonard MD (03/14/24 08:22:17)                   Impression:      No acute cardiopulmonary process.      Electronically signed by: Moises Leonard MD  Date:    03/14/2024  Time:    08:22               Narrative:    EXAMINATION:  XR CHEST PA AND LATERAL    CLINICAL HISTORY:  Dyspnea, unspecified    TECHNIQUE:  PA and lateral views of the chest were performed.    COMPARISON:  07/10/2022    FINDINGS:  There is no consolidation, effusion, or pneumothorax.  Cardiomediastinal silhouette is unremarkable.  Regional osseous structures are unremarkable.                                    X-Rays:   Independently Interpreted Readings:   Chest X-Ray: No focal infiltrate or effusion. No pulmonary edema.     Medications   sodium chloride 0.9% bolus 1,000 mL 1,000 mL (0 mLs Intravenous Stopped 3/14/24 0907)   ondansetron injection 4 mg  (4 mg Intravenous Given 3/14/24 0803)   sodium chloride 0.9% bolus 1,000 mL 1,000 mL (0 mLs Intravenous Stopped 3/14/24 1032)     Medical Decision Making  Differential diagnosis includes gastroenteritis, food-borne illness, pancreatitis    Amount and/or Complexity of Data Reviewed  Labs: ordered. Decision-making details documented in ED Course.  Radiology: ordered and independent interpretation performed.     Details: See interpretation above.    Risk  Prescription drug management.    Patient with a history of well-controlled diabetes presents with decreased appetite for several days, malaise, and 1 day of nausea, vomiting.  On exam she appears mildly dehydrated but is afebrile, stable vital signs, with a benign abdomen.  We did give fluid repletion, antiemetics.  Laboratory studies show no leukocytosis.  Stable mild anemia.  She does have mild hyponatremia.  Creatinine, GFR slightly worse than previous, likely secondary to dehydration.  No laboratory evidence of biliary, hepatic, pancreatic disease.  After the above interventions the patient is feeling better.  Suspect viral gastroenteritis or similar benign etiology.  Encouraged continued hydration, gradually advancing diet. Encouraged follow-up with primary care, especially if symptoms persist.  Return precautions discussed for the interim.  Stressed returning for fever persistent vomiting worsening abdominal pain        Scribe Attestation:   Scribe #1: I performed the above scribed service and the documentation accurately describes the services I performed. I attest to the accuracy of the note.    Physician Attestation for Scribe: I, [facility], reviewed documentation as scribed in my presence, which is both accurate and complete.                             Clinical Impression:  Final diagnoses:  [R06.00] Dyspnea  [R11.2] Nausea and vomiting, unspecified vomiting type (Primary)  [E86.0] Dehydration  [E87.1] Hyponatremia          ED Disposition Condition    Discharge  Stable          ED Prescriptions       Medication Sig Dispense Start Date End Date Auth. Provider    ondansetron (ZOFRAN-ODT) 4 MG TbDL Take 1 tablet (4 mg total) by mouth every 6 (six) hours as needed. 15 tablet 3/14/2024 -- Jamarcus Diaz II, MD          Follow-up Information       Follow up With Specialties Details Why Contact Info    Rene Juarez MD Family Medicine In 5 days  [address]  [place] [address]  [phone]               Jamarcus Diaz II, MD  03/14/24 3404

## 2024-03-28 ENCOUNTER — TELEPHONE (OUTPATIENT)
Dept: PAIN MEDICINE | Facility: CLINIC | Age: 69
End: 2024-03-28
Payer: MEDICARE

## 2024-03-28 NOTE — TELEPHONE ENCOUNTER
----- Message from Kp Dickinson sent at 3/28/2024 12:41 PM CDT -----  Regarding: Injection  Name of Who is Calling:  Patient          What is the request in detail:  Patient would like to know if she can get a morning appointment scheduled for an injection in July            Can the clinic reply by [facility]: No            What Number to Call Back if not in [facility]: [phone]

## 2024-04-18 ENCOUNTER — OFFICE VISIT (OUTPATIENT)
Dept: OPTOMETRY | Facility: CLINIC | Age: 69
End: 2024-04-18
Payer: MEDICARE

## 2024-04-18 DIAGNOSIS — H40.023 OPEN ANGLE WITH BORDERLINE FINDINGS AND HIGH GLAUCOMA RISK IN BOTH EYES: Primary | ICD-10-CM

## 2024-04-18 DIAGNOSIS — E11.9 TYPE 2 DIABETES MELLITUS WITHOUT OPHTHALMIC MANIFESTATIONS: ICD-10-CM

## 2024-04-18 PROCEDURE — 1159F MED LIST DOCD IN RCRD: CPT | Mod: CPTII,S$GLB,, | Performed by: OPTOMETRIST

## 2024-04-18 PROCEDURE — 99999 PR PBB SHADOW E&M-EST. PATIENT-LVL III: CPT | Mod: PBBFAC,,, | Performed by: OPTOMETRIST

## 2024-04-18 PROCEDURE — 1160F RVW MEDS BY RX/DR IN RCRD: CPT | Mod: CPTII,S$GLB,, | Performed by: OPTOMETRIST

## 2024-04-18 PROCEDURE — 1126F AMNT PAIN NOTED NONE PRSNT: CPT | Mod: CPTII,S$GLB,, | Performed by: OPTOMETRIST

## 2024-04-18 PROCEDURE — 99214 OFFICE O/P EST MOD 30 MIN: CPT | Mod: S$GLB,,, | Performed by: OPTOMETRIST

## 2024-04-18 PROCEDURE — 3288F FALL RISK ASSESSMENT DOCD: CPT | Mod: CPTII,S$GLB,, | Performed by: OPTOMETRIST

## 2024-04-18 PROCEDURE — 1101F PT FALLS ASSESS-DOCD LE1/YR: CPT | Mod: CPTII,S$GLB,, | Performed by: OPTOMETRIST

## 2024-04-18 PROCEDURE — 4010F ACE/ARB THERAPY RXD/TAKEN: CPT | Mod: CPTII,S$GLB,, | Performed by: OPTOMETRIST

## 2024-04-18 PROCEDURE — 3044F HG A1C LEVEL LT 7.0%: CPT | Mod: CPTII,S$GLB,, | Performed by: OPTOMETRIST

## 2024-04-18 NOTE — PROGRESS NOTES
Dictation #1  MRN:[id number]  CSN:[id number] HPI    DLS: 11/27/2023 - Dr. Puri  4mo IOP check     Pt states that her vision seems to bee stable but she is having difficulty   reading out of his glasses.     GTTS:  Latanoprost Qhs   Last edited by Cherelle Garcia MA on 4/18/2024  9:57 AM.            Assessment /Plan     For exam results, see Encounter Report.    Open angle with borderline findings and high glaucoma risk in both eyes  -     Posterior Segment OCT Optic Nerve- Both eyes; Future  -     Oswald Visual Field - OU - Extended - Both Eyes; Future    Type 2 diabetes mellitus without ophthalmic manifestations      (+) fHx- mother. IOP 13 OD, OS. Last 15 OD, OS. C/d 0.7 OD, 0.8 OS w/pallor OU. Prev pt of Dr Rey w/testing in 2/2022.   7/26/2023 OCT WNL OU  7/26/2023 HVF OD excessive high false positives, 59% false positives, abnormally high sensitivity, sup and inf defects, OS excessive high false positives, 40% false positives, abnormally high sensitivity, inf defects  Unreliable HVFs. Pt likely on drops due to h/o high IOP. Pt reports she has been on drops since about 2005.   Educated pt on findings w/understanding.   Cont Latanoprost QHS OU.   RTC 4 mo Routine/OCT/24-2 Francisca faster                    Hypertension

## 2024-06-07 ENCOUNTER — TELEPHONE (OUTPATIENT)
Dept: OPTOMETRY | Facility: CLINIC | Age: 69
End: 2024-06-07
Payer: MEDICARE

## 2024-06-07 NOTE — TELEPHONE ENCOUNTER
----- Message from Ailyn Campbell sent at 6/7/2024  2:08 PM CDT -----  Contact: pt @ [phone]  Katey Cerrato calling regarding Patient Advice (message) for #pt is calling to speak with nurse she have a question, asking for call back

## 2024-06-28 ENCOUNTER — OFFICE VISIT (OUTPATIENT)
Dept: PAIN MEDICINE | Facility: CLINIC | Age: 69
End: 2024-06-28
Payer: MEDICARE

## 2024-06-28 VITALS
WEIGHT: 184.5 LBS | SYSTOLIC BLOOD PRESSURE: 150 MMHG | OXYGEN SATURATION: 98 % | HEART RATE: 85 BPM | RESPIRATION RATE: 18 BRPM | DIASTOLIC BLOOD PRESSURE: 91 MMHG | BODY MASS INDEX: 34.87 KG/M2 | TEMPERATURE: 99 F

## 2024-06-28 DIAGNOSIS — G89.29 CHRONIC PAIN OF LEFT KNEE: ICD-10-CM

## 2024-06-28 DIAGNOSIS — M25.562 CHRONIC PAIN OF LEFT KNEE: ICD-10-CM

## 2024-06-28 DIAGNOSIS — M17.32 POST-TRAUMATIC OSTEOARTHRITIS OF LEFT KNEE: Primary | ICD-10-CM

## 2024-06-28 PROCEDURE — 99999 PR PBB SHADOW E&M-EST. PATIENT-LVL IV: CPT | Mod: PBBFAC,,, | Performed by: ANESTHESIOLOGY

## 2024-06-28 NOTE — PROGRESS NOTES
PCP: Rene Juarez MD    REFERRING PHYSICIAN: No ref. provider found    CHIEF COMPLAINT: Left Knee Pain    Original HISTORY OF PRESENT ILLNESS: Katey Cerrato presents to the clinic for the evaluation of the above pain. The pain started in approximately 2004 which worsened in 2020. She does have a history of prior knee surgery.     Original Pain Description:  The pain is located in the left medial knee. The pain is described as aching. Exacerbating factors: sitting. Mitigating factors walking. Symptoms interfere with daily activity. The patient feels like symptoms have been worsening.     Original PAIN SCORES:  Best: Pain is 5  Worst: Pain is 8  Current: Pain is 7        1/11/2024     9:03 AM   Last 3 PDI Scores   Pain Disability Index (PDI) 56       INTERVAL HISTORY: (Newest visit at the bottom)   Interval History 6/28/24: Katey Cerrato returns for follow up. At our last visit she was having left knee pain. We did a left knee steroid injection for her with good results. She reports 50% relief for the past 4-5 months. She returns today with recurrence of her left knee pain and new onset right knee pain. We reviewed xrays and she is bone-on-bone in the left knee with some medial joint line narrowing in the right knee. This is where she feels her pain. She rates her pain as a 9/10. She normally sees Dr. Barr but I am told he is stepping back and not operating moving forward. She has been referred to Dr. Ro. We discussed that she will need to wait 6 months after any injection into that knee. She believes she will be unable to do surgery until January and asks that we treat her.       6 weeks of Conservative therapy:  PT: 2004 (Must include dates)  Chiro: No  HEP: No      Treatments / Medications: (Ice/Heat/NSAIDS/APAP/etc):  TENS  Arthotec - helped a lot but interfered with her Keppra  Mobic - helps  Voltaren - helps  IcyHot    Interventional Pain Procedures: (Previous injections)  2/6/24: Left knee  steroid injection 50% relief for 4 months    Past Medical History:   Diagnosis Date    Cataract     Empty sella syndrome     GHD (growth hormone deficiency)     Glaucoma     Graves' disease with exophthalmos     History of vitamin D deficiency     Hyperlipidemia     Hypertension     Seizures     Thyroid nodule     Thyroid nodule     Type II or unspecified type diabetes mellitus without mention of complication, uncontrolled      Past Surgical History:   Procedure Laterality Date    BREAST BIOPSY      CATARACT EXTRACTION W/  INTRAOCULAR LENS IMPLANT Right 3/15/2021    Procedure: EXTRACTION, CATARACT, WITH IOL INSERTION;  Surgeon: Cj Caban MD;  Location: [facility] OR;  Service: Ophthalmology;  Laterality: Right;    CATARACT EXTRACTION W/  INTRAOCULAR LENS IMPLANT Left 3/29/2021    Procedure: EXTRACTION, CATARACT, WITH IOL INSERTION;  Surgeon: Cj Caban MD;  Location: [facility] OR;  Service: Ophthalmology;  Laterality: Left;    CHOLECYSTECTOMY      HYSTERECTOMY      one ovary intact    INJECTION OF JOINT Left 2/6/2024    Procedure: INJECTION, JOINT LEFT KNEE WITH STEROID;  Surgeon: She Schwartz MD;  Location: [facility];  Service: Pain Management;  Laterality: Left;  [phone]    KNEE SURGERY      LYMPH NODE BIOPSY  3010    OOPHORECTOMY       Social History     Socioeconomic History    Marital status:    Occupational History     Employer: [employer]   Tobacco Use    Smoking status: Never    Smokeless tobacco: Never   Substance and Sexual Activity    Alcohol use: No     Comment: none    Drug use: No    Sexual activity: Yes     Partners: Male     Comment: m  works in dietary,  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     Social Determinants of Health     Financial Resource Strain: Low Risk  (1/2/2021)    Overall Financial Resource Strain (CARDIA)     Difficulty of Paying Living Expenses: Not hard at all   Food Insecurity: No Food Insecurity (1/2/2021)    Hunger Vital Sign     Worried About  Running Out of Food in the Last Year: Never true     Ran Out of Food in the Last Year: Never true   Transportation Needs: No Transportation Needs (1/2/2021)    PRAPARE - Transportation     Lack of Transportation (Medical): No     Lack of Transportation (Non-Medical): No   Physical Activity: Insufficiently Active (1/2/2021)    Exercise Vital Sign     Days of Exercise per Week: 1 day     Minutes of Exercise per Session: 30 min   Stress: No Stress Concern Present (1/2/2021)    [employer] [place] [employer] - Occupational Stress Questionnaire     Feeling of Stress : Not at all     Family History   Problem Relation Name Age of Onset    Cancer Mother      Cataracts Mother      Glaucoma Mother          shunt    Heart disease Mother      Tremor Mother      Breast cancer Mother  78    Heart disease Father 64   chf     Heart disease Sister      No Known Problems Sister      Breast cancer Sister  60    Hypertension Brother      No Known Problems Brother      No Known Problems Maternal Aunt      No Known Problems Maternal Uncle      No Known Problems Paternal Aunt      No Known Problems Paternal Uncle      No Known Problems Maternal Grandmother      No Known Problems Maternal Grandfather      No Known Problems Paternal Grandmother      No Known Problems Paternal Grandfather      No Known Problems Son      No Known Problems Other         Review of patient's allergies indicates:   Allergen Reactions    Codeine Nausea Only       Current Outpatient Medications   Medication Sig    amLODIPine (NORVASC) 10 MG tablet TAKE 1 TABLET BY MOUTH EVERY DAY    atorvastatin (LIPITOR) 80 MG tablet TAKE 1 TABLET BY MOUTH EVERY DAY    blood sugar diagnostic Strp To check BG 1 times daily, to use with insurance preferred meter (Patient not taking: Reported on 1/11/2024)    blood-glucose meter kit To check BG 1 times daily, to use with insurance preferred meter    dulaglutide (TRULICITY) 3 mg/0.5 mL pen injector Inject 3 mg into the skin  every 7 days.    FLUZONE HIGHDOSE QUAD 23-24  mcg/0.7 mL Syrg     lancets Misc To check BG 1 times daily, to use with insurance preferred meter    latanoprost 0.005 % ophthalmic solution Place 1 drop into both eyes every evening.    levETIRAcetam (KEPPRA) 750 MG Tab Take 2 tablets (1,500 mg total) by mouth once daily.    meloxicam (MOBIC) 15 MG tablet TAKE 1 TABLET BY MOUTH EVERY DAY AS NEEDED FOR PAIN    metFORMIN (GLUCOPHAGE-XR) 500 MG ER 24hr tablet Take 1 tablet (500 mg total) by mouth once daily.    metoprolol succinate (TOPROL-XL) 50 MG 24 hr tablet TAKE 1 TABLET BY MOUTH EVERY DAY IN THE EVENING    multivitamin-minerals-lutein Tab Take 1 tablet by mouth once daily once daily.    mv-mn/iron/folic acid/herb 190 (VITAMIN D3 COMPLETE ORAL) Take by mouth.    ondansetron (ZOFRAN-ODT) 4 MG TbDL Take 1 tablet (4 mg total) by mouth every 6 (six) hours as needed.    SPIKEVAX 8306-6328,12Y UP,,PF, 50 mcg/0.5 mL injection     valsartan (DIOVAN) 320 MG tablet TAKE 1 TABLET BY MOUTH ONCE DAILY.    VITAMIN B COMPLEX ORAL Take by mouth.     No current facility-administered medications for this visit.       ROS:  GENERAL: No fever. No chills. No fatigue. Denies weight loss. Denies weight gain.  HEENT: Denies headaches. Denies vision change. Denies eye pain. Denies double vision. Denies ear pain.   CV: Denies chest pain.   PULM: Denies of shortness of breath.  GI: Denies constipation. No diarrhea. No abdominal pain. Denies nausea. Denies vomiting. No blood in stool.  HEME: Denies bleeding problems.  : Denies urgency. No painful urination. No blood in urine.  MS: Denies joint stiffness. Denies joint swelling.  Denies back pain.  SKIN: Denies rash.   NEURO: Denies seizures. No weakness.  PSYCH:  Denies difficulty sleeping. No anxiety. Denies depression. No suicidal thoughts.       VITALS:   Vitals:    06/28/24 1010   BP: (!) 150/91   Pulse: 85   Resp: 18   Temp: 98.5 °F (36.9 °C)   SpO2: 98%   Weight: 83.7 kg (184 lb 8.4  oz)   PainSc:   9         PHYSICAL EXAM:   GENERAL: Well appearing, in no acute distress, alert and oriented x3.  PSYCH:  Mood and affect appropriate.  SKIN: Skin color, texture, turgor normal, no rashes or lesions.  HEENT:  Normocephalic, atraumatic. Cranial nerves grossly intact.  NECK: No pain to palpation over the cervical paraspinous muscles. No pain to palpation over facets. No pain with neck flexion, extension, or lateral flexion.   PULM: No evidence of respiratory difficulty, symmetric chest rise.  GI:  Non-distended  BACK: Normal range of motion. No pain to palpation over the spinous processes. No pain to palpation over facet joints. There is no pain with palpation over the sacroiliac joints bilaterally.   EXTREMITIES: No deformities, edema, or skin discoloration.   MUSCULOSKELETAL: Lt. Knee: S-shaped incision. Decreased flexion/extension with palpable crepitus. Some lateral flexibility. Pain with manipulation of patella and mainly with medial joint line. Rt Knee: Pain to palpation of medial joint line.   NEURO: Sensation is equal and appropriate bilaterally. Bilateral upper and lower extremity strength is normal and symmetric. Bilateral upper and lower extremity coordination and muscle stretch reflexes are physiologic and symmetric. Plantar response are downgoing. Straight leg raising in the supine position is negative to radicular pain.   GAIT: normal.      LABS:      IMAGING:    XR KNEE ORTHO LEFT WITH FLEXION     CLINICAL HISTORY:  . Pain in left knee     TECHNIQUE:  AP standing view of both knees, PA flexion standing views of both knees, and Merchant views of both knees were performed. A lateral view of the left knee was also performed.     COMPARISON:  July 2022     FINDINGS:  Postop probable repair left medial collateral ligament.  Advanced degenerative changes at the left medial femoral tibial compartment.  There may be mild narrowing medially on the right on the standing flexion view.  No  significant effusion on the left.  Degenerative change left patellofemoral joint.     Impression:     Postop and degenerative changes above.        Electronically signed by: Aj Reardon MD  Date:                                            12/15/2023  Time:                                           11:24    ASSESSMENT: 68 y.o. year old female with pain, consistent with:    Encounter Diagnoses   Name Primary?    Post-traumatic osteoarthritis of left knee Yes    Chronic pain of left knee          DISCUSSION: Katey Cerrato is retired from [employer]. She comes to us from Dr. Barr with left knee pain which is worst with standing/walking. Imaging shows severe loss of medial joint space. Exam shows pain reproduced at the patella and medial joint line of both knees. Kellgren Scottie grade 4 on the left and grade 2 on the right.     PLAN:  Reviewed images with Ms. Cerrato  Schedule B/L knee steroid injections  Follow up with Dr. Ro regarding left TKA  Follow up with me after injection      She Schwartz  06/28/2024

## 2024-07-01 ENCOUNTER — TELEPHONE (OUTPATIENT)
Dept: PAIN MEDICINE | Facility: CLINIC | Age: 69
End: 2024-07-01
Payer: MEDICARE

## 2024-07-01 ENCOUNTER — TELEPHONE (OUTPATIENT)
Dept: ORTHOPEDICS | Facility: CLINIC | Age: 69
End: 2024-07-01
Payer: MEDICARE

## 2024-07-01 DIAGNOSIS — M17.32 POST-TRAUMATIC OSTEOARTHRITIS OF LEFT KNEE: Primary | ICD-10-CM

## 2024-07-01 NOTE — TELEPHONE ENCOUNTER
BILL with name and call back number    ----- Message from Celestino Kirk sent at 7/1/2024  3:01 PM CDT -----  Regarding: advise  Contact: Patient @ [phone]  Patient is calling asking to speak with someone in the office to discuss possible surgery at in January 2025. Patient states that she is wanting to know if Dr. Barr has to refer her to another provider in his office, in order to get surgery; so the can get things going. Patient is asking for a return call back to advise. Thanks.

## 2024-07-01 NOTE — TELEPHONE ENCOUNTER
----- Message from Anila Santiago sent at 7/1/2024  1:32 PM CDT -----  Regarding: Patient Advice            Name of Who is Calling: Katey Cerrato    Who Left The Message:  Katey Cerrato      What is the request in detail:        Patient called requesting a call regarding scheduling a follow-up visit with Dr. She Schwartz MD. Patient wasn't very specific.  Please further advise.  Thank you      Can the clinic reply by [facility]: NO      [facility]:  [phone]

## 2024-07-02 ENCOUNTER — TELEPHONE (OUTPATIENT)
Dept: ORTHOPEDICS | Facility: CLINIC | Age: 69
End: 2024-07-02
Payer: MEDICARE

## 2024-07-02 DIAGNOSIS — M25.562 PAIN IN BOTH KNEES, UNSPECIFIED CHRONICITY: Primary | ICD-10-CM

## 2024-07-02 DIAGNOSIS — M25.561 PAIN IN BOTH KNEES, UNSPECIFIED CHRONICITY: Primary | ICD-10-CM

## 2024-07-02 NOTE — TELEPHONE ENCOUNTER
----- Message from Cherelle Aaron sent at 7/2/2024 12:02 PM CDT -----  Regarding: Pt Advice  Contact: pt @[phone]  Pt is calling to discuss plan of care, also would like to have provider refer to a surgeon since Dr. Barr is no longer performing surgeries, please call pt @[phone]

## 2024-07-03 ENCOUNTER — OFFICE VISIT (OUTPATIENT)
Dept: ORTHOPEDICS | Facility: CLINIC | Age: 69
End: 2024-07-03
Payer: MEDICARE

## 2024-07-03 ENCOUNTER — HOSPITAL ENCOUNTER (OUTPATIENT)
Dept: RADIOLOGY | Facility: HOSPITAL | Age: 69
Discharge: HOME | End: 2024-07-03
Attending: ORTHOPAEDIC SURGERY
Payer: MEDICARE

## 2024-07-03 DIAGNOSIS — M25.562 PAIN IN BOTH KNEES, UNSPECIFIED CHRONICITY: ICD-10-CM

## 2024-07-03 DIAGNOSIS — M25.561 PAIN IN BOTH KNEES, UNSPECIFIED CHRONICITY: ICD-10-CM

## 2024-07-03 DIAGNOSIS — M17.32 POST-TRAUMATIC OSTEOARTHRITIS OF LEFT KNEE: Primary | ICD-10-CM

## 2024-07-03 PROCEDURE — 3288F FALL RISK ASSESSMENT DOCD: CPT | Mod: CPTII,S$GLB,, | Performed by: ORTHOPAEDIC SURGERY

## 2024-07-03 PROCEDURE — 73564 X-RAY EXAM KNEE 4 OR MORE: CPT | Mod: TC,50

## 2024-07-03 PROCEDURE — 99999 PR PBB SHADOW E&M-EST. PATIENT-LVL III: CPT | Mod: PBBFAC,,, | Performed by: ORTHOPAEDIC SURGERY

## 2024-07-03 PROCEDURE — 1160F RVW MEDS BY RX/DR IN RCRD: CPT | Mod: CPTII,S$GLB,, | Performed by: ORTHOPAEDIC SURGERY

## 2024-07-03 PROCEDURE — 1125F AMNT PAIN NOTED PAIN PRSNT: CPT | Mod: CPTII,S$GLB,, | Performed by: ORTHOPAEDIC SURGERY

## 2024-07-03 PROCEDURE — 3072F LOW RISK FOR RETINOPATHY: CPT | Mod: CPTII,S$GLB,, | Performed by: ORTHOPAEDIC SURGERY

## 2024-07-03 PROCEDURE — 4010F ACE/ARB THERAPY RXD/TAKEN: CPT | Mod: CPTII,S$GLB,, | Performed by: ORTHOPAEDIC SURGERY

## 2024-07-03 PROCEDURE — 73564 X-RAY EXAM KNEE 4 OR MORE: CPT | Mod: 26,50,, | Performed by: RADIOLOGY

## 2024-07-03 PROCEDURE — 3044F HG A1C LEVEL LT 7.0%: CPT | Mod: CPTII,S$GLB,, | Performed by: ORTHOPAEDIC SURGERY

## 2024-07-03 PROCEDURE — 1101F PT FALLS ASSESS-DOCD LE1/YR: CPT | Mod: CPTII,S$GLB,, | Performed by: ORTHOPAEDIC SURGERY

## 2024-07-03 PROCEDURE — 99213 OFFICE O/P EST LOW 20 MIN: CPT | Mod: S$GLB,,, | Performed by: ORTHOPAEDIC SURGERY

## 2024-07-03 PROCEDURE — 1159F MED LIST DOCD IN RCRD: CPT | Mod: CPTII,S$GLB,, | Performed by: ORTHOPAEDIC SURGERY

## 2024-07-08 ENCOUNTER — TELEPHONE (OUTPATIENT)
Dept: PAIN MEDICINE | Facility: CLINIC | Age: 69
End: 2024-07-08
Payer: MEDICARE

## 2024-07-08 NOTE — TELEPHONE ENCOUNTER
Staff called pt about message that was left with the call center. Pt did not answer. Staff left a vm.this is staff third time calling.

## 2024-07-08 NOTE — PROGRESS NOTES
Subjective:      Patient ID: Katey Cerrato is a 68 y.o. female.    Chief Complaint:   Pain of the Right Knee and Pain of the Left Knee    HPI  She returns to again discuss her left greater than right knee osteoarthritis pain.  She feels that it stems from a fall that she had in 2005.  She had an injection in January which has actually helped for 4 months.  She notices that her knee is getting stiffer over time.  She also has pain up to 8/10.  No recent fall, accident, injury.  From our previous detailed note, reviewed with her and confirmed:  She comes in for chronic pain of the left knee, up to 8/10. She had to retire from work because of this pain which goes from the thigh to the toes. She got Synvisc injections in the knee a long time ago which did not really help her. She broke her leg when she was 11 years old. She had MCL surgery for reconstructing the ligament in 1976 at [facility]. She is on daily meloxicam without relief. No groin pain, distal numbness or tingling.       Objective:        Ortho/SPM Exam  There is significant varus deformity, which is partially passively correctable.  Active range of motion is 0-85 degrees.  Anterior crepitus with active extension.  Patellar mobility is limited.  Boggy synovitis without effusion.  Medial joint line tenderness predominates.  No instability to varus/valgus/Lachman's stressing.  No pain in the groin with flexion and internal rotation of the hip which is not limited.  Skin intact.  Distal neurovascular intact.  Flip test negative.     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  Only mild degenerative changes on the right.  Assessment:   End-stage DJD, posttraumatic, left knee  Plan:   She is ready to discuss surgery, but wants to wait until January of 2025.  She will continue to follow up with Dr. Schwartz  until this time.  We will get her an appointment with Dr. oR.    The patient's pathophysiology was explained in detail with reference to x-rays, models, other visual aids as appropriate.  Treatment options were discussed in detail.  Questions were invited and answered to the patient's satisfaction.      Shay Barr MD  Orthopedic Surgery

## 2024-07-08 NOTE — TELEPHONE ENCOUNTER
----- Message from Seth Garcia sent at 7/8/2024  4:03 PM CDT -----  Name of Who is Calling:MORAIMA LUCAS [[id number]]                   What is the request in detail:PT spoke with her insurance company and they said no one put a claim in for her injection she needs a call ASAP please assist                    Can the clinic reply by [facility]: No                   What Number to Call Back if not in [facility]:[phone]

## 2024-07-08 NOTE — TELEPHONE ENCOUNTER
----- Message from Estephania Vega sent at 7/8/2024  3:41 PM CDT -----  Regarding: missed call       Who Called: Katey         Who Left Message for Patient: Seb         Does the patient know what this is regarding? Yes         Best Call Back Number: [phone]         Additional Information:

## 2024-07-08 NOTE — TELEPHONE ENCOUNTER
----- Message from Seth Garcia sent at 7/8/2024  3:31 PM CDT -----  Name of Who is Calling:MORAIMA LUCAS [[id number]]                   What is the request in detail:PT wants the nurse to give her a call she wants to ask a question please assist                    Can the clinic reply by [facility]: No                   What Number to Call Back if not in [facility]: [phone]

## 2024-07-10 ENCOUNTER — TELEPHONE (OUTPATIENT)
Dept: PAIN MEDICINE | Facility: CLINIC | Age: 69
End: 2024-07-10
Payer: MEDICARE

## 2024-07-10 NOTE — TELEPHONE ENCOUNTER
----- Message from Iesha Raza sent at 7/10/2024 11:25 AM CDT -----  Regarding: call back  Type: Patient Call Back    Who called: pt     What is the request in detail: requesting claim for cortisone shot be submitted to her insurance     Can the clinic reply by [facility]?no    Would the patient rather a call back or a response via [facility]? call    Best call back number: [phone]     Additional Information:

## 2024-07-12 ENCOUNTER — TELEPHONE (OUTPATIENT)
Dept: PAIN MEDICINE | Facility: CLINIC | Age: 69
End: 2024-07-12
Payer: MEDICARE

## 2024-07-12 DIAGNOSIS — R52 PAIN: ICD-10-CM

## 2024-07-12 DIAGNOSIS — M17.32 POST-TRAUMATIC OSTEOARTHRITIS OF LEFT KNEE: Primary | ICD-10-CM

## 2024-07-12 RX ORDER — MELOXICAM 15 MG/1
15 TABLET ORAL DAILY PRN
Qty: 15 TABLET | Refills: 0 | Status: SHIPPED | OUTPATIENT
Start: 2024-07-12

## 2024-07-12 NOTE — TELEPHONE ENCOUNTER
Returned call to Ms. Cerrato. Informed that Dr. Juarez is out of the office and refill request will go to his colleague. [facility] has been trying to contact Dr. Schwartz in pain management. Informed there are notes on her chart were they attempted to call her ,  LOV 02/08/24. Has GORDO 08/08/24

## 2024-07-12 NOTE — TELEPHONE ENCOUNTER
Patient is with [employer] and she could not get in touch with us. [facility] are stating that they never received  a referral and it was placed on 7/1 staff will repend.

## 2024-07-12 NOTE — TELEPHONE ENCOUNTER
Care Due:                  Date            Visit Type   Department     Provider  --------------------------------------------------------------------------------                                EP -                              [facility]  Rene Thapa  Last Visit: 02-      CARE ([facility])   [facility]                              [facility]                              [facility]  Next Visit: 08-      [facility] ([facility])   [facility]                                                            Last  Test          Frequency    Reason                     Performed    Due Date  --------------------------------------------------------------------------------    HBA1C.......  6 months...  dulaglutide..............  02- 08-    [facility] Embedded Care Due Messages. Reference number: [id number].   7/12/2024 1:36:37 PM CDT

## 2024-07-12 NOTE — TELEPHONE ENCOUNTER
----- Message from Jacquelyn Lawler sent at 7/12/2024 11:53 AM CDT -----  Type:   RX Refill Request     Who called:pt   Refill or New RX:refill  RX Name and Strength:meloxicam   Would the patient rather a call back or a response via [facility]?   Best Call Back Number:[phone]  Additional Information: states [facility]

## 2024-07-12 NOTE — TELEPHONE ENCOUNTER
----- Message from Jacquelyn Lawler sent at 7/12/2024 11:51 AM CDT -----  Type:  Patient Returning Call    Who Called:pt   Who Left Message for Patient:  Does the patient know what this is regarding?:pt   Would the patient rather a call back or a response via [facility]? Call   Best Call Back Number:[phone]  Additional Information: states she would like to speak with office asap as she has sent three messages with no response as of yet. [facility] never received the claim for steroid injection, pt needs shot asap.

## 2024-07-17 ENCOUNTER — PATIENT MESSAGE (OUTPATIENT)
Dept: ORTHOPEDICS | Facility: CLINIC | Age: 69
End: 2024-07-17
Payer: MEDICARE

## 2024-07-18 ENCOUNTER — TELEPHONE (OUTPATIENT)
Dept: ORTHOPEDICS | Facility: CLINIC | Age: 69
End: 2024-07-18
Payer: MEDICARE

## 2024-07-18 NOTE — TELEPHONE ENCOUNTER
Spoke to pt in regards to requesting a sooner appt, I informed pt that right now she has the soonest appt available. She stated that she have a appt on August 8, 2024 to see Dr. Herndon for the steroid injection in her right knee. She was wonder how far out will she have to wait for her L Knee surgery, I informed her that right now everyone in our clinic surgery schedule is basically booked up until the end of September, early October. Pt stated that's basically what Dr. Barr was telling her as well. She stated that it is fine she will just keep her appt with Dr. Herndon to get the injection in her R Knee and then discuss the surgery dates and stuff with Dr. Ro when she come to see him on August 27th.    Sincerely,  Noreen Brewster, [facility]   Certified Clinical Medical Assistant to Dr. Rafael mendiola  Phone: [phone]  Fax: [phone]      ----- Message from Farnaz Key sent at 7/17/2024 10:29 AM CDT -----  Regarding: Appt  Contact: pt @ [phone]  Pt feel the need to be seen sooner, asking for a call back to get an earlier appt. Pt have appt 8/27. Asking for a call back

## 2024-07-29 DIAGNOSIS — R52 PAIN: ICD-10-CM

## 2024-07-29 RX ORDER — MELOXICAM 15 MG/1
15 TABLET ORAL DAILY PRN
Qty: 15 TABLET | Refills: 0 | Status: SHIPPED | OUTPATIENT
Start: 2024-07-29

## 2024-07-29 RX ORDER — ATORVASTATIN CALCIUM 80 MG/1
80 TABLET, FILM COATED ORAL DAILY
Qty: 90 TABLET | Refills: 3 | Status: SHIPPED | OUTPATIENT
Start: 2024-07-29

## 2024-07-29 NOTE — TELEPHONE ENCOUNTER
No care due was identified.  [facility] Embedded Care Due Messages. Reference number: [id number].   7/29/2024 8:28:22 AM CDT

## 2024-08-08 ENCOUNTER — OFFICE VISIT (OUTPATIENT)
Dept: INTERNAL MEDICINE | Facility: CLINIC | Age: 69
End: 2024-08-08
Attending: FAMILY MEDICINE
Payer: MEDICARE

## 2024-08-08 ENCOUNTER — PROCEDURE VISIT (OUTPATIENT)
Dept: PAIN MEDICINE | Facility: CLINIC | Age: 69
End: 2024-08-08
Payer: MEDICARE

## 2024-08-08 VITALS
DIASTOLIC BLOOD PRESSURE: 82 MMHG | OXYGEN SATURATION: 100 % | SYSTOLIC BLOOD PRESSURE: 122 MMHG | BODY MASS INDEX: 35.52 KG/M2 | WEIGHT: 188.13 LBS | HEIGHT: 61 IN

## 2024-08-08 VITALS
OXYGEN SATURATION: 99 % | DIASTOLIC BLOOD PRESSURE: 86 MMHG | TEMPERATURE: 97 F | HEART RATE: 85 BPM | WEIGHT: 183.88 LBS | SYSTOLIC BLOOD PRESSURE: 143 MMHG | BODY MASS INDEX: 34.74 KG/M2

## 2024-08-08 DIAGNOSIS — E78.2 MIXED HYPERLIPIDEMIA: Chronic | ICD-10-CM

## 2024-08-08 DIAGNOSIS — I10 ESSENTIAL HYPERTENSION: Chronic | ICD-10-CM

## 2024-08-08 DIAGNOSIS — E66.9 DIABETES MELLITUS TYPE 2 IN OBESE: Primary | ICD-10-CM

## 2024-08-08 DIAGNOSIS — M17.0 PRIMARY OSTEOARTHRITIS OF BOTH KNEES: Primary | ICD-10-CM

## 2024-08-08 DIAGNOSIS — E11.69 DIABETES MELLITUS TYPE 2 IN OBESE: Primary | ICD-10-CM

## 2024-08-08 PROCEDURE — 99214 OFFICE O/P EST MOD 30 MIN: CPT | Mod: S$GLB,,, | Performed by: FAMILY MEDICINE

## 2024-08-08 PROCEDURE — 3288F FALL RISK ASSESSMENT DOCD: CPT | Mod: CPTII,S$GLB,, | Performed by: FAMILY MEDICINE

## 2024-08-08 PROCEDURE — 1125F AMNT PAIN NOTED PAIN PRSNT: CPT | Mod: CPTII,S$GLB,, | Performed by: FAMILY MEDICINE

## 2024-08-08 PROCEDURE — 1101F PT FALLS ASSESS-DOCD LE1/YR: CPT | Mod: CPTII,S$GLB,, | Performed by: FAMILY MEDICINE

## 2024-08-08 PROCEDURE — 3079F DIAST BP 80-89 MM HG: CPT | Mod: CPTII,S$GLB,, | Performed by: FAMILY MEDICINE

## 2024-08-08 PROCEDURE — 3044F HG A1C LEVEL LT 7.0%: CPT | Mod: CPTII,S$GLB,, | Performed by: FAMILY MEDICINE

## 2024-08-08 PROCEDURE — 4010F ACE/ARB THERAPY RXD/TAKEN: CPT | Mod: CPTII,S$GLB,, | Performed by: FAMILY MEDICINE

## 2024-08-08 PROCEDURE — 1159F MED LIST DOCD IN RCRD: CPT | Mod: CPTII,S$GLB,, | Performed by: FAMILY MEDICINE

## 2024-08-08 PROCEDURE — 1160F RVW MEDS BY RX/DR IN RCRD: CPT | Mod: CPTII,S$GLB,, | Performed by: FAMILY MEDICINE

## 2024-08-08 PROCEDURE — 3074F SYST BP LT 130 MM HG: CPT | Mod: CPTII,S$GLB,, | Performed by: FAMILY MEDICINE

## 2024-08-08 PROCEDURE — 3008F BODY MASS INDEX DOCD: CPT | Mod: CPTII,S$GLB,, | Performed by: FAMILY MEDICINE

## 2024-08-08 PROCEDURE — 99999 PR PBB SHADOW E&M-EST. PATIENT-LVL IV: CPT | Mod: PBBFAC,,, | Performed by: FAMILY MEDICINE

## 2024-08-08 PROCEDURE — 3072F LOW RISK FOR RETINOPATHY: CPT | Mod: CPTII,S$GLB,, | Performed by: FAMILY MEDICINE

## 2024-08-08 PROCEDURE — 99499 UNLISTED E&M SERVICE: CPT | Mod: S$GLB,,, | Performed by: ANESTHESIOLOGY

## 2024-08-12 DIAGNOSIS — R52 PAIN: ICD-10-CM

## 2024-08-12 RX ORDER — MELOXICAM 15 MG/1
15 TABLET ORAL DAILY PRN
Qty: 15 TABLET | Refills: 0 | Status: SHIPPED | OUTPATIENT
Start: 2024-08-12

## 2024-08-12 NOTE — TELEPHONE ENCOUNTER
Care Due:                  Date            Visit Type   Department     Provider  --------------------------------------------------------------------------------                                EP -                              [facility]  Rene Thapa  Last Visit: 08-      CARE ([facility])   [facility]                              EP -                              [facility]  Next Visit: 02-      [facility] ([facility]                                                            Last  Test          Frequency    Reason                     Performed    Due Date  --------------------------------------------------------------------------------    Lipid Panel.  12 months..  atorvastatin.............  08- 07-    [facility] Embedded Care Due Messages. Reference number: [id number].   8/12/2024 1:11:04 PM CDT

## 2024-08-12 NOTE — TELEPHONE ENCOUNTER
Last office visit: 8/8/2024  Upcoming appointment set with PRIMARY CARE PROVIDER on 2/10/2024    Labs last complete: 3/14/2024    Allergies confirmed, medication pended, and routed to PRIMARY CARE PROVIDER for advise.
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
## 2024-08-26 ENCOUNTER — PATIENT OUTREACH (OUTPATIENT)
Dept: ADMINISTRATIVE | Facility: HOSPITAL | Age: 69
End: 2024-08-26
Payer: MEDICARE

## 2024-08-27 ENCOUNTER — PATIENT OUTREACH (OUTPATIENT)
Dept: ADMINISTRATIVE | Facility: HOSPITAL | Age: 69
End: 2024-08-27
Payer: MEDICARE

## 2024-08-27 ENCOUNTER — OFFICE VISIT (OUTPATIENT)
Dept: ORTHOPEDICS | Facility: CLINIC | Age: 69
End: 2024-08-27
Payer: MEDICARE

## 2024-08-27 VITALS — BODY MASS INDEX: 34.69 KG/M2 | HEIGHT: 61 IN | WEIGHT: 183.75 LBS

## 2024-08-27 DIAGNOSIS — E11.9 DIABETES MELLITUS WITHOUT COMPLICATION: ICD-10-CM

## 2024-08-27 DIAGNOSIS — E78.2 MIXED HYPERLIPIDEMIA: Primary | Chronic | ICD-10-CM

## 2024-08-27 DIAGNOSIS — M17.32 POST-TRAUMATIC OSTEOARTHRITIS OF LEFT KNEE: Primary | ICD-10-CM

## 2024-08-27 PROCEDURE — 3008F BODY MASS INDEX DOCD: CPT | Mod: CPTII,S$GLB,, | Performed by: ORTHOPAEDIC SURGERY

## 2024-08-27 PROCEDURE — 1125F AMNT PAIN NOTED PAIN PRSNT: CPT | Mod: CPTII,S$GLB,, | Performed by: ORTHOPAEDIC SURGERY

## 2024-08-27 PROCEDURE — 4010F ACE/ARB THERAPY RXD/TAKEN: CPT | Mod: CPTII,S$GLB,, | Performed by: ORTHOPAEDIC SURGERY

## 2024-08-27 PROCEDURE — 1101F PT FALLS ASSESS-DOCD LE1/YR: CPT | Mod: CPTII,S$GLB,, | Performed by: ORTHOPAEDIC SURGERY

## 2024-08-27 PROCEDURE — 3288F FALL RISK ASSESSMENT DOCD: CPT | Mod: CPTII,S$GLB,, | Performed by: ORTHOPAEDIC SURGERY

## 2024-08-27 PROCEDURE — 99213 OFFICE O/P EST LOW 20 MIN: CPT | Mod: S$GLB,,, | Performed by: ORTHOPAEDIC SURGERY

## 2024-08-27 PROCEDURE — 3044F HG A1C LEVEL LT 7.0%: CPT | Mod: CPTII,S$GLB,, | Performed by: ORTHOPAEDIC SURGERY

## 2024-08-27 PROCEDURE — 3072F LOW RISK FOR RETINOPATHY: CPT | Mod: CPTII,S$GLB,, | Performed by: ORTHOPAEDIC SURGERY

## 2024-08-27 PROCEDURE — 99999 PR PBB SHADOW E&M-EST. PATIENT-LVL IV: CPT | Mod: PBBFAC,,, | Performed by: ORTHOPAEDIC SURGERY

## 2024-08-27 PROCEDURE — 1159F MED LIST DOCD IN RCRD: CPT | Mod: CPTII,S$GLB,, | Performed by: ORTHOPAEDIC SURGERY

## 2024-08-30 ENCOUNTER — LAB VISIT (OUTPATIENT)
Dept: LAB | Facility: OTHER | Age: 69
End: 2024-08-30
Attending: FAMILY MEDICINE
Payer: MEDICARE

## 2024-08-30 DIAGNOSIS — E78.2 MIXED HYPERLIPIDEMIA: Chronic | ICD-10-CM

## 2024-08-30 DIAGNOSIS — E11.9 TYPE 2 DIABETES MELLITUS WITHOUT COMPLICATION, WITHOUT LONG-TERM CURRENT USE OF INSULIN: ICD-10-CM

## 2024-08-30 DIAGNOSIS — E11.59 HYPERTENSION ASSOCIATED WITH DIABETES: ICD-10-CM

## 2024-08-30 DIAGNOSIS — I15.2 HYPERTENSION ASSOCIATED WITH DIABETES: ICD-10-CM

## 2024-08-30 LAB
CHOLEST SERPL-MCNC: 150 MG/DL (ref 120–199)
CHOLEST/HDLC SERPL: 2.8 {RATIO} (ref 2–5)
ESTIMATED AVG GLUCOSE: 131 MG/DL (ref 68–131)
HBA1C MFR BLD: 6.2 % (ref 4–5.6)
HDLC SERPL-MCNC: 53 MG/DL (ref 40–75)
HDLC SERPL: 35.3 % (ref 20–50)
LDLC SERPL CALC-MCNC: 83.2 MG/DL (ref 63–159)
NONHDLC SERPL-MCNC: 97 MG/DL
TRIGL SERPL-MCNC: 69 MG/DL (ref 30–150)

## 2024-08-30 PROCEDURE — 36415 COLL VENOUS BLD VENIPUNCTURE: CPT | Performed by: FAMILY MEDICINE

## 2024-08-30 PROCEDURE — 83036 HEMOGLOBIN GLYCOSYLATED A1C: CPT | Performed by: FAMILY MEDICINE

## 2024-08-30 PROCEDURE — 80061 LIPID PANEL: CPT | Performed by: FAMILY MEDICINE

## 2024-09-08 NOTE — TELEPHONE ENCOUNTER
No care due was identified.  [facility] Embedded Care Due Messages. Reference number: [id number].   9/08/2024 5:56:04 PM CDT

## 2024-09-09 RX ORDER — AMLODIPINE BESYLATE 10 MG/1
TABLET ORAL
Qty: 90 TABLET | Refills: 3 | Status: SHIPPED | OUTPATIENT
Start: 2024-09-09

## 2024-09-09 NOTE — TELEPHONE ENCOUNTER
Refill Routing Note   Medication(s) are not appropriate for processing by [facility] for the following reason(s):        Required vitals abnormal    [facility] action(s):  Defer               Appointments  past 12m or future 3m with PCP    Date Provider   Last Visit   8/8/2024 Rene Juarez MD   Next Visit   2/10/2025 Rene Juarez MD   ED visits in past 90 days: 0        Note composed:8:34 AM 09/09/2024

## 2024-10-03 DIAGNOSIS — H40.023 OPEN ANGLE WITH BORDERLINE FINDINGS AND HIGH GLAUCOMA RISK IN BOTH EYES: ICD-10-CM

## 2024-10-03 RX ORDER — LATANOPROST 50 UG/ML
1 SOLUTION/ DROPS OPHTHALMIC
Qty: 7.5 ML | Refills: 3 | Status: SHIPPED | OUTPATIENT
Start: 2024-10-03

## 2024-10-14 ENCOUNTER — OFFICE VISIT (OUTPATIENT)
Dept: OPTOMETRY | Facility: CLINIC | Age: 69
End: 2024-10-14
Payer: COMMERCIAL

## 2024-10-14 ENCOUNTER — CLINICAL SUPPORT (OUTPATIENT)
Dept: OPHTHALMOLOGY | Facility: CLINIC | Age: 69
End: 2024-10-14
Payer: MEDICARE

## 2024-10-14 DIAGNOSIS — H52.203 MYOPIA WITH ASTIGMATISM AND PRESBYOPIA, BILATERAL: ICD-10-CM

## 2024-10-14 DIAGNOSIS — H52.13 MYOPIA WITH ASTIGMATISM AND PRESBYOPIA, BILATERAL: ICD-10-CM

## 2024-10-14 DIAGNOSIS — H40.023 OPEN ANGLE WITH BORDERLINE FINDINGS AND HIGH GLAUCOMA RISK IN BOTH EYES: Primary | ICD-10-CM

## 2024-10-14 DIAGNOSIS — H40.023 OPEN ANGLE WITH BORDERLINE FINDINGS AND HIGH GLAUCOMA RISK IN BOTH EYES: ICD-10-CM

## 2024-10-14 DIAGNOSIS — H52.4 MYOPIA WITH ASTIGMATISM AND PRESBYOPIA, BILATERAL: ICD-10-CM

## 2024-10-14 DIAGNOSIS — E05.00 GRAVES' DISEASE WITH EXOPHTHALMOS: ICD-10-CM

## 2024-10-14 DIAGNOSIS — Z96.1 PSEUDOPHAKIA: ICD-10-CM

## 2024-10-14 DIAGNOSIS — E11.9 TYPE 2 DIABETES MELLITUS WITHOUT OPHTHALMIC MANIFESTATIONS: ICD-10-CM

## 2024-10-14 PROCEDURE — 99999 PR PBB SHADOW E&M-EST. PATIENT-LVL III: CPT | Mod: PBBFAC,,, | Performed by: OPTOMETRIST

## 2024-10-15 NOTE — PROGRESS NOTES
Oct/hvf done ou./ rel/fix/coop. Good ou./ chart checked for latex allergy./ -.75 + 1.00 x 115/od -.50 + .75 x 135/os-h

## 2024-10-15 NOTE — PROGRESS NOTES
HPI    TIESHA: 04/24  Chief complaint (CC): Patient is here for annual with HVF and OCT today.   Patient hasn't noticed any vision changes since the last exam.  Glasses? +  Contacts? -  H/o eye surgery, injections or laser: PC IOL OU  H/o eye injury: -  Known eye conditions? See above  Family h/o eye conditions?  Mother with glaucoma  Eye gtts? Using Latanoprost OU Q HS      (-) Flashes (-)  Floaters (-) Mucous   (-)  Tearing (-) Itching (-) Burning   (-) Headaches (-) Eye Pain/discomfort (-) Irritation   (-)  Redness (-) Double vision (-) Blurry vision    Diabetic? +  A1c? Hemoglobin A1C       Date                     Value               Ref Range             Status                08/30/2024               6.2 (H)             4.0 - 5.6 %           Final                 02/06/2024               6.1 (H)             4.0 - 5.6 %           Final                 08/03/2023               6.3 (H)             4.0 - 5.6 %           Final                  Last edited by Vandana Marroquin on 10/14/2024  3:44 PM.            Assessment /Plan     For exam results, see Encounter Report.      Open angle with borderline findings and high glaucoma risk in both eyes  (+) fHx- mother. IOP 14 OD, OS. Last 13 OD, OS. C/d 0.7 OD, 0.8 OS w/pallor OU. Prev pt of Dr Rey w/testing in 2/2022.   10/14/2024 OCT WNL OU  10/14/2024 HVF OD WNL OS borderline  Pt likely on drops due to h/o high IOP. Pt reports she has been on drops since about 2005.   Educated pt on findings w/understanding.   Cont Latanoprost QHS OU.   RTC 4 mo IOP    Type 2 diabetes mellitus without ophthalmic manifestations  BS control. No signs of diabetic retinopathy. Monitor with annual exam.     Myopia with astigmatism and presbyopia, bilateral  SRx released to patient. Patient educated on lens options. Normal ocular health. RTC 1 year for routine exam.     Pseudophakia  Good result.     Graves' disease with exophthalmos  Stable.

## 2024-10-30 ENCOUNTER — CLINICAL SUPPORT (OUTPATIENT)
Dept: REHABILITATION | Facility: OTHER | Age: 69
End: 2024-10-30
Attending: ORTHOPAEDIC SURGERY
Payer: MEDICARE

## 2024-10-30 DIAGNOSIS — M25.662 DECREASED RANGE OF MOTION (ROM) OF LEFT KNEE: Primary | ICD-10-CM

## 2024-10-30 DIAGNOSIS — R29.898 DECREASED STRENGTH OF LOWER EXTREMITY: ICD-10-CM

## 2024-10-30 DIAGNOSIS — M17.32 POST-TRAUMATIC OSTEOARTHRITIS OF LEFT KNEE: ICD-10-CM

## 2024-10-30 PROCEDURE — 97530 THERAPEUTIC ACTIVITIES: CPT

## 2024-10-30 PROCEDURE — 97161 PT EVAL LOW COMPLEX 20 MIN: CPT

## 2024-10-30 PROCEDURE — 97110 THERAPEUTIC EXERCISES: CPT

## 2024-10-31 ENCOUNTER — TELEPHONE (OUTPATIENT)
Dept: OBSTETRICS AND GYNECOLOGY | Facility: CLINIC | Age: 69
End: 2024-10-31
Payer: MEDICARE

## 2024-10-31 DIAGNOSIS — Z12.31 VISIT FOR SCREENING MAMMOGRAM: Primary | ICD-10-CM

## 2024-11-05 NOTE — PROGRESS NOTES
"  Physical Therapy Daily Treatment Note     Name: Katey LEVY [facility]  Clinic Number: [id number]    Therapy Diagnosis:   Encounter Diagnoses   Name Primary?    Decreased range of motion (ROM) of left knee Yes    Decreased strength of lower extremity      Physician: Rafael Ro III, *    Visit Date: 2024    Physician Orders: PT Eval and Treat   Medical Diagnosis from Referral: M17.32 (ICD-10-CM) - Post-traumatic osteoarthritis of left knee   Evaluation Date: 10/30/2024  Authorization Period Expiration: 25  Plan of Care Expiration: 24  Visit # / Visits authorized:    Progress Note Due: 24  FOTO: 1/ 3     Precautions: Standard     Time In: 7:00 am  Time Out: 755 am  Total Appointment Time (timed & untimed codes): 55 minutes    Subjective     Pt reports: She is walking a little better. She is feeling a little better too.   she was compliant with home exercise program given last session.   Response to previous treatment:Eval  Functional change: Ongoing    Pain: 5/10  Location: left knee      Pts goals: decrease pain, improve motion, and prepare for surgery  Objective     Updated at Progress Report Unless Specified    24  TU seconds  30 second STS: 12 no UE    Treatment     aKtey LEVY received the following manual therapy techniques for 10  minutes:     Patella mobs   Seated tibial distraction      Katey LEVY received therapeutic exercises for 45 minutes including:    Recumbent bike x 5'  Seated tailgaters 5# x 3'  Heel slides x 2'  Quad sets 2 x 10 x 5"  SL clams RTB 2 x 10 x 3" ea  Bridges 2 x 10   Leg press 70# 2 x 10      Katey LEVY participated in dynamic functional therapeutic activities to improve functional performance for 00  minutes, including:        Katey LEVY participated in neuromuscular re-education activities to improve: Balance, Coordination, and Proprioception for 00 minutes. The following activities were included:        Home Exercises and Education Provided     Education " provided:   - DOMS   -HEP   - Progress towards goals     Written Home Exercises Provided: Patient instructed to cont prior HEP.  Exercises were reviewed and Katey LEVY was able to demonstrate them prior to the end of the session.  Katey LEVY demonstrated good  understanding of the HEP provided.   .   See EMR under Patient Instructions for exercises provided 10/30/2024.  Assessment      Katey presents to first follow up with mod levels of pain levels of pain. Reviewed HEP with pt able to perform with mod  cues indicating HEP  compliance. Muscle guarding present during seated tibial distraction. Pt was able to perform exercises without exacerbation of symptoms. Will progress as tolerated.        Katey LEVY is progressing well towards her goals and there are no updates to goals at this time. Pt prognosis is Good.     Pt will continue to benefit from skilled outpatient physical therapy to address the deficits listed in the problem list on initial evaluation provide pt/family education and to maximize pt's level of independence in the home and community environment.     Anticipated Barriers for therapy: chronicity and severity of symptoms    Pt's spiritual, cultural and educational needs considered and pt agreeable to plan of care and goals.    GOALS: Short Term Goals:  4-5 weeks  1.Report decreased in pain at worse less than  <   / =  5  /10  to increase tolerance for functional mobility.On going  2. Pt to improve L knee range of motion by 75% to allow for improved functional mobility to allow for improvement in IADLs. .On going  3. Increased B hip/LE MMT 1/2 grade to increase tolerance for ADL and work activities.On going  4. Pt to tolerate HEP to improve ROM and independence with ADL's.On going     Long Term Goals: 8-10 weeks  1.Report decreased in pain at worse less than  <   / =  2  /10  to increase tolerance for functional mobility. On going  2.Pt to improve L knee range of motion by 90% to allow for improved  functional mobility to allow for improvement in IADLs. On going  3.Increased B hip/LE MMT 1 grade to increase tolerance for ADL and work activities.On going  4. Pt will report 50 or greater score on FOTO knee survey to demonstrate increase in LE function with every day tasks. On going  5. Pt to be Independent with HEP to improve ROM and independence with ADL's. On going     Plan   Plan of care Certification: 10/30/2024 to 12/31/24.      Vincenzo Santa, PTA

## 2024-11-06 ENCOUNTER — CLINICAL SUPPORT (OUTPATIENT)
Dept: REHABILITATION | Facility: OTHER | Age: 69
End: 2024-11-06
Payer: MEDICARE

## 2024-11-06 DIAGNOSIS — R29.898 DECREASED STRENGTH OF LOWER EXTREMITY: ICD-10-CM

## 2024-11-06 DIAGNOSIS — M25.662 DECREASED RANGE OF MOTION (ROM) OF LEFT KNEE: Primary | ICD-10-CM

## 2024-11-06 PROCEDURE — 97140 MANUAL THERAPY 1/> REGIONS: CPT | Mod: CQ

## 2024-11-06 PROCEDURE — 97110 THERAPEUTIC EXERCISES: CPT | Mod: CQ

## 2024-11-10 DIAGNOSIS — R52 PAIN: ICD-10-CM

## 2024-11-10 NOTE — TELEPHONE ENCOUNTER
No care due was identified.  [facility] Embedded Care Due Messages. Reference number: [id number].   11/10/2024 4:17:07 PM CST

## 2024-11-11 RX ORDER — MELOXICAM 15 MG/1
15 TABLET ORAL DAILY PRN
Qty: 15 TABLET | Refills: 0 | Status: SHIPPED | OUTPATIENT
Start: 2024-11-11

## 2024-11-11 NOTE — TELEPHONE ENCOUNTER
Refill Routing Note   Medication(s) are not appropriate for processing by [facility] for the following reason(s):        Outside of protocol    [facility] action(s):  Route               Appointments  past 12m or future 3m with PCP    Date Provider   Last Visit   8/8/2024 Rene Juarez MD   Next Visit   2/10/2025 Rene Juarez MD   ED visits in past 90 days: 0        Note composed:9:58 AM 11/11/2024

## 2024-11-12 ENCOUNTER — CLINICAL SUPPORT (OUTPATIENT)
Dept: REHABILITATION | Facility: OTHER | Age: 69
End: 2024-11-12
Payer: MEDICARE

## 2024-11-12 DIAGNOSIS — R29.898 DECREASED STRENGTH OF LOWER EXTREMITY: ICD-10-CM

## 2024-11-12 DIAGNOSIS — M25.662 DECREASED RANGE OF MOTION (ROM) OF LEFT KNEE: Primary | ICD-10-CM

## 2024-11-12 PROCEDURE — 97110 THERAPEUTIC EXERCISES: CPT

## 2024-11-12 NOTE — PROGRESS NOTES
"  Physical Therapy Daily Treatment Note     Name: Katey LEVY [facility] Number: [id number]    Therapy Diagnosis:   Encounter Diagnoses   Name Primary?    Decreased range of motion (ROM) of left knee Yes    Decreased strength of lower extremity      Physician: Rafael Ro III, *    Visit Date: 2024    Physician Orders: PT Eval and Treat   Medical Diagnosis from Referral: M17.32 (ICD-10-CM) - Post-traumatic osteoarthritis of left knee   Evaluation Date: 10/30/2024  Authorization Period Expiration: 25  Plan of Care Expiration: 24  Visit # / Visits authorized:    Progress Note Due: 24  FOTO: 1/ 3     Precautions: Standard     Time In: 8:00 am  Time Out: 9:00 am  Total Appointment Time (timed & untimed codes): 60 minutes (30 min 1:1)    Subjective     Pt reports: that she has been feeling good and feels like she can walk better.  she was compliant with home exercise program given last session.   Response to previous treatment: good  Functional change: improvement in symptoms    Pain: 3/10  Location: left knee      Pts goals: decrease pain, improve motion, and prepare for surgery  Objective     Updated at Progress Report Unless Specified    24  TU seconds  30 second STS: 12 no UE    Treatment     Katey LEVY received the following manual therapy techniques for 5 minutes:   Patella mobs on L side  Seated tibial distraction L knee  STM to L quad    Katey LEVY received therapeutic exercises for 50 minutes including:  Recumbent bike x 5'  Seated tailgaters 5# x 3'  Heel slides x 2' on L side  2 rounds   10 heel raises   10 squats on side of treadmill  DKTC with ball x 2 minutes  Standing glut med YTB 2 x 10 x 3" ea  Bridges 2 x 10 3 second holds  Leg press 80# 4 x 10- increase to 100# NV    Katey LEVY participated in dynamic functional therapeutic activities to improve functional performance for 0  minutes, including:  NA today    Katey LEVY participated in neuromuscular re-education " activities to improve: Balance, Coordination, and Proprioception for 0 minutes. The following activities were included:  NA today    MH to L knee in supine with bolster x 5 minutes.    Home Exercises and Education Provided     Education provided:   - DOMS   -HEP   - Progress towards goals     Written Home Exercises Provided: Patient instructed to cont prior HEP.  Exercises were reviewed and Katey LEVY was able to demonstrate them prior to the end of the session.  Katey LEVY demonstrated good  understanding of the HEP provided.   .   See EMR under Patient Instructions for exercises provided 10/30/2024.  Assessment   Patient demonstrated great tolerance to strengthening and more knee ROM work this session. Improvement in L knee ROM and quality of gait by the end of the session.     Katey LEVY is progressing well towards her goals and there are no updates to goals at this time. Pt prognosis is Good.     Pt will continue to benefit from skilled outpatient physical therapy to address the deficits listed in the problem list on initial evaluation provide pt/family education and to maximize pt's level of independence in the home and community environment.     Anticipated Barriers for therapy: chronicity and severity of symptoms    Pt's spiritual, cultural and educational needs considered and pt agreeable to plan of care and goals.    GOALS: Short Term Goals:  4-5 weeks  1.Report decreased in pain at worse less than  <   / =  5  /10  to increase tolerance for functional mobility.On going  2. Pt to improve L knee range of motion by 75% to allow for improved functional mobility to allow for improvement in IADLs. .On going  3. Increased B hip/LE MMT 1/2 grade to increase tolerance for ADL and work activities.On going  4. Pt to tolerate HEP to improve ROM and independence with ADL's.On going     Long Term Goals: 8-10 weeks  1.Report decreased in pain at worse less than  <   / =  2  /10  to increase tolerance for functional mobility.  On going  2.Pt to improve L knee range of motion by 90% to allow for improved functional mobility to allow for improvement in IADLs. On going  3.Increased B hip/LE MMT 1 grade to increase tolerance for ADL and work activities.On going  4. Pt will report 50 or greater score on FOTO knee survey to demonstrate increase in LE function with every day tasks. On going  5. Pt to be Independent with HEP to improve ROM and independence with ADL's. On going     Plan   Plan of care Certification: 10/30/2024 to 12/31/24.    Continue POC    Dean Tompkins, PT

## 2024-11-13 ENCOUNTER — TELEPHONE (OUTPATIENT)
Dept: INTERNAL MEDICINE | Facility: CLINIC | Age: 69
End: 2024-11-13
Payer: MEDICARE

## 2024-11-13 NOTE — TELEPHONE ENCOUNTER
Spoke with the pt who stated she would be dropping off some [facility] paperwork for medication that needs to be filled out by me and given to Dr Juarez to sign.

## 2024-11-13 NOTE — TELEPHONE ENCOUNTER
----- Message from Kp sent at 11/13/2024  1:43 PM CST -----  Regarding: pAPER wORK  Name of Who is Calling:  Patient          What is the request in detail:  Please contact patient she stated she has paper work that need to be filled out            Can the clinic reply by [facility]: No            [facility]: [phone]

## 2024-11-14 ENCOUNTER — CLINICAL SUPPORT (OUTPATIENT)
Dept: REHABILITATION | Facility: OTHER | Age: 69
End: 2024-11-14
Payer: MEDICARE

## 2024-11-14 DIAGNOSIS — R29.898 DECREASED STRENGTH OF LOWER EXTREMITY: ICD-10-CM

## 2024-11-14 DIAGNOSIS — M25.662 DECREASED RANGE OF MOTION (ROM) OF LEFT KNEE: Primary | ICD-10-CM

## 2024-11-14 PROCEDURE — 97110 THERAPEUTIC EXERCISES: CPT | Mod: CQ

## 2024-11-14 NOTE — PROGRESS NOTES
"  Physical Therapy Daily Treatment Note     Name: Katey LEVY [facility]  Clinic Number: [id number]    Therapy Diagnosis:   Encounter Diagnoses   Name Primary?    Decreased range of motion (ROM) of left knee Yes    Decreased strength of lower extremity        Physician: Rafael Ro III, *    Visit Date: 2024    Physician Orders: PT Eval and Treat   Medical Diagnosis from Referral: M17.32 (ICD-10-CM) - Post-traumatic osteoarthritis of left knee   Evaluation Date: 10/30/2024  Authorization Period Expiration: 25  Plan of Care Expiration: 24  Visit # / Visits authorized:    Progress Note Due: 24  FOTO: 1/ 3     Precautions: Standard     Time In: 8:00 am  Time Out: 854 am  Total Appointment Time (timed & untimed codes): 54 minutes    Subjective     Pt reports: She didn't sleep well from the pain in her knee.   she was compliant with home exercise program given last session.   Response to previous treatment: good  Functional change: improvement in symptoms    Pain: 7/10  Location: left knee      Pts goals: decrease pain, improve motion, and prepare for surgery  Objective     Updated at Progress Report Unless Specified    24  TU seconds  30 second STS: 12 no UE    Treatment     Katey LEVY received the following manual therapy techniques for 5 minutes:   Patella mobs on L side  Seated tibial distraction L knee  STM to L quad    Katey LEVY received therapeutic exercises for 49 minutes including:  Nustep bike x 5'  Seated tailgaters 5# x 3'  Heel prop x 2'  Heel slides x 2' on L side  2 rounds   10 heel raises   10 squats on side of treadmill  DKTC with ball x 2 minutes  Standing glut med YTB 2 x 10 x 3" ea  Bridges 2 x 10 3 second holds  Leg press 100# 4 x 10-     Katey LEVY participated in dynamic functional therapeutic activities to improve functional performance for 0  minutes, including:  NA today    Katye LEVY participated in neuromuscular re-education activities to improve: Balance, " Coordination, and Proprioception for 0 minutes. The following activities were included:  NA today    MH to L knee in supine with bolster x 5 minutes.    Home Exercises and Education Provided     Education provided:   - DOMS   -HEP   - Progress towards goals     Written Home Exercises Provided: Patient instructed to cont prior HEP.  Exercises were reviewed and Katey LEVY was able to demonstrate them prior to the end of the session.  Katey LEVY demonstrated good  understanding of the HEP provided.   .   See EMR under Patient Instructions for exercises provided 10/30/2024.  Assessment   Katey presents to therapy with high levels of pain. Manual therapy performed to decrease symptoms with positive response. Encouraged pt to perform HEP more frequently as pt reports she is hesitant to perform when in pain. Progressed quad strengthening without further exacerbation of symptoms.     Katey LEVY is progressing well towards her goals and there are no updates to goals at this time. Pt prognosis is Good.     Pt will continue to benefit from skilled outpatient physical therapy to address the deficits listed in the problem list on initial evaluation provide pt/family education and to maximize pt's level of independence in the home and community environment.     Anticipated Barriers for therapy: chronicity and severity of symptoms    Pt's spiritual, cultural and educational needs considered and pt agreeable to plan of care and goals.    GOALS: Short Term Goals:  4-5 weeks  1.Report decreased in pain at worse less than  <   / =  5  /10  to increase tolerance for functional mobility.On going  2. Pt to improve L knee range of motion by 75% to allow for improved functional mobility to allow for improvement in IADLs. .On going  3. Increased B hip/LE MMT 1/2 grade to increase tolerance for ADL and work activities.On going  4. Pt to tolerate HEP to improve ROM and independence with ADL's.On going     Long Term Goals: 8-10 weeks  1.Report  decreased in pain at worse less than  <   / =  2  /10  to increase tolerance for functional mobility. On going  2.Pt to improve L knee range of motion by 90% to allow for improved functional mobility to allow for improvement in IADLs. On going  3.Increased B hip/LE MMT 1 grade to increase tolerance for ADL and work activities.On going  4. Pt will report 50 or greater score on FOTO knee survey to demonstrate increase in LE function with every day tasks. On going  5. Pt to be Independent with HEP to improve ROM and independence with ADL's. On going     Plan   Plan of care Certification: 10/30/2024 to 12/31/24.    Continue POC    Vincenzo Santa, PTA

## 2024-11-18 ENCOUNTER — CLINICAL SUPPORT (OUTPATIENT)
Dept: REHABILITATION | Facility: OTHER | Age: 69
End: 2024-11-18
Payer: MEDICARE

## 2024-11-18 DIAGNOSIS — M25.662 DECREASED RANGE OF MOTION (ROM) OF LEFT KNEE: Primary | ICD-10-CM

## 2024-11-18 DIAGNOSIS — R29.898 DECREASED STRENGTH OF LOWER EXTREMITY: ICD-10-CM

## 2024-11-18 PROCEDURE — 97110 THERAPEUTIC EXERCISES: CPT

## 2024-11-18 PROCEDURE — 97140 MANUAL THERAPY 1/> REGIONS: CPT

## 2024-11-18 NOTE — PROGRESS NOTES
"  Physical Therapy Daily Treatment Note     [facility] [id number]    Therapy Diagnosis:   Encounter Diagnoses   Name Primary?    Decreased range of motion (ROM) of left knee Yes    Decreased strength of lower extremity        Physician: Rafael Ro III, *    Visit Date: 2024    Physician Orders: PT Eval and Treat   Medical Diagnosis from Referral: M17.32 (ICD-10-CM) - Post-traumatic osteoarthritis of left knee   Evaluation Date: 10/30/2024  Authorization Period Expiration: 25  Plan of Care Expiration: 24  Visit # / Visits authorized:    Progress Note Due: 24  FOTO: 1/ 3     Precautions: Standard     Time In: 8:00 am  Time Out: 8:57 am  Total Appointment Time (timed & untimed codes): 57 minutes    Subjective     Pt reports: moderate L knee pain. She has increased soreness after pulling weed out of her yard.   she was compliant with home exercise program given last session.   Response to previous treatment: good  Functional change: improvement in symptoms    Pain: 6/10  Location: left knee      Pts goals: decrease pain, improve motion, and prepare for surgery  Objective     Updated at Progress Report Unless Specified    24  TU seconds  30 second STS: 12 no UE    Treatment     Katey LEVY received the following manual therapy techniques for 8 minutes:   Patella mobs on L side  Seated tibial distraction L knee  STM to L quad    Katey LEVY received therapeutic exercises for 49 minutes including:  Nustep bike x 6'  Seated tailgaters 5# x 3'  Heel prop x 2'  Heel slides x 2' on L side  2 rounds   10 heel raises   10 squats on side of treadmill  DKTC with ball x 2 minutes  Standing glut med YTB 2 x 10 x 3" ea  Bridges 2 x 10 3 second holds  Leg press 100# 4 x 10    Katey LEVY participated in dynamic functional therapeutic activities to improve functional performance for 0  minutes, including:  NA today    Katey LEVY participated in neuromuscular re-education " activities to improve: Balance, Coordination, and Proprioception for 0 minutes. The following activities were included:  NA today    Ice pack to L knee in supine with bolster x 5 minutes.    Home Exercises and Education Provided     Education provided:   - DOMS   -HEP   - Progress towards goals     Written Home Exercises Provided: Patient instructed to cont prior HEP.  Exercises were reviewed and Katey LEVY was able to demonstrate them prior to the end of the session.  Katey LEVY demonstrated good  understanding of the HEP provided.   .   See EMR under Patient Instructions for exercises provided 10/30/2024.  Assessment   Katey presents to therapy with moderate levels of pain. Patient was able to perform exercises well without exhibiting further exacerbation of symptoms. Noted tenderness to palpation along L patella and moderate swelling along medial L patella. Will continue to monitor and progress exercises as tolerated by patient with appropriate challenge.     Katey LEVY is progressing well towards her goals and there are no updates to goals at this time. Pt prognosis is Good.     Pt will continue to benefit from skilled outpatient physical therapy to address the deficits listed in the problem list on initial evaluation provide pt/family education and to maximize pt's level of independence in the home and community environment.     Anticipated Barriers for therapy: chronicity and severity of symptoms    Pt's spiritual, cultural and educational needs considered and pt agreeable to plan of care and goals.    GOALS: Short Term Goals:  4-5 weeks  1.Report decreased in pain at worse less than  <   / =  5  /10  to increase tolerance for functional mobility.On going  2. Pt to improve L knee range of motion by 75% to allow for improved functional mobility to allow for improvement in IADLs. .On going  3. Increased B hip/LE MMT 1/2 grade to increase tolerance for ADL and work activities.On going  4. Pt to tolerate HEP to  improve ROM and independence with ADL's.On going     Long Term Goals: 8-10 weeks  1.Report decreased in pain at worse less than  <   / =  2  /10  to increase tolerance for functional mobility. On going  2.Pt to improve L knee range of motion by 90% to allow for improved functional mobility to allow for improvement in IADLs. On going  3.Increased B hip/LE MMT 1 grade to increase tolerance for ADL and work activities.On going  4. Pt will report 50 or greater score on FOTO knee survey to demonstrate increase in LE function with every day tasks. On going  5. Pt to be Independent with HEP to improve ROM and independence with ADL's. On going     Plan   Plan of care Certification: 10/30/2024 to 12/31/24.    Continue POC    Virginia Dockery, PT

## 2024-11-19 ENCOUNTER — TELEPHONE (OUTPATIENT)
Dept: INTERNAL MEDICINE | Facility: CLINIC | Age: 69
End: 2024-11-19
Payer: MEDICARE

## 2024-11-19 NOTE — TELEPHONE ENCOUNTER
Spoke with the pt and informed her that I have not completed her paperwork yet and I will try to have it done.

## 2024-11-19 NOTE — TELEPHONE ENCOUNTER
----- Message from [employer] sent at 11/19/2024  9:55 AM CST -----   Name of Who is Calling:     What is the request in detail:  patient request call back in reference to paper work / patient want to know if ready for  Please contact to further discuss and advise      Can the clinic reply by [facility]:     What Number to Call Back if not in [facility]:   [phone]

## 2024-11-20 ENCOUNTER — TELEPHONE (OUTPATIENT)
Dept: INTERNAL MEDICINE | Facility: CLINIC | Age: 69
End: 2024-11-20
Payer: MEDICARE

## 2024-11-20 ENCOUNTER — CLINICAL SUPPORT (OUTPATIENT)
Dept: REHABILITATION | Facility: OTHER | Age: 69
End: 2024-11-20
Payer: MEDICARE

## 2024-11-20 DIAGNOSIS — R29.898 DECREASED STRENGTH OF LOWER EXTREMITY: ICD-10-CM

## 2024-11-20 DIAGNOSIS — M25.662 DECREASED RANGE OF MOTION (ROM) OF LEFT KNEE: Primary | ICD-10-CM

## 2024-11-20 PROCEDURE — 97110 THERAPEUTIC EXERCISES: CPT | Mod: CQ

## 2024-11-20 PROCEDURE — 97140 MANUAL THERAPY 1/> REGIONS: CPT | Mod: CQ

## 2024-11-20 NOTE — PROGRESS NOTES
"  Physical Therapy Daily Treatment Note     Name: Katey LEVY [facility]  Clinic Number: [id number]    Therapy Diagnosis:   Encounter Diagnoses   Name Primary?    Decreased range of motion (ROM) of left knee Yes    Decreased strength of lower extremity          Physician: Rafael Ro III, *    Visit Date: 2024    Physician Orders: PT Eval and Treat   Medical Diagnosis from Referral: M17.32 (ICD-10-CM) - Post-traumatic osteoarthritis of left knee   Evaluation Date: 10/30/2024  Authorization Period Expiration: 25  Plan of Care Expiration: 24  Visit # / Visits authorized:    Progress Note Due: 24  FOTO: 1/ 3     Precautions: Standard     Time In: 700 am  Time Out: 755 am  Total Appointment Time (timed & untimed codes): 55 minutes (30 mins 1:1)    Subjective     Pt reports: She has pain that is worse in the morning. She doesn't sleep much at night from her arthritis.   she was compliant with home exercise program given last session.   Response to previous treatment: good  Functional change: improvement in symptoms    Pain: 6/10  Location: left knee      Pts goals: decrease pain, improve motion, and prepare for surgery  Objective     Updated at Progress Report Unless Specified    24  TU seconds  30 second STS: 12 no UE    Treatment     Katey LEVY received the following manual therapy techniques for 10 minutes:   Patella mobs on L side  Seated tibial distraction L knee  STM to L quad    Katey LEVY received therapeutic exercises for 45 minutes including:  Nustep bike x 6'  Seated tailgaters 5# x 3'  Heel prop x 2'  Heel slides x 2' on L side  2 rounds   10 heel raises   10 squats on side of treadmill  DKTC with ball x 2 minutes  Standing glut med YTB 2 x 10 x 3" ea  Bridges 2 x 10 3 second holds  Leg press 104# 4 x 10    Katey LEVY participated in dynamic functional therapeutic activities to improve functional performance for 0  minutes, including:  NA today    Katey LEVY participated in " neuromuscular re-education activities to improve: Balance, Coordination, and Proprioception for 0 minutes. The following activities were included:  NA today    Ice pack to L knee in supine with bolster x 5 minutes.    Home Exercises and Education Provided     Education provided:   - DOMS   -HEP   - Progress towards goals     Written Home Exercises Provided: Patient instructed to cont prior HEP.  Exercises were reviewed and Katey LEVY was able to demonstrate them prior to the end of the session.  Katey LEVY demonstrated good  understanding of the HEP provided.   .   See EMR under Patient Instructions for exercises provided 10/30/2024.  Assessment   Pt presents with symptoms unchanged. Manual therapy performed to decrease symptoms with good response. Continued LE and hip strengthening without issue.    Katey LEVY is progressing well towards her goals and there are no updates to goals at this time. Pt prognosis is Good.     Pt will continue to benefit from skilled outpatient physical therapy to address the deficits listed in the problem list on initial evaluation provide pt/family education and to maximize pt's level of independence in the home and community environment.     Anticipated Barriers for therapy: chronicity and severity of symptoms    Pt's spiritual, cultural and educational needs considered and pt agreeable to plan of care and goals.    GOALS: Short Term Goals:  4-5 weeks  1.Report decreased in pain at worse less than  <   / =  5  /10  to increase tolerance for functional mobility.On going  2. Pt to improve L knee range of motion by 75% to allow for improved functional mobility to allow for improvement in IADLs. .On going  3. Increased B hip/LE MMT 1/2 grade to increase tolerance for ADL and work activities.On going  4. Pt to tolerate HEP to improve ROM and independence with ADL's.On going     Long Term Goals: 8-10 weeks  1.Report decreased in pain at worse less than  <   / =  2  /10  to increase tolerance for  functional mobility. On going  2.Pt to improve L knee range of motion by 90% to allow for improved functional mobility to allow for improvement in IADLs. On going  3.Increased B hip/LE MMT 1 grade to increase tolerance for ADL and work activities.On going  4. Pt will report 50 or greater score on FOTO knee survey to demonstrate increase in LE function with every day tasks. On going  5. Pt to be Independent with HEP to improve ROM and independence with ADL's. On going     Plan   Plan of care Certification: 10/30/2024 to 12/31/24.    Continue POC    Vincenzo Santa, PTA

## 2024-11-25 ENCOUNTER — CLINICAL SUPPORT (OUTPATIENT)
Dept: REHABILITATION | Facility: OTHER | Age: 69
End: 2024-11-25
Payer: MEDICARE

## 2024-11-25 DIAGNOSIS — R29.898 DECREASED STRENGTH OF LOWER EXTREMITY: ICD-10-CM

## 2024-11-25 DIAGNOSIS — M25.662 DECREASED RANGE OF MOTION (ROM) OF LEFT KNEE: Primary | ICD-10-CM

## 2024-11-25 NOTE — PROGRESS NOTES
"  Physical Therapy Daily Treatment Note     Name: Katey LEVY [facility]  Clinic Number: [id number]    Therapy Diagnosis:   Encounter Diagnoses   Name Primary?    Decreased range of motion (ROM) of left knee Yes    Decreased strength of lower extremity        Physician: Rafael Ro III, *    Visit Date: 2024    Physician Orders: PT Eval and Treat   Medical Diagnosis from Referral: M17.32 (ICD-10-CM) - Post-traumatic osteoarthritis of left knee   Evaluation Date: 10/30/2024  Authorization Period Expiration: 25  Plan of Care Expiration: 24  Visit # / Visits authorized:    Progress Note Due: 24  FOTO: 1/ 3     Precautions: Standard     Time In: 10:04 am  Time Out: 11:05 am  Total Appointment Time (timed & untimed codes): 55 minutes (30 mins 1:1)    Subjective     Pt reports: That when she gets up in the morning and with the weather change her knee tends to be most painful. She's waiting on her surgery date after completing therapy.      she was compliant with home exercise program given last session.   Response to previous treatment: good  Functional change: improvement in symptoms    Pain:  /10  Location: left knee      Pts goals: decrease pain, improve motion, and prepare for surgery  Objective     Updated at Progress Report Unless Specified    24  TU seconds  30 second STS: 12 no UE    Treatment     Katey LEVY received the following manual therapy techniques for 0 minutes:   Patella mobs on L side  Seated tibial distraction L knee  STM to L quad    Katey LEVY received therapeutic exercises for 55 minutes including:  Nustep bike x 6'    Seated tailgaters 5# x 3'  Heel slides x 2' on L side  2 rounds   10 heel raises   10 squats on side of treadmill  DKTC with ball x 2 minutes  Standing glut med RTB 2 x 10 x 3" ea  + Standing hip ext RTB   Bridges 2 x 10 3 second holds  Leg press 104# 4 x 10  + LAQ 5# AW 2 x 10    NP: Heel prop x 2'    Katey LEVY participated in dynamic functional " therapeutic activities to improve functional performance for 0  minutes, including:  NA today    Katey LEVY participated in neuromuscular re-education activities to improve: Balance, Coordination, and Proprioception for 0 minutes. The following activities were included:      Ice pack to L knee in supine with bolster x 5 minutes.    Home Exercises and Education Provided     Education provided:   - DOMS   -HEP   - Progress towards goals     Written Home Exercises Provided: Patient instructed to cont prior HEP.  Exercises were reviewed and Katey LEVY was able to demonstrate them prior to the end of the session.  Katey LEVY demonstrated good  understanding of the HEP provided.   .   See EMR under Patient Instructions for exercises provided 10/30/2024.  Assessment   Patient presents reporting decrease in left knee pain since initiating therapy. Limitations in bilateral hip extension recruitment likely limiting patient's knee extension during loading response phase of gait. To address this, resisted hip extension strengthening added to patient's treatment along with weighted quadriceps strengthening. Patient tolerated progressions well. Continue to progress patient as tolerated.     Katey LEVY is progressing well towards her goals and there are no updates to goals at this time. Pt prognosis is Good.     Pt will continue to benefit from skilled outpatient physical therapy to address the deficits listed in the problem list on initial evaluation provide pt/family education and to maximize pt's level of independence in the home and community environment.     Anticipated Barriers for therapy: chronicity and severity of symptoms    Pt's spiritual, cultural and educational needs considered and pt agreeable to plan of care and goals.    GOALS: Short Term Goals:  4-5 weeks  1.Report decreased in pain at worse less than  <   / =  5  /10  to increase tolerance for functional mobility.On going  2. Pt to improve L knee range of motion by 75%  to allow for improved functional mobility to allow for improvement in IADLs. .On going  3. Increased B hip/LE MMT 1/2 grade to increase tolerance for ADL and work activities.On going  4. Pt to tolerate HEP to improve ROM and independence with ADL's.On going     Long Term Goals: 8-10 weeks  1.Report decreased in pain at worse less than  <   / =  2  /10  to increase tolerance for functional mobility. On going  2.Pt to improve L knee range of motion by 90% to allow for improved functional mobility to allow for improvement in IADLs. On going  3.Increased B hip/LE MMT 1 grade to increase tolerance for ADL and work activities.On going  4. Pt will report 50 or greater score on FOTO knee survey to demonstrate increase in LE function with every day tasks. On going  5. Pt to be Independent with HEP to improve ROM and independence with ADL's. On going     Plan   Plan of care Certification: 10/30/2024 to 12/31/24.    Continue DOREEN Cardenas, PT

## 2024-11-29 ENCOUNTER — CLINICAL SUPPORT (OUTPATIENT)
Dept: REHABILITATION | Facility: OTHER | Age: 69
End: 2024-11-29
Payer: MEDICARE

## 2024-11-29 DIAGNOSIS — R29.898 DECREASED STRENGTH OF LOWER EXTREMITY: ICD-10-CM

## 2024-11-29 DIAGNOSIS — M25.662 DECREASED RANGE OF MOTION (ROM) OF LEFT KNEE: Primary | ICD-10-CM

## 2024-11-29 PROCEDURE — 97530 THERAPEUTIC ACTIVITIES: CPT

## 2024-11-29 PROCEDURE — 97110 THERAPEUTIC EXERCISES: CPT

## 2024-11-29 NOTE — PROGRESS NOTES
Physical Therapy Progress Note     Name: Katey LEVY [facility] Number: [id number]    Therapy Diagnosis:   Encounter Diagnoses   Name Primary?    Decreased range of motion (ROM) of left knee Yes    Decreased strength of lower extremity        Physician: Rafael Ro III, *    Visit Date: 2024    Physician Orders: PT Eval and Treat   Medical Diagnosis from Referral: M17.32 (ICD-10-CM) - Post-traumatic osteoarthritis of left knee   Evaluation Date: 10/30/2024  Authorization Period Expiration: 25  Plan of Care Expiration: 24  Visit # / Visits authorized:    Progress Note Due: 24  FOTO: 2/ 3     Precautions: Standard     Time In: 9:00 am  Time Out: 10:00 am  Total Appointment Time (timed & untimed codes): 55 minutes (30 mins 1:1)    Subjective     Pt reports: she was having cramps and spasms for a couple days after last session. She thinks it was the leg lifts.     she was compliant with home exercise program given last session.   Response to previous treatment: good  Functional change: improvement in symptoms    Pain:  /10  Location: left knee      Pts goals: decrease pain, improve motion, and prepare for surgery  Objective     Updated at Progress Report Unless Specified    24    TU seconds  30 second STS: 12 no UE    Range of Motion:   Knee Left active Left Passive   Flexion 81 88   Extension 2 0     Lumbar ROM:  FL: 100% some discomfort  EX: 25% some discomfort  R Sbin%  L Sbin%  R rotation: 75%   L rotation: 75%        Lower Extremity Strength   Right LE   Left LE     Knee extension: 4/5 Knee extension: 4/5   Knee flexion: 4/5 Knee flexion: 4/5   Hip flexion: 4-/5 Hip flexion: 4-/5   Hip extension:  3+/5 Hip extension: 3+/5   Hip abduction: 3+/5 Hip abduction: 3+/5   Hip adduction: 4-/5 Hip adduction 4-/5   Ankle dorsiflexion: 4-/5 Ankle dorsiflexion: 4/5   Ankle plantarflexion: 4-/5 Ankle plantarflexion: 4/5        Treatment     Katey LEVY received the following  "manual therapy techniques for 0 minutes:   Patella mobs on L side  Seated tibial distraction L knee  STM to L quad    Katey LEVY received therapeutic exercises for 45 minutes including:  Nustep bike x 6'    Seated tailgaters 5# x 3'   Hamstring stretch 2x30"  Heel slides x 2' on L side  2 rounds   10 heel raises   10 squats on side of treadmill  DKTC with ball x 2 minutes  Standing glut med RTB 2 x 10 x 3" ea  Standing hip ext RTB 2x10x3"  Bridges 2 x 10 3 second holds  Leg press 110# 4 x 10  LAQ 5# AW 2 x 10    NP: Heel prop x 2'    Katey LEVY participated in dynamic functional therapeutic activities to improve functional performance for 10  minutes, including:    Objective measures and reassessmnet    Katey LEYV participated in neuromuscular re-education activities to improve: Balance, Coordination, and Proprioception for 0 minutes. The following activities were included:      Ice pack to L knee in supine with bolster x 5 minutes.    Home Exercises and Education Provided     Education provided:   - DOMS   -HEP   - Progress towards goals     Written Home Exercises Provided: Patient instructed to cont prior HEP.  Exercises were reviewed and Katey LEVY was able to demonstrate them prior to the end of the session.  Katey LEVY demonstrated good  understanding of the HEP provided.   .   See EMR under Patient Instructions for exercises provided 10/30/2024.  Assessment   Patient presents today with continued complaints of knee pain. Reassessment performed with pt continue deficits in L knee ROM, B LE strength, gait deviations, poor balance, and decreased functional mobiltiy toelrance due to knee pain. Will continue to benefit from skilled therapy to address described deficits, and decrease pain    Katey LEVY is progressing well towards her goals and there are no updates to goals at this time. Pt prognosis is Good.     Pt will continue to benefit from skilled outpatient physical therapy to address the deficits listed in the problem " list on initial evaluation provide pt/family education and to maximize pt's level of independence in the home and community environment.     Anticipated Barriers for therapy: chronicity and severity of symptoms    Pt's spiritual, cultural and educational needs considered and pt agreeable to plan of care and goals.    GOALS: Short Term Goals:  4-5 weeks  1.Report decreased in pain at worse less than  <   / =  5  /10  to increase tolerance for functional mobility.On going  2. Pt to improve L knee range of motion by 75% to allow for improved functional mobility to allow for improvement in IADLs. .On going  3. Increased B hip/LE MMT 1/2 grade to increase tolerance for ADL and work activities.On going  4. Pt to tolerate HEP to improve ROM and independence with ADL's.On going     Long Term Goals: 8-10 weeks  1.Report decreased in pain at worse less than  <   / =  2  /10  to increase tolerance for functional mobility. On going  2.Pt to improve L knee range of motion by 90% to allow for improved functional mobility to allow for improvement in IADLs. On going  3.Increased B hip/LE MMT 1 grade to increase tolerance for ADL and work activities.On going  4. Pt will report 50 or greater score on FOTO knee survey to demonstrate increase in LE function with every day tasks. On going  5. Pt to be Independent with HEP to improve ROM and independence with ADL's. On going     Plan   Plan of care Certification: 10/30/2024 to 12/31/24.    Continue POC    Ernesto Ace, PT   11/29/2024

## 2024-12-03 ENCOUNTER — CLINICAL SUPPORT (OUTPATIENT)
Dept: REHABILITATION | Facility: OTHER | Age: 69
End: 2024-12-03
Payer: MEDICARE

## 2024-12-03 DIAGNOSIS — E11.9 TYPE 2 DIABETES MELLITUS WITHOUT COMPLICATION, WITHOUT LONG-TERM CURRENT USE OF INSULIN: ICD-10-CM

## 2024-12-03 DIAGNOSIS — M25.662 DECREASED RANGE OF MOTION (ROM) OF LEFT KNEE: Primary | ICD-10-CM

## 2024-12-03 DIAGNOSIS — R29.898 DECREASED STRENGTH OF LOWER EXTREMITY: ICD-10-CM

## 2024-12-03 DIAGNOSIS — R52 PAIN: ICD-10-CM

## 2024-12-03 PROCEDURE — 97110 THERAPEUTIC EXERCISES: CPT

## 2024-12-03 RX ORDER — MELOXICAM 15 MG/1
15 TABLET ORAL DAILY PRN
Qty: 15 TABLET | Refills: 0 | Status: SHIPPED | OUTPATIENT
Start: 2024-12-03

## 2024-12-03 NOTE — TELEPHONE ENCOUNTER
No care due was identified.  [facility] Embedded Care Due Messages. Reference number: [id number].   12/03/2024 2:33:14 PM CST

## 2024-12-03 NOTE — PROGRESS NOTES
Physical Therapy Progress Note     Name: Katey LEVY [facility] Number: [id number]    Therapy Diagnosis:   Encounter Diagnoses   Name Primary?    Decreased range of motion (ROM) of left knee Yes    Decreased strength of lower extremity        Physician: Rafael Ro III, *    Visit Date: 12/3/2024    Physician Orders: PT Eval and Treat   Medical Diagnosis from Referral: M17.32 (ICD-10-CM) - Post-traumatic osteoarthritis of left knee   Evaluation Date: 10/30/2024  Authorization Period Expiration: 25  Plan of Care Expiration: 24  Visit # / Visits authorized:    Progress Note Due: 24  FOTO: 2/ 3     Precautions: Standard     Time In: ***  Time Out: ***  Total Appointment Time (timed & untimed codes): 55 minutes (30 mins 1:1)    Subjective     Pt reports: ***     she was compliant with home exercise program given last session.   Response to previous treatment: good  Functional change: improvement in symptoms    Pain: 10  Location: left knee      Pts goals: decrease pain, improve motion, and prepare for surgery  Objective     Updated at Progress Report Unless Specified    24    TU seconds  30 second STS: 12 no UE    Range of Motion:   Knee Left active Left Passive   Flexion 81 88   Extension 2 0     Lumbar ROM:  FL: 100% some discomfort  EX: 25% some discomfort  R Sbin%  L Sbin%  R rotation: 75%   L rotation: 75%        Lower Extremity Strength   Right LE   Left LE     Knee extension: 4/5 Knee extension: 4/5   Knee flexion: 4/5 Knee flexion: 4/5   Hip flexion: 4-/5 Hip flexion: 4-/5   Hip extension:  3+/5 Hip extension: 3+/5   Hip abduction: 3+/5 Hip abduction: 3+/5   Hip adduction: 4-/5 Hip adduction 4-/5   Ankle dorsiflexion: 4-/5 Ankle dorsiflexion: 4/5   Ankle plantarflexion: 4-/5 Ankle plantarflexion: 4/5        Treatment     Katey LEVY received the following manual therapy techniques for 0 minutes:   Patella mobs on L side  Seated tibial distraction L knee  STM to L  "robin LEVY received therapeutic exercises for 45 minutes including:  Nustep bike x 6'    Seated tailgaters 5# x 3'   Hamstring stretch 2x30"  Heel slides x 2' on L side  2 rounds   10 heel raises   10 squats on side of treadmill  DKTC with ball x 2 minutes  Standing glut med RTB 2 x 10 x 3" ea  Standing hip ext RTB 2x10x3"  Bridges 2 x 10 3 second holds  Leg press 110# 4 x 10  LAQ 5# AW 2 x 10    NP: Heel prop x 2'    Katey LEVY participated in dynamic functional therapeutic activities to improve functional performance for 10  minutes, including:    Objective measures and reassessmnet    Katey LEVY participated in neuromuscular re-education activities to improve: Balance, Coordination, and Proprioception for 0 minutes. The following activities were included:      Ice pack to L knee in supine with bolster x 5 minutes.    Home Exercises and Education Provided     Education provided:   - DOMS   -HEP   - Progress towards goals     Written Home Exercises Provided: Patient instructed to cont prior HEP.  Exercises were reviewed and Katey LEVY was able to demonstrate them prior to the end of the session.  Katey LEVY demonstrated good  understanding of the HEP provided.   .   See EMR under Patient Instructions for exercises provided 10/30/2024.  Assessment   Patient presents today with continued complaints of knee pain. Reassessment performed with pt continue deficits in L knee ROM, B LE strength, gait deviations, poor balance, and decreased functional mobiltiy toelrance due to knee pain. Will continue to benefit from skilled therapy to address described deficits, and decrease pain    Katey LEVY is progressing well towards her goals and there are no updates to goals at this time. Pt prognosis is Good.     Pt will continue to benefit from skilled outpatient physical therapy to address the deficits listed in the problem list on initial evaluation provide pt/family education and to maximize pt's level of independence in the home " and community environment.     Anticipated Barriers for therapy: chronicity and severity of symptoms    Pt's spiritual, cultural and educational needs considered and pt agreeable to plan of care and goals.    GOALS: Short Term Goals:  4-5 weeks  1.Report decreased in pain at worse less than  <   / =  5  /10  to increase tolerance for functional mobility.On going  2. Pt to improve L knee range of motion by 75% to allow for improved functional mobility to allow for improvement in IADLs. .On going  3. Increased B hip/LE MMT 1/2 grade to increase tolerance for ADL and work activities.On going  4. Pt to tolerate HEP to improve ROM and independence with ADL's.On going     Long Term Goals: 8-10 weeks  1.Report decreased in pain at worse less than  <   / =  2  /10  to increase tolerance for functional mobility. On going  2.Pt to improve L knee range of motion by 90% to allow for improved functional mobility to allow for improvement in IADLs. On going  3.Increased B hip/LE MMT 1 grade to increase tolerance for ADL and work activities.On going  4. Pt will report 50 or greater score on FOTO knee survey to demonstrate increase in LE function with every day tasks. On going  5. Pt to be Independent with HEP to improve ROM and independence with ADL's. On going     Plan   Plan of care Certification: 10/30/2024 to 12/31/24.    Continue DOREEN Lovett, PTA   12/3/2024

## 2024-12-03 NOTE — PROGRESS NOTES
"  Physical Therapy Progress Note     Name: Katey LEVY [facility]  Clinic Number: [id number]    Therapy Diagnosis:   Encounter Diagnoses   Name Primary?    Decreased range of motion (ROM) of left knee Yes    Decreased strength of lower extremity      Physician: Rafael Ro III, *    Visit Date: 12/3/2024    Physician Orders: PT Eval and Treat   Medical Diagnosis from Referral: M17.32 (ICD-10-CM) - Post-traumatic osteoarthritis of left knee   Evaluation Date: 10/30/2024  Authorization Period Expiration: 24  Plan of Care Expiration: 24  Visit # / Visits authorized:    Progress Note Due: 24  FOTO: 2/ 3     Precautions: Standard     Time In: 9:00 am  Time Out: 10:00 am  Total Appointment Time (timed & untimed codes): 55 minutes 1:1    Subjective     Pt reports: she overall has been feeling better. Still having spasms and her hips are hurting today.    she was compliant with home exercise program given last session.   Response to previous treatment: good  Functional change: ongoing    Pain: 3 /10  Location: left knee      Pts goals: decrease pain, improve motion, and prepare for surgery  Objective     Updated at Progress Report Unless Specified    24    TU seconds  30 second STS: 12 no UE     Treatment     Katey LEVY received the following manual therapy techniques for 5 minutes:   Patella mobs on L side  STM to L quad    Katey LEVY received therapeutic exercises for 50 minutes including:  Nustep x 6' for joint nutrition  LE bike x 3 min for knee FL ROM  Seated tailgaters 5# x 3'   Heel slides x 2' on L side  Prone quad stretch x 2 minutes 15 second holds  2 rounds    10 Standing glut med R TB x 10 x 3" ea   10 squats on side of treadmill (deep)  DKTC with ball x 2 minutes  Bridges 2 x 10 3 second holds  Leg press 130# 3 x 10- increase to 150# NV    LAQ 5# AW 2 x 10- NP  NP: Heel prop x 2'    Katey LEVY participated in dynamic functional therapeutic activities to improve functional performance " for 0 minutes, including:  Na today    Katey LEVY participated in neuromuscular re-education activities to improve: Balance, Coordination, and Proprioception for 0 minutes. The following activities were included:  NA today    Ice pack to L knee in supine with bolster x 5 minutes.    Home Exercises and Education Provided     Education provided:   - DOMS   -HEP   - Progress towards goals     Written Home Exercises Provided: Patient instructed to cont prior HEP.  Exercises were reviewed and Katey LEVY was able to demonstrate them prior to the end of the session.  Katey LEVY demonstrated good  understanding of the HEP provided.   .   See EMR under Patient Instructions for exercises provided 10/30/2024.  Assessment   Patient demonstrated good tolerance to strengthening and more knee FL work this session. Improvement in knee FL ROM by the end of the session.    Katey LEVY is progressing well towards her goals and there are no updates to goals at this time. Pt prognosis is Good.     Pt will continue to benefit from skilled outpatient physical therapy to address the deficits listed in the problem list on initial evaluation provide pt/family education and to maximize pt's level of independence in the home and community environment.     Anticipated Barriers for therapy: chronicity and severity of symptoms    Pt's spiritual, cultural and educational needs considered and pt agreeable to plan of care and goals.    GOALS: Short Term Goals:  4-5 weeks  1.Report decreased in pain at worse less than  <   / =  5  /10  to increase tolerance for functional mobility.On going  2. Pt to improve L knee range of motion by 75% to allow for improved functional mobility to allow for improvement in IADLs. .On going  3. Increased B hip/LE MMT 1/2 grade to increase tolerance for ADL and work activities.On going  4. Pt to tolerate HEP to improve ROM and independence with ADL's.On going     Long Term Goals: 8-10 weeks  1.Report decreased in pain at worse  less than  <   / =  2  /10  to increase tolerance for functional mobility. On going  2.Pt to improve L knee range of motion by 90% to allow for improved functional mobility to allow for improvement in IADLs. On going  3.Increased B hip/LE MMT 1 grade to increase tolerance for ADL and work activities.On going  4. Pt will report 50 or greater score on FOTO knee survey to demonstrate increase in LE function with every day tasks. On going  5. Pt to be Independent with HEP to improve ROM and independence with ADL's. On going     Plan   Plan of care Certification: 10/30/2024 to 12/31/24.    Continue POC    Dean Tompkins, PT   12/3/2024

## 2024-12-03 NOTE — TELEPHONE ENCOUNTER
Care Due:                  Date            Visit Type   Department     Provider  --------------------------------------------------------------------------------                                EP -                              [facility] INTERNAL  Rene Thapa  Last Visit: 08-      CARE ([facility])   [facility]                              EP -                              [facility]  Rene Thapa  Next Visit: 02-      [facility] ([facility]                                                            Last  Test          Frequency    Reason                     Performed    Due Date  --------------------------------------------------------------------------------    HBA1C.......  6 months...  dulaglutide..............  08- 02-    [facility] Embedded Care Due Messages. Reference number: [id number].   12/03/2024 2:16:57 PM CST

## 2024-12-04 RX ORDER — DULAGLUTIDE 3 MG/.5ML
INJECTION, SOLUTION SUBCUTANEOUS
Qty: 12 PEN | Refills: 1 | Status: SHIPPED | OUTPATIENT
Start: 2024-12-04

## 2024-12-05 NOTE — TELEPHONE ENCOUNTER
Refill Decision Note   Katey Cerrato  is requesting a refill authorization.  Brief Assessment and Rationale for Refill:  Approve     Medication Therapy Plan:         Comments:     Note composed:11:01 PM 12/04/2024

## 2024-12-06 ENCOUNTER — CLINICAL SUPPORT (OUTPATIENT)
Dept: REHABILITATION | Facility: OTHER | Age: 69
End: 2024-12-06
Payer: MEDICARE

## 2024-12-06 DIAGNOSIS — R29.898 DECREASED STRENGTH OF LOWER EXTREMITY: ICD-10-CM

## 2024-12-06 DIAGNOSIS — M25.662 DECREASED RANGE OF MOTION (ROM) OF LEFT KNEE: Primary | ICD-10-CM

## 2024-12-06 PROCEDURE — 97110 THERAPEUTIC EXERCISES: CPT

## 2024-12-06 PROCEDURE — 97112 NEUROMUSCULAR REEDUCATION: CPT

## 2024-12-06 NOTE — PROGRESS NOTES
"  Physical Therapy Progress Note     Name: Katey LEVY [facility]  Clinic Number: [id number]    Therapy Diagnosis:   Encounter Diagnoses   Name Primary?    Decreased range of motion (ROM) of left knee Yes    Decreased strength of lower extremity      Physician: Rafael Ro III, *    Visit Date: 2024    Physician Orders: PT Eval and Treat   Medical Diagnosis from Referral: M17.32 (ICD-10-CM) - Post-traumatic osteoarthritis of left knee   Evaluation Date: 10/30/2024  Authorization Period Expiration: 24  Plan of Care Expiration: 24  Visit # / Visits authorized: 10/ 20   Progress Note Due: 24  FOTO: 2/ 3     Precautions: Standard     Time In: 8:00 am  Time Out: 8:55 am  Total Appointment Time (timed & untimed codes): 55 minutes 1:1    Subjective     Pt reports: moderate L knee pain as it's bothering her this morning.     she was compliant with home exercise program given last session.   Response to previous treatment: good  Functional change: ongoing    Pain: 5/10  Location: left knee      Pts goals: decrease pain, improve motion, and prepare for surgery  Objective     Updated at Progress Report Unless Specified    24    TU seconds  30 second STS: 12 no UE     Treatment     Katey LEVY received the following manual therapy techniques for 8 minutes:   Patella mobs on L side  STM to L quad    Katey LEVY received therapeutic exercises for 47 minutes including:  Nustep x 6' for joint nutrition  LE bike x 3 min for knee FL ROM  Seated tailgaters 5# x 3'   Heel slides x 2' on L side  Prone quad stretch x 2 minutes 15 second holds  2 rounds    10 Standing glut med R TB x 10 x 3" ea   10 squats on side of treadmill (deep)  DKTC with ball x 2 minutes  Bridges 2 x 10 3 second holds  Leg press 130# 3 x 10- increase to 150# NV    LAQ 5# AW 2 x 10- NP  NP: Heel prop x 2'    Katey LEVY participated in dynamic functional therapeutic activities to improve functional performance for 0 minutes, including:  Na " today    Katey LEVY participated in neuromuscular re-education activities to improve: Balance, Coordination, and Proprioception for 0 minutes. The following activities were included:  NA today    Ice pack to L knee in supine with bolster x 5 minutes.    Home Exercises and Education Provided     Education provided:   - DOMS   -HEP   - Progress towards goals     Written Home Exercises Provided: Patient instructed to cont prior HEP.  Exercises were reviewed and Katey LEVY was able to demonstrate them prior to the end of the session.  Katey LEVY demonstrated good  understanding of the HEP provided.   .   See EMR under Patient Instructions for exercises provided 10/30/2024.  Assessment   Patient presents to therapy session with moderate L knee pain. Noted slight increase in pain/discomfort during exercises; however, exercises were tolerable as expressed by patient. Will continue to monitor and progress exercises as tolerated by patient with appropriate challenge to further improve strength, stability, range of motion, endurance and overall functional mobility.       Katey LEVY is progressing well towards her goals and there are no updates to goals at this time. Pt prognosis is Good.     Pt will continue to benefit from skilled outpatient physical therapy to address the deficits listed in the problem list on initial evaluation provide pt/family education and to maximize pt's level of independence in the home and community environment.     Anticipated Barriers for therapy: chronicity and severity of symptoms    Pt's spiritual, cultural and educational needs considered and pt agreeable to plan of care and goals.    GOALS: Short Term Goals:  4-5 weeks  1.Report decreased in pain at worse less than  <   / =  5  /10  to increase tolerance for functional mobility.On going  2. Pt to improve L knee range of motion by 75% to allow for improved functional mobility to allow for improvement in IADLs. .On going  3. Increased B hip/LE MMT 1/2  grade to increase tolerance for ADL and work activities.On going  4. Pt to tolerate HEP to improve ROM and independence with ADL's.On going     Long Term Goals: 8-10 weeks  1.Report decreased in pain at worse less than  <   / =  2  /10  to increase tolerance for functional mobility. On going  2.Pt to improve L knee range of motion by 90% to allow for improved functional mobility to allow for improvement in IADLs. On going  3.Increased B hip/LE MMT 1 grade to increase tolerance for ADL and work activities.On going  4. Pt will report 50 or greater score on FOTO knee survey to demonstrate increase in LE function with every day tasks. On going  5. Pt to be Independent with HEP to improve ROM and independence with ADL's. On going     Plan   Plan of care Certification: 10/30/2024 to 12/31/24.    Continue POC    Virginia Dockery, PT   12/6/2024

## 2024-12-10 ENCOUNTER — CLINICAL SUPPORT (OUTPATIENT)
Dept: REHABILITATION | Facility: OTHER | Age: 69
End: 2024-12-10
Payer: MEDICARE

## 2024-12-10 DIAGNOSIS — M25.662 DECREASED RANGE OF MOTION (ROM) OF LEFT KNEE: Primary | ICD-10-CM

## 2024-12-10 DIAGNOSIS — R29.898 DECREASED STRENGTH OF LOWER EXTREMITY: ICD-10-CM

## 2024-12-10 PROCEDURE — 97110 THERAPEUTIC EXERCISES: CPT

## 2024-12-10 PROCEDURE — 97140 MANUAL THERAPY 1/> REGIONS: CPT

## 2024-12-10 NOTE — PROGRESS NOTES
"  Physical Therapy Progress Note     Name: Katey LEVY [facility]  Clinic Number: [id number]    Therapy Diagnosis:   Encounter Diagnoses   Name Primary?    Decreased range of motion (ROM) of left knee Yes    Decreased strength of lower extremity        Physician: Rafael Ro III, *    Visit Date: 12/10/2024    Physician Orders: PT Eval and Treat   Medical Diagnosis from Referral: M17.32 (ICD-10-CM) - Post-traumatic osteoarthritis of left knee   Evaluation Date: 10/30/2024  Authorization Period Expiration: 24  Plan of Care Expiration: 24  Visit # / Visits authorized:    Progress Note Due: 24  FOTO: 2/ 3     Precautions: Standard     Time In: 8:00 am  Time Out: 8:55 am  Total Appointment Time (timed & untimed codes): 30 minutes 1:1    Subjective     Pt reports: moderate L knee pain as it's bothering her this morning.     she was compliant with home exercise program given last session.   Response to previous treatment: good  Functional change: ongoing    Pain: 10  Location: left knee      Pts goals: decrease pain, improve motion, and prepare for surgery  Objective     Updated at Progress Report Unless Specified    12/10/24    TU seconds  30 second STS: 12 no UE     Treatment     Katey LEVY received the following manual therapy techniques for 8 minutes:   Patella mobs on L side  STM to L quad    Katey LEVY received therapeutic exercises for 47 minutes including:  Nustep x 6' for joint nutrition  LE bike x 3 min for knee FL ROM  Seated tailgaters 5# x 3'   Heel slides x 2' on L side  Prone quad stretch x 2 minutes 15 second holds  2 rounds    10 Standing glut med R TB x 10 x 3" ea   10 squats on side of treadmill (deep)  DKTC with ball x 2 minutes  Bridges 2 x 10 3 second holds  Leg press 130# 3 x 10- increase to 150# NV    LAQ 5# AW 2 x 10- NP  NP: Heel prop x 2'    Katey LEVY participated in dynamic functional therapeutic activities to improve functional performance for 0 minutes, including:  Na " today    Katey LEVY participated in neuromuscular re-education activities to improve: Balance, Coordination, and Proprioception for 0 minutes. The following activities were included:  NA today    Ice pack to L knee in supine with bolster x 5 minutes.    Home Exercises and Education Provided     Education provided:   - DOMS   -HEP   - Progress towards goals     Written Home Exercises Provided: Patient instructed to cont prior HEP.  Exercises were reviewed and Katey LEVY was able to demonstrate them prior to the end of the session.  Katey LEVY demonstrated good  understanding of the HEP provided.   .   See EMR under Patient Instructions for exercises provided 10/30/2024.  Assessment   The patient presents with bilateral knee pain and continues to demonstrate progress in strength, mobility, and functional capacity. During todays session, the patient reported being pleased with the improvements achieved thus far, including decreased pain during daily activities and improved tolerance for exercises. Objective measures indicate continued progress in lower extremity strength and joint stability, which are essential for long-term functional gains. Skilled physical therapy remains necessary to further address strength deficits, refine movement patterns, and reduce residual discomfort to optimize the patients overall function and quality of life.      Katey LEVY is progressing well towards her goals and there are no updates to goals at this time. Pt prognosis is Good.     Pt will continue to benefit from skilled outpatient physical therapy to address the deficits listed in the problem list on initial evaluation provide pt/family education and to maximize pt's level of independence in the home and community environment.     Anticipated Barriers for therapy: chronicity and severity of symptoms    Pt's spiritual, cultural and educational needs considered and pt agreeable to plan of care and goals.    GOALS: Short Term Goals:  4-5  weeks  1.Report decreased in pain at worse less than  <   / =  5  /10  to increase tolerance for functional mobility.On going  2. Pt to improve L knee range of motion by 75% to allow for improved functional mobility to allow for improvement in IADLs. .On going  3. Increased B hip/LE MMT 1/2 grade to increase tolerance for ADL and work activities.On going  4. Pt to tolerate HEP to improve ROM and independence with ADL's.On going     Long Term Goals: 8-10 weeks  1.Report decreased in pain at worse less than  <   / =  2  /10  to increase tolerance for functional mobility. On going  2.Pt to improve L knee range of motion by 90% to allow for improved functional mobility to allow for improvement in IADLs. On going  3.Increased B hip/LE MMT 1 grade to increase tolerance for ADL and work activities.On going  4. Pt will report 50 or greater score on FOTO knee survey to demonstrate increase in LE function with every day tasks. On going  5. Pt to be Independent with HEP to improve ROM and independence with ADL's. On going     Plan   Plan of care Certification: 10/30/2024 to 12/31/24.    Continue POC    Benja Castanon, PT   12/10/2024

## 2024-12-12 ENCOUNTER — CLINICAL SUPPORT (OUTPATIENT)
Dept: REHABILITATION | Facility: OTHER | Age: 69
End: 2024-12-12
Payer: MEDICARE

## 2024-12-12 DIAGNOSIS — R29.898 DECREASED STRENGTH OF LOWER EXTREMITY: ICD-10-CM

## 2024-12-12 DIAGNOSIS — G40.319 GENERALIZED CONVULSIVE EPILEPSY WITH INTRACTABLE EPILEPSY: ICD-10-CM

## 2024-12-12 DIAGNOSIS — M25.662 DECREASED RANGE OF MOTION (ROM) OF LEFT KNEE: Primary | ICD-10-CM

## 2024-12-12 PROCEDURE — 97110 THERAPEUTIC EXERCISES: CPT | Mod: CQ

## 2024-12-12 NOTE — PROGRESS NOTES
"  Physical Therapy Progress Note     Name: Katey LEVY [facility] Number: [id number]    Therapy Diagnosis:   Encounter Diagnoses   Name Primary?    Decreased range of motion (ROM) of left knee Yes    Decreased strength of lower extremity        Physician: Rafael Ro III, *    Visit Date: 2024    Physician Orders: PT Eval and Treat   Medical Diagnosis from Referral: M17.32 (ICD-10-CM) - Post-traumatic osteoarthritis of left knee   Evaluation Date: 10/30/2024  Authorization Period Expiration: 24  Plan of Care Expiration: 24  Visit # / Visits authorized:    Progress Note Due: 24  FOTO: 2/ 3     Precautions: Standard     Time In: 8:00 am  Time Out: 900  Total Appointment Time (timed & untimed codes): 30 minutes 1:1    Subjective     Pt reports: she is feeling ok. Both knees are hurting     she was compliant with home exercise program given last session.   Response to previous treatment: good  Functional change: ongoing    Pain: 5/10  Location: left knee      Pts goals: decrease pain, improve motion, and prepare for surgery  Objective     Updated at Progress Report Unless Specified    12/10/24    TU seconds  30 second STS: 12 no UE     Treatment     Katey LEVY received the following manual therapy techniques for 8 minutes:   Patella mobs on L side  STM to L quad    Katey LEVY received therapeutic exercises for 47 minutes including:  Nustep x 6' for joint nutrition  LE bike x 3 min for knee FL ROM  Seated tailgaters 5# x 3'   Heel slides x 2' on L side  Prone quad stretch x 2 minutes 15 second holds  2 rounds    10 Standing glut med R TB x 10 x 3" ea   10 squats on side of treadmill (deep)  DKTC with ball x 2 minutes  Bridges 2 x 10 3 second holds  Leg press 150# 3 x 10  +Med X ext 30# 2x10  +Med X ext 40# 2x10    LAQ 5# AW 2 x 10- NP  NP: Heel prop x 2'    Katey LEVY participated in dynamic functional therapeutic activities to improve functional performance for 0 minutes, including:  Na " today    Katey LEVY participated in neuromuscular re-education activities to improve: Balance, Coordination, and Proprioception for 0 minutes. The following activities were included:  NA today    Ice pack to L knee in supine with bolster x 5 minutes.    Home Exercises and Education Provided     Education provided:   - DOMS   -HEP   - Progress towards goals     Written Home Exercises Provided: Patient instructed to cont prior HEP.  Exercises were reviewed and Katey LEVY was able to demonstrate them prior to the end of the session.  Katey LEVY demonstrated good  understanding of the HEP provided.   .   See EMR under Patient Instructions for exercises provided 10/30/2024.  Assessment   The patient presents with bilateral knee pain and continues to demonstrate progress in strength, mobility, and functional capacity. Progressed knee strengthening with Med X machine tolerated well. Pt shows improvements in gait pattern, less antalgic. Plan to progress strengthening to more functional activities       Katey LEVY is progressing well towards her goals and there are no updates to goals at this time. Pt prognosis is Good.     Pt will continue to benefit from skilled outpatient physical therapy to address the deficits listed in the problem list on initial evaluation provide pt/family education and to maximize pt's level of independence in the home and community environment.     Anticipated Barriers for therapy: chronicity and severity of symptoms    Pt's spiritual, cultural and educational needs considered and pt agreeable to plan of care and goals.    GOALS: Short Term Goals:  4-5 weeks  1.Report decreased in pain at worse less than  <   / =  5  /10  to increase tolerance for functional mobility.On going  2. Pt to improve L knee range of motion by 75% to allow for improved functional mobility to allow for improvement in IADLs. .On going  3. Increased B hip/LE MMT 1/2 grade to increase tolerance for ADL and work activities.On  going  4. Pt to tolerate HEP to improve ROM and independence with ADL's.On going     Long Term Goals: 8-10 weeks  1.Report decreased in pain at worse less than  <   / =  2  /10  to increase tolerance for functional mobility. On going  2.Pt to improve L knee range of motion by 90% to allow for improved functional mobility to allow for improvement in IADLs. On going  3.Increased B hip/LE MMT 1 grade to increase tolerance for ADL and work activities.On going  4. Pt will report 50 or greater score on FOTO knee survey to demonstrate increase in LE function with every day tasks. On going  5. Pt to be Independent with HEP to improve ROM and independence with ADL's. On going     Plan   Plan of care Certification: 10/30/2024 to 12/31/24.    Continue POC    Warren Lovett, PTA   12/12/2024

## 2024-12-13 ENCOUNTER — TELEPHONE (OUTPATIENT)
Dept: INTERNAL MEDICINE | Facility: CLINIC | Age: 69
End: 2024-12-13
Payer: MEDICARE

## 2024-12-13 RX ORDER — LEVETIRACETAM 750 MG/1
1500 TABLET ORAL
Qty: 180 TABLET | Refills: 1 | Status: SHIPPED | OUTPATIENT
Start: 2024-12-13

## 2024-12-13 NOTE — TELEPHONE ENCOUNTER
Patient would like to speak Kenzie in regards to the following medication below:      TRULICITY 3 mg/0.5 mL pen injector 12 Pen 1 12/4/2024 -- No   Sig: INJECT 3 MG (0.5 ML) UNDER THE SKIN ONCE A WEEK   Sent to pharmacy as: TRULICITY 3 mg/0.5 mL pen injector   Class: Normal   Order: [id number]   Cosign for Ordering: Accepted by Rene Juarez MD on 12/5/2024 12:20 PM   Date/Time Signed: 12/4/2024 23:01       E-Prescribing Status: Receipt confirmed by pharmacy (12/4/2024 11:02 PM CST)   Prior authorization: Denied     Pharmacy    [facility] - [address]        Patient states the medication was denied by [facility] per letter received in the mail but she uses a non-profit organization to get her medications. Patient believes the letter was not intended for her and may be for another patient as the letter states 8 pens. Confirmed with patient that she gets 12 pens per chart review.

## 2024-12-13 NOTE — TELEPHONE ENCOUNTER
----- Message from Dayami sent at 12/13/2024  4:10 PM CST -----  Regarding: Requesting a call  Name of Who is Calling:Katey            What is the request in detail:Pt is requesting a call from someone in the clinic about a medication letter she received.            Can the clinic reply by [facility]:No            What Number to Call Back if not in [facility]:[phone]

## 2024-12-16 ENCOUNTER — CLINICAL SUPPORT (OUTPATIENT)
Dept: REHABILITATION | Facility: OTHER | Age: 69
End: 2024-12-16
Payer: MEDICARE

## 2024-12-16 DIAGNOSIS — R29.898 DECREASED STRENGTH OF LOWER EXTREMITY: ICD-10-CM

## 2024-12-16 DIAGNOSIS — I10 ESSENTIAL HYPERTENSION: Primary | ICD-10-CM

## 2024-12-16 DIAGNOSIS — M25.662 DECREASED RANGE OF MOTION (ROM) OF LEFT KNEE: Primary | ICD-10-CM

## 2024-12-16 PROCEDURE — 97140 MANUAL THERAPY 1/> REGIONS: CPT

## 2024-12-16 PROCEDURE — 97110 THERAPEUTIC EXERCISES: CPT

## 2024-12-16 RX ORDER — VALSARTAN 320 MG/1
320 TABLET ORAL DAILY
Qty: 90 TABLET | Refills: 2 | Status: SHIPPED | OUTPATIENT
Start: 2024-12-16

## 2024-12-16 NOTE — TELEPHONE ENCOUNTER
Care Due:                  Date            Visit Type   Department     Provider  --------------------------------------------------------------------------------                                EP -                              [facility] INTERNAL  Rene Thapa  Last Visit: 08-      CARE ([facility])   [facility]                              [facility]  Next Visit: 02-      [facility] ([facility]                                                            Last  Test          Frequency    Reason                     Performed    Due Date  --------------------------------------------------------------------------------    CBC.........  12 months..  meloxicam................  03- 03-    CMP.........  12 months..  atorvastatin, meloxicam..  03- 03-    [facility] Embedded Care Due Messages. Reference number: [id number].   12/16/2024 2:15:40 PM CST

## 2024-12-16 NOTE — TELEPHONE ENCOUNTER
----- Message from Iesha sent at 12/16/2024 10:29 AM CST -----  Regarding: refill  Type: RX Refill Request     Who Called:pt      RX Name and Strength:valsartan (DIOVAN) 320 MG tablet      Preferred Pharmacy with phone number:[facility]/pharmacy #[id number] - [facility] [address]   Phone: [phone]           Would the patient rather a call back or a response via [facility]?call     Best Call Back Number:[phone]      Additional Information: requesting call when refill is sent

## 2024-12-16 NOTE — PROGRESS NOTES
"  Physical Therapy Progress Note     Name: Katey LEVY [facility]  Clinic Number: [id number]    Therapy Diagnosis:   Encounter Diagnoses   Name Primary?    Decreased range of motion (ROM) of left knee Yes    Decreased strength of lower extremity        Physician: Rafael Ro III, *    Visit Date: 2024    Physician Orders: PT Eval and Treat   Medical Diagnosis from Referral: M17.32 (ICD-10-CM) - Post-traumatic osteoarthritis of left knee   Evaluation Date: 10/30/2024  Authorization Period Expiration: 24  Plan of Care Expiration: 24  Visit # / Visits authorized:    Progress Note Due: 24  FOTO: 2/ 3     Precautions: Standard     Time In: 8:00 am  Time Out: 9:00 am   Total Appointment Time (timed & untimed codes): 30 minutes 1:1    Subjective     Pt reports: experiencing increased bilateral knee pain since last session. She has current moderate knee pain.     she was compliant with home exercise program given last session.   Response to previous treatment: good  Functional change: ongoing    Pain: 6/10  Location: left knee      Pts goals: decrease pain, improve motion, and prepare for surgery  Objective     Updated at Progress Report Unless Specified    24    TU seconds  30 second STS: 10 no UE     Treatment     Katey LEVY received the following manual therapy techniques for 8 minutes:   Patella mobs on L side  STM to L quad    Katey LEVY received therapeutic exercises for 47 minutes including:  Nustep x 6' for joint nutrition  LE bike x 3 min for knee FL ROM  Seated tailgaters 5# x 3'   Heel slides x 2' on L side  Prone quad stretch x 2 minutes 15 second holds  2 rounds    10 Standing glut med R TB x 10 x 3" ea   10 squats on side of treadmill (deep)  DKTC with ball x 2 minutes  Bridges 2 x 10 3 second holds  Leg press 150# 3 x 10  Med X ext 30# 2x10  Med X hamstring curls 40# 2x10  +mini squats on airex, 2x10  +sit to stands, 2x10  +step ups, 4 in, 2x10  +lateral step downs, 2 in, 2x10 " - NP d/t pain     LAQ 5# AW 2 x 10- NP  NP: Heel prop x 2'    Katey LEVY participated in dynamic functional therapeutic activities to improve functional performance for 0 minutes, including:  Na today    Katey LEVY participated in neuromuscular re-education activities to improve: Balance, Coordination, and Proprioception for 0 minutes. The following activities were included:  NA today    Ice pack to L knee in supine with bolster x 5 minutes.    Home Exercises and Education Provided     Education provided:   - DOMS   -HEP   - Progress towards goals     Written Home Exercises Provided: Patient instructed to cont prior HEP.  Exercises were reviewed and Katey LEVY was able to demonstrate them prior to the end of the session.  Katey LEVY demonstrated good  understanding of the HEP provided.   .   See EMR under Patient Instructions for exercises provided 10/30/2024.  Assessment   The patient presents with moderate bilateral knee pain. She performed exercises with slight increase in pain and discomfort especially during lateral step downs. Progress strengthening to more functional activities such as sit to stands, lateral steps downs, step ups and squats on uneven surface. Noted tenderness to palpation along L patella and moderate swelling along medial L patella. Will continue to monitor and progress exercises as tolerated by patient to improve overall functional mobility, strength, stability, range of motion and endurance.       Katey LEVY is progressing well towards her goals and there are no updates to goals at this time. Pt prognosis is Good.     Pt will continue to benefit from skilled outpatient physical therapy to address the deficits listed in the problem list on initial evaluation provide pt/family education and to maximize pt's level of independence in the home and community environment.     Anticipated Barriers for therapy: chronicity and severity of symptoms    Pt's spiritual, cultural and educational needs considered  and pt agreeable to plan of care and goals.    GOALS: Short Term Goals:  4-5 weeks  1.Report decreased in pain at worse less than  <   / =  5  /10  to increase tolerance for functional mobility.On going  2. Pt to improve L knee range of motion by 75% to allow for improved functional mobility to allow for improvement in IADLs. .On going  3. Increased B hip/LE MMT 1/2 grade to increase tolerance for ADL and work activities.On going  4. Pt to tolerate HEP to improve ROM and independence with ADL's.On going     Long Term Goals: 8-10 weeks  1.Report decreased in pain at worse less than  <   / =  2  /10  to increase tolerance for functional mobility. On going  2.Pt to improve L knee range of motion by 90% to allow for improved functional mobility to allow for improvement in IADLs. On going  3.Increased B hip/LE MMT 1 grade to increase tolerance for ADL and work activities.On going  4. Pt will report 50 or greater score on FOTO knee survey to demonstrate increase in LE function with every day tasks. On going  5. Pt to be Independent with HEP to improve ROM and independence with ADL's. On going     Plan   Plan of care Certification: 10/30/2024 to 12/31/24.    Continue POC    Virginia Dockery, PT   12/16/2024

## 2024-12-17 ENCOUNTER — OFFICE VISIT (OUTPATIENT)
Dept: ORTHOPEDICS | Facility: CLINIC | Age: 69
End: 2024-12-17
Payer: MEDICARE

## 2024-12-17 VITALS — HEIGHT: 61 IN | WEIGHT: 186.75 LBS | BODY MASS INDEX: 35.26 KG/M2

## 2024-12-17 DIAGNOSIS — M17.32 POST-TRAUMATIC OSTEOARTHRITIS OF LEFT KNEE: Primary | ICD-10-CM

## 2024-12-17 PROCEDURE — 1101F PT FALLS ASSESS-DOCD LE1/YR: CPT | Mod: CPTII,S$GLB,, | Performed by: ORTHOPAEDIC SURGERY

## 2024-12-17 PROCEDURE — 99214 OFFICE O/P EST MOD 30 MIN: CPT | Mod: S$GLB,,, | Performed by: ORTHOPAEDIC SURGERY

## 2024-12-17 PROCEDURE — 3061F NEG MICROALBUMINURIA REV: CPT | Mod: CPTII,S$GLB,, | Performed by: ORTHOPAEDIC SURGERY

## 2024-12-17 PROCEDURE — 99999 PR PBB SHADOW E&M-EST. PATIENT-LVL III: CPT | Mod: PBBFAC,,, | Performed by: ORTHOPAEDIC SURGERY

## 2024-12-17 PROCEDURE — 1125F AMNT PAIN NOTED PAIN PRSNT: CPT | Mod: CPTII,S$GLB,, | Performed by: ORTHOPAEDIC SURGERY

## 2024-12-17 PROCEDURE — 1159F MED LIST DOCD IN RCRD: CPT | Mod: CPTII,S$GLB,, | Performed by: ORTHOPAEDIC SURGERY

## 2024-12-17 PROCEDURE — 3044F HG A1C LEVEL LT 7.0%: CPT | Mod: CPTII,S$GLB,, | Performed by: ORTHOPAEDIC SURGERY

## 2024-12-17 PROCEDURE — 3288F FALL RISK ASSESSMENT DOCD: CPT | Mod: CPTII,S$GLB,, | Performed by: ORTHOPAEDIC SURGERY

## 2024-12-17 PROCEDURE — 3066F NEPHROPATHY DOC TX: CPT | Mod: CPTII,S$GLB,, | Performed by: ORTHOPAEDIC SURGERY

## 2024-12-17 PROCEDURE — 4010F ACE/ARB THERAPY RXD/TAKEN: CPT | Mod: CPTII,S$GLB,, | Performed by: ORTHOPAEDIC SURGERY

## 2024-12-17 PROCEDURE — 3008F BODY MASS INDEX DOCD: CPT | Mod: CPTII,S$GLB,, | Performed by: ORTHOPAEDIC SURGERY

## 2024-12-17 NOTE — PROGRESS NOTES
"  Physical Therapy Progress Note     Name: Katey LEVY [facility] Number: [id number]    Therapy Diagnosis:   Encounter Diagnoses   Name Primary?    Decreased range of motion (ROM) of left knee Yes    Decreased strength of lower extremity          Physician: Rafael Ro III, *    Visit Date: 2024    Physician Orders: PT Eval and Treat   Medical Diagnosis from Referral: M17.32 (ICD-10-CM) - Post-traumatic osteoarthritis of left knee   Evaluation Date: 10/30/2024  Authorization Period Expiration: 24  Plan of Care Expiration: 24  Visit # / Visits authorized:    Progress Note Due: 24  FOTO: 2/ 3     Precautions: Standard     Time In: 8:00 am  Time Out: 9:00 am   Total Appointment Time (timed & untimed codes): 30 minutes 1:1    Subjective     Pt reports: She is hurting today because she saw her doctor yesterday and he was "cranking" all on her knee. She is hesitant about using stairs because she lives alone and does not want to fall. She is planning on getting the knee surgery but she is unsure when because she needs someone to be able to stay with her.     she was compliant with home exercise program given last session.   Response to previous treatment: good  Functional change: ongoing    Pain: 7/10  Location: left knee      Pts goals: decrease pain, improve motion, and prepare for surgery  Objective     Updated at Progress Report Unless Specified    24    TU seconds  30 second STS: 10 no UE     Treatment     Katey LEVY received the following manual therapy techniques for 8 minutes:   Patella mobs on L side  STM to L quad    Katey LEVY received therapeutic exercises for 47 minutes including:  Nustep x 6' for joint nutrition  LE bike x 3 min for knee FL ROM  Seated tailgaters 5# x 3'   Heel slides x 2' on L side  Standing glut med RTB 2 x 10 ea  DKTC with ball x 2 minutes  Bridges 2 x 10 3 second holds  Leg press 150# 3 x 10  Med X ext 34# 2x10  Med X hamstring curls 40# 2x10  mini " "squats on airex, 2x10  sit to stands, 2x10  step ups, 4 in, 2x10  TKE red sport tube 20 x 5"    LAQ 5# AW 2 x 10- NP  NP: Heel prop x 2'    Katey LEVY participated in dynamic functional therapeutic activities to improve functional performance for 0 minutes, including:  Na today    Katey LEVY participated in neuromuscular re-education activities to improve: Balance, Coordination, and Proprioception for 0 minutes. The following activities were included:  NA today    Ice pack to L knee in supine with bolster x 5 minutes.    Home Exercises and Education Provided     Education provided:   - DOMS   -HEP   - Progress towards goals     Written Home Exercises Provided: Patient instructed to cont prior HEP.  Exercises were reviewed and Katey LEVY was able to demonstrate them prior to the end of the session.  Katey LEVY demonstrated good  understanding of the HEP provided.   .   See EMR under Patient Instructions for exercises provided 10/30/2024.  Assessment   The patient presents with high levels of knee pain. Continued previous progressing with the addition of terminal knee extension in CKC with good tolerance and without further exacerbation. Pt does have a R lateral shift with sit<>stand to avoid WB on L LE.         Katey LEVY is progressing well towards her goals and there are no updates to goals at this time. Pt prognosis is Good.     Pt will continue to benefit from skilled outpatient physical therapy to address the deficits listed in the problem list on initial evaluation provide pt/family education and to maximize pt's level of independence in the home and community environment.     Anticipated Barriers for therapy: chronicity and severity of symptoms    Pt's spiritual, cultural and educational needs considered and pt agreeable to plan of care and goals.    GOALS: Short Term Goals:  4-5 weeks  1.Report decreased in pain at worse less than  <   / =  5  /10  to increase tolerance for functional mobility.On going  2. Pt to " improve L knee range of motion by 75% to allow for improved functional mobility to allow for improvement in IADLs. .On going  3. Increased B hip/LE MMT 1/2 grade to increase tolerance for ADL and work activities.On going  4. Pt to tolerate HEP to improve ROM and independence with ADL's.On going     Long Term Goals: 8-10 weeks  1.Report decreased in pain at worse less than  <   / =  2  /10  to increase tolerance for functional mobility. On going  2.Pt to improve L knee range of motion by 90% to allow for improved functional mobility to allow for improvement in IADLs. On going  3.Increased B hip/LE MMT 1 grade to increase tolerance for ADL and work activities.On going  4. Pt will report 50 or greater score on FOTO knee survey to demonstrate increase in LE function with every day tasks. On going  5. Pt to be Independent with HEP to improve ROM and independence with ADL's. On going     Plan   Plan of care Certification: 10/30/2024 to 12/31/24.    Continue DOREEN Santa, PTA   12/18/2024
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
## 2024-12-18 ENCOUNTER — CLINICAL SUPPORT (OUTPATIENT)
Dept: REHABILITATION | Facility: OTHER | Age: 69
End: 2024-12-18
Payer: MEDICARE

## 2024-12-18 DIAGNOSIS — R29.898 DECREASED STRENGTH OF LOWER EXTREMITY: ICD-10-CM

## 2024-12-18 DIAGNOSIS — M25.662 DECREASED RANGE OF MOTION (ROM) OF LEFT KNEE: Primary | ICD-10-CM

## 2024-12-18 PROCEDURE — 97110 THERAPEUTIC EXERCISES: CPT | Mod: CQ

## 2024-12-27 ENCOUNTER — TELEPHONE (OUTPATIENT)
Dept: ORTHOPEDICS | Facility: CLINIC | Age: 69
End: 2024-12-27
Payer: MEDICARE

## 2024-12-27 NOTE — TELEPHONE ENCOUNTER
I called the patient today regarding her surgery. I left a message for the patient to call me back. I left my name and phone number.      Patient needs to call back to pick out a surgery date.

## 2025-01-01 ENCOUNTER — OFFICE VISIT (OUTPATIENT)
Dept: INTERNAL MEDICINE | Facility: CLINIC | Age: 70
End: 2025-01-01
Payer: MEDICARE

## 2025-01-01 ENCOUNTER — HOSPITAL ENCOUNTER (INPATIENT)
Facility: HOSPITAL | Age: 70
LOS: 4 days | DRG: 871 | End: 2025-06-28
Attending: EMERGENCY MEDICINE | Admitting: INTERNAL MEDICINE
Payer: MEDICARE

## 2025-01-01 ENCOUNTER — ANESTHESIA (OUTPATIENT)
Dept: INTERVENTIONAL RADIOLOGY/VASCULAR | Facility: HOSPITAL | Age: 70
End: 2025-01-01
Payer: MEDICARE

## 2025-01-01 VITALS
BODY MASS INDEX: 34.93 KG/M2 | HEART RATE: 86 BPM | OXYGEN SATURATION: 98 % | DIASTOLIC BLOOD PRESSURE: 80 MMHG | HEIGHT: 60 IN | SYSTOLIC BLOOD PRESSURE: 120 MMHG | WEIGHT: 177.94 LBS

## 2025-01-01 DIAGNOSIS — R41.82 ALTERED MENTAL STATUS, UNSPECIFIED ALTERED MENTAL STATUS TYPE: ICD-10-CM

## 2025-01-01 DIAGNOSIS — E87.20 METABOLIC ACIDOSIS: ICD-10-CM

## 2025-01-01 DIAGNOSIS — Z01.818 ENCOUNTER FOR INTUBATION: ICD-10-CM

## 2025-01-01 DIAGNOSIS — R41.82 ALTERED MENTAL STATUS: ICD-10-CM

## 2025-01-01 DIAGNOSIS — B02.8 HERPES ZOSTER WITH COMPLICATION: Primary | ICD-10-CM

## 2025-01-01 DIAGNOSIS — R57.9 SHOCK: ICD-10-CM

## 2025-01-01 DIAGNOSIS — G40.909 SEIZURE DISORDER: Primary | ICD-10-CM

## 2025-01-01 DIAGNOSIS — Z13.6 SCREENING FOR CARDIOVASCULAR CONDITION: ICD-10-CM

## 2025-01-01 LAB
ABO + RH BLD: NORMAL
ABO + RH BLD: NORMAL
ABSOLUTE EOSINOPHIL (OHS): 0 K/UL
ABSOLUTE EOSINOPHIL (OHS): 0.01 K/UL
ABSOLUTE MONOCYTE (OHS): 0.39 K/UL (ref 0.3–1)
ABSOLUTE MONOCYTE (OHS): 0.55 K/UL (ref 0.3–1)
ABSOLUTE MONOCYTE (OHS): 0.57 K/UL (ref 0.3–1)
ABSOLUTE MONOCYTE (OHS): 0.62 K/UL (ref 0.3–1)
ABSOLUTE MONOCYTE (OHS): 0.73 K/UL (ref 0.3–1)
ABSOLUTE MONOCYTE (OHS): 0.73 K/UL (ref 0.3–1)
ABSOLUTE MONOCYTE (OHS): 0.78 K/UL (ref 0.3–1)
ABSOLUTE MONOCYTE (OHS): 0.78 K/UL (ref 0.3–1)
ABSOLUTE MONOCYTE (OHS): 1.27 K/UL (ref 0.3–1)
ABSOLUTE NEUTROPHIL COUNT (OHS): 10.04 K/UL (ref 1.8–7.7)
ABSOLUTE NEUTROPHIL COUNT (OHS): 11.15 K/UL (ref 1.8–7.7)
ABSOLUTE NEUTROPHIL COUNT (OHS): 11.73 K/UL (ref 1.8–7.7)
ABSOLUTE NEUTROPHIL COUNT (OHS): 12.08 K/UL (ref 1.8–7.7)
ABSOLUTE NEUTROPHIL COUNT (OHS): 12.23 K/UL (ref 1.8–7.7)
ABSOLUTE NEUTROPHIL COUNT (OHS): 6.49 K/UL (ref 1.8–7.7)
ABSOLUTE NEUTROPHIL COUNT (OHS): 7.67 K/UL (ref 1.8–7.7)
ABSOLUTE NEUTROPHIL COUNT (OHS): 7.77 K/UL (ref 1.8–7.7)
ABSOLUTE NEUTROPHIL COUNT (OHS): 8.18 K/UL (ref 1.8–7.7)
ABSOLUTE NEUTROPHIL MANUAL (OHS): 10.4 K/UL
ABSOLUTE NEUTROPHIL MANUAL (OHS): 10.6 K/UL
ABSOLUTE NEUTROPHIL MANUAL (OHS): 8.2 K/UL
ALBUMIN SERPL BCP-MCNC: 1.3 G/DL (ref 3.5–5.2)
ALBUMIN SERPL BCP-MCNC: 1.4 G/DL (ref 3.5–5.2)
ALBUMIN SERPL BCP-MCNC: 1.5 G/DL (ref 3.5–5.2)
ALBUMIN SERPL BCP-MCNC: 1.5 G/DL (ref 3.5–5.2)
ALBUMIN SERPL BCP-MCNC: 2 G/DL (ref 3.5–5.2)
ALLENS TEST: ABNORMAL
ALP SERPL-CCNC: 1018 UNIT/L (ref 40–150)
ALP SERPL-CCNC: 1147 UNIT/L (ref 40–150)
ALP SERPL-CCNC: 1241 UNIT/L (ref 40–150)
ALP SERPL-CCNC: 1256 UNIT/L (ref 40–150)
ALP SERPL-CCNC: 434 UNIT/L (ref 40–150)
ALP SERPL-CCNC: 509 UNIT/L (ref 40–150)
ALT SERPL W/O P-5'-P-CCNC: 170 UNIT/L (ref 10–44)
ALT SERPL W/O P-5'-P-CCNC: 171 UNIT/L (ref 10–44)
ALT SERPL W/O P-5'-P-CCNC: 183 UNIT/L (ref 10–44)
ALT SERPL W/O P-5'-P-CCNC: 19 UNIT/L (ref 10–44)
ALT SERPL W/O P-5'-P-CCNC: 191 UNIT/L (ref 10–44)
ALT SERPL W/O P-5'-P-CCNC: 83 UNIT/L (ref 10–44)
AMMONIA PLAS-SCNC: 163 UMOL/L (ref 10–50)
ANION GAP (OHS): 15 MMOL/L (ref 8–16)
ANION GAP (OHS): 17 MMOL/L (ref 8–16)
ANION GAP (OHS): 18 MMOL/L (ref 8–16)
ANION GAP (OHS): 19 MMOL/L (ref 8–16)
ANION GAP (OHS): 19 MMOL/L (ref 8–16)
ANION GAP (OHS): 20 MMOL/L (ref 8–16)
ANION GAP (OHS): 21 MMOL/L (ref 8–16)
ANION GAP (OHS): 24 MMOL/L (ref 8–16)
ANION GAP (OHS): 24 MMOL/L (ref 8–16)
ANION GAP (OHS): 28 MMOL/L (ref 8–16)
ANION GAP (OHS): 28 MMOL/L (ref 8–16)
ANION GAP (OHS): 31 MMOL/L (ref 8–16)
ANION GAP (OHS): 32 MMOL/L (ref 8–16)
ANION GAP (OHS): 32 MMOL/L (ref 8–16)
ANION GAP (OHS): 35 MMOL/L (ref 8–16)
ANION GAP (OHS): 36 MMOL/L (ref 8–16)
ANION GAP (OHS): 39 MMOL/L (ref 8–16)
ANION GAP (OHS): ABNORMAL
ANISOCYTOSIS BLD QL SMEAR: SLIGHT
ANISOCYTOSIS BLD QL SMEAR: SLIGHT
AORTIC SIZE INDEX (SOV): 2.2 CM/M2
AORTIC SIZE INDEX: 2 CM/M2
ASCENDING AORTA: 3 CM
AST SERPL-CCNC: 1349 UNIT/L (ref 11–45)
AST SERPL-CCNC: 604 UNIT/L (ref 11–45)
AST SERPL-CCNC: 722 UNIT/L (ref 11–45)
AST SERPL-CCNC: 756 UNIT/L (ref 11–45)
AST SERPL-CCNC: 76 UNIT/L (ref 11–45)
AST SERPL-CCNC: 853 UNIT/L (ref 11–45)
AV AREA BY CONTINUOUS VTI: 3 CM2
AV INDEX (PROSTH): 0.96
AV LVOT MEAN GRADIENT: 1 MMHG
AV LVOT PEAK GRADIENT: 2 MMHG
AV MEAN GRADIENT: 2 MMHG
AV PEAK GRADIENT: 3 MMHG
AV VALVE AREA BY VELOCITY RATIO: 2.8 CM²
AV VALVE AREA: 3 CM2
AV VELOCITY RATIO: 0.89
B PERT DNA NPH QL NAA+PROBE: NOT DETECTED
B-OH-BUTYR BLD STRIP-SCNC: 0.1 MMOL/L
B-OH-BUTYR BLD STRIP-SCNC: 3.4 MMOL/L
BACTERIA #/AREA URNS AUTO: ABNORMAL /HPF
BACTERIA #/AREA URNS AUTO: ABNORMAL /HPF
BASOPHILS # BLD AUTO: 0 K/UL
BASOPHILS # BLD AUTO: 0.01 K/UL
BASOPHILS # BLD AUTO: 0.01 K/UL
BASOPHILS # BLD AUTO: 0.02 K/UL
BASOPHILS # BLD AUTO: 0.03 K/UL
BASOPHILS NFR BLD AUTO: 0 %
BASOPHILS NFR BLD AUTO: 0.1 %
BASOPHILS NFR BLD AUTO: 0.2 %
BASOPHILS NFR BLD AUTO: 0.3 %
BILIRUB DIRECT SERPL-MCNC: 1.1 MG/DL (ref 0.1–0.3)
BILIRUB SERPL-MCNC: 1.4 MG/DL (ref 0.1–1)
BILIRUB SERPL-MCNC: 1.6 MG/DL (ref 0.1–1)
BILIRUB SERPL-MCNC: 1.7 MG/DL (ref 0.1–1)
BILIRUB SERPL-MCNC: 2 MG/DL (ref 0.1–1)
BILIRUB UR QL STRIP.AUTO: ABNORMAL
BILIRUB UR QL STRIP.AUTO: NEGATIVE
BIPAP: 0
BIPAP: 0
BLD PROD TYP BPU: NORMAL
BLD PROD TYP BPU: NORMAL
BLOOD UNIT EXPIRATION DATE: NORMAL
BLOOD UNIT EXPIRATION DATE: NORMAL
BLOOD UNIT TYPE CODE: 6200
BLOOD UNIT TYPE CODE: 6200
BUN SERPL-MCNC: 13 MG/DL (ref 8–23)
BUN SERPL-MCNC: 14 MG/DL (ref 8–23)
BUN SERPL-MCNC: 15 MG/DL (ref 8–23)
BUN SERPL-MCNC: 16 MG/DL (ref 8–23)
BUN SERPL-MCNC: 19 MG/DL (ref 6–30)
BUN SERPL-MCNC: 19 MG/DL (ref 8–23)
BUN SERPL-MCNC: 20 MG/DL (ref 8–23)
BUN SERPL-MCNC: 22 MG/DL (ref 8–23)
BUN SERPL-MCNC: 24 MG/DL (ref 8–23)
BUN SERPL-MCNC: 25 MG/DL (ref 6–30)
BUN SERPL-MCNC: 25 MG/DL (ref 8–23)
BUN SERPL-MCNC: 26 MG/DL (ref 8–23)
BUN SERPL-MCNC: 9 MG/DL (ref 8–23)
BURR CELLS BLD QL SMEAR: ABNORMAL
C PNEUM DNA LOWER RESP QL NAA+NON-PROBE: NOT DETECTED
CA-I BLD-SCNC: 0.87 MMOL/L (ref 1.06–1.42)
CALCIUM SERPL-MCNC: 5.9 MG/DL (ref 8.7–10.5)
CALCIUM SERPL-MCNC: 6.1 MG/DL (ref 8.7–10.5)
CALCIUM SERPL-MCNC: 6.3 MG/DL (ref 8.7–10.5)
CALCIUM SERPL-MCNC: 6.5 MG/DL (ref 8.7–10.5)
CALCIUM SERPL-MCNC: 6.6 MG/DL (ref 8.7–10.5)
CALCIUM SERPL-MCNC: 6.7 MG/DL (ref 8.7–10.5)
CALCIUM SERPL-MCNC: 6.9 MG/DL (ref 8.7–10.5)
CALCIUM SERPL-MCNC: 6.9 MG/DL (ref 8.7–10.5)
CALCIUM SERPL-MCNC: 7 MG/DL (ref 8.7–10.5)
CALCIUM SERPL-MCNC: 7.3 MG/DL (ref 8.7–10.5)
CALCIUM SERPL-MCNC: 8.1 MG/DL (ref 8.7–10.5)
CHLORIDE SERPL-SCNC: 100 MMOL/L (ref 95–110)
CHLORIDE SERPL-SCNC: 100 MMOL/L (ref 95–110)
CHLORIDE SERPL-SCNC: 101 MMOL/L (ref 95–110)
CHLORIDE SERPL-SCNC: 101 MMOL/L (ref 95–110)
CHLORIDE SERPL-SCNC: 103 MMOL/L (ref 95–110)
CHLORIDE SERPL-SCNC: 105 MMOL/L (ref 95–110)
CHLORIDE SERPL-SCNC: 90 MMOL/L (ref 95–110)
CHLORIDE SERPL-SCNC: 91 MMOL/L (ref 95–110)
CHLORIDE SERPL-SCNC: 93 MMOL/L (ref 95–110)
CHLORIDE SERPL-SCNC: 93 MMOL/L (ref 95–110)
CHLORIDE SERPL-SCNC: 94 MMOL/L (ref 95–110)
CHLORIDE SERPL-SCNC: 95 MMOL/L (ref 95–110)
CHLORIDE SERPL-SCNC: 95 MMOL/L (ref 95–110)
CHLORIDE SERPL-SCNC: 96 MMOL/L (ref 95–110)
CHLORIDE SERPL-SCNC: 98 MMOL/L (ref 95–110)
CHLORIDE SERPL-SCNC: 98 MMOL/L (ref 95–110)
CK SERPL-CCNC: 57 U/L (ref 20–180)
CLARITY UR: ABNORMAL
CLARITY UR: CLEAR
CO2 SERPL-SCNC: 10 MMOL/L (ref 23–29)
CO2 SERPL-SCNC: 14 MMOL/L (ref 23–29)
CO2 SERPL-SCNC: 14 MMOL/L (ref 23–29)
CO2 SERPL-SCNC: 16 MMOL/L (ref 23–29)
CO2 SERPL-SCNC: 16 MMOL/L (ref 23–29)
CO2 SERPL-SCNC: 22 MMOL/L (ref 23–29)
CO2 SERPL-SCNC: 23 MMOL/L (ref 23–29)
CO2 SERPL-SCNC: 24 MMOL/L (ref 23–29)
CO2 SERPL-SCNC: 25 MMOL/L (ref 23–29)
CO2 SERPL-SCNC: 5 MMOL/L (ref 23–29)
CO2 SERPL-SCNC: 5 MMOL/L (ref 23–29)
CO2 SERPL-SCNC: 6 MMOL/L (ref 23–29)
CO2 SERPL-SCNC: 8 MMOL/L (ref 23–29)
CO2 SERPL-SCNC: 8 MMOL/L (ref 23–29)
CO2 SERPL-SCNC: <5 MMOL/L (ref 23–29)
COLOR UR AUTO: YELLOW
COLOR UR AUTO: YELLOW
CORRECTED TEMPERATURE (PCO2): 19.4 MMHG
CORRECTED TEMPERATURE (PCO2): 20.6 MMHG
CORRECTED TEMPERATURE (PH): 6.97
CORRECTED TEMPERATURE (PH): 7.12
CORRECTED TEMPERATURE (PO2): 51.3 MMHG
CORTIS SERPL-MCNC: 73.8 UG/DL
CREAT SERPL-MCNC: 0.8 MG/DL (ref 0.5–1.4)
CREAT SERPL-MCNC: 1.3 MG/DL (ref 0.5–1.4)
CREAT SERPL-MCNC: 1.3 MG/DL (ref 0.5–1.4)
CREAT SERPL-MCNC: 1.4 MG/DL (ref 0.5–1.4)
CREAT SERPL-MCNC: 1.5 MG/DL (ref 0.5–1.4)
CREAT SERPL-MCNC: 1.7 MG/DL (ref 0.5–1.4)
CREAT SERPL-MCNC: 1.8 MG/DL (ref 0.5–1.4)
CREAT SERPL-MCNC: 1.8 MG/DL (ref 0.5–1.4)
CREAT SERPL-MCNC: 1.9 MG/DL (ref 0.5–1.4)
CREAT SERPL-MCNC: 2 MG/DL (ref 0.5–1.4)
CREAT SERPL-MCNC: 2.1 MG/DL (ref 0.5–1.4)
CREAT SERPL-MCNC: 2.2 MG/DL (ref 0.5–1.4)
CREAT SERPL-MCNC: 2.2 MG/DL (ref 0.5–1.4)
CREAT UR-MCNC: 28 MG/DL (ref 15–325)
CREAT UR-MCNC: 59 MG/DL (ref 15–325)
CROSSMATCH INTERPRETATION: NORMAL
CROSSMATCH INTERPRETATION: NORMAL
CV ECHO LV RWT: 0.63 CM
DELSYS: ABNORMAL
DISPENSE STATUS: NORMAL
DISPENSE STATUS: NORMAL
DOP CALC AO PEAK VEL: 0.9 M/S
DOP CALC AO VTI: 10.4 CM
DOP CALC LVOT AREA: 3.1 CM2
DOP CALC LVOT DIAMETER: 2 CM
DOP CALC LVOT PEAK VEL: 0.8 M/S
DOP CALC LVOT STROKE VOLUME: 31.4 CM3
DOP CALCLVOT PEAK VEL VTI: 10 CM
E/E' RATIO: 11 M/S
EAG (OHS): 105 MG/DL (ref 68–131)
ECHO EF ESTIMATED: 54 %
ECHO LV POSTERIOR WALL: 1.1 CM (ref 0.6–1.1)
EJECTION FRACTION: 43 %
ERYTHROCYTE [DISTWIDTH] IN BLOOD BY AUTOMATED COUNT: 15 % (ref 11.5–14.5)
ERYTHROCYTE [DISTWIDTH] IN BLOOD BY AUTOMATED COUNT: 15 % (ref 11.5–14.5)
ERYTHROCYTE [DISTWIDTH] IN BLOOD BY AUTOMATED COUNT: 15.2 % (ref 11.5–14.5)
ERYTHROCYTE [DISTWIDTH] IN BLOOD BY AUTOMATED COUNT: 15.2 % (ref 11.5–14.5)
ERYTHROCYTE [DISTWIDTH] IN BLOOD BY AUTOMATED COUNT: 15.3 % (ref 11.5–14.5)
ERYTHROCYTE [DISTWIDTH] IN BLOOD BY AUTOMATED COUNT: 15.4 % (ref 11.5–14.5)
ERYTHROCYTE [DISTWIDTH] IN BLOOD BY AUTOMATED COUNT: 15.6 % (ref 11.5–14.5)
ERYTHROCYTE [DISTWIDTH] IN BLOOD BY AUTOMATED COUNT: 16 % (ref 11.5–14.5)
ERYTHROCYTE [DISTWIDTH] IN BLOOD BY AUTOMATED COUNT: 16.1 % (ref 11.5–14.5)
ERYTHROCYTE [DISTWIDTH] IN BLOOD BY AUTOMATED COUNT: 16.4 % (ref 11.5–14.5)
ERYTHROCYTE [DISTWIDTH] IN BLOOD BY AUTOMATED COUNT: 16.6 % (ref 11.5–14.5)
ERYTHROCYTE [DISTWIDTH] IN BLOOD BY AUTOMATED COUNT: 17.7 % (ref 11.5–14.5)
ERYTHROCYTE [SEDIMENTATION RATE] IN BLOOD BY WESTERGREN METHOD: 22 MM/H
ERYTHROCYTE [SEDIMENTATION RATE] IN BLOOD BY WESTERGREN METHOD: 32 MM/H
FACT X PPP CHRO-ACNC: 0.6 IU/ML (ref 0.3–0.7)
FIBRINOGEN PPP-MCNC: 203 MG/DL (ref 182–400)
FIBRINOGEN PPP-MCNC: 205 MG/DL (ref 182–400)
FIO2: 21
FIO2: 21 %
FIO2: 21 %
FIO2: 35
FLUAV RNA NPH QL NAA+NON-PROBE: NOT DETECTED
FLUBV RNA NPH QL NAA+NON-PROBE: NOT DETECTED
FRACTIONAL SHORTENING: 25.7 % (ref 28–44)
GFR SERPLBLD CREATININE-BSD FMLA CKD-EPI: 24 ML/MIN/1.73/M2
GFR SERPLBLD CREATININE-BSD FMLA CKD-EPI: 24 ML/MIN/1.73/M2
GFR SERPLBLD CREATININE-BSD FMLA CKD-EPI: 25 ML/MIN/1.73/M2
GFR SERPLBLD CREATININE-BSD FMLA CKD-EPI: 28 ML/MIN/1.73/M2
GFR SERPLBLD CREATININE-BSD FMLA CKD-EPI: 30 ML/MIN/1.73/M2
GFR SERPLBLD CREATININE-BSD FMLA CKD-EPI: 30 ML/MIN/1.73/M2
GFR SERPLBLD CREATININE-BSD FMLA CKD-EPI: 32 ML/MIN/1.73/M2
GFR SERPLBLD CREATININE-BSD FMLA CKD-EPI: 37 ML/MIN/1.73/M2
GFR SERPLBLD CREATININE-BSD FMLA CKD-EPI: 41 ML/MIN/1.73/M2
GFR SERPLBLD CREATININE-BSD FMLA CKD-EPI: 44 ML/MIN/1.73/M2
GFR SERPLBLD CREATININE-BSD FMLA CKD-EPI: 44 ML/MIN/1.73/M2
GFR SERPLBLD CREATININE-BSD FMLA CKD-EPI: >60 ML/MIN/1.73/M2
GLUCOSE SERPL-MCNC: 139 MG/DL (ref 70–110)
GLUCOSE SERPL-MCNC: 139 MG/DL (ref 70–110)
GLUCOSE SERPL-MCNC: 165 MG/DL (ref 70–110)
GLUCOSE SERPL-MCNC: 165 MG/DL (ref 70–110)
GLUCOSE SERPL-MCNC: 174 MG/DL (ref 70–110)
GLUCOSE SERPL-MCNC: 178 MG/DL (ref 70–110)
GLUCOSE SERPL-MCNC: 179 MG/DL (ref 70–110)
GLUCOSE SERPL-MCNC: 195 MG/DL (ref 70–110)
GLUCOSE SERPL-MCNC: 221 MG/DL (ref 70–110)
GLUCOSE SERPL-MCNC: 247 MG/DL (ref 70–110)
GLUCOSE SERPL-MCNC: 267 MG/DL (ref 70–110)
GLUCOSE SERPL-MCNC: 28 MG/DL (ref 70–110)
GLUCOSE SERPL-MCNC: 30 MG/DL (ref 70–110)
GLUCOSE SERPL-MCNC: 301 MG/DL (ref 70–110)
GLUCOSE SERPL-MCNC: 356 MG/DL (ref 70–110)
GLUCOSE SERPL-MCNC: 46 MG/DL (ref 70–110)
GLUCOSE SERPL-MCNC: 58 MG/DL (ref 70–110)
GLUCOSE SERPL-MCNC: 62 MG/DL (ref 70–110)
GLUCOSE SERPL-MCNC: 81 MG/DL (ref 70–110)
GLUCOSE SERPL-MCNC: 81 MG/DL (ref 70–110)
GLUCOSE UR QL STRIP: ABNORMAL
GLUCOSE UR QL STRIP: NEGATIVE
GRAN CASTS UR QL COMP ASSIST: 7 /LPF (ref ?–0)
HADV DNA NPH QL NAA+NON-PROBE: NOT DETECTED
HAPTOGLOB SERPL-MCNC: <10 MG/DL (ref 30–250)
HBA1C MFR BLD: 5.3 % (ref 4–5.6)
HCO3 UR-SCNC: 10.7 MMOL/L (ref 24–28)
HCO3 UR-SCNC: 12.2 MMOL/L (ref 24–28)
HCO3 UR-SCNC: 26.5 MMOL/L (ref 24–28)
HCO3 UR-SCNC: 4.9 MMOL/L (ref 24–28)
HCO3 UR-SCNC: 6 MMOL/L (ref 24–28)
HCO3 UR-SCNC: 6.1 MMOL/L (ref 24–28)
HCOV 229E RNA NPH QL NAA+NON-PROBE: NOT DETECTED
HCOV HKU1 RNA NPH QL NAA+NON-PROBE: NOT DETECTED
HCOV NL63 RNA NPH QL NAA+NON-PROBE: NOT DETECTED
HCOV OC43 RNA NPH QL NAA+NON-PROBE: NOT DETECTED
HCT VFR BLD AUTO: 19.4 % (ref 37–48.5)
HCT VFR BLD AUTO: 19.7 % (ref 37–48.5)
HCT VFR BLD AUTO: 20.2 % (ref 37–48.5)
HCT VFR BLD AUTO: 21.7 % (ref 37–48.5)
HCT VFR BLD AUTO: 22.7 % (ref 37–48.5)
HCT VFR BLD AUTO: 23.8 % (ref 37–48.5)
HCT VFR BLD AUTO: 23.9 % (ref 37–48.5)
HCT VFR BLD AUTO: 23.9 % (ref 37–48.5)
HCT VFR BLD AUTO: 24.4 % (ref 37–48.5)
HCT VFR BLD AUTO: 24.8 % (ref 37–48.5)
HCT VFR BLD AUTO: 24.9 % (ref 37–48.5)
HCT VFR BLD AUTO: 27.5 % (ref 37–48.5)
HCT VFR BLD CALC: 15 %PCV (ref 36–54)
HCT VFR BLD CALC: 16.7 % (ref 36–54)
HCT VFR BLD CALC: 18 %PCV (ref 36–54)
HGB BLD-MCNC: 5.6 GM/DL (ref 12–16)
HGB BLD-MCNC: 5.8 GM/DL (ref 12–16)
HGB BLD-MCNC: 5.9 GM/DL (ref 12–16)
HGB BLD-MCNC: 7.6 GM/DL (ref 12–16)
HGB BLD-MCNC: 8 GM/DL (ref 12–16)
HGB BLD-MCNC: 8.1 GM/DL (ref 12–16)
HGB BLD-MCNC: 8.2 GM/DL (ref 12–16)
HGB BLD-MCNC: 8.5 GM/DL (ref 12–16)
HGB BLD-MCNC: 8.5 GM/DL (ref 12–16)
HGB BLD-MCNC: 8.6 GM/DL (ref 12–16)
HGB BLD-MCNC: 8.7 GM/DL (ref 12–16)
HGB BLD-MCNC: 9.2 GM/DL (ref 12–16)
HGB UR QL STRIP: ABNORMAL
HGB UR QL STRIP: ABNORMAL
HMPV RNA LOWER RESP QL NAA+NON-PROBE: NOT DETECTED
HMPV RNA NPH QL NAA+NON-PROBE: NOT DETECTED
HOLD SPECIMEN: NORMAL
HPIV1 RNA NPH QL NAA+NON-PROBE: NOT DETECTED
HPIV2 RNA NPH QL NAA+NON-PROBE: NOT DETECTED
HPIV3 RNA NPH QL NAA+NON-PROBE: NOT DETECTED
HPIV4 RNA NPH QL NAA+NON-PROBE: NOT DETECTED
HYALINE CASTS UR QL AUTO: 0 /LPF (ref 0–1)
HYALINE CASTS UR QL AUTO: 4 /LPF (ref 0–1)
HYPOCHROMIA BLD QL SMEAR: ABNORMAL
IMM GRANULOCYTES # BLD AUTO: 0.18 K/UL (ref 0–0.04)
IMM GRANULOCYTES # BLD AUTO: 0.19 K/UL (ref 0–0.04)
IMM GRANULOCYTES # BLD AUTO: 0.23 K/UL (ref 0–0.04)
IMM GRANULOCYTES # BLD AUTO: 0.26 K/UL (ref 0–0.04)
IMM GRANULOCYTES # BLD AUTO: 0.32 K/UL (ref 0–0.04)
IMM GRANULOCYTES # BLD AUTO: 0.34 K/UL (ref 0–0.04)
IMM GRANULOCYTES # BLD AUTO: 0.38 K/UL (ref 0–0.04)
IMM GRANULOCYTES # BLD AUTO: 0.49 K/UL (ref 0–0.04)
IMM GRANULOCYTES # BLD AUTO: 0.74 K/UL (ref 0–0.04)
IMM GRANULOCYTES NFR BLD AUTO: 1.9 % (ref 0–0.5)
IMM GRANULOCYTES NFR BLD AUTO: 2 % (ref 0–0.5)
IMM GRANULOCYTES NFR BLD AUTO: 2 % (ref 0–0.5)
IMM GRANULOCYTES NFR BLD AUTO: 2.2 % (ref 0–0.5)
IMM GRANULOCYTES NFR BLD AUTO: 2.5 % (ref 0–0.5)
IMM GRANULOCYTES NFR BLD AUTO: 2.6 % (ref 0–0.5)
IMM GRANULOCYTES NFR BLD AUTO: 3.2 % (ref 0–0.5)
IMM GRANULOCYTES NFR BLD AUTO: 4.7 % (ref 0–0.5)
IMM GRANULOCYTES NFR BLD AUTO: 4.8 % (ref 0–0.5)
INDIRECT COOMBS: NORMAL
INR PPP: 2.2 (ref 0.8–1.2)
INR PPP: 2.5 (ref 0.8–1.2)
INR PPP: 2.5 (ref 0.8–1.2)
INR PPP: 2.7 (ref 0.8–1.2)
INTERVENTRICULAR SEPTUM: 1 CM (ref 0.6–1.1)
IVC DIAMETER: 0.9 CM
KETONES UR QL STRIP: ABNORMAL
KETONES UR QL STRIP: NEGATIVE
LA MAJOR: 3.8 CM
LA MINOR: 3.4 CM
LA WIDTH: 2.6 CM
LACTATE SERPL-SCNC: 11.8 MMOL/L (ref 0.5–2.2)
LACTATE SERPL-SCNC: >12 MMOL/L (ref 0.5–2.2)
LDH SERPL L TO P-CCNC: >20 MMOL/L (ref 0.36–1.25)
LDH SERPL L TO P-CCNC: >20 MMOL/L (ref 0.5–2.2)
LDH SERPL L TO P-CCNC: >20 MMOL/L (ref 0.5–2.2)
LDH SERPL-CCNC: >7500 U/L (ref 110–260)
LEFT ATRIUM SIZE: 2.1 CM
LEFT ATRIUM VOLUME INDEX MOD: 18 ML/M2
LEFT ATRIUM VOLUME INDEX: 11 ML/M2
LEFT ATRIUM VOLUME MOD: 27 ML
LEFT ATRIUM VOLUME: 17 CM3
LEFT INTERNAL DIMENSION IN SYSTOLE: 2.6 CM (ref 2.1–4)
LEFT VENTRICLE DIASTOLIC VOLUME INDEX: 33.77 ML/M2
LEFT VENTRICLE DIASTOLIC VOLUME: 51 ML
LEFT VENTRICLE MASS INDEX: 73.5 G/M2
LEFT VENTRICLE SYSTOLIC VOLUME INDEX: 15.2 ML/M2
LEFT VENTRICLE SYSTOLIC VOLUME: 23 ML
LEFT VENTRICULAR INTERNAL DIMENSION IN DIASTOLE: 3.5 CM (ref 3.5–6)
LEFT VENTRICULAR MASS: 111 G
LEUKOCYTE ESTERASE UR QL STRIP: NEGATIVE
LEUKOCYTE ESTERASE UR QL STRIP: NEGATIVE
LV LATERAL E/E' RATIO: 9.9 M/S
LV SEPTAL E/E' RATIO: 11.3 M/S
LYMPHOCYTES # BLD AUTO: 0.61 K/UL (ref 1–4.8)
LYMPHOCYTES # BLD AUTO: 0.79 K/UL (ref 1–4.8)
LYMPHOCYTES # BLD AUTO: 0.96 K/UL (ref 1–4.8)
LYMPHOCYTES # BLD AUTO: 1 K/UL (ref 1–4.8)
LYMPHOCYTES # BLD AUTO: 1.06 K/UL (ref 1–4.8)
LYMPHOCYTES # BLD AUTO: 1.38 K/UL (ref 1–4.8)
LYMPHOCYTES # BLD AUTO: 1.41 K/UL (ref 1–4.8)
LYMPHOCYTES # BLD AUTO: 1.43 K/UL (ref 1–4.8)
LYMPHOCYTES # BLD AUTO: 1.49 K/UL (ref 1–4.8)
LYMPHOCYTES NFR BLD MANUAL: 11 % (ref 18–48)
LYMPHOCYTES NFR BLD MANUAL: 12 % (ref 18–48)
LYMPHOCYTES NFR BLD MANUAL: 14 % (ref 18–48)
MAGNESIUM SERPL-MCNC: 1 MG/DL (ref 1.6–2.6)
MAGNESIUM SERPL-MCNC: 1.5 MG/DL (ref 1.6–2.6)
MAGNESIUM SERPL-MCNC: 1.8 MG/DL (ref 1.6–2.6)
MAGNESIUM SERPL-MCNC: 1.9 MG/DL (ref 1.6–2.6)
MAGNESIUM SERPL-MCNC: 1.9 MG/DL (ref 1.6–2.6)
MAGNESIUM SERPL-MCNC: 2 MG/DL (ref 1.6–2.6)
MCH RBC QN AUTO: 28.5 PG (ref 27–31)
MCH RBC QN AUTO: 28.8 PG (ref 27–31)
MCH RBC QN AUTO: 28.8 PG (ref 27–31)
MCH RBC QN AUTO: 28.9 PG (ref 27–31)
MCH RBC QN AUTO: 28.9 PG (ref 27–31)
MCH RBC QN AUTO: 29 PG (ref 27–31)
MCH RBC QN AUTO: 29 PG (ref 27–31)
MCH RBC QN AUTO: 29.1 PG (ref 27–31)
MCH RBC QN AUTO: 29.3 PG (ref 27–31)
MCH RBC QN AUTO: 29.4 PG (ref 27–31)
MCH RBC QN AUTO: 29.5 PG (ref 27–31)
MCH RBC QN AUTO: 29.5 PG (ref 27–31)
MCHC RBC AUTO-ENTMCNC: 28.4 G/DL (ref 32–36)
MCHC RBC AUTO-ENTMCNC: 28.7 G/DL (ref 32–36)
MCHC RBC AUTO-ENTMCNC: 30.4 G/DL (ref 32–36)
MCHC RBC AUTO-ENTMCNC: 33.5 G/DL (ref 32–36)
MCHC RBC AUTO-ENTMCNC: 33.9 G/DL (ref 32–36)
MCHC RBC AUTO-ENTMCNC: 34.3 G/DL (ref 32–36)
MCHC RBC AUTO-ENTMCNC: 34.3 G/DL (ref 32–36)
MCHC RBC AUTO-ENTMCNC: 34.9 G/DL (ref 32–36)
MCHC RBC AUTO-ENTMCNC: 35 G/DL (ref 32–36)
MCHC RBC AUTO-ENTMCNC: 35.2 G/DL (ref 32–36)
MCHC RBC AUTO-ENTMCNC: 35.2 G/DL (ref 32–36)
MCHC RBC AUTO-ENTMCNC: 35.7 G/DL (ref 32–36)
MCV RBC AUTO: 101 FL (ref 82–98)
MCV RBC AUTO: 103 FL (ref 82–98)
MCV RBC AUTO: 82 FL (ref 82–98)
MCV RBC AUTO: 84 FL (ref 82–98)
MCV RBC AUTO: 85 FL (ref 82–98)
MCV RBC AUTO: 88 FL (ref 82–98)
MCV RBC AUTO: 97 FL (ref 82–98)
METAMYELOCYTES NFR BLD MANUAL: 1 %
METAMYELOCYTES NFR BLD MANUAL: 1 %
MICROSCOPIC COMMENT: ABNORMAL
MICROSCOPIC COMMENT: ABNORMAL
MIN VOL: 10.1
MIN VOL: 12
MIN VOL: 12.1
MIN VOL: 12.1
MIN VOL: 14.1
MIN VOL: 15.1
MODE: ABNORMAL
MONOCYTES NFR BLD MANUAL: 10 % (ref 4–15)
MONOCYTES NFR BLD MANUAL: 4 % (ref 4–15)
MONOCYTES NFR BLD MANUAL: 7 % (ref 4–15)
MRSA PCR SCRN (OHS): NOT DETECTED
MV PEAK E VEL: 0.79 M/S
MYELOCYTES NFR BLD MANUAL: 1 %
NEUTROPHILS NFR BLD MANUAL: 77 % (ref 38–73)
NEUTROPHILS NFR BLD MANUAL: 78 % (ref 38–73)
NEUTROPHILS NFR BLD MANUAL: 78 % (ref 38–73)
NEUTS BAND NFR BLD MANUAL: 3 %
NEUTS BAND NFR BLD MANUAL: 3 %
NITRITE UR QL STRIP: NEGATIVE
NITRITE UR QL STRIP: NEGATIVE
NUCLEATED RBC (/100WBC) (OHS): 0 /100 WBC
NUCLEATED RBC (/100WBC) (OHS): 0 /100 WBC
NUCLEATED RBC (/100WBC) (OHS): 1 /100 WBC
NUCLEATED RBC (/100WBC) (OHS): 1 /100 WBC
NUCLEATED RBC (/100WBC) (OHS): 11 /100 WBC
NUCLEATED RBC (/100WBC) (OHS): 12 /100 WBC
NUCLEATED RBC (/100WBC) (OHS): 12 /100 WBC
NUCLEATED RBC (/100WBC) (OHS): 3 /100 WBC
NUCLEATED RBC (/100WBC) (OHS): 5 /100 WBC
NUCLEATED RBC (/100WBC) (OHS): 8 /100 WBC
NUCLEATED RBC (/100WBC) (OHS): 8 /100 WBC
NUCLEATED RBC (/100WBC) (OHS): 9 /100 WBC
OHS CV RV/LV RATIO: 1.11 CM
OHS QRS DURATION: 76 MS
OHS QRS DURATION: 84 MS
OHS QTC CALCULATION: 432 MS
OHS QTC CALCULATION: 463 MS
PATHOLOGIST REVIEW - CBC/DIFF (OHS): NORMAL
PCO2 BLDA: 12.2 MMHG (ref 35–45)
PCO2 BLDA: 12.5 MMHG (ref 35–45)
PCO2 BLDA: 13.1 MMHG (ref 35–45)
PCO2 BLDA: 13.2 MMHG (ref 35–45)
PCO2 BLDA: 14.6 MMHG (ref 35–45)
PCO2 BLDA: 19.4 MMHG (ref 35–45)
PCO2 BLDA: 20.6 MMHG (ref 35–45)
PCO2 BLDA: 25.3 MMHG (ref 35–45)
PEEP: 5
PH SMN: 6.97 [PH] (ref 7.35–7.45)
PH SMN: 7.12 [PH] (ref 7.35–7.45)
PH SMN: 7.18 [PH] (ref 7.35–7.45)
PH SMN: 7.28 [PH] (ref 7.35–7.45)
PH SMN: 7.3 [PH] (ref 7.35–7.45)
PH SMN: 7.53 [PH] (ref 7.35–7.45)
PH SMN: 7.54 [PH] (ref 7.35–7.45)
PH SMN: 7.63 [PH] (ref 7.35–7.45)
PH UR STRIP: 6 [PH]
PH UR STRIP: 7 [PH]
PHOSPHATE SERPL-MCNC: 1.3 MG/DL (ref 2.7–4.5)
PHOSPHATE SERPL-MCNC: 3.2 MG/DL (ref 2.7–4.5)
PHOSPHATE SERPL-MCNC: 3.3 MG/DL (ref 2.7–4.5)
PHOSPHATE SERPL-MCNC: 3.5 MG/DL (ref 2.7–4.5)
PHOSPHATE SERPL-MCNC: 3.7 MG/DL (ref 2.7–4.5)
PHOSPHATE SERPL-MCNC: 5.1 MG/DL (ref 2.7–4.5)
PHOSPHATE SERPL-MCNC: 5.6 MG/DL (ref 2.7–4.5)
PHOSPHATE SERPL-MCNC: <1 MG/DL (ref 2.7–4.5)
PIP: 24
PIP: 25
PIP: 25
PIP: 26
PISA TR MAX VEL: 2.7 M/S
PLATELET # BLD AUTO: 114 K/UL (ref 150–450)
PLATELET # BLD AUTO: 124 K/UL (ref 150–450)
PLATELET # BLD AUTO: 142 K/UL (ref 150–450)
PLATELET # BLD AUTO: 143 K/UL (ref 150–450)
PLATELET # BLD AUTO: 151 K/UL (ref 150–450)
PLATELET # BLD AUTO: 169 K/UL (ref 150–450)
PLATELET # BLD AUTO: 229 K/UL (ref 150–450)
PLATELET # BLD AUTO: 62 K/UL (ref 150–450)
PLATELET # BLD AUTO: 66 K/UL (ref 150–450)
PLATELET # BLD AUTO: 73 K/UL (ref 150–450)
PLATELET # BLD AUTO: 80 K/UL (ref 150–450)
PLATELET # BLD AUTO: 80 K/UL (ref 150–450)
PLATELET BLD QL SMEAR: ABNORMAL
PMV BLD AUTO: 10.3 FL (ref 9.2–12.9)
PMV BLD AUTO: 10.5 FL (ref 9.2–12.9)
PMV BLD AUTO: 10.9 FL (ref 9.2–12.9)
PMV BLD AUTO: 11.2 FL (ref 9.2–12.9)
PMV BLD AUTO: 11.9 FL (ref 9.2–12.9)
PMV BLD AUTO: 12 FL (ref 9.2–12.9)
PMV BLD AUTO: 12.1 FL (ref 9.2–12.9)
PMV BLD AUTO: 12.1 FL (ref 9.2–12.9)
PMV BLD AUTO: 12.3 FL (ref 9.2–12.9)
PMV BLD AUTO: ABNORMAL FL
PO2 BLDA: 107 MMHG (ref 80–100)
PO2 BLDA: 107 MMHG (ref 80–100)
PO2 BLDA: 116 MMHG (ref 80–100)
PO2 BLDA: 135 MMHG (ref 80–100)
PO2 BLDA: 199 MMHG (ref 80–100)
PO2 BLDA: 51.3 MMHG (ref 40–60)
PO2 BLDA: 88 MMHG (ref 80–100)
POC BASE DEFICIT: -24.7 MMOL/L
POC BE: -11 MMOL/L (ref -2–2)
POC BE: -12 MMOL/L (ref -2–2)
POC BE: -20 MMOL/L (ref -2–2)
POC BE: -21 MMOL/L (ref -2–2)
POC BE: -24 MMOL/L (ref -2–2)
POC BE: 5 MMOL/L (ref -2–2)
POC HCO3: 6 MMOL/L (ref 24–28)
POC HCO3: 6.3 MMOL/L
POC IONIZED CALCIUM: 0.91 MMOL/L (ref 1.06–1.42)
POC IONIZED CALCIUM: 0.96 MMOL/L (ref 1.06–1.42)
POC IONIZED CALCIUM: 0.97 MMOL/L (ref 1.06–1.42)
POC IONIZED CALCIUM: 1.01 MMOL/L (ref 1.06–1.42)
POC PERFORMED BY: ABNORMAL
POC PERFORMED BY: ABNORMAL
POC SATURATED O2: 100.3 % (ref 95–100)
POC SATURATED O2: 98 % (ref 95–100)
POC SATURATED O2: 98 % (ref 95–100)
POC SATURATED O2: 99 % (ref 95–100)
POC TCO2 (MEASURED): 8 MMOL/L (ref 23–29)
POC TCO2 (MEASURED): 8 MMOL/L (ref 23–29)
POC TCO2: 11 MMOL/L (ref 23–27)
POC TCO2: 13 MMOL/L (ref 23–27)
POC TCO2: 27 MMOL/L (ref 23–27)
POC TCO2: 5 MMOL/L (ref 23–27)
POC TCO2: 6 MMOL/L (ref 23–27)
POC TCO2: 7 MMOL/L (ref 23–27)
POC TEMPERATURE: 37 C
POC TEMPERATURE: 37 C
POCT GLUCOSE: 123 MG/DL (ref 70–110)
POCT GLUCOSE: 149 MG/DL (ref 70–110)
POCT GLUCOSE: 163 MG/DL (ref 70–110)
POCT GLUCOSE: 163 MG/DL (ref 70–110)
POCT GLUCOSE: 171 MG/DL (ref 70–110)
POCT GLUCOSE: 179 MG/DL (ref 70–110)
POCT GLUCOSE: 197 MG/DL (ref 70–110)
POCT GLUCOSE: 205 MG/DL (ref 70–110)
POCT GLUCOSE: 218 MG/DL (ref 70–110)
POCT GLUCOSE: 232 MG/DL (ref 70–110)
POCT GLUCOSE: 239 MG/DL (ref 70–110)
POCT GLUCOSE: 247 MG/DL (ref 70–110)
POCT GLUCOSE: 253 MG/DL (ref 70–110)
POCT GLUCOSE: 279 MG/DL (ref 70–110)
POCT GLUCOSE: 284 MG/DL (ref 70–110)
POCT GLUCOSE: 291 MG/DL (ref 70–110)
POCT GLUCOSE: 299 MG/DL (ref 70–110)
POCT GLUCOSE: 317 MG/DL (ref 70–110)
POCT GLUCOSE: 319 MG/DL (ref 70–110)
POCT GLUCOSE: 321 MG/DL (ref 70–110)
POCT GLUCOSE: 332 MG/DL (ref 70–110)
POCT GLUCOSE: 344 MG/DL (ref 70–110)
POCT GLUCOSE: 365 MG/DL (ref 70–110)
POCT GLUCOSE: 397 MG/DL (ref 70–110)
POCT GLUCOSE: 427 MG/DL (ref 70–110)
POCT GLUCOSE: 473 MG/DL (ref 70–110)
POCT GLUCOSE: <20 MG/DL (ref 70–110)
POIKILOCYTOSIS BLD QL SMEAR: SLIGHT
POIKILOCYTOSIS BLD QL SMEAR: SLIGHT
POLYCHROMASIA BLD QL SMEAR: ABNORMAL
POTASSIUM BLD-SCNC: 3 MMOL/L (ref 3.5–5.1)
POTASSIUM BLD-SCNC: 3.5 MMOL/L (ref 3.5–5.1)
POTASSIUM BLD-SCNC: 3.9 MMOL/L (ref 3.5–5.1)
POTASSIUM BLD-SCNC: 4 MMOL/L (ref 3.5–5.1)
POTASSIUM SERPL-SCNC: 3.2 MMOL/L (ref 3.5–5.1)
POTASSIUM SERPL-SCNC: 3.2 MMOL/L (ref 3.5–5.1)
POTASSIUM SERPL-SCNC: 3.3 MMOL/L (ref 3.5–5.1)
POTASSIUM SERPL-SCNC: 3.4 MMOL/L (ref 3.5–5.1)
POTASSIUM SERPL-SCNC: 3.5 MMOL/L (ref 3.5–5.1)
POTASSIUM SERPL-SCNC: 3.5 MMOL/L (ref 3.5–5.1)
POTASSIUM SERPL-SCNC: 3.6 MMOL/L (ref 3.5–5.1)
POTASSIUM SERPL-SCNC: 3.7 MMOL/L (ref 3.5–5.1)
POTASSIUM SERPL-SCNC: 3.8 MMOL/L (ref 3.5–5.1)
POTASSIUM SERPL-SCNC: 4 MMOL/L (ref 3.5–5.1)
POTASSIUM SERPL-SCNC: 4 MMOL/L (ref 3.5–5.1)
POTASSIUM SERPL-SCNC: 4.3 MMOL/L (ref 3.5–5.1)
POTASSIUM SERPL-SCNC: 4.7 MMOL/L (ref 3.5–5.1)
POTASSIUM SERPL-SCNC: 4.8 MMOL/L (ref 3.5–5.1)
POTASSIUM SERPL-SCNC: 4.8 MMOL/L (ref 3.5–5.1)
POTASSIUM SERPL-SCNC: 5.2 MMOL/L (ref 3.5–5.1)
POTASSIUM SERPL-SCNC: 5.2 MMOL/L (ref 3.5–5.1)
PROCALCITONIN SERPL-MCNC: 4.62 NG/ML
PROLACTIN SERPL IA-MCNC: 23.9 NG/ML (ref 5.2–26.5)
PROT SERPL-MCNC: 4.3 GM/DL (ref 6–8.4)
PROT SERPL-MCNC: 4.7 GM/DL (ref 6–8.4)
PROT SERPL-MCNC: 4.7 GM/DL (ref 6–8.4)
PROT SERPL-MCNC: 4.8 GM/DL (ref 6–8.4)
PROT SERPL-MCNC: 4.8 GM/DL (ref 6–8.4)
PROT SERPL-MCNC: 6.7 GM/DL (ref 6–8.4)
PROT UR QL STRIP: ABNORMAL
PROT UR QL STRIP: ABNORMAL
PROT UR-MCNC: 185 MG/DL
PROT/CREAT UR: 6.61 MG/G{CREAT}
PROTHROMBIN TIME: 22.5 SECONDS (ref 9–12.5)
PROTHROMBIN TIME: 25.2 SECONDS (ref 9–12.5)
PROTHROMBIN TIME: 26.1 SECONDS (ref 9–12.5)
PROTHROMBIN TIME: 27.4 SECONDS (ref 9–12.5)
PS: 5
PS: 5
RA MAJOR: 4.2 CM
RA WIDTH: 3.75 CM
RBC # BLD AUTO: 1.91 M/UL (ref 4–5.4)
RBC # BLD AUTO: 2 M/UL (ref 4–5.4)
RBC # BLD AUTO: 2.01 M/UL (ref 4–5.4)
RBC # BLD AUTO: 2.64 M/UL (ref 4–5.4)
RBC # BLD AUTO: 2.77 M/UL (ref 4–5.4)
RBC # BLD AUTO: 2.81 M/UL (ref 4–5.4)
RBC # BLD AUTO: 2.83 M/UL (ref 4–5.4)
RBC # BLD AUTO: 2.92 M/UL (ref 4–5.4)
RBC # BLD AUTO: 2.92 M/UL (ref 4–5.4)
RBC # BLD AUTO: 2.93 M/UL (ref 4–5.4)
RBC # BLD AUTO: 3.05 M/UL (ref 4–5.4)
RBC # BLD AUTO: 3.13 M/UL (ref 4–5.4)
RBC #/AREA URNS AUTO: 2 /HPF (ref 0–4)
RBC #/AREA URNS AUTO: 7 /HPF (ref 0–4)
RELATIVE EOSINOPHIL (OHS): 0 %
RELATIVE EOSINOPHIL (OHS): 0.1 %
RELATIVE LYMPHOCYTE (OHS): 12.4 % (ref 18–48)
RELATIVE LYMPHOCYTE (OHS): 13.9 % (ref 18–48)
RELATIVE LYMPHOCYTE (OHS): 14.3 % (ref 18–48)
RELATIVE LYMPHOCYTE (OHS): 5.3 % (ref 18–48)
RELATIVE LYMPHOCYTE (OHS): 5.8 % (ref 18–48)
RELATIVE LYMPHOCYTE (OHS): 8.1 % (ref 18–48)
RELATIVE LYMPHOCYTE (OHS): 8.9 % (ref 18–48)
RELATIVE LYMPHOCYTE (OHS): 9.5 % (ref 18–48)
RELATIVE LYMPHOCYTE (OHS): 9.6 % (ref 18–48)
RELATIVE MONOCYTE (OHS): 4.2 % (ref 4–15)
RELATIVE MONOCYTE (OHS): 4.8 % (ref 4–15)
RELATIVE MONOCYTE (OHS): 5 % (ref 4–15)
RELATIVE MONOCYTE (OHS): 5.3 % (ref 4–15)
RELATIVE MONOCYTE (OHS): 5.7 % (ref 4–15)
RELATIVE MONOCYTE (OHS): 6.1 % (ref 4–15)
RELATIVE MONOCYTE (OHS): 7 % (ref 4–15)
RELATIVE MONOCYTE (OHS): 7.2 % (ref 4–15)
RELATIVE MONOCYTE (OHS): 8.2 % (ref 4–15)
RELATIVE NEUTROPHIL (OHS): 73.7 % (ref 38–73)
RELATIVE NEUTROPHIL (OHS): 76.6 % (ref 38–73)
RELATIVE NEUTROPHIL (OHS): 77.9 % (ref 38–73)
RELATIVE NEUTROPHIL (OHS): 80.5 % (ref 38–73)
RELATIVE NEUTROPHIL (OHS): 81.2 % (ref 38–73)
RELATIVE NEUTROPHIL (OHS): 82.3 % (ref 38–73)
RELATIVE NEUTROPHIL (OHS): 85.6 % (ref 38–73)
RELATIVE NEUTROPHIL (OHS): 85.8 % (ref 38–73)
RELATIVE NEUTROPHIL (OHS): 87.7 % (ref 38–73)
RETICS/RBC NFR AUTO: 2 % (ref 0.5–2.5)
RH BLD: NORMAL
RIGHT ATRIAL AREA: 15.6 CM2
RIGHT VENTRICLE DIASTOLIC BASEL DIMENSION: 3.9 CM
RSV RNA NPH QL NAA+NON-PROBE: NOT DETECTED
RSV RNA NPH QL NAA+NON-PROBE: NOT DETECTED
RV TISSUE DOPPLER FREE WALL SYSTOLIC VELOCITY 1 (APICAL 4 CHAMBER VIEW): 11.38 CM/S
RV+EV RNA NPH QL NAA+NON-PROBE: NOT DETECTED
SALICYLATES SERPL-MCNC: 6.3 MG/DL (ref 15–30)
SAMPLE: ABNORMAL
SARS-COV-2 RNA RESP QL NAA+PROBE: NOT DETECTED
SCHISTOCYTES BLD QL SMEAR: ABNORMAL
SCHISTOCYTES BLD QL SMEAR: ABNORMAL
SINUS: 3.3 CM
SITE: ABNORMAL
SODIUM BLD-SCNC: 137 MMOL/L (ref 136–145)
SODIUM BLD-SCNC: 139 MMOL/L (ref 136–145)
SODIUM BLD-SCNC: 143 MMOL/L (ref 136–145)
SODIUM BLD-SCNC: 143 MMOL/L (ref 136–145)
SODIUM SERPL-SCNC: 134 MMOL/L (ref 136–145)
SODIUM SERPL-SCNC: 134 MMOL/L (ref 136–145)
SODIUM SERPL-SCNC: 135 MMOL/L (ref 136–145)
SODIUM SERPL-SCNC: 136 MMOL/L (ref 136–145)
SODIUM SERPL-SCNC: 137 MMOL/L (ref 136–145)
SODIUM SERPL-SCNC: 140 MMOL/L (ref 136–145)
SODIUM SERPL-SCNC: 142 MMOL/L (ref 136–145)
SODIUM SERPL-SCNC: 142 MMOL/L (ref 136–145)
SODIUM SERPL-SCNC: 145 MMOL/L (ref 136–145)
SODIUM UR-SCNC: 66 MMOL/L (ref 20–250)
SP GR UR STRIP: 1.02
SP GR UR STRIP: 1.02
SP02: 100
SP02: 95
SP02: 98
SP02: 99
SPECIMEN OUTDATE: NORMAL
SPECIMEN SOURCE: ABNORMAL
SPECIMEN SOURCE: ABNORMAL
SPECIMEN SOURCE: NORMAL
SPONT RATE: 18
SPONT RATE: 24
SQUAMOUS #/AREA URNS AUTO: 1 /HPF
SQUAMOUS #/AREA URNS AUTO: 1 /HPF
STJ: 2.9 CM
TDI LATERAL: 0.08 M/S
TDI SEPTAL: 0.07 M/S
TDI: 0.08 M/S
TSH SERPL-ACNC: 3.58 UIU/ML (ref 0.4–4)
TV PEAK GRADIENT: 28 MMHG
UNIT NUMBER: NORMAL
UNIT NUMBER: NORMAL
UROBILINOGEN UR STRIP-ACNC: ABNORMAL EU/DL
UROBILINOGEN UR STRIP-ACNC: NEGATIVE EU/DL
VANCOMYCIN SERPL-MCNC: 13.4 UG/ML (ref ?–80)
VANCOMYCIN SERPL-MCNC: 24.6 UG/ML (ref ?–80)
VANCOMYCIN SERPL-MCNC: 25.6 UG/ML (ref ?–80)
VOL: 626
VOL: 646
VT: 375
W LEVETIRACETAM: <1 UG/ML
WBC # BLD AUTO: 10.08 K/UL (ref 3.9–12.7)
WBC # BLD AUTO: 10.14 K/UL (ref 3.9–12.7)
WBC # BLD AUTO: 10.3 K/UL (ref 3.9–12.7)
WBC # BLD AUTO: 10.41 K/UL (ref 3.9–12.7)
WBC # BLD AUTO: 11.45 K/UL (ref 3.9–12.7)
WBC # BLD AUTO: 13.03 K/UL (ref 3.9–12.7)
WBC # BLD AUTO: 13.07 K/UL (ref 3.9–12.7)
WBC # BLD AUTO: 13.38 K/UL (ref 3.9–12.7)
WBC # BLD AUTO: 13.67 K/UL (ref 3.9–12.7)
WBC # BLD AUTO: 14.85 K/UL (ref 3.9–12.7)
WBC # BLD AUTO: 15.5 K/UL (ref 3.9–12.7)
WBC # BLD AUTO: 8.07 K/UL (ref 3.9–12.7)
WBC #/AREA URNS AUTO: 1 /HPF (ref 0–5)
WBC #/AREA URNS AUTO: 8 /HPF (ref 0–5)
YEAST UR QL AUTO: ABNORMAL /HPF
Z-SCORE OF LEFT VENTRICULAR DIMENSION IN END DIASTOLE: -2.38
Z-SCORE OF LEFT VENTRICULAR DIMENSION IN END SYSTOLE: -0.46

## 2025-01-01 PROCEDURE — 84156 ASSAY OF PROTEIN URINE: CPT | Performed by: STUDENT IN AN ORGANIZED HEALTH CARE EDUCATION/TRAINING PROGRAM

## 2025-01-01 PROCEDURE — 25000003 PHARM REV CODE 250: Performed by: EMERGENCY MEDICINE

## 2025-01-01 PROCEDURE — 99291 CRITICAL CARE FIRST HOUR: CPT | Mod: ,,,

## 2025-01-01 PROCEDURE — 83605 ASSAY OF LACTIC ACID: CPT | Performed by: PHYSICIAN ASSISTANT

## 2025-01-01 PROCEDURE — 85384 FIBRINOGEN ACTIVITY: CPT | Performed by: NURSE PRACTITIONER

## 2025-01-01 PROCEDURE — 80069 RENAL FUNCTION PANEL: CPT | Performed by: STUDENT IN AN ORGANIZED HEALTH CARE EDUCATION/TRAINING PROGRAM

## 2025-01-01 PROCEDURE — 94003 VENT MGMT INPAT SUBQ DAY: CPT

## 2025-01-01 PROCEDURE — 99223 1ST HOSP IP/OBS HIGH 75: CPT | Mod: ,,, | Performed by: PHYSICIAN ASSISTANT

## 2025-01-01 PROCEDURE — 87075 CULTR BACTERIA EXCEPT BLOOD: CPT

## 2025-01-01 PROCEDURE — 82010 KETONE BODYS QUAN: CPT

## 2025-01-01 PROCEDURE — 63600175 PHARM REV CODE 636 W HCPCS

## 2025-01-01 PROCEDURE — 94761 N-INVAS EAR/PLS OXIMETRY MLT: CPT | Mod: XB

## 2025-01-01 PROCEDURE — 27100171 HC OXYGEN HIGH FLOW UP TO 24 HOURS

## 2025-01-01 PROCEDURE — 27200966 HC CLOSED SUCTION SYSTEM

## 2025-01-01 PROCEDURE — 25000003 PHARM REV CODE 250

## 2025-01-01 PROCEDURE — 63600175 PHARM REV CODE 636 W HCPCS: Performed by: NURSE PRACTITIONER

## 2025-01-01 PROCEDURE — 95700 EEG CONT REC W/VID EEG TECH: CPT

## 2025-01-01 PROCEDURE — 5A1D90Z PERFORMANCE OF URINARY FILTRATION, CONTINUOUS, GREATER THAN 18 HOURS PER DAY: ICD-10-PCS | Performed by: INTERNAL MEDICINE

## 2025-01-01 PROCEDURE — 1101F PT FALLS ASSESS-DOCD LE1/YR: CPT | Mod: CPTII,S$GLB,,

## 2025-01-01 PROCEDURE — 02HV33Z INSERTION OF INFUSION DEVICE INTO SUPERIOR VENA CAVA, PERCUTANEOUS APPROACH: ICD-10-PCS | Performed by: INTERNAL MEDICINE

## 2025-01-01 PROCEDURE — 83605 ASSAY OF LACTIC ACID: CPT

## 2025-01-01 PROCEDURE — 25000003 PHARM REV CODE 250: Performed by: NURSE PRACTITIONER

## 2025-01-01 PROCEDURE — 82962 GLUCOSE BLOOD TEST: CPT

## 2025-01-01 PROCEDURE — 20000000 HC ICU ROOM

## 2025-01-01 PROCEDURE — 82803 BLOOD GASES ANY COMBINATION: CPT

## 2025-01-01 PROCEDURE — 99900026 HC AIRWAY MAINTENANCE (STAT)

## 2025-01-01 PROCEDURE — 85610 PROTHROMBIN TIME: CPT

## 2025-01-01 PROCEDURE — 83036 HEMOGLOBIN GLYCOSYLATED A1C: CPT | Performed by: NURSE PRACTITIONER

## 2025-01-01 PROCEDURE — 36620 INSERTION CATHETER ARTERY: CPT

## 2025-01-01 PROCEDURE — 87040 BLOOD CULTURE FOR BACTERIA: CPT | Performed by: STUDENT IN AN ORGANIZED HEALTH CARE EDUCATION/TRAINING PROGRAM

## 2025-01-01 PROCEDURE — 93010 ELECTROCARDIOGRAM REPORT: CPT | Mod: ,,, | Performed by: INTERNAL MEDICINE

## 2025-01-01 PROCEDURE — 80100008 HC CRRT DAILY MAINTENANCE

## 2025-01-01 PROCEDURE — 83605 ASSAY OF LACTIC ACID: CPT | Performed by: NURSE PRACTITIONER

## 2025-01-01 PROCEDURE — 96367 TX/PROPH/DG ADDL SEQ IV INF: CPT

## 2025-01-01 PROCEDURE — 86920 COMPATIBILITY TEST SPIN: CPT | Performed by: PHYSICIAN ASSISTANT

## 2025-01-01 PROCEDURE — 99291 CRITICAL CARE FIRST HOUR: CPT | Mod: ,,, | Performed by: PHYSICIAN ASSISTANT

## 2025-01-01 PROCEDURE — 80069 RENAL FUNCTION PANEL: CPT | Performed by: NURSE PRACTITIONER

## 2025-01-01 PROCEDURE — 83735 ASSAY OF MAGNESIUM: CPT | Performed by: STUDENT IN AN ORGANIZED HEALTH CARE EDUCATION/TRAINING PROGRAM

## 2025-01-01 PROCEDURE — 63600175 PHARM REV CODE 636 W HCPCS: Performed by: INTERNAL MEDICINE

## 2025-01-01 PROCEDURE — 99999 PR PBB SHADOW E&M-EST. PATIENT-LVL V: CPT | Mod: PBBFAC,,,

## 2025-01-01 PROCEDURE — 37799 UNLISTED PX VASCULAR SURGERY: CPT

## 2025-01-01 PROCEDURE — P9016 RBC LEUKOCYTES REDUCED: HCPCS | Performed by: PHYSICIAN ASSISTANT

## 2025-01-01 PROCEDURE — 82330 ASSAY OF CALCIUM: CPT

## 2025-01-01 PROCEDURE — 85025 COMPLETE CBC W/AUTO DIFF WBC: CPT

## 2025-01-01 PROCEDURE — 82310 ASSAY OF CALCIUM: CPT | Performed by: NURSE PRACTITIONER

## 2025-01-01 PROCEDURE — 27000923 HC TRIALYSIS CATHETER, ANY SIZE

## 2025-01-01 PROCEDURE — 86920 COMPATIBILITY TEST SPIN: CPT | Performed by: NURSE PRACTITIONER

## 2025-01-01 PROCEDURE — 95714 VEEG EA 12-26 HR UNMNTR: CPT

## 2025-01-01 PROCEDURE — 82533 TOTAL CORTISOL: CPT | Performed by: PHYSICIAN ASSISTANT

## 2025-01-01 PROCEDURE — 80179 DRUG ASSAY SALICYLATE: CPT | Performed by: PHYSICIAN ASSISTANT

## 2025-01-01 PROCEDURE — 80047 BASIC METABLC PNL IONIZED CA: CPT

## 2025-01-01 PROCEDURE — 84300 ASSAY OF URINE SODIUM: CPT

## 2025-01-01 PROCEDURE — 84100 ASSAY OF PHOSPHORUS: CPT | Performed by: NURSE PRACTITIONER

## 2025-01-01 PROCEDURE — 99900035 HC TECH TIME PER 15 MIN (STAT)

## 2025-01-01 PROCEDURE — 87641 MR-STAPH DNA AMP PROBE: CPT

## 2025-01-01 PROCEDURE — 76937 US GUIDE VASCULAR ACCESS: CPT

## 2025-01-01 PROCEDURE — 85007 BL SMEAR W/DIFF WBC COUNT: CPT

## 2025-01-01 PROCEDURE — 82040 ASSAY OF SERUM ALBUMIN: CPT | Performed by: NURSE PRACTITIONER

## 2025-01-01 PROCEDURE — 90945 DIALYSIS ONE EVALUATION: CPT

## 2025-01-01 PROCEDURE — 80202 ASSAY OF VANCOMYCIN: CPT | Performed by: INTERNAL MEDICINE

## 2025-01-01 PROCEDURE — 99291 CRITICAL CARE FIRST HOUR: CPT

## 2025-01-01 PROCEDURE — 96375 TX/PRO/DX INJ NEW DRUG ADDON: CPT

## 2025-01-01 PROCEDURE — 94002 VENT MGMT INPAT INIT DAY: CPT

## 2025-01-01 PROCEDURE — 93005 ELECTROCARDIOGRAM TRACING: CPT

## 2025-01-01 PROCEDURE — 80177 DRUG SCRN QUAN LEVETIRACETAM: CPT | Performed by: NURSE PRACTITIONER

## 2025-01-01 PROCEDURE — 80053 COMPREHEN METABOLIC PANEL: CPT | Performed by: NURSE PRACTITIONER

## 2025-01-01 PROCEDURE — 82140 ASSAY OF AMMONIA: CPT | Performed by: EMERGENCY MEDICINE

## 2025-01-01 PROCEDURE — 99233 SBSQ HOSP IP/OBS HIGH 50: CPT | Mod: ,,, | Performed by: STUDENT IN AN ORGANIZED HEALTH CARE EDUCATION/TRAINING PROGRAM

## 2025-01-01 PROCEDURE — 85610 PROTHROMBIN TIME: CPT | Performed by: PHYSICIAN ASSISTANT

## 2025-01-01 PROCEDURE — 96361 HYDRATE IV INFUSION ADD-ON: CPT

## 2025-01-01 PROCEDURE — 25500020 PHARM REV CODE 255: Performed by: INTERNAL MEDICINE

## 2025-01-01 PROCEDURE — 85060 BLOOD SMEAR INTERPRETATION: CPT | Mod: ,,, | Performed by: PATHOLOGY

## 2025-01-01 PROCEDURE — 85610 PROTHROMBIN TIME: CPT | Performed by: NURSE PRACTITIONER

## 2025-01-01 PROCEDURE — 36556 INSERT NON-TUNNEL CV CATH: CPT

## 2025-01-01 PROCEDURE — 0202U NFCT DS 22 TRGT SARS-COV-2: CPT

## 2025-01-01 PROCEDURE — 99291 CRITICAL CARE FIRST HOUR: CPT | Mod: ,,, | Performed by: INTERNAL MEDICINE

## 2025-01-01 PROCEDURE — 85045 AUTOMATED RETICULOCYTE COUNT: CPT | Performed by: PHYSICIAN ASSISTANT

## 2025-01-01 PROCEDURE — 82550 ASSAY OF CK (CPK): CPT

## 2025-01-01 PROCEDURE — 30233N1 TRANSFUSION OF NONAUTOLOGOUS RED BLOOD CELLS INTO PERIPHERAL VEIN, PERCUTANEOUS APPROACH: ICD-10-PCS | Performed by: INTERNAL MEDICINE

## 2025-01-01 PROCEDURE — 82330 ASSAY OF CALCIUM: CPT | Performed by: PHYSICIAN ASSISTANT

## 2025-01-01 PROCEDURE — 84146 ASSAY OF PROLACTIN: CPT

## 2025-01-01 PROCEDURE — 81003 URINALYSIS AUTO W/O SCOPE: CPT | Performed by: STUDENT IN AN ORGANIZED HEALTH CARE EDUCATION/TRAINING PROGRAM

## 2025-01-01 PROCEDURE — 0W9J3ZZ DRAINAGE OF PELVIC CAVITY, PERCUTANEOUS APPROACH: ICD-10-PCS | Performed by: STUDENT IN AN ORGANIZED HEALTH CARE EDUCATION/TRAINING PROGRAM

## 2025-01-01 PROCEDURE — 25000003 PHARM REV CODE 250: Performed by: STUDENT IN AN ORGANIZED HEALTH CARE EDUCATION/TRAINING PROGRAM

## 2025-01-01 PROCEDURE — 99214 OFFICE O/P EST MOD 30 MIN: CPT | Mod: S$GLB,,,

## 2025-01-01 PROCEDURE — 86900 BLOOD TYPING SEROLOGIC ABO: CPT | Performed by: PHYSICIAN ASSISTANT

## 2025-01-01 PROCEDURE — 85025 COMPLETE CBC W/AUTO DIFF WBC: CPT | Performed by: NURSE PRACTITIONER

## 2025-01-01 PROCEDURE — 3044F HG A1C LEVEL LT 7.0%: CPT | Mod: CPTII,S$GLB,,

## 2025-01-01 PROCEDURE — 83735 ASSAY OF MAGNESIUM: CPT | Performed by: NURSE PRACTITIONER

## 2025-01-01 PROCEDURE — 1125F AMNT PAIN NOTED PAIN PRSNT: CPT | Mod: CPTII,S$GLB,,

## 2025-01-01 PROCEDURE — 31500 INSERT EMERGENCY AIRWAY: CPT

## 2025-01-01 PROCEDURE — 99232 SBSQ HOSP IP/OBS MODERATE 35: CPT | Mod: ,,, | Performed by: FAMILY MEDICINE

## 2025-01-01 PROCEDURE — 3079F DIAST BP 80-89 MM HG: CPT | Mod: CPTII,S$GLB,,

## 2025-01-01 PROCEDURE — 25000003 PHARM REV CODE 250: Performed by: INTERNAL MEDICINE

## 2025-01-01 PROCEDURE — 84100 ASSAY OF PHOSPHORUS: CPT | Performed by: STUDENT IN AN ORGANIZED HEALTH CARE EDUCATION/TRAINING PROGRAM

## 2025-01-01 PROCEDURE — 82040 ASSAY OF SERUM ALBUMIN: CPT | Performed by: INTERNAL MEDICINE

## 2025-01-01 PROCEDURE — 99497 ADVNCD CARE PLAN 30 MIN: CPT | Mod: 25,,, | Performed by: STUDENT IN AN ORGANIZED HEALTH CARE EDUCATION/TRAINING PROGRAM

## 2025-01-01 PROCEDURE — 84450 TRANSFERASE (AST) (SGOT): CPT | Performed by: STUDENT IN AN ORGANIZED HEALTH CARE EDUCATION/TRAINING PROGRAM

## 2025-01-01 PROCEDURE — 3008F BODY MASS INDEX DOCD: CPT | Mod: CPTII,S$GLB,,

## 2025-01-01 PROCEDURE — 87070 CULTURE OTHR SPECIMN AEROBIC: CPT

## 2025-01-01 PROCEDURE — 84295 ASSAY OF SERUM SODIUM: CPT

## 2025-01-01 PROCEDURE — 84132 ASSAY OF SERUM POTASSIUM: CPT

## 2025-01-01 PROCEDURE — 63600175 PHARM REV CODE 636 W HCPCS: Performed by: PHYSICIAN ASSISTANT

## 2025-01-01 PROCEDURE — 83010 ASSAY OF HAPTOGLOBIN QUANT: CPT | Performed by: PHYSICIAN ASSISTANT

## 2025-01-01 PROCEDURE — 99233 SBSQ HOSP IP/OBS HIGH 50: CPT | Mod: ,,, | Performed by: INTERNAL MEDICINE

## 2025-01-01 PROCEDURE — 94761 N-INVAS EAR/PLS OXIMETRY MLT: CPT

## 2025-01-01 PROCEDURE — 63600175 PHARM REV CODE 636 W HCPCS: Performed by: NURSE ANESTHETIST, CERTIFIED REGISTERED

## 2025-01-01 PROCEDURE — 99223 1ST HOSP IP/OBS HIGH 75: CPT | Mod: 25,,, | Performed by: STUDENT IN AN ORGANIZED HEALTH CARE EDUCATION/TRAINING PROGRAM

## 2025-01-01 PROCEDURE — 82570 ASSAY OF URINE CREATININE: CPT

## 2025-01-01 PROCEDURE — 3074F SYST BP LT 130 MM HG: CPT | Mod: CPTII,S$GLB,,

## 2025-01-01 PROCEDURE — 3288F FALL RISK ASSESSMENT DOCD: CPT | Mod: CPTII,S$GLB,,

## 2025-01-01 PROCEDURE — 85384 FIBRINOGEN ACTIVITY: CPT | Performed by: PHYSICIAN ASSISTANT

## 2025-01-01 PROCEDURE — 82010 KETONE BODYS QUAN: CPT | Performed by: PHYSICIAN ASSISTANT

## 2025-01-01 PROCEDURE — P9016 RBC LEUKOCYTES REDUCED: HCPCS | Performed by: NURSE PRACTITIONER

## 2025-01-01 PROCEDURE — 82248 BILIRUBIN DIRECT: CPT | Performed by: PHYSICIAN ASSISTANT

## 2025-01-01 PROCEDURE — 36430 TRANSFUSION BLD/BLD COMPNT: CPT

## 2025-01-01 PROCEDURE — 25000003 PHARM REV CODE 250: Performed by: NURSE ANESTHETIST, CERTIFIED REGISTERED

## 2025-01-01 PROCEDURE — 84145 PROCALCITONIN (PCT): CPT | Performed by: NURSE PRACTITIONER

## 2025-01-01 PROCEDURE — 96365 THER/PROPH/DIAG IV INF INIT: CPT

## 2025-01-01 PROCEDURE — 85610 PROTHROMBIN TIME: CPT | Performed by: EMERGENCY MEDICINE

## 2025-01-01 PROCEDURE — 87086 URINE CULTURE/COLONY COUNT: CPT

## 2025-01-01 PROCEDURE — 4010F ACE/ARB THERAPY RXD/TAKEN: CPT | Mod: CPTII,S$GLB,,

## 2025-01-01 PROCEDURE — 25000003 PHARM REV CODE 250: Performed by: PHYSICIAN ASSISTANT

## 2025-01-01 PROCEDURE — 85025 COMPLETE CBC W/AUTO DIFF WBC: CPT | Performed by: STUDENT IN AN ORGANIZED HEALTH CARE EDUCATION/TRAINING PROGRAM

## 2025-01-01 PROCEDURE — 95720 EEG PHY/QHP EA INCR W/VEEG: CPT | Mod: ,,, | Performed by: PSYCHIATRY & NEUROLOGY

## 2025-01-01 PROCEDURE — 5A1945Z RESPIRATORY VENTILATION, 24-96 CONSECUTIVE HOURS: ICD-10-PCS | Performed by: EMERGENCY MEDICINE

## 2025-01-01 PROCEDURE — 0BH17EZ INSERTION OF ENDOTRACHEAL AIRWAY INTO TRACHEA, VIA NATURAL OR ARTIFICIAL OPENING: ICD-10-PCS | Performed by: EMERGENCY MEDICINE

## 2025-01-01 PROCEDURE — 63600175 PHARM REV CODE 636 W HCPCS: Performed by: STUDENT IN AN ORGANIZED HEALTH CARE EDUCATION/TRAINING PROGRAM

## 2025-01-01 PROCEDURE — 82374 ASSAY BLOOD CARBON DIOXIDE: CPT | Performed by: STUDENT IN AN ORGANIZED HEALTH CARE EDUCATION/TRAINING PROGRAM

## 2025-01-01 PROCEDURE — 84443 ASSAY THYROID STIM HORMONE: CPT | Performed by: PHYSICIAN ASSISTANT

## 2025-01-01 PROCEDURE — 85520 HEPARIN ASSAY: CPT | Performed by: NURSE PRACTITIONER

## 2025-01-01 PROCEDURE — 83615 LACTATE (LD) (LDH) ENZYME: CPT | Performed by: PHYSICIAN ASSISTANT

## 2025-01-01 RX ORDER — VALACYCLOVIR HYDROCHLORIDE 1 G/1
1000 TABLET, FILM COATED ORAL 3 TIMES DAILY
Qty: 21 TABLET | Refills: 0 | Status: ON HOLD | OUTPATIENT
Start: 2025-01-01 | End: 2025-01-01

## 2025-01-01 RX ORDER — MAGNESIUM SULFATE HEPTAHYDRATE 40 MG/ML
2 INJECTION, SOLUTION INTRAVENOUS
Status: ACTIVE | OUTPATIENT
Start: 2025-01-01 | End: 2025-01-01

## 2025-01-01 RX ORDER — FENTANYL CITRATE 50 UG/ML
INJECTION, SOLUTION INTRAMUSCULAR; INTRAVENOUS
Status: DISCONTINUED | OUTPATIENT
Start: 2025-01-01 | End: 2025-01-01

## 2025-01-01 RX ORDER — INSULIN ASPART 100 [IU]/ML
0-5 INJECTION, SOLUTION INTRAVENOUS; SUBCUTANEOUS EVERY 4 HOURS PRN
Status: DISCONTINUED | OUTPATIENT
Start: 2025-01-01 | End: 2025-01-01

## 2025-01-01 RX ORDER — LEVETIRACETAM 500 MG/5ML
750 INJECTION, SOLUTION, CONCENTRATE INTRAVENOUS EVERY 12 HOURS
Status: DISCONTINUED | OUTPATIENT
Start: 2025-01-01 | End: 2025-01-01

## 2025-01-01 RX ORDER — MORPHINE SULFATE 2 MG/ML
4 INJECTION, SOLUTION INTRAMUSCULAR; INTRAVENOUS ONCE
Status: COMPLETED | OUTPATIENT
Start: 2025-01-01 | End: 2025-01-01

## 2025-01-01 RX ORDER — GLUCAGON 1 MG
1 KIT INJECTION
Status: DISCONTINUED | OUTPATIENT
Start: 2025-01-01 | End: 2025-06-28 | Stop reason: HOSPADM

## 2025-01-01 RX ORDER — FENTANYL CITRATE 50 UG/ML
INJECTION, SOLUTION INTRAMUSCULAR; INTRAVENOUS
Status: COMPLETED
Start: 2025-01-01 | End: 2025-01-01

## 2025-01-01 RX ORDER — HYDROCODONE BITARTRATE AND ACETAMINOPHEN 500; 5 MG/1; MG/1
TABLET ORAL CONTINUOUS
Status: DISCONTINUED | OUTPATIENT
Start: 2025-01-01 | End: 2025-01-01

## 2025-01-01 RX ORDER — POTASSIUM CHLORIDE 29.8 MG/ML
40 INJECTION INTRAVENOUS ONCE
Status: COMPLETED | OUTPATIENT
Start: 2025-01-01 | End: 2025-01-01

## 2025-01-01 RX ORDER — FLUDROCORTISONE ACETATE 0.1 MG/1
100 TABLET ORAL DAILY
Status: DISCONTINUED | OUTPATIENT
Start: 2025-01-01 | End: 2025-01-01

## 2025-01-01 RX ORDER — CALCIUM GLUCONATE 20 MG/ML
1 INJECTION, SOLUTION INTRAVENOUS
Status: COMPLETED | OUTPATIENT
Start: 2025-01-01 | End: 2025-01-01

## 2025-01-01 RX ORDER — ROCURONIUM BROMIDE 10 MG/ML
INJECTION, SOLUTION INTRAVENOUS
Status: DISCONTINUED | OUTPATIENT
Start: 2025-01-01 | End: 2025-01-01

## 2025-01-01 RX ORDER — HEPARIN SODIUM,PORCINE/D5W 25000/250
0-40 INTRAVENOUS SOLUTION INTRAVENOUS CONTINUOUS
Status: DISCONTINUED | OUTPATIENT
Start: 2025-01-01 | End: 2025-01-01

## 2025-01-01 RX ORDER — MORPHINE SULFATE 2 MG/ML
2 INJECTION, SOLUTION INTRAMUSCULAR; INTRAVENOUS
Status: DISCONTINUED | OUTPATIENT
Start: 2025-01-01 | End: 2025-06-28 | Stop reason: HOSPADM

## 2025-01-01 RX ORDER — NOREPINEPHRINE BIT/0.9 % NACL 32MG/250ML
0-3 PLASTIC BAG, INJECTION (ML) INTRAVENOUS CONTINUOUS
Status: DISCONTINUED | OUTPATIENT
Start: 2025-01-01 | End: 2025-01-01

## 2025-01-01 RX ORDER — MAGNESIUM SULFATE HEPTAHYDRATE 40 MG/ML
2 INJECTION, SOLUTION INTRAVENOUS
Status: DISPENSED | OUTPATIENT
Start: 2025-01-01 | End: 2025-01-01

## 2025-01-01 RX ORDER — FENTANYL CITRATE 50 UG/ML
50 INJECTION, SOLUTION INTRAMUSCULAR; INTRAVENOUS
Refills: 0 | Status: DISCONTINUED | OUTPATIENT
Start: 2025-01-01 | End: 2025-01-01

## 2025-01-01 RX ORDER — GLUCAGON 1 MG
1 KIT INJECTION
Status: DISCONTINUED | OUTPATIENT
Start: 2025-01-01 | End: 2025-01-01

## 2025-01-01 RX ORDER — METHYLPREDNISOLONE SOD SUCC 125 MG
125 VIAL (EA) INJECTION
Status: DISCONTINUED | OUTPATIENT
Start: 2025-01-01 | End: 2025-01-01

## 2025-01-01 RX ORDER — DEXMEDETOMIDINE HYDROCHLORIDE 4 UG/ML
INJECTION, SOLUTION INTRAVENOUS
Status: COMPLETED
Start: 2025-01-01 | End: 2025-01-01

## 2025-01-01 RX ORDER — MAGNESIUM SULFATE HEPTAHYDRATE 40 MG/ML
2 INJECTION, SOLUTION INTRAVENOUS
Status: DISCONTINUED | OUTPATIENT
Start: 2025-01-01 | End: 2025-01-01

## 2025-01-01 RX ORDER — ROCURONIUM BROMIDE 10 MG/ML
INJECTION, SOLUTION INTRAVENOUS
Status: COMPLETED
Start: 2025-01-01 | End: 2025-01-01

## 2025-01-01 RX ORDER — HYDROCODONE BITARTRATE AND ACETAMINOPHEN 500; 5 MG/1; MG/1
TABLET ORAL CONTINUOUS
Status: ACTIVE | OUTPATIENT
Start: 2025-01-01 | End: 2025-01-01

## 2025-01-01 RX ORDER — DEXMEDETOMIDINE HYDROCHLORIDE 4 UG/ML
0-1.4 INJECTION, SOLUTION INTRAVENOUS CONTINUOUS
Status: DISCONTINUED | OUTPATIENT
Start: 2025-01-01 | End: 2025-01-01

## 2025-01-01 RX ORDER — LORAZEPAM 2 MG/ML
1 INJECTION INTRAMUSCULAR
Status: DISCONTINUED | OUTPATIENT
Start: 2025-01-01 | End: 2025-06-28 | Stop reason: HOSPADM

## 2025-01-01 RX ORDER — ETOMIDATE 2 MG/ML
INJECTION INTRAVENOUS
Status: COMPLETED
Start: 2025-01-01 | End: 2025-01-01

## 2025-01-01 RX ORDER — MIDAZOLAM HYDROCHLORIDE 1 MG/ML
INJECTION INTRAMUSCULAR; INTRAVENOUS
Status: DISCONTINUED | OUTPATIENT
Start: 2025-01-01 | End: 2025-01-01

## 2025-01-01 RX ORDER — MAGNESIUM SULFATE HEPTAHYDRATE 40 MG/ML
2 INJECTION, SOLUTION INTRAVENOUS ONCE
Status: COMPLETED | OUTPATIENT
Start: 2025-01-01 | End: 2025-01-01

## 2025-01-01 RX ORDER — PANTOPRAZOLE SODIUM 40 MG/10ML
80 INJECTION, POWDER, LYOPHILIZED, FOR SOLUTION INTRAVENOUS ONCE
Status: COMPLETED | OUTPATIENT
Start: 2025-01-01 | End: 2025-01-01

## 2025-01-01 RX ORDER — ROCURONIUM BROMIDE 10 MG/ML
70 INJECTION, SOLUTION INTRAVENOUS
Status: COMPLETED | OUTPATIENT
Start: 2025-01-01 | End: 2025-01-01

## 2025-01-01 RX ORDER — HYDROCODONE BITARTRATE AND ACETAMINOPHEN 500; 5 MG/1; MG/1
TABLET ORAL
Status: DISCONTINUED | OUTPATIENT
Start: 2025-01-01 | End: 2025-06-28 | Stop reason: HOSPADM

## 2025-01-01 RX ORDER — DEXTROSE MONOHYDRATE 100 MG/ML
INJECTION, SOLUTION INTRAVENOUS CONTINUOUS PRN
Status: DISCONTINUED | OUTPATIENT
Start: 2025-01-01 | End: 2025-01-01

## 2025-01-01 RX ORDER — LORAZEPAM 2 MG/ML
2 INJECTION INTRAMUSCULAR ONCE
Status: COMPLETED | OUTPATIENT
Start: 2025-01-01 | End: 2025-01-01

## 2025-01-01 RX ORDER — SODIUM CHLORIDE 0.9 % (FLUSH) 0.9 %
10 SYRINGE (ML) INJECTION
Status: DISCONTINUED | OUTPATIENT
Start: 2025-01-01 | End: 2025-06-28 | Stop reason: HOSPADM

## 2025-01-01 RX ORDER — INSULIN ASPART 100 [IU]/ML
0-5 INJECTION, SOLUTION INTRAVENOUS; SUBCUTANEOUS EVERY 6 HOURS PRN
Status: DISCONTINUED | OUTPATIENT
Start: 2025-01-01 | End: 2025-06-28 | Stop reason: HOSPADM

## 2025-01-01 RX ORDER — INSULIN ASPART 100 [IU]/ML
0-5 INJECTION, SOLUTION INTRAVENOUS; SUBCUTANEOUS EVERY 6 HOURS PRN
Status: DISCONTINUED | OUTPATIENT
Start: 2025-01-01 | End: 2025-01-01

## 2025-01-01 RX ORDER — PROPOFOL 10 MG/ML
0-50 INJECTION, EMULSION INTRAVENOUS CONTINUOUS
Status: DISCONTINUED | OUTPATIENT
Start: 2025-01-01 | End: 2025-01-01

## 2025-01-01 RX ORDER — INDOMETHACIN 25 MG/1
50 CAPSULE ORAL
Status: DISCONTINUED | OUTPATIENT
Start: 2025-01-01 | End: 2025-01-01

## 2025-01-01 RX ORDER — PANTOPRAZOLE SODIUM 40 MG/10ML
40 INJECTION, POWDER, LYOPHILIZED, FOR SOLUTION INTRAVENOUS 2 TIMES DAILY
Status: DISCONTINUED | OUTPATIENT
Start: 2025-01-01 | End: 2025-01-01

## 2025-01-01 RX ORDER — PHENYLEPHRINE HCL IN 0.9% NACL 1 MG/10 ML
SYRINGE (ML) INTRAVENOUS
Status: COMPLETED
Start: 2025-01-01 | End: 2025-01-01

## 2025-01-01 RX ORDER — DEXTROSE 50 % IN WATER (D50W) INTRAVENOUS SYRINGE
25
Status: COMPLETED | OUTPATIENT
Start: 2025-01-01 | End: 2025-01-01

## 2025-01-01 RX ORDER — SODIUM BICARBONATE 1 MEQ/ML
50 SYRINGE (ML) INTRAVENOUS
Status: COMPLETED | OUTPATIENT
Start: 2025-01-01 | End: 2025-01-01

## 2025-01-01 RX ORDER — HEPARIN SODIUM 5000 [USP'U]/ML
5000 INJECTION, SOLUTION INTRAVENOUS; SUBCUTANEOUS EVERY 8 HOURS
Status: DISCONTINUED | OUTPATIENT
Start: 2025-01-01 | End: 2025-01-01

## 2025-01-01 RX ORDER — NOREPINEPHRINE BITARTRATE/D5W 4MG/250ML
0-3 PLASTIC BAG, INJECTION (ML) INTRAVENOUS CONTINUOUS
Status: DISCONTINUED | OUTPATIENT
Start: 2025-01-01 | End: 2025-01-01

## 2025-01-01 RX ORDER — PROPOFOL 10 MG/ML
INJECTION, EMULSION INTRAVENOUS
Status: DISPENSED
Start: 2025-01-01 | End: 2025-01-01

## 2025-01-01 RX ORDER — FENTANYL CITRATE 50 UG/ML
50 INJECTION, SOLUTION INTRAMUSCULAR; INTRAVENOUS ONCE
Status: COMPLETED | OUTPATIENT
Start: 2025-01-01 | End: 2025-01-01

## 2025-01-01 RX ORDER — ETOMIDATE 2 MG/ML
20 INJECTION INTRAVENOUS
Status: COMPLETED | OUTPATIENT
Start: 2025-01-01 | End: 2025-01-01

## 2025-01-01 RX ADMIN — SODIUM CHLORIDE: 9 INJECTION, SOLUTION INTRAVENOUS at 03:06

## 2025-01-01 RX ADMIN — POTASSIUM CHLORIDE 40 MEQ: 29.8 INJECTION, SOLUTION INTRAVENOUS at 02:06

## 2025-01-01 RX ADMIN — PIPERACILLIN SODIUM AND TAZOBACTAM SODIUM 4.5 G: 4; .5 INJECTION, POWDER, FOR SOLUTION INTRAVENOUS at 02:06

## 2025-01-01 RX ADMIN — SODIUM BICARBONATE: 84 INJECTION, SOLUTION INTRAVENOUS at 11:06

## 2025-01-01 RX ADMIN — HYDROCORTISONE SODIUM SUCCINATE 100 MG: 100 INJECTION INTRAMUSCULAR; INTRAVENOUS at 01:06

## 2025-01-01 RX ADMIN — SODIUM CHLORIDE 200 ML/HR: 9 INJECTION, SOLUTION INTRAVENOUS at 06:06

## 2025-01-01 RX ADMIN — HYDROCORTISONE SODIUM SUCCINATE 100 MG: 100 INJECTION INTRAMUSCULAR; INTRAVENOUS at 10:06

## 2025-01-01 RX ADMIN — ROCURONIUM BROMIDE 70 MG: 10 INJECTION, SOLUTION INTRAVENOUS at 07:06

## 2025-01-01 RX ADMIN — HYPROMELLOSE 2910 1 DROP: 5 SOLUTION/ DROPS OPHTHALMIC at 08:06

## 2025-01-01 RX ADMIN — DEXMEDETOMIDINE HYDROCHLORIDE 0.4 MCG/KG/HR: 4 INJECTION INTRAVENOUS at 12:06

## 2025-01-01 RX ADMIN — IOHEXOL 75 ML: 350 INJECTION, SOLUTION INTRAVENOUS at 08:06

## 2025-01-01 RX ADMIN — LEVETIRACETAM 750 MG: 500 INJECTION, SOLUTION, CONCENTRATE INTRAVENOUS at 08:06

## 2025-01-01 RX ADMIN — CALCIUM GLUCONATE 1 G: 20 INJECTION, SOLUTION INTRAVENOUS at 11:06

## 2025-01-01 RX ADMIN — NOREPINEPHRINE BITARTRATE 0.02 MCG/KG/MIN: 4 INJECTION, SOLUTION INTRAVENOUS at 07:06

## 2025-01-01 RX ADMIN — HYDROCORTISONE SODIUM SUCCINATE 100 MG: 100 INJECTION INTRAMUSCULAR; INTRAVENOUS at 05:06

## 2025-01-01 RX ADMIN — SODIUM BICARBONATE: 84 INJECTION, SOLUTION INTRAVENOUS at 08:06

## 2025-01-01 RX ADMIN — PANTOPRAZOLE SODIUM 40 MG: 40 INJECTION, POWDER, LYOPHILIZED, FOR SOLUTION INTRAVENOUS at 10:06

## 2025-01-01 RX ADMIN — SODIUM BICARBONATE: 84 INJECTION, SOLUTION INTRAVENOUS at 06:06

## 2025-01-01 RX ADMIN — VANCOMYCIN HYDROCHLORIDE 750 MG: 750 INJECTION, POWDER, LYOPHILIZED, FOR SOLUTION INTRAVENOUS at 09:06

## 2025-01-01 RX ADMIN — FLUDROCORTISONE ACETATE 100 MCG: 0.1 TABLET ORAL at 08:06

## 2025-01-01 RX ADMIN — FLUDROCORTISONE ACETATE 100 MCG: 0.1 TABLET ORAL at 02:06

## 2025-01-01 RX ADMIN — PANTOPRAZOLE SODIUM 40 MG: 40 INJECTION, POWDER, LYOPHILIZED, FOR SOLUTION INTRAVENOUS at 08:06

## 2025-01-01 RX ADMIN — PANTOPRAZOLE SODIUM 80 MG: 40 INJECTION, POWDER, FOR SOLUTION INTRAVENOUS at 10:06

## 2025-01-01 RX ADMIN — ROCURONIUM BROMIDE 50 MG: 10 INJECTION INTRAVENOUS at 04:06

## 2025-01-01 RX ADMIN — VASOPRESSIN 0.04 UNITS/MIN: 20 INJECTION INTRAVENOUS at 06:06

## 2025-01-01 RX ADMIN — NOREPINEPHRINE BITARTRATE 0.14 MCG/KG/MIN: 4 INJECTION, SOLUTION INTRAVENOUS at 12:06

## 2025-01-01 RX ADMIN — NOREPINEPHRINE BITARTRATE 0.28 MCG/KG/MIN: 1 INJECTION, SOLUTION, CONCENTRATE INTRAVENOUS at 03:06

## 2025-01-01 RX ADMIN — ETOMIDATE 20 MG: 2 INJECTION INTRAVENOUS at 07:06

## 2025-01-01 RX ADMIN — MAGNESIUM SULFATE HEPTAHYDRATE 2 G: 40 INJECTION, SOLUTION INTRAVENOUS at 06:06

## 2025-01-01 RX ADMIN — HYDROCORTISONE SODIUM SUCCINATE 100 MG: 100 INJECTION INTRAMUSCULAR; INTRAVENOUS at 06:06

## 2025-01-01 RX ADMIN — PIPERACILLIN SODIUM AND TAZOBACTAM SODIUM 4.5 G: 4; .5 INJECTION, POWDER, LYOPHILIZED, FOR SOLUTION INTRAVENOUS at 06:06

## 2025-01-01 RX ADMIN — POTASSIUM CHLORIDE 40 MEQ: 29.8 INJECTION, SOLUTION INTRAVENOUS at 09:06

## 2025-01-01 RX ADMIN — FENTANYL CITRATE 50 MCG: 50 INJECTION, SOLUTION INTRAMUSCULAR; INTRAVENOUS at 01:06

## 2025-01-01 RX ADMIN — NOREPINEPHRINE BITARTRATE 0.3 MCG/KG/MIN: 4 INJECTION, SOLUTION INTRAVENOUS at 09:06

## 2025-01-01 RX ADMIN — SODIUM BICARBONATE 50 MEQ: 84 INJECTION, SOLUTION INTRAVENOUS at 07:06

## 2025-01-01 RX ADMIN — MAGNESIUM SULFATE HEPTAHYDRATE 2 G: 40 INJECTION, SOLUTION INTRAVENOUS at 07:06

## 2025-01-01 RX ADMIN — SODIUM BICARBONATE: 84 INJECTION, SOLUTION INTRAVENOUS at 10:06

## 2025-01-01 RX ADMIN — INSULIN ASPART 3 UNITS: 100 INJECTION, SOLUTION INTRAVENOUS; SUBCUTANEOUS at 05:06

## 2025-01-01 RX ADMIN — NOREPINEPHRINE BITARTRATE 0.12 MCG/KG/MIN: 4 INJECTION, SOLUTION INTRAVENOUS at 05:06

## 2025-01-01 RX ADMIN — FENTANYL CITRATE 50 MCG: 50 INJECTION, SOLUTION INTRAMUSCULAR; INTRAVENOUS at 05:06

## 2025-01-01 RX ADMIN — INSULIN ASPART 5 UNITS: 100 INJECTION, SOLUTION INTRAVENOUS; SUBCUTANEOUS at 02:06

## 2025-01-01 RX ADMIN — HYPROMELLOSE 2910 1 DROP: 5 SOLUTION/ DROPS OPHTHALMIC at 03:06

## 2025-01-01 RX ADMIN — LACTULOSE 30 G: 20 SOLUTION ORAL at 02:06

## 2025-01-01 RX ADMIN — FENTANYL CITRATE 50 MCG: 50 INJECTION INTRAMUSCULAR; INTRAVENOUS at 03:06

## 2025-01-01 RX ADMIN — LACTULOSE 30 G: 20 SOLUTION ORAL at 08:06

## 2025-01-01 RX ADMIN — VANCOMYCIN HYDROCHLORIDE 1250 MG: 1.25 INJECTION, POWDER, LYOPHILIZED, FOR SOLUTION INTRAVENOUS at 11:06

## 2025-01-01 RX ADMIN — INSULIN ASPART 5 UNITS: 100 INJECTION, SOLUTION INTRAVENOUS; SUBCUTANEOUS at 10:06

## 2025-01-01 RX ADMIN — SODIUM CHLORIDE, POTASSIUM CHLORIDE, SODIUM LACTATE AND CALCIUM CHLORIDE 1000 ML: 600; 310; 30; 20 INJECTION, SOLUTION INTRAVENOUS at 10:06

## 2025-01-01 RX ADMIN — DEXTROSE MONOHYDRATE 12.5 G: 25 INJECTION, SOLUTION INTRAVENOUS at 01:06

## 2025-01-01 RX ADMIN — DEXTROSE MONOHYDRATE 25 G: 25 INJECTION, SOLUTION INTRAVENOUS at 06:06

## 2025-01-01 RX ADMIN — NOREPINEPHRINE BITARTRATE 0.16 MCG/KG/MIN: 1 INJECTION, SOLUTION, CONCENTRATE INTRAVENOUS at 01:06

## 2025-01-01 RX ADMIN — INSULIN HUMAN 2.8 UNITS/HR: 1 INJECTION, SOLUTION INTRAVENOUS at 11:06

## 2025-01-01 RX ADMIN — LACTULOSE 30 G: 20 SOLUTION ORAL at 03:06

## 2025-01-01 RX ADMIN — VASOPRESSIN 0.04 UNITS/MIN: 20 INJECTION INTRAVENOUS at 01:06

## 2025-01-01 RX ADMIN — VASOPRESSIN 0.04 UNITS/MIN: 20 INJECTION INTRAVENOUS at 10:06

## 2025-01-01 RX ADMIN — DEXTROSE MONOHYDRATE 12.5 G: 25 INJECTION, SOLUTION INTRAVENOUS at 11:06

## 2025-01-01 RX ADMIN — FENTANYL CITRATE 50 MCG: 50 INJECTION INTRAMUSCULAR; INTRAVENOUS at 11:06

## 2025-01-01 RX ADMIN — MORPHINE SULFATE 4 MG: 2 INJECTION, SOLUTION INTRAMUSCULAR; INTRAVENOUS at 06:06

## 2025-01-01 RX ADMIN — INSULIN HUMAN 2 UNITS/HR: 1 INJECTION, SOLUTION INTRAVENOUS at 06:06

## 2025-01-01 RX ADMIN — HYDROCORTISONE SODIUM SUCCINATE 100 MG: 100 INJECTION INTRAMUSCULAR; INTRAVENOUS at 09:06

## 2025-01-01 RX ADMIN — FENTANYL CITRATE 50 MCG: 50 INJECTION INTRAMUSCULAR; INTRAVENOUS at 01:06

## 2025-01-01 RX ADMIN — HYDROCORTISONE SODIUM SUCCINATE 100 MG: 100 INJECTION INTRAMUSCULAR; INTRAVENOUS at 02:06

## 2025-01-01 RX ADMIN — NOREPINEPHRINE BITARTRATE 0.12 MCG/KG/MIN: 4 INJECTION, SOLUTION INTRAVENOUS at 02:06

## 2025-01-01 RX ADMIN — POTASSIUM CHLORIDE 40 MEQ: 29.8 INJECTION, SOLUTION INTRAVENOUS at 06:06

## 2025-01-01 RX ADMIN — LORAZEPAM 1 MG: 2 INJECTION INTRAMUSCULAR; INTRAVENOUS at 07:06

## 2025-01-01 RX ADMIN — PIPERACILLIN SODIUM AND TAZOBACTAM SODIUM 4.5 G: 4; .5 INJECTION, POWDER, FOR SOLUTION INTRAVENOUS at 01:06

## 2025-01-01 RX ADMIN — CALCIUM GLUCONATE 1 G: 20 INJECTION, SOLUTION INTRAVENOUS at 12:06

## 2025-01-01 RX ADMIN — LACTULOSE 30 G: 20 SOLUTION ORAL at 10:06

## 2025-01-01 RX ADMIN — DEXMEDETOMIDINE HYDROCHLORIDE 0.2 MCG/KG/HR: 4 INJECTION INTRAVENOUS at 07:06

## 2025-01-01 RX ADMIN — HYPROMELLOSE 2910 1 DROP: 5 SOLUTION/ DROPS OPHTHALMIC at 10:06

## 2025-01-01 RX ADMIN — INSULIN ASPART 5 UNITS: 100 INJECTION, SOLUTION INTRAVENOUS; SUBCUTANEOUS at 04:06

## 2025-01-01 RX ADMIN — LEVETIRACETAM 750 MG: 500 INJECTION, SOLUTION, CONCENTRATE INTRAVENOUS at 10:06

## 2025-01-01 RX ADMIN — HYPROMELLOSE 2910 1 DROP: 5 SOLUTION/ DROPS OPHTHALMIC at 02:06

## 2025-01-01 RX ADMIN — NOREPINEPHRINE BITARTRATE 0.14 MCG/KG/MIN: 4 INJECTION, SOLUTION INTRAVENOUS at 07:06

## 2025-01-01 RX ADMIN — MAGNESIUM SULFATE HEPTAHYDRATE 2 G: 40 INJECTION, SOLUTION INTRAVENOUS at 09:06

## 2025-01-01 RX ADMIN — MIDAZOLAM HYDROCHLORIDE 2 MG: 2 INJECTION, SOLUTION INTRAMUSCULAR; INTRAVENOUS at 04:06

## 2025-01-01 RX ADMIN — SODIUM PHOSPHATE, MONOBASIC, MONOHYDRATE AND SODIUM PHOSPHATE, DIBASIC, ANHYDROUS 39.9 MMOL: 142; 276 INJECTION, SOLUTION INTRAVENOUS at 07:06

## 2025-01-01 RX ADMIN — LORAZEPAM 2 MG: 2 INJECTION INTRAMUSCULAR; INTRAVENOUS at 06:06

## 2025-01-03 ENCOUNTER — HOSPITAL ENCOUNTER (OUTPATIENT)
Dept: RADIOLOGY | Facility: OTHER | Age: 70
Discharge: HOME | End: 2025-01-03
Attending: OBSTETRICS & GYNECOLOGY
Payer: MEDICARE

## 2025-01-03 DIAGNOSIS — Z12.31 VISIT FOR SCREENING MAMMOGRAM: ICD-10-CM

## 2025-01-03 PROCEDURE — 77067 SCR MAMMO BI INCL CAD: CPT | Mod: 26,,, | Performed by: RADIOLOGY

## 2025-01-03 PROCEDURE — 77063 BREAST TOMOSYNTHESIS BI: CPT | Mod: TC

## 2025-01-03 PROCEDURE — 77063 BREAST TOMOSYNTHESIS BI: CPT | Mod: 26,,, | Performed by: RADIOLOGY

## 2025-01-24 ENCOUNTER — OCHSNER VIRTUAL EMERGENCY DEPARTMENT (OUTPATIENT)
Facility: CLINIC | Age: 70
End: 2025-01-24
Payer: MEDICARE

## 2025-01-24 ENCOUNTER — NURSE TRIAGE (OUTPATIENT)
Dept: ADMINISTRATIVE | Facility: CLINIC | Age: 70
End: 2025-01-24
Payer: MEDICARE

## 2025-01-24 DIAGNOSIS — R21 RASH: Primary | ICD-10-CM

## 2025-01-24 NOTE — PLAN OF CARE-OVED
[facility] Emergency Department Plan of Care Note    Referral source: Nurse On-Call      Reason for consult: Rash      Additional History: 73F with HTN, DM, HLD, and graves disease reports rash to her back, BUE, and abdomen that began 3 weeks ago. Pt reports rash to abdomen has blisters. Rash is itchy. Denies SOB, fever, or mucosal membrane involvement. Pt reports hx shingles.      Disposition recommended: Primary Care      Additional Recommendations: Pt was offered PCP visit today but wanted to wait until Monday. Appt given on Monday.    Diagnosis:  Rash

## 2025-01-24 NOTE — TELEPHONE ENCOUNTER
Pt reports rash to back, BUE, and abdomen that began 3 weeks ago. Pt reports rash to abdomen has blisters. Rashes are itchy. Denies SOB, fever. Pt reports hx shingles. Care advice to go to office now. Pt declines appointment or VV today s/t snow. Secure chat sent to Win. MD Polina verbalized pt can have appointment Monday. Appointment scheduled. Pt verbalizes understanding.   Reason for Disposition   Large or small blisters on skin (i.e., fluid filled bubbles or sacs)    Additional Information   Negative: Sudden onset of rash (within last 2 hours) and difficulty with breathing or swallowing   Negative: Difficult to awaken or acting confused (e.g., disoriented, slurred speech)   Negative: Fever and purple or blood-colored spots or dots   Negative: Too weak or sick to stand   Negative: Life-threatening reaction (anaphylaxis) in the past to similar substance (e.g., food, insect bite/sting, chemical, etc.) and < 2 hours since exposure   Negative: Sounds like a life-threatening emergency to the triager   Negative: Bright red, sunburn-like rash and current tampon use or nasal packing   Negative: Bright red, sunburn-like rash and wound infection or recent surgery   Negative: Bright red skin that peels off in sheets   Negative: Stiff neck (can't touch chin to chest)   Negative: Patient sounds very sick or weak to the triager   Negative: Fever   Negative: Face becomes swollen   Negative: Headache   Negative: Purple or blood-colored spots or dots (no fever and sounds well to triager)    Protocols used: Rash or Redness - Widespread-A-OH

## 2025-02-03 ENCOUNTER — OFFICE VISIT (OUTPATIENT)
Dept: INTERNAL MEDICINE | Facility: CLINIC | Age: 70
DRG: 435 | End: 2025-02-03
Payer: MEDICARE

## 2025-02-03 ENCOUNTER — HOSPITAL ENCOUNTER (INPATIENT)
Facility: OTHER | Age: 70
LOS: 15 days | Discharge: HOME | DRG: 435 | End: 2025-02-19
Attending: EMERGENCY MEDICINE | Admitting: INTERNAL MEDICINE
Payer: MEDICARE

## 2025-02-03 VITALS
DIASTOLIC BLOOD PRESSURE: 72 MMHG | HEIGHT: 60 IN | OXYGEN SATURATION: 99 % | BODY MASS INDEX: 34.15 KG/M2 | WEIGHT: 173.94 LBS | SYSTOLIC BLOOD PRESSURE: 122 MMHG | HEART RATE: 89 BPM

## 2025-02-03 DIAGNOSIS — M54.50 BILATERAL LOW BACK PAIN, UNSPECIFIED CHRONICITY, UNSPECIFIED WHETHER SCIATICA PRESENT: ICD-10-CM

## 2025-02-03 DIAGNOSIS — R17 JAUNDICE: ICD-10-CM

## 2025-02-03 DIAGNOSIS — R74.8 ELEVATED LIVER ENZYMES: ICD-10-CM

## 2025-02-03 DIAGNOSIS — N39.0 URINARY TRACT INFECTION WITHOUT HEMATURIA, SITE UNSPECIFIED: ICD-10-CM

## 2025-02-03 DIAGNOSIS — K83.1 OBSTRUCTIVE JAUNDICE: Primary | ICD-10-CM

## 2025-02-03 DIAGNOSIS — R21 RASH: Primary | ICD-10-CM

## 2025-02-03 DIAGNOSIS — R11.0 NAUSEA: ICD-10-CM

## 2025-02-03 DIAGNOSIS — K83.1 BILIARY OBSTRUCTION: ICD-10-CM

## 2025-02-03 DIAGNOSIS — R94.31 QT PROLONGATION: ICD-10-CM

## 2025-02-03 DIAGNOSIS — I10 ESSENTIAL HYPERTENSION: ICD-10-CM

## 2025-02-03 DIAGNOSIS — G40.319 GENERALIZED CONVULSIVE EPILEPSY WITH INTRACTABLE EPILEPSY: ICD-10-CM

## 2025-02-03 DIAGNOSIS — E11.9 TYPE 2 DIABETES MELLITUS WITHOUT COMPLICATION, WITHOUT LONG-TERM CURRENT USE OF INSULIN: ICD-10-CM

## 2025-02-03 DIAGNOSIS — K75.9 HEPATITIS: ICD-10-CM

## 2025-02-03 LAB
ALBUMIN SERPL BCP-MCNC: 3.3 G/DL (ref 3.5–5.2)
ALP SERPL-CCNC: 538 U/L (ref 40–150)
ALT SERPL W/O P-5'-P-CCNC: 298 U/L (ref 10–44)
ANION GAP SERPL CALC-SCNC: 14 MMOL/L (ref 8–16)
AST SERPL-CCNC: 314 U/L (ref 10–40)
BACTERIA #/AREA URNS HPF: ABNORMAL /HPF
BASOPHILS # BLD AUTO: 0.03 K/UL (ref 0–0.2)
BASOPHILS NFR BLD: 0.3 % (ref 0–1.9)
BILIRUB SERPL-MCNC: 7.4 MG/DL (ref 0.1–1)
BILIRUB UR QL STRIP: ABNORMAL
BUN SERPL-MCNC: 9 MG/DL (ref 8–23)
CALCIUM SERPL-MCNC: 10.2 MG/DL (ref 8.7–10.5)
CAOX CRY URNS QL MICRO: ABNORMAL
CHLORIDE SERPL-SCNC: 100 MMOL/L (ref 95–110)
CLARITY UR: ABNORMAL
CO2 SERPL-SCNC: 23 MMOL/L (ref 23–29)
COLOR UR: YELLOW
CREAT SERPL-MCNC: 0.8 MG/DL (ref 0.5–1.4)
DIFFERENTIAL METHOD BLD: ABNORMAL
EOSINOPHIL # BLD AUTO: 0 K/UL (ref 0–0.5)
EOSINOPHIL NFR BLD: 0 % (ref 0–8)
ERYTHROCYTE [DISTWIDTH] IN BLOOD BY AUTOMATED COUNT: 16 % (ref 11.5–14.5)
EST. GFR  (NO RACE VARIABLE): >60 ML/MIN/1.73 M^2
GLUCOSE SERPL-MCNC: 152 MG/DL (ref 70–110)
GLUCOSE UR QL STRIP: ABNORMAL
HCT VFR BLD AUTO: 36.5 % (ref 37–48.5)
HGB BLD-MCNC: 12.8 G/DL (ref 12–16)
HGB UR QL STRIP: NEGATIVE
HYALINE CASTS #/AREA URNS LPF: 17 /LPF
IMM GRANULOCYTES # BLD AUTO: 0.02 K/UL (ref 0–0.04)
IMM GRANULOCYTES NFR BLD AUTO: 0.2 % (ref 0–0.5)
INR PPP: 1.2 (ref 0.8–1.2)
KETONES UR QL STRIP: NEGATIVE
LEUKOCYTE ESTERASE UR QL STRIP: NEGATIVE
LIPASE SERPL-CCNC: 47 U/L (ref 4–60)
LYMPHOCYTES # BLD AUTO: 1.1 K/UL (ref 1–4.8)
LYMPHOCYTES NFR BLD: 11.9 % (ref 18–48)
MCH RBC QN AUTO: 30.8 PG (ref 27–31)
MCHC RBC AUTO-ENTMCNC: 35.1 G/DL (ref 32–36)
MCV RBC AUTO: 88 FL (ref 82–98)
MICROSCOPIC COMMENT: ABNORMAL
MONOCYTES # BLD AUTO: 0.7 K/UL (ref 0.3–1)
MONOCYTES NFR BLD: 7.1 % (ref 4–15)
NEUTROPHILS # BLD AUTO: 7.7 K/UL (ref 1.8–7.7)
NEUTROPHILS NFR BLD: 80.5 % (ref 38–73)
NITRITE UR QL STRIP: NEGATIVE
NON-SQ EPI CELLS #/AREA URNS HPF: 1 /HPF
NRBC BLD-RTO: 0 /100 WBC
PH UR STRIP: 6 [PH] (ref 5–8)
PLATELET # BLD AUTO: 329 K/UL (ref 150–450)
PMV BLD AUTO: 10.1 FL (ref 9.2–12.9)
POTASSIUM SERPL-SCNC: 3.6 MMOL/L (ref 3.5–5.1)
PROT SERPL-MCNC: 8.6 G/DL (ref 6–8.4)
PROT UR QL STRIP: ABNORMAL
PROTHROMBIN TIME: 13.8 SEC (ref 9–12.5)
RBC # BLD AUTO: 4.16 M/UL (ref 4–5.4)
RBC #/AREA URNS HPF: 21 /HPF (ref 0–4)
SODIUM SERPL-SCNC: 137 MMOL/L (ref 136–145)
SP GR UR STRIP: >1.03 (ref 1–1.03)
SQUAMOUS #/AREA URNS HPF: 8 /HPF
URN SPEC COLLECT METH UR: ABNORMAL
UROBILINOGEN UR STRIP-ACNC: ABNORMAL EU/DL
WBC # BLD AUTO: 9.53 K/UL (ref 3.9–12.7)
WBC #/AREA URNS HPF: 16 /HPF (ref 0–5)
WBC CLUMPS URNS QL MICRO: ABNORMAL

## 2025-02-03 PROCEDURE — 3078F DIAST BP <80 MM HG: CPT | Mod: CPTII,S$GLB,, | Performed by: STUDENT IN AN ORGANIZED HEALTH CARE EDUCATION/TRAINING PROGRAM

## 2025-02-03 PROCEDURE — 3072F LOW RISK FOR RETINOPATHY: CPT | Mod: CPTII,S$GLB,, | Performed by: STUDENT IN AN ORGANIZED HEALTH CARE EDUCATION/TRAINING PROGRAM

## 2025-02-03 PROCEDURE — 85610 PROTHROMBIN TIME: CPT | Performed by: EMERGENCY MEDICINE

## 2025-02-03 PROCEDURE — 80074 ACUTE HEPATITIS PANEL: CPT | Performed by: NURSE PRACTITIONER

## 2025-02-03 PROCEDURE — 1126F AMNT PAIN NOTED NONE PRSNT: CPT | Mod: CPTII,S$GLB,, | Performed by: STUDENT IN AN ORGANIZED HEALTH CARE EDUCATION/TRAINING PROGRAM

## 2025-02-03 PROCEDURE — 1101F PT FALLS ASSESS-DOCD LE1/YR: CPT | Mod: CPTII,S$GLB,, | Performed by: STUDENT IN AN ORGANIZED HEALTH CARE EDUCATION/TRAINING PROGRAM

## 2025-02-03 PROCEDURE — 85025 COMPLETE CBC W/AUTO DIFF WBC: CPT | Performed by: NURSE PRACTITIONER

## 2025-02-03 PROCEDURE — 83690 ASSAY OF LIPASE: CPT | Performed by: NURSE PRACTITIONER

## 2025-02-03 PROCEDURE — 3008F BODY MASS INDEX DOCD: CPT | Mod: CPTII,S$GLB,, | Performed by: STUDENT IN AN ORGANIZED HEALTH CARE EDUCATION/TRAINING PROGRAM

## 2025-02-03 PROCEDURE — 25000003 PHARM REV CODE 250: Performed by: EMERGENCY MEDICINE

## 2025-02-03 PROCEDURE — 80053 COMPREHEN METABOLIC PANEL: CPT | Mod: 91 | Performed by: NURSE PRACTITIONER

## 2025-02-03 PROCEDURE — 81000 URINALYSIS NONAUTO W/SCOPE: CPT | Mod: 91 | Performed by: NURSE PRACTITIONER

## 2025-02-03 PROCEDURE — 87086 URINE CULTURE/COLONY COUNT: CPT | Performed by: NURSE PRACTITIONER

## 2025-02-03 PROCEDURE — 3288F FALL RISK ASSESSMENT DOCD: CPT | Mod: CPTII,S$GLB,, | Performed by: STUDENT IN AN ORGANIZED HEALTH CARE EDUCATION/TRAINING PROGRAM

## 2025-02-03 PROCEDURE — 99214 OFFICE O/P EST MOD 30 MIN: CPT | Mod: S$GLB,,, | Performed by: STUDENT IN AN ORGANIZED HEALTH CARE EDUCATION/TRAINING PROGRAM

## 2025-02-03 PROCEDURE — 96375 TX/PRO/DX INJ NEW DRUG ADDON: CPT

## 2025-02-03 PROCEDURE — 96361 HYDRATE IV INFUSION ADD-ON: CPT

## 2025-02-03 PROCEDURE — 25500020 PHARM REV CODE 255: Performed by: EMERGENCY MEDICINE

## 2025-02-03 PROCEDURE — 99999 PR PBB SHADOW E&M-EST. PATIENT-LVL III: CPT | Mod: PBBFAC,,, | Performed by: STUDENT IN AN ORGANIZED HEALTH CARE EDUCATION/TRAINING PROGRAM

## 2025-02-03 PROCEDURE — 3074F SYST BP LT 130 MM HG: CPT | Mod: CPTII,S$GLB,, | Performed by: STUDENT IN AN ORGANIZED HEALTH CARE EDUCATION/TRAINING PROGRAM

## 2025-02-03 RX ORDER — CHLORHEXIDINE GLUCONATE ORAL RINSE 1.2 MG/ML
SOLUTION DENTAL
Status: ON HOLD | COMMUNITY
Start: 2025-01-03 | End: 2025-02-11

## 2025-02-03 RX ADMIN — IOHEXOL 75 ML: 350 INJECTION, SOLUTION INTRAVENOUS at 08:02

## 2025-02-03 RX ADMIN — SODIUM CHLORIDE 1000 ML: 9 INJECTION, SOLUTION INTRAVENOUS at 10:02

## 2025-02-03 NOTE — FIRST PROVIDER EVALUATION
Emergency Department TeleTriage Encounter Note      CHIEF COMPLAINT    Chief Complaint   Patient presents with    Sent by MD     Pt states she was sent by MD for elevated liver enzymes and bile duct blockage.       VITAL SIGNS   Initial Vitals [02/03/25 1728]   BP Pulse Resp Temp SpO2   137/65 88 18 98 °F (36.7 °C) 99 %      MAP       --            ALLERGIES    Review of patient's allergies indicates:   Allergen Reactions    Codeine Nausea Only       PROVIDER TRIAGE NOTE  Verbal consent for the teletriage evaluation was given by the patient at the start of the evaluation.  All efforts will be made to maintain patient's privacy during the evaluation.      This is a teletriage evaluation of a 69 y.o. female presenting to the ED with c/o sent in by MD for elevated liver enzymes.  Patient reports lower back pain, nausea, and vomiting. Limited physical exam via telehealth: The patient is awake, alert, answering questions appropriately and is not in respiratory distress.  As the Teletriage provider, I performed an initial assessment and ordered appropriate labs and imaging studies, if any, to facilitate the patient's care once placed in the ED. Once a room is available, care and a full evaluation will be completed by an alternate ED provider.  Any additional orders and the final disposition will be determined by that provider.  All imaging and labs will not be followed-up by the Teletriage Team, including myself.          ORDERS  Labs Reviewed - No data to display    ED Orders (720h ago, onward)      Start Ordered     Status Ordering Provider    02/03/25 1735 02/03/25 1734  Saline lock IV  Once         Ordered ROSI HAYES    02/03/25 1735 02/03/25 1734  CBC auto differential  STAT         Ordered ROSI HAYES    02/03/25 1735 02/03/25 1734  Comprehensive metabolic panel  STAT         Ordered ROSI HAYES    02/03/25 1735 02/03/25 1734  Cardiac Monitoring - Adult  Continuous        Comments: Notify Physician If:     Ordered ROSI HAYES    02/03/25 1735 02/03/25 1734  Pulse Oximetry Continuous  Continuous         Ordered ROSI HAYES    02/03/25 1735 02/03/25 1734  Urinalysis, Reflex to Urine Culture Urine, Clean Catch  STAT         Ordered ROSI HAYES    02/03/25 1735 02/03/25 1734  Hepatitis panel, acute  Once         Ordered ROSI HAYES              Virtual Visit Note: The provider triage portion of this emergency department evaluation and documentation was performed via [employer], a HIPAA-compliant telemedicine application, in concert with a tele-presenter in the room. A face to face patient evaluation with one of my colleagues will occur once the patient is placed in an emergency department room.      DISCLAIMER: This note was prepared with [employer] voice recognition transcription software. Garbled syntax, mangled pronouns, and other bizarre constructions may be attributed to that software system.

## 2025-02-03 NOTE — PROGRESS NOTES
[facility]  Subjective:     Patient ID: Katey Cerrato is a 69 y.o. female.  History of Present Illness    CHIEF COMPLAINT:  Patient presents today to establish care with a new doctor and for follow-up on a recent shingles diagnosis    CURRENT SYMPTOMS:  She reports a rash that started during a recent snowstorm, described as itchy. She developed back pain this morning at 5 AM, experiencing aching pain when getting up from bed. She denies any previous occurrences of similar back pain. She also reports nausea affecting her appetite and food intake. She notes a change in urine color from yellow to yellowish-orange, but denies other urinary symptoms.    SHINGLES:  She was diagnosed with shingles one week ago and was prescribed Valtrex, which she has been taking 3 times daily. She has one dose remaining to be completed today. She reports a previous episode of shingles in 2009.    NEUROLOGICAL HISTORY:  She has a history of seizures with two reported episodes - first in 2009 and second in 2017. She denies any seizures since starting Keppra.    CARDIOVASCULAR HISTORY:  She denies any history of heart attack or stroke.    SOCIAL HISTORY:  She denies history of smoking except for trying it once.    CURRENT MEDICATIONS:  She takes Atorvastatin, Trulicity once weekly, Valsartan 320mg, Metoprolol at night, Keppra two tablets at night, Amlodipine in the morning, and Meloxicam for arthritis.       Current Outpatient Medications   Medication Instructions    amLODIPine (NORVASC) 10 MG tablet TAKE 1 TABLET BY MOUTH EVERY DAY    atorvastatin (LIPITOR) 80 mg, Oral, Daily    blood sugar diagnostic Strp To check BG 1 times daily, to use with insurance preferred meter    blood-glucose meter kit To check BG 1 times daily, to use with insurance preferred meter    chlorhexidine (PERIDEX) 0.12 % solution SWISH AND SPIT 15MLS THREE TIMES DAILY FOR 7 AYS    FLUZONE HIGHDOSE QUAD 23-24  mcg/0.7 mL Syrg      lancets Misc To check BG 1 times daily, to use with insurance preferred meter    latanoprost 0.005 % ophthalmic solution 1 drop, Both Eyes    levETIRAcetam (KEPPRA) 1,500 mg, Oral    meloxicam (MOBIC) 15 mg, Oral, Daily PRN    metoprolol succinate (TOPROL-XL) 50 mg, Oral, Nightly    multivitamin-minerals-lutein Tab 1 tablet, Daily    mv-mn/iron/folic acid/herb 190 (VITAMIN D3 COMPLETE ORAL) Take by mouth.    ondansetron (ZOFRAN-ODT) 4 mg, Oral, Every 6 hours PRN    TRULICITY 3 mg/0.5 mL pen injector INJECT 3 MG (0.5 ML) UNDER THE SKIN ONCE A WEEK    valACYclovir (VALTREX) 1,000 mg, Oral, 3 times daily    valsartan (DIOVAN) 320 mg, Oral, Daily    VITAMIN B COMPLEX ORAL Take by mouth.     Objective:      Body mass index is 33.97 kg/m².  Vitals:    02/03/25 1321   BP: 122/72   Pulse: 89   SpO2: 99%   Weight: 78.9 kg (173 lb 15.1 oz)   Height: 5' (1.524 m)   PainSc: 0-No pain     Physical Exam   Physical Exam    General: No acute distress. Normal appearance.  Head: Normocephalic.  Eyes: Extraocular movements intact.  Neck: No thyromegaly.  Cardiovascular: Normal rate and rhythm. No murmur heard.  Lungs: Pulmonary effort is normal. Normal breath sounds. No wheezing. No rales.  Abdomen: Flat.  Musculoskeletal: No edema lower extremities.  Skin: Warm. Dry.  Neurological: Alert.  Psychiatric: Normal mood. Normal behavior.  Vitals: Blood pressure: 122/72.       Assessment:       1. Rash    2. Type 2 diabetes mellitus without complication, without long-term current use of insulin    3. Jaundice    4. Elevated liver enzymes    5. Essential hypertension    6. Generalized convulsive epilepsy with intractable epilepsy        Plan:   Impression (dictated)   Plan (software generated and edited)   Assessment & Plan    IMPRESSION:  Labs returned showing elevated liver enzymes and bilirubin consistent with obstructive jaundice. I called the patient and advised her to proceed to the ER for further workup. She expressed understanding  and stated she would go today.    Otherwise she has type 2 diabetes which is well-controlled on current dose of Trulicity, essential hypertension which is well-controlled on current antihypertensives, hyperlipidemia which is well-controlled with Lipitor, and history of seizure disorder which is well-controlled on current dose of Keppra.     Rash not consistent with shingles as it spans multiple bilateral dermatomes and has not follow up the course of shingles over the past few days.  Advised trial of hydrocortisone and will refer to Dermatology for definitive diagnosis and management.    RASH:  Assessed rash; unlikely to be shingles due to bilateral presentation and lack of confinement to 1 dermatome.  Rash started during recent snowstorm, presenting on back, shoulder, and stomach with associated pruritus.  Recommend OTC low-dose hydrocortisone cream for management.  DERMATOLOGY REFERRAL:  Refer to dermatology for further evaluation of rash.    OBSTRUCTIVE JAUNDICE:  Identified elevated liver enzymes and bilirubin consistent with obstructive jaundice.  Advise immediate emergency room evaluation for further workup.        Rash  -     Ambulatory referral/consult to Dermatology; Future; Expected date: 02/10/2025    Type 2 diabetes mellitus without complication, without long-term current use of insulin  -     Hemoglobin A1C; Future; Expected date: 02/03/2025  -     COMPREHENSIVE METABOLIC PANEL; Future; Expected date: 02/03/2025  -     CBC Without Differential; Future; Expected date: 02/03/2025  -     LIPID PANEL; Future; Expected date: 02/03/2025  -     Microalbumin/creatinine urine ratio; Future; Expected date: 02/03/2025  -     Urinalysis; Future; Expected date: 02/03/2025    Jaundice    Elevated liver enzymes    Essential hypertension    Generalized convulsive epilepsy with intractable epilepsy        All of your core healthy metrics are met.    Follow up in about 6 months (around 8/3/2025). or sooner prn (as  needed)          Shay Arroyo Bank [facility]  [address]  [address]  [place] [address]  Phone [phone]  Fax [phone]    Part of this note is dictated using the [employer] word recognition program. It may contain word recognition mistakes or wrong word substitutions (commonly he/she and is/was substitutions) that were missed on review.    Part of this note was generated with the assistance of ambient listening technology. Verbal consent was obtained by the patient and accompanying visitor(s) for the recording of patient appointment to facilitate this note. I attest to having reviewed and edited the generated note for accuracy, though some syntax or spelling errors may persist. Please contact the author of this note for any clarification.

## 2025-02-03 NOTE — ED TRIAGE NOTES
Pt had new blood work today done by her PCP. She received a call today and she was told to report to the ED because her liver test was abnormal. She was also told that there was blood in urine. Pt states she woke up this am with low back pain.

## 2025-02-04 PROBLEM — K86.9 PANCREATIC LESION: Status: ACTIVE | Noted: 2025-02-04

## 2025-02-04 LAB
ALBUMIN SERPL BCP-MCNC: 2.9 G/DL (ref 3.5–5.2)
ALP SERPL-CCNC: 501 U/L (ref 40–150)
ALT SERPL W/O P-5'-P-CCNC: 263 U/L (ref 10–44)
ANION GAP SERPL CALC-SCNC: 12 MMOL/L (ref 8–16)
AST SERPL-CCNC: 288 U/L (ref 10–40)
BACTERIA UR CULT: NORMAL
BACTERIA UR CULT: NORMAL
BASOPHILS # BLD AUTO: 0.03 K/UL (ref 0–0.2)
BASOPHILS NFR BLD: 0.4 % (ref 0–1.9)
BILIRUB SERPL-MCNC: 6.4 MG/DL (ref 0.1–1)
BUN SERPL-MCNC: 8 MG/DL (ref 8–23)
CALCIUM SERPL-MCNC: 9.9 MG/DL (ref 8.7–10.5)
CHLORIDE SERPL-SCNC: 102 MMOL/L (ref 95–110)
CO2 SERPL-SCNC: 24 MMOL/L (ref 23–29)
CREAT SERPL-MCNC: 0.7 MG/DL (ref 0.5–1.4)
DIFFERENTIAL METHOD BLD: ABNORMAL
EOSINOPHIL # BLD AUTO: 0 K/UL (ref 0–0.5)
EOSINOPHIL NFR BLD: 0.1 % (ref 0–8)
ERYTHROCYTE [DISTWIDTH] IN BLOOD BY AUTOMATED COUNT: 16.2 % (ref 11.5–14.5)
EST. GFR  (NO RACE VARIABLE): >60 ML/MIN/1.73 M^2
GLUCOSE SERPL-MCNC: 94 MG/DL (ref 70–110)
HAV IGM SERPL QL IA: NORMAL
HBV CORE IGM SERPL QL IA: NORMAL
HBV SURFACE AG SERPL QL IA: NORMAL
HCT VFR BLD AUTO: 33.2 % (ref 37–48.5)
HCV AB SERPL QL IA: NORMAL
HGB BLD-MCNC: 11.4 G/DL (ref 12–16)
IMM GRANULOCYTES # BLD AUTO: 0.01 K/UL (ref 0–0.04)
IMM GRANULOCYTES NFR BLD AUTO: 0.1 % (ref 0–0.5)
LYMPHOCYTES # BLD AUTO: 2 K/UL (ref 1–4.8)
LYMPHOCYTES NFR BLD: 28.6 % (ref 18–48)
MAGNESIUM SERPL-MCNC: 1.6 MG/DL (ref 1.6–2.6)
MCH RBC QN AUTO: 30.5 PG (ref 27–31)
MCHC RBC AUTO-ENTMCNC: 34.3 G/DL (ref 32–36)
MCV RBC AUTO: 89 FL (ref 82–98)
MONOCYTES # BLD AUTO: 0.7 K/UL (ref 0.3–1)
MONOCYTES NFR BLD: 10.4 % (ref 4–15)
NEUTROPHILS # BLD AUTO: 4.2 K/UL (ref 1.8–7.7)
NEUTROPHILS NFR BLD: 60.4 % (ref 38–73)
NRBC BLD-RTO: 0 /100 WBC
PLATELET # BLD AUTO: 281 K/UL (ref 150–450)
PMV BLD AUTO: 9.7 FL (ref 9.2–12.9)
POCT GLUCOSE: 111 MG/DL (ref 70–110)
POTASSIUM SERPL-SCNC: 3.6 MMOL/L (ref 3.5–5.1)
PROT SERPL-MCNC: 7.8 G/DL (ref 6–8.4)
RBC # BLD AUTO: 3.74 M/UL (ref 4–5.4)
SODIUM SERPL-SCNC: 138 MMOL/L (ref 136–145)
WBC # BLD AUTO: 7.02 K/UL (ref 3.9–12.7)

## 2025-02-04 PROCEDURE — 83735 ASSAY OF MAGNESIUM: CPT | Performed by: PHYSICIAN ASSISTANT

## 2025-02-04 PROCEDURE — 99285 EMERGENCY DEPT VISIT HI MDM: CPT | Mod: 25

## 2025-02-04 PROCEDURE — 25000003 PHARM REV CODE 250: Performed by: HOSPITALIST

## 2025-02-04 PROCEDURE — 63600175 PHARM REV CODE 636 W HCPCS: Mod: TB,JZ | Performed by: EMERGENCY MEDICINE

## 2025-02-04 PROCEDURE — 25000003 PHARM REV CODE 250: Performed by: PHYSICIAN ASSISTANT

## 2025-02-04 PROCEDURE — A9585 GADOBUTROL INJECTION: HCPCS | Performed by: HOSPITALIST

## 2025-02-04 PROCEDURE — 96374 THER/PROPH/DIAG INJ IV PUSH: CPT

## 2025-02-04 PROCEDURE — 21400001 HC TELEMETRY ROOM

## 2025-02-04 PROCEDURE — A4216 STERILE WATER/SALINE, 10 ML: HCPCS | Performed by: PHYSICIAN ASSISTANT

## 2025-02-04 PROCEDURE — 36415 COLL VENOUS BLD VENIPUNCTURE: CPT | Performed by: PHYSICIAN ASSISTANT

## 2025-02-04 PROCEDURE — 85025 COMPLETE CBC W/AUTO DIFF WBC: CPT | Performed by: PHYSICIAN ASSISTANT

## 2025-02-04 PROCEDURE — 25500020 PHARM REV CODE 255: Performed by: HOSPITALIST

## 2025-02-04 PROCEDURE — 82962 GLUCOSE BLOOD TEST: CPT

## 2025-02-04 PROCEDURE — 80053 COMPREHEN METABOLIC PANEL: CPT | Performed by: PHYSICIAN ASSISTANT

## 2025-02-04 RX ORDER — CEFTRIAXONE 1 G/1
1 INJECTION, POWDER, FOR SOLUTION INTRAMUSCULAR; INTRAVENOUS
Status: COMPLETED | OUTPATIENT
Start: 2025-02-04 | End: 2025-02-04

## 2025-02-04 RX ORDER — GLUCAGON 1 MG
1 KIT INJECTION
Status: DISCONTINUED | OUTPATIENT
Start: 2025-02-04 | End: 2025-02-19 | Stop reason: HOSPADM

## 2025-02-04 RX ORDER — KETOROLAC TROMETHAMINE 30 MG/ML
10 INJECTION, SOLUTION INTRAMUSCULAR; INTRAVENOUS
Status: COMPLETED | OUTPATIENT
Start: 2025-02-04 | End: 2025-02-04

## 2025-02-04 RX ORDER — ACETAMINOPHEN 325 MG/1
650 TABLET ORAL EVERY 6 HOURS PRN
Status: DISCONTINUED | OUTPATIENT
Start: 2025-02-04 | End: 2025-02-09

## 2025-02-04 RX ORDER — LATANOPROST 50 UG/ML
1 SOLUTION/ DROPS OPHTHALMIC NIGHTLY
Status: DISCONTINUED | OUTPATIENT
Start: 2025-02-04 | End: 2025-02-19 | Stop reason: HOSPADM

## 2025-02-04 RX ORDER — AMOXICILLIN 250 MG
1 CAPSULE ORAL 2 TIMES DAILY PRN
Status: DISCONTINUED | OUTPATIENT
Start: 2025-02-04 | End: 2025-02-14

## 2025-02-04 RX ORDER — TALC
6 POWDER (GRAM) TOPICAL NIGHTLY PRN
Status: DISCONTINUED | OUTPATIENT
Start: 2025-02-04 | End: 2025-02-19 | Stop reason: HOSPADM

## 2025-02-04 RX ORDER — METOPROLOL SUCCINATE 50 MG/1
50 TABLET, EXTENDED RELEASE ORAL NIGHTLY
Status: DISCONTINUED | OUTPATIENT
Start: 2025-02-04 | End: 2025-02-14

## 2025-02-04 RX ORDER — VALSARTAN 160 MG/1
320 TABLET ORAL DAILY
Status: DISCONTINUED | OUTPATIENT
Start: 2025-02-04 | End: 2025-02-14

## 2025-02-04 RX ORDER — INSULIN ASPART 100 [IU]/ML
0-5 INJECTION, SOLUTION INTRAVENOUS; SUBCUTANEOUS EVERY 6 HOURS PRN
Status: DISCONTINUED | OUTPATIENT
Start: 2025-02-04 | End: 2025-02-11

## 2025-02-04 RX ORDER — IBUPROFEN 200 MG
24 TABLET ORAL
Status: DISCONTINUED | OUTPATIENT
Start: 2025-02-04 | End: 2025-02-19 | Stop reason: HOSPADM

## 2025-02-04 RX ORDER — IBUPROFEN 200 MG
16 TABLET ORAL
Status: DISCONTINUED | OUTPATIENT
Start: 2025-02-04 | End: 2025-02-19 | Stop reason: HOSPADM

## 2025-02-04 RX ORDER — LEVETIRACETAM 500 MG/1
1500 TABLET ORAL DAILY
Status: DISCONTINUED | OUTPATIENT
Start: 2025-02-04 | End: 2025-02-04

## 2025-02-04 RX ORDER — ATORVASTATIN CALCIUM 20 MG/1
80 TABLET, FILM COATED ORAL DAILY
Status: DISCONTINUED | OUTPATIENT
Start: 2025-02-04 | End: 2025-02-04

## 2025-02-04 RX ORDER — NALOXONE HCL 0.4 MG/ML
0.02 VIAL (ML) INJECTION
Status: DISCONTINUED | OUTPATIENT
Start: 2025-02-04 | End: 2025-02-19 | Stop reason: HOSPADM

## 2025-02-04 RX ORDER — LEVETIRACETAM 500 MG/1
1500 TABLET ORAL NIGHTLY
Status: DISCONTINUED | OUTPATIENT
Start: 2025-02-04 | End: 2025-02-19 | Stop reason: HOSPADM

## 2025-02-04 RX ORDER — GLUCAGON 1 MG
1 KIT INJECTION
Status: DISCONTINUED | OUTPATIENT
Start: 2025-02-04 | End: 2025-02-07

## 2025-02-04 RX ORDER — SODIUM CHLORIDE 0.9 % (FLUSH) 0.9 %
10 SYRINGE (ML) INJECTION EVERY 8 HOURS
Status: DISCONTINUED | OUTPATIENT
Start: 2025-02-04 | End: 2025-02-19 | Stop reason: HOSPADM

## 2025-02-04 RX ORDER — GADOBUTROL 604.72 MG/ML
8 INJECTION INTRAVENOUS
Status: COMPLETED | OUTPATIENT
Start: 2025-02-04 | End: 2025-02-04

## 2025-02-04 RX ORDER — AMLODIPINE BESYLATE 10 MG/1
10 TABLET ORAL DAILY
Status: DISCONTINUED | OUTPATIENT
Start: 2025-02-04 | End: 2025-02-14

## 2025-02-04 RX ADMIN — METOPROLOL SUCCINATE 50 MG: 50 TABLET, EXTENDED RELEASE ORAL at 08:02

## 2025-02-04 RX ADMIN — VALSARTAN 320 MG: 160 TABLET, FILM COATED ORAL at 08:02

## 2025-02-04 RX ADMIN — SODIUM CHLORIDE, PRESERVATIVE FREE 10 ML: 5 INJECTION INTRAVENOUS at 07:02

## 2025-02-04 RX ADMIN — SODIUM CHLORIDE, PRESERVATIVE FREE 10 ML: 5 INJECTION INTRAVENOUS at 04:02

## 2025-02-04 RX ADMIN — GADOBUTROL 8 ML: 604.72 INJECTION INTRAVENOUS at 06:02

## 2025-02-04 RX ADMIN — SODIUM CHLORIDE, PRESERVATIVE FREE 10 ML: 5 INJECTION INTRAVENOUS at 09:02

## 2025-02-04 RX ADMIN — KETOROLAC TROMETHAMINE 10 MG: 30 INJECTION, SOLUTION INTRAMUSCULAR at 01:02

## 2025-02-04 RX ADMIN — LEVETIRACETAM 1500 MG: 750 TABLET, FILM COATED ORAL at 08:02

## 2025-02-04 RX ADMIN — CEFTRIAXONE SODIUM 1 G: 1 INJECTION, POWDER, FOR SOLUTION INTRAMUSCULAR; INTRAVENOUS at 01:02

## 2025-02-04 RX ADMIN — AMLODIPINE BESYLATE 10 MG: 10 TABLET ORAL at 07:02

## 2025-02-04 NOTE — ED NOTES
Pt ambulatory to the restroom independently; steady gait noted.  Pt returned to room 6 without incident.  Pt replaced on continuous cardiac and pulse ox monitoring with non-invasion blood pressure to cycle every 30 minutes. Bed locked in lowest position; side rails up and locked x 2; call light, bedside table, and personal belongings within reach. Pt awaiting CT results; instructed to alert nurse for assistance and before attempting to get out of bed; verbalizes understanding. Monitoring ongoing.

## 2025-02-04 NOTE — ASSESSMENT & PLAN NOTE
- Ms. Katey Cerrato presents with discoloration of the skin and eyes, and labs that show elevated bilirubin, and imaging with biliary ductal dilatation  - she has history of cholecystectomy  - labs also show a pancreatic lesion  - she was scheduled for transfer and ERCP  - transfers pending  - MRCP and GI consult ordered  - p.r.n. meds for symptomatic relief  - monitor

## 2025-02-04 NOTE — PLAN OF CARE
Problem: Adult Inpatient Plan of Care  Goal: Plan of Care Review  2/4/2025 1657 by Argelia Mayer RN  Outcome: Progressing  2/4/2025 1639 by Argelia Mayer RN  Outcome: Progressing  Goal: Patient-Specific Goal (Individualized)  2/4/2025 1657 by Argelia Mayer RN  Outcome: Progressing  2/4/2025 1639 by Argelia Mayer RN  Outcome: Progressing  Goal: Absence of Hospital-Acquired Illness or Injury  2/4/2025 1657 by Argelia Mayer RN  Outcome: Progressing  2/4/2025 1639 by Argelia Mayer RN  Outcome: Progressing  Goal: Optimal Comfort and Wellbeing  2/4/2025 1657 by Argelia Mayer RN  Outcome: Progressing  2/4/2025 1639 by Argelia Mayer RN  Outcome: Progressing  Goal: Readiness for Transition of Care  2/4/2025 1657 by Argelia Mayer RN  Outcome: Progressing  2/4/2025 1639 by Argelia Mayer RN  Outcome: Progressing     Problem: Diabetes Comorbidity  Goal: Blood Glucose Level Within Targeted Range  2/4/2025 1657 by Argelia Mayer RN  Outcome: Progressing  2/4/2025 1639 by Argelia Mayer RN  Outcome: Progressing

## 2025-02-04 NOTE — ASSESSMENT & PLAN NOTE
Patient's blood pressure range in the last 24 hours was: BP  Min: 122/72  Max: 160/70.The patient's inpatient anti-hypertensive regimen is listed below:  Current Antihypertensives       Plan  - BP is controlled, no changes needed to their regimen

## 2025-02-04 NOTE — ED PROVIDER NOTES
Encounter Date: 2/3/2025       History     Chief Complaint   Patient presents with    Sent by MD     Pt states she was sent by MD for elevated liver enzymes and bile duct blockage.     This is a pleasant 69-year-old female who presents for evaluation after having attempted to obtain regular outpatient follow-up for worsening discoloration in urine which appeared orange to her in nature.  She had recently undergone treatment for shingles and took Valtrex with improvement in the rash but then began have the abnormal urine prompting her to seek care with a new primary physician.  After seeing the primary physician a center for outpatient laboratory testing, she was found to have abnormal liver function testing and as a result they referred to the emergency department for further evaluation.  She endorses some low back pain.  Denies any fevers, chills, trauma, alcohol consumption, Tylenol consumption.    The history is provided by the patient and medical records.     Review of patient's allergies indicates:   Allergen Reactions    Codeine Nausea Only     Past Medical History:   Diagnosis Date    Cataract     Empty sella syndrome     GHD (growth hormone deficiency)     Glaucoma     Graves' disease with exophthalmos     History of vitamin D deficiency     Hyperlipidemia     Hypertension     Seizures     Thyroid nodule     Thyroid nodule     Type II or unspecified type diabetes mellitus without mention of complication, uncontrolled      Past Surgical History:   Procedure Laterality Date    BREAST BIOPSY      CATARACT EXTRACTION W/  INTRAOCULAR LENS IMPLANT Right 3/15/2021    Procedure: EXTRACTION, CATARACT, WITH IOL INSERTION;  Surgeon: Cj Caban MD;  Location: [facility] OR;  Service: Ophthalmology;  Laterality: Right;    CATARACT EXTRACTION W/  INTRAOCULAR LENS IMPLANT Left 3/29/2021    Procedure: EXTRACTION, CATARACT, WITH IOL INSERTION;  Surgeon: Cj Caban MD;  Location: [facility] OR;  Service: Ophthalmology;   Laterality: Left;    CHOLECYSTECTOMY      HYSTERECTOMY      one ovary intact    INJECTION OF JOINT Left 2/6/2024    Procedure: INJECTION, JOINT LEFT KNEE WITH STEROID;  Surgeon: She Schwartz MD;  Location: [facility];  Service: Pain Management;  Laterality: Left;  [phone]    KNEE SURGERY      LYMPH NODE BIOPSY  3010    OOPHORECTOMY       Family History   Problem Relation Name Age of Onset    Cancer Mother      Cataracts Mother      Glaucoma Mother          shunt    Heart disease Mother      Tremor Mother      Breast cancer Mother  78    Heart disease Father 64   chf     Heart disease Sister      No Known Problems Sister      Breast cancer Sister  60    Hypertension Brother      No Known Problems Brother      No Known Problems Maternal Aunt      No Known Problems Maternal Uncle      No Known Problems Paternal Aunt      No Known Problems Paternal Uncle      No Known Problems Maternal Grandmother      No Known Problems Maternal Grandfather      No Known Problems Paternal Grandmother      No Known Problems Paternal Grandfather      No Known Problems Son      No Known Problems Other       Social History     Tobacco Use    Smoking status: Never    Smokeless tobacco: Never   Substance Use Topics    Alcohol use: No     Comment: none    Drug use: No     Review of Systems  Constitutional-no fever  HEENT-no congestion  Eyes-positive jaundice  Respiratory-no shortness of breath  Cardio-no chest pain  GI-no abdominal pain  Endocrine-no cold intolerance  -no difficulty urinating  MSK-positive back pain  Skin-no rashes  Allergy-no environmental allergy  Neurologic-, no headache  Hematology-no swollen nodes  Behavioral-no confusion  Physical Exam     Initial Vitals [02/03/25 1728]   BP Pulse Resp Temp SpO2   137/65 88 18 98 °F (36.7 °C) 99 %      MAP       --         Physical Exam  Constitutional:  Generally well-appearing 69-year-old female in no obvious distress  Eyes:  Yellowing of the sclera bilaterally, pupils  3 mm briskly reactive and symmetric  ENT       Head: Normocephalic, atraumatic.       Nose: Normal external appearance        Mouth/Throat: no strigulous respirations   Hematological/Lymphatic/Immunilogical: no visible lymphadenopathy   Cardiovascular: Normal rate,   Respiratory: Normal respiratory effort.   Gastrointestinal: non distended soft, no rebound, no guarding   Musculoskeletal: Normal range of motion in all extremities. No obvious deformities or swelling.  Mild tenderness to palpation in the paraspinal muscles of the lumbar spine  Neurologic: Alert, oriented. Normal speech and language. No gross focal neurologic deficits are appreciated.  Skin: Skin is warm, dry. No rash noted.  Psychiatric: Mood and affect are normal.   ED Course   Procedures  Labs Reviewed   CBC W/ AUTO DIFFERENTIAL - Abnormal       Result Value    WBC 9.53      RBC 4.16      Hemoglobin 12.8      Hematocrit 36.5 (*)     MCV 88      MCH 30.8      MCHC 35.1      RDW 16.0 (*)     Platelets 329      MPV 10.1      Immature Granulocytes 0.2      Gran # (ANC) 7.7      Immature Grans (Abs) 0.02      Lymph # 1.1      Mono # 0.7      Eos # 0.0      Baso # 0.03      nRBC 0      Gran % 80.5 (*)     Lymph % 11.9 (*)     Mono % 7.1      Eosinophil % 0.0      Basophil % 0.3      Differential Method Automated     COMPREHENSIVE METABOLIC PANEL - Abnormal    Sodium 137      Potassium 3.6      Chloride 100      CO2 23      Glucose 152 (*)     BUN 9      Creatinine 0.8      Calcium 10.2      Total Protein 8.6 (*)     Albumin 3.3 (*)     Total Bilirubin 7.4 (*)     Alkaline Phosphatase 538 (*)      (*)      (*)     eGFR >60      Anion Gap 14     URINALYSIS, REFLEX TO URINE CULTURE - Abnormal    Specimen UA Urine, Clean Catch      Color, UA Yellow      Appearance, UA Hazy (*)     pH, UA 6.0      Specific Gravity, UA >1.030 (*)     Protein, UA 1+ (*)     Glucose, UA Trace (*)     Ketones, UA Negative      Bilirubin (UA) 3+ (*)     Occult Blood UA  Negative      Nitrite, UA Negative      Urobilinogen, UA 4.0-6.0 (*)     Leukocytes, UA Negative      Narrative:     Specimen Source->Urine   PROTIME-INR - Abnormal    Prothrombin Time 13.8 (*)     INR 1.2     URINALYSIS MICROSCOPIC - Abnormal    RBC, UA 21 (*)     WBC, UA 16 (*)     WBC Clumps, UA Few (*)     Bacteria Few (*)     Squam Epithel, UA 8      Non-Squam Epith 1 (*)     Hyaline Casts, UA 17 (*)     Ca Oxalate Geraldine, UA Few      Microscopic Comment SEE COMMENT      Narrative:     Specimen Source->Urine   CULTURE, URINE   HEPATITIS PANEL, ACUTE    Hepatitis B Surface Ag Non-reactive      Hepatitis C Ab Non-reactive     LIPASE   PROTIME-INR   LIPASE    Lipase 47     HEPATITIS PANEL, ACUTE          Imaging Results               CT Abdomen Pelvis With IV Contrast NO Oral Contrast (Final result)  Result time 02/04/25 00:03:27      Final result by Matilda Florez MD (02/04/25 00:03:27)                   Impression:      Significant intrahepatic biliary ductal dilatation.  Mild extrahepatic biliary ductal dilatation.  9 mm fat containing lesion in the uncinate process of the pancreas.  Cholecystectomy.    Retrocrural adenopathy.    This report was flagged in Epic as abnormal.      Electronically signed by: Matilda Florez  Date:    02/04/2025  Time:    00:03               Narrative:    EXAMINATION:  CT OF ABDOMEN PELVIS WITH    CLINICAL HISTORY:  Abdominal pain, acute, nonlocalized;    TECHNIQUE:  5 mm enhanced axial images were obtained from the lung bases through the greater trochanters.  Seventy-five mL of Omnipaque 350 was injected.    COMPARISON:  None.    FINDINGS:  Significant intrahepatic biliary ductal dilatation is present.  There is mild intrahepatic biliary ductal dilatation up to 8 mm (sagittal image 88)..    Spleen, left kidney and adrenal glands are unremarkable. The gallbladder is surgically absent.    There is a 3.1 cm right renal cyst.    There is a 9 mm fat containing lesion in uncinate  process.    There is no definite evidence for abdominal adenopathy or ascites.    There are no pelvic masses or adenopathy.  The appendix is not inflamed.  The uterus is surgically absent.    There is no free fluid in the pelvis.    There is mild bibasilar atelectasis.  There is a retrocrural enlarged lymph node.  There are prominent lymph node adjacent to the right distal thoracic aorta (coronal image 70).    Mild levoscoliosis is present.  There is grade 1 anterolisthesis at L5/S1.  Spondylitic changes are present greatest at L5/S1.                                       US Abdomen Limited (Final result)  Result time 02/03/25 20:35:50      Final result by Matilda Florez MD (02/03/25 20:35:50)                   Impression:      Hepatic steatosis.  Intrahepatic biliary ductal dilatation.  Cholecystectomy.    Right renal cyst.      Electronically signed by: Matilda Florez  Date:    02/03/2025  Time:    20:35               Narrative:    EXAMINATION:  ULTRASOUND ABDOMEN LIMITED (GALLBLADDER)    CLINICAL HISTORY:  biliary obstruction;    TECHNIQUE:  Limited ultrasound of the right upper quadrant of the abdomen with attention to the gallbladder region was performed.    COMPARISON:  None.    FINDINGS:  Liver: Increased liver echogenicity.  Intrahepatic biliary ductal dilatation.    Gallbladder: Surgically absent.    Biliary system: The common duct is not dilated, measuring 7.8 mm.  Positive intrahepatic ductal dilatation.    Miscellaneous: Pancreas is partially obscured.  There is a 3.2 x 3.2 x 2.8 cm anechoic avascular right renal lesion or cyst.                                       Medications   ketorolac injection 9.999 mg (has no administration in time range)   cefTRIAXone injection 1 g (has no administration in time range)   iohexoL (OMNIPAQUE 350) injection 75 mL (75 mLs Intravenous Given 2/3/25 2047)   sodium chloride 0.9% bolus 1,000 mL 1,000 mL (0 mLs Intravenous Stopped 2/3/25 4727)     Medical Decision  Making  Differential diagnosis-hepatitis, neoplasm, obstructive jaundice, retained stone    Problems Addressed:  Bilateral low back pain, unspecified chronicity, unspecified whether sciatica present: acute illness or injury  Hepatitis: acute illness or injury  Obstructive jaundice: acute illness or injury  Urinary tract infection without hematuria, site unspecified: acute illness or injury    Amount and/or Complexity of Data Reviewed  External Data Reviewed: labs, radiology, ECG and notes.     Details: Recent history of shingles  Labs: ordered. Decision-making details documented in ED Course.  Radiology: ordered and independent interpretation performed. Decision-making details documented in ED Course.    Risk  OTC drugs.  Prescription drug management.  Parenteral controlled substances.  Decision regarding hospitalization.  Diagnosis or treatment significantly limited by social determinants of health.               ED Course as of 02/04/25 0143 Tue Feb 04, 2025 0131 Updated patient at the bedside in regards to current care plan, will attempt transfer for potential ERCP.  Will obtain MRCP while waiting.  Have discussed with Hospital Medicine given likelihood for prolonged boarding time. [TK]      ED Course User Index  [TK] Harjit Joyner MD                           Clinical Impression:  Final diagnoses:  [K83.1] Obstructive jaundice (Primary)  [K75.9] Hepatitis  [N39.0] Urinary tract infection without hematuria, site unspecified  [M54.50] Bilateral low back pain, unspecified chronicity, unspecified whether sciatica present          ED Disposition Condition    Observation Stable                Harjit Joyner MD  02/04/25 0143

## 2025-02-04 NOTE — H&P
[facility] Medicine  History & Physical    Patient Name: Katey Cerrato  MRN: [id number]  Patient Class: OP- Observation  Admission Date: 2/3/2025  Attending Physician: Dafne Min MD   Primary Care Provider: Shay Castellano MD         Patient information was obtained from patient, past medical records, and ER records.     Subjective:     Principal Problem:Pancreatic lesion    Chief Complaint:   Chief Complaint   Patient presents with    Sent by MD     Pt states she was sent by MD for elevated liver enzymes and bile duct blockage.        HPI: Ms. Katey Cerrato is a 69 y.o. female, with PMH of HTN, HLD, DMII, Grave's disease, who presented to [facility] ED on 2/3/25 after labs following an outpatient appointment were abnormal. She states she recently was treated for shingles with Valtrex with rash improvement, but then her urine became discolored and that prompted her visit to her PCP.  At her PCP visit it was noticed that she had an orange coloration to the skin, and yellow color of the eyes.  After her appointment she had outpatient lab testing showing abnormal liver function, which is what prompted her PCP to send her to the ED. Her only new symptom is some low back pain. She states she had a cholecystectomy in the past, and had many gallstones removed at that time. She states her sister had retained gallstones that needed removal in the past. Denies any fevers, chills, trauma, alcohol consumption, Tylenol consumption.  ED labs showed tbili 7.4, alk phos 538,  and . Lipase normal. INR 1.2. U/A with few bacteria. A  CT showed significant intrahepatic biliary ductal dilatation, mild extrahepatic biliary ductal dilatation, a 9 mm fat containing lesion in the uncinate process of the pancreas.  She was treated with IV fluids, IV Rocephin, Toradol and a transfer request was initiated for ERCP.  However as transfer still pending at 1:30 a.m. an MRCP was ordered and she was placed  on observation.    Past Medical History:   Diagnosis Date    Cataract     Empty sella syndrome     GHD (growth hormone deficiency)     Glaucoma     Graves' disease with exophthalmos     History of vitamin D deficiency     Hyperlipidemia     Hypertension     Seizures     Thyroid nodule     Thyroid nodule     Type II or unspecified type diabetes mellitus without mention of complication, uncontrolled        Past Surgical History:   Procedure Laterality Date    BREAST BIOPSY      CATARACT EXTRACTION W/  INTRAOCULAR LENS IMPLANT Right 3/15/2021    Procedure: EXTRACTION, CATARACT, WITH IOL INSERTION;  Surgeon: Cj Caban MD;  Location: [facility] OR;  Service: Ophthalmology;  Laterality: Right;    CATARACT EXTRACTION W/  INTRAOCULAR LENS IMPLANT Left 3/29/2021    Procedure: EXTRACTION, CATARACT, WITH IOL INSERTION;  Surgeon: Cj Caban MD;  Location: [facility] OR;  Service: Ophthalmology;  Laterality: Left;    CHOLECYSTECTOMY      HYSTERECTOMY      one ovary intact    INJECTION OF JOINT Left 2/6/2024    Procedure: INJECTION, JOINT LEFT KNEE WITH STEROID;  Surgeon: She Schwartz MD;  Location: [facility] PAIN MGT;  Service: Pain Management;  Laterality: Left;  [phone]    KNEE SURGERY      LYMPH NODE BIOPSY  3010    OOPHORECTOMY         Review of patient's allergies indicates:   Allergen Reactions    Codeine Nausea Only       No current facility-administered medications on file prior to encounter.     Current Outpatient Medications on File Prior to Encounter   Medication Sig    amLODIPine (NORVASC) 10 MG tablet TAKE 1 TABLET BY MOUTH EVERY DAY    atorvastatin (LIPITOR) 80 MG tablet Take 1 tablet (80 mg total) by mouth once daily.    blood sugar diagnostic Strp To check BG 1 times daily, to use with insurance preferred meter    blood-glucose meter kit To check BG 1 times daily, to use with insurance preferred meter    chlorhexidine (PERIDEX) 0.12 % solution SWISH AND SPIT 15MLS THREE TIMES DAILY FOR 7 AYS    FLUZONE  HIGHDOSE QUAD 23-24  mcg/0.7 mL Syrg     lancets Misc To check BG 1 times daily, to use with insurance preferred meter    latanoprost 0.005 % ophthalmic solution INSTILL 1 DROP INTO BOTH EYES EVERY EVENING    levETIRAcetam (KEPPRA) 750 MG Tab TAKE 2 TABLETS BY MOUTH ONCE  DAILY    meloxicam (MOBIC) 15 MG tablet TAKE 1 TABLET BY MOUTH EVERY DAY AS NEEDED FOR PAIN    metoprolol succinate (TOPROL-XL) 50 MG 24 hr tablet TAKE 1 TABLET BY MOUTH EVERY DAY IN THE EVENING    multivitamin-minerals-lutein Tab Take 1 tablet by mouth once daily once daily.    mv-mn/iron/folic acid/herb 190 (VITAMIN D3 COMPLETE ORAL) Take by mouth.    ondansetron (ZOFRAN-ODT) 4 MG TbDL Take 1 tablet (4 mg total) by mouth every 6 (six) hours as needed.    TRULICITY 3 mg/0.5 mL pen injector INJECT 3 MG (0.5 ML) UNDER THE SKIN ONCE A WEEK    valACYclovir (VALTREX) 1000 MG tablet Take 1 tablet (1,000 mg total) by mouth 3 (three) times daily. for 7 days    valsartan (DIOVAN) 320 MG tablet Take 1 tablet (320 mg total) by mouth once daily.    VITAMIN B COMPLEX ORAL Take by mouth.     Family History       Problem Relation (Age of Onset)    Breast cancer Mother (78), Sister (60)    Cancer Mother    Cataracts Mother    Glaucoma Mother    Heart disease Mother, Father, Sister    Hypertension Brother    No Known Problems Sister, Brother, Maternal Aunt, Maternal Uncle, Paternal Aunt, Paternal Uncle, Maternal Grandmother, Maternal Grandfather, Paternal Grandmother, Paternal Grandfather, Son, Other    Tremor Mother          Tobacco Use    Smoking status: Never    Smokeless tobacco: Never   Substance and Sexual Activity    Alcohol use: No     Comment: none    Drug use: No    Sexual activity: Yes     Partners: Male     Comment: m  works in dietary,  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     Review of Systems   Constitutional:  Negative for activity change, appetite change, chills, diaphoresis, fatigue and fever.   Respiratory:  Negative for cough, shortness of breath  and wheezing.    Cardiovascular:  Negative for chest pain and palpitations.   Gastrointestinal:  Negative for abdominal distention, abdominal pain, constipation, diarrhea, nausea and vomiting.   Genitourinary:  Negative for decreased urine volume, dysuria, flank pain, frequency, hematuria and urgency.        + dark color of urine   Musculoskeletal:  Positive for myalgias. Negative for arthralgias, back pain, gait problem, joint swelling, neck pain and neck stiffness.   Skin:  Positive for color change (yellow sclera). Negative for pallor, rash and wound.   Neurological:  Negative for dizziness, syncope, weakness, light-headedness and headaches.   Psychiatric/Behavioral:  Negative for agitation and confusion.      Objective:     Vital Signs (Most Recent):  Temp: 98.2 °F (36.8 °C) (02/04/25 0333)  Pulse: 65 (02/04/25 0503)  Resp: 16 (02/04/25 0503)  BP: (!) 147/68 (02/04/25 0502)  SpO2: 96 % (02/04/25 0503) Vital Signs (24h Range):  Temp:  [98 °F (36.7 °C)-98.2 °F (36.8 °C)] 98.2 °F (36.8 °C)  Pulse:  [65-89] 65  Resp:  [13-18] 16  SpO2:  [95 %-99 %] 96 %  BP: (122-160)/(62-82) 147/68     Weight: 78.9 kg (174 lb)  Body mass index is 33.98 kg/m².     Physical Exam  Vitals and nursing note reviewed.   Constitutional:       General: She is not in acute distress.     Appearance: Normal appearance. She is well-developed. She is obese. She is not ill-appearing, toxic-appearing or diaphoretic.   HENT:      Head: Normocephalic and atraumatic.   Eyes:      General: No scleral icterus.        Right eye: No discharge.         Left eye: No discharge.      Conjunctiva/sclera: Conjunctivae normal.   Neck:      Trachea: No tracheal deviation.   Cardiovascular:      Rate and Rhythm: Normal rate and regular rhythm.      Heart sounds: Normal heart sounds. No murmur heard.     No gallop.   Pulmonary:      Effort: Pulmonary effort is normal. No respiratory distress.      Breath sounds: Normal breath sounds. No stridor. No wheezing or  "rales.   Abdominal:      General: Bowel sounds are normal. There is no distension.      Palpations: Abdomen is soft. There is no mass.      Tenderness: There is no abdominal tenderness. There is no guarding.   Musculoskeletal:         General: No deformity. Normal range of motion.      Cervical back: Normal range of motion and neck supple.   Skin:     General: Skin is warm and dry.      Coloration: Skin is not pale.      Findings: No erythema or rash.   Neurological:      General: No focal deficit present.      Mental Status: She is alert and oriented to person, place, and time.      Cranial Nerves: No cranial nerve deficit.      Motor: No abnormal muscle tone.   Psychiatric:         Mood and Affect: Mood normal.         Behavior: Behavior normal.         Thought Content: Thought content normal.         Judgment: Judgment normal.                Significant Labs: All pertinent labs within the past 24 hours have been reviewed.  BMP:   Recent Labs   Lab 02/03/25  1746   *      K 3.6      CO2 23   BUN 9   CREATININE 0.8   CALCIUM 10.2     CBC:   Recent Labs   Lab 02/03/25  1428 02/03/25  1746   WBC 6.77 9.53   HGB 12.4 12.8   HCT 37.2 36.5*    329     CMP:   Recent Labs   Lab 02/03/25  1428 02/03/25  1746    137   K 3.6 3.6   CL 99 100   CO2 25 23   * 152*   BUN 8 9   CREATININE 0.9 0.8   CALCIUM 10.2 10.2   PROT 8.6* 8.6*   ALBUMIN 3.3* 3.3*   BILITOT 7.3* 7.4*   ALKPHOS 551* 538*   * 314*   * 298*   ANIONGAP 13 14     Lipase:   Recent Labs   Lab 02/03/25  1746   LIPASE 47     Urine Culture: No results for input(s): "LABURIN" in the last 48 hours.  Urine Studies:   Recent Labs   Lab 02/03/25  1900   COLORU Yellow   APPEARANCEUA Hazy*   PHUR 6.0   SPECGRAV >1.030*   PROTEINUA 1+*   GLUCUA Trace*   KETONESU Negative   BILIRUBINUA 3+*   OCCULTUA Negative   NITRITE Negative   UROBILINOGEN 4.0-6.0*   LEUKOCYTESUR Negative   RBCUA 21*   WBCUA 16*   BACTERIA Few* "   SQUAMEPITHEL 8   HYALINECASTS 17*       Significant Imaging: I have reviewed all pertinent imaging results/findings within the past 24 hours.  Imaging Results               CT Abdomen Pelvis With IV Contrast NO Oral Contrast (Final result)  Result time 02/04/25 00:03:27      Final result by Matilda Florez MD (02/04/25 00:03:27)                   Impression:      Significant intrahepatic biliary ductal dilatation.  Mild extrahepatic biliary ductal dilatation.  9 mm fat containing lesion in the uncinate process of the pancreas.  Cholecystectomy.    Retrocrural adenopathy.    This report was flagged in Epic as abnormal.      Electronically signed by: Matilda Florez  Date:    02/04/2025  Time:    00:03               Narrative:    EXAMINATION:  CT OF ABDOMEN PELVIS WITH    CLINICAL HISTORY:  Abdominal pain, acute, nonlocalized;    TECHNIQUE:  5 mm enhanced axial images were obtained from the lung bases through the greater trochanters.  Seventy-five mL of Omnipaque 350 was injected.    COMPARISON:  None.    FINDINGS:  Significant intrahepatic biliary ductal dilatation is present.  There is mild intrahepatic biliary ductal dilatation up to 8 mm (sagittal image 88)..    Spleen, left kidney and adrenal glands are unremarkable. The gallbladder is surgically absent.    There is a 3.1 cm right renal cyst.    There is a 9 mm fat containing lesion in uncinate process.    There is no definite evidence for abdominal adenopathy or ascites.    There are no pelvic masses or adenopathy.  The appendix is not inflamed.  The uterus is surgically absent.    There is no free fluid in the pelvis.    There is mild bibasilar atelectasis.  There is a retrocrural enlarged lymph node.  There are prominent lymph node adjacent to the right distal thoracic aorta (coronal image 70).    Mild levoscoliosis is present.  There is grade 1 anterolisthesis at L5/S1.  Spondylitic changes are present greatest at L5/S1.                                  "      US Abdomen Limited (Final result)  Result time 02/03/25 20:35:50      Final result by Matilda Florez MD (02/03/25 20:35:50)                   Impression:      Hepatic steatosis.  Intrahepatic biliary ductal dilatation.  Cholecystectomy.    Right renal cyst.      Electronically signed by: Matilda Florez  Date:    02/03/2025  Time:    20:35               Narrative:    EXAMINATION:  ULTRASOUND ABDOMEN LIMITED (GALLBLADDER)    CLINICAL HISTORY:  biliary obstruction;    TECHNIQUE:  Limited ultrasound of the right upper quadrant of the abdomen with attention to the gallbladder region was performed.    COMPARISON:  None.    FINDINGS:  Liver: Increased liver echogenicity.  Intrahepatic biliary ductal dilatation.    Gallbladder: Surgically absent.    Biliary system: The common duct is not dilated, measuring 7.8 mm.  Positive intrahepatic ductal dilatation.    Miscellaneous: Pancreas is partially obscured.  There is a 3.2 x 3.2 x 2.8 cm anechoic avascular right renal lesion or cyst.                                      Assessment/Plan:     * Pancreatic lesion  - Ms. Katey Cerrato presents with discoloration of the skin and eyes, and labs that show elevated bilirubin, and imaging with biliary ductal dilatation  - she has history of cholecystectomy  - labs also show a pancreatic lesion  - she was scheduled for transfer and ERCP  - transfers pending  - MRCP and GI consult ordered  - p.r.n. meds for symptomatic relief  - monitor      Type 2 diabetes mellitus without complication, without long-term current use of insulin  Patient's FSGs are controlled on current medication regimen.  Last A1c reviewed-   Lab Results   Component Value Date    HGBA1C 5.9 (H) 02/03/2025     Most recent fingerstick glucose reviewed-   No results for input(s): "POCTGLUCOSE" in the last 24 hours.  Current correctional scale  Low  Maintain anti-hyperglycemic dose as follows-   Antihyperglycemics (From admission, onward)      Start     Stop " Route Frequency Ordered    02/04/25 0434  insulin aspart U-100 pen 0-5 Units         -- SubQ Every 6 hours PRN 02/04/25 0334          Hold Oral hypoglycemics while patient is in the hospital.        Mixed hyperlipidemia  - holding statin 2/2 elevated LFTs      Hypertension associated with diabetes  Patient's blood pressure range in the last 24 hours was: BP  Min: 122/72  Max: 160/70.The patient's inpatient anti-hypertensive regimen is listed below:  Current Antihypertensives       Plan  - BP is controlled, no changes needed to their regimen        VTE Risk Mitigation (From admission, onward)           Ordered     IP VTE HIGH RISK PATIENT  Once         02/04/25 0219     Place sequential compression device  Until discontinued         02/04/25 0219                         On 02/04/2025, patient should be placed in hospital observation services under the care of Dr. ROSE Roy MD.           Vandana Brown PA-C  Department of Hospital Medicine  [facility] - Emergency Dept

## 2025-02-04 NOTE — HPI
"Ms. Katey Cerrato is a 69 y.o. female, with PMH of HTN, HLD, DMII, Grave's disease, who presented to [facility] ED on 2/3/25 after labs following an outpatient appointment were abnormal. She states she recently was treated for shingles with Valtrex with rash improvement, but then her urine became discolored and that prompted her visit to her PCP.  At her PCP visit it was noticed that she had an orange coloration to the skin, and yellow color of the eyes.  After her appointment she had outpatient lab testing showing abnormal liver function, which is what prompted her PCP to send her to the ED. Her only new symptom is some low back pain. She states she had a cholecystectomy in the past, and had many gallstones removed at that time. She states her sister had retained gallstones that needed removal in the past. Denies any fevers, chills, trauma, alcohol consumption, Tylenol consumption.  [facility] ED labs showed tbili 7.4, alk phos 538,  and . Lipase normal. INR 1.2. U/A with few bacteria. A  CT showed significant intrahepatic biliary ductal dilatation, mild extrahepatic biliary ductal dilatation, a 9 mm fat containing lesion in the uncinate process of the pancreas.  She was treated with IV fluids, IV Rocephin, Toradol on admission.  MRCP showed intrahepatic bile duct dilation noted with an ill-defined liver mass.  Further workup with MRI abdomen with/without contrast showed "hypoattenuating lesion near the confluence of the right and left hepatic ducts causing severe upstream ductal dilatation concerning for cholangiocarcinoma. Given lesion and hyperbilirubinemia decision was made to transfer patient to [facility] for further workup and evaluation with advanced services, possible ERCP.    On arrival to [facility] VSS. Patient notes generalized pruritus, decreased appetite, nausea, lower back pain. Denies abdominal pain. Pending further workup and management   "

## 2025-02-04 NOTE — ED NOTES
Pt AAOx4, VSS, in NAD. Pt ambulated to and from the bedside commode w/o assistance. Pt instructed on how to use the call bell if assistance is needed.

## 2025-02-04 NOTE — ED NOTES
Pt sleeping quietly on stretcher; left undisturbed at this time.  Pt remains on continuous cardiac and pulse ox monitoring with non-invasive blood pressure to cycle every 60 minutes. VS stable; NSR noted. Pt appears comfortable; respirations are spontaneous, even, and non-labored; no signs of acute distress or discomfort observed. Pt is able to reposition self on stretcher. Bed locked in lowest position; side rails up and locked x 2; call light, bedside table, and personal belongings within reach. Room assessed for safety measures and cleanliness; no action needed at this time; monitoring ongoing.

## 2025-02-04 NOTE — PROVIDER TRANSFER
Outside Transfer Acceptance Note / Regional Referral Center    Referring facility: [facility] ([facility])   Referring provider: SAROJ GRIFFITHS  Accepting facility: [place]  Accepting provider: Dr. Kody Vasquez  Admitting provider: Dr. Kody Vasquez  Reason for transfer:  HLOC  Transfer diagnosis: Obstructive Jaundice  Transfer specialty requested: Gastroenterology  Transfer specialty notified: Yes  Transfer level: NUMBER 1-5: 2  Bed type requested: med-surg  Isolation status: No active isolations   Admission class or status: Emergency      Narrative     69 year female with PMHx of HTN, HLD, DMII, Grave's disease who was seen in clinic to establish care and outpatient labs notable for elevated LFT's so she was referred to the ED. She had recently undergone treatment for shingles and took Valtrex with improvement in the rash but then began have the abnormal urine prompting her to seek care with a new primary physician.     In the ED, /65 HR 88 RR 18 afebrile and 99% on room air. Labs notable for tbili 7.4, alk phos 538,  and . Lipase normal. INR 1.2. U/A with few bacteria.    CT showed Significant intrahepatic biliary ductal dilatation. Mild extrahepatic biliary ductal dilatation. 9 mm fat containing lesion in the uncinate process of the pancreas. Cholecystectomy.     Patient given IV CTX, IVF and IV ketorolac and transfer request for GI evaluation and ERCP.  IF patient still boarding for transfer in the AM, GI recommends MRCP at referring facility.     Objective     Vitals: Temp: 98 °F (36.7 °C) (02/03/25 1728)  Pulse: 76 (02/04/25 0102)  Resp: 18 (02/03/25 1728)  BP: (!) 152/70 (02/04/25 0102)  SpO2: 96 % (02/04/25 0102)  Recent Labs: All pertinent labs within the past 24 hours have been reviewed.  CBC:   Recent Labs   Lab 02/03/25 1428 02/03/25  1746   WBC 6.77 9.53   HGB 12.4 12.8   HCT 37.2 36.5*    329     CMP:   Recent Labs   Lab 02/03/25  1428 02/03/25  1746    137  "  K 3.6 3.6   CL 99 100   CO2 25 23   * 152*   BUN 8 9   CREATININE 0.9 0.8   CALCIUM 10.2 10.2   PROT 8.6* 8.6*   ALBUMIN 3.3* 3.3*   BILITOT 7.3* 7.4*   ALKPHOS 551* 538*   * 314*   * 298*   ANIONGAP 13 14     Lactic Acid: No results for input(s): "LACTATE" in the last 48 hours.  Lipase:   Recent Labs   Lab 02/03/25  1746   LIPASE 47     Lipid Panel:   Recent Labs   Lab 02/03/25  1428   CHOL 174   HDL 44   LDLCALC 113.0   TRIG 85   CHOLHDL 25.3     Urine Studies:   Recent Labs   Lab 02/03/25  1900   COLORU Yellow   APPEARANCEUA Hazy*   PHUR 6.0   SPECGRAV >1.030*   PROTEINUA 1+*   GLUCUA Trace*   KETONESU Negative   BILIRUBINUA 3+*   OCCULTUA Negative   NITRITE Negative   UROBILINOGEN 4.0-6.0*   LEUKOCYTESUR Negative   RBCUA 21*   WBCUA 16*   BACTERIA Few*   SQUAMEPITHEL 8   HYALINECASTS 17*     Recent imaging:   Imaging Results               CT Abdomen Pelvis With IV Contrast NO Oral Contrast (Final result)  Result time 02/04/25 00:03:27      Final result by Matilda Florez MD (02/04/25 00:03:27)                   Impression:      Significant intrahepatic biliary ductal dilatation.  Mild extrahepatic biliary ductal dilatation.  9 mm fat containing lesion in the uncinate process of the pancreas.  Cholecystectomy.    Retrocrural adenopathy.    This report was flagged in Epic as abnormal.      Electronically signed by: Matilda Florez  Date:    02/04/2025  Time:    00:03               Narrative:    EXAMINATION:  CT OF ABDOMEN PELVIS WITH    CLINICAL HISTORY:  Abdominal pain, acute, nonlocalized;    TECHNIQUE:  5 mm enhanced axial images were obtained from the lung bases through the greater trochanters.  Seventy-five mL of Omnipaque 350 was injected.    COMPARISON:  None.    FINDINGS:  Significant intrahepatic biliary ductal dilatation is present.  There is mild intrahepatic biliary ductal dilatation up to 8 mm (sagittal image 88)..    Spleen, left kidney and adrenal glands are unremarkable. " The gallbladder is surgically absent.    There is a 3.1 cm right renal cyst.    There is a 9 mm fat containing lesion in uncinate process.    There is no definite evidence for abdominal adenopathy or ascites.    There are no pelvic masses or adenopathy.  The appendix is not inflamed.  The uterus is surgically absent.    There is no free fluid in the pelvis.    There is mild bibasilar atelectasis.  There is a retrocrural enlarged lymph node.  There are prominent lymph node adjacent to the right distal thoracic aorta (coronal image 70).    Mild levoscoliosis is present.  There is grade 1 anterolisthesis at L5/S1.  Spondylitic changes are present greatest at L5/S1.                                       US Abdomen Limited (Final result)  Result time 02/03/25 20:35:50      Final result by Matilda Florez MD (02/03/25 20:35:50)                   Impression:      Hepatic steatosis.  Intrahepatic biliary ductal dilatation.  Cholecystectomy.    Right renal cyst.      Electronically signed by: Matilda Florez  Date:    02/03/2025  Time:    20:35               Narrative:    EXAMINATION:  ULTRASOUND ABDOMEN LIMITED (GALLBLADDER)    CLINICAL HISTORY:  biliary obstruction;    TECHNIQUE:  Limited ultrasound of the right upper quadrant of the abdomen with attention to the gallbladder region was performed.    COMPARISON:  None.    FINDINGS:  Liver: Increased liver echogenicity.  Intrahepatic biliary ductal dilatation.    Gallbladder: Surgically absent.    Biliary system: The common duct is not dilated, measuring 7.8 mm.  Positive intrahepatic ductal dilatation.    Miscellaneous: Pancreas is partially obscured.  There is a 3.2 x 3.2 x 2.8 cm anechoic avascular right renal lesion or cyst.                                       Airway:     Vent settings:         IV access:        Peripheral IV - Single Lumen 02/03/25 2015 22 G Left;Posterior Hand (Active)   Site Assessment Clean;Dry;Intact 02/03/25 2015   Extremity Assessment  Distal to IV No abnormal discoloration;No redness;No swelling 02/03/25 2015   Line Status Blood return noted;Flushed;Saline locked 02/03/25 2015   Dressing Status Clean;Dry;Intact 02/03/25 2015   Dressing Intervention Integrity maintained 02/03/25 2015     Infusions: N/A  Allergies:   Review of patient's allergies indicates:   Allergen Reactions    Codeine Nausea Only      NPO: No    Anticoagulation:   Anticoagulants       None             Instructions      [facility]  Admit to Hospital Medicine  Upon patient arrival to floor, please contact Hospital Medicine on call.     Consult GI

## 2025-02-04 NOTE — PLAN OF CARE
Received patient from ED, AOX4 VSS on RA. Hooked patient to telemetry.  Safety precautions maintained.

## 2025-02-04 NOTE — ED TRIAGE NOTES
Pt presents to the ER at the direction of her physician after labs she had drawn today revealed elevated liver enzymes. Pt is reporting orange urine with nausea and back pain for the past week.

## 2025-02-04 NOTE — ED NOTES
LOC: The patient is awake, alert, and oriented to self, place, time, and situation. Pt is calm and cooperative. Affect is appropriate.  Speech is appropriate and clear.     APPEARANCE: Patient resting on stretcher in no acute distress.  Patient is clean and well groomed.    SKIN: The skin is warm and dry; color consistent with ethnicity with the exception of yellowing of the sclera of the eyes.  Patient has normal skin turgor and moist mucus membranes.  Skin intact; no breakdown or bruising noted.     MUSCULOSKELETAL: Patient moving upper and lower extremities without difficulty; denies pain in the extremities but is reporting mild back pain. Denies weakness.     RESPIRATORY: Airway is open and patent. Respirations spontaneous, even, easy, and non-labored.  Patient has a normal effort and rate.  No accessory muscle use noted. Denies cough.     CARDIAC:  Normal heart rate noted.  No peripheral edema noted. No complaints of chest pain.     ABDOMEN: Soft and non tender to palpation.  No distention noted. Pt denies abdominal pain; reports nausea;denies vomiting, diarrhea, or constipation.    : Pt is reporting orange urine.    NEUROLOGIC: Eyes open spontaneously.  Behavior appropriate to situation.  Follows commands; facial expression symmetrical.  Purposeful motor response noted; normal sensation in all extremities. Pt denies headache; denies lightheadedness or dizziness; denies visual disturbances; denies loss of balance; denies unilateral weakness.

## 2025-02-04 NOTE — PLAN OF CARE
Case Management Assessment     PCP: Dr Shay Castellano    Patient Arrived From: Home  Existing Help at Home: Self    Barriers to Discharge: None    Discharge Plan:    A. Home w/family   B. Home      Patient AAOX3, ambulates cane at baseline. PCP correct in Epic. Will drive self home on discharge.      02/04/25 1549   Discharge Assessment   Assessment Type Discharge Planning Assessment   Confirmed/corrected address, phone number and insurance Yes   Confirmed Demographics Correct on Facesheet   Source of Information patient   Communicated ISHMAEL with patient/caregiver Date not available/Unable to determine   People in Home alone   Do you expect to return to your current living situation? Yes   Do you have help at home or someone to help you manage your care at home? No   Prior to hospitilization cognitive status: Alert/Oriented   Current cognitive status: Alert/Oriented   Walking or Climbing Stairs Difficulty yes   Walking or Climbing Stairs ambulation difficulty, requires equipment   Dressing/Bathing Difficulty no   Equipment Currently Used at Home cane, straight   Readmission within 30 days? No   Do you currently have service(s) that help you manage your care at home? No   Do you take prescription medications? Yes   Do you have prescription coverage? Yes   Do you have any problems affording any of your prescribed medications? No   Is the patient taking medications as prescribed? yes   Who is going to help you get home at discharge? Will drive self home   How do you get to doctors appointments? car, drives self   Are you on dialysis? No   Do you take coumadin? No   Discharge Plan A Home with family   Discharge Plan B Home   DME Needed Upon Discharge  none   Discharge Plan discussed with: Patient   Transition of Care Barriers None   Physical Activity   On average, how many days per week do you engage in moderate to strenuous exercise (like a brisk walk)? 0 days   On average, how many minutes do you engage in exercise  at this level? 0 min   Financial Resource Strain   How hard is it for you to pay for the very basics like food, housing, medical care, and heating? Not hard   Housing Stability   In the last 12 months, was there a time when you were not able to pay the mortgage or rent on time? N   At any time in the past 12 months, were you homeless or living in a shelter (including now)? N   Transportation Needs   Has the lack of transportation kept you from medical appointments, meetings, work or from getting things needed for daily living? No   Food Insecurity   Within the past 12 months, you worried that your food would run out before you got the money to buy more. Never true   Within the past 12 months, the food you bought just didn't last and you didn't have money to get more. Never true   Stress   Do you feel stress - tense, restless, nervous, or anxious, or unable to sleep at night because your mind is troubled all the time - these days? Very much   Social Isolation   How often do you feel lonely or isolated from those around you?  Never   Alcohol Use   Q1: How often do you have a drink containing alcohol? Never   Q2: How many drinks containing alcohol do you have on a typical day when you are drinking? None   Q3: How often do you have six or more drinks on one occasion? Never   Utilities   In the past 12 months has the electric, gas, oil, or water company threatened to shut off services in your home? No   Health Literacy   How often do you need to have someone help you when you read instructions, pamphlets, or other written material from your doctor or pharmacy? Never     [facility] - Med Surg (Pam)  Initial Discharge Assessment       Primary Care Provider: Shay Castellano MD    Admission Diagnosis: Hepatitis [K75.9]  Obstructive jaundice [K83.1]  Urinary tract infection without hematuria, site unspecified [N39.0]  Bilateral low back pain, unspecified chronicity, unspecified whether sciatica present  [M54.50]    Admission Date: 2/3/2025  Expected Discharge Date:     Transition of Care Barriers: (P) None    Payor: [employer] [facility] [facility] / Plan: [employer] CHOICES / Product Type: Medicare Advantage /     Extended Emergency Contact Information  Primary Emergency Contact: AMIE CAM  Mobile Phone: [phone]  Relation: Relative  Preferred language: English   needed? No    Discharge Plan A: (P) Home with family  Discharge Plan B: (P) Home      [id number] - [place], [place] - [address]  [address]  [place] [address]  Phone: [phone] Fax: [phone]    [facility] [place] - [place], [place] - [address]  [address]  [facility] [address]  Phone: [phone] Fax: [phone]    [facility] - [place], [place] - [address]  [address]  [facility] [address]  Phone: [phone] Fax: [phone]    [facility] - [place], [place] - [address]  [address]  [facility] [address]  Phone: [phone] Fax: [phone]    [facility] - [place], [place] - [address] [place]  [address]  [facility] [address]  Phone: [phone] Fax: [phone]    [facility] - [place], [place] - [address]  [address]  [address]  [facility] [address]  Phone: [phone] Fax: [phone]      Initial Assessment (most recent)       Adult Discharge Assessment - 02/04/25 1549          Discharge Assessment    Assessment Type Discharge Planning Assessment (P)      Confirmed/corrected address, phone number and insurance Yes (P)      Confirmed Demographics Correct on Facesheet (P)      Source of Information patient (P)      Communicated ISHMAEL with patient/caregiver Date not available/Unable to determine (P)      People in Home alone (P)      Do you expect to return to your current living situation? Yes (P)      Do you have help at home or someone to  help you manage your care at home? No (P)      Prior to hospitilization cognitive status: Alert/Oriented (P)      Current cognitive status: Alert/Oriented (P)      Walking or Climbing Stairs Difficulty yes (P)      Walking or Climbing Stairs ambulation difficulty, requires equipment (P)      Dressing/Bathing Difficulty no (P)      Equipment Currently Used at Home cane, straight (P)      Readmission within 30 days? No (P)      Do you currently have service(s) that help you manage your care at home? No (P)      Do you take prescription medications? Yes (P)      Do you have prescription coverage? Yes (P)      Do you have any problems affording any of your prescribed medications? No (P)      Is the patient taking medications as prescribed? yes (P)      Who is going to help you get home at discharge? Will drive self home (P)      How do you get to doctors appointments? car, drives self (P)      Are you on dialysis? No (P)      Do you take coumadin? No (P)      Discharge Plan A Home with family (P)      Discharge Plan B Home (P)      DME Needed Upon Discharge  none (P)      Discharge Plan discussed with: Patient (P)      Transition of Care Barriers None (P)         Physical Activity    On average, how many days per week do you engage in moderate to strenuous exercise (like a brisk walk)? 0 days (P)      On average, how many minutes do you engage in exercise at this level? 0 min (P)         Financial Resource Strain    How hard is it for you to pay for the very basics like food, housing, medical care, and heating? Not hard at all (P)         Housing Stability    In the last 12 months, was there a time when you were not able to pay the mortgage or rent on time? No (P)      At any time in the past 12 months, were you homeless or living in a shelter (including now)? No (P)         Transportation Needs    Has the lack of transportation kept you from medical appointments, meetings, work or from getting things needed for daily  living? No (P)         Food Insecurity    Within the past 12 months, you worried that your food would run out before you got the money to buy more. Never true (P)      Within the past 12 months, the food you bought just didn't last and you didn't have money to get more. Never true (P)         Stress    Do you feel stress - tense, restless, nervous, or anxious, or unable to sleep at night because your mind is troubled all the time - these days? Very much (P)         Social Isolation    How often do you feel lonely or isolated from those around you?  Never (P)         Alcohol Use    Q1: How often do you have a drink containing alcohol? Never (P)      Q2: How many drinks containing alcohol do you have on a typical day when you are drinking? Patient does not drink (P)      Q3: How often do you have six or more drinks on one occasion? Never (P)         Utilities    In the past 12 months has the electric, gas, oil, or water company threatened to shut off services in your home? No (P)         Health Literacy    How often do you need to have someone help you when you read instructions, pamphlets, or other written material from your doctor or pharmacy? Never (P)

## 2025-02-04 NOTE — ASSESSMENT & PLAN NOTE
"Patient's FSGs are controlled on current medication regimen.  Last A1c reviewed-   Lab Results   Component Value Date    HGBA1C 5.9 (H) 02/03/2025     Most recent fingerstick glucose reviewed-   No results for input(s): "POCTGLUCOSE" in the last 24 hours.  Current correctional scale  Low  Maintain anti-hyperglycemic dose as follows-   Antihyperglycemics (From admission, onward)      Start     Stop Route Frequency Ordered    02/04/25 0434  insulin aspart U-100 pen 0-5 Units         -- SubQ Every 6 hours PRN 02/04/25 0334          Hold Oral hypoglycemics while patient is in the hospital.      "

## 2025-02-04 NOTE — SUBJECTIVE & OBJECTIVE
Past Medical History:   Diagnosis Date    Cataract     Empty sella syndrome     GHD (growth hormone deficiency)     Glaucoma     Graves' disease with exophthalmos     History of vitamin D deficiency     Hyperlipidemia     Hypertension     Seizures     Thyroid nodule     Thyroid nodule     Type II or unspecified type diabetes mellitus without mention of complication, uncontrolled        Past Surgical History:   Procedure Laterality Date    BREAST BIOPSY      CATARACT EXTRACTION W/  INTRAOCULAR LENS IMPLANT Right 3/15/2021    Procedure: EXTRACTION, CATARACT, WITH IOL INSERTION;  Surgeon: Cj Caban MD;  Location: [facility] OR;  Service: Ophthalmology;  Laterality: Right;    CATARACT EXTRACTION W/  INTRAOCULAR LENS IMPLANT Left 3/29/2021    Procedure: EXTRACTION, CATARACT, WITH IOL INSERTION;  Surgeon: Cj Caban MD;  Location: [facility] OR;  Service: Ophthalmology;  Laterality: Left;    CHOLECYSTECTOMY      HYSTERECTOMY      one ovary intact    INJECTION OF JOINT Left 2/6/2024    Procedure: INJECTION, JOINT LEFT KNEE WITH STEROID;  Surgeon: She Schwartz MD;  Location: [facility] PAIN MGT;  Service: Pain Management;  Laterality: Left;  [phone]    KNEE SURGERY      LYMPH NODE BIOPSY  3010    OOPHORECTOMY         Review of patient's allergies indicates:   Allergen Reactions    Codeine Nausea Only       No current facility-administered medications on file prior to encounter.     Current Outpatient Medications on File Prior to Encounter   Medication Sig    amLODIPine (NORVASC) 10 MG tablet TAKE 1 TABLET BY MOUTH EVERY DAY    atorvastatin (LIPITOR) 80 MG tablet Take 1 tablet (80 mg total) by mouth once daily.    blood sugar diagnostic Strp To check BG 1 times daily, to use with insurance preferred meter    blood-glucose meter kit To check BG 1 times daily, to use with insurance preferred meter    chlorhexidine (PERIDEX) 0.12 % solution SWISH AND SPIT 15MLS THREE TIMES DAILY FOR 7 AYS    FLUZOLUIS M PEREZSE QUAD 23-24 PF  240 mcg/0.7 mL Syrg     lancets Misc To check BG 1 times daily, to use with insurance preferred meter    latanoprost 0.005 % ophthalmic solution INSTILL 1 DROP INTO BOTH EYES EVERY EVENING    levETIRAcetam (KEPPRA) 750 MG Tab TAKE 2 TABLETS BY MOUTH ONCE  DAILY    meloxicam (MOBIC) 15 MG tablet TAKE 1 TABLET BY MOUTH EVERY DAY AS NEEDED FOR PAIN    metoprolol succinate (TOPROL-XL) 50 MG 24 hr tablet TAKE 1 TABLET BY MOUTH EVERY DAY IN THE EVENING    multivitamin-minerals-lutein Tab Take 1 tablet by mouth once daily once daily.    mv-mn/iron/folic acid/herb 190 (VITAMIN D3 COMPLETE ORAL) Take by mouth.    ondansetron (ZOFRAN-ODT) 4 MG TbDL Take 1 tablet (4 mg total) by mouth every 6 (six) hours as needed.    TRULICITY 3 mg/0.5 mL pen injector INJECT 3 MG (0.5 ML) UNDER THE SKIN ONCE A WEEK    valACYclovir (VALTREX) 1000 MG tablet Take 1 tablet (1,000 mg total) by mouth 3 (three) times daily. for 7 days    valsartan (DIOVAN) 320 MG tablet Take 1 tablet (320 mg total) by mouth once daily.    VITAMIN B COMPLEX ORAL Take by mouth.     Family History       Problem Relation (Age of Onset)    Breast cancer Mother (78), Sister (60)    Cancer Mother    Cataracts Mother    Glaucoma Mother    Heart disease Mother, Father, Sister    Hypertension Brother    No Known Problems Sister, Brother, Maternal Aunt, Maternal Uncle, Paternal Aunt, Paternal Uncle, Maternal Grandmother, Maternal Grandfather, Paternal Grandmother, Paternal Grandfather, Son, Other    Tremor Mother          Tobacco Use    Smoking status: Never    Smokeless tobacco: Never   Substance and Sexual Activity    Alcohol use: No     Comment: none    Drug use: No    Sexual activity: Yes     Partners: Male     Comment: m  works in dietary,  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     Review of Systems   Constitutional:  Negative for activity change, appetite change, chills, diaphoresis, fatigue and fever.   Respiratory:  Negative for cough, shortness of breath and wheezing.     Cardiovascular:  Negative for chest pain and palpitations.   Gastrointestinal:  Negative for abdominal distention, abdominal pain, constipation, diarrhea, nausea and vomiting.   Genitourinary:  Negative for decreased urine volume, dysuria, flank pain, frequency, hematuria and urgency.        + dark color of urine   Musculoskeletal:  Positive for myalgias. Negative for arthralgias, back pain, gait problem, joint swelling, neck pain and neck stiffness.   Skin:  Positive for color change (yellow sclera). Negative for pallor, rash and wound.   Neurological:  Negative for dizziness, syncope, weakness, light-headedness and headaches.   Psychiatric/Behavioral:  Negative for agitation and confusion.      Objective:     Vital Signs (Most Recent):  Temp: 98.2 °F (36.8 °C) (02/04/25 0333)  Pulse: 65 (02/04/25 0503)  Resp: 16 (02/04/25 0503)  BP: (!) 147/68 (02/04/25 0502)  SpO2: 96 % (02/04/25 0503) Vital Signs (24h Range):  Temp:  [98 °F (36.7 °C)-98.2 °F (36.8 °C)] 98.2 °F (36.8 °C)  Pulse:  [65-89] 65  Resp:  [13-18] 16  SpO2:  [95 %-99 %] 96 %  BP: (122-160)/(62-82) 147/68     Weight: 78.9 kg (174 lb)  Body mass index is 33.98 kg/m².     Physical Exam  Vitals and nursing note reviewed.   Constitutional:       General: She is not in acute distress.     Appearance: Normal appearance. She is well-developed. She is obese. She is not ill-appearing, toxic-appearing or diaphoretic.   HENT:      Head: Normocephalic and atraumatic.   Eyes:      General: No scleral icterus.        Right eye: No discharge.         Left eye: No discharge.      Conjunctiva/sclera: Conjunctivae normal.   Neck:      Trachea: No tracheal deviation.   Cardiovascular:      Rate and Rhythm: Normal rate and regular rhythm.      Heart sounds: Normal heart sounds. No murmur heard.     No gallop.   Pulmonary:      Effort: Pulmonary effort is normal. No respiratory distress.      Breath sounds: Normal breath sounds. No stridor. No wheezing or rales.  "  Abdominal:      General: Bowel sounds are normal. There is no distension.      Palpations: Abdomen is soft. There is no mass.      Tenderness: There is no abdominal tenderness. There is no guarding.   Musculoskeletal:         General: No deformity. Normal range of motion.      Cervical back: Normal range of motion and neck supple.   Skin:     General: Skin is warm and dry.      Coloration: Skin is not pale.      Findings: No erythema or rash.   Neurological:      General: No focal deficit present.      Mental Status: She is alert and oriented to person, place, and time.      Cranial Nerves: No cranial nerve deficit.      Motor: No abnormal muscle tone.   Psychiatric:         Mood and Affect: Mood normal.         Behavior: Behavior normal.         Thought Content: Thought content normal.         Judgment: Judgment normal.                Significant Labs: All pertinent labs within the past 24 hours have been reviewed.  BMP:   Recent Labs   Lab 02/03/25  1746   *      K 3.6      CO2 23   BUN 9   CREATININE 0.8   CALCIUM 10.2     CBC:   Recent Labs   Lab 02/03/25  1428 02/03/25  1746   WBC 6.77 9.53   HGB 12.4 12.8   HCT 37.2 36.5*    329     CMP:   Recent Labs   Lab 02/03/25  1428 02/03/25  1746    137   K 3.6 3.6   CL 99 100   CO2 25 23   * 152*   BUN 8 9   CREATININE 0.9 0.8   CALCIUM 10.2 10.2   PROT 8.6* 8.6*   ALBUMIN 3.3* 3.3*   BILITOT 7.3* 7.4*   ALKPHOS 551* 538*   * 314*   * 298*   ANIONGAP 13 14     Lipase:   Recent Labs   Lab 02/03/25  1746   LIPASE 47     Urine Culture: No results for input(s): "LABURIN" in the last 48 hours.  Urine Studies:   Recent Labs   Lab 02/03/25  1900   COLORU Yellow   APPEARANCEUA Hazy*   PHUR 6.0   SPECGRAV >1.030*   PROTEINUA 1+*   GLUCUA Trace*   KETONESU Negative   BILIRUBINUA 3+*   OCCULTUA Negative   NITRITE Negative   UROBILINOGEN 4.0-6.0*   LEUKOCYTESUR Negative   RBCUA 21*   WBCUA 16*   BACTERIA Few*   SQUAMEPITHEL 8 "   HYALINECASTS 17*       Significant Imaging: I have reviewed all pertinent imaging results/findings within the past 24 hours.  Imaging Results               CT Abdomen Pelvis With IV Contrast NO Oral Contrast (Final result)  Result time 02/04/25 00:03:27      Final result by Matilda Florez MD (02/04/25 00:03:27)                   Impression:      Significant intrahepatic biliary ductal dilatation.  Mild extrahepatic biliary ductal dilatation.  9 mm fat containing lesion in the uncinate process of the pancreas.  Cholecystectomy.    Retrocrural adenopathy.    This report was flagged in Epic as abnormal.      Electronically signed by: Matilda Florez  Date:    02/04/2025  Time:    00:03               Narrative:    EXAMINATION:  CT OF ABDOMEN PELVIS WITH    CLINICAL HISTORY:  Abdominal pain, acute, nonlocalized;    TECHNIQUE:  5 mm enhanced axial images were obtained from the lung bases through the greater trochanters.  Seventy-five mL of Omnipaque 350 was injected.    COMPARISON:  None.    FINDINGS:  Significant intrahepatic biliary ductal dilatation is present.  There is mild intrahepatic biliary ductal dilatation up to 8 mm (sagittal image 88)..    Spleen, left kidney and adrenal glands are unremarkable. The gallbladder is surgically absent.    There is a 3.1 cm right renal cyst.    There is a 9 mm fat containing lesion in uncinate process.    There is no definite evidence for abdominal adenopathy or ascites.    There are no pelvic masses or adenopathy.  The appendix is not inflamed.  The uterus is surgically absent.    There is no free fluid in the pelvis.    There is mild bibasilar atelectasis.  There is a retrocrural enlarged lymph node.  There are prominent lymph node adjacent to the right distal thoracic aorta (coronal image 70).    Mild levoscoliosis is present.  There is grade 1 anterolisthesis at L5/S1.  Spondylitic changes are present greatest at L5/S1.                                       US Abdomen  Limited (Final result)  Result time 02/03/25 20:35:50      Final result by Matilda Florez MD (02/03/25 20:35:50)                   Impression:      Hepatic steatosis.  Intrahepatic biliary ductal dilatation.  Cholecystectomy.    Right renal cyst.      Electronically signed by: Matilda Florez  Date:    02/03/2025  Time:    20:35               Narrative:    EXAMINATION:  ULTRASOUND ABDOMEN LIMITED (GALLBLADDER)    CLINICAL HISTORY:  biliary obstruction;    TECHNIQUE:  Limited ultrasound of the right upper quadrant of the abdomen with attention to the gallbladder region was performed.    COMPARISON:  None.    FINDINGS:  Liver: Increased liver echogenicity.  Intrahepatic biliary ductal dilatation.    Gallbladder: Surgically absent.    Biliary system: The common duct is not dilated, measuring 7.8 mm.  Positive intrahepatic ductal dilatation.    Miscellaneous: Pancreas is partially obscured.  There is a 3.2 x 3.2 x 2.8 cm anechoic avascular right renal lesion or cyst.

## 2025-02-04 NOTE — ED NOTES
Attempted to call in gastro consult, but the answering service was having technical difficulties and requested me to call back. Provider notified.

## 2025-02-04 NOTE — ED NOTES
Pt placed on continuous  pulse ox monitoring with blood pressure to cycle every 0 minutes.  VS stable.  Bed locked in lowest position; side rails up and locked x 2; call light, bedside table, and personal belongings within reach.  Pt instructed to alert nurse for assistance before attempting to get out of bed; verbalizes understanding.  Pt denies needs or complaints at this time; will continue to monitor pt.

## 2025-02-05 PROBLEM — K83.1 BILIARY OBSTRUCTION: Status: ACTIVE | Noted: 2025-02-04

## 2025-02-05 LAB
ALBUMIN SERPL BCP-MCNC: 2.8 G/DL (ref 3.5–5.2)
ALP SERPL-CCNC: 507 U/L (ref 40–150)
ALT SERPL W/O P-5'-P-CCNC: 264 U/L (ref 10–44)
ANION GAP SERPL CALC-SCNC: 12 MMOL/L (ref 8–16)
AST SERPL-CCNC: 280 U/L (ref 10–40)
BASOPHILS # BLD AUTO: 0.03 K/UL (ref 0–0.2)
BASOPHILS NFR BLD: 0.4 % (ref 0–1.9)
BILIRUB SERPL-MCNC: 8.3 MG/DL (ref 0.1–1)
BUN SERPL-MCNC: 8 MG/DL (ref 8–23)
CALCIUM SERPL-MCNC: 9.9 MG/DL (ref 8.7–10.5)
CHLORIDE SERPL-SCNC: 101 MMOL/L (ref 95–110)
CO2 SERPL-SCNC: 24 MMOL/L (ref 23–29)
CREAT SERPL-MCNC: 0.7 MG/DL (ref 0.5–1.4)
DIFFERENTIAL METHOD BLD: ABNORMAL
EOSINOPHIL # BLD AUTO: 0 K/UL (ref 0–0.5)
EOSINOPHIL NFR BLD: 0.2 % (ref 0–8)
ERYTHROCYTE [DISTWIDTH] IN BLOOD BY AUTOMATED COUNT: 16.4 % (ref 11.5–14.5)
EST. GFR  (NO RACE VARIABLE): >60 ML/MIN/1.73 M^2
GLUCOSE SERPL-MCNC: 82 MG/DL (ref 70–110)
HCT VFR BLD AUTO: 33.3 % (ref 37–48.5)
HGB BLD-MCNC: 11.7 G/DL (ref 12–16)
IMM GRANULOCYTES # BLD AUTO: 0.03 K/UL (ref 0–0.04)
IMM GRANULOCYTES NFR BLD AUTO: 0.4 % (ref 0–0.5)
LYMPHOCYTES # BLD AUTO: 1.7 K/UL (ref 1–4.8)
LYMPHOCYTES NFR BLD: 20.2 % (ref 18–48)
MAGNESIUM SERPL-MCNC: 1.7 MG/DL (ref 1.6–2.6)
MCH RBC QN AUTO: 30.8 PG (ref 27–31)
MCHC RBC AUTO-ENTMCNC: 35.1 G/DL (ref 32–36)
MCV RBC AUTO: 88 FL (ref 82–98)
MONOCYTES # BLD AUTO: 0.7 K/UL (ref 0.3–1)
MONOCYTES NFR BLD: 8.8 % (ref 4–15)
NEUTROPHILS # BLD AUTO: 5.8 K/UL (ref 1.8–7.7)
NEUTROPHILS NFR BLD: 70 % (ref 38–73)
NRBC BLD-RTO: 0 /100 WBC
PLATELET # BLD AUTO: 305 K/UL (ref 150–450)
PMV BLD AUTO: 10.2 FL (ref 9.2–12.9)
POCT GLUCOSE: 107 MG/DL (ref 70–110)
POCT GLUCOSE: 118 MG/DL (ref 70–110)
POCT GLUCOSE: 88 MG/DL (ref 70–110)
POCT GLUCOSE: 93 MG/DL (ref 70–110)
POTASSIUM SERPL-SCNC: 3.3 MMOL/L (ref 3.5–5.1)
PROT SERPL-MCNC: 7.6 G/DL (ref 6–8.4)
RBC # BLD AUTO: 3.8 M/UL (ref 4–5.4)
SODIUM SERPL-SCNC: 137 MMOL/L (ref 136–145)
WBC # BLD AUTO: 8.21 K/UL (ref 3.9–12.7)

## 2025-02-05 PROCEDURE — 21400001 HC TELEMETRY ROOM

## 2025-02-05 PROCEDURE — 25000003 PHARM REV CODE 250: Performed by: HOSPITALIST

## 2025-02-05 PROCEDURE — A4216 STERILE WATER/SALINE, 10 ML: HCPCS | Performed by: PHYSICIAN ASSISTANT

## 2025-02-05 PROCEDURE — 94761 N-INVAS EAR/PLS OXIMETRY MLT: CPT

## 2025-02-05 PROCEDURE — 25000003 PHARM REV CODE 250: Performed by: PHYSICIAN ASSISTANT

## 2025-02-05 PROCEDURE — 80053 COMPREHEN METABOLIC PANEL: CPT | Performed by: PHYSICIAN ASSISTANT

## 2025-02-05 PROCEDURE — 83735 ASSAY OF MAGNESIUM: CPT | Performed by: PHYSICIAN ASSISTANT

## 2025-02-05 PROCEDURE — 36415 COLL VENOUS BLD VENIPUNCTURE: CPT | Performed by: PHYSICIAN ASSISTANT

## 2025-02-05 PROCEDURE — 85025 COMPLETE CBC W/AUTO DIFF WBC: CPT | Performed by: PHYSICIAN ASSISTANT

## 2025-02-05 RX ADMIN — SODIUM CHLORIDE, PRESERVATIVE FREE 10 ML: 5 INJECTION INTRAVENOUS at 02:02

## 2025-02-05 RX ADMIN — AMLODIPINE BESYLATE 10 MG: 10 TABLET ORAL at 09:02

## 2025-02-05 RX ADMIN — SODIUM CHLORIDE, PRESERVATIVE FREE 10 ML: 5 INJECTION INTRAVENOUS at 05:02

## 2025-02-05 RX ADMIN — LATANOPROST 1 DROP: 50 SOLUTION/ DROPS OPHTHALMIC at 09:02

## 2025-02-05 RX ADMIN — VALSARTAN 320 MG: 160 TABLET, FILM COATED ORAL at 09:02

## 2025-02-05 RX ADMIN — LEVETIRACETAM 1500 MG: 750 TABLET, FILM COATED ORAL at 09:02

## 2025-02-05 RX ADMIN — SODIUM CHLORIDE, PRESERVATIVE FREE 10 ML: 5 INJECTION INTRAVENOUS at 09:02

## 2025-02-05 RX ADMIN — METOPROLOL SUCCINATE 50 MG: 50 TABLET, EXTENDED RELEASE ORAL at 09:02

## 2025-02-05 NOTE — ASSESSMENT & PLAN NOTE
- Last hemoglobin A1c 5.9, well controlled  - Hold home regimen of Trulicity  - Glucose level reviewed, currently in normal range  - Continue Accu-Cheks q.a.c. and q.h.s. and give low-dose sliding scale insulin as necessary

## 2025-02-05 NOTE — PROGRESS NOTES
[facility] Medicine  Progress Note    Patient Name: Katey Cerrato  MRN: [id number]  Patient Class: IP- Inpatient   Admission Date: 2/3/2025  Length of Stay: 1 days  Attending Physician: Dafne Min MD  Primary Care Provider: Shay Castellano MD        Subjective     Principal Problem:Biliary obstruction        HPI:  Ms. Katey Cerrato is a 69 y.o. female, with PMH of HTN, HLD, DMII, Grave's disease, who presented to [facility] ED on 2/3/25 after labs following an outpatient appointment were abnormal. She states she recently was treated for shingles with Valtrex with rash improvement, but then her urine became discolored and that prompted her visit to her PCP.  At her PCP visit it was noticed that she had an orange coloration to the skin, and yellow color of the eyes.  After her appointment she had outpatient lab testing showing abnormal liver function, which is what prompted her PCP to send her to the ED. Her only new symptom is some low back pain. She states she had a cholecystectomy in the past, and had many gallstones removed at that time. She states her sister had retained gallstones that needed removal in the past. Denies any fevers, chills, trauma, alcohol consumption, Tylenol consumption.  ED labs showed tbili 7.4, alk phos 538,  and . Lipase normal. INR 1.2. U/A with few bacteria. A  CT showed significant intrahepatic biliary ductal dilatation, mild extrahepatic biliary ductal dilatation, a 9 mm fat containing lesion in the uncinate process of the pancreas.  She was treated with IV fluids, IV Rocephin, Toradol and a transfer request was initiated for ERCP.  However as transfer still pending at 1:30 a.m. an MRCP was ordered and she was placed on observation.    Overview/Hospital Course:  Ms. Cerrato presented with abnormal labs and jaundice.  Patient admitted for biliary obstruction.  Workup with MRCP showed intrahepatic bile duct dilation noted ill-defined liver  mass.    Interval History:  No acute events, reports nausea but no vomiting, but has not really attempted to eat anything.  Would like to try something to eat if possible.     Review of Systems   Constitutional:  Negative for chills and fever.   Gastrointestinal:  Positive for abdominal pain and nausea. Negative for vomiting.   Skin:  Positive for color change.     Objective:     Vital Signs (Most Recent):  Temp: 97.8 °F (36.6 °C) (02/05/25 1523)  Pulse: 81 (02/05/25 1525)  Resp: 18 (02/05/25 1523)  BP: 136/60 (02/05/25 1523)  SpO2: 100 % (02/05/25 1523) Vital Signs (24h Range):  Temp:  [97.8 °F (36.6 °C)-98.5 °F (36.9 °C)] 97.8 °F (36.6 °C)  Pulse:  [64-93] 81  Resp:  [16-20] 18  SpO2:  [94 %-100 %] 100 %  BP: (123-141)/(60-86) 136/60     Weight: 79.4 kg (175 lb 0.7 oz)  Body mass index is 35.35 kg/m².    Intake/Output Summary (Last 24 hours) at 2/5/2025 1550  Last data filed at 2/5/2025 1229  Gross per 24 hour   Intake 480 ml   Output 700 ml   Net -220 ml         Physical Exam  Vitals and nursing note reviewed.   Constitutional:       General: She is not in acute distress.     Appearance: She is well-developed. She is obese. She is not toxic-appearing or diaphoretic.   HENT:      Head: Normocephalic and atraumatic.      Nose: Nose normal.   Eyes:      General: Scleral icterus present.      Comments: Exophthalmos noted bilaterally   Neck:      Trachea: No tracheal deviation.   Cardiovascular:      Rate and Rhythm: Normal rate and regular rhythm.      Heart sounds: Normal heart sounds. No murmur heard.     No gallop.   Pulmonary:      Effort: Pulmonary effort is normal. No respiratory distress.      Breath sounds: Normal breath sounds. No stridor. No wheezing or rales.   Abdominal:      General: Bowel sounds are normal. There is no distension.      Palpations: Abdomen is soft.      Tenderness: There is abdominal tenderness. There is no guarding or rebound.      Comments: Mild TTP in right upper quadrant  "  Musculoskeletal:         General: No deformity. Normal range of motion.      Cervical back: Normal range of motion and neck supple.   Skin:     General: Skin is warm and dry.      Coloration: Skin is jaundiced.   Neurological:      General: No focal deficit present.      Mental Status: She is alert and oriented to person, place, and time.      Cranial Nerves: No cranial nerve deficit.      Motor: No abnormal muscle tone.   Psychiatric:         Mood and Affect: Mood normal.         Behavior: Behavior normal.         Thought Content: Thought content normal.             Significant Labs: All pertinent labs within the past 24 hours have been reviewed.    Significant Imaging: I have reviewed all pertinent imaging results/findings within the past 24 hours.    Assessment and Plan     * Biliary obstruction  Liver mass, possible cholangiocarcinoma  - Presented with jaundice and scleral icterus, along with abnormal labs from clinic.  Noted history of cholecystectomy. Labs showed elevated bilirubin, and imaging with biliary ductal dilatation  - Initial CT of the abdomen and pelvis with IV contrast showed significant intrahepatic biliary ductal dilatation and questionable 9 mm fat containing lesion in the uncinate process of the pancreas.  Abdominal ultrasound showed hepatic steatosis and intra hepatic biliary ductal dilatation and status post cholecystectomy.  - Transfer was planned to [facility], but stopped due to patient declined transfer and MRCP showed "severe intrahepatic bile duct dilatation, with abrupt narrowing at the confluence of the hepatic ducts where there is a suspected ill-defined liver mass.  This is poorly evaluated without contrast but concerning for neoplasm such as cholangiocarcinoma" and no need for emergent ERCP  - further workup with MRI abdomen with and without contrast showed "hypoattenuating lesion near the confluence of the right and left hepatic ducts causing severe upstream ductal " "dilatation concerning for cholangiocarcinoma.  Further evaluation with ERCP is recommended."  - Update given to the patient about MRI findings concerning for cholangiocarcinoma and she expressed understanding.  Also discussed need for transfer for possible ERCP for biopsy and stent placement, and patient was agreeable  - Case discussed with [facility], and hepatobiliary service reviewed case with recommendation for transfer to [facility].  Patient accepted for transfer to [facility]  - Continue p.r.n. medication for symptomatic relief  - Trend with labs and correct electrolyte abnormalities as needed    Type 2 diabetes mellitus without complication, without long-term current use of insulin  - Last hemoglobin A1c 5.9, well controlled  - Hold home regimen of Trulicity  - Glucose level reviewed, currently in normal range  - Continue Accu-Cheks q.a.c. and q.h.s. and give low-dose sliding scale insulin as necessary    Mixed hyperlipidemia  - Holding statin due to abnormal liver function tests    Hypertension associated with diabetes  - Well controlled, continue metoprolol 50 mg p.o. q.h.s., amlodipine 10 mg p.o. daily and valsartan 320 mg p.o. daily    Generalized convulsive epilepsy with intractable epilepsy  - Stable, no reported seizure activity  - Continue levetiracetam 1500 mg p.o. q.h.s.      VTE Risk Mitigation (From admission, onward)           Ordered     IP VTE HIGH RISK PATIENT  Once         02/04/25 0219     Place sequential compression device  Until discontinued         02/04/25 0219                        Dafne Min MD  Department of Hospital Medicine   [facility] ([place])    "

## 2025-02-05 NOTE — SUBJECTIVE & OBJECTIVE
Interval History:  No acute events, reports nausea but no vomiting, but has not really attempted to eat anything.  Would like to try something to eat if possible.     Review of Systems   Constitutional:  Negative for chills and fever.   Gastrointestinal:  Positive for abdominal pain and nausea. Negative for vomiting.   Skin:  Positive for color change.     Objective:     Vital Signs (Most Recent):  Temp: 97.8 °F (36.6 °C) (02/05/25 1523)  Pulse: 81 (02/05/25 1525)  Resp: 18 (02/05/25 1523)  BP: 136/60 (02/05/25 1523)  SpO2: 100 % (02/05/25 1523) Vital Signs (24h Range):  Temp:  [97.8 °F (36.6 °C)-98.5 °F (36.9 °C)] 97.8 °F (36.6 °C)  Pulse:  [64-93] 81  Resp:  [16-20] 18  SpO2:  [94 %-100 %] 100 %  BP: (123-141)/(60-86) 136/60     Weight: 79.4 kg (175 lb 0.7 oz)  Body mass index is 35.35 kg/m².    Intake/Output Summary (Last 24 hours) at 2/5/2025 1550  Last data filed at 2/5/2025 1229  Gross per 24 hour   Intake 480 ml   Output 700 ml   Net -220 ml         Physical Exam  Vitals and nursing note reviewed.   Constitutional:       General: She is not in acute distress.     Appearance: She is well-developed. She is obese. She is not toxic-appearing or diaphoretic.   HENT:      Head: Normocephalic and atraumatic.      Nose: Nose normal.   Eyes:      General: Scleral icterus present.      Comments: Exophthalmos noted bilaterally   Neck:      Trachea: No tracheal deviation.   Cardiovascular:      Rate and Rhythm: Normal rate and regular rhythm.      Heart sounds: Normal heart sounds. No murmur heard.     No gallop.   Pulmonary:      Effort: Pulmonary effort is normal. No respiratory distress.      Breath sounds: Normal breath sounds. No stridor. No wheezing or rales.   Abdominal:      General: Bowel sounds are normal. There is no distension.      Palpations: Abdomen is soft.      Tenderness: There is abdominal tenderness. There is no guarding or rebound.      Comments: Mild TTP in right upper quadrant   Musculoskeletal:          General: No deformity. Normal range of motion.      Cervical back: Normal range of motion and neck supple.   Skin:     General: Skin is warm and dry.      Coloration: Skin is jaundiced.   Neurological:      General: No focal deficit present.      Mental Status: She is alert and oriented to person, place, and time.      Cranial Nerves: No cranial nerve deficit.      Motor: No abnormal muscle tone.   Psychiatric:         Mood and Affect: Mood normal.         Behavior: Behavior normal.         Thought Content: Thought content normal.             Significant Labs: All pertinent labs within the past 24 hours have been reviewed.    Significant Imaging: I have reviewed all pertinent imaging results/findings within the past 24 hours.

## 2025-02-05 NOTE — PLAN OF CARE
Recommendations  1. Continue diabetic diet.   2. Encourage oral intake.   3. Monitor weight and labs.   4. RD to follow up.    Goals: Pt be able to tolerate diabetic diet by RD follow up.  Nutrition Goal Status: new  Communication of RD Recs: other (comment) (POC)

## 2025-02-05 NOTE — PROGRESS NOTES
[facility] ([place])  Adult Nutrition  Progress Note    SUMMARY       Recommendations  1. Continue diabetic diet.   2. Encourage oral intake.   3. Monitor weight and labs.   4. RD to follow up.    Goals: Pt be able to tolerate diabetic diet by RD follow up.  Nutrition Goal Status: new  Communication of RD Recs: other (comment) (POC)    Assessment and Plan  Nutrition Problem  Increased energy needs    Related to (etiology):   Metabolic change    Signs and Symptoms (as evidenced by):   Pancreatic lesion     Interventions/Recommendations (treatment strategy):  Carb-modified diet  Collaboration/referral with providers.     Nutrition Diagnosis Status:   New     Malnutrition Assessment     Eyes (Micronutrient): yellow       Weight Loss (Malnutrition): other (see comments) (6% weight loss in 2 months)  Energy Intake (Malnutrition): other (see comments) (Predicted inadequate)   Orbital Region (Subcutaneous Fat Loss): well nourished  Upper Arm Region (Subcutaneous Fat Loss): well nourished  Thoracic and Lumbar Region: well nourished   Muslim Region (Muscle Loss): well nourished  Clavicle Bone Region (Muscle Loss): well nourished  Clavicle and Acromion Bone Region (Muscle Loss): well nourished  Scapular Bone Region (Muscle Loss): well nourished  Dorsal Hand (Muscle Loss): well nourished  Patellar Region (Muscle Loss): well nourished  Anterior Thigh Region (Muscle Loss): well nourished  Posterior Calf Region (Muscle Loss): well nourished                 Reason for Assessment    Reason For Assessment: other (see comments) (MST)  Diagnosis: gastrointestinal disease (pancreatic lesion)  General Information Comments: Pt admitted pancreatic lesion, is receiving diabetic diet. Previously NPO for breakfast. No N/C/D/V reported to RD. Pt reports placing order with dietary. Pending transfer for work up of liver mass. PIV. Mau 21. NFPE- Patient appeared nourished at this time.  Nutrition Discharge Planning: d/c on a diabetic  "diet    Nutrition/Diet History    Food Allergies: NKFA  Factors Affecting Nutritional Intake: nausea/vomiting, decreased appetite    Anthropometrics    Height: 4' 11" (149.9 cm)  Height (inches): 59 in  Height Method: Stated  Weight: 79.4 kg (175 lb 0.7 oz)  Weight (lb): 175.05 lb  Weight Method: Bed Scale  Ideal Body Weight (IBW), Female: 95 lb  % Ideal Body Weight, Female (lb): 184.26 %  BMI (Calculated): 35.3  BMI Grade: 35 - 39.9 - obesity - grade II  Weight Loss: unintentional  Usual Body Weight (UBW), k.8 kg ()  % Usual Body Weight: 93.83  % Weight Change From Usual Weight: -6.37 %       Lab/Procedures/Meds    Pertinent Labs Reviewed: reviewed  Pertinent Labs Comments: Albumin 2.8 (L), Total Bilirubin 8.3 (H), Alkaline Phosphatase 507 (H),  (H),  (H), K 3.3 (L)  Pertinent Medications Reviewed: reviewed  Pertinent Medications Comments: glucagon, dextrose 50%, melatonin, senna-docusate, sodium chloride, insulin aspart U-100    Estimated/Assessed Needs    Weight Used For Calorie Calculations: 79.4 kg (175 lb 0.7 oz)  Energy Calorie Requirements (kcal): 1828  Energy Need Method: [place]-St Jeor (x activity factor 1.5)  Protein Requirements: 103 (1.3 g/kg)  Weight Used For Protein Calculations: 79.4 kg (175 lb 0.7 oz)  Fluid Requirements (mL): 1 ml/kcal (per MD)  Estimated Fluid Requirement Method: RDA Method  RDA Method (mL): 1828  CHO Requirement: 103-137 (45-60%)      Nutrition Prescription Ordered    Current Diet Order: Diabetic    Evaluation of Received Nutrient/Fluid Intake    I/O: 240/--  Energy Calories Required: not meeting needs  Protein Required: not meeting needs  Fluid Required: meeting needs  Comments: LBM 2/3/2025  Tolerance: other (see comments) (Monitoring)  % Intake of Estimated Energy Needs: 0 - 25 %  % Meal Intake: 0 - 25 %    Nutrition Risk    Level of Risk/Frequency of Follow-up: moderate     Monitor and Evaluation    Food and Nutrient Intake: energy intake, food and " beverage intake  Food and Nutrient Adminstration: diet order  Physical Activity and Function: nutrition-related ADLs and IADLs  Anthropometric Measurements: weight, weight change, body mass index  Biochemical Data, Medical Tests and Procedures: electrolyte and renal panel, gastrointestinal profile, glucose/endocrine profile, inflammatory profile, lipid profile  Nutrition-Focused Physical Findings: overall appearance     Nutrition Follow-Up    RD Follow-up?: Yes    Ranjana Rodriguez, Student Dietitian

## 2025-02-05 NOTE — PROVIDER TRANSFER
(Physician in Lead of Transfers)  Outside Transfer Acceptance Note / Regional Referral Center    Upon patient arrival to floor, please send SecureChat to [facility] or call extension [phone] (if no answer, do NOT leave a callback number after the beep, rather please send a SecureChat to [facility]), for Hospital Medicine admit team assignment and for additional admit orders for the patient.  Do not page the attending physician associated with the patient on arrival (this physician may not be on duty at the time of arrival).  Rather, always send a SecureChat to [facility] or call [phone] to reach the triage physician for orders and team assignment.    Referring facility: [facility] ([facility])   Referring provider: INA ARITA  Accepting facility: [facility]  Accepting provider: HARSHAD SIMMS  Reason for transfer:  Biliary Service evaluation  Transfer diagnosis: biliary obstruction  Transfer specialty requested: Pancreatic Billiary  Transfer specialty notified: Yes  Transfer level: NUMBER 1-5: 2  Bed type requested: med-surg  Isolation status: No active isolations   Admission class or status: IP- Inpatient      Narrative     INITIAL  NOTE (2/4): 69 year female with PMHx of HTN, HLD, DMII, Grave's disease who was seen in clinic to establish care and outpatient labs notable for elevated LFT's so she was referred to the ED. She had recently undergone treatment for shingles and took Valtrex with improvement in the rash but then began have the abnormal urine prompting her to seek care with a new primary physician.      In the ED, /65 HR 88 RR 18 afebrile and 99% on room air. Labs notable for tbili 7.4, alk phos 538,  and . Lipase normal. INR 1.2. U/A with few bacteria.     CT showed Significant intrahepatic biliary ductal dilatation. Mild extrahepatic biliary ductal dilatation. 9 mm fat containing lesion in the uncinate process of the pancreas. Cholecystectomy.       Patient given IV CTX, IVF and IV ketorolac and transfer request for GI evaluation and ERCP. IF patient still boarding for transfer in the AM, GI recommends MRCP at referring facility.       UPDATE (2/5):  She was initially accepted to [facility] for biliary evaluation with elevated liver enzymes and concern for pancreatic lesion.  MRCP on February 4 showed severe intrahepatic bile duct dilatation with abrupt narrowing of the confluence of the hepatic ducts with a suspected ill-defined liver mass.  This is poorly evaluated without contrast but was concerning for neoplasm such as cholangiocarcinoma.  On February 4, transfer was canceled.  She subsequently had MRI of the abdomen with and without contrast that had hypoattenuating lesion near the confluence of the right left hepatic ducts causing severe upstream ductal dilatation concerning for cholangiocarcinoma.  She has persistent nausea and jaundice.  She does not show obvious evidence of infection at present.  Hospital Medicine at [facility] noted patient is now agreeable for transfer.  Transfer center contacted Biliary Service at [facility].  With concern for mass lesion causing biliary obstruction, will plan transfer to Hospital Medicine at [facility] for Biliary Service evaluation.  Currently patient is awake and alert, in no distress.    February 5: White blood cells 8.21, hemoglobin 11.7, hematocrit 33.3, platelets 305, sodium 137, potassium 3.3, chloride 101, CO2 24, BUN 8, creatinine 0.7, glucose 82, magnesium 1.7, total bilirubin 8.3, ,     Objective     Vitals: Temp: 98.4 °F (36.9 °C) (02/05/25 1143)  Pulse: 76 (02/05/25 1143)  Resp: 18 (02/05/25 1143)  BP: 128/69 (02/05/25 1143)  SpO2: 100 % (02/05/25 1143)  Recent Labs: CBC:   Recent Labs   Lab 02/03/25  1746 02/04/25  0559 02/05/25  0352   WBC 9.53 7.02 8.21   HGB 12.8 11.4* 11.7*   HCT 36.5* 33.2* 33.3*    281 305     CMP:   Recent Labs   Lab 02/03/25  1746  02/04/25  0559 02/05/25  0352    138 137   K 3.6 3.6 3.3*    102 101   CO2 23 24 24   * 94 82   BUN 9 8 8   CREATININE 0.8 0.7 0.7   CALCIUM 10.2 9.9 9.9   PROT 8.6* 7.8 7.6   ALBUMIN 3.3* 2.9* 2.8*   BILITOT 7.4* 6.4* 8.3*   ALKPHOS 538* 501* 507*   * 288* 280*   * 263* 264*   ANIONGAP 14 12 12     Coagulation:   Recent Labs   Lab 02/03/25 2014   INR 1.2         Instructions    Admit to Hospital Medicine  Consult Biliary Service/AES      RAMIREZ Flores MD  Hospital Medicine Staff  Cell: [phone]

## 2025-02-05 NOTE — ASSESSMENT & PLAN NOTE
- Well controlled, continue metoprolol 50 mg p.o. q.h.s., amlodipine 10 mg p.o. daily and valsartan 320 mg p.o. daily

## 2025-02-05 NOTE — PLAN OF CARE
-transfer to [facility] vs [facility] pending for w/u of liver mass   02/05/25 1249   Discharge Reassessment   Assessment Type Discharge Planning Reassessment   Did the patient's condition or plan change since previous assessment? No   Discharge Plan discussed with: Patient   Communicated ISHMAEL with patient/caregiver Date not available/Unable to determine   Discharge Plan B Home   DME Needed Upon Discharge  none   Transition of Care Barriers None   Why the patient remains in the hospital Requires continued medical care   Post-Acute Status   Discharge Delays None known at this time

## 2025-02-05 NOTE — HOSPITAL COURSE
"Patient presented to OSH with jaundice and scleral icterus, along with abnormal labs from clinic. Also noted nausea and back pain.  Noted history of cholecystectomy. Labs showed elevated bilirubin, and imaging with biliary ductal dilatation. Initial CT of the abdomen and pelvis with IV contrast showed significant intrahepatic biliary ductal dilatation and questionable 9 mm fat containing lesion in the uncinate process of the pancreas.  MRCP showed "severe intrahepatic bile duct dilatation, with abrupt narrowing at the confluence of the hepatic ducts where there is a suspected ill-defined liver mass. Repeat imaging with MRI abdomen with and without contrast showed "hypoattenuating lesion near the confluence of the right and left hepatic ducts causing severe upstream ductal dilatation concerning for cholangiocarcinoma.  Further evaluation with ERCP is recommended." Patient transferred to [facility] [place] workup and evaluation. CEA elevated, CA 19-9 WNL. AES and Surgical oncology consulted, recommended PTC for further evaluation. Symptoms controlled with benadryl. PCT placed 2/8. Surg/ onc Recommend capping both sided PTC prior to discharge. Both PTC drains need to be flushed with saline twice daily. CT chest abdomen and pelvis w contrast obtained per surg onc reccs and patient will need a CT chest non contrast for stagin. Patient was unable to be discharged due to fever and elevated WBC on 2/13 and infectious workup was started. Infectious workup remains negative to date and patient received Zosyn and Vanc. Finished a course of  flagyl and cipro for intraabdominal infx. Stable for discharge home with HH orders, and flush her drains at home TID, with surg onc clinic follow up in a week. If she fevers at home, needs to open the drains to gravity and call surg onc clinic.       "

## 2025-02-05 NOTE — ASSESSMENT & PLAN NOTE
"Liver mass, possible cholangiocarcinoma  - Presented with jaundice and scleral icterus, along with abnormal labs from clinic.  Noted history of cholecystectomy. Labs showed elevated bilirubin, and imaging with biliary ductal dilatation  - Initial CT of the abdomen and pelvis with IV contrast showed significant intrahepatic biliary ductal dilatation and questionable 9 mm fat containing lesion in the uncinate process of the pancreas.  Abdominal ultrasound showed hepatic steatosis and intra hepatic biliary ductal dilatation and status post cholecystectomy.  - Transfer was planned to [facility], but stopped due to patient declined transfer and MRCP showed "severe intrahepatic bile duct dilatation, with abrupt narrowing at the confluence of the hepatic ducts where there is a suspected ill-defined liver mass.  This is poorly evaluated without contrast but concerning for neoplasm such as cholangiocarcinoma" and no need for emergent ERCP  - further workup with MRI abdomen with and without contrast showed "hypoattenuating lesion near the confluence of the right and left hepatic ducts causing severe upstream ductal dilatation concerning for cholangiocarcinoma.  Further evaluation with ERCP is recommended."  - Update given to the patient about MRI findings concerning for cholangiocarcinoma and she expressed understanding.  Also discussed need for transfer for possible ERCP for biopsy and stent placement, and patient was agreeable  - Case discussed with RCC, and hepatobiliary service reviewed case with recommendation for transfer to [facility].  Patient accepted for transfer to [facility]  - Continue p.r.n. medication for symptomatic relief  - Trend with labs and correct electrolyte abnormalities as needed  "

## 2025-02-05 NOTE — PLAN OF CARE
Problem: Adult Inpatient Plan of Care  Goal: Plan of Care Review  Outcome: Progressing  Goal: Patient-Specific Goal (Individualized)  Outcome: Progressing  Goal: Absence of Hospital-Acquired Illness or Injury  Outcome: Progressing  Goal: Optimal Comfort and Wellbeing  Outcome: Progressing     Problem: Diabetes Comorbidity  Goal: Blood Glucose Level Within Targeted Range  Outcome: Progressing     POC reviewed with patient. All questions and concerns addressed. Fall/safety precautions implemented and maintained. Blood glucose monitored. NPO after MN per orders. No acute events noted this shift. Please see flowsheet for full assessment and vitals. Bed locked in lowest position. Side rails up x2. Call bell within reach.

## 2025-02-06 LAB
ALBUMIN SERPL BCP-MCNC: 2.7 G/DL (ref 3.5–5.2)
ALP SERPL-CCNC: 512 U/L (ref 40–150)
ALT SERPL W/O P-5'-P-CCNC: 251 U/L (ref 10–44)
ANION GAP SERPL CALC-SCNC: 11 MMOL/L (ref 8–16)
AST SERPL-CCNC: 271 U/L (ref 10–40)
BASOPHILS # BLD AUTO: 0.04 K/UL (ref 0–0.2)
BASOPHILS NFR BLD: 0.6 % (ref 0–1.9)
BILIRUB SERPL-MCNC: 9.9 MG/DL (ref 0.1–1)
BUN SERPL-MCNC: 10 MG/DL (ref 8–23)
CALCIUM SERPL-MCNC: 9.5 MG/DL (ref 8.7–10.5)
CHLORIDE SERPL-SCNC: 103 MMOL/L (ref 95–110)
CO2 SERPL-SCNC: 25 MMOL/L (ref 23–29)
CREAT SERPL-MCNC: 0.7 MG/DL (ref 0.5–1.4)
DIFFERENTIAL METHOD BLD: ABNORMAL
EOSINOPHIL # BLD AUTO: 0 K/UL (ref 0–0.5)
EOSINOPHIL NFR BLD: 0.4 % (ref 0–8)
ERYTHROCYTE [DISTWIDTH] IN BLOOD BY AUTOMATED COUNT: 16.4 % (ref 11.5–14.5)
EST. GFR  (NO RACE VARIABLE): >60 ML/MIN/1.73 M^2
GLUCOSE SERPL-MCNC: 88 MG/DL (ref 70–110)
HCT VFR BLD AUTO: 33.5 % (ref 37–48.5)
HGB BLD-MCNC: 11.4 G/DL (ref 12–16)
IMM GRANULOCYTES # BLD AUTO: 0.02 K/UL (ref 0–0.04)
IMM GRANULOCYTES NFR BLD AUTO: 0.3 % (ref 0–0.5)
LYMPHOCYTES # BLD AUTO: 2 K/UL (ref 1–4.8)
LYMPHOCYTES NFR BLD: 28.3 % (ref 18–48)
MAGNESIUM SERPL-MCNC: 1.7 MG/DL (ref 1.6–2.6)
MCH RBC QN AUTO: 29.8 PG (ref 27–31)
MCHC RBC AUTO-ENTMCNC: 34 G/DL (ref 32–36)
MCV RBC AUTO: 88 FL (ref 82–98)
MONOCYTES # BLD AUTO: 0.8 K/UL (ref 0.3–1)
MONOCYTES NFR BLD: 11.4 % (ref 4–15)
NEUTROPHILS # BLD AUTO: 4.1 K/UL (ref 1.8–7.7)
NEUTROPHILS NFR BLD: 59 % (ref 38–73)
NRBC BLD-RTO: 0 /100 WBC
PLATELET # BLD AUTO: 293 K/UL (ref 150–450)
PMV BLD AUTO: 10 FL (ref 9.2–12.9)
POCT GLUCOSE: 102 MG/DL (ref 70–110)
POCT GLUCOSE: 105 MG/DL (ref 70–110)
POCT GLUCOSE: 130 MG/DL (ref 70–110)
POCT GLUCOSE: 61 MG/DL (ref 70–110)
POCT GLUCOSE: 78 MG/DL (ref 70–110)
POCT GLUCOSE: 89 MG/DL (ref 70–110)
POTASSIUM SERPL-SCNC: 3.4 MMOL/L (ref 3.5–5.1)
PROT SERPL-MCNC: 7.5 G/DL (ref 6–8.4)
RBC # BLD AUTO: 3.82 M/UL (ref 4–5.4)
SODIUM SERPL-SCNC: 139 MMOL/L (ref 136–145)
WBC # BLD AUTO: 7 K/UL (ref 3.9–12.7)

## 2025-02-06 PROCEDURE — 25000003 PHARM REV CODE 250: Performed by: HOSPITALIST

## 2025-02-06 PROCEDURE — 80053 COMPREHEN METABOLIC PANEL: CPT | Performed by: PHYSICIAN ASSISTANT

## 2025-02-06 PROCEDURE — 94761 N-INVAS EAR/PLS OXIMETRY MLT: CPT

## 2025-02-06 PROCEDURE — 21400001 HC TELEMETRY ROOM

## 2025-02-06 PROCEDURE — 85025 COMPLETE CBC W/AUTO DIFF WBC: CPT | Performed by: PHYSICIAN ASSISTANT

## 2025-02-06 PROCEDURE — 36415 COLL VENOUS BLD VENIPUNCTURE: CPT | Performed by: PHYSICIAN ASSISTANT

## 2025-02-06 PROCEDURE — 83735 ASSAY OF MAGNESIUM: CPT | Performed by: PHYSICIAN ASSISTANT

## 2025-02-06 PROCEDURE — A4216 STERILE WATER/SALINE, 10 ML: HCPCS | Performed by: PHYSICIAN ASSISTANT

## 2025-02-06 PROCEDURE — 63600175 PHARM REV CODE 636 W HCPCS: Performed by: PHYSICIAN ASSISTANT

## 2025-02-06 PROCEDURE — 25000003 PHARM REV CODE 250: Performed by: PHYSICIAN ASSISTANT

## 2025-02-06 RX ORDER — ONDANSETRON HYDROCHLORIDE 2 MG/ML
4 INJECTION, SOLUTION INTRAVENOUS EVERY 8 HOURS PRN
Status: DISCONTINUED | OUTPATIENT
Start: 2025-02-06 | End: 2025-02-09

## 2025-02-06 RX ADMIN — LATANOPROST 1 DROP: 50 SOLUTION/ DROPS OPHTHALMIC at 09:02

## 2025-02-06 RX ADMIN — SODIUM CHLORIDE, PRESERVATIVE FREE 10 ML: 5 INJECTION INTRAVENOUS at 09:02

## 2025-02-06 RX ADMIN — ONDANSETRON 4 MG: 2 INJECTION INTRAMUSCULAR; INTRAVENOUS at 10:02

## 2025-02-06 RX ADMIN — LEVETIRACETAM 1500 MG: 750 TABLET, FILM COATED ORAL at 09:02

## 2025-02-06 RX ADMIN — POTASSIUM BICARBONATE 40 MEQ: 391 TABLET, EFFERVESCENT ORAL at 09:02

## 2025-02-06 RX ADMIN — AMLODIPINE BESYLATE 10 MG: 10 TABLET ORAL at 09:02

## 2025-02-06 RX ADMIN — SODIUM CHLORIDE, PRESERVATIVE FREE 10 ML: 5 INJECTION INTRAVENOUS at 06:02

## 2025-02-06 RX ADMIN — VALSARTAN 320 MG: 160 TABLET, FILM COATED ORAL at 09:02

## 2025-02-06 RX ADMIN — METOPROLOL SUCCINATE 50 MG: 50 TABLET, EXTENDED RELEASE ORAL at 09:02

## 2025-02-06 NOTE — PROGRESS NOTES
[facility] Medicine  Progress Note    Patient Name: Katey Cerrato  MRN: [id number]  Patient Class: IP- Inpatient   Admission Date: 2/3/2025  Length of Stay: 2 days  Attending Physician: Dafne Min MD  Primary Care Provider: Shay Castellano MD        Subjective     Principal Problem:Biliary obstruction        HPI:  Ms. Katey Cerrato is a 69 y.o. female, with PMH of HTN, HLD, DMII, Grave's disease, who presented to [facility] ED on 2/3/25 after labs following an outpatient appointment were abnormal. She states she recently was treated for shingles with Valtrex with rash improvement, but then her urine became discolored and that prompted her visit to her PCP.  At her PCP visit it was noticed that she had an orange coloration to the skin, and yellow color of the eyes.  After her appointment she had outpatient lab testing showing abnormal liver function, which is what prompted her PCP to send her to the ED. Her only new symptom is some low back pain. She states she had a cholecystectomy in the past, and had many gallstones removed at that time. She states her sister had retained gallstones that needed removal in the past. Denies any fevers, chills, trauma, alcohol consumption, Tylenol consumption.  ED labs showed tbili 7.4, alk phos 538,  and . Lipase normal. INR 1.2. U/A with few bacteria. A  CT showed significant intrahepatic biliary ductal dilatation, mild extrahepatic biliary ductal dilatation, a 9 mm fat containing lesion in the uncinate process of the pancreas.  She was treated with IV fluids, IV Rocephin, Toradol and a transfer request was initiated for ERCP.  However as transfer still pending at 1:30 a.m. an MRCP was ordered and she was placed on observation.    Overview/Hospital Course:  Ms. Cerrato presented with abnormal labs and jaundice.  Patient admitted for biliary obstruction.  Workup with MRCP showed intrahepatic bile duct dilation noted with an ill-defined  "liver mass.  Further workup with MRI abdomen with/without contrast showed "hypoattenuating lesion near the confluence of the right and left hepatic ducts causing severe upstream ductal dilatation concerning for cholangiocarcinoma. Further evaluation with ERCP is recommended." RCC contacted and patient awaiting transfer to [facility] for hepatobiliary service.    Interval History:  No acute events, reports persistent nausea but no vomiting. Not much of an appetite but able to keep some food down. No real abdominal pain.    Review of Systems   Constitutional:  Negative for chills and fever.   Gastrointestinal:  Positive for nausea. Negative for abdominal pain and vomiting.   Skin:  Positive for color change.     Objective:     Vital Signs (Most Recent):  Temp: 98.1 °F (36.7 °C) (02/06/25 1038)  Pulse: 74 (02/06/25 1038)  Resp: 18 (02/06/25 1038)  BP: (!) 158/72 (02/06/25 1038)  SpO2: 95 % (02/06/25 0753) Vital Signs (24h Range):  Temp:  [97.8 °F (36.6 °C)-99 °F (37.2 °C)] 98.1 °F (36.7 °C)  Pulse:  [64-81] 74  Resp:  [16-20] 18  SpO2:  [94 %-100 %] 95 %  BP: (128-158)/(60-86) 158/72     Weight: 79.3 kg (174 lb 12.8 oz)  Body mass index is 35.31 kg/m².    Intake/Output Summary (Last 24 hours) at 2/6/2025 1049  Last data filed at 2/6/2025 0927  Gross per 24 hour   Intake 840 ml   Output 210 ml   Net 630 ml         Physical Exam  Vitals and nursing note reviewed.   Constitutional:       General: She is not in acute distress.     Appearance: She is well-developed. She is obese. She is not toxic-appearing or diaphoretic.   HENT:      Head: Normocephalic and atraumatic.      Nose: Nose normal.   Eyes:      General: Scleral icterus present.      Comments: Exophthalmos noted bilaterally   Neck:      Trachea: No tracheal deviation.   Cardiovascular:      Rate and Rhythm: Normal rate and regular rhythm.      Heart sounds: Normal heart sounds. No murmur heard.     No gallop.   Pulmonary:      Effort: Pulmonary effort is normal. No " "respiratory distress.      Breath sounds: Normal breath sounds. No stridor. No wheezing or rales.   Abdominal:      General: Bowel sounds are normal. There is no distension.      Palpations: Abdomen is soft.      Tenderness: There is no abdominal tenderness. There is no guarding or rebound.   Musculoskeletal:         General: No deformity. Normal range of motion.      Cervical back: Normal range of motion and neck supple.   Skin:     General: Skin is warm and dry.      Coloration: Skin is jaundiced.   Neurological:      General: No focal deficit present.      Mental Status: She is alert and oriented to person, place, and time.      Cranial Nerves: No cranial nerve deficit.      Motor: No abnormal muscle tone.   Psychiatric:         Mood and Affect: Mood normal.         Behavior: Behavior normal.         Thought Content: Thought content normal.             Significant Labs: All pertinent labs within the past 24 hours have been reviewed.    Significant Imaging: I have reviewed all pertinent imaging results/findings within the past 24 hours.    Assessment and Plan     * Biliary obstruction  Liver mass, possible cholangiocarcinoma  - Presented with jaundice and scleral icterus, along with abnormal labs from clinic.  Noted history of cholecystectomy. Labs showed elevated bilirubin, and imaging with biliary ductal dilatation  - Initial CT of the abdomen and pelvis with IV contrast showed significant intrahepatic biliary ductal dilatation and questionable 9 mm fat containing lesion in the uncinate process of the pancreas.  Abdominal ultrasound showed hepatic steatosis and intra hepatic biliary ductal dilatation and status post cholecystectomy.  - Transfer was planned to [facility], but stopped due to patient declined transfer and MRCP showed "severe intrahepatic bile duct dilatation, with abrupt narrowing at the confluence of the hepatic ducts where there is a suspected ill-defined liver mass.  This is poorly evaluated " "without contrast but concerning for neoplasm such as cholangiocarcinoma" and no need for emergent ERCP  - Further workup with MRI abdomen with and without contrast showed "hypoattenuating lesion near the confluence of the right and left hepatic ducts causing severe upstream ductal dilatation concerning for cholangiocarcinoma.  Further evaluation with ERCP is recommended."  - Update given to the patient about MRI findings concerning for cholangiocarcinoma and she expressed understanding.  Also discussed need for transfer for possible ERCP for biopsy and stent placement, and patient was agreeable  - Case discussed with RCC, and hepatobiliary service reviewed case with recommendation for transfer to [facility].  Patient accepted for transfer to [facility], but still awaiting bed availability  - Continue p.r.n. medication for symptomatic relief  - Trend with labs and correct electrolyte abnormalities as needed    Type 2 diabetes mellitus without complication, without long-term current use of insulin  - Last hemoglobin A1c 5.9, well controlled  - Hold home regimen of Trulicity  - Glucose level reviewed, currently in normal range  - Continue Accu-Cheks q.a.c. and q.h.s. and give low-dose sliding scale insulin as necessary    Mixed hyperlipidemia  - Holding statin due to abnormal liver function tests    Hypertension associated with diabetes  - Well controlled, continue metoprolol 50 mg p.o. q.h.s., amlodipine 10 mg p.o. daily and valsartan 320 mg p.o. daily    Generalized convulsive epilepsy with intractable epilepsy  - Stable, no reported seizure activity  - Continue levetiracetam 1500 mg p.o. q.h.s.      VTE Risk Mitigation (From admission, onward)           Ordered     IP VTE HIGH RISK PATIENT  Once         02/04/25 0219     Place sequential compression device  Until discontinued         02/04/25 0219                        Dafne Min MD  Department of Hospital Medicine   [facility] ([place])    "

## 2025-02-06 NOTE — ASSESSMENT & PLAN NOTE
"Liver mass, possible cholangiocarcinoma  - Presented with jaundice and scleral icterus, along with abnormal labs from clinic.  Noted history of cholecystectomy. Labs showed elevated bilirubin, and imaging with biliary ductal dilatation  - Initial CT of the abdomen and pelvis with IV contrast showed significant intrahepatic biliary ductal dilatation and questionable 9 mm fat containing lesion in the uncinate process of the pancreas.  Abdominal ultrasound showed hepatic steatosis and intra hepatic biliary ductal dilatation and status post cholecystectomy.  - Transfer was planned to [facility], but stopped due to patient declined transfer and MRCP showed "severe intrahepatic bile duct dilatation, with abrupt narrowing at the confluence of the hepatic ducts where there is a suspected ill-defined liver mass.  This is poorly evaluated without contrast but concerning for neoplasm such as cholangiocarcinoma" and no need for emergent ERCP  - Further workup with MRI abdomen with and without contrast showed "hypoattenuating lesion near the confluence of the right and left hepatic ducts causing severe upstream ductal dilatation concerning for cholangiocarcinoma.  Further evaluation with ERCP is recommended."  - Update given to the patient about MRI findings concerning for cholangiocarcinoma and she expressed understanding.  Also discussed need for transfer for possible ERCP for biopsy and stent placement, and patient was agreeable  - Case discussed with RCC, and hepatobiliary service reviewed case with recommendation for transfer to [facility].  Patient accepted for transfer to [facility], but still awaiting bed availability  - Continue p.r.n. medication for symptomatic relief  - Trend with labs and correct electrolyte abnormalities as needed  "

## 2025-02-06 NOTE — SUBJECTIVE & OBJECTIVE
Interval History:  No acute events, reports persistent nausea but no vomiting. Not much of an appetite but able to keep some food down. No real abdominal pain.    Review of Systems   Constitutional:  Negative for chills and fever.   Gastrointestinal:  Positive for nausea. Negative for abdominal pain and vomiting.   Skin:  Positive for color change.     Objective:     Vital Signs (Most Recent):  Temp: 98.1 °F (36.7 °C) (02/06/25 1038)  Pulse: 74 (02/06/25 1038)  Resp: 18 (02/06/25 1038)  BP: (!) 158/72 (02/06/25 1038)  SpO2: 95 % (02/06/25 0753) Vital Signs (24h Range):  Temp:  [97.8 °F (36.6 °C)-99 °F (37.2 °C)] 98.1 °F (36.7 °C)  Pulse:  [64-81] 74  Resp:  [16-20] 18  SpO2:  [94 %-100 %] 95 %  BP: (128-158)/(60-86) 158/72     Weight: 79.3 kg (174 lb 12.8 oz)  Body mass index is 35.31 kg/m².    Intake/Output Summary (Last 24 hours) at 2/6/2025 1049  Last data filed at 2/6/2025 0927  Gross per 24 hour   Intake 840 ml   Output 210 ml   Net 630 ml         Physical Exam  Vitals and nursing note reviewed.   Constitutional:       General: She is not in acute distress.     Appearance: She is well-developed. She is obese. She is not toxic-appearing or diaphoretic.   HENT:      Head: Normocephalic and atraumatic.      Nose: Nose normal.   Eyes:      General: Scleral icterus present.      Comments: Exophthalmos noted bilaterally   Neck:      Trachea: No tracheal deviation.   Cardiovascular:      Rate and Rhythm: Normal rate and regular rhythm.      Heart sounds: Normal heart sounds. No murmur heard.     No gallop.   Pulmonary:      Effort: Pulmonary effort is normal. No respiratory distress.      Breath sounds: Normal breath sounds. No stridor. No wheezing or rales.   Abdominal:      General: Bowel sounds are normal. There is no distension.      Palpations: Abdomen is soft.      Tenderness: There is no abdominal tenderness. There is no guarding or rebound.   Musculoskeletal:         General: No deformity. Normal range of  motion.      Cervical back: Normal range of motion and neck supple.   Skin:     General: Skin is warm and dry.      Coloration: Skin is jaundiced.   Neurological:      General: No focal deficit present.      Mental Status: She is alert and oriented to person, place, and time.      Cranial Nerves: No cranial nerve deficit.      Motor: No abnormal muscle tone.   Psychiatric:         Mood and Affect: Mood normal.         Behavior: Behavior normal.         Thought Content: Thought content normal.             Significant Labs: All pertinent labs within the past 24 hours have been reviewed.    Significant Imaging: I have reviewed all pertinent imaging results/findings within the past 24 hours.

## 2025-02-06 NOTE — PLAN OF CARE
Medicare Message     Important Message from Medicare regarding Discharge Appeal Rights Explained to patient/caregiver; Signed/date by patient/caregiver   Date IMM was signed 2/4/2025   Time IMM was signed [id number]

## 2025-02-07 LAB
ALBUMIN SERPL BCP-MCNC: 2.8 G/DL (ref 3.5–5.2)
ALP SERPL-CCNC: 551 U/L (ref 40–150)
ALT SERPL W/O P-5'-P-CCNC: 256 U/L (ref 10–44)
ANION GAP SERPL CALC-SCNC: 10 MMOL/L (ref 8–16)
AST SERPL-CCNC: 271 U/L (ref 10–40)
BASOPHILS # BLD AUTO: 0.03 K/UL (ref 0–0.2)
BASOPHILS NFR BLD: 0.5 % (ref 0–1.9)
BILIRUB SERPL-MCNC: 10.3 MG/DL (ref 0.1–1)
BUN SERPL-MCNC: 11 MG/DL (ref 8–23)
CALCIUM SERPL-MCNC: 9.8 MG/DL (ref 8.7–10.5)
CHLORIDE SERPL-SCNC: 101 MMOL/L (ref 95–110)
CO2 SERPL-SCNC: 25 MMOL/L (ref 23–29)
CREAT SERPL-MCNC: 0.7 MG/DL (ref 0.5–1.4)
DIFFERENTIAL METHOD BLD: ABNORMAL
EOSINOPHIL # BLD AUTO: 0 K/UL (ref 0–0.5)
EOSINOPHIL NFR BLD: 0.6 % (ref 0–8)
ERYTHROCYTE [DISTWIDTH] IN BLOOD BY AUTOMATED COUNT: 16.9 % (ref 11.5–14.5)
EST. GFR  (NO RACE VARIABLE): >60 ML/MIN/1.73 M^2
GLUCOSE SERPL-MCNC: 100 MG/DL (ref 70–110)
HCT VFR BLD AUTO: 32.2 % (ref 37–48.5)
HGB BLD-MCNC: 11.4 G/DL (ref 12–16)
IMM GRANULOCYTES # BLD AUTO: 0.02 K/UL (ref 0–0.04)
IMM GRANULOCYTES NFR BLD AUTO: 0.3 % (ref 0–0.5)
INR PPP: 1.2 (ref 0.8–1.2)
LYMPHOCYTES # BLD AUTO: 1.6 K/UL (ref 1–4.8)
LYMPHOCYTES NFR BLD: 25.1 % (ref 18–48)
MAGNESIUM SERPL-MCNC: 1.7 MG/DL (ref 1.6–2.6)
MCH RBC QN AUTO: 30.9 PG (ref 27–31)
MCHC RBC AUTO-ENTMCNC: 35.4 G/DL (ref 32–36)
MCV RBC AUTO: 87 FL (ref 82–98)
MONOCYTES # BLD AUTO: 0.7 K/UL (ref 0.3–1)
MONOCYTES NFR BLD: 10.9 % (ref 4–15)
NEUTROPHILS # BLD AUTO: 3.9 K/UL (ref 1.8–7.7)
NEUTROPHILS NFR BLD: 62.6 % (ref 38–73)
NRBC BLD-RTO: 0 /100 WBC
PHOSPHATE SERPL-MCNC: 3.6 MG/DL (ref 2.7–4.5)
PLATELET # BLD AUTO: 325 K/UL (ref 150–450)
PMV BLD AUTO: 10.3 FL (ref 9.2–12.9)
POCT GLUCOSE: 100 MG/DL (ref 70–110)
POCT GLUCOSE: 75 MG/DL (ref 70–110)
POTASSIUM SERPL-SCNC: 3.6 MMOL/L (ref 3.5–5.1)
PROT SERPL-MCNC: 7.9 G/DL (ref 6–8.4)
PROTHROMBIN TIME: 13.8 SEC (ref 9–12.5)
RBC # BLD AUTO: 3.69 M/UL (ref 4–5.4)
SODIUM SERPL-SCNC: 136 MMOL/L (ref 136–145)
WBC # BLD AUTO: 6.22 K/UL (ref 3.9–12.7)

## 2025-02-07 PROCEDURE — 21400001 HC TELEMETRY ROOM

## 2025-02-07 PROCEDURE — 25000003 PHARM REV CODE 250: Performed by: PHYSICIAN ASSISTANT

## 2025-02-07 PROCEDURE — 36415 COLL VENOUS BLD VENIPUNCTURE: CPT | Performed by: HOSPITALIST

## 2025-02-07 PROCEDURE — 85610 PROTHROMBIN TIME: CPT | Performed by: HOSPITALIST

## 2025-02-07 PROCEDURE — 83735 ASSAY OF MAGNESIUM: CPT | Performed by: HOSPITALIST

## 2025-02-07 PROCEDURE — 25000003 PHARM REV CODE 250: Performed by: HOSPITALIST

## 2025-02-07 PROCEDURE — 84100 ASSAY OF PHOSPHORUS: CPT | Performed by: HOSPITALIST

## 2025-02-07 PROCEDURE — A4216 STERILE WATER/SALINE, 10 ML: HCPCS | Performed by: PHYSICIAN ASSISTANT

## 2025-02-07 PROCEDURE — 85025 COMPLETE CBC W/AUTO DIFF WBC: CPT | Performed by: HOSPITALIST

## 2025-02-07 PROCEDURE — 80053 COMPREHEN METABOLIC PANEL: CPT | Performed by: HOSPITALIST

## 2025-02-07 PROCEDURE — 99222 1ST HOSP IP/OBS MODERATE 55: CPT | Mod: GC,,, | Performed by: INTERNAL MEDICINE

## 2025-02-07 RX ORDER — DIPHENHYDRAMINE HCL 25 MG
25 CAPSULE ORAL EVERY 6 HOURS PRN
Status: DISCONTINUED | OUTPATIENT
Start: 2025-02-07 | End: 2025-02-19 | Stop reason: HOSPADM

## 2025-02-07 RX ADMIN — VALSARTAN 320 MG: 160 TABLET, FILM COATED ORAL at 08:02

## 2025-02-07 RX ADMIN — SODIUM CHLORIDE, PRESERVATIVE FREE 10 ML: 5 INJECTION INTRAVENOUS at 02:02

## 2025-02-07 RX ADMIN — SODIUM CHLORIDE, PRESERVATIVE FREE 10 ML: 5 INJECTION INTRAVENOUS at 10:02

## 2025-02-07 RX ADMIN — LEVETIRACETAM 1500 MG: 750 TABLET, FILM COATED ORAL at 10:02

## 2025-02-07 RX ADMIN — SODIUM CHLORIDE, PRESERVATIVE FREE 10 ML: 5 INJECTION INTRAVENOUS at 05:02

## 2025-02-07 RX ADMIN — AMLODIPINE BESYLATE 10 MG: 10 TABLET ORAL at 08:02

## 2025-02-07 RX ADMIN — LATANOPROST 1 DROP: 50 SOLUTION/ DROPS OPHTHALMIC at 10:02

## 2025-02-07 RX ADMIN — METOPROLOL SUCCINATE 50 MG: 50 TABLET, EXTENDED RELEASE ORAL at 10:02

## 2025-02-07 NOTE — ASSESSMENT & PLAN NOTE
Patient with hx HLD presenting with abnormal liver function tests. HLD well controlled on home statin    - Holding statin due to abnormal liver function tests

## 2025-02-07 NOTE — MED STUDENT ASSESSMENT & PLAN
"Biliary Obstruction  69 y.o. female, with PMH of HTN, HLD, DMII, Grave's disease who presented for clinic for workup of abnormal liver enzymes.  In the ED, labs showed tbili 7.4, alk phos 538,  and . Lipase normal. INR 1.2. U/A with few bacteria. CT showed significant intrahepatic biliary ductal dilatation, mild extrahepatic biliary ductal dilatation, a 9 mm fat containing lesion in the uncinate process of the pancreas.  She was treated with IV fluids, IV Rocephin, Toradol. MRCP showed intrahepatic bile duct dilation noted with an ill-defined liver mass.  Further workup with MRI abdomen with/without contrast showed "hypoattenuating lesion near the confluence of the right and left hepatic ducts causing severe upstream ductal dilatation concerning for cholangiocarcinoma. Transferred to [facility] for evaluation with ERCP.     Plan  -NPO  -Surgical oncology consulted, appreciate recs    Type 2 Diabetes Mellitus  Patient's FSGs are controlled on current medication regimen.  Last A1c reviewed-   Lab Results   Component Value Date    HGBA1C 5.9 (H) 02/03/2025     Most recent fingerstick glucose reviewed-   Recent Labs   Lab 02/06/25  1547 02/06/25  2201 02/07/25  0723   POCTGLUCOSE 105 130* 100     Current correctional scale  Medium  Maintain anti-hyperglycemic dose as follows-   Antihyperglycemics (From admission, onward)      Start     Stop Route Frequency Ordered    02/04/25 0434  insulin aspart U-100 pen 0-5 Units         -- SubQ Every 6 hours PRN 02/04/25 0334          Hold Oral hypoglycemics while patient is in the hospital.    Mixed Hyperlipidemia  -Hold statin in the setting of elevated liver enzymes    Hypertension  Patients blood pressure range in the last 24 hours was: BP  Min: 122/72  Max: 160/70.The patient's inpatient anti-hypertensive regimen is listed below:  Current Antihypertensives  amLODIPine tablet 10 mg, Daily, Oral  metoprolol succinate (TOPROL-XL) 24 hr tablet 50 mg, Nightly, Oral  valsartan " tablet 320 mg, Daily, Oral    Plan  - BP is controlled, no changes needed to their regimen  - Continue hypertensive meds as listed above        Generalized convulsive epilepsy with intractable epilepsy  - Continue levetiracetam 1500 mg p.o. q.h.s

## 2025-02-07 NOTE — CONSULTS
[facility]  [facility]  Consult Note    Patient Name: Katey Cerrato  MRN: [id number]  Admission Date: 2/3/2025  Hospital Length of Stay: 3 days  Code Status: Full Code   Attending Provider: Juliana Marx MD   Consulting Provider: Carlos Tran MD  Primary Care Physician: Shay Castellano MD  Principal Problem:Biliary obstruction    Inpatient consult to [facility]  Consult performed by: Carlso Tran MD  Consult ordered by: Zaire Lu MD        Subjective:     HPI: Katey Cerrato is a 69 y.o. female with history of HLD, DMII, Grave's disease who initially presented to [facility] ED on 2/3 for elevated LFTs noted on outpatient labs. These were checked due to jaundice noted at recent PCP visit. Complains of low back pain but otherwise asymptomatic. tbili 7.4, alk phos 538,  and . CT showed significant intrahepatic biliary ductal dilatation, mild intrahepatic dilation and 9 mm fat containing lesion in uncinate process of pancreas. MRI/MRCP then showed hypo attenuating lesion at confluence of L and R hepatic ducts with severe ductal dilatation.    No history of gastric bypass/altered anatomy  Not on blood thinners    PSH:  Cholecystectomy  Knee surgery  Hysterectomy  oophorectomy    Objective:     Vitals:    02/07/25 0713   BP: 122/62   Pulse: 68   Resp: 16   Temp: 98.4 °F (36.9 °C)         Constitutional:  not in acute distress and well developed  HENT: Head: Normal, normocephalic.  Eyes: scleral icterus  GI: soft, non-tender, without masses or organomegaly  Musculoskeletal: no muscular tenderness noted  Skin: jaundiced  Neurological: alert, oriented x3    Significant Labs:  Reviewed the following pertinent laboratory tests:   TB 10.3         Significant Imaging:  Imaging reviewed: MRI MRCP 2/4/25. Interpretation:    Hypoattenuating lesion near the confluence of the right and left hepatic ducts causing severe upstream ductal  dilatation concerning for cholangiocarcinoma. Further evaluation with ERCP is recommended.     Assessment/Plan:     Katey Cerrato is a 69 y.o. female with history of HLD, DMII, Grave's disease who is admitted with concern for cholangiocarcinoma. Transferred to [facility] for further evaluation. Not cholangitic. Recommend surgical oncology consult to guide approach for tissue sampling and biliary drainage.       Problem List:  Obstructive jaundice, concern for cholangiocarcinoma      Recommendations:  - Consult surgical oncology to help determine IR vs endoscopic approach for diagnosis and biliary drainage    Thank you for involving us in the care of Katey Cerrato. Please call with any additional questions, concerns or changes in the patient's clinical status. We will continue to follow.     Carlos Tran MD  Gastroenterology Fellow PGY-6  [facility]

## 2025-02-07 NOTE — ASSESSMENT & PLAN NOTE
Pt with hx graves disease last TSH wnl in 2023. Currently without symptoms.    -Continue to monitor

## 2025-02-07 NOTE — SUBJECTIVE & OBJECTIVE
Past Medical History:   Diagnosis Date    Cataract     Empty sella syndrome     GHD (growth hormone deficiency)     Glaucoma     Graves' disease with exophthalmos     History of vitamin D deficiency     Hyperlipidemia     Hypertension     Seizures     Thyroid nodule     Thyroid nodule     Type II or unspecified type diabetes mellitus without mention of complication, uncontrolled        Past Surgical History:   Procedure Laterality Date    BREAST BIOPSY      CATARACT EXTRACTION W/  INTRAOCULAR LENS IMPLANT Right 3/15/2021    Procedure: EXTRACTION, CATARACT, WITH IOL INSERTION;  Surgeon: Cj Caban MD;  Location: [facility] OR;  Service: Ophthalmology;  Laterality: Right;    CATARACT EXTRACTION W/  INTRAOCULAR LENS IMPLANT Left 3/29/2021    Procedure: EXTRACTION, CATARACT, WITH IOL INSERTION;  Surgeon: Cj Caban MD;  Location: [facility] OR;  Service: Ophthalmology;  Laterality: Left;    CHOLECYSTECTOMY      HYSTERECTOMY      one ovary intact    INJECTION OF JOINT Left 2/6/2024    Procedure: INJECTION, JOINT LEFT KNEE WITH STEROID;  Surgeon: She Schwartz MD;  Location: [facility] PAIN MGT;  Service: Pain Management;  Laterality: Left;  [phone]    KNEE SURGERY      LYMPH NODE BIOPSY  3010    OOPHORECTOMY         Review of patient's allergies indicates:   Allergen Reactions    Codeine Nausea Only       No current facility-administered medications on file prior to encounter.     Current Outpatient Medications on File Prior to Encounter   Medication Sig    amLODIPine (NORVASC) 10 MG tablet TAKE 1 TABLET BY MOUTH EVERY DAY    atorvastatin (LIPITOR) 80 MG tablet Take 1 tablet (80 mg total) by mouth once daily.    blood sugar diagnostic Strp To check BG 1 times daily, to use with insurance preferred meter    blood-glucose meter kit To check BG 1 times daily, to use with insurance preferred meter    chlorhexidine (PERIDEX) 0.12 % solution SWISH AND SPIT 15MLS THREE TIMES DAILY FOR 7 AYS    FLUZOLUIS M PEREZSE QUAD 23-24 PF  240 mcg/0.7 mL Syrg     lancets Misc To check BG 1 times daily, to use with insurance preferred meter    latanoprost 0.005 % ophthalmic solution INSTILL 1 DROP INTO BOTH EYES EVERY EVENING    levETIRAcetam (KEPPRA) 750 MG Tab TAKE 2 TABLETS BY MOUTH ONCE  DAILY    meloxicam (MOBIC) 15 MG tablet TAKE 1 TABLET BY MOUTH EVERY DAY AS NEEDED FOR PAIN    metoprolol succinate (TOPROL-XL) 50 MG 24 hr tablet TAKE 1 TABLET BY MOUTH EVERY DAY IN THE EVENING    multivitamin-minerals-lutein Tab Take 1 tablet by mouth once daily once daily.    mv-mn/iron/folic acid/herb 190 (VITAMIN D3 COMPLETE ORAL) Take by mouth.    ondansetron (ZOFRAN-ODT) 4 MG TbDL Take 1 tablet (4 mg total) by mouth every 6 (six) hours as needed.    TRULICITY 3 mg/0.5 mL pen injector INJECT 3 MG (0.5 ML) UNDER THE SKIN ONCE A WEEK    valACYclovir (VALTREX) 1000 MG tablet Take 1 tablet (1,000 mg total) by mouth 3 (three) times daily. for 7 days    valsartan (DIOVAN) 320 MG tablet Take 1 tablet (320 mg total) by mouth once daily.    VITAMIN B COMPLEX ORAL Take by mouth.     Family History       Problem Relation (Age of Onset)    Breast cancer Mother (78), Sister (60)    Cancer Mother    Cataracts Mother    Glaucoma Mother    Heart disease Mother, Father, Sister    Hypertension Brother    No Known Problems Sister, Brother, Maternal Aunt, Maternal Uncle, Paternal Aunt, Paternal Uncle, Maternal Grandmother, Maternal Grandfather, Paternal Grandmother, Paternal Grandfather, Son, Other    Tremor Mother          Tobacco Use    Smoking status: Never    Smokeless tobacco: Never   Substance and Sexual Activity    Alcohol use: No     Comment: none    Drug use: No    Sexual activity: Yes     Partners: Male     Comment: m  works in dietary,  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     Review of Systems   Constitutional:  Negative for chills and fever.   Respiratory:  Negative for cough, shortness of breath and wheezing.    Cardiovascular:  Negative for chest pain and leg swelling.    Gastrointestinal:  Positive for nausea. Negative for abdominal distention, abdominal pain, blood in stool, constipation, diarrhea and vomiting.   Genitourinary:  Negative for difficulty urinating and dysuria.   Musculoskeletal:  Positive for back pain.   Skin:  Positive for color change.     Objective:     Vital Signs (Most Recent):  Temp: 98 °F (36.7 °C) (02/07/25 1216)  Pulse: 71 (02/07/25 1216)  Resp: 18 (02/07/25 1216)  BP: 135/84 (02/07/25 1216)  SpO2: 98 % (02/07/25 1216) Vital Signs (24h Range):  Temp:  [98 °F (36.7 °C)-99.2 °F (37.3 °C)] 98 °F (36.7 °C)  Pulse:  [60-83] 71  Resp:  [14-18] 18  SpO2:  [97 %-99 %] 98 %  BP: (122-143)/(58-91) 135/84     Weight: 79.7 kg (175 lb 11.2 oz)  Body mass index is 35.49 kg/m².     Physical Exam  Vitals and nursing note reviewed.   Constitutional:       General: She is not in acute distress.     Appearance: She is well-developed. She is obese. She is not toxic-appearing or diaphoretic.   HENT:      Head: Normocephalic and atraumatic.      Nose: Nose normal.      Mouth/Throat:      Pharynx: Oropharynx is clear. No oropharyngeal exudate.   Eyes:      General: Scleral icterus present.      Extraocular Movements: Extraocular movements intact.      Comments: Exophthalmos noted bilaterally   Neck:      Trachea: No tracheal deviation.   Cardiovascular:      Rate and Rhythm: Normal rate and regular rhythm.      Heart sounds: Normal heart sounds. No murmur heard.     No friction rub. No gallop.   Pulmonary:      Effort: Pulmonary effort is normal. No respiratory distress.      Breath sounds: Normal breath sounds. No stridor. No wheezing, rhonchi or rales.   Chest:      Chest wall: No tenderness.   Abdominal:      General: Bowel sounds are normal. There is no distension.      Palpations: Abdomen is soft.      Tenderness: There is no abdominal tenderness. There is no guarding or rebound.   Musculoskeletal:         General: No deformity. Normal range of motion.      Cervical back:  Normal range of motion and neck supple.   Skin:     General: Skin is warm and dry.      Coloration: Skin is jaundiced.      Findings: No erythema or rash.   Neurological:      General: No focal deficit present.      Mental Status: She is alert and oriented to person, place, and time.      Cranial Nerves: No cranial nerve deficit.      Motor: No abnormal muscle tone.   Psychiatric:         Mood and Affect: Mood normal.         Behavior: Behavior normal.         Thought Content: Thought content normal.                Significant Labs: All pertinent labs within the past 24 hours have been reviewed.    Significant Imaging: I have reviewed all pertinent imaging results/findings within the past 24 hours.

## 2025-02-07 NOTE — ASSESSMENT & PLAN NOTE
Patient with history of epilepsy in the past on levitiracetam 1500 mg qhs. Stable, no reported seizure activity    - Continue home levetiracetam 1500 mg p.o. q.h.s.

## 2025-02-07 NOTE — NURSING
Patient declines to wear telemetry at this time. States is too uncomfortable to keep on. Sinus Rhythm noted before she removed it.  Box returned to desk. Will continue to monitor patient.

## 2025-02-07 NOTE — MEDICAL/APP STUDENT
[facility] - Internal Medicine [facility] Medicine  Progress Note    Patient Name: Katey Cerrato  MRN: [id number]  Patient Class: IP- Inpatient   Admission Date: 2/3/2025  Length of Stay: 3 days  Attending Physician: Juliana Marx MD  Primary Care Provider: Shay Castellano MD        Subjective:     Chief Complaint   Patient presents with    Sent by MD     Pt states she was sent by MD for elevated liver enzymes and bile duct blockage.       Principal Problem:Biliary obstruction    HPI: Mrs. Katey Cerrato is a 69 year old F with a background of HTN, HLD, Seizures (managed with Keppra), Graves Disease, T2DM, with a Surgical Hx of Cholecystectomy (Cholelithiasis), who was referred by her current PCP for elevated liver enzymes and suspected bile duct blockage. Two weeks ago, pt presented to a previous PCP for a pruritic abdominal rash, and noted constipation relieved at home by miralax. The rash was treated as shingles, and the patient completed 6 of a 7 day course of Valtrex. One week ago, the patient developed nausea limiting oral intake, shanika lower back pain, and noted urine discoloration from yellow to yellowish orange. Denies other urinary symptoms.    She presented to a new PCP who noted jaundice on physical exam and the following labs:    2/3/25   Labs: CBC: HCT 36.5, RDW 16.0, PT: PT 13.8, Liver Enzymes: , Bilirubin 7.4, ,     Given jaundice, elevated bilirubin, and elevated liver enzymes, PCP recommended presentation to the ED for workup of an obstructive jaundice.    Labs: Lipid: Normal, HbA1C: 5.9, Hepatitis Screen: Hep A, B, C non-reactive, Urinalysis: Hazy yellow. Elevated Urobilinogen (4.0-6.0), Bilirubin 3+.  Imaging: US Abdomen: Hepatic steatosis.  Intrahepatic biliary ductal dilatation. Cholecystectomy.  EKG: Normal Sinus Rhythm.    Overview/Hospital Course:  No notes on file      Interval History: ***    Review of Systems  Objective:   Weight: 79.7 kg (175 lb 11.2  "oz)  Body mass index is 35.49 kg/m².    Intake/Output Summary (Last 24 hours) at 2/7/2025 1314  Last data filed at 2/7/2025 0400  Gross per 24 hour   Intake 700 ml   Output --   Net 700 ml     Vitals:    02/07/25 1216   BP: 135/84   Pulse: 71   Resp: 18   Temp: 98 °F (36.7 °C)     Physical Exam  Constitutional:       Appearance: She is obese.             Significant Labs: {Results:79987::"All pertinent labs within the past 24 hours have been reviewed."}    Significant Imaging: {Imaging Review:13240::"I have reviewed all pertinent imaging results/findings within the past 24 hours."}        Assessment/Plan:      No notes have been filed under this hospital service.  Service: Med/Surg    VTE Risk Mitigation (From admission, onward)           Ordered     IP VTE HIGH RISK PATIENT  Once         02/04/25 0219     Place sequential compression device  Until discontinued         02/04/25 0219                    Discharge Planning   ISHMAEL: 2/9/2025     Code Status: Full Code   Is the patient medically ready for discharge?:     Reason for patient still in hospital (select all that apply): {HMREASONPATIENTINHOSP:65596}  Discharge Plan A: Home with family   Discharge Delays: None known at this time              Favian Wong  Department of Hospital Medicine   [facility] - Internal Medicine Telemetry    "

## 2025-02-07 NOTE — MED STUDENT SUBJECTIVE & OBJECTIVE
"Medical Student Subjective & Objective     Principal Problem: Biliary obstruction    Chief Complaint:   Chief Complaint   Patient presents with    Sent by MD     Pt states she was sent by MD for elevated liver enzymes and bile duct blockage.       HPI: Ms. Katey Cerrato is a 69 y.o. female, with PMH of HTN, HLD, DMII, Grave's disease, who presented to [facility] ED on 2/3/25 after labs following an outpatient appointment were abnormal. She states she recently was treated for shingles with Valtrex with rash improvement, but then her urine became discolored and that prompted her visit to her PCP.  At her PCP visit it was noticed that she had an orange coloration to the skin, and yellow color of the eyes.  After her appointment she had outpatient lab testing showing abnormal liver function, which is what prompted her PCP to send her to the ED. Her only new symptom is some low back pain. She states she had a cholecystectomy in the past, and had many gallstones removed at that time. She states her sister had retained gallstones that needed removal in the past. Denies any fevers, chills, trauma, alcohol consumption, Tylenol consumption.  [facility] ED labs showed tbili 7.4, alk phos 538,  and . Lipase normal. INR 1.2. U/A with few bacteria. A  CT showed significant intrahepatic biliary ductal dilatation, mild extrahepatic biliary ductal dilatation, a 9 mm fat containing lesion in the uncinate process of the pancreas.  She was treated with IV fluids, IV Rocephin, Toradol. MRCP showed intrahepatic bile duct dilation noted with an ill-defined liver mass.  Further workup with MRI abdomen with/without contrast showed "hypoattenuating lesion near the confluence of the right and left hepatic ducts causing severe upstream ductal dilatation concerning for cholangiocarcinoma. Transferred to [facility] for evaluation with ERCP.       Past Medical History:   Diagnosis Date    Cataract     Empty sella syndrome     GHD (growth hormone " deficiency)     Glaucoma     Graves' disease with exophthalmos     History of vitamin D deficiency     Hyperlipidemia     Hypertension     Seizures     Thyroid nodule     Thyroid nodule     Type II or unspecified type diabetes mellitus without mention of complication, uncontrolled        Past Surgical History:   Procedure Laterality Date    BREAST BIOPSY      CATARACT EXTRACTION W/  INTRAOCULAR LENS IMPLANT Right 3/15/2021    Procedure: EXTRACTION, CATARACT, WITH IOL INSERTION;  Surgeon: Cj Caban MD;  Location: [facility] OR;  Service: Ophthalmology;  Laterality: Right;    CATARACT EXTRACTION W/  INTRAOCULAR LENS IMPLANT Left 3/29/2021    Procedure: EXTRACTION, CATARACT, WITH IOL INSERTION;  Surgeon: Cj Caban MD;  Location: [facility] OR;  Service: Ophthalmology;  Laterality: Left;    CHOLECYSTECTOMY      HYSTERECTOMY      one ovary intact    INJECTION OF JOINT Left 2/6/2024    Procedure: INJECTION, JOINT LEFT KNEE WITH STEROID;  Surgeon: She Schwartz MD;  Location: [facility] PAIN MGT;  Service: Pain Management;  Laterality: Left;  [phone]    KNEE SURGERY      LYMPH NODE BIOPSY  3010    OOPHORECTOMY         Review of patient's allergies indicates:   Allergen Reactions    Codeine Nausea Only       No current facility-administered medications on file prior to encounter.     Current Outpatient Medications on File Prior to Encounter   Medication Sig    amLODIPine (NORVASC) 10 MG tablet TAKE 1 TABLET BY MOUTH EVERY DAY    atorvastatin (LIPITOR) 80 MG tablet Take 1 tablet (80 mg total) by mouth once daily.    blood sugar diagnostic Strp To check BG 1 times daily, to use with insurance preferred meter    blood-glucose meter kit To check BG 1 times daily, to use with insurance preferred meter    chlorhexidine (PERIDEX) 0.12 % solution SWISH AND SPIT 15MLS THREE TIMES DAILY FOR 7 AYS    FLUZONE HIGHDOSE QUAD 23-24  mcg/0.7 mL Syrg     lancets Misc To check BG 1 times daily, to use with insurance preferred meter     latanoprost 0.005 % ophthalmic solution INSTILL 1 DROP INTO BOTH EYES EVERY EVENING    levETIRAcetam (KEPPRA) 750 MG Tab TAKE 2 TABLETS BY MOUTH ONCE  DAILY    meloxicam (MOBIC) 15 MG tablet TAKE 1 TABLET BY MOUTH EVERY DAY AS NEEDED FOR PAIN    metoprolol succinate (TOPROL-XL) 50 MG 24 hr tablet TAKE 1 TABLET BY MOUTH EVERY DAY IN THE EVENING    multivitamin-minerals-lutein Tab Take 1 tablet by mouth once daily once daily.    mv-mn/iron/folic acid/herb 190 (VITAMIN D3 COMPLETE ORAL) Take by mouth.    ondansetron (ZOFRAN-ODT) 4 MG TbDL Take 1 tablet (4 mg total) by mouth every 6 (six) hours as needed.    TRULICITY 3 mg/0.5 mL pen injector INJECT 3 MG (0.5 ML) UNDER THE SKIN ONCE A WEEK    valACYclovir (VALTREX) 1000 MG tablet Take 1 tablet (1,000 mg total) by mouth 3 (three) times daily. for 7 days    valsartan (DIOVAN) 320 MG tablet Take 1 tablet (320 mg total) by mouth once daily.    VITAMIN B COMPLEX ORAL Take by mouth.     Family History       Problem Relation (Age of Onset)    Breast cancer Mother (78), Sister (60)    Cancer Mother    Cataracts Mother    Glaucoma Mother    Heart disease Mother, Father, Sister    Hypertension Brother    No Known Problems Sister, Brother, Maternal Aunt, Maternal Uncle, Paternal Aunt, Paternal Uncle, Maternal Grandmother, Maternal Grandfather, Paternal Grandmother, Paternal Grandfather, Son, Other    Tremor Mother          Tobacco Use    Smoking status: Never    Smokeless tobacco: Never   Substance and Sexual Activity    Alcohol use: No     Comment: none    Drug use: No    Sexual activity: Yes     Partners: Male     Comment: mark  works in dietary,  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     Review of Systems   Constitutional:  Positive for fatigue.   Respiratory:  Negative for shortness of breath, wheezing and stridor.    Cardiovascular:  Negative for chest pain, palpitations and leg swelling.   Gastrointestinal:  Positive for nausea (Intermittent). Negative for abdominal distention,  abdominal pain, blood in stool and vomiting.   Genitourinary:  Negative for difficulty urinating, dyspareunia, dysuria, flank pain and hematuria.   Musculoskeletal:  Positive for back pain (Lower back pain for approximately 1 week).   Skin:  Positive for color change and rash (Flat rash localized to the abdomen).   Neurological:  Negative for dizziness, tremors, weakness, light-headedness and headaches.     Objective:     Vital Signs (Most Recent):  Temp: 98.4 °F (36.9 °C) (02/07/25 0713)  Pulse: 68 (02/07/25 0713)  Resp: 16 (02/07/25 0713)  BP: 122/62 (02/07/25 0713)  SpO2: 97 % (02/07/25 0713) Vital Signs (24h Range):  Temp:  [98.3 °F (36.8 °C)-99.2 °F (37.3 °C)] 98.4 °F (36.9 °C)  Pulse:  [60-83] 68  Resp:  [14-18] 16  SpO2:  [97 %-99 %] 97 %  BP: (122-143)/(58-91) 122/62     Weight: 79.7 kg (175 lb 11.2 oz)  Body mass index is 35.49 kg/m².    Intake/Output Summary (Last 24 hours) at 2/7/2025 1207  Last data filed at 2/7/2025 0400  Gross per 24 hour   Intake 700 ml   Output --   Net 700 ml      Physical Exam  Constitutional:       General: She is not in acute distress.     Appearance: She is not ill-appearing.   Eyes:      General: Scleral icterus present.      Pupils: Pupils are equal, round, and reactive to light.      Comments: Exophthalmus     Cardiovascular:      Rate and Rhythm: Normal rate and regular rhythm.      Pulses: Normal pulses.      Heart sounds: No murmur heard.  Pulmonary:      Effort: Pulmonary effort is normal. No respiratory distress.      Breath sounds: Normal breath sounds. No stridor. No wheezing or rales.   Abdominal:      General: Abdomen is flat. There is no distension.      Palpations: Abdomen is soft. There is no mass.      Tenderness: There is no abdominal tenderness. There is no guarding or rebound.   Musculoskeletal:         General: No swelling.      Right lower leg: No edema.      Left lower leg: No edema.   Skin:     Coloration: Skin is jaundiced.   Neurological:      Mental  Status: She is oriented to person, place, and time.         Significant Labs: All pertinent labs within the past 24 hours have been reviewed.  Recent Lab Results         02/07/25  0723   02/07/25  0706   02/06/25  2201   02/06/25  1547        Albumin   2.8           ALP   551           ALT   256           Anion Gap   10           AST   271           Baso #   0.03           Basophil %   0.5           BILIRUBIN TOTAL   10.3  Comment: For infants and newborns, interpretation of results should be based  on gestational age, weight and in agreement with clinical  observations.    Premature Infant recommended reference ranges:  Up to 24 hours.............<8.0 mg/dL  Up to 48 hours............<12.0 mg/dL  3-5 days..................<15.0 mg/dL  6-29 days.................<15.0 mg/dL             BUN   11           Calcium   9.8           Chloride   101           CO2   25           Creatinine   0.7           Differential Method   Automated           eGFR   >60           Eos #   0.0           Eos %   0.6           Glucose   100           Gran # (ANC)   3.9           Gran %   62.6           Hematocrit   32.2           Hemoglobin   11.4           Immature Grans (Abs)   0.02  Comment: Mild elevation in immature granulocytes is non specific and   can be seen in a variety of conditions including stress response,   acute inflammation, trauma and pregnancy. Correlation with other   laboratory and clinical findings is essential.             Immature Granulocytes   0.3           INR   1.2  Comment: Coumadin Therapy:  2.0 - 3.0 for INR for all indicators except mechanical heart valves  and antiphospholipid syndromes which should use 2.5 - 3.5.  LOT^040^PT Inn^830075             Lymph #   1.6           Lymph %   25.1           Magnesium    1.7           MCH   30.9           MCHC   35.4           MCV   87           Mono #   0.7           Mono %   10.9           MPV   10.3           nRBC   0           Phosphorus Level   3.6            Platelet Count   325           POCT Glucose 100     130   105       Potassium   3.6           PROTEIN TOTAL   7.9           PT   13.8           RBC   3.69           RDW   16.9           Sodium   136           WBC   6.22                   Significant Imaging: I have reviewed all pertinent imaging results/findings within the past 24 hours.  MRI Abdomen W WO Contrast   Final Result      Hypoattenuating lesion near the confluence of the right and left hepatic ducts causing severe upstream ductal dilatation concerning for cholangiocarcinoma.  Further evaluation with ERCP is recommended.      Additional findings as above.         Electronically signed by: Eric Armstrong MD   Date:    02/05/2025   Time:    08:00      MRI MRCP Abdomen WO Contrast 3D WO Independent WS XPD   Final Result      Severe intrahepatic bile duct dilatation, with abrupt narrowing at the confluence of the hepatic ducts where there is a suspected ill-defined liver mass.  This is poorly evaluated without contrast but concerning for neoplasm such as cholangiocarcinoma.      No lymphadenopathy.      Other findings as described.         Electronically signed by: Abhijeet Morgan   Date:    02/04/2025   Time:    10:29      CT Abdomen Pelvis With IV Contrast NO Oral Contrast   Final Result   Abnormal      Significant intrahepatic biliary ductal dilatation.  Mild extrahepatic biliary ductal dilatation.  9 mm fat containing lesion in the uncinate process of the pancreas.  Cholecystectomy.      Retrocrural adenopathy.      This report was flagged in Epic as abnormal.         Electronically signed by: Matilda Florez   Date:    02/04/2025   Time:    00:03      US Abdomen Limited   Final Result      Hepatic steatosis.  Intrahepatic biliary ductal dilatation.  Cholecystectomy.      Right renal cyst.         Electronically signed by: Matilda Florez   Date:    02/03/2025   Time:    20:35            Medical Student Subjective & Objective

## 2025-02-07 NOTE — MEDICAL/APP STUDENT
"[facility] - Internal Medicine Telemetry  Progress Note    Patient Name: Katey Cerrato  MRN: [id number]  Patient Class: IP- Inpatient   Admission Date: 2/3/2025  Length of Stay: 3 days  Attending Physician: Juliana Marx MD  Primary Care Provider: Shay Castellano MD    Subjective:     Principal Problem: Biliary obstruction    Chief Complaint:   Chief Complaint   Patient presents with    Sent by MD     Pt states she was sent by MD for elevated liver enzymes and bile duct blockage.       HPI: Ms. Katey Cerrato is a 69 y.o. female, with PMH of HTN, HLD, DMII, Grave's disease, who presented to [facility] ED on 2/3/25 after labs following an outpatient appointment were abnormal. She states she recently was treated for shingles with Valtrex with rash improvement, but then her urine became discolored and that prompted her visit to her PCP.  At her PCP visit it was noticed that she had an orange coloration to the skin, and yellow color of the eyes.  After her appointment she had outpatient lab testing showing abnormal liver function, which is what prompted her PCP to send her to the ED. Her only new symptom is some low back pain. She states she had a cholecystectomy in the past, and had many gallstones removed at that time. She states her sister had retained gallstones that needed removal in the past. Denies any fevers, chills, trauma, alcohol consumption, Tylenol consumption.  [facility] ED labs showed tbili 7.4, alk phos 538,  and . Lipase normal. INR 1.2. U/A with few bacteria. A  CT showed significant intrahepatic biliary ductal dilatation, mild extrahepatic biliary ductal dilatation, a 9 mm fat containing lesion in the uncinate process of the pancreas.  She was treated with IV fluids, IV Rocephin, Toradol. MRCP showed intrahepatic bile duct dilation noted with an ill-defined liver mass.  Further workup with MRI abdomen with/without contrast showed "hypoattenuating lesion near the confluence of the right " and left hepatic ducts causing severe upstream ductal dilatation concerning for cholangiocarcinoma. Transferred to [facility] for evaluation with ERCP.       Past Medical History:   Diagnosis Date    Cataract     Empty sella syndrome     GHD (growth hormone deficiency)     Glaucoma     Graves' disease with exophthalmos     History of vitamin D deficiency     Hyperlipidemia     Hypertension     Seizures     Thyroid nodule     Thyroid nodule     Type II or unspecified type diabetes mellitus without mention of complication, uncontrolled        Past Surgical History:   Procedure Laterality Date    BREAST BIOPSY      CATARACT EXTRACTION W/  INTRAOCULAR LENS IMPLANT Right 3/15/2021    Procedure: EXTRACTION, CATARACT, WITH IOL INSERTION;  Surgeon: Cj Caban MD;  Location: [facility] OR;  Service: Ophthalmology;  Laterality: Right;    CATARACT EXTRACTION W/  INTRAOCULAR LENS IMPLANT Left 3/29/2021    Procedure: EXTRACTION, CATARACT, WITH IOL INSERTION;  Surgeon: Cj Caban MD;  Location: [facility] OR;  Service: Ophthalmology;  Laterality: Left;    CHOLECYSTECTOMY      HYSTERECTOMY      one ovary intact    INJECTION OF JOINT Left 2/6/2024    Procedure: INJECTION, JOINT LEFT KNEE WITH STEROID;  Surgeon: She Schwartz MD;  Location: [facility] PAIN MGT;  Service: Pain Management;  Laterality: Left;  [phone]    KNEE SURGERY      LYMPH NODE BIOPSY  3010    OOPHORECTOMY         Review of patient's allergies indicates:   Allergen Reactions    Codeine Nausea Only       No current facility-administered medications on file prior to encounter.     Current Outpatient Medications on File Prior to Encounter   Medication Sig    amLODIPine (NORVASC) 10 MG tablet TAKE 1 TABLET BY MOUTH EVERY DAY    atorvastatin (LIPITOR) 80 MG tablet Take 1 tablet (80 mg total) by mouth once daily.    blood sugar diagnostic Strp To check BG 1 times daily, to use with insurance preferred meter    blood-glucose meter kit To check BG 1 times daily, to use with  insurance preferred meter    chlorhexidine (PERIDEX) 0.12 % solution SWISH AND SPIT 15MLS THREE TIMES DAILY FOR 7 AYS    FLUZONE HIGHDOSE QUAD 23-24  mcg/0.7 mL Syrg     lancets Misc To check BG 1 times daily, to use with insurance preferred meter    latanoprost 0.005 % ophthalmic solution INSTILL 1 DROP INTO BOTH EYES EVERY EVENING    levETIRAcetam (KEPPRA) 750 MG Tab TAKE 2 TABLETS BY MOUTH ONCE  DAILY    meloxicam (MOBIC) 15 MG tablet TAKE 1 TABLET BY MOUTH EVERY DAY AS NEEDED FOR PAIN    metoprolol succinate (TOPROL-XL) 50 MG 24 hr tablet TAKE 1 TABLET BY MOUTH EVERY DAY IN THE EVENING    multivitamin-minerals-lutein Tab Take 1 tablet by mouth once daily once daily.    mv-mn/iron/folic acid/herb 190 (VITAMIN D3 COMPLETE ORAL) Take by mouth.    ondansetron (ZOFRAN-ODT) 4 MG TbDL Take 1 tablet (4 mg total) by mouth every 6 (six) hours as needed.    TRULICITY 3 mg/0.5 mL pen injector INJECT 3 MG (0.5 ML) UNDER THE SKIN ONCE A WEEK    valACYclovir (VALTREX) 1000 MG tablet Take 1 tablet (1,000 mg total) by mouth 3 (three) times daily. for 7 days    valsartan (DIOVAN) 320 MG tablet Take 1 tablet (320 mg total) by mouth once daily.    VITAMIN B COMPLEX ORAL Take by mouth.     Family History       Problem Relation (Age of Onset)    Breast cancer Mother (78), Sister (60)    Cancer Mother    Cataracts Mother    Glaucoma Mother    Heart disease Mother, Father, Sister    Hypertension Brother    No Known Problems Sister, Brother, Maternal Aunt, Maternal Uncle, Paternal Aunt, Paternal Uncle, Maternal Grandmother, Maternal Grandfather, Paternal Grandmother, Paternal Grandfather, Son, Other    Tremor Mother          Tobacco Use    Smoking status: Never    Smokeless tobacco: Never   Substance and Sexual Activity    Alcohol use: No     Comment: none    Drug use: No    Sexual activity: Yes     Partners: Male     Comment: m  works in dietary,  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     Review of Systems   Constitutional:  Positive for  fatigue.   Respiratory:  Negative for shortness of breath, wheezing and stridor.    Cardiovascular:  Negative for chest pain, palpitations and leg swelling.   Gastrointestinal:  Positive for nausea (Intermittent). Negative for abdominal distention, abdominal pain, blood in stool and vomiting.   Genitourinary:  Negative for difficulty urinating, dyspareunia, dysuria, flank pain and hematuria.   Musculoskeletal:  Positive for back pain (Lower back pain for approximately 1 week).   Skin:  Positive for color change and rash (Flat rash localized to the abdomen).   Neurological:  Negative for dizziness, tremors, weakness, light-headedness and headaches.     Objective:     Vital Signs (Most Recent):  Temp: 98.4 °F (36.9 °C) (02/07/25 0713)  Pulse: 68 (02/07/25 0713)  Resp: 16 (02/07/25 0713)  BP: 122/62 (02/07/25 0713)  SpO2: 97 % (02/07/25 0713) Vital Signs (24h Range):  Temp:  [98.3 °F (36.8 °C)-99.2 °F (37.3 °C)] 98.4 °F (36.9 °C)  Pulse:  [60-83] 68  Resp:  [14-18] 16  SpO2:  [97 %-99 %] 97 %  BP: (122-143)/(58-91) 122/62     Weight: 79.7 kg (175 lb 11.2 oz)  Body mass index is 35.49 kg/m².    Intake/Output Summary (Last 24 hours) at 2/7/2025 1207  Last data filed at 2/7/2025 0400  Gross per 24 hour   Intake 700 ml   Output --   Net 700 ml      Physical Exam  Constitutional:       General: She is not in acute distress.     Appearance: She is not ill-appearing.   Eyes:      General: Scleral icterus present.      Pupils: Pupils are equal, round, and reactive to light.      Comments: Exophthalmus     Cardiovascular:      Rate and Rhythm: Normal rate and regular rhythm.      Pulses: Normal pulses.      Heart sounds: No murmur heard.  Pulmonary:      Effort: Pulmonary effort is normal. No respiratory distress.      Breath sounds: Normal breath sounds. No stridor. No wheezing or rales.   Abdominal:      General: Abdomen is flat. There is no distension.      Palpations: Abdomen is soft. There is no mass.      Tenderness:  There is no abdominal tenderness. There is no guarding or rebound.   Musculoskeletal:         General: No swelling.      Right lower leg: No edema.      Left lower leg: No edema.   Skin:     Coloration: Skin is jaundiced.   Neurological:      Mental Status: She is oriented to person, place, and time.         Significant Labs: All pertinent labs within the past 24 hours have been reviewed.  Recent Lab Results         02/07/25  0723   02/07/25  0706   02/06/25  2201   02/06/25  1547        Albumin   2.8           ALP   551           ALT   256           Anion Gap   10           AST   271           Baso #   0.03           Basophil %   0.5           BILIRUBIN TOTAL   10.3  Comment: For infants and newborns, interpretation of results should be based  on gestational age, weight and in agreement with clinical  observations.    Premature Infant recommended reference ranges:  Up to 24 hours.............<8.0 mg/dL  Up to 48 hours............<12.0 mg/dL  3-5 days..................<15.0 mg/dL  6-29 days.................<15.0 mg/dL             BUN   11           Calcium   9.8           Chloride   101           CO2   25           Creatinine   0.7           Differential Method   Automated           eGFR   >60           Eos #   0.0           Eos %   0.6           Glucose   100           Gran # (ANC)   3.9           Gran %   62.6           Hematocrit   32.2           Hemoglobin   11.4           Immature Grans (Abs)   0.02  Comment: Mild elevation in immature granulocytes is non specific and   can be seen in a variety of conditions including stress response,   acute inflammation, trauma and pregnancy. Correlation with other   laboratory and clinical findings is essential.             Immature Granulocytes   0.3           INR   1.2  Comment: Coumadin Therapy:  2.0 - 3.0 for INR for all indicators except mechanical heart valves  and antiphospholipid syndromes which should use 2.5 - 3.5.  LOT^040^PT Inn^[id number]             Lymph #   1.6            Lymph %   25.1           Magnesium    1.7           MCH   30.9           MCHC   35.4           MCV   87           Mono #   0.7           Mono %   10.9           MPV   10.3           nRBC   0           Phosphorus Level   3.6           Platelet Count   325           POCT Glucose 100     130   105       Potassium   3.6           PROTEIN TOTAL   7.9           PT   13.8           RBC   3.69           RDW   16.9           Sodium   136           WBC   6.22                   Significant Imaging: I have reviewed all pertinent imaging results/findings within the past 24 hours.  MRI Abdomen W WO Contrast   Final Result      Hypoattenuating lesion near the confluence of the right and left hepatic ducts causing severe upstream ductal dilatation concerning for cholangiocarcinoma.  Further evaluation with ERCP is recommended.      Additional findings as above.         Electronically signed by: Eric Armstrong MD   Date:    02/05/2025   Time:    08:00      MRI MRCP Abdomen WO Contrast 3D WO Independent WS XPD   Final Result      Severe intrahepatic bile duct dilatation, with abrupt narrowing at the confluence of the hepatic ducts where there is a suspected ill-defined liver mass.  This is poorly evaluated without contrast but concerning for neoplasm such as cholangiocarcinoma.      No lymphadenopathy.      Other findings as described.         Electronically signed by: Abhijeet Morgan   Date:    02/04/2025   Time:    10:29      CT Abdomen Pelvis With IV Contrast NO Oral Contrast   Final Result   Abnormal      Significant intrahepatic biliary ductal dilatation.  Mild extrahepatic biliary ductal dilatation.  9 mm fat containing lesion in the uncinate process of the pancreas.  Cholecystectomy.      Retrocrural adenopathy.      This report was flagged in Epic as abnormal.         Electronically signed by: Matilda Florez   Date:    02/04/2025   Time:    00:03      US Abdomen Limited   Final Result      Hepatic steatosis.   "Intrahepatic biliary ductal dilatation.  Cholecystectomy.      Right renal cyst.         Electronically signed by: Matilda Florez   Date:    02/03/2025   Time:    20:35                Assessment/Plan:      Biliary Obstruction  69 y.o. female, with PMH of HTN, HLD, DMII, Grave's disease who presented for clinic for workup of abnormal liver enzymes.  In the ED, labs showed tbili 7.4, alk phos 538,  and . Lipase normal. INR 1.2. U/A with few bacteria. CT showed significant intrahepatic biliary ductal dilatation, mild extrahepatic biliary ductal dilatation, a 9 mm fat containing lesion in the uncinate process of the pancreas.  She was treated with IV fluids, IV Rocephin, Toradol. MRCP showed intrahepatic bile duct dilation noted with an ill-defined liver mass.  Further workup with MRI abdomen with/without contrast showed "hypoattenuating lesion near the confluence of the right and left hepatic ducts causing severe upstream ductal dilatation concerning for cholangiocarcinoma. [place] [facility] [place].     Plan  -NPO  -Surgical oncology consulted, appreciate recs    Type 2 Diabetes Mellitus  Patient's FSGs are controlled on current medication regimen.  Last A1c reviewed-   Lab Results   Component Value Date    HGBA1C 5.9 (H) 02/03/2025     Most recent fingerstick glucose reviewed-   Recent Labs   Lab 02/06/25  1547 02/06/25  2201 02/07/25  0723   POCTGLUCOSE 105 130* 100     Current correctional scale  Medium  Maintain anti-hyperglycemic dose as follows-   Antihyperglycemics (From admission, onward)      Start     Stop Route Frequency Ordered    02/04/25 0434  insulin aspart U-100 pen 0-5 Units         -- SubQ Every 6 hours PRN 02/04/25 0334          Hold Oral hypoglycemics while patient is in the hospital.    Mixed Hyperlipidemia  -Hold statin in the setting of elevated liver enzymes    Hypertension  Patients blood pressure range in the last 24 hours was: BP  Min: 122/72  Max: 160/70.The " patient's inpatient anti-hypertensive regimen is listed below:  Current Antihypertensives  amLODIPine tablet 10 mg, Daily, Oral  metoprolol succinate (TOPROL-XL) 24 hr tablet 50 mg, Nightly, Oral  valsartan tablet 320 mg, Daily, Oral    Plan  - BP is controlled, no changes needed to their regimen  - Continue hypertensive meds as listed above        Generalized convulsive epilepsy with intractable epilepsy  - Continue levetiracetam 1500 mg p.o. q.h.s                 Active Diagnoses:    Diagnosis Date Noted POA    PRINCIPAL PROBLEM:  Biliary obstruction [K83.1] 02/04/2025 Yes    Type 2 diabetes mellitus without complication, without long-term current use of insulin [E11.9] 03/18/2014 Yes    Mixed hyperlipidemia [E78.2]  Yes     Chronic    Graves' disease with exophthalmos [E05.00]  Yes    Hypertension associated with diabetes [E11.59, I15.2] 09/07/2012 Yes    Generalized convulsive epilepsy with intractable epilepsy [G40.319] 09/07/2012 Yes      Problems Resolved During this Admission:     VTE Risk Mitigation (From admission, onward)           Ordered     IP VTE HIGH RISK PATIENT  Once         02/04/25 0219     Place sequential compression device  Until discontinued         02/04/25 0219                       [facility] - Internal Medicine Telemetry

## 2025-02-07 NOTE — H&P
[facility] Bradshaw - Internal Medicine [facility] Medicine  History & Physical    Patient Name: Katey eCrrato  MRN: [id number]  Patient Class: IP- Inpatient  Admission Date: 2/3/2025  Attending Physician: Juliana Marx MD   Primary Care Provider: Shay Castellano MD         Patient information was obtained from patient and ER records.     Subjective:     Principal Problem:Biliary obstruction    Chief Complaint:   Chief Complaint   Patient presents with    Sent by MD     Pt states she was sent by MD for elevated liver enzymes and bile duct blockage.        HPI: Ms. Katey Cerrato is a 69 y.o. female, with PMH of HTN, HLD, DMII, Grave's disease, who presented to [facility] ED on 2/3/25 after labs following an outpatient appointment were abnormal. She states she recently was treated for shingles with Valtrex with rash improvement, but then her urine became discolored and that prompted her visit to her PCP.  At her PCP visit it was noticed that she had an orange coloration to the skin, and yellow color of the eyes.  After her appointment she had outpatient lab testing showing abnormal liver function, which is what prompted her PCP to send her to the ED. Her only new symptom is some low back pain. She states she had a cholecystectomy in the past, and had many gallstones removed at that time. She states her sister had retained gallstones that needed removal in the past. Denies any fevers, chills, trauma, alcohol consumption, Tylenol consumption.  [facility] ED labs showed tbili 7.4, alk phos 538,  and . Lipase normal. INR 1.2. U/A with few bacteria. A  CT showed significant intrahepatic biliary ductal dilatation, mild extrahepatic biliary ductal dilatation, a 9 mm fat containing lesion in the uncinate process of the pancreas.  She was treated with IV fluids, IV Rocephin, Toradol on admission.  MRCP showed intrahepatic bile duct dilation noted with an ill-defined liver mass.  Further workup with MRI abdomen  "with/without contrast showed "hypoattenuating lesion near the confluence of the right and left hepatic ducts causing severe upstream ductal dilatation concerning for cholangiocarcinoma. Given lesion and hyperbilirubinemia decision was made to transfer patient to [facility] for further workup and evaluation with advanced services, possible ERCP.    On arrival to [facility] VSS. Patient notes generalized pruritus, decreased appetite, nausea, lower back pain. Denies abdominal pain. Pending further workup and management     Past Medical History:   Diagnosis Date    Cataract     Empty sella syndrome     GHD (growth hormone deficiency)     Glaucoma     Graves' disease with exophthalmos     History of vitamin D deficiency     Hyperlipidemia     Hypertension     Seizures     Thyroid nodule     Thyroid nodule     Type II or unspecified type diabetes mellitus without mention of complication, uncontrolled        Past Surgical History:   Procedure Laterality Date    BREAST BIOPSY      CATARACT EXTRACTION W/  INTRAOCULAR LENS IMPLANT Right 3/15/2021    Procedure: EXTRACTION, CATARACT, WITH IOL INSERTION;  Surgeon: Cj Caban MD;  Location: [facility] OR;  Service: Ophthalmology;  Laterality: Right;    CATARACT EXTRACTION W/  INTRAOCULAR LENS IMPLANT Left 3/29/2021    Procedure: EXTRACTION, CATARACT, WITH IOL INSERTION;  Surgeon: Cj Caban MD;  Location: [facility] OR;  Service: Ophthalmology;  Laterality: Left;    CHOLECYSTECTOMY      HYSTERECTOMY      one ovary intact    INJECTION OF JOINT Left 2/6/2024    Procedure: INJECTION, JOINT LEFT KNEE WITH STEROID;  Surgeon: She Schwartz MD;  Location: [facility] PAIN MGT;  Service: Pain Management;  Laterality: Left;  [phone]    KNEE SURGERY      LYMPH NODE BIOPSY  3010    OOPHORECTOMY         Review of patient's allergies indicates:   Allergen Reactions    Codeine Nausea Only       No current facility-administered medications on file prior to encounter.     Current Outpatient Medications on " File Prior to Encounter   Medication Sig    amLODIPine (NORVASC) 10 MG tablet TAKE 1 TABLET BY MOUTH EVERY DAY    atorvastatin (LIPITOR) 80 MG tablet Take 1 tablet (80 mg total) by mouth once daily.    blood sugar diagnostic Strp To check BG 1 times daily, to use with insurance preferred meter    blood-glucose meter kit To check BG 1 times daily, to use with insurance preferred meter    chlorhexidine (PERIDEX) 0.12 % solution SWISH AND SPIT 15MLS THREE TIMES DAILY FOR 7 AYS    FLUZONE HIGHDOSE QUAD 23-24  mcg/0.7 mL Syrg     lancets Misc To check BG 1 times daily, to use with insurance preferred meter    latanoprost 0.005 % ophthalmic solution INSTILL 1 DROP INTO BOTH EYES EVERY EVENING    levETIRAcetam (KEPPRA) 750 MG Tab TAKE 2 TABLETS BY MOUTH ONCE  DAILY    meloxicam (MOBIC) 15 MG tablet TAKE 1 TABLET BY MOUTH EVERY DAY AS NEEDED FOR PAIN    metoprolol succinate (TOPROL-XL) 50 MG 24 hr tablet TAKE 1 TABLET BY MOUTH EVERY DAY IN THE EVENING    multivitamin-minerals-lutein Tab Take 1 tablet by mouth once daily once daily.    mv-mn/iron/folic acid/herb 190 (VITAMIN D3 COMPLETE ORAL) Take by mouth.    ondansetron (ZOFRAN-ODT) 4 MG TbDL Take 1 tablet (4 mg total) by mouth every 6 (six) hours as needed.    TRULICITY 3 mg/0.5 mL pen injector INJECT 3 MG (0.5 ML) UNDER THE SKIN ONCE A WEEK    valACYclovir (VALTREX) 1000 MG tablet Take 1 tablet (1,000 mg total) by mouth 3 (three) times daily. for 7 days    valsartan (DIOVAN) 320 MG tablet Take 1 tablet (320 mg total) by mouth once daily.    VITAMIN B COMPLEX ORAL Take by mouth.     Family History       Problem Relation (Age of Onset)    Breast cancer Mother (78), Sister (60)    Cancer Mother    Cataracts Mother    Glaucoma Mother    Heart disease Mother, Father, Sister    Hypertension Brother    No Known Problems Sister, Brother, Maternal Aunt, Maternal Uncle, Paternal Aunt, Paternal Uncle, Maternal Grandmother, Maternal Grandfather, Paternal Grandmother, Paternal  Grandfather, Son, Other    Tremor Mother          Tobacco Use    Smoking status: Never    Smokeless tobacco: Never   Substance and Sexual Activity    Alcohol use: No     Comment: none    Drug use: No    Sexual activity: Yes     Partners: Male     Comment: m  works in dietary,  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     Review of Systems   Constitutional:  Negative for chills and fever.   Respiratory:  Negative for cough, shortness of breath and wheezing.    Cardiovascular:  Negative for chest pain and leg swelling.   Gastrointestinal:  Positive for nausea. Negative for abdominal distention, abdominal pain, blood in stool, constipation, diarrhea and vomiting.   Genitourinary:  Negative for difficulty urinating and dysuria.   Musculoskeletal:  Positive for back pain.   Skin:  Positive for color change.     Objective:     Vital Signs (Most Recent):  Temp: 98 °F (36.7 °C) (02/07/25 1216)  Pulse: 71 (02/07/25 1216)  Resp: 18 (02/07/25 1216)  BP: 135/84 (02/07/25 1216)  SpO2: 98 % (02/07/25 1216) Vital Signs (24h Range):  Temp:  [98 °F (36.7 °C)-99.2 °F (37.3 °C)] 98 °F (36.7 °C)  Pulse:  [60-83] 71  Resp:  [14-18] 18  SpO2:  [97 %-99 %] 98 %  BP: (122-143)/(58-91) 135/84     Weight: 79.7 kg (175 lb 11.2 oz)  Body mass index is 35.49 kg/m².     Physical Exam  Vitals and nursing note reviewed.   Constitutional:       General: She is not in acute distress.     Appearance: She is well-developed. She is obese. She is not toxic-appearing or diaphoretic.   HENT:      Head: Normocephalic and atraumatic.      Nose: Nose normal.      Mouth/Throat:      Pharynx: Oropharynx is clear. No oropharyngeal exudate.   Eyes:      General: Scleral icterus present.      Extraocular Movements: Extraocular movements intact.      Comments: Exophthalmos noted bilaterally   Neck:      Trachea: No tracheal deviation.   Cardiovascular:      Rate and Rhythm: Normal rate and regular rhythm.      Heart sounds: Normal heart sounds. No murmur heard.     No friction  "rub. No gallop.   Pulmonary:      Effort: Pulmonary effort is normal. No respiratory distress.      Breath sounds: Normal breath sounds. No stridor. No wheezing, rhonchi or rales.   Chest:      Chest wall: No tenderness.   Abdominal:      General: Bowel sounds are normal. There is no distension.      Palpations: Abdomen is soft.      Tenderness: There is no abdominal tenderness. There is no guarding or rebound.   Musculoskeletal:         General: No deformity. Normal range of motion.      Cervical back: Normal range of motion and neck supple.   Skin:     General: Skin is warm and dry.      Coloration: Skin is jaundiced.      Findings: No erythema or rash.   Neurological:      General: No focal deficit present.      Mental Status: She is alert and oriented to person, place, and time.      Cranial Nerves: No cranial nerve deficit.      Motor: No abnormal muscle tone.   Psychiatric:         Mood and Affect: Mood normal.         Behavior: Behavior normal.         Thought Content: Thought content normal.                Significant Labs: All pertinent labs within the past 24 hours have been reviewed.    Significant Imaging: I have reviewed all pertinent imaging results/findings within the past 24 hours.  Assessment/Plan:     * Biliary obstruction  Liver mass, possible cholangiocarcinoma  Patient presented to OSH with jaundice and scleral icterus, along with abnormal labs from clinic. Also noted nausea and back pain.  Noted history of cholecystectomy. Labs showed elevated bilirubin, and imaging with biliary ductal dilatation. Initial CT of the abdomen and pelvis with IV contrast showed significant intrahepatic biliary ductal dilatation and questionable 9 mm fat containing lesion in the uncinate process of the pancreas.  Abdominal ultrasound showed hepatic steatosis and intra hepatic biliary ductal dilatation and status post cholecystectomy. MRCP showed "severe intrahepatic bile duct dilatation, with abrupt narrowing at the " "confluence of the hepatic ducts where there is a suspected ill-defined liver mass.  This is poorly evaluated without contrast but concerning for neoplasm such as cholangiocarcinoma." Repeat imaging with MRI abdomen with and without contrast showed "hypoattenuating lesion near the confluence of the right and left hepatic ducts causing severe upstream ductal dilatation concerning for cholangiocarcinoma.  Further evaluation with ERCP is recommended." Patient transferred to [facility] for further workup and evaluation    -Currently NPO  -AES Consulted, appreciate recs      -Consult Surgical Oncology for futher consideration of biopsy   -Surgical Oncology consulted, appreciate recs  - Continue p.r.n. medication for symptomatic relief  - Trend with labs and correct electrolyte abnormalities as needed    Type 2 diabetes mellitus without complication, without long-term current use of insulin  - Last hemoglobin A1c 5.9, well controlled  - Hold home regimen of Trulicity  - Glucose level reviewed, currently in normal range  - Continue Accu-Cheks q.a.c. and q.h.s. and give low-dose sliding scale insulin as necessary    Mixed hyperlipidemia  Patient with hx HLD presenting with abnormal liver function tests. HLD well controlled on home statin    - Holding statin due to abnormal liver function tests    Graves' disease with exophthalmos  Pt with hx graves disease last TSH wnl in 2023. Currently without symptoms.    -Continue to monitor      Hypertension associated with diabetes  - Well controlled, continue metoprolol 50 mg p.o. q.h.s., amlodipine 10 mg p.o. daily and valsartan 320 mg p.o. daily    Generalized convulsive epilepsy with intractable epilepsy  Patient with history of epilepsy in the past on levitiracetam 1500 mg qhs. Stable, no reported seizure activity    - Continue home levetiracetam 1500 mg p.o. q.h.s.      VTE Risk Mitigation (From admission, onward)           Ordered     IP VTE HIGH RISK PATIENT  Once         02/04/25 " 0219     Place sequential compression device  Until discontinued         02/04/25 0219                         Zaire Lu MD  Internal Medicine, PGY-1  Department of Hospital Medicine  [facility] ahmet - Internal Medicine Telemetry

## 2025-02-07 NOTE — ASSESSMENT & PLAN NOTE
"Liver mass, possible cholangiocarcinoma  - Presented with jaundice and scleral icterus, along with abnormal labs from clinic.  Noted history of cholecystectomy. Labs showed elevated bilirubin, and imaging with biliary ductal dilatation  - Initial CT of the abdomen and pelvis with IV contrast showed significant intrahepatic biliary ductal dilatation and questionable 9 mm fat containing lesion in the uncinate process of the pancreas.  Abdominal ultrasound showed hepatic steatosis and intra hepatic biliary ductal dilatation and status post cholecystectomy.  - Transfer was planned to [facility], but stopped due to patient declined transfer and MRCP showed "severe intrahepatic bile duct dilatation, with abrupt narrowing at the confluence of the hepatic ducts where there is a suspected ill-defined liver mass.  This is poorly evaluated without contrast but concerning for neoplasm such as cholangiocarcinoma" and no need for emergent ERCP  - Further workup with MRI abdomen with and without contrast showed "hypoattenuating lesion near the confluence of the right and left hepatic ducts causing severe upstream ductal dilatation concerning for cholangiocarcinoma.  Further evaluation with ERCP is recommended."  - Update given to the patient about MRI findings concerning for cholangiocarcinoma and she expressed understanding.  Also discussed need for transfer for possible ERCP for biopsy and stent placement, and patient was agreeable  - Case discussed with [facility], and hepatobiliary service reviewed case with recommendation for transfer to [facility].  Patient accepted for transfer to [facility], but sawaiting bed availability  - Patient has had nothing to eat/drink since 5 pm yesterday, will officially make her NPO now for possible procedure upon arrival to [facility] today given transport en route. INR normal and vitals stable, no barriers for ERCP should it be able to be done today  - Continue p.r.n. medication for symptomatic " relief  - Trend with labs and correct electrolyte abnormalities as needed

## 2025-02-07 NOTE — PLAN OF CARE
Problem: Adult Inpatient Plan of Care  Goal: Plan of Care Review  2/7/2025 0402 by Pranav Veliz RN  Outcome: Progressing  Flowsheets (Taken 2/7/2025 0402)  Plan of Care Reviewed With: patient  2/7/2025 0401 by Pranav Veliz RN  Outcome: Progressing  Goal: Patient-Specific Goal (Individualized)  2/7/2025 0402 by Pranav Veliz RN  Outcome: Progressing  2/7/2025 0401 by Pranav Veliz RN  Outcome: Progressing  Goal: Absence of Hospital-Acquired Illness or Injury  2/7/2025 0402 by Pranav Veliz RN  Outcome: Progressing  2/7/2025 0401 by Pranav Veliz RN  Outcome: Progressing  Intervention: Identify and Manage Fall Risk  Flowsheets (Taken 2/7/2025 0402)  Safety Promotion/Fall Prevention:   assistive device/personal item within reach   bed alarm refused   Fall Risk reviewed with patient/family   high risk medications identified   instructed to call staff for mobility   lighting adjusted   medications reviewed   nonskid shoes/socks when out of bed   side rails raised x 2  Intervention: Prevent Skin Injury  Flowsheets (Taken 2/7/2025 0402)  Body Position: position changed independently  Skin Protection: incontinence pads utilized  Device Skin Pressure Protection: absorbent pad utilized/changed  Intervention: Prevent and Manage VTE (Venous Thromboembolism) Risk  Flowsheets (Taken 2/7/2025 0402)  VTE Prevention/Management:   ambulation promoted   bleeding risk assessed   ROM (active) performed  Intervention: Prevent Infection  Flowsheets (Taken 2/7/2025 0402)  Infection Prevention:   hand hygiene promoted   single patient room provided  Goal: Optimal Comfort and Wellbeing  2/7/2025 0402 by Pranav Veliz RN  Outcome: Progressing  2/7/2025 0401 by Pranav Veliz RN  Outcome: Progressing  Intervention: Monitor Pain and Promote Comfort  Flowsheets (Taken 2/7/2025 0402)  Pain Management Interventions: pain management plan reviewed with patient/caregiver  Intervention: Provide  Person-Centered Care  Flowsheets (Taken 2/7/2025 0402)  Trust Relationship/Rapport:   care explained   choices provided   emotional support provided   empathic listening provided   questions answered   questions encouraged   reassurance provided   thoughts/feelings acknowledged  Goal: Readiness for Transition of Care  2/7/2025 0402 by Pranav Veliz RN  Outcome: Progressing  2/7/2025 0401 by Pranav Veliz RN  Outcome: Progressing     Problem: Diabetes Comorbidity  Goal: Blood Glucose Level Within Targeted Range  2/7/2025 0402 by Pranav Veliz RN  Outcome: Progressing  2/7/2025 0401 by Pranav Veliz RN  Outcome: Progressing  Intervention: Monitor and Manage Glycemia  Flowsheets (Taken 2/7/2025 0402)  Glycemic Management: blood glucose monitored     Problem: Nausea and Vomiting  Goal: Nausea and Vomiting Relief  Outcome: Progressing  Intervention: Prevent and Manage Nausea and Vomiting  Flowsheets (Taken 2/7/2025 0402)  Nausea/Vomiting Interventions: (medication)   nausea triggers minimized   slow deep breathing encouraged   other (see comments)  Environmental Support:   calm environment promoted   personal routine supported     Problem: Pain Acute  Goal: Optimal Pain Control and Function  Outcome: Progressing  Intervention: Develop Pain Management Plan  Flowsheets (Taken 2/7/2025 0402)  Pain Management Interventions: pain management plan reviewed with patient/caregiver  Intervention: Prevent or Manage Pain  Flowsheets (Taken 2/7/2025 0402)  Sleep/Rest Enhancement: awakenings minimized  Sensory Stimulation Regulation: care clustered  Medication Review/Management:   medications reviewed   high-risk medications identified

## 2025-02-07 NOTE — ASSESSMENT & PLAN NOTE
"Liver mass, possible cholangiocarcinoma  Patient presented to OSH with jaundice and scleral icterus, along with abnormal labs from clinic. Also noted nausea and back pain.  Noted history of cholecystectomy. Labs showed elevated bilirubin, and imaging with biliary ductal dilatation. Initial CT of the abdomen and pelvis with IV contrast showed significant intrahepatic biliary ductal dilatation and questionable 9 mm fat containing lesion in the uncinate process of the pancreas.  Abdominal ultrasound showed hepatic steatosis and intra hepatic biliary ductal dilatation and status post cholecystectomy. MRCP showed "severe intrahepatic bile duct dilatation, with abrupt narrowing at the confluence of the hepatic ducts where there is a suspected ill-defined liver mass.  This is poorly evaluated without contrast but concerning for neoplasm such as cholangiocarcinoma." Repeat imaging with MRI abdomen with and without contrast showed "hypoattenuating lesion near the confluence of the right and left hepatic ducts causing severe upstream ductal dilatation concerning for cholangiocarcinoma.  Further evaluation with ERCP is recommended." Patient transferred to [facility] for further workup and evaluation    -Currently NPO  -AES Consulted, appreciate recs      -Consult Surgical Oncology for futher consideration of biopsy   -Surgical Oncology consulted, appreciate recs  - Continue p.r.n. medication for symptomatic relief  - Trend with labs and correct electrolyte abnormalities as needed  "

## 2025-02-07 NOTE — SUBJECTIVE & OBJECTIVE
Interval History:  No acute events, reports persistent nausea but no vomiting. Not much of an appetite, and no significant abdominal pain. Bowel movement yesterday and again this morning. Last time patient had anything to eat or drink was at 5:00 pm yesterday afternoon.      Review of Systems   Constitutional:  Negative for chills and fever.   Gastrointestinal:  Positive for nausea. Negative for abdominal pain and vomiting.   Skin:  Positive for color change.     Objective:     Vital Signs (Most Recent):  Temp: 98.4 °F (36.9 °C) (02/07/25 0713)  Pulse: 68 (02/07/25 0713)  Resp: 16 (02/07/25 0713)  BP: 122/62 (02/07/25 0713)  SpO2: 97 % (02/07/25 0713) Vital Signs (24h Range):  Temp:  [98.1 °F (36.7 °C)-99.2 °F (37.3 °C)] 98.4 °F (36.9 °C)  Pulse:  [60-83] 68  Resp:  [14-18] 16  SpO2:  [95 %-99 %] 97 %  BP: (122-158)/(58-91) 122/62     Weight: 79.7 kg (175 lb 11.2 oz)  Body mass index is 35.49 kg/m².    Intake/Output Summary (Last 24 hours) at 2/7/2025 0751  Last data filed at 2/7/2025 0400  Gross per 24 hour   Intake 940 ml   Output 10 ml   Net 930 ml         Physical Exam  Vitals and nursing note reviewed.   Constitutional:       General: She is not in acute distress.     Appearance: She is well-developed. She is obese. She is not toxic-appearing or diaphoretic.   HENT:      Head: Normocephalic and atraumatic.      Nose: Nose normal.   Eyes:      General: Scleral icterus present.      Comments: Exophthalmos noted bilaterally   Neck:      Trachea: No tracheal deviation.   Cardiovascular:      Rate and Rhythm: Normal rate and regular rhythm.      Heart sounds: Normal heart sounds. No murmur heard.     No gallop.   Pulmonary:      Effort: Pulmonary effort is normal. No respiratory distress.      Breath sounds: Normal breath sounds. No stridor. No wheezing or rales.   Abdominal:      General: Bowel sounds are normal. There is no distension.      Palpations: Abdomen is soft.      Tenderness: There is no abdominal  tenderness. There is no guarding or rebound.   Musculoskeletal:         General: No deformity. Normal range of motion.      Cervical back: Normal range of motion and neck supple.   Skin:     General: Skin is warm and dry.      Coloration: Skin is jaundiced.   Neurological:      General: No focal deficit present.      Mental Status: She is alert and oriented to person, place, and time.      Cranial Nerves: No cranial nerve deficit.      Motor: No abnormal muscle tone.   Psychiatric:         Mood and Affect: Mood normal.         Behavior: Behavior normal.         Thought Content: Thought content normal.             Significant Labs: All pertinent labs within the past 24 hours have been reviewed.    Significant Imaging: I have reviewed all pertinent imaging results/findings within the past 24 hours.

## 2025-02-07 NOTE — PROGRESS NOTES
[facility] Medicine  Progress Note    Patient Name: Katey Cerrato  MRN: [id number]  Patient Class: IP- Inpatient   Admission Date: 2/3/2025  Length of Stay: 3 days  Attending Physician: Dafne Min MD  Primary Care Provider: Shay Castellano MD        Subjective     Principal Problem:Biliary obstruction        HPI:  Ms. Katey Cerrato is a 69 y.o. female, with PMH of HTN, HLD, DMII, Grave's disease, who presented to [facility] ED on 2/3/25 after labs following an outpatient appointment were abnormal. She states she recently was treated for shingles with Valtrex with rash improvement, but then her urine became discolored and that prompted her visit to her PCP.  At her PCP visit it was noticed that she had an orange coloration to the skin, and yellow color of the eyes.  After her appointment she had outpatient lab testing showing abnormal liver function, which is what prompted her PCP to send her to the ED. Her only new symptom is some low back pain. She states she had a cholecystectomy in the past, and had many gallstones removed at that time. She states her sister had retained gallstones that needed removal in the past. Denies any fevers, chills, trauma, alcohol consumption, Tylenol consumption.  ED labs showed tbili 7.4, alk phos 538,  and . Lipase normal. INR 1.2. U/A with few bacteria. A  CT showed significant intrahepatic biliary ductal dilatation, mild extrahepatic biliary ductal dilatation, a 9 mm fat containing lesion in the uncinate process of the pancreas.  She was treated with IV fluids, IV Rocephin, Toradol and a transfer request was initiated for ERCP.  However as transfer still pending at 1:30 a.m. an MRCP was ordered and she was placed on observation.    Overview/Hospital Course:  Ms. Cerrato presented with abnormal labs and jaundice.  Patient admitted for biliary obstruction.  Workup with MRCP showed intrahepatic bile duct dilation noted with an ill-defined  "liver mass.  Further workup with MRI abdomen with/without contrast showed "hypoattenuating lesion near the confluence of the right and left hepatic ducts causing severe upstream ductal dilatation concerning for cholangiocarcinoma. Further evaluation with ERCP is recommended." [facility] contacted and [place] [facility] [place].    Interval History:  No acute events, reports persistent nausea but no vomiting. Not much of an appetite, and no significant abdominal pain. Bowel movement yesterday and again this morning. Last time patient had anything to eat or drink was at 5:00 pm yesterday afternoon.      Review of Systems   Constitutional:  Negative for chills and fever.   Gastrointestinal:  Positive for nausea. Negative for abdominal pain and vomiting.   Skin:  Positive for color change.     Objective:     Vital Signs (Most Recent):  Temp: 98.4 °F (36.9 °C) (02/07/25 0713)  Pulse: 68 (02/07/25 0713)  Resp: 16 (02/07/25 0713)  BP: 122/62 (02/07/25 0713)  SpO2: 97 % (02/07/25 0713) Vital Signs (24h Range):  Temp:  [98.1 °F (36.7 °C)-99.2 °F (37.3 °C)] 98.4 °F (36.9 °C)  Pulse:  [60-83] 68  Resp:  [14-18] 16  SpO2:  [95 %-99 %] 97 %  BP: (122-158)/(58-91) 122/62     Weight: 79.7 kg (175 lb 11.2 oz)  Body mass index is 35.49 kg/m².    Intake/Output Summary (Last 24 hours) at 2/7/2025 0751  Last data filed at 2/7/2025 0400  Gross per 24 hour   Intake 940 ml   Output 10 ml   Net 930 ml         Physical Exam  Vitals and nursing note reviewed.   Constitutional:       General: She is not in acute distress.     Appearance: She is well-developed. She is obese. She is not toxic-appearing or diaphoretic.   HENT:      Head: Normocephalic and atraumatic.      Nose: Nose normal.   Eyes:      General: Scleral icterus present.      Comments: Exophthalmos noted bilaterally   Neck:      Trachea: No tracheal deviation.   Cardiovascular:      Rate and Rhythm: Normal rate and regular rhythm.      Heart sounds: " "Normal heart sounds. No murmur heard.     No gallop.   Pulmonary:      Effort: Pulmonary effort is normal. No respiratory distress.      Breath sounds: Normal breath sounds. No stridor. No wheezing or rales.   Abdominal:      General: Bowel sounds are normal. There is no distension.      Palpations: Abdomen is soft.      Tenderness: There is no abdominal tenderness. There is no guarding or rebound.   Musculoskeletal:         General: No deformity. Normal range of motion.      Cervical back: Normal range of motion and neck supple.   Skin:     General: Skin is warm and dry.      Coloration: Skin is jaundiced.   Neurological:      General: No focal deficit present.      Mental Status: She is alert and oriented to person, place, and time.      Cranial Nerves: No cranial nerve deficit.      Motor: No abnormal muscle tone.   Psychiatric:         Mood and Affect: Mood normal.         Behavior: Behavior normal.         Thought Content: Thought content normal.             Significant Labs: All pertinent labs within the past 24 hours have been reviewed.    Significant Imaging: I have reviewed all pertinent imaging results/findings within the past 24 hours.    Assessment and Plan     * Biliary obstruction  Liver mass, possible cholangiocarcinoma  - Presented with jaundice and scleral icterus, along with abnormal labs from clinic.  Noted history of cholecystectomy. Labs showed elevated bilirubin, and imaging with biliary ductal dilatation  - Initial CT of the abdomen and pelvis with IV contrast showed significant intrahepatic biliary ductal dilatation and questionable 9 mm fat containing lesion in the uncinate process of the pancreas.  Abdominal ultrasound showed hepatic steatosis and intra hepatic biliary ductal dilatation and status post cholecystectomy.  - Transfer was planned to [facility], but stopped due to patient declined transfer and MRCP showed "severe intrahepatic bile duct dilatation, with abrupt narrowing at " "the confluence of the hepatic ducts where there is a suspected ill-defined liver mass.  This is poorly evaluated without contrast but concerning for neoplasm such as cholangiocarcinoma" and no need for emergent ERCP  - Further workup with MRI abdomen with and without contrast showed "hypoattenuating lesion near the confluence of the right and left hepatic ducts causing severe upstream ductal dilatation concerning for cholangiocarcinoma.  Further evaluation with ERCP is recommended."  - Update given to the patient about MRI findings concerning for cholangiocarcinoma and she expressed understanding.  Also discussed need for transfer for possible ERCP for biopsy and stent placement, and patient was agreeable  - Case discussed with [facility], and hepatobiliary service reviewed case with [place] [facility]  Patient accepted for transfer to [facility], but sawaiting bed availability  - Patient has had nothing to eat/drink since 5 pm yesterday, will officially make her NPO now for possible procedure upon [place] [facility] [place] given transport en route. INR normal and vitals stable, no barriers for ERCP should it be able to be done today  - Continue p.r.n. medication for symptomatic relief  - Trend with labs and correct electrolyte abnormalities as needed    Type 2 diabetes mellitus without complication, without long-term current use of insulin  - Last hemoglobin A1c 5.9, well controlled  - Hold home regimen of Trulicity  - Glucose level reviewed, currently in normal range  - Continue Accu-Cheks q.a.c. and q.h.s. and give low-dose sliding scale insulin as necessary    Mixed hyperlipidemia  - Holding statin due to abnormal liver function tests    Hypertension associated with diabetes  - Well controlled, continue metoprolol 50 mg p.o. q.h.s., amlodipine 10 mg p.o. daily and valsartan 320 mg p.o. daily    Generalized convulsive epilepsy with intractable epilepsy  - Stable, no reported seizure activity  - " Continue levetiracetam 1500 mg p.o. q.h.s.      VTE Risk Mitigation (From admission, onward)           Ordered     IP VTE HIGH RISK PATIENT  Once         02/04/25 0219     Place sequential compression device  Until discontinued         02/04/25 0219                        Dafne Min MD  Department of Hospital Medicine   [facility] ([place])

## 2025-02-08 ENCOUNTER — ANESTHESIA (OUTPATIENT)
Dept: INTERVENTIONAL RADIOLOGY/VASCULAR | Facility: HOSPITAL | Age: 70
End: 2025-02-08
Payer: MEDICARE

## 2025-02-08 LAB
ALBUMIN SERPL BCP-MCNC: 2.7 G/DL (ref 3.5–5.2)
ALP SERPL-CCNC: 553 U/L (ref 40–150)
ALT SERPL W/O P-5'-P-CCNC: 257 U/L (ref 10–44)
ANION GAP SERPL CALC-SCNC: 14 MMOL/L (ref 8–16)
AST SERPL-CCNC: 278 U/L (ref 10–40)
BASOPHILS # BLD AUTO: 0.04 K/UL (ref 0–0.2)
BASOPHILS NFR BLD: 0.7 % (ref 0–1.9)
BILIRUB SERPL-MCNC: 13.1 MG/DL (ref 0.1–1)
BUN SERPL-MCNC: 9 MG/DL (ref 8–23)
CALCIUM SERPL-MCNC: 9.8 MG/DL (ref 8.7–10.5)
CANCER AG19-9 SERPL-ACNC: <2.1 U/ML (ref 0–40)
CEA SERPL-MCNC: 10.8 NG/ML (ref 0–5)
CHLORIDE SERPL-SCNC: 100 MMOL/L (ref 95–110)
CO2 SERPL-SCNC: 23 MMOL/L (ref 23–29)
CREAT SERPL-MCNC: 0.7 MG/DL (ref 0.5–1.4)
DIFFERENTIAL METHOD BLD: ABNORMAL
EOSINOPHIL # BLD AUTO: 0 K/UL (ref 0–0.5)
EOSINOPHIL NFR BLD: 0.5 % (ref 0–8)
ERYTHROCYTE [DISTWIDTH] IN BLOOD BY AUTOMATED COUNT: 17.5 % (ref 11.5–14.5)
ERYTHROCYTE [DISTWIDTH] IN BLOOD BY AUTOMATED COUNT: 17.6 % (ref 11.5–14.5)
EST. GFR  (NO RACE VARIABLE): >60 ML/MIN/1.73 M^2
GLUCOSE SERPL-MCNC: 75 MG/DL (ref 70–110)
HCT VFR BLD AUTO: 33.4 % (ref 37–48.5)
HCT VFR BLD AUTO: 34 % (ref 37–48.5)
HGB BLD-MCNC: 11.4 G/DL (ref 12–16)
HGB BLD-MCNC: 11.9 G/DL (ref 12–16)
IMM GRANULOCYTES # BLD AUTO: 0.01 K/UL (ref 0–0.04)
IMM GRANULOCYTES NFR BLD AUTO: 0.2 % (ref 0–0.5)
LYMPHOCYTES # BLD AUTO: 1.8 K/UL (ref 1–4.8)
LYMPHOCYTES NFR BLD: 28.9 % (ref 18–48)
MAGNESIUM SERPL-MCNC: 1.7 MG/DL (ref 1.6–2.6)
MCH RBC QN AUTO: 30.1 PG (ref 27–31)
MCH RBC QN AUTO: 30.5 PG (ref 27–31)
MCHC RBC AUTO-ENTMCNC: 34.1 G/DL (ref 32–36)
MCHC RBC AUTO-ENTMCNC: 35 G/DL (ref 32–36)
MCV RBC AUTO: 87 FL (ref 82–98)
MCV RBC AUTO: 88 FL (ref 82–98)
MONOCYTES # BLD AUTO: 0.7 K/UL (ref 0.3–1)
MONOCYTES NFR BLD: 10.7 % (ref 4–15)
NEUTROPHILS # BLD AUTO: 3.6 K/UL (ref 1.8–7.7)
NEUTROPHILS NFR BLD: 59 % (ref 38–73)
NRBC BLD-RTO: 0 /100 WBC
PHOSPHATE SERPL-MCNC: 3.5 MG/DL (ref 2.7–4.5)
PLATELET # BLD AUTO: 338 K/UL (ref 150–450)
PLATELET # BLD AUTO: 338 K/UL (ref 150–450)
PMV BLD AUTO: 10.2 FL (ref 9.2–12.9)
PMV BLD AUTO: 10.7 FL (ref 9.2–12.9)
POCT GLUCOSE: 113 MG/DL (ref 70–110)
POCT GLUCOSE: 146 MG/DL (ref 70–110)
POCT GLUCOSE: 98 MG/DL (ref 70–110)
POTASSIUM SERPL-SCNC: 3.7 MMOL/L (ref 3.5–5.1)
PROT SERPL-MCNC: 7.7 G/DL (ref 6–8.4)
RBC # BLD AUTO: 3.79 M/UL (ref 4–5.4)
RBC # BLD AUTO: 3.9 M/UL (ref 4–5.4)
SODIUM SERPL-SCNC: 137 MMOL/L (ref 136–145)
WBC # BLD AUTO: 6.08 K/UL (ref 3.9–12.7)
WBC # BLD AUTO: 9.59 K/UL (ref 3.9–12.7)

## 2025-02-08 PROCEDURE — 63600175 PHARM REV CODE 636 W HCPCS: Performed by: ANESTHESIOLOGY

## 2025-02-08 PROCEDURE — 63600175 PHARM REV CODE 636 W HCPCS: Performed by: PHYSICIAN ASSISTANT

## 2025-02-08 PROCEDURE — 85027 COMPLETE CBC AUTOMATED: CPT

## 2025-02-08 PROCEDURE — 82378 CARCINOEMBRYONIC ANTIGEN: CPT

## 2025-02-08 PROCEDURE — 36415 COLL VENOUS BLD VENIPUNCTURE: CPT

## 2025-02-08 PROCEDURE — 84100 ASSAY OF PHOSPHORUS: CPT | Performed by: HOSPITALIST

## 2025-02-08 PROCEDURE — 99223 1ST HOSP IP/OBS HIGH 75: CPT | Mod: 25,,, | Performed by: STUDENT IN AN ORGANIZED HEALTH CARE EDUCATION/TRAINING PROGRAM

## 2025-02-08 PROCEDURE — 0F9930Z DRAINAGE OF COMMON BILE DUCT WITH DRAINAGE DEVICE, PERCUTANEOUS APPROACH: ICD-10-PCS | Performed by: STUDENT IN AN ORGANIZED HEALTH CARE EDUCATION/TRAINING PROGRAM

## 2025-02-08 PROCEDURE — 36415 COLL VENOUS BLD VENIPUNCTURE: CPT | Performed by: HOSPITALIST

## 2025-02-08 PROCEDURE — 21400001 HC TELEMETRY ROOM

## 2025-02-08 PROCEDURE — D9220A PRA ANESTHESIA: Mod: ANES,,, | Performed by: ANESTHESIOLOGY

## 2025-02-08 PROCEDURE — 63600175 PHARM REV CODE 636 W HCPCS: Performed by: NURSE ANESTHETIST, CERTIFIED REGISTERED

## 2025-02-08 PROCEDURE — 25000003 PHARM REV CODE 250: Performed by: HOSPITALIST

## 2025-02-08 PROCEDURE — 83735 ASSAY OF MAGNESIUM: CPT | Performed by: HOSPITALIST

## 2025-02-08 PROCEDURE — A4216 STERILE WATER/SALINE, 10 ML: HCPCS | Performed by: PHYSICIAN ASSISTANT

## 2025-02-08 PROCEDURE — 25000003 PHARM REV CODE 250

## 2025-02-08 PROCEDURE — 86301 IMMUNOASSAY TUMOR CA 19-9: CPT

## 2025-02-08 PROCEDURE — 63600175 PHARM REV CODE 636 W HCPCS: Mod: JZ,TB

## 2025-02-08 PROCEDURE — D9220A PRA ANESTHESIA: Mod: CRNA,,, | Performed by: NURSE ANESTHETIST, CERTIFIED REGISTERED

## 2025-02-08 PROCEDURE — 25500020 PHARM REV CODE 255: Performed by: HOSPITALIST

## 2025-02-08 PROCEDURE — 25000003 PHARM REV CODE 250: Performed by: PHYSICIAN ASSISTANT

## 2025-02-08 PROCEDURE — 85025 COMPLETE CBC W/AUTO DIFF WBC: CPT | Performed by: HOSPITALIST

## 2025-02-08 PROCEDURE — 25000003 PHARM REV CODE 250: Performed by: NURSE ANESTHETIST, CERTIFIED REGISTERED

## 2025-02-08 PROCEDURE — 99223 1ST HOSP IP/OBS HIGH 75: CPT | Mod: GC,,, | Performed by: SURGERY

## 2025-02-08 PROCEDURE — 63600175 PHARM REV CODE 636 W HCPCS

## 2025-02-08 PROCEDURE — 80053 COMPREHEN METABOLIC PANEL: CPT | Performed by: HOSPITALIST

## 2025-02-08 RX ORDER — ONDANSETRON HYDROCHLORIDE 2 MG/ML
INJECTION, SOLUTION INTRAVENOUS
Status: DISCONTINUED | OUTPATIENT
Start: 2025-02-08 | End: 2025-02-08

## 2025-02-08 RX ORDER — CIPROFLOXACIN 2 MG/ML
INJECTION, SOLUTION INTRAVENOUS
Status: DISCONTINUED | OUTPATIENT
Start: 2025-02-08 | End: 2025-02-08

## 2025-02-08 RX ORDER — HYDROMORPHONE HYDROCHLORIDE 1 MG/ML
0.2 INJECTION, SOLUTION INTRAMUSCULAR; INTRAVENOUS; SUBCUTANEOUS EVERY 6 HOURS PRN
Status: DISCONTINUED | OUTPATIENT
Start: 2025-02-08 | End: 2025-02-09

## 2025-02-08 RX ORDER — PROPOFOL 10 MG/ML
VIAL (ML) INTRAVENOUS
Status: DISCONTINUED | OUTPATIENT
Start: 2025-02-08 | End: 2025-02-08

## 2025-02-08 RX ORDER — HALOPERIDOL 5 MG/ML
0.5 INJECTION INTRAMUSCULAR ONCE
Status: COMPLETED | OUTPATIENT
Start: 2025-02-08 | End: 2025-02-08

## 2025-02-08 RX ORDER — HALOPERIDOL 5 MG/ML
INJECTION INTRAMUSCULAR
Status: COMPLETED
Start: 2025-02-08 | End: 2025-02-08

## 2025-02-08 RX ORDER — ROCURONIUM BROMIDE 10 MG/ML
INJECTION, SOLUTION INTRAVENOUS
Status: DISCONTINUED | OUTPATIENT
Start: 2025-02-08 | End: 2025-02-08

## 2025-02-08 RX ORDER — HYDROMORPHONE HYDROCHLORIDE 1 MG/ML
0.2 INJECTION, SOLUTION INTRAMUSCULAR; INTRAVENOUS; SUBCUTANEOUS EVERY 5 MIN PRN
Status: DISCONTINUED | OUTPATIENT
Start: 2025-02-08 | End: 2025-02-08

## 2025-02-08 RX ORDER — ONDANSETRON HYDROCHLORIDE 2 MG/ML
4 INJECTION, SOLUTION INTRAVENOUS ONCE AS NEEDED
Status: COMPLETED | OUTPATIENT
Start: 2025-02-08 | End: 2025-02-08

## 2025-02-08 RX ORDER — FENTANYL CITRATE 50 UG/ML
INJECTION, SOLUTION INTRAMUSCULAR; INTRAVENOUS
Status: DISCONTINUED | OUTPATIENT
Start: 2025-02-08 | End: 2025-02-08

## 2025-02-08 RX ORDER — ONDANSETRON HYDROCHLORIDE 2 MG/ML
4 INJECTION, SOLUTION INTRAVENOUS DAILY PRN
Status: DISCONTINUED | OUTPATIENT
Start: 2025-02-08 | End: 2025-02-09

## 2025-02-08 RX ORDER — LIDOCAINE HYDROCHLORIDE 20 MG/ML
INJECTION INTRAVENOUS
Status: DISCONTINUED | OUTPATIENT
Start: 2025-02-08 | End: 2025-02-08

## 2025-02-08 RX ORDER — DEXAMETHASONE SODIUM PHOSPHATE 4 MG/ML
INJECTION, SOLUTION INTRA-ARTICULAR; INTRALESIONAL; INTRAMUSCULAR; INTRAVENOUS; SOFT TISSUE
Status: DISCONTINUED | OUTPATIENT
Start: 2025-02-08 | End: 2025-02-08

## 2025-02-08 RX ADMIN — HALOPERIDOL 0.5 MG: 5 INJECTION INTRAMUSCULAR at 03:02

## 2025-02-08 RX ADMIN — SODIUM CHLORIDE, PRESERVATIVE FREE 10 ML: 5 INJECTION INTRAVENOUS at 05:02

## 2025-02-08 RX ADMIN — IOHEXOL 25 ML: 300 INJECTION, SOLUTION INTRAVENOUS at 02:02

## 2025-02-08 RX ADMIN — ACETAMINOPHEN 650 MG: 325 TABLET ORAL at 08:02

## 2025-02-08 RX ADMIN — DIPHENHYDRAMINE HYDROCHLORIDE 25 MG: 25 CAPSULE ORAL at 08:02

## 2025-02-08 RX ADMIN — CIPROFLOXACIN 400 MG: 2 INJECTION, SOLUTION INTRAVENOUS at 12:02

## 2025-02-08 RX ADMIN — SODIUM CHLORIDE, SODIUM GLUCONATE, SODIUM ACETATE, POTASSIUM CHLORIDE, MAGNESIUM CHLORIDE, SODIUM PHOSPHATE, DIBASIC, AND POTASSIUM PHOSPHATE: .53; .5; .37; .037; .03; .012; .00082 INJECTION, SOLUTION INTRAVENOUS at 12:02

## 2025-02-08 RX ADMIN — AMLODIPINE BESYLATE 10 MG: 10 TABLET ORAL at 08:02

## 2025-02-08 RX ADMIN — LATANOPROST 1 DROP: 50 SOLUTION/ DROPS OPHTHALMIC at 08:02

## 2025-02-08 RX ADMIN — LIDOCAINE HYDROCHLORIDE 80 MG: 20 INJECTION INTRAVENOUS at 12:02

## 2025-02-08 RX ADMIN — HALOPERIDOL LACTATE 0.5 MG: 5 INJECTION, SOLUTION INTRAMUSCULAR at 03:02

## 2025-02-08 RX ADMIN — LEVETIRACETAM 1500 MG: 750 TABLET, FILM COATED ORAL at 08:02

## 2025-02-08 RX ADMIN — SODIUM CHLORIDE, PRESERVATIVE FREE 10 ML: 5 INJECTION INTRAVENOUS at 09:02

## 2025-02-08 RX ADMIN — SUGAMMADEX 200 MG: 100 INJECTION, SOLUTION INTRAVENOUS at 01:02

## 2025-02-08 RX ADMIN — FENTANYL CITRATE 50 MCG: 50 INJECTION, SOLUTION INTRAMUSCULAR; INTRAVENOUS at 12:02

## 2025-02-08 RX ADMIN — ONDANSETRON 4 MG: 2 INJECTION INTRAMUSCULAR; INTRAVENOUS at 10:02

## 2025-02-08 RX ADMIN — FENTANYL CITRATE 50 MCG: 50 INJECTION, SOLUTION INTRAMUSCULAR; INTRAVENOUS at 01:02

## 2025-02-08 RX ADMIN — ROCURONIUM BROMIDE 50 MG: 10 INJECTION INTRAVENOUS at 12:02

## 2025-02-08 RX ADMIN — ONDANSETRON 4 MG: 2 INJECTION INTRAMUSCULAR; INTRAVENOUS at 12:02

## 2025-02-08 RX ADMIN — METOPROLOL SUCCINATE 50 MG: 50 TABLET, EXTENDED RELEASE ORAL at 08:02

## 2025-02-08 RX ADMIN — PROPOFOL 150 MG: 10 INJECTION, EMULSION INTRAVENOUS at 12:02

## 2025-02-08 RX ADMIN — ONDANSETRON 4 MG: 2 INJECTION INTRAMUSCULAR; INTRAVENOUS at 02:02

## 2025-02-08 RX ADMIN — VALSARTAN 320 MG: 160 TABLET, FILM COATED ORAL at 08:02

## 2025-02-08 RX ADMIN — DEXAMETHASONE SODIUM PHOSPHATE 4 MG: 4 INJECTION, SOLUTION INTRAMUSCULAR; INTRAVENOUS at 12:02

## 2025-02-08 RX ADMIN — HYDROMORPHONE HYDROCHLORIDE 0.2 MG: 1 INJECTION, SOLUTION INTRAMUSCULAR; INTRAVENOUS; SUBCUTANEOUS at 08:02

## 2025-02-08 NOTE — CONSULTS
[facility] - Internal Medicine Telemetry  General Surgery  Consult Note    Patient Name: Katey Cerrato  MRN: [id number]  Code Status: Full Code  Admission Date: 2/3/2025  Hospital Length of Stay: 3 days  Attending Physician: Juliana Marx MD  Primary Care Provider: Shay Castellano MD    Patient information was obtained from patient and past medical records.     Inpatient consult to Surgical Oncology  Consult performed by: Gwendolyn Francisco MD  Consult ordered by: Zaire Lu MD        Subjective:     Principal Problem: Biliary obstruction    History of Present Illness: Katey Cerrato is a 69 y.o. female with a history of empty sella syndrome, GHD, glaucoma, Grave's w/ ecophthalmos, HLD, HTN, seizures, and DM2 who presents as a transfer for workup of her biliary obstruction. The patient states that she recently saw her PCP after she noticed urine discoloration. She was sent for outpatient lab work, which was suggestive of biliary obstruction so she was sent to the ED. In the ED, her lab work was remarkable for t bili of 8.3, elevated LFTs (280/264), and elevated ALP (507). RUQ and CT A/P w/ significant intrahepatic biliary ductal dilation and mild extra hepatic dilation. MRCP w/ hypoattenuating lesion near the confluence of the right and left hepatic ducts causing severe upstream ductal dilatation concerning for cholangiocarcinoma. Surgical oncology consulted for evaluation.       No current facility-administered medications on file prior to encounter.     Current Outpatient Medications on File Prior to Encounter   Medication Sig    amLODIPine (NORVASC) 10 MG tablet TAKE 1 TABLET BY MOUTH EVERY DAY    atorvastatin (LIPITOR) 80 MG tablet Take 1 tablet (80 mg total) by mouth once daily.    blood sugar diagnostic Strp To check BG 1 times daily, to use with insurance preferred meter    blood-glucose meter kit To check BG 1 times daily, to use with insurance preferred meter    chlorhexidine (PERIDEX) 0.12 %  solution SWISH AND SPIT 15MLS THREE TIMES DAILY FOR 7 AYS    FLUZONE HIGHDOSE QUAD 23-24  mcg/0.7 mL Syrg     lancets Misc To check BG 1 times daily, to use with insurance preferred meter    latanoprost 0.005 % ophthalmic solution INSTILL 1 DROP INTO BOTH EYES EVERY EVENING    levETIRAcetam (KEPPRA) 750 MG Tab TAKE 2 TABLETS BY MOUTH ONCE  DAILY    meloxicam (MOBIC) 15 MG tablet TAKE 1 TABLET BY MOUTH EVERY DAY AS NEEDED FOR PAIN    metoprolol succinate (TOPROL-XL) 50 MG 24 hr tablet TAKE 1 TABLET BY MOUTH EVERY DAY IN THE EVENING    multivitamin-minerals-lutein Tab Take 1 tablet by mouth once daily once daily.    mv-mn/iron/folic acid/herb 190 (VITAMIN D3 COMPLETE ORAL) Take by mouth.    ondansetron (ZOFRAN-ODT) 4 MG TbDL Take 1 tablet (4 mg total) by mouth every 6 (six) hours as needed.    TRULICITY 3 mg/0.5 mL pen injector INJECT 3 MG (0.5 ML) UNDER THE SKIN ONCE A WEEK    valACYclovir (VALTREX) 1000 MG tablet Take 1 tablet (1,000 mg total) by mouth 3 (three) times daily. for 7 days    valsartan (DIOVAN) 320 MG tablet Take 1 tablet (320 mg total) by mouth once daily.    VITAMIN B COMPLEX ORAL Take by mouth.       Review of patient's allergies indicates:   Allergen Reactions    Codeine Nausea Only       Past Medical History:   Diagnosis Date    Cataract     Empty sella syndrome     GHD (growth hormone deficiency)     Glaucoma     Graves' disease with exophthalmos     History of vitamin D deficiency     Hyperlipidemia     Hypertension     Seizures     Thyroid nodule     Thyroid nodule     Type II or unspecified type diabetes mellitus without mention of complication, uncontrolled      Past Surgical History:   Procedure Laterality Date    BREAST BIOPSY      CATARACT EXTRACTION W/  INTRAOCULAR LENS IMPLANT Right 3/15/2021    Procedure: EXTRACTION, CATARACT, WITH IOL INSERTION;  Surgeon: Cj Caban MD;  [facility];  Service: Ophthalmology;  Laterality: Right;    CATARACT EXTRACTION W/  INTRAOCULAR LENS  IMPLANT Left 3/29/2021    Procedure: EXTRACTION, CATARACT, WITH IOL INSERTION;  Surgeon: Cj Caban MD;  Location: [facility] OR;  Service: Ophthalmology;  Laterality: Left;    CHOLECYSTECTOMY      HYSTERECTOMY      one ovary intact    INJECTION OF JOINT Left 2/6/2024    Procedure: INJECTION, JOINT LEFT KNEE WITH STEROID;  Surgeon: She Schwartz MD;  Location: [facility];  Service: Pain Management;  Laterality: Left;  [phone]    KNEE SURGERY      LYMPH NODE BIOPSY  3010    OOPHORECTOMY       Family History       Problem Relation (Age of Onset)    Breast cancer Mother (78), Sister (60)    Cancer Mother    Cataracts Mother    Glaucoma Mother    Heart disease Mother, Father, Sister    Hypertension Brother    No Known Problems Sister, Brother, Maternal Aunt, Maternal Uncle, Paternal Aunt, Paternal Uncle, Maternal Grandmother, Maternal Grandfather, Paternal Grandmother, Paternal Grandfather, Son, Other    Tremor Mother          Tobacco Use    Smoking status: Never    Smokeless tobacco: Never   Substance and Sexual Activity    Alcohol use: No     Comment: none    Drug use: No    Sexual activity: Yes     Partners: Male     Comment: m  works in dietary,  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     Review of Systems   Constitutional:  Negative for chills and fever.   HENT:  Negative for trouble swallowing and voice change.    Eyes: Negative.    Respiratory:  Negative for chest tightness and shortness of breath.    Cardiovascular:  Negative for chest pain and palpitations.   Gastrointestinal:  Negative for abdominal distention, abdominal pain, constipation, diarrhea, nausea and vomiting.   Endocrine: Negative.    Genitourinary:  Negative for difficulty urinating and flank pain.   Skin:  Positive for color change.   Hematological: Negative.    Psychiatric/Behavioral: Negative.       Objective:     Vital Signs (Most Recent):  Temp: 98 °F (36.7 °C) (02/07/25 1934)  Pulse: 74 (02/07/25 1934)  Resp: 18 (02/07/25 1613)  BP: (!)  150/81 (02/07/25 1934)  SpO2: 97 % (02/07/25 1934) Vital Signs (24h Range):  Temp:  [98 °F (36.7 °C)-99.2 °F (37.3 °C)] 98 °F (36.7 °C)  Pulse:  [60-83] 74  Resp:  [16-18] 18  SpO2:  [97 %-99 %] 97 %  BP: (122-150)/(58-91) 150/81     Weight: 79.7 kg (175 lb 11.2 oz)  Body mass index is 35.49 kg/m².     Physical Exam  Vitals reviewed.   Constitutional:       General: She is not in acute distress.     Appearance: Normal appearance. She is not toxic-appearing.   HENT:      Head: Normocephalic and atraumatic.      Nose: Nose normal.      Mouth/Throat:      Mouth: Mucous membranes are moist.   Eyes:      General: Scleral icterus present.   Cardiovascular:      Rate and Rhythm: Normal rate.      Pulses: Normal pulses.   Pulmonary:      Effort: No respiratory distress.   Abdominal:      General: Abdomen is flat.      Palpations: Abdomen is soft.      Comments: Soft, non distended. Very mildly tender to deep palpation in the epigastrium. No rebound or guarding.    Neurological:      General: No focal deficit present.      Mental Status: She is alert and oriented to person, place, and time.   Psychiatric:         Mood and Affect: Mood normal.         Behavior: Behavior normal.            I have reviewed all pertinent lab results within the past 24 hours.  CBC:   Recent Labs   Lab 02/07/25  0706   WBC 6.22   RBC 3.69*   HGB 11.4*   HCT 32.2*      MCV 87   MCH 30.9   MCHC 35.4     CMP:   Recent Labs   Lab 02/07/25  0706      CALCIUM 9.8   ALBUMIN 2.8*   PROT 7.9      K 3.6   CO2 25      BUN 11   CREATININE 0.7   ALKPHOS 551*   *   *   BILITOT 10.3*       Significant Diagnostics:  I have reviewed all pertinent imaging results/findings within the past 24 hours.    MRI Abdomen W WO Contrast  2/5/2025    Hypoattenuating lesion near the confluence of the right and left hepatic ducts causing severe upstream ductal dilatation concerning for cholangiocarcinoma.  Further evaluation with ERCP is  recommended. Additional findings as above.     MRI MRCP Abdomen WO Contrast 3D WO Independent WS XPD  2/4/2025    Severe intrahepatic bile duct dilatation, with abrupt narrowing at the confluence of the hepatic ducts where there is a suspected ill-defined liver mass.  This is poorly evaluated without contrast but concerning for neoplasm such as cholangiocarcinoma. No lymphadenopathy.     CT Abdomen Pelvis With IV Contrast NO Oral Contrast  2/4/2025    Significant intrahepatic biliary ductal dilatation.  Mild extrahepatic biliary ductal dilatation.  9 mm fat containing lesion in the uncinate process of the pancreas.  Cholecystectomy. Retrocrural adenopathy.     US Abdomen Limited  2/3/2025    Hepatic steatosis.  Intrahepatic biliary ductal dilatation.  Cholecystectomy. Right renal cyst.     Assessment/Plan:     * Biliary obstruction  69F presenting with, essentially, painless jaundice after being found to have biliary obstruction on recent outpatient lab work. Her t bili continued to uptrend, now 10.3 (from 7). Imaging studies w/ a hypoattenuating mass near the confluence of the right and left hepatic ducts, which is concerning for cholangiocarcinoma. She will need biliary decompression, and given the location of her mass, this would likely best be done via PTC with IR. She will also need biochemical work up (Ca 19-9, CEA), as well as, a CT chest to rule out any metastasis if this is indeed cholangiocarcinoma. Surgical oncology will follow.     -- will require biliary decompression, recommend PTC with IR given location of mass   -- obtain Ca 19-9 and CEA levels   -- will need a CT chest for further staging  -- will follow along         Gwendolyn Francisco MD  General Surgery  [facility] - Internal Medicine Telemetry

## 2025-02-08 NOTE — ANESTHESIA PROCEDURE NOTES
Intubation    Date/Time: 2/8/2025 12:44 PM    Performed by: Pedro Santa CRNA  Authorized by: Elier Aguero MD    Intubation:     Induction:  Intravenous    Intubated:  Postinduction    Mask Ventilation:  Easy mask    Attempts:  1    Attempted By:  CRNA    Method of Intubation:  Video laryngoscopy    Blade:  De Guzman 3    Laryngeal View Grade: Grade I - full view of cords      Difficult Airway Encountered?: No      Complications:  None    Airway Device:  Oral endotracheal tube    Airway Device Size:  7.5    Style/Cuff Inflation:  Cuffed (inflated to minimal occlusive pressure)    Inflation Amount (mL):  7    Tube secured:  21    Secured at:  The lips    Placement Verified By:  Capnometry    Complicating Factors:  Obesity and short neck    Findings Post-Intubation:  BS equal bilateral and atraumatic/condition of teeth unchanged

## 2025-02-08 NOTE — PLAN OF CARE
Problem: Adult Inpatient Plan of Care  Goal: Plan of Care Review  Outcome: Progressing     Problem: Adult Inpatient Plan of Care  Goal: Patient-Specific Goal (Individualized)  Outcome: Progressing     Problem: Adult Inpatient Plan of Care  Goal: Absence of Hospital-Acquired Illness or Injury  Outcome: Progressing     Problem: Adult Inpatient Plan of Care  Goal: Optimal Comfort and Wellbeing  Outcome: Progressing     Problem: Adult Inpatient Plan of Care  Goal: Readiness for Transition of Care  Outcome: Progressing     Problem: Nausea and Vomiting  Goal: Nausea and Vomiting Relief  Outcome: Progressing    Pt AOx4. No acute events noted during shift. Vitals remained stable. No c/o pain or discomfort noted, scheduled meds given per MD orders. Q1-2hr safety checks completed. Bed remained low, locked, with all personal items in reach.

## 2025-02-08 NOTE — ASSESSMENT & PLAN NOTE
"Liver mass, possible cholangiocarcinoma  Patient presented to OSH with jaundice and scleral icterus, along with abnormal labs from clinic. Also noted nausea and back pain.  Noted history of cholecystectomy. Labs showed elevated bilirubin, and imaging with biliary ductal dilatation. Initial CT of the abdomen and pelvis with IV contrast showed significant intrahepatic biliary ductal dilatation and questionable 9 mm fat containing lesion in the uncinate process of the pancreas.  Abdominal ultrasound showed hepatic steatosis and intra hepatic biliary ductal dilatation and status post cholecystectomy. MRCP showed "severe intrahepatic bile duct dilatation, with abrupt narrowing at the confluence of the hepatic ducts where there is a suspected ill-defined liver mass.  This is poorly evaluated without contrast but concerning for neoplasm such as cholangiocarcinoma." Repeat imaging with MRI abdomen with and without contrast showed "hypoattenuating lesion near the confluence of the right and left hepatic ducts causing severe upstream ductal dilatation concerning for cholangiocarcinoma.  Further evaluation with ERCP is recommended." Patient transferred to [facility] [place] workup and evaluation. CEA elevated, CA 19-9 WNL.     -Currently NPO  -AES Consulted signed off  -Surgical Oncology consulted, appreciate recs     -Plan for PCT with IR     -Will need CT chest for further staging  - IR consulted for PCT, plan for procedure to be completed 2/8  - Continue p.r.n. medication for symptomatic relief  - Trend with labs and correct electrolyte abnormalities as needed  "

## 2025-02-08 NOTE — PROGRESS NOTES
"Siva Bradshaw - Internal Medicine [facility] Medicine  Progress Note    Patient Name: Katey Cerrato  MRN: [id number]  Patient Class: IP- Inpatient   Admission Date: 2/3/2025  Length of Stay: 4 days  Attending Physician: Juliana Marx MD  Primary Care Provider: Shay Castellano MD        Subjective     Principal Problem:Biliary obstruction        HPI:  Ms. Katey Cerrato is a 69 y.o. female, with PMH of HTN, HLD, DMII, Grave's disease, who presented to [facility] ED on 2/3/25 after labs following an outpatient appointment were abnormal. She states she recently was treated for shingles with Valtrex with rash improvement, but then her urine became discolored and that prompted her visit to her PCP.  At her PCP visit it was noticed that she had an orange coloration to the skin, and yellow color of the eyes.  After her appointment she had outpatient lab testing showing abnormal liver function, which is what prompted her PCP to send her to the ED. Her only new symptom is some low back pain. She states she had a cholecystectomy in the past, and had many gallstones removed at that time. She states her sister had retained gallstones that needed removal in the past. Denies any fevers, chills, trauma, alcohol consumption, Tylenol consumption.  [facility] labs showed tbili 7.4, alk phos 538,  and . Lipase normal. INR 1.2. U/A with few bacteria. A  CT showed significant intrahepatic biliary ductal dilatation, mild extrahepatic biliary ductal dilatation, a 9 mm fat containing lesion in the uncinate process of the pancreas.  She was treated with IV fluids, IV Rocephin, Toradol on admission.  MRCP showed intrahepatic bile duct dilation noted with an ill-defined liver mass.  Further workup with MRI abdomen with/without contrast showed "hypoattenuating lesion near the confluence of the right and left hepatic ducts causing severe upstream ductal dilatation concerning for cholangiocarcinoma. Given lesion and " "hyperbilirubinemia decision was made to transfer patient to [facility] for further workup and evaluation with advanced services, possible ERCP.    On arrival to [facility] VSS. Patient notes generalized pruritus, decreased appetite, nausea, lower back pain. Denies abdominal pain. Pending further workup and management     Overview/Hospital Course:  Patient presented to OSH with jaundice and scleral icterus, along with abnormal labs from clinic. Also noted nausea and back pain.  Noted history of cholecystectomy. Labs showed elevated bilirubin, and imaging with biliary ductal dilatation. Initial CT of the abdomen and pelvis with IV contrast showed significant intrahepatic biliary ductal dilatation and questionable 9 mm fat containing lesion in the uncinate process of the pancreas.  Abdominal ultrasound showed hepatic steatosis and intra hepatic biliary ductal dilatation and status post cholecystectomy. MRCP showed "severe intrahepatic bile duct dilatation, with abrupt narrowing at the confluence of the hepatic ducts where there is a suspected ill-defined liver mass.  This is poorly evaluated without contrast but concerning for neoplasm such as cholangiocarcinoma." Repeat imaging with MRI abdomen with and without contrast showed "hypoattenuating lesion near the confluence of the right and left hepatic ducts causing severe upstream ductal dilatation concerning for cholangiocarcinoma.  Further evaluation with ERCP is recommended." Patient transferred to [facility] for further workup and evaluation. CEA elevated, CA 19-9 WNL. AES and Surgical oncology consulted, recommended PTC for further evaluation. Symptoms controlled with benadryl    Interval History: NAEON and VSS. Patient feeling well this AM. Notes continued pruritus and nausea, however denies vomiting, diarrhea, constipation, abdominal pain, chest pain, shortness of breath. Plan for PTC today.     Review of Systems   Constitutional:  Negative for chills and fever.   Respiratory:  " Negative for cough, shortness of breath and wheezing.    Cardiovascular:  Negative for chest pain and leg swelling.   Gastrointestinal:  Positive for nausea. Negative for abdominal distention, abdominal pain, blood in stool, constipation, diarrhea and vomiting.   Genitourinary:  Negative for difficulty urinating and dysuria.   Musculoskeletal:  Positive for back pain.   Skin:  Positive for color change.     Objective:     Vital Signs (Most Recent):  Temp: 98.5 °F (36.9 °C) (02/08/25 1129)  Pulse: 82 (02/08/25 1129)  Resp: 18 (02/08/25 1129)  BP: 126/82 (02/08/25 1129)  SpO2: 98 % (02/08/25 1129) Vital Signs (24h Range):  Temp:  [98 °F (36.7 °C)-98.5 °F (36.9 °C)] 98.5 °F (36.9 °C)  Pulse:  [67-82] 82  Resp:  [18] 18  SpO2:  [96 %-99 %] 98 %  BP: ()/(66-84) 126/82     Weight: 79.7 kg (175 lb 11.2 oz)  Body mass index is 35.49 kg/m².    Intake/Output Summary (Last 24 hours) at 2/8/2025 1348  Last data filed at 2/8/2025 1250  Gross per 24 hour   Intake 200 ml   Output --   Net 200 ml         Physical Exam  Vitals and nursing note reviewed.   Constitutional:       General: She is not in acute distress.     Appearance: Normal appearance. She is well-developed. She is obese. She is not toxic-appearing or diaphoretic.   HENT:      Head: Normocephalic and atraumatic.      Nose: Nose normal.      Mouth/Throat:      Mouth: Mucous membranes are moist.      Pharynx: Oropharynx is clear. No oropharyngeal exudate.   Eyes:      General: Scleral icterus present.      Extraocular Movements: Extraocular movements intact.      Comments: Exophthalmos noted bilaterally   Neck:      Trachea: No tracheal deviation.   Cardiovascular:      Rate and Rhythm: Normal rate and regular rhythm.      Pulses: Normal pulses.      Heart sounds: Normal heart sounds. No murmur heard.     No friction rub. No gallop.   Pulmonary:      Effort: Pulmonary effort is normal. No respiratory distress.      Breath sounds: Normal breath sounds. No stridor.  "No wheezing, rhonchi or rales.   Chest:      Chest wall: No tenderness.   Abdominal:      General: Abdomen is flat. Bowel sounds are normal. There is no distension.      Palpations: Abdomen is soft.      Tenderness: There is no abdominal tenderness. There is no guarding or rebound.   Musculoskeletal:         General: No deformity. Normal range of motion.      Cervical back: Normal range of motion and neck supple.   Skin:     General: Skin is warm and dry.      Coloration: Skin is jaundiced.      Findings: No erythema or rash.   Neurological:      General: No focal deficit present.      Mental Status: She is alert and oriented to person, place, and time.      Cranial Nerves: No cranial nerve deficit.      Motor: No abnormal muscle tone.   Psychiatric:         Mood and Affect: Mood normal.         Behavior: Behavior normal.         Thought Content: Thought content normal.             Significant Labs: All pertinent labs within the past 24 hours have been reviewed.    Significant Imaging: I have reviewed all pertinent imaging results/findings within the past 24 hours.    Assessment and Plan     * Biliary obstruction  Liver mass, possible cholangiocarcinoma  Patient presented to OSH with jaundice and scleral icterus, along with abnormal labs from clinic. Also noted nausea and back pain.  Noted history of cholecystectomy. Labs showed elevated bilirubin, and imaging with biliary ductal dilatation. Initial CT of the abdomen and pelvis with IV contrast showed significant intrahepatic biliary ductal dilatation and questionable 9 mm fat containing lesion in the uncinate process of the pancreas.  Abdominal ultrasound showed hepatic steatosis and intra hepatic biliary ductal dilatation and status post cholecystectomy. MRCP showed "severe intrahepatic bile duct dilatation, with abrupt narrowing at the confluence of the hepatic ducts where there is a suspected ill-defined liver mass.  This is poorly evaluated without contrast but " "concerning for neoplasm such as cholangiocarcinoma." Repeat imaging with MRI abdomen with and without contrast showed "hypoattenuating lesion near the confluence of the right and left hepatic ducts causing severe upstream ductal dilatation concerning for cholangiocarcinoma.  Further evaluation with ERCP is recommended." [place] [facility] [place] and evaluation. CEA elevated, CA 19-9 WNL.     -Currently NPO  -AES Consulted signed off  -Surgical Oncology consulted, appreciate recs     -Plan for PCT with IR     -Will need CT chest for further staging  - IR consulted for PCT, plan for procedure to be completed 2/8  - Continue p.r.n. medication for symptomatic relief  - Trend with labs and correct electrolyte abnormalities as needed    Type 2 diabetes mellitus without complication, without long-term current use of insulin  - Last hemoglobin A1c 5.9, well controlled  - Hold home regimen of Trulicity  - Glucose level reviewed, currently in normal range  - Continue Accu-Cheks q.a.c. and q.h.s. and give low-dose sliding scale insulin as necessary    Mixed hyperlipidemia  Patient with hx HLD presenting with abnormal liver function tests. HLD well controlled on home statin    - Holding statin due to abnormal liver function tests    Graves' disease with exophthalmos  Pt with hx graves disease last TSH wnl in 2023. Currently without symptoms.    -Continue to monitor      Hypertension associated with diabetes  - Well controlled, continue metoprolol 50 mg p.o. q.h.s., amlodipine 10 mg p.o. daily and valsartan 320 mg p.o. daily    Generalized convulsive epilepsy with intractable epilepsy  Patient with history of epilepsy in the past on levitiracetam 1500 mg qhs. Stable, no reported seizure activity    - Continue home levetiracetam 1500 mg p.o. q.h.s.      VTE Risk Mitigation (From admission, onward)           Ordered     IP VTE HIGH RISK PATIENT  Once         02/04/25 0219     Place sequential compression device "  Until discontinued         02/04/25 0219                    Discharge Planning   ISHMAEL: 2/9/2025     Code Status: Full Code   Medical Readiness for Discharge Date:   Discharge Plan A: Home with family   Discharge Delays: None known at this time                    Zaire Lu MD  Internal Medicine, PGY-1  Department of Hospital Medicine   [facility] - Internal Medicine Telemetry

## 2025-02-08 NOTE — CONSULTS
Inpatient Radiology Pre-procedure Note    History of Present Illness:  Katey Cerrato is a 69 y.o. female who presents for biliary obstruction with obstructing hepatic hilum mass.    Admission H&P reviewed.  Past Medical History:   Diagnosis Date    Cataract     Empty sella syndrome     GHD (growth hormone deficiency)     Glaucoma     Graves' disease with exophthalmos     History of vitamin D deficiency     Hyperlipidemia     Hypertension     Seizures     Thyroid nodule     Thyroid nodule     Type II or unspecified type diabetes mellitus without mention of complication, uncontrolled      Past Surgical History:   Procedure Laterality Date    BREAST BIOPSY      CATARACT EXTRACTION W/  INTRAOCULAR LENS IMPLANT Right 3/15/2021    Procedure: EXTRACTION, CATARACT, WITH IOL INSERTION;  Surgeon: Cj Caban MD;  Location: [facility] OR;  Service: Ophthalmology;  Laterality: Right;    CATARACT EXTRACTION W/  INTRAOCULAR LENS IMPLANT Left 3/29/2021    Procedure: EXTRACTION, CATARACT, WITH IOL INSERTION;  Surgeon: Cj Caban MD;  Location: [facility] OR;  Service: Ophthalmology;  Laterality: Left;    CHOLECYSTECTOMY      HYSTERECTOMY      one ovary intact    INJECTION OF JOINT Left 2/6/2024    Procedure: INJECTION, JOINT LEFT KNEE WITH STEROID;  Surgeon: She Schwartz MD;  Location: [facility] PAIN MGT;  Service: Pain Management;  Laterality: Left;  [phone]    KNEE SURGERY      LYMPH NODE BIOPSY  3010    OOPHORECTOMY         Review of Systems:   As documented in primary team H&P    Home Meds:   Prior to Admission medications    Medication Sig Start Date End Date Taking? Authorizing Provider   amLODIPine (NORVASC) 10 MG tablet TAKE 1 TABLET BY MOUTH EVERY DAY 9/9/24   Rene Juarez MD   atorvastatin (LIPITOR) 80 MG tablet Take 1 tablet (80 mg total) by mouth once daily. 7/29/24   Rene Juarez MD   blood sugar diagnostic Strp To check BG 1 times daily, to use with insurance preferred meter 4/14/20    Baltazar England MD   blood-glucose meter kit To check BG 1 times daily, to use with insurance preferred meter 4/14/20 2/3/23  Baltazar England MD   chlorhexidine (PERIDEX) 0.12 % solution SWISH AND SPIT 15MLS THREE TIMES DAILY FOR 7 AYS 1/3/25   Provider, Historical   FLUZONE HIGHDOSE QUAD 23-24  mcg/0.7 mL Syrg  8/16/23   Provider, Historical   lancets Misc To check BG 1 times daily, to use with insurance preferred meter 4/14/20   Baltazar England MD   latanoprost 0.005 % ophthalmic solution INSTILL 1 DROP INTO BOTH EYES EVERY EVENING 10/3/24   Malia Puri, CEASAR   levETIRAcetam (KEPPRA) 750 MG Tab TAKE 2 TABLETS BY MOUTH ONCE  DAILY 12/13/24   Bethel Boykin MD   meloxicam (MOBIC) 15 MG tablet TAKE 1 TABLET BY MOUTH EVERY DAY AS NEEDED FOR PAIN 12/3/24   Rene Juarez MD   metoprolol succinate (TOPROL-XL) 50 MG 24 hr tablet TAKE 1 TABLET BY MOUTH EVERY DAY IN THE EVENING 10/5/23   Rene Juarez MD   multivitamin-minerals-lutein Tab Take 1 tablet by mouth once daily once daily.    Provider, Historical   mv-mn/iron/folic acid/herb 190 (VITAMIN D3 COMPLETE ORAL) Take by mouth.    Provider, Historical   ondansetron (ZOFRAN-ODT) 4 MG TbDL Take 1 tablet (4 mg total) by mouth every 6 (six) hours as needed. 3/14/24   Jamarcus Diaz II, MD   TRULICITY 3 mg/0.5 mL pen injector INJECT 3 MG (0.5 ML) UNDER THE SKIN ONCE A WEEK 12/4/24   Rene Juarez MD   valACYclovir (VALTREX) 1000 MG tablet Take 1 tablet (1,000 mg total) by mouth 3 (three) times daily. for 7 days 1/27/25 2/3/25  Lyly Díaz MD   valsartan (DIOVAN) 320 MG tablet Take 1 tablet (320 mg total) by mouth once daily. 12/16/24   Rene Juarez MD   VITAMIN B COMPLEX ORAL Take by mouth.    Provider, Historical     Scheduled Meds:    amLODIPine  10 mg Oral Daily    latanoprost  1 drop Both Eyes QHS    levETIRAcetam  1,500 mg Oral Nightly    metoprolol succinate  50 mg Oral QHS     "sodium chloride 0.9%  10 mL Intravenous Q8H    valsartan  320 mg Oral Daily     Continuous Infusions:   PRN Meds:  Current Facility-Administered Medications:     acetaminophen, 650 mg, Oral, Q6H PRN    dextrose 50%, 12.5 g, Intravenous, PRN    dextrose 50%, 25 g, Intravenous, PRN    diphenhydrAMINE, 25 mg, Oral, Q6H PRN    diphenhydrAMINE-zinc acetate 2-0.1%, , Topical (Top), TID PRN    glucagon (human recombinant), 1 mg, Intramuscular, PRN    glucose, 16 g, Oral, PRN    glucose, 24 g, Oral, PRN    insulin aspart U-100, 0-5 Units, Subcutaneous, Q6H PRN    melatonin, 6 mg, Oral, Nightly PRN    naloxone, 0.02 mg, Intravenous, PRN    ondansetron, 4 mg, Intravenous, Q8H PRN    senna-docusate 8.6-50 mg, 1 tablet, Oral, BID PRN  Anticoagulants/Antiplatelets: no anticoagulation    Allergies:   Review of patient's allergies indicates:   Allergen Reactions    Codeine Nausea Only     Sedation Hx: have not been any systemic reactions    Labs:  Recent Labs   Lab 02/07/25  0706   INR 1.2       Recent Labs   Lab 02/08/25 0519   WBC 6.08   HGB 11.4*   HCT 33.4*   MCV 88         Recent Labs   Lab 02/08/25 0519   GLU 75      K 3.7      CO2 23   BUN 9   CREATININE 0.7   CALCIUM 9.8   MG 1.7   *   *   ALBUMIN 2.7*   BILITOT 13.1*         Vitals:  Temp: 98.4 °F (36.9 °C) (02/08/25 0449)  Pulse: 74 (02/08/25 0449)  Resp: 18 (02/08/25 0449)  BP: 124/75 (02/08/25 0449)  SpO2: 97 % (02/08/25 0449)     Physical Exam:  ASA: 3  Mallampati: per anesthesia    General: no acute distress  Mental Status: alert and oriented to person, place and time  HEENT: jaundice, normocephalic, atraumatic  Chest: unlabored breathing  Heart: regular heart rate  Abdomen: nondistended  Extremity: moves all extremities    Plan: Bilateral PTC drain placement  Sedation Plan: general Sotero Brewster MD (Buck)  Interventional Radiology          "

## 2025-02-08 NOTE — HPI
Katey Cerrato is a 69 y.o. female with a history of empty sella syndrome, GHD, glaucoma, Grave's w/ ecophthalmos, HLD, HTN, seizures, and DM2 who presents as a transfer for workup of her biliary obstruction. The patient states that she recently saw her PCP after she noticed urine discoloration. She was sent for outpatient lab work, which was suggestive of biliary obstruction so she was sent to the ED. In the ED, her lab work was remarkable for t bili of 8.3, elevated LFTs (280/264), and elevated ALP (507). RUQ and CT A/P w/ significant intrahepatic biliary ductal dilation and mild extra hepatic dilation. MRCP w/ hypoattenuating lesion near the confluence of the right and left hepatic ducts causing severe upstream ductal dilatation concerning for cholangiocarcinoma. Surgical oncology consulted for evaluation.

## 2025-02-08 NOTE — ASSESSMENT & PLAN NOTE
69F presenting with, essentially, painless jaundice after being found to have biliary obstruction on recent outpatient lab work. Her t bili continued to uptrend, now 10.3 (from 7). Imaging studies w/ a hypoattenuating mass near the confluence of the right and left hepatic ducts, which is concerning for cholangiocarcinoma. She will need biliary decompression, and given the location of her mass, this would likely best be done via PTC with IR. She will also need biochemical work up (Ca 19-9, CEA), as well as, a CT chest to rule out any metastasis if this is indeed cholangiocarcinoma. Surgical oncology will follow.     -- will require biliary decompression, recommend PTC with IR given location of mass   -- obtain Ca 19-9 and CEA levels   -- will need a CT chest for further staging  -- will follow along

## 2025-02-08 NOTE — SUBJECTIVE & OBJECTIVE
Interval History: NAEON and VSS. Patient feeling well this AM. Notes continued pruritus and nausea, however denies vomiting, diarrhea, constipation, abdominal pain, chest pain, shortness of breath. Plan for PTC today.     Review of Systems   Constitutional:  Negative for chills and fever.   Respiratory:  Negative for cough, shortness of breath and wheezing.    Cardiovascular:  Negative for chest pain and leg swelling.   Gastrointestinal:  Positive for nausea. Negative for abdominal distention, abdominal pain, blood in stool, constipation, diarrhea and vomiting.   Genitourinary:  Negative for difficulty urinating and dysuria.   Musculoskeletal:  Positive for back pain.   Skin:  Positive for color change.     Objective:     Vital Signs (Most Recent):  Temp: 98.5 °F (36.9 °C) (02/08/25 1129)  Pulse: 82 (02/08/25 1129)  Resp: 18 (02/08/25 1129)  BP: 126/82 (02/08/25 1129)  SpO2: 98 % (02/08/25 1129) Vital Signs (24h Range):  Temp:  [98 °F (36.7 °C)-98.5 °F (36.9 °C)] 98.5 °F (36.9 °C)  Pulse:  [67-82] 82  Resp:  [18] 18  SpO2:  [96 %-99 %] 98 %  BP: ()/(66-84) 126/82     Weight: 79.7 kg (175 lb 11.2 oz)  Body mass index is 35.49 kg/m².    Intake/Output Summary (Last 24 hours) at 2/8/2025 1348  Last data filed at 2/8/2025 1250  Gross per 24 hour   Intake 200 ml   Output --   Net 200 ml         Physical Exam  Vitals and nursing note reviewed.   Constitutional:       General: She is not in acute distress.     Appearance: Normal appearance. She is well-developed. She is obese. She is not toxic-appearing or diaphoretic.   HENT:      Head: Normocephalic and atraumatic.      Nose: Nose normal.      Mouth/Throat:      Mouth: Mucous membranes are moist.      Pharynx: Oropharynx is clear. No oropharyngeal exudate.   Eyes:      General: Scleral icterus present.      Extraocular Movements: Extraocular movements intact.      Comments: Exophthalmos noted bilaterally   Neck:      Trachea: No tracheal deviation.   Cardiovascular:       Rate and Rhythm: Normal rate and regular rhythm.      Pulses: Normal pulses.      Heart sounds: Normal heart sounds. No murmur heard.     No friction rub. No gallop.   Pulmonary:      Effort: Pulmonary effort is normal. No respiratory distress.      Breath sounds: Normal breath sounds. No stridor. No wheezing, rhonchi or rales.   Chest:      Chest wall: No tenderness.   Abdominal:      General: Abdomen is flat. Bowel sounds are normal. There is no distension.      Palpations: Abdomen is soft.      Tenderness: There is no abdominal tenderness. There is no guarding or rebound.   Musculoskeletal:         General: No deformity. Normal range of motion.      Cervical back: Normal range of motion and neck supple.   Skin:     General: Skin is warm and dry.      Coloration: Skin is jaundiced.      Findings: No erythema or rash.   Neurological:      General: No focal deficit present.      Mental Status: She is alert and oriented to person, place, and time.      Cranial Nerves: No cranial nerve deficit.      Motor: No abnormal muscle tone.   Psychiatric:         Mood and Affect: Mood normal.         Behavior: Behavior normal.         Thought Content: Thought content normal.             Significant Labs: All pertinent labs within the past 24 hours have been reviewed.    Significant Imaging: I have reviewed all pertinent imaging results/findings within the past 24 hours.

## 2025-02-08 NOTE — SUBJECTIVE & OBJECTIVE
No current facility-administered medications on file prior to encounter.     Current Outpatient Medications on File Prior to Encounter   Medication Sig    amLODIPine (NORVASC) 10 MG tablet TAKE 1 TABLET BY MOUTH EVERY DAY    atorvastatin (LIPITOR) 80 MG tablet Take 1 tablet (80 mg total) by mouth once daily.    blood sugar diagnostic Strp To check BG 1 times daily, to use with insurance preferred meter    blood-glucose meter kit To check BG 1 times daily, to use with insurance preferred meter    chlorhexidine (PERIDEX) 0.12 % solution SWISH AND SPIT 15MLS THREE TIMES DAILY FOR 7 AYS    FLUZONE HIGHDOSE QUAD 23-24  mcg/0.7 mL Syrg     lancets Misc To check BG 1 times daily, to use with insurance preferred meter    latanoprost 0.005 % ophthalmic solution INSTILL 1 DROP INTO BOTH EYES EVERY EVENING    levETIRAcetam (KEPPRA) 750 MG Tab TAKE 2 TABLETS BY MOUTH ONCE  DAILY    meloxicam (MOBIC) 15 MG tablet TAKE 1 TABLET BY MOUTH EVERY DAY AS NEEDED FOR PAIN    metoprolol succinate (TOPROL-XL) 50 MG 24 hr tablet TAKE 1 TABLET BY MOUTH EVERY DAY IN THE EVENING    multivitamin-minerals-lutein Tab Take 1 tablet by mouth once daily once daily.    mv-mn/iron/folic acid/herb 190 (VITAMIN D3 COMPLETE ORAL) Take by mouth.    ondansetron (ZOFRAN-ODT) 4 MG TbDL Take 1 tablet (4 mg total) by mouth every 6 (six) hours as needed.    TRULICITY 3 mg/0.5 mL pen injector INJECT 3 MG (0.5 ML) UNDER THE SKIN ONCE A WEEK    valACYclovir (VALTREX) 1000 MG tablet Take 1 tablet (1,000 mg total) by mouth 3 (three) times daily. for 7 days    valsartan (DIOVAN) 320 MG tablet Take 1 tablet (320 mg total) by mouth once daily.    VITAMIN B COMPLEX ORAL Take by mouth.       Review of patient's allergies indicates:   Allergen Reactions    Codeine Nausea Only       Past Medical History:   Diagnosis Date    Cataract     Empty sella syndrome     GHD (growth hormone deficiency)     Glaucoma     Graves' disease with exophthalmos     History of vitamin  D deficiency     Hyperlipidemia     Hypertension     Seizures     Thyroid nodule     Thyroid nodule     Type II or unspecified type diabetes mellitus without mention of complication, uncontrolled      Past Surgical History:   Procedure Laterality Date    BREAST BIOPSY      CATARACT EXTRACTION W/  INTRAOCULAR LENS IMPLANT Right 3/15/2021    Procedure: EXTRACTION, CATARACT, WITH IOL INSERTION;  Surgeon: Cj Caban MD;  Location: [facility] OR;  Service: Ophthalmology;  Laterality: Right;    CATARACT EXTRACTION W/  INTRAOCULAR LENS IMPLANT Left 3/29/2021    Procedure: EXTRACTION, CATARACT, WITH IOL INSERTION;  Surgeon: Cj Caban MD;  Location: [facility] OR;  Service: Ophthalmology;  Laterality: Left;    CHOLECYSTECTOMY      HYSTERECTOMY      one ovary intact    INJECTION OF JOINT Left 2/6/2024    Procedure: INJECTION, JOINT LEFT KNEE WITH STEROID;  Surgeon: She Schwartz MD;  Location: [facility] PAIN MGT;  Service: Pain Management;  Laterality: Left;  [phone]    KNEE SURGERY      LYMPH NODE BIOPSY  3010    OOPHORECTOMY       Family History       Problem Relation (Age of Onset)    Breast cancer Mother (78), Sister (60)    Cancer Mother    Cataracts Mother    Glaucoma Mother    Heart disease Mother, Father, Sister    Hypertension Brother    No Known Problems Sister, Brother, Maternal Aunt, Maternal Uncle, Paternal Aunt, Paternal Uncle, Maternal Grandmother, Maternal Grandfather, Paternal Grandmother, Paternal Grandfather, Son, Other    Tremor Mother          Tobacco Use    Smoking status: Never    Smokeless tobacco: Never   Substance and Sexual Activity    Alcohol use: No     Comment: none    Drug use: No    Sexual activity: Yes     Partners: Male     Comment: m  works in dietary,  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     Review of Systems   Constitutional:  Negative for chills and fever.   HENT:  Negative for trouble swallowing and voice change.    Eyes: Negative.    Respiratory:  Negative for chest tightness and shortness of  breath.    Cardiovascular:  Negative for chest pain and palpitations.   Gastrointestinal:  Negative for abdominal distention, abdominal pain, constipation, diarrhea, nausea and vomiting.   Endocrine: Negative.    Genitourinary:  Negative for difficulty urinating and flank pain.   Skin:  Positive for color change.   Hematological: Negative.    Psychiatric/Behavioral: Negative.       Objective:     Vital Signs (Most Recent):  Temp: 98 °F (36.7 °C) (02/07/25 1934)  Pulse: 74 (02/07/25 1934)  Resp: 18 (02/07/25 1613)  BP: (!) 150/81 (02/07/25 1934)  SpO2: 97 % (02/07/25 1934) Vital Signs (24h Range):  Temp:  [98 °F (36.7 °C)-99.2 °F (37.3 °C)] 98 °F (36.7 °C)  Pulse:  [60-83] 74  Resp:  [16-18] 18  SpO2:  [97 %-99 %] 97 %  BP: (122-150)/(58-91) 150/81     Weight: 79.7 kg (175 lb 11.2 oz)  Body mass index is 35.49 kg/m².     Physical Exam  Vitals reviewed.   Constitutional:       General: She is not in acute distress.     Appearance: Normal appearance. She is not toxic-appearing.   HENT:      Head: Normocephalic and atraumatic.      Nose: Nose normal.      Mouth/Throat:      Mouth: Mucous membranes are moist.   Eyes:      General: Scleral icterus present.   Cardiovascular:      Rate and Rhythm: Normal rate.      Pulses: Normal pulses.   Pulmonary:      Effort: No respiratory distress.   Abdominal:      General: Abdomen is flat.      Palpations: Abdomen is soft.      Comments: Soft, non distended. Very mildly tender to deep palpation in the epigastrium. No rebound or guarding.    Neurological:      General: No focal deficit present.      Mental Status: She is alert and oriented to person, place, and time.   Psychiatric:         Mood and Affect: Mood normal.         Behavior: Behavior normal.            I have reviewed all pertinent lab results within the past 24 hours.  CBC:   Recent Labs   Lab 02/07/25  0706   WBC 6.22   RBC 3.69*   HGB 11.4*   HCT 32.2*      MCV 87   MCH 30.9   MCHC 35.4     CMP:   Recent Labs    Lab 02/07/25  0706      CALCIUM 9.8   ALBUMIN 2.8*   PROT 7.9      K 3.6   CO2 25      BUN 11   CREATININE 0.7   ALKPHOS 551*   *   *   BILITOT 10.3*       Significant Diagnostics:  I have reviewed all pertinent imaging results/findings within the past 24 hours.    MRI Abdomen W WO Contrast  2/5/2025    Hypoattenuating lesion near the confluence of the right and left hepatic ducts causing severe upstream ductal dilatation concerning for cholangiocarcinoma.  Further evaluation with ERCP is recommended. Additional findings as above.     MRI MRCP Abdomen WO Contrast 3D WO Independent WS XPD  2/4/2025    Severe intrahepatic bile duct dilatation, with abrupt narrowing at the confluence of the hepatic ducts where there is a suspected ill-defined liver mass.  This is poorly evaluated without contrast but concerning for neoplasm such as cholangiocarcinoma. No lymphadenopathy.     CT Abdomen Pelvis With IV Contrast NO Oral Contrast  2/4/2025    Significant intrahepatic biliary ductal dilatation.  Mild extrahepatic biliary ductal dilatation.  9 mm fat containing lesion in the uncinate process of the pancreas.  Cholecystectomy. Retrocrural adenopathy.     US Abdomen Limited  2/3/2025    Hepatic steatosis.  Intrahepatic biliary ductal dilatation.  Cholecystectomy. Right renal cyst.

## 2025-02-08 NOTE — DISCHARGE INSTRUCTIONS
Biliary Drain Discharge Instructions  What is a biliary drain?   Bile is produced by your liver to help digest food. It is stored in the gallbladder. A biliary drain is placed when bile cannot naturally flow from the liver through the bile ducts and into the intestines.   It helps drain the bile from the liver in two ways:   1. External drain: Allows bile to drain outside of your body into a collection bag.   2. Internal-external drain: Allows bile to drain into the intestines as well as outside your body into a collection bag.     Your provider will explain the type of drain and how long you should expect it to stay in place. Generally the drain must stay in for at least 4 weeks. While the drain is there, you will have follow-up care with your provider regularly. Drains are generally replaced every 8-10 weeks. If you do not have an appointment and feel it is time for the tube to be exchanged, please call [phone].       Diet and Activity    Continue with your regular diet and previously prescribed medications.    Avoid any activity that may result in pulling on the drain.    Do not submerge the drain. This means do not swim, sit in a hot tub, soak in a tub bath, or do anything that involves putting the drain under water.     Emptying the Collection Bag   Empty the collection bag into the toilet when it is half full, or at least once each day. Your provider will let you know if you should keep track of the amount.     1. Turn the knob at the bottom of the bag and drain into toilet. If your provider has told you to track the contents, empty the bag into a measuring container and record the amount.   2. Wash your hands.   3. Change outside tube and bag weekly. These can be bought from any medical supplies store.     Showering   While it is okay to shower with your drain, do not soak in any water with your drain. Soaking may lead to infection.      First 2 weeks: Before taking a shower, cover the dressing with a  double layer of plastic wrap (like Saran wrap) where it enters the skin. Tape down the edges. After the shower, remove the plastic and apply a clean bandage.    After 2 weeks: You may shower without the plastic wrap. Rinse well. Pat the area dry and apply a clean bandage.     Drain Care   Change your dressing at least every 2 days, or if it becomes dirty or wet.   1. Wash your hands.   2. Remove the old bandage carefully. Try not to pull on the drain or stitches.   3. If the skin needs to be cleaned, use a gauze or cotton swab to gently clean it with soap and water.   4. Wait for your skin to dry.   5. Apply a sterile 4x4 gauze over the tube where it enters your body. Be careful not to kink the drain.   6. Secure with paper tape.   7. Wash your hands.     You may or may not need to flush the drain. Your provider will discuss this with you.     Call your provider immediately or seek emergency medical attention if you experience any of the following symptoms. This information does not replace medical advice from your healthcare provider. Your experience may differ from that of the typical patient. Talk to your provider if you have any questions about this document, your condition, or your treatment plan.    New or worsening yellowing of your skin/eyes    Fever    New or worsening belly pain    Nausea or vomiting    New or worsening redness or swelling where the drain meets the skin    Pus leaking around the drain    The drain begins to leak, breaks, or falls out Troubleshooting    If you have any of the above symptoms and the drain is capped, uncap the drain and attach the drainage bag.    If the drain is already attached to a drainage bag, remove the bandage and check to see if the drain is twisted. If these steps do not improve your symptoms, call your provider immediately, or go to the nearest emergency room, or call 911.   - Continue to flush drains at home TID, and she feels comfortable with this.  - If  fevers  at home,  place both PTCs to gravity and call surgical oncology clinic    Flushing instructions  If you are told to flush your drain You will need to flush your biliary drainwith 10 cc of normal saline each day.   1. Gather supplies: 10 mL of sterile normal saline solution, syringe, sterile gauze, paper tape, and rubbing alcohol (liquid or single-use alcohol wipes).   2. Wash your hands.   3. Unscrew the drainage bag from drain (place a towel beneath to catch any drips).   4. Wipe drain with alcohol.   5. Fill the syringe with 10 mL of normal saline solution.   6. Attach syringe to drain.   7. Gently inject normal saline into drain. Do NOT pull back on the syringe.   8. Unscrew syringe.   9. Reattach drainage bag.   10. Wash your hands and dispose of supplies.         Capping instructions  If you are told to cap your drain, you should cap the drain 24 hours after the procedure.     Remember, if you experience any of the following symptoms, try uncapping the drain and attaching the drainage bag:    New or worsening yellowing of your skin/eyes    Fever    New or worsening belly pain    Nausea or vomiting    Increased drainage around the tube    If this does not improve your symptoms, call your provider immediately or seek emergency medical attention.     Interventional Radiology Clinic    [phone], choose prompt 3 Monday - Friday, 8:00 am - 4:00 pm    [phone] After hours and on holidays. Ask to speak with the interventional radiologist on call.             .Our goal at [facility] is to always give you outstanding care and exceptional service. You may receive a survey by mail, text or e-mail in the next 7-10 days from Zhane Garibay and our leadership team asking about the care you received with us. The survey should only take 5-10 minutes to complete and is very important to us.     Your feedback provides us with a way to recognize our staff who work tirelessly to provide the best care! Also, your responses  help us learn how to improve when your experience was below our aspiration of excellence. We WILL use your feedback to continue making improvements to help us provide the highest quality care. We keep your personal information and feedback confidential. We appreciate your time completing this survey and can't wait to hear from you!!!     We want you to leave us today feeling like you can DEFINITELY recommend us to others! We look forward to your continued care with us! Thanks so much for choosing [facility] for your healthcare needs!    Please hold you Amlodipine and Valsartan until you follow up with your PCP and make sure to check you blood pressure at home   Also, stop Trulicity since it can worsen you nausea and vomiting and would follow up with your PCP regarding it.

## 2025-02-08 NOTE — TRANSFER OF CARE
"Anesthesia Transfer of Care Note    Patient: Katey Cerrato    Procedure(s) Performed: * No procedures listed *    Patient location: PACU    Anesthesia Type: general    Transport from OR: Transported from OR on 6-10 L/min O2 by face mask with adequate spontaneous ventilation    Post pain: adequate analgesia    Post assessment: no apparent anesthetic complications and tolerated procedure well    Post vital signs: stable    Level of consciousness: awake, alert and oriented    Nausea/Vomiting: no nausea/vomiting    Complications: none    Transfer of care protocol was followed      Last vitals: Visit Vitals  BP (!) 186/85   Pulse 73   Temp 36.6 °C (97.9 °F) (Temporal)   Resp 11   Ht 4' 11" (1.499 m)   Wt 79.7 kg (175 lb 11.2 oz)   SpO2 100%   Breastfeeding No   BMI 35.49 kg/m²     "

## 2025-02-08 NOTE — NURSING TRANSFER
Nursing Transfer Note      2/8/2025   3:17 PM    Nurse giving handoff:JAYLEEN Brunner    Reason patient is being transferred: Post procedure     Transfer To: 1105    Transfer via stretcher    Transported by transport     Chart send with patient: Yes    Notified: family via text messaging system    Patient reassessed at: 2/8/2025 1453

## 2025-02-08 NOTE — ANESTHESIA PREPROCEDURE EVALUATION
[facility]  Anesthesia Pre-Operative Evaluation         Patient Name: Katey Cerrato  YOB: 1955  MRN: [id number]    SUBJECTIVE:     Pre-operative evaluation for * No procedures listed *     02/08/2025    Katey Cerrato is a 69 y.o. female w/ a significant PMHx of HTN, HLD, DMII, Grave's disease, BMI 35, epilepsy, PVD who presented to [facility] [place] on 2/3/25 after labs following an outpatient appointment were abnormal. Found to have biliary obstruction    Patient now presents for the above procedure(s).        LDA:       Peripheral IV - Single Lumen 02/03/25 2015 22 G Left;Posterior Hand (Active)   Site Assessment Clean;Dry;Intact;No redness;No swelling 02/07/25 2100   Line Securement Device Secured with sutureless device 02/07/25 2100   Extremity Assessment Distal to IV No warmth;No swelling;No redness;No abnormal discoloration 02/07/25 2100   Line Status Flushed;Saline locked 02/07/25 2100   Dressing Status Clean;Dry;Intact 02/07/25 2100   Dressing Intervention Integrity maintained 02/07/25 2100   Dressing Change Due 02/07/25 02/07/25 2100   Site Change Due 02/07/25 02/07/25 2100   Reason Not Rotated Not due 02/06/25 1905   Number of days: 4       Prev airway: None documented.    Drips: None documented.      Patient Active Problem List   Diagnosis    Generalized convulsive epilepsy with intractable epilepsy    Hypertension associated with diabetes    Diabetes mellitus type 2 in obese    Obesity (BMI 30-39.9)    Essential hypertension    Graves' disease with exophthalmos    Empty sella syndrome    Mixed hyperlipidemia    Type 2 diabetes mellitus without complication, without long-term current use of insulin    Vaginal vault prolapse, posthysterectomy    Urge incontinence    Carpal tunnel syndrome of right wrist    Nuclear sclerosis, bilateral    Post-operative state    Nuclear sclerotic cataract of left eye    Severe obesity    Post-traumatic osteoarthritis of left knee    Peripheral  vascular disease, unspecified    Primary osteoarthritis of both knees    Decreased range of motion (ROM) of left knee    Decreased strength of lower extremity    Biliary obstruction       Review of patient's allergies indicates:   Allergen Reactions    Codeine Nausea Only       Current Inpatient Medications:   amLODIPine  10 mg Oral Daily    latanoprost  1 drop Both Eyes QHS    levETIRAcetam  1,500 mg Oral Nightly    metoprolol succinate  50 mg Oral QHS    sodium chloride 0.9%  10 mL Intravenous Q8H    valsartan  320 mg Oral Daily       No current facility-administered medications on file prior to encounter.     Current Outpatient Medications on File Prior to Encounter   Medication Sig Dispense Refill    amLODIPine (NORVASC) 10 MG tablet TAKE 1 TABLET BY MOUTH EVERY DAY 90 tablet 3    atorvastatin (LIPITOR) 80 MG tablet Take 1 tablet (80 mg total) by mouth once daily. 90 tablet 3    blood sugar diagnostic Strp To check BG 1 times daily, to use with insurance preferred meter 100 each 3    blood-glucose meter kit To check BG 1 times daily, to use with insurance preferred meter 1 each 0    chlorhexidine (PERIDEX) 0.12 % solution SWISH AND SPIT 15MLS THREE TIMES DAILY FOR 7 AYS      FLUZONE HIGHDOSE QUAD 23-24  mcg/0.7 mL Syrg       lancets Misc To check BG 1 times daily, to use with insurance preferred meter 100 each 3    latanoprost 0.005 % ophthalmic solution INSTILL 1 DROP INTO BOTH EYES EVERY EVENING 7.5 mL 3    levETIRAcetam (KEPPRA) 750 MG Tab TAKE 2 TABLETS BY MOUTH ONCE  DAILY 180 tablet 1    meloxicam (MOBIC) 15 MG tablet TAKE 1 TABLET BY MOUTH EVERY DAY AS NEEDED FOR PAIN 15 tablet 0    metoprolol succinate (TOPROL-XL) 50 MG 24 hr tablet TAKE 1 TABLET BY MOUTH EVERY DAY IN THE EVENING 90 tablet 3    multivitamin-minerals-lutein Tab Take 1 tablet by mouth once daily once daily.      mv-mn/iron/folic acid/herb 190 (VITAMIN D3 COMPLETE ORAL) Take by mouth.      ondansetron (ZOFRAN-ODT) 4 MG TbDL Take 1  tablet (4 mg total) by mouth every 6 (six) hours as needed. 15 tablet 0    TRULICITY 3 mg/0.5 mL pen injector INJECT 3 MG (0.5 ML) UNDER THE SKIN ONCE A WEEK 12 Pen 1    valACYclovir (VALTREX) 1000 MG tablet Take 1 tablet (1,000 mg total) by mouth 3 (three) times daily. for 7 days 21 tablet 0    valsartan (DIOVAN) 320 MG tablet Take 1 tablet (320 mg total) by mouth once daily. 90 tablet 2    VITAMIN B COMPLEX ORAL Take by mouth.         Past Surgical History:   Procedure Laterality Date    BREAST BIOPSY      CATARACT EXTRACTION W/  INTRAOCULAR LENS IMPLANT Right 3/15/2021    Procedure: EXTRACTION, CATARACT, WITH IOL INSERTION;  Surgeon: Cj Caban MD;  Location: [facility] OR;  Service: Ophthalmology;  Laterality: Right;    CATARACT EXTRACTION W/  INTRAOCULAR LENS IMPLANT Left 3/29/2021    Procedure: EXTRACTION, CATARACT, WITH IOL INSERTION;  Surgeon: Cj Caban MD;  Location: [facility] OR;  Service: Ophthalmology;  Laterality: Left;    CHOLECYSTECTOMY      HYSTERECTOMY      one ovary intact    INJECTION OF JOINT Left 2/6/2024    Procedure: INJECTION, JOINT LEFT KNEE WITH STEROID;  Surgeon: She Schwartz MD;  Location: [facility] PAIN MGT;  Service: Pain Management;  Laterality: Left;  [phone]    KNEE SURGERY      LYMPH NODE BIOPSY  3010    OOPHORECTOMY         Social History     Socioeconomic History    Marital status:    Occupational History     Employer: [employer]   Tobacco Use    Smoking status: Never    Smokeless tobacco: Never   Substance and Sexual Activity    Alcohol use: No     Comment: none    Drug use: No    Sexual activity: Yes     Partners: Male     Comment: m  works in dietary,  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     Social Drivers of Health     Financial Resource Strain: Low Risk  (2/4/2025)    Overall Financial Resource Strain (CARDIA)     Difficulty of Paying Living Expenses: Not hard at all   Food Insecurity: No Food Insecurity (2/4/2025)    Hunger Vital Sign     Worried About  Running Out of Food in the Last Year: Never true     Ran Out of Food in the Last Year: Never true   Transportation Needs: No Transportation Needs (2/4/2025)    TRANSPORTATION NEEDS     Transportation : No   Physical Activity: Inactive (2/4/2025)    Exercise Vital Sign     Days of Exercise per Week: 0 days     Minutes of Exercise per Session: 0 min   Stress: Stress Concern Present (2/4/2025)    [facility] - Occupational Stress Questionnaire     Feeling of Stress : Very much   Housing Stability: Low Risk  (2/4/2025)    Housing Stability Vital Sign     Unable to Pay for Housing in the Last Year: No     Homeless in the Last Year: No       OBJECTIVE:     Vital Signs Range (Last 24H):  Temp:  [36.7 °C (98 °F)-36.9 °C (98.5 °F)]   Pulse:  [67-75]   Resp:  [18]   BP: ()/(66-84)   SpO2:  [96 %-99 %]       Significant Labs:  Lab Results   Component Value Date    WBC 6.08 02/08/2025    HGB 11.4 (L) 02/08/2025    HCT 33.4 (L) 02/08/2025     02/08/2025    CHOL 174 02/03/2025    TRIG 85 02/03/2025    HDL 44 02/03/2025     (H) 02/08/2025     (H) 02/08/2025     02/08/2025    K 3.7 02/08/2025     02/08/2025    CREATININE 0.7 02/08/2025    BUN 9 02/08/2025    CO2 23 02/08/2025    TSH 1.542 08/03/2023    INR 1.2 02/07/2025    HGBA1C 5.9 (H) 02/03/2025       Diagnostic Studies: No relevant studies.    EKG:   Results for orders placed or performed during the hospital encounter of 01/25/19   EKG 12-lead    Collection Time: 01/25/19  2:10 PM    Narrative    Test Reason : R11.0,    Vent. Rate : 071 BPM     Atrial Rate : 071 BPM     P-R Int : 162 ms          QRS Dur : 098 ms      QT Int : 420 ms       P-R-T Axes : 064 -05 047 degrees     QTc Int : 456 ms    Normal sinus rhythm  Minimal voltage criteria for LVH, may be normal variant  Nonspecific T wave abnormality  Abnormal ECG    Confirmed by Arya BLANCO, Ingris ([id number]) on 1/26/2019 3:27:51 PM    Referred By: [email or web address]    SELF           Confirmed By:Ingris Koenig MD       2D ECHO:  TTE:  No results found for this or any previous visit.    DINA:  No results found for this or any previous visit.    ASSESSMENT/PLAN:           Pre-op Assessment    I have reviewed the Patient Summary Reports.     I have reviewed the Nursing Notes. I have reviewed the NPO Status.   I have reviewed the Medications.     Review of Systems  Anesthesia Hx:  No problems with previous Anesthesia   History of prior surgery of interest to airway management or planning:          Denies Family Hx of Anesthesia complications.    Denies Personal Hx of Anesthesia complications.                    Hematology/Oncology:  Hematology Normal                                     EENT/Dental:  EENT/Dental Normal           Cardiovascular:     Hypertension           hyperlipidemia                               Pulmonary:  Pulmonary Normal                       Renal/:  Renal/ Normal                 Hepatic/GI:  Hepatic/GI Normal                    Musculoskeletal:  Arthritis               Neurological:       Seizures                                Endocrine:  Diabetes, type 2   Grave's Disease      Obesity / BMI > 30      Physical Exam  General: Well nourished, Cooperative, Alert and Oriented    Airway:  Mallampati: III   Mouth Opening: Normal  TM Distance: Normal  Tongue: Normal  Neck ROM: Normal ROM    Dental:  Intact    Chest/Lungs:  Normal Respiratory Rate    Heart:  Rate: Normal        Anesthesia Plan  Type of Anesthesia, risks & benefits discussed:    Anesthesia Type: Gen ETT, Gen Natural Airway  Intra-op Monitoring Plan: Standard ASA Monitors  Post Op Pain Control Plan: multimodal analgesia and IV/PO Opioids PRN  Induction:  IV  Airway Plan: Direct and Video, Post-Induction  Informed Consent: Informed consent signed with the Patient and all parties understand the risks and agree with anesthesia plan.  All questions answered.   ASA Score: 3  Day of Surgery Review of  History & Physical: H&P Update referred to the surgeon/provider.    Ready For Surgery From Anesthesia Perspective.     .

## 2025-02-08 NOTE — TREATMENT PLAN
[facility] Treatment Plan    Katey Cerrato is a 69 y.o. female admitted to hospital 2/3/2025 (Hospital Day: 6) due to Biliary obstruction.     Interval History  Patient planned for IR PCT placement today.     Objective  Temp:  [98 °F (36.7 °C)-98.5 °F (36.9 °C)] 98.5 °F (36.9 °C) (02/08 0822)  Pulse:  [67-75] 71 (02/08 0822)  BP: ()/(66-84) 121/73 (02/08 0822)  Resp:  [18] 18 (02/08 0822)  SpO2:  [96 %-99 %] 96 % (02/08 0822)    Laboratory  Recent Labs   Lab 02/06/25  0339 02/07/25  0706 02/08/25  0519   HGB 11.4* 11.4* 11.4*       Assessment and Plan  Katey Cerrato is a 69 y.o. female with history of HLD, DMII, Grave's disease who is admitted with concern for cholangiocarcinoma. Transferred to [facility] for further evaluation. Not cholangitic. Surgical oncology rec biliary decompression with bilateral PTCs.      Problem List:  Obstructive jaundice, concern for cholangiocarcinoma     IR planned for PCT placement today. AES will sign off.     Thank you for involving us in the care of Katey Cerrato. Please call with any additional questions, concerns or changes in the patient's clinical status.    Lucy Haque MD  Gastroenterology Fellow, PGY IV

## 2025-02-08 NOTE — PLAN OF CARE
Problem: Adult Inpatient Plan of Care  Goal: Plan of Care Review  Outcome: Progressing  Goal: Patient-Specific Goal (Individualized)  Outcome: Progressing  Goal: Absence of Hospital-Acquired Illness or Injury  Outcome: Progressing  Goal: Optimal Comfort and Wellbeing  Outcome: Progressing  Goal: Readiness for Transition of Care  Outcome: Progressing   AAOX4,VSS,NAD noted. Pt educated on safety and made aware of plan of care, all questions answered. Pt denies any other concerns or discomfort. Safety maintained, call light within reach

## 2025-02-09 PROBLEM — R11.2 NAUSEA AND VOMITING: Status: ACTIVE | Noted: 2025-02-09

## 2025-02-09 LAB
ALBUMIN SERPL BCP-MCNC: 2.8 G/DL (ref 3.5–5.2)
ALP SERPL-CCNC: 593 U/L (ref 40–150)
ALT SERPL W/O P-5'-P-CCNC: 291 U/L (ref 10–44)
ANION GAP SERPL CALC-SCNC: 11 MMOL/L (ref 8–16)
AST SERPL-CCNC: 300 U/L (ref 10–40)
BASOPHILS # BLD AUTO: 0.01 K/UL (ref 0–0.2)
BASOPHILS NFR BLD: 0.1 % (ref 0–1.9)
BILIRUB SERPL-MCNC: 9.8 MG/DL (ref 0.1–1)
BUN SERPL-MCNC: 12 MG/DL (ref 8–23)
CALCIUM SERPL-MCNC: 10.1 MG/DL (ref 8.7–10.5)
CHLORIDE SERPL-SCNC: 100 MMOL/L (ref 95–110)
CO2 SERPL-SCNC: 26 MMOL/L (ref 23–29)
CREAT SERPL-MCNC: 0.8 MG/DL (ref 0.5–1.4)
DIFFERENTIAL METHOD BLD: ABNORMAL
EOSINOPHIL # BLD AUTO: 0 K/UL (ref 0–0.5)
EOSINOPHIL NFR BLD: 0 % (ref 0–8)
ERYTHROCYTE [DISTWIDTH] IN BLOOD BY AUTOMATED COUNT: 17.4 % (ref 11.5–14.5)
EST. GFR  (NO RACE VARIABLE): >60 ML/MIN/1.73 M^2
GLUCOSE SERPL-MCNC: 167 MG/DL (ref 70–110)
HCT VFR BLD AUTO: 34.5 % (ref 37–48.5)
HGB BLD-MCNC: 12.1 G/DL (ref 12–16)
IMM GRANULOCYTES # BLD AUTO: 0.03 K/UL (ref 0–0.04)
IMM GRANULOCYTES NFR BLD AUTO: 0.3 % (ref 0–0.5)
LYMPHOCYTES # BLD AUTO: 1 K/UL (ref 1–4.8)
LYMPHOCYTES NFR BLD: 10.1 % (ref 18–48)
MAGNESIUM SERPL-MCNC: 1.8 MG/DL (ref 1.6–2.6)
MCH RBC QN AUTO: 30.6 PG (ref 27–31)
MCHC RBC AUTO-ENTMCNC: 35.1 G/DL (ref 32–36)
MCV RBC AUTO: 87 FL (ref 82–98)
MONOCYTES # BLD AUTO: 0.7 K/UL (ref 0.3–1)
MONOCYTES NFR BLD: 6.9 % (ref 4–15)
NEUTROPHILS # BLD AUTO: 7.8 K/UL (ref 1.8–7.7)
NEUTROPHILS NFR BLD: 82.6 % (ref 38–73)
NRBC BLD-RTO: 0 /100 WBC
PHOSPHATE SERPL-MCNC: 3.5 MG/DL (ref 2.7–4.5)
PLATELET # BLD AUTO: 326 K/UL (ref 150–450)
PMV BLD AUTO: 10.6 FL (ref 9.2–12.9)
POCT GLUCOSE: 156 MG/DL (ref 70–110)
POCT GLUCOSE: 161 MG/DL (ref 70–110)
POCT GLUCOSE: 162 MG/DL (ref 70–110)
POCT GLUCOSE: 163 MG/DL (ref 70–110)
POTASSIUM SERPL-SCNC: 3.9 MMOL/L (ref 3.5–5.1)
PROT SERPL-MCNC: 8.4 G/DL (ref 6–8.4)
RBC # BLD AUTO: 3.96 M/UL (ref 4–5.4)
SODIUM SERPL-SCNC: 137 MMOL/L (ref 136–145)
WBC # BLD AUTO: 9.41 K/UL (ref 3.9–12.7)

## 2025-02-09 PROCEDURE — 63600175 PHARM REV CODE 636 W HCPCS: Mod: JZ,TB

## 2025-02-09 PROCEDURE — 93010 ELECTROCARDIOGRAM REPORT: CPT | Mod: ,,, | Performed by: INTERNAL MEDICINE

## 2025-02-09 PROCEDURE — 84100 ASSAY OF PHOSPHORUS: CPT | Performed by: HOSPITALIST

## 2025-02-09 PROCEDURE — 80053 COMPREHEN METABOLIC PANEL: CPT | Performed by: HOSPITALIST

## 2025-02-09 PROCEDURE — 25000003 PHARM REV CODE 250

## 2025-02-09 PROCEDURE — 83735 ASSAY OF MAGNESIUM: CPT | Performed by: HOSPITALIST

## 2025-02-09 PROCEDURE — 93005 ELECTROCARDIOGRAM TRACING: CPT

## 2025-02-09 PROCEDURE — 63600175 PHARM REV CODE 636 W HCPCS

## 2025-02-09 PROCEDURE — 99232 SBSQ HOSP IP/OBS MODERATE 35: CPT | Mod: GC,,, | Performed by: SURGERY

## 2025-02-09 PROCEDURE — A4216 STERILE WATER/SALINE, 10 ML: HCPCS | Performed by: PHYSICIAN ASSISTANT

## 2025-02-09 PROCEDURE — 63600175 PHARM REV CODE 636 W HCPCS: Performed by: PHYSICIAN ASSISTANT

## 2025-02-09 PROCEDURE — 85025 COMPLETE CBC W/AUTO DIFF WBC: CPT | Performed by: HOSPITALIST

## 2025-02-09 PROCEDURE — 21400001 HC TELEMETRY ROOM

## 2025-02-09 PROCEDURE — 36415 COLL VENOUS BLD VENIPUNCTURE: CPT | Performed by: HOSPITALIST

## 2025-02-09 PROCEDURE — 25000003 PHARM REV CODE 250: Performed by: PHYSICIAN ASSISTANT

## 2025-02-09 PROCEDURE — 25000003 PHARM REV CODE 250: Performed by: HOSPITALIST

## 2025-02-09 RX ORDER — ACETAMINOPHEN 500 MG
1000 TABLET ORAL 2 TIMES DAILY
Status: DISCONTINUED | OUTPATIENT
Start: 2025-02-09 | End: 2025-02-13

## 2025-02-09 RX ORDER — SCOPOLAMINE 1 MG/3D
1 PATCH, EXTENDED RELEASE TRANSDERMAL
Status: DISCONTINUED | OUTPATIENT
Start: 2025-02-09 | End: 2025-02-19 | Stop reason: HOSPADM

## 2025-02-09 RX ORDER — IBUPROFEN 400 MG/1
400 TABLET ORAL EVERY 6 HOURS PRN
Status: DISCONTINUED | OUTPATIENT
Start: 2025-02-09 | End: 2025-02-14

## 2025-02-09 RX ORDER — PROCHLORPERAZINE EDISYLATE 5 MG/ML
2.5 INJECTION INTRAMUSCULAR; INTRAVENOUS EVERY 6 HOURS PRN
Status: DISCONTINUED | OUTPATIENT
Start: 2025-02-09 | End: 2025-02-17

## 2025-02-09 RX ORDER — ONDANSETRON HYDROCHLORIDE 2 MG/ML
4 INJECTION, SOLUTION INTRAVENOUS EVERY 6 HOURS
Status: DISCONTINUED | OUTPATIENT
Start: 2025-02-09 | End: 2025-02-12

## 2025-02-09 RX ORDER — OXYCODONE HYDROCHLORIDE 5 MG/1
5 TABLET ORAL EVERY 6 HOURS PRN
Status: DISCONTINUED | OUTPATIENT
Start: 2025-02-09 | End: 2025-02-12

## 2025-02-09 RX ORDER — HYDROMORPHONE HYDROCHLORIDE 1 MG/ML
0.2 INJECTION, SOLUTION INTRAMUSCULAR; INTRAVENOUS; SUBCUTANEOUS EVERY 6 HOURS PRN
Status: DISCONTINUED | OUTPATIENT
Start: 2025-02-09 | End: 2025-02-12

## 2025-02-09 RX ADMIN — ONDANSETRON 4 MG: 2 INJECTION INTRAMUSCULAR; INTRAVENOUS at 11:02

## 2025-02-09 RX ADMIN — LEVETIRACETAM 1500 MG: 750 TABLET, FILM COATED ORAL at 08:02

## 2025-02-09 RX ADMIN — ONDANSETRON 4 MG: 2 INJECTION INTRAMUSCULAR; INTRAVENOUS at 06:02

## 2025-02-09 RX ADMIN — VALSARTAN 320 MG: 160 TABLET, FILM COATED ORAL at 09:02

## 2025-02-09 RX ADMIN — SODIUM CHLORIDE, PRESERVATIVE FREE 10 ML: 5 INJECTION INTRAVENOUS at 02:02

## 2025-02-09 RX ADMIN — METOPROLOL SUCCINATE 50 MG: 50 TABLET, EXTENDED RELEASE ORAL at 08:02

## 2025-02-09 RX ADMIN — HYDROMORPHONE HYDROCHLORIDE 0.2 MG: 1 INJECTION, SOLUTION INTRAMUSCULAR; INTRAVENOUS; SUBCUTANEOUS at 04:02

## 2025-02-09 RX ADMIN — ACETAMINOPHEN 1000 MG: 500 TABLET ORAL at 08:02

## 2025-02-09 RX ADMIN — LATANOPROST 1 DROP: 50 SOLUTION/ DROPS OPHTHALMIC at 08:02

## 2025-02-09 RX ADMIN — SCOPOLAMINE 1 PATCH: 1.5 PATCH, EXTENDED RELEASE TRANSDERMAL at 07:02

## 2025-02-09 RX ADMIN — ACETAMINOPHEN 1000 MG: 500 TABLET ORAL at 12:02

## 2025-02-09 RX ADMIN — SODIUM CHLORIDE, PRESERVATIVE FREE 10 ML: 5 INJECTION INTRAVENOUS at 11:02

## 2025-02-09 RX ADMIN — ONDANSETRON 4 MG: 2 INJECTION INTRAMUSCULAR; INTRAVENOUS at 04:02

## 2025-02-09 RX ADMIN — AMLODIPINE BESYLATE 10 MG: 10 TABLET ORAL at 09:02

## 2025-02-09 NOTE — PROGRESS NOTES
[facility] - Internal Medicine Telemetry  General Surgery  Progress Note    Subjective:     History of Present Illness:  Katey Cerrato is a 69 y.o. female with a history of empty sella syndrome, GHD, glaucoma, Grave's w/ ecophthalmos, HLD, HTN, seizures, and DM2 who presents as a transfer for workup of her biliary obstruction. The patient states that she recently saw her PCP after she noticed urine discoloration. She was sent for outpatient lab work, which was suggestive of biliary obstruction so she was sent to the ED. In the ED, her lab work was remarkable for t bili of 8.3, elevated LFTs (280/264), and elevated ALP (507). RUQ and CT A/P w/ significant intrahepatic biliary ductal dilation and mild extra hepatic dilation. MRCP w/ hypoattenuating lesion near the confluence of the right and left hepatic ducts causing severe upstream ductal dilatation concerning for cholangiocarcinoma. Surgical oncology consulted for evaluation.       Post-Op Info:  * No surgery found *         Interval History:   -Bilateral PTC's placed yesterday. The L drain is clamped for unknown reason  -T Bili downtrending  -Significant nausea and emesis this morning.    Medications:  Continuous Infusions:  Scheduled Meds:   amLODIPine  10 mg Oral Daily    latanoprost  1 drop Both Eyes QHS    levETIRAcetam  1,500 mg Oral Nightly    metoprolol succinate  50 mg Oral QHS    scopolamine  1 patch Transdermal Q3 Days    sodium chloride 0.9%  10 mL Intravenous Q8H    valsartan  320 mg Oral Daily     PRN Meds:  Current Facility-Administered Medications:     acetaminophen, 650 mg, Oral, Q6H PRN    dextrose 50%, 12.5 g, Intravenous, PRN    dextrose 50%, 25 g, Intravenous, PRN    diphenhydrAMINE, 25 mg, Oral, Q6H PRN    diphenhydrAMINE-zinc acetate 2-0.1%, , Topical (Top), TID PRN    glucagon (human recombinant), 1 mg, Intramuscular, PRN    glucose, 16 g, Oral, PRN    glucose, 24 g, Oral, PRN    insulin aspart U-100, 0-5 Units, Subcutaneous, Q6H PRN     melatonin, 6 mg, Oral, Nightly PRN    naloxone, 0.02 mg, Intravenous, PRN    ondansetron, 4 mg, Intravenous, Q8H PRN    oxyCODONE, 5 mg, Oral, Q6H PRN    senna-docusate 8.6-50 mg, 1 tablet, Oral, BID PRN     Review of patient's allergies indicates:   Allergen Reactions    Codeine Nausea Only     Objective:     Vital Signs (Most Recent):  Temp: 98.8 °F (37.1 °C) (02/09/25 0757)  Pulse: 82 (02/09/25 0757)  Resp: 18 (02/09/25 0757)  BP: (!) 146/91 (02/09/25 0757)  SpO2: 96 % (02/09/25 0757) Vital Signs (24h Range):  Temp:  [97.9 °F (36.6 °C)-99.4 °F (37.4 °C)] 98.8 °F (37.1 °C)  Pulse:  [68-82] 82  Resp:  [11-23] 18  SpO2:  [95 %-100 %] 96 %  BP: (126-186)/(70-91) 146/91     Weight: 79.7 kg (175 lb 11.2 oz)  Body mass index is 35.49 kg/m².    Intake/Output - Last 3 Shifts         02/07 0700  02/08 0659 02/08 0700  02/09 0659 02/09 0700  02/10 0659    P.O.  360     I.V. (mL/kg)  10 (0.1)     IV Piggyback  300     Total Intake(mL/kg)  670 (8.4)     Urine (mL/kg/hr)       Emesis/NG output  0     Drains  450     Stool  0     Total Output  450     Net  +220            Stool Occurrence  0 x     Emesis Occurrence  3 x              Physical Exam  Vitals reviewed.   Constitutional:       General: She is not in acute distress.     Appearance: Normal appearance. She is not toxic-appearing.   HENT:      Head: Normocephalic and atraumatic.      Nose: Nose normal.      Mouth/Throat:      Mouth: Mucous membranes are moist.   Eyes:      General: Scleral icterus present.   Cardiovascular:      Rate and Rhythm: Normal rate.      Pulses: Normal pulses.   Pulmonary:      Effort: No respiratory distress.   Abdominal:      General: Abdomen is flat.      Palpations: Abdomen is soft.      Comments: Soft, non distended. Very mildly tender to deep palpation in the epigastrium. No rebound or guarding.   R PTC to bag with bilious output  L PTC clamped   Neurological:      General: No focal deficit present.      Mental Status: She is alert and  oriented to person, place, and time.   Psychiatric:         Mood and Affect: Mood normal.         Behavior: Behavior normal.          Significant Labs:  I have reviewed all pertinent lab results within the past 24 hours.  CBC:   Recent Labs   Lab 02/09/25  0327   WBC 9.41   RBC 3.96*   HGB 12.1   HCT 34.5*      MCV 87   MCH 30.6   MCHC 35.1     CMP:   Recent Labs   Lab 02/09/25 0327   *   CALCIUM 10.1   ALBUMIN 2.8*   PROT 8.4      K 3.9   CO2 26      BUN 12   CREATININE 0.8   ALKPHOS 593*   *   *   BILITOT 9.8*       Significant Diagnostics:  I have reviewed all pertinent imaging results/findings within the past 24 hours.  Assessment/Plan:     * Biliary obstruction  69F presenting with, essentially, painless jaundice after being found to have biliary obstruction on recent outpatient lab work. Her t bili continued to uptrend, now 10.3 (from 7). Imaging studies w/ a hypoattenuating mass near the confluence of the right and left hepatic ducts, which is concerning for cholangiocarcinoma. She will need biliary decompression, and given the location of her mass, this would likely best be done via PTC with IR. She will also need biochemical work up (Ca 19-9, CEA), as well as, a CT chest to rule out any metastasis if this is indeed cholangiocarcinoma. Surgical oncology will follow.     S/p Bilateral PTC placement 2/8/25    -- Needs Left PTC connected to bag and opened to drain  -- Recommend clears given her significant nausea  -- Trend CMP  -- Aggressive antiemetic regimen        Kenroy Stafford MD  General Surgery  [facility] - Internal Medicine Telemetry

## 2025-02-09 NOTE — ASSESSMENT & PLAN NOTE
Patient with worsening nausea, vomiting s/p PTC on 2/9. Will plan aggressive antiemetic regimen and adjust diet as needed    -Aggressive antiemetic regimen  -Clear liquid diet

## 2025-02-09 NOTE — PROGRESS NOTES
"[facility] - Internal Medicine [facility] Medicine  Progress Note    Patient Name: Katey Cerrato  MRN: [id number]  Patient Class: IP- Inpatient   Admission Date: 2/3/2025  Length of Stay: 5 days  Attending Physician: Juliana Marx MD  Primary Care Provider: Shay Castellano MD        Subjective     Principal Problem:Biliary obstruction        HPI:  Ms. Katey Cerrato is a 69 y.o. female, with PMH of HTN, HLD, DMII, Grave's disease, who presented to [facility] ED on 2/3/25 after labs following an outpatient appointment were abnormal. She states she recently was treated for shingles with Valtrex with rash improvement, but then her urine became discolored and that prompted her visit to her PCP.  At her PCP visit it was noticed that she had an orange coloration to the skin, and yellow color of the eyes.  After her appointment she had outpatient lab testing showing abnormal liver function, which is what prompted her PCP to send her to the ED. Her only new symptom is some low back pain. She states she had a cholecystectomy in the past, and had many gallstones removed at that time. She states her sister had retained gallstones that needed removal in the past. Denies any fevers, chills, trauma, alcohol consumption, Tylenol consumption.  [facility] ED labs showed tbili 7.4, alk phos 538,  and . Lipase normal. INR 1.2. U/A with few bacteria. A  CT showed significant intrahepatic biliary ductal dilatation, mild extrahepatic biliary ductal dilatation, a 9 mm fat containing lesion in the uncinate process of the pancreas.  She was treated with IV fluids, IV Rocephin, Toradol on admission.  MRCP showed intrahepatic bile duct dilation noted with an ill-defined liver mass.  Further workup with MRI abdomen with/without contrast showed "hypoattenuating lesion near the confluence of the right and left hepatic ducts causing severe upstream ductal dilatation concerning for cholangiocarcinoma. Given lesion and " "hyperbilirubinemia decision was made to transfer patient to [facility] for further workup and evaluation with advanced services, possible ERCP.    On arrival to [facility] VSS. Patient notes generalized pruritus, decreased appetite, nausea, lower back pain. Denies abdominal pain. Pending further workup and management     Overview/Hospital Course:  Patient presented to OSH with jaundice and scleral icterus, along with abnormal labs from clinic. Also noted nausea and back pain.  Noted history of cholecystectomy. Labs showed elevated bilirubin, and imaging with biliary ductal dilatation. Initial CT of the abdomen and pelvis with IV contrast showed significant intrahepatic biliary ductal dilatation and questionable 9 mm fat containing lesion in the uncinate process of the pancreas.  MRCP showed "severe intrahepatic bile duct dilatation, with abrupt narrowing at the confluence of the hepatic ducts where there is a suspected ill-defined liver mass. Repeat imaging with MRI abdomen with and without contrast showed "hypoattenuating lesion near the confluence of the right and left hepatic ducts causing severe upstream ductal dilatation concerning for cholangiocarcinoma.  Further evaluation with ERCP is recommended." Patient transferred to [facility] for further workup and evaluation. CEA elevated, CA 19-9 WNL. AES and Surgical oncology consulted, recommended PTC for further evaluation. Symptoms controlled with benadryl    Interval History: NAEON and VSS. Patient obtained PTC yesterday without complications. She does note increased pain that was relieved by pain regimen overnight and worsening nausea with vomiting this AM. During exam patient had 3 episodes of emesis. Otherwise patient feels unchanged with occasional pruritus. Denies CP, shortness of breath, constipation, diarrhea.     Review of Systems   Constitutional:  Negative for chills and fever.   Respiratory:  Negative for cough, shortness of breath and wheezing.    Cardiovascular:  " Negative for chest pain and leg swelling.   Gastrointestinal:  Positive for abdominal pain (Sites of PTC), nausea and vomiting. Negative for abdominal distention, blood in stool, constipation and diarrhea.   Genitourinary:  Negative for difficulty urinating and dysuria.   Musculoskeletal:  Positive for back pain.   Skin:  Positive for color change.     Objective:     Vital Signs (Most Recent):  Temp: 99.1 °F (37.3 °C) (02/09/25 1106)  Pulse: 73 (02/09/25 1106)  Resp: 18 (02/09/25 1106)  BP: 134/85 (02/09/25 1106)  SpO2: 98 % (02/09/25 1106) Vital Signs (24h Range):  Temp:  [97.9 °F (36.6 °C)-99.4 °F (37.4 °C)] 99.1 °F (37.3 °C)  Pulse:  [68-82] 73  Resp:  [11-23] 18  SpO2:  [95 %-100 %] 98 %  BP: (133-186)/(70-91) 134/85     Weight: 79.7 kg (175 lb 11.2 oz)  Body mass index is 35.49 kg/m².    Intake/Output Summary (Last 24 hours) at 2/9/2025 1350  Last data filed at 2/9/2025 1125  Gross per 24 hour   Intake 810 ml   Output 600 ml   Net 210 ml         Physical Exam  Vitals and nursing note reviewed.   Constitutional:       General: She is not in acute distress.     Appearance: Normal appearance. She is well-developed. She is obese. She is not toxic-appearing or diaphoretic.   HENT:      Head: Normocephalic and atraumatic.      Nose: Nose normal.      Mouth/Throat:      Mouth: Mucous membranes are moist.      Pharynx: Oropharynx is clear. No oropharyngeal exudate.   Eyes:      General: Scleral icterus present.      Extraocular Movements: Extraocular movements intact.      Comments: Exophthalmos noted bilaterally   Neck:      Trachea: No tracheal deviation.   Cardiovascular:      Rate and Rhythm: Normal rate and regular rhythm.      Pulses: Normal pulses.      Heart sounds: Normal heart sounds. No murmur heard.     No friction rub. No gallop.   Pulmonary:      Effort: Pulmonary effort is normal. No respiratory distress.      Breath sounds: Normal breath sounds. No stridor. No wheezing, rhonchi or rales.   Chest:       "Chest wall: No tenderness.   Abdominal:      General: Abdomen is flat. Bowel sounds are normal. There is no distension.      Palpations: Abdomen is soft.      Tenderness: There is abdominal tenderness. There is no guarding or rebound.      Comments: Soft, non distended. Tender to palpation close to sites of PTC  R PTC to bag with bilious output  L PTC clamped   Musculoskeletal:         General: No deformity. Normal range of motion.      Cervical back: Normal range of motion and neck supple.   Skin:     General: Skin is warm and dry.      Coloration: Skin is jaundiced.      Findings: No erythema or rash.   Neurological:      General: No focal deficit present.      Mental Status: She is alert and oriented to person, place, and time.      Cranial Nerves: No cranial nerve deficit.      Motor: No abnormal muscle tone.   Psychiatric:         Mood and Affect: Mood normal.         Behavior: Behavior normal.         Thought Content: Thought content normal.             Significant Labs: All pertinent labs within the past 24 hours have been reviewed.    Significant Imaging: I have reviewed all pertinent imaging results/findings within the past 24 hours.    Assessment and Plan     * Biliary obstruction  Liver mass, possible cholangiocarcinoma  Patient presented to OSH with jaundice and scleral icterus, along with abnormal labs from clinic. Also noted nausea and back pain.  Noted history of cholecystectomy. Labs showed elevated bilirubin, and imaging with biliary ductal dilatation. Initial CT of the abdomen and pelvis with IV contrast showed significant intrahepatic biliary ductal dilatation and questionable 9 mm fat containing lesion in the uncinate process of the pancreas.  Abdominal ultrasound showed hepatic steatosis and intra hepatic biliary ductal dilatation and status post cholecystectomy. MRCP showed "severe intrahepatic bile duct dilatation, with abrupt narrowing at the confluence of the hepatic ducts where there is a " "suspected ill-defined liver mass.  This is poorly evaluated without contrast but concerning for neoplasm such as cholangiocarcinoma." Repeat imaging with MRI abdomen with and without contrast showed "hypoattenuating lesion near the confluence of the right and left hepatic ducts causing severe upstream ductal dilatation concerning for cholangiocarcinoma.  Further evaluation with ERCP is recommended." Patient transferred to [facility] for further workup and evaluation. CEA elevated, CA 19-9 WNL. Surgical oncology, AES, and IR consulted to coordinate plan for further management. Underwent PTC with IR on 2/8 without complication. Plan to allow decompression of the biliary system and repeat imaging to reassess obstruction    - Clear liquid diet for nausea and vomiting  -AES Consulted signed off  -Surgical Oncology consulted, appreciate recs     -S/p PTC w/ IR     - Plan for repeat CT A/P when biliary system decompressed     -Will need CT chest for further staging  - Leave drains to gravity drainage until bilirubin stabilizes. Drains can be flushed once daily with normal saline. She will need routine exchange in 8-12 weeks with IR.    - Continue p.r.n. medication for symptomatic relief  - Trend with labs and correct electrolyte abnormalities as needed    Nausea and vomiting  Patient with worsening nausea, vomiting s/p PTC on 2/9. Will plan aggressive antiemetic regimen and adjust diet as needed    -Aggressive antiemetic regimen  -Clear liquid diet    Type 2 diabetes mellitus without complication, without long-term current use of insulin  - Last hemoglobin A1c 5.9, well controlled  - Hold home regimen of Trulicity  - Glucose level reviewed, currently in normal range  - Continue Accu-Cheks q.a.c. and q.h.s. and give low-dose sliding scale insulin as necessary    Mixed hyperlipidemia  Patient with hx HLD presenting with abnormal liver function tests. HLD well controlled on home statin    - Holding statin due to abnormal liver " function tests    Graves' disease with exophthalmos  Pt with hx graves disease last TSH wnl in 2023. Currently without symptoms.    -Continue to monitor      Hypertension associated with diabetes  - Well controlled, continue metoprolol 50 mg p.o. q.h.s., amlodipine 10 mg p.o. daily and valsartan 320 mg p.o. daily    Generalized convulsive epilepsy with intractable epilepsy  Patient with history of epilepsy in the past on levitiracetam 1500 mg qhs. Stable, no reported seizure activity    - Continue home levetiracetam 1500 mg p.o. q.h.s.      VTE Risk Mitigation (From admission, onward)           Ordered     IP VTE HIGH RISK PATIENT  Once         02/04/25 0219     Place sequential compression device  Until discontinued         02/04/25 0219                    Discharge Planning   ISHMAEL: 2/11/2025     Code Status: Full Code   Medical Readiness for Discharge Date:   Discharge Plan A: Home with family   Discharge Delays: None known at this time                    Zaire Lu MD  Internal Medicine, PGY-1  Department of Hospital Medicine   [facility] - Internal Medicine Telemetry

## 2025-02-09 NOTE — SUBJECTIVE & OBJECTIVE
Interval History: NAEON and VSS. Patient obtained PTC yesterday without complications. She does note increased pain that was relieved by pain regimen overnight and worsening nausea with vomiting this AM. During exam patient had 3 episodes of emesis. Otherwise patient feels unchanged with occasional pruritus. Denies CP, shortness of breath, constipation, diarrhea.     Review of Systems   Constitutional:  Negative for chills and fever.   Respiratory:  Negative for cough, shortness of breath and wheezing.    Cardiovascular:  Negative for chest pain and leg swelling.   Gastrointestinal:  Positive for abdominal pain (Sites of PTC), nausea and vomiting. Negative for abdominal distention, blood in stool, constipation and diarrhea.   Genitourinary:  Negative for difficulty urinating and dysuria.   Musculoskeletal:  Positive for back pain.   Skin:  Positive for color change.     Objective:     Vital Signs (Most Recent):  Temp: 99.1 °F (37.3 °C) (02/09/25 1106)  Pulse: 73 (02/09/25 1106)  Resp: 18 (02/09/25 1106)  BP: 134/85 (02/09/25 1106)  SpO2: 98 % (02/09/25 1106) Vital Signs (24h Range):  Temp:  [97.9 °F (36.6 °C)-99.4 °F (37.4 °C)] 99.1 °F (37.3 °C)  Pulse:  [68-82] 73  Resp:  [11-23] 18  SpO2:  [95 %-100 %] 98 %  BP: (133-186)/(70-91) 134/85     Weight: 79.7 kg (175 lb 11.2 oz)  Body mass index is 35.49 kg/m².    Intake/Output Summary (Last 24 hours) at 2/9/2025 1350  Last data filed at 2/9/2025 1125  Gross per 24 hour   Intake 810 ml   Output 600 ml   Net 210 ml         Physical Exam  Vitals and nursing note reviewed.   Constitutional:       General: She is not in acute distress.     Appearance: Normal appearance. She is well-developed. She is obese. She is not toxic-appearing or diaphoretic.   HENT:      Head: Normocephalic and atraumatic.      Nose: Nose normal.      Mouth/Throat:      Mouth: Mucous membranes are moist.      Pharynx: Oropharynx is clear. No oropharyngeal exudate.   Eyes:      General: Scleral  icterus present.      Extraocular Movements: Extraocular movements intact.      Comments: Exophthalmos noted bilaterally   Neck:      Trachea: No tracheal deviation.   Cardiovascular:      Rate and Rhythm: Normal rate and regular rhythm.      Pulses: Normal pulses.      Heart sounds: Normal heart sounds. No murmur heard.     No friction rub. No gallop.   Pulmonary:      Effort: Pulmonary effort is normal. No respiratory distress.      Breath sounds: Normal breath sounds. No stridor. No wheezing, rhonchi or rales.   Chest:      Chest wall: No tenderness.   Abdominal:      General: Abdomen is flat. Bowel sounds are normal. There is no distension.      Palpations: Abdomen is soft.      Tenderness: There is abdominal tenderness. There is no guarding or rebound.      Comments: Soft, non distended. Tender to palpation close to sites of PTC  R PTC to bag with bilious output  L PTC clamped   Musculoskeletal:         General: No deformity. Normal range of motion.      Cervical back: Normal range of motion and neck supple.   Skin:     General: Skin is warm and dry.      Coloration: Skin is jaundiced.      Findings: No erythema or rash.   Neurological:      General: No focal deficit present.      Mental Status: She is alert and oriented to person, place, and time.      Cranial Nerves: No cranial nerve deficit.      Motor: No abnormal muscle tone.   Psychiatric:         Mood and Affect: Mood normal.         Behavior: Behavior normal.         Thought Content: Thought content normal.             Significant Labs: All pertinent labs within the past 24 hours have been reviewed.    Significant Imaging: I have reviewed all pertinent imaging results/findings within the past 24 hours.

## 2025-02-09 NOTE — ASSESSMENT & PLAN NOTE
"Liver mass, possible cholangiocarcinoma  Patient presented to OSH with jaundice and scleral icterus, along with abnormal labs from clinic. Also noted nausea and back pain.  Noted history of cholecystectomy. Labs showed elevated bilirubin, and imaging with biliary ductal dilatation. Initial CT of the abdomen and pelvis with IV contrast showed significant intrahepatic biliary ductal dilatation and questionable 9 mm fat containing lesion in the uncinate process of the pancreas.  Abdominal ultrasound showed hepatic steatosis and intra hepatic biliary ductal dilatation and status post cholecystectomy. MRCP showed "severe intrahepatic bile duct dilatation, with abrupt narrowing at the confluence of the hepatic ducts where there is a suspected ill-defined liver mass.  This is poorly evaluated without contrast but concerning for neoplasm such as cholangiocarcinoma." Repeat imaging with MRI abdomen with and without contrast showed "hypoattenuating lesion near the confluence of the right and left hepatic ducts causing severe upstream ductal dilatation concerning for cholangiocarcinoma.  Further evaluation with ERCP is recommended." Patient transferred to [facility] [place] workup and evaluation. CEA elevated, CA 19-9 WNL. Surgical oncology, AES, and IR consulted to coordinate plan for further management. Underwent PTC with IR on 2/8 without complication. Plan to allow decompression of the biliary system and repeat imaging to reassess obstruction    - Clear liquid diet for nausea and vomiting  -AES Consulted signed off  -Surgical Oncology consulted, appreciate recs     -S/p PTC w/ IR     - Plan for repeat CT A/P when biliary system decompressed     -Will need CT chest for further staging  - Leave drains to gravity drainage until bilirubin stabilizes. Drains can be flushed once daily with normal saline. She will need routine exchange in 8-12 weeks with IR.    - Continue p.r.n. medication for symptomatic relief  - Trend with labs " and correct electrolyte abnormalities as needed

## 2025-02-09 NOTE — ASSESSMENT & PLAN NOTE
69F presenting with, essentially, painless jaundice after being found to have biliary obstruction on recent outpatient lab work. Her t bili continued to uptrend, now 10.3 (from 7). Imaging studies w/ a hypoattenuating mass near the confluence of the right and left hepatic ducts, which is concerning for cholangiocarcinoma. She will need biliary decompression, and given the location of her mass, this would likely best be done via PTC with IR. She will also need biochemical work up (Ca 19-9, CEA), as well as, a CT chest to rule out any metastasis if this is indeed cholangiocarcinoma. Surgical oncology will follow.     S/p Bilateral PTC placement 2/8/25    -- Needs Left PTC connected to bag and opened to drain  -- Recommend clears given her significant nausea  -- Trend CMP  -- Aggressive antiemetic regimen

## 2025-02-09 NOTE — PLAN OF CARE
Problem: Adult Inpatient Plan of Care  Goal: Plan of Care Review  Outcome: Progressing  Goal: Patient-Specific Goal (Individualized)  Outcome: Progressing  Goal: Absence of Hospital-Acquired Illness or Injury  Outcome: Progressing  Goal: Optimal Comfort and Wellbeing  Outcome: Progressing  Goal: Readiness for Transition of Care  Outcome: Progressing   AAOX4,VSS,NAD noted. Plan of care and safety education provided to pt, all questions and concerns addressed. Prn nausea medication giver per mar, pt verbalizes relief after receiving prn medication. Pt denies any further needs or discomfort. Bed alarm set, safety maintained call light within reach

## 2025-02-09 NOTE — SUBJECTIVE & OBJECTIVE
Interval History:   -Bilateral PTC's placed yesterday. The L drain is clamped for unknown reason  -T Bili downtrending  -Significant nausea and emesis this morning.    Medications:  Continuous Infusions:  Scheduled Meds:   amLODIPine  10 mg Oral Daily    latanoprost  1 drop Both Eyes QHS    levETIRAcetam  1,500 mg Oral Nightly    metoprolol succinate  50 mg Oral QHS    scopolamine  1 patch Transdermal Q3 Days    sodium chloride 0.9%  10 mL Intravenous Q8H    valsartan  320 mg Oral Daily     PRN Meds:  Current Facility-Administered Medications:     acetaminophen, 650 mg, Oral, Q6H PRN    dextrose 50%, 12.5 g, Intravenous, PRN    dextrose 50%, 25 g, Intravenous, PRN    diphenhydrAMINE, 25 mg, Oral, Q6H PRN    diphenhydrAMINE-zinc acetate 2-0.1%, , Topical (Top), TID PRN    glucagon (human recombinant), 1 mg, Intramuscular, PRN    glucose, 16 g, Oral, PRN    glucose, 24 g, Oral, PRN    insulin aspart U-100, 0-5 Units, Subcutaneous, Q6H PRN    melatonin, 6 mg, Oral, Nightly PRN    naloxone, 0.02 mg, Intravenous, PRN    ondansetron, 4 mg, Intravenous, Q8H PRN    oxyCODONE, 5 mg, Oral, Q6H PRN    senna-docusate 8.6-50 mg, 1 tablet, Oral, BID PRN     Review of patient's allergies indicates:   Allergen Reactions    Codeine Nausea Only     Objective:     Vital Signs (Most Recent):  Temp: 98.8 °F (37.1 °C) (02/09/25 0757)  Pulse: 82 (02/09/25 0757)  Resp: 18 (02/09/25 0757)  BP: (!) 146/91 (02/09/25 0757)  SpO2: 96 % (02/09/25 0757) Vital Signs (24h Range):  Temp:  [97.9 °F (36.6 °C)-99.4 °F (37.4 °C)] 98.8 °F (37.1 °C)  Pulse:  [68-82] 82  Resp:  [11-23] 18  SpO2:  [95 %-100 %] 96 %  BP: (126-186)/(70-91) 146/91     Weight: 79.7 kg (175 lb 11.2 oz)  Body mass index is 35.49 kg/m².    Intake/Output - Last 3 Shifts         02/07 0700  02/08 0659 02/08 0700 02/09 0659 02/09 0700  02/10 0659    P.O.  360     I.V. (mL/kg)  10 (0.1)     IV Piggyback  300     Total Intake(mL/kg)  670 (8.4)     Urine (mL/kg/hr)       Emesis/NG  output  0     Drains  450     Stool  0     Total Output  450     Net  +220            Stool Occurrence  0 x     Emesis Occurrence  3 x              Physical Exam  Vitals reviewed.   Constitutional:       General: She is not in acute distress.     Appearance: Normal appearance. She is not toxic-appearing.   HENT:      Head: Normocephalic and atraumatic.      Nose: Nose normal.      Mouth/Throat:      Mouth: Mucous membranes are moist.   Eyes:      General: Scleral icterus present.   Cardiovascular:      Rate and Rhythm: Normal rate.      Pulses: Normal pulses.   Pulmonary:      Effort: No respiratory distress.   Abdominal:      General: Abdomen is flat.      Palpations: Abdomen is soft.      Comments: Soft, non distended. Very mildly tender to deep palpation in the epigastrium. No rebound or guarding.   R PTC to bag with bilious output  L PTC clamped   Neurological:      General: No focal deficit present.      Mental Status: She is alert and oriented to person, place, and time.   Psychiatric:         Mood and Affect: Mood normal.         Behavior: Behavior normal.          Significant Labs:  I have reviewed all pertinent lab results within the past 24 hours.  CBC:   Recent Labs   Lab 02/09/25  0327   WBC 9.41   RBC 3.96*   HGB 12.1   HCT 34.5*      MCV 87   MCH 30.6   MCHC 35.1     CMP:   Recent Labs   Lab 02/09/25  0327   *   CALCIUM 10.1   ALBUMIN 2.8*   PROT 8.4      K 3.9   CO2 26      BUN 12   CREATININE 0.8   ALKPHOS 593*   *   *   BILITOT 9.8*       Significant Diagnostics:  I have reviewed all pertinent imaging results/findings within the past 24 hours.

## 2025-02-09 NOTE — PLAN OF CARE
Problem: Adult Inpatient Plan of Care  Goal: Plan of Care Review  Outcome: Progressing  Goal: Patient-Specific Goal (Individualized)  Outcome: Progressing  Goal: Absence of Hospital-Acquired Illness or Injury  Outcome: Progressing  Goal: Optimal Comfort and Wellbeing  Outcome: Progressing  Goal: Readiness for Transition of Care  Outcome: Progressing     Problem: Diabetes Comorbidity  Goal: Blood Glucose Level Within Targeted Range  Outcome: Progressing     Problem: Nausea and Vomiting  Goal: Nausea and Vomiting Relief  Outcome: Progressing     Problem: Pain Acute  Goal: Optimal Pain Control and Function  Outcome: Progressing     Problem: Fall Injury Risk  Goal: Absence of Fall and Fall-Related Injury  Outcome: Progressing   Pt AAO X 4; able to express needs.  Very nauseated during the night.  Scoploamine patch ordered per MD and applied this morning.  Dilaudid given for pain.  Drain flushed and emptied.     Safety maintained.  Bed in low position,  call  light in reach.

## 2025-02-10 DIAGNOSIS — K83.1 BILIARY OBSTRUCTION: Primary | ICD-10-CM

## 2025-02-10 LAB
ALBUMIN SERPL BCP-MCNC: 2.7 G/DL (ref 3.5–5.2)
ALP SERPL-CCNC: 479 U/L (ref 40–150)
ALT SERPL W/O P-5'-P-CCNC: 242 U/L (ref 10–44)
ANION GAP SERPL CALC-SCNC: 12 MMOL/L (ref 8–16)
AST SERPL-CCNC: 206 U/L (ref 10–40)
BASOPHILS # BLD AUTO: 0.01 K/UL (ref 0–0.2)
BASOPHILS NFR BLD: 0.1 % (ref 0–1.9)
BILIRUB SERPL-MCNC: 6 MG/DL (ref 0.1–1)
BUN SERPL-MCNC: 19 MG/DL (ref 8–23)
CALCIUM SERPL-MCNC: 9.8 MG/DL (ref 8.7–10.5)
CHLORIDE SERPL-SCNC: 100 MMOL/L (ref 95–110)
CO2 SERPL-SCNC: 23 MMOL/L (ref 23–29)
CREAT SERPL-MCNC: 0.9 MG/DL (ref 0.5–1.4)
DIFFERENTIAL METHOD BLD: ABNORMAL
EOSINOPHIL # BLD AUTO: 0 K/UL (ref 0–0.5)
EOSINOPHIL NFR BLD: 0 % (ref 0–8)
ERYTHROCYTE [DISTWIDTH] IN BLOOD BY AUTOMATED COUNT: 17.9 % (ref 11.5–14.5)
EST. GFR  (NO RACE VARIABLE): >60 ML/MIN/1.73 M^2
GLUCOSE SERPL-MCNC: 135 MG/DL (ref 70–110)
HCT VFR BLD AUTO: 38 % (ref 37–48.5)
HGB BLD-MCNC: 13 G/DL (ref 12–16)
IMM GRANULOCYTES # BLD AUTO: 0.04 K/UL (ref 0–0.04)
IMM GRANULOCYTES NFR BLD AUTO: 0.3 % (ref 0–0.5)
LYMPHOCYTES # BLD AUTO: 0.9 K/UL (ref 1–4.8)
LYMPHOCYTES NFR BLD: 7.4 % (ref 18–48)
MAGNESIUM SERPL-MCNC: 1.9 MG/DL (ref 1.6–2.6)
MCH RBC QN AUTO: 30.2 PG (ref 27–31)
MCHC RBC AUTO-ENTMCNC: 34.2 G/DL (ref 32–36)
MCV RBC AUTO: 88 FL (ref 82–98)
MONOCYTES # BLD AUTO: 0.7 K/UL (ref 0.3–1)
MONOCYTES NFR BLD: 5.6 % (ref 4–15)
NEUTROPHILS # BLD AUTO: 10.7 K/UL (ref 1.8–7.7)
NEUTROPHILS NFR BLD: 86.6 % (ref 38–73)
NRBC BLD-RTO: 0 /100 WBC
OHS QRS DURATION: 100 MS
OHS QTC CALCULATION: 444 MS
PHOSPHATE SERPL-MCNC: 3.4 MG/DL (ref 2.7–4.5)
PLATELET # BLD AUTO: 338 K/UL (ref 150–450)
PMV BLD AUTO: 11 FL (ref 9.2–12.9)
POCT GLUCOSE: 138 MG/DL (ref 70–110)
POCT GLUCOSE: 147 MG/DL (ref 70–110)
POCT GLUCOSE: 155 MG/DL (ref 70–110)
POCT GLUCOSE: 164 MG/DL (ref 70–110)
POCT GLUCOSE: 90 MG/DL (ref 70–110)
POTASSIUM SERPL-SCNC: 4.6 MMOL/L (ref 3.5–5.1)
PROT SERPL-MCNC: 8.5 G/DL (ref 6–8.4)
RBC # BLD AUTO: 4.31 M/UL (ref 4–5.4)
SODIUM SERPL-SCNC: 135 MMOL/L (ref 136–145)
WBC # BLD AUTO: 12.37 K/UL (ref 3.9–12.7)

## 2025-02-10 PROCEDURE — 25000003 PHARM REV CODE 250: Performed by: PHYSICIAN ASSISTANT

## 2025-02-10 PROCEDURE — 63600175 PHARM REV CODE 636 W HCPCS

## 2025-02-10 PROCEDURE — 99231 SBSQ HOSP IP/OBS SF/LOW 25: CPT | Mod: GC,,, | Performed by: SURGERY

## 2025-02-10 PROCEDURE — 63600175 PHARM REV CODE 636 W HCPCS: Performed by: HOSPITALIST

## 2025-02-10 PROCEDURE — 80053 COMPREHEN METABOLIC PANEL: CPT | Performed by: HOSPITALIST

## 2025-02-10 PROCEDURE — 83735 ASSAY OF MAGNESIUM: CPT | Performed by: HOSPITALIST

## 2025-02-10 PROCEDURE — 25000003 PHARM REV CODE 250

## 2025-02-10 PROCEDURE — 85025 COMPLETE CBC W/AUTO DIFF WBC: CPT | Performed by: HOSPITALIST

## 2025-02-10 PROCEDURE — 25000003 PHARM REV CODE 250: Performed by: HOSPITALIST

## 2025-02-10 PROCEDURE — A4216 STERILE WATER/SALINE, 10 ML: HCPCS | Performed by: PHYSICIAN ASSISTANT

## 2025-02-10 PROCEDURE — 36415 COLL VENOUS BLD VENIPUNCTURE: CPT | Performed by: HOSPITALIST

## 2025-02-10 PROCEDURE — 84100 ASSAY OF PHOSPHORUS: CPT | Performed by: HOSPITALIST

## 2025-02-10 PROCEDURE — 21400001 HC TELEMETRY ROOM

## 2025-02-10 RX ORDER — ENOXAPARIN SODIUM 100 MG/ML
40 INJECTION SUBCUTANEOUS EVERY 24 HOURS
Status: DISCONTINUED | OUTPATIENT
Start: 2025-02-10 | End: 2025-02-19 | Stop reason: HOSPADM

## 2025-02-10 RX ORDER — SODIUM CHLORIDE, SODIUM LACTATE, POTASSIUM CHLORIDE, CALCIUM CHLORIDE 600; 310; 30; 20 MG/100ML; MG/100ML; MG/100ML; MG/100ML
INJECTION, SOLUTION INTRAVENOUS CONTINUOUS
Status: ACTIVE | OUTPATIENT
Start: 2025-02-10 | End: 2025-02-10

## 2025-02-10 RX ADMIN — ONDANSETRON 4 MG: 2 INJECTION INTRAMUSCULAR; INTRAVENOUS at 05:02

## 2025-02-10 RX ADMIN — SODIUM CHLORIDE, PRESERVATIVE FREE 10 ML: 5 INJECTION INTRAVENOUS at 06:02

## 2025-02-10 RX ADMIN — ACETAMINOPHEN 1000 MG: 500 TABLET ORAL at 08:02

## 2025-02-10 RX ADMIN — SODIUM CHLORIDE, POTASSIUM CHLORIDE, SODIUM LACTATE AND CALCIUM CHLORIDE: 600; 310; 30; 20 INJECTION, SOLUTION INTRAVENOUS at 12:02

## 2025-02-10 RX ADMIN — SODIUM CHLORIDE, PRESERVATIVE FREE 10 ML: 5 INJECTION INTRAVENOUS at 08:02

## 2025-02-10 RX ADMIN — LATANOPROST 1 DROP: 50 SOLUTION/ DROPS OPHTHALMIC at 08:02

## 2025-02-10 RX ADMIN — ENOXAPARIN SODIUM 40 MG: 40 INJECTION SUBCUTANEOUS at 05:02

## 2025-02-10 RX ADMIN — SODIUM CHLORIDE, PRESERVATIVE FREE 10 ML: 5 INJECTION INTRAVENOUS at 03:02

## 2025-02-10 RX ADMIN — VALSARTAN 320 MG: 160 TABLET, FILM COATED ORAL at 08:02

## 2025-02-10 RX ADMIN — METOPROLOL SUCCINATE 50 MG: 50 TABLET, EXTENDED RELEASE ORAL at 08:02

## 2025-02-10 RX ADMIN — ONDANSETRON 4 MG: 2 INJECTION INTRAMUSCULAR; INTRAVENOUS at 11:02

## 2025-02-10 RX ADMIN — LEVETIRACETAM 1500 MG: 750 TABLET, FILM COATED ORAL at 08:02

## 2025-02-10 RX ADMIN — AMLODIPINE BESYLATE 10 MG: 10 TABLET ORAL at 08:02

## 2025-02-10 NOTE — ASSESSMENT & PLAN NOTE
"Liver mass, possible cholangiocarcinoma  Patient presented to OSH with jaundice and scleral icterus, along with abnormal labs from clinic. Also noted nausea and back pain.  Noted history of cholecystectomy. Labs showed elevated bilirubin, and imaging with biliary ductal dilatation. Initial CT of the abdomen and pelvis with IV contrast showed significant intrahepatic biliary ductal dilatation and questionable 9 mm fat containing lesion in the uncinate process of the pancreas.  Abdominal ultrasound showed hepatic steatosis and intra hepatic biliary ductal dilatation and status post cholecystectomy. MRCP showed "severe intrahepatic bile duct dilatation, with abrupt narrowing at the confluence of the hepatic ducts where there is a suspected ill-defined liver mass.  This is poorly evaluated without contrast but concerning for neoplasm such as cholangiocarcinoma." Repeat imaging with MRI abdomen with and without contrast showed "hypoattenuating lesion near the confluence of the right and left hepatic ducts causing severe upstream ductal dilatation concerning for cholangiocarcinoma.  Further evaluation with ERCP is recommended." Patient transferred to [facility] for further workup and evaluation. CEA elevated, CA 19-9 WNL. Surgical oncology, AES, and IR consulted to coordinate plan for further management. Underwent PTC with IR on 2/8 without complication. Plan to allow decompression of the biliary system and repeat imaging to reassess obstruction    - Full liquid diet for nausea and vomiting  -AES Consulted  -Surgical Oncology consulted, appreciate recs     -S/p PTC w/ IR     - Plan for repeat CT A/P when biliary system decompressed     -Will need CT chest for further staging  - Leave drains to gravity drainage until bilirubin stabilizes. Drains can be flushed once daily with normal saline. She will need routine exchange in 8-12 weeks with IR.    - Continue p.r.n. medication for symptomatic relief  - Trend with labs and correct " electrolyte abnormalities as needed  - Receiving 1L LR for volume losses with PCT drains

## 2025-02-10 NOTE — PLAN OF CARE
[facility] - Internal Medicine Telemetry  Discharge Reassessment    Primary Care Provider: Shay Castellano MD    Expected Discharge Date: 2/11/2025    Reassessment (most recent)       Discharge Reassessment - 02/10/25 1750          Discharge Reassessment    Assessment Type Discharge Planning Reassessment (P)      Did the patient's condition or plan change since previous assessment? No (P)      Communicated ISHMAEL with patient/caregiver Yes (P)    02/11/25    Discharge Plan A Home with family (P)      Discharge Plan B Home (P)      DME Needed Upon Discharge  none (P)      Transition of Care Barriers None (P)      Why the patient remains in the hospital Requires continued medical care (P)         Post-Acute Status    Post-Acute Authorization Other (P)      Other Status No Post-Acute Service Needs (P)      Discharge Delays None known at this time (P)                    Discharge plan is home with no needs. Surgery team monitoring. ISHMAEL: 2/11/25.    Discharge Plan A and Plan B have been determined by review of patient's clinical status, future medical and therapeutic needs, and coverage/benefits for post-acute care in coordination with multidisciplinary team members.     Tamar Alan LMSW

## 2025-02-10 NOTE — PLAN OF CARE
Problem: Adult Inpatient Plan of Care  Goal: Plan of Care Review  Outcome: Progressing  Goal: Patient-Specific Goal (Individualized)  Outcome: Progressing  Goal: Absence of Hospital-Acquired Illness or Injury  Outcome: Progressing  Goal: Optimal Comfort and Wellbeing  Outcome: Progressing  Goal: Readiness for Transition of Care  Outcome: Progressing   AAOX4,VSS,NAD noted. Pt c/o intermittent nausea, zofran administered per mar orders. Pt educated on safety and plan of care. Prn pain medication offered on rounds, pt declines. Repositioning assistance provided to promote comfort. Pt tolerated all scheduled medication well. RUQ bilary drain w/ drainage bag to gravity output of 225 mL total for this shift, dressing clean dry and intact. LUQ bilary drain w/drainage bag to gravity output of 150 mL total for this shift, dressing clean, dry and intact.  Pt denies any further needs or discomfort. All questions and concerns answered and addressed. Pt requesting do not disturb hours from 11pm-5am, charge nurse notified and will communicate request with pm nurse. Safety maintained, call light within reach

## 2025-02-10 NOTE — ASSESSMENT & PLAN NOTE
69F presenting with, essentially, painless jaundice after being found to have biliary obstruction on recent outpatient lab work. Her t bili continued to uptrend, now 10.3 (from 7). Imaging studies w/ a hypoattenuating mass near the confluence of the right and left hepatic ducts, which is concerning for cholangiocarcinoma. She will need biliary decompression, and given the location of her mass, this would likely best be done via PTC with IR. She will also need biochemical work up (Ca 19-9, CEA), as well as, a CT chest to rule out any metastasis if this is indeed cholangiocarcinoma. Surgical oncology will follow.     S/p Bilateral PTC placement 2/8/25    -- Bilateral PTCs to gravity  -- Recommend clears given her significant nausea  -- Recommend replacing her PTC losses with LR at least 0.5:1 up to 1:1  -- Trend CMP  -- Aggressive antiemetic regimen

## 2025-02-10 NOTE — PLAN OF CARE
Problem: Adult Inpatient Plan of Care  Goal: Plan of Care Review  Outcome: Progressing  Goal: Patient-Specific Goal (Individualized)  Outcome: Progressing  Goal: Absence of Hospital-Acquired Illness or Injury  Outcome: Progressing  Goal: Optimal Comfort and Wellbeing  Outcome: Progressing  Goal: Readiness for Transition of Care  Outcome: Progressing     Problem: Diabetes Comorbidity  Goal: Blood Glucose Level Within Targeted Range  Outcome: Progressing     Problem: Nausea and Vomiting  Goal: Nausea and Vomiting Relief  Outcome: Progressing     Problem: Pain Acute  Goal: Optimal Pain Control and Function  Outcome: Progressing     Problem: Fall Injury Risk  Goal: Absence of Fall and Fall-Related Injury  Outcome: Progressing      No

## 2025-02-10 NOTE — SUBJECTIVE & OBJECTIVE
Interval History: S/p bilateral PTCs 2/8/25. NAEON. Sleeping comfortably on rounds this am. PTCs to gravity with bilious output.    Medications:  Continuous Infusions:  Scheduled Meds:   acetaminophen  1,000 mg Oral BID    amLODIPine  10 mg Oral Daily    latanoprost  1 drop Both Eyes QHS    levETIRAcetam  1,500 mg Oral Nightly    metoprolol succinate  50 mg Oral QHS    ondansetron  4 mg Intravenous Q6H    scopolamine  1 patch Transdermal Q3 Days    sodium chloride 0.9%  10 mL Intravenous Q8H    valsartan  320 mg Oral Daily     PRN Meds:  Current Facility-Administered Medications:     dextrose 50%, 12.5 g, Intravenous, PRN    dextrose 50%, 25 g, Intravenous, PRN    diphenhydrAMINE, 25 mg, Oral, Q6H PRN    diphenhydrAMINE-zinc acetate 2-0.1%, , Topical (Top), TID PRN    glucagon (human recombinant), 1 mg, Intramuscular, PRN    glucose, 16 g, Oral, PRN    glucose, 24 g, Oral, PRN    HYDROmorphone, 0.2 mg, Intravenous, Q6H PRN    ibuprofen, 400 mg, Oral, Q6H PRN    insulin aspart U-100, 0-5 Units, Subcutaneous, Q6H PRN    melatonin, 6 mg, Oral, Nightly PRN    naloxone, 0.02 mg, Intravenous, PRN    oxyCODONE, 5 mg, Oral, Q6H PRN    prochlorperazine, 2.5 mg, Intravenous, Q6H PRN    senna-docusate 8.6-50 mg, 1 tablet, Oral, BID PRN     Review of patient's allergies indicates:   Allergen Reactions    Codeine Nausea Only     Objective:     Vital Signs (Most Recent):  Temp: 98.4 °F (36.9 °C) (02/10/25 0737)  Pulse: 69 (02/10/25 0737)  Resp: 18 (02/10/25 0737)  BP: 109/60 (02/10/25 0737)  SpO2: 97 % (02/10/25 0737) Vital Signs (24h Range):  Temp:  [98.4 °F (36.9 °C)-99.2 °F (37.3 °C)] 98.4 °F (36.9 °C)  Pulse:  [65-82] 69  Resp:  [18-19] 18  SpO2:  [96 %-99 %] 97 %  BP: (109-147)/(60-88) 109/60     Weight: 79.7 kg (175 lb 11.2 oz)  Body mass index is 35.49 kg/m².    Intake/Output - Last 3 Shifts         02/08 0700  02/09 0659 02/09 0700  02/10 0659 02/10 0700  02/11 0659    P.O. 360 730     I.V. (mL/kg) 10 (0.1)      IV  Piggyback 300      Total Intake(mL/kg) 670 (8.4) 730 (9.2)     Urine (mL/kg/hr)  0 (0)     Emesis/NG output 0      Drains 500 775     Stool 0 0     Total Output 500 775     Net +170 -45            Urine Occurrence  4 x     Stool Occurrence 0 x 1 x     Emesis Occurrence 3 x               Physical Exam  Vitals reviewed.   Constitutional:       General: She is not in acute distress.     Appearance: Normal appearance. She is not toxic-appearing.   HENT:      Head: Normocephalic and atraumatic.      Nose: Nose normal.      Mouth/Throat:      Mouth: Mucous membranes are moist.   Eyes:      General: Scleral icterus present.   Cardiovascular:      Rate and Rhythm: Normal rate.      Pulses: Normal pulses.   Pulmonary:      Effort: No respiratory distress.   Abdominal:      General: Abdomen is flat.      Palpations: Abdomen is soft.      Comments: Soft, non distended. Very mildly tender to deep palpation in the epigastrium. No rebound or guarding.   Bilateral PTCs to bag with bilious output   Neurological:      General: No focal deficit present.      Mental Status: She is alert and oriented to person, place, and time.   Psychiatric:         Mood and Affect: Mood normal.         Behavior: Behavior normal.          Significant Labs:  I have reviewed all pertinent lab results within the past 24 hours.  CBC:   Recent Labs   Lab 02/10/25  0539   WBC 12.37   RBC 4.31   HGB 13.0   HCT 38.0      MCV 88   MCH 30.2   MCHC 34.2     CMP:   Recent Labs   Lab 02/10/25  0539   *   CALCIUM 9.8   ALBUMIN 2.7*   PROT 8.5*   *   K 4.6   CO2 23      BUN 19   CREATININE 0.9   ALKPHOS 479*   *   *   BILITOT 6.0*       Significant Diagnostics:  I have reviewed all pertinent imaging results/findings within the past 24 hours.

## 2025-02-10 NOTE — PROGRESS NOTES
[facility] Tele[facility]  General Surgery  Progress Note    Subjective:     History of Present Illness:  Katey Cerrato is a 69 y.o. female with a history of empty sella syndrome, GHD, glaucoma, Grave's w/ ecophthalmos, HLD, HTN, seizures, and DM2 who presents as a transfer for workup of her biliary obstruction. The patient states that she recently saw her PCP after she noticed urine discoloration. She was sent for outpatient lab work, which was suggestive of biliary obstruction so she was sent to the ED. In the ED, her lab work was remarkable for t bili of 8.3, elevated LFTs (280/264), and elevated ALP (507). RUQ and CT A/P w/ significant intrahepatic biliary ductal dilation and mild extra hepatic dilation. MRCP w/ hypoattenuating lesion near the confluence of the right and left hepatic ducts causing severe upstream ductal dilatation concerning for cholangiocarcinoma. Surgical oncology consulted for evaluation.       Post-Op Info:  * No surgery found *         Interval History: S/p bilateral PTCs 2/8/25. NAEON. Sleeping comfortably on rounds this am. PTCs to gravity with bilious output.    Medications:  Continuous Infusions:  Scheduled Meds:   acetaminophen  1,000 mg Oral BID    amLODIPine  10 mg Oral Daily    latanoprost  1 drop Both Eyes QHS    levETIRAcetam  1,500 mg Oral Nightly    metoprolol succinate  50 mg Oral QHS    ondansetron  4 mg Intravenous Q6H    scopolamine  1 patch Transdermal Q3 Days    sodium chloride 0.9%  10 mL Intravenous Q8H    valsartan  320 mg Oral Daily     PRN Meds:  Current Facility-Administered Medications:     dextrose 50%, 12.5 g, Intravenous, PRN    dextrose 50%, 25 g, Intravenous, PRN    diphenhydrAMINE, 25 mg, Oral, Q6H PRN    diphenhydrAMINE-zinc acetate 2-0.1%, , Topical (Top), TID PRN    glucagon (human recombinant), 1 mg, Intramuscular, PRN    glucose, 16 g, Oral, PRN    glucose, 24 g, Oral, PRN    HYDROmorphone, 0.2 mg, Intravenous, Q6H PRN    ibuprofen, 400 mg,  Oral, Q6H PRN    insulin aspart U-100, 0-5 Units, Subcutaneous, Q6H PRN    melatonin, 6 mg, Oral, Nightly PRN    naloxone, 0.02 mg, Intravenous, PRN    oxyCODONE, 5 mg, Oral, Q6H PRN    prochlorperazine, 2.5 mg, Intravenous, Q6H PRN    senna-docusate 8.6-50 mg, 1 tablet, Oral, BID PRN     Review of patient's allergies indicates:   Allergen Reactions    Codeine Nausea Only     Objective:     Vital Signs (Most Recent):  Temp: 98.4 °F (36.9 °C) (02/10/25 0737)  Pulse: 69 (02/10/25 0737)  Resp: 18 (02/10/25 0737)  BP: 109/60 (02/10/25 0737)  SpO2: 97 % (02/10/25 0737) Vital Signs (24h Range):  Temp:  [98.4 °F (36.9 °C)-99.2 °F (37.3 °C)] 98.4 °F (36.9 °C)  Pulse:  [65-82] 69  Resp:  [18-19] 18  SpO2:  [96 %-99 %] 97 %  BP: (109-147)/(60-88) 109/60     Weight: 79.7 kg (175 lb 11.2 oz)  Body mass index is 35.49 kg/m².    Intake/Output - Last 3 Shifts         02/08 0700  02/09 0659 02/09 0700  02/10 0659 02/10 0700 02/11 0659    P.O. 360 730     I.V. (mL/kg) 10 (0.1)      IV Piggyback 300      Total Intake(mL/kg) 670 (8.4) 730 (9.2)     Urine (mL/kg/hr)  0 (0)     Emesis/NG output 0      Drains 500 775     Stool 0 0     Total Output 500 775     Net +170 -45            Urine Occurrence  4 x     Stool Occurrence 0 x 1 x     Emesis Occurrence 3 x               Physical Exam  Vitals reviewed.   Constitutional:       General: She is not in acute distress.     Appearance: Normal appearance. She is not toxic-appearing.   HENT:      Head: Normocephalic and atraumatic.      Nose: Nose normal.      Mouth/Throat:      Mouth: Mucous membranes are moist.   Eyes:      General: Scleral icterus present.   Cardiovascular:      Rate and Rhythm: Normal rate.      Pulses: Normal pulses.   Pulmonary:      Effort: No respiratory distress.   Abdominal:      General: Abdomen is flat.      Palpations: Abdomen is soft.      Comments: Soft, non distended. Very mildly tender to deep palpation in the epigastrium. No rebound or guarding.   Bilateral  PTCs to bag with bilious output   Neurological:      General: No focal deficit present.      Mental Status: She is alert and oriented to person, place, and time.   Psychiatric:         Mood and Affect: Mood normal.         Behavior: Behavior normal.          Significant Labs:  I have reviewed all pertinent lab results within the past 24 hours.  CBC:   Recent Labs   Lab 02/10/25  0539   WBC 12.37   RBC 4.31   HGB 13.0   HCT 38.0      MCV 88   MCH 30.2   MCHC 34.2     CMP:   Recent Labs   Lab 02/10/25  0539   *   CALCIUM 9.8   ALBUMIN 2.7*   PROT 8.5*   *   K 4.6   CO2 23      BUN 19   CREATININE 0.9   ALKPHOS 479*   *   *   BILITOT 6.0*       Significant Diagnostics:  I have reviewed all pertinent imaging results/findings within the past 24 hours.  Assessment/Plan:     * Biliary obstruction  69F presenting with, essentially, painless jaundice after being found to have biliary obstruction on recent outpatient lab work. Her t bili continued to uptrend, now 10.3 (from 7). Imaging studies w/ a hypoattenuating mass near the confluence of the right and left hepatic ducts, which is concerning for cholangiocarcinoma. She will need biliary decompression, and given the location of her mass, this would likely best be done via PTC with IR. She will also need biochemical work up (Ca 19-9, CEA), as well as, a CT chest to rule out any metastasis if this is indeed cholangiocarcinoma. Surgical oncology will follow.     S/p Bilateral PTC placement 2/8/25    -- Bilateral PTCs to gravity  -- Recommend clears given her significant nausea  -- Recommend replacing her PTC losses with LR at least 0.5:1 (LR:losses) up to 1:1  -- Trend CMP  -- Aggressive antiemetic regimen        Walter Arechiga MD  General Surgery  [facility]

## 2025-02-10 NOTE — SUBJECTIVE & OBJECTIVE
Interval History: NAEON, VSS, continued nausea and vomiting tolerating liquid/full diet, PCT with 1000 ml drainage with place for repletion of losses with 1L of LR, Tbil down trending 6 this AM as well other liver function tests, pending final surg/onc recs for inpatient/outpatient planning    Review of Systems  Objective:     Vital Signs (Most Recent):  Temp: 98.7 °F (37.1 °C) (02/10/25 1134)  Pulse: 70 (02/10/25 1134)  Resp: 18 (02/10/25 1134)  BP: 127/85 (02/10/25 1134)  SpO2: 96 % (02/10/25 1134) Vital Signs (24h Range):  Temp:  [98.4 °F (36.9 °C)-99.2 °F (37.3 °C)] 98.7 °F (37.1 °C)  Pulse:  [65-82] 70  Resp:  [18-19] 18  SpO2:  [96 %-99 %] 96 %  BP: (109-147)/(60-88) 127/85     Weight: 79.7 kg (175 lb 11.2 oz)  Body mass index is 35.49 kg/m².    Intake/Output Summary (Last 24 hours) at 2/10/2025 1347  Last data filed at 2/10/2025 1203  Gross per 24 hour   Intake --   Output 900 ml   Net -900 ml         Physical Exam  Vitals and nursing note reviewed.   Constitutional:       General: She is not in acute distress.     Appearance: Normal appearance. She is well-developed. She is obese. She is not toxic-appearing or diaphoretic.   HENT:      Head: Normocephalic and atraumatic.      Nose: Nose normal.      Mouth/Throat:      Mouth: Mucous membranes are moist.      Pharynx: Oropharynx is clear. No oropharyngeal exudate.   Eyes:      General: Scleral icterus present.      Extraocular Movements: Extraocular movements intact.      Comments: Exophthalmos noted bilaterally   Neck:      Trachea: No tracheal deviation.   Cardiovascular:      Rate and Rhythm: Normal rate and regular rhythm.      Pulses: Normal pulses.      Heart sounds: Normal heart sounds. No murmur heard.     No friction rub. No gallop.   Pulmonary:      Effort: Pulmonary effort is normal. No respiratory distress.      Breath sounds: Normal breath sounds. No stridor. No wheezing, rhonchi or rales.   Chest:      Chest wall: No tenderness.   Abdominal:       General: Abdomen is flat. Bowel sounds are normal. There is no distension.      Palpations: Abdomen is soft.      Tenderness: There is abdominal tenderness. There is no guarding or rebound.      Comments: Soft, non distended. Tender to palpation close to sites of PTC  R and L PCT draining to gravity   Musculoskeletal:         General: No deformity. Normal range of motion.      Cervical back: Normal range of motion and neck supple.   Skin:     General: Skin is warm and dry.      Coloration: Skin is jaundiced.      Findings: No erythema or rash.   Neurological:      General: No focal deficit present.      Mental Status: She is alert and oriented to person, place, and time.      Cranial Nerves: No cranial nerve deficit.      Motor: No abnormal muscle tone.   Psychiatric:         Mood and Affect: Mood normal.         Behavior: Behavior normal.         Thought Content: Thought content normal.             Significant Labs: All pertinent labs within the past 24 hours have been reviewed.  CBC:   Recent Labs   Lab 02/08/25  1827 02/09/25  0327 02/10/25  0539   WBC 9.59 9.41 12.37   HGB 11.9* 12.1 13.0   HCT 34.0* 34.5* 38.0    326 338     CMP:   Recent Labs   Lab 02/09/25  0327 02/10/25  0539    135*   K 3.9 4.6    100   CO2 26 23   * 135*   BUN 12 19   CREATININE 0.8 0.9   CALCIUM 10.1 9.8   PROT 8.4 8.5*   ALBUMIN 2.8* 2.7*   BILITOT 9.8* 6.0*   ALKPHOS 593* 479*   * 206*   * 242*   ANIONGAP 11 12       Significant Imaging: I have reviewed all pertinent imaging results/findings within the past 24 hours.

## 2025-02-10 NOTE — ASSESSMENT & PLAN NOTE
Patient with worsening nausea, vomiting s/p PTC on 2/9. Will plan aggressive antiemetic regimen and adjust diet as needed    -Aggressive antiemetic regimen  -Full liquid diet

## 2025-02-10 NOTE — PROGRESS NOTES
"[facility]shaw - Internal Medicine [facility] Medicine  Progress Note    Patient Name: Katey Cerrato  MRN: [id number]  Patient Class: IP- Inpatient   Admission Date: 2/3/2025  Length of Stay: 6 days  Attending Physician: Juliana Marx MD  Primary Care Provider: Shay Castellano MD        Subjective     Principal Problem:Biliary obstruction        HPI:  Ms. Katey Cerrato is a 69 y.o. female, with PMH of HTN, HLD, DMII, Grave's disease, who [place] [facility] [place] after labs following an outpatient appointment were abnormal. She states she recently was treated for shingles with Valtrex with rash improvement, but then her urine became discolored and that prompted her visit to her PCP.  At her PCP visit it was noticed that she had an orange coloration to the skin, and yellow color of the eyes.  After her appointment she had outpatient lab testing showing abnormal liver function, which is what prompted her PCP to send her to the ED. Her only new symptom is some low back pain. She states she had a cholecystectomy in the past, and had many gallstones removed at that time. She states her sister had retained gallstones that needed removal in the past. Denies any fevers, chills, trauma, alcohol consumption, Tylenol consumption.  [facility] labs showed tbili 7.4, alk phos 538,  and . Lipase normal. INR 1.2. U/A with few bacteria. A  CT showed significant intrahepatic biliary ductal dilatation, mild extrahepatic biliary ductal dilatation, a 9 mm fat containing lesion in the uncinate process of the pancreas.  She was treated with IV fluids, IV Rocephin, Toradol on admission.  MRCP showed intrahepatic bile duct dilation noted with an ill-defined liver mass.  Further workup with MRI abdomen with/without contrast showed "hypoattenuating lesion near the confluence of the right and left hepatic ducts causing severe upstream ductal dilatation concerning for cholangiocarcinoma. Given lesion and " "hyperbilirubinemia decision was made to transfer patient to [facility] [place] for further workup and evaluation with advanced services, possible ERCP.    On arrival to [facility]. Patient notes generalized pruritus, decreased appetite, nausea, lower back pain. Denies abdominal pain. Pending further workup and management     Overview/Hospital Course:  Patient presented to OSH with jaundice and scleral icterus, along with abnormal labs from clinic. Also noted nausea and back pain.  Noted history of cholecystectomy. Labs showed elevated bilirubin, and imaging with biliary ductal dilatation. Initial CT of the abdomen and pelvis with IV contrast showed significant intrahepatic biliary ductal dilatation and questionable 9 mm fat containing lesion in the uncinate process of the pancreas.  MRCP showed "severe intrahepatic bile duct dilatation, with abrupt narrowing at the confluence of the hepatic ducts where there is a suspected ill-defined liver mass. Repeat imaging with MRI abdomen with and without contrast showed "hypoattenuating lesion near the confluence of the right and left hepatic ducts causing severe upstream ductal dilatation concerning for cholangiocarcinoma.  Further evaluation with ERCP is recommended." Patient transferred to [facility] for further workup and evaluation. CEA elevated, CA 19-9 WNL. AES and Surgical oncology consulted, recommended PTC for further evaluation. Symptoms controlled with benadryl. PCT placed 2/8.     Interval History: NAEON, VSS, continued nausea and vomiting tolerating liquid/full diet, PCT with 1000 ml drainage with place for repletion of losses with 1L of LR, Tbil down trending 6 this AM as well other liver function tests, pending final surg/onc recs for inpatient/outpatient planning    Review of Systems  Objective:     Vital Signs (Most Recent):  Temp: 98.7 °F (37.1 °C) (02/10/25 1134)  Pulse: 70 (02/10/25 1134)  Resp: 18 (02/10/25 1134)  BP: 127/85 (02/10/25 1134)  SpO2: 96 % (02/10/25 1134) Vital " Signs (24h Range):  Temp:  [98.4 °F (36.9 °C)-99.2 °F (37.3 °C)] 98.7 °F (37.1 °C)  Pulse:  [65-82] 70  Resp:  [18-19] 18  SpO2:  [96 %-99 %] 96 %  BP: (109-147)/(60-88) 127/85     Weight: 79.7 kg (175 lb 11.2 oz)  Body mass index is 35.49 kg/m².    Intake/Output Summary (Last 24 hours) at 2/10/2025 1347  Last data filed at 2/10/2025 1203  Gross per 24 hour   Intake --   Output 900 ml   Net -900 ml         Physical Exam  Vitals and nursing note reviewed.   Constitutional:       General: She is not in acute distress.     Appearance: Normal appearance. She is well-developed. She is obese. She is not toxic-appearing or diaphoretic.   HENT:      Head: Normocephalic and atraumatic.      Nose: Nose normal.      Mouth/Throat:      Mouth: Mucous membranes are moist.      Pharynx: Oropharynx is clear. No oropharyngeal exudate.   Eyes:      General: Scleral icterus present.      Extraocular Movements: Extraocular movements intact.      Comments: Exophthalmos noted bilaterally   Neck:      Trachea: No tracheal deviation.   Cardiovascular:      Rate and Rhythm: Normal rate and regular rhythm.      Pulses: Normal pulses.      Heart sounds: Normal heart sounds. No murmur heard.     No friction rub. No gallop.   Pulmonary:      Effort: Pulmonary effort is normal. No respiratory distress.      Breath sounds: Normal breath sounds. No stridor. No wheezing, rhonchi or rales.   Chest:      Chest wall: No tenderness.   Abdominal:      General: Abdomen is flat. Bowel sounds are normal. There is no distension.      Palpations: Abdomen is soft.      Tenderness: There is abdominal tenderness. There is no guarding or rebound.      Comments: Soft, non distended. Tender to palpation close to sites of PTC  R and L PCT draining to gravity   Musculoskeletal:         General: No deformity. Normal range of motion.      Cervical back: Normal range of motion and neck supple.   Skin:     General: Skin is warm and dry.      Coloration: Skin is  "jaundiced.      Findings: No erythema or rash.   Neurological:      General: No focal deficit present.      Mental Status: She is alert and oriented to person, place, and time.      Cranial Nerves: No cranial nerve deficit.      Motor: No abnormal muscle tone.   Psychiatric:         Mood and Affect: Mood normal.         Behavior: Behavior normal.         Thought Content: Thought content normal.             Significant Labs: All pertinent labs within the past 24 hours have been reviewed.  CBC:   Recent Labs   Lab 02/08/25  1827 02/09/25  0327 02/10/25  0539   WBC 9.59 9.41 12.37   HGB 11.9* 12.1 13.0   HCT 34.0* 34.5* 38.0    326 338     CMP:   Recent Labs   Lab 02/09/25  0327 02/10/25  0539    135*   K 3.9 4.6    100   CO2 26 23   * 135*   BUN 12 19   CREATININE 0.8 0.9   CALCIUM 10.1 9.8   PROT 8.4 8.5*   ALBUMIN 2.8* 2.7*   BILITOT 9.8* 6.0*   ALKPHOS 593* 479*   * 206*   * 242*   ANIONGAP 11 12       Significant Imaging: I have reviewed all pertinent imaging results/findings within the past 24 hours.    Assessment and Plan     * Biliary obstruction  Liver mass, possible cholangiocarcinoma  Patient presented to OSH with jaundice and scleral icterus, along with abnormal labs from clinic. Also noted nausea and back pain.  Noted history of cholecystectomy. Labs showed elevated bilirubin, and imaging with biliary ductal dilatation. Initial CT of the abdomen and pelvis with IV contrast showed significant intrahepatic biliary ductal dilatation and questionable 9 mm fat containing lesion in the uncinate process of the pancreas.  Abdominal ultrasound showed hepatic steatosis and intra hepatic biliary ductal dilatation and status post cholecystectomy. MRCP showed "severe intrahepatic bile duct dilatation, with abrupt narrowing at the confluence of the hepatic ducts where there is a suspected ill-defined liver mass.  This is poorly evaluated without contrast but concerning for " "neoplasm such as cholangiocarcinoma." Repeat imaging with MRI abdomen with and without contrast showed "hypoattenuating lesion near the confluence of the right and left hepatic ducts causing severe upstream ductal dilatation concerning for cholangiocarcinoma.  Further evaluation with ERCP is recommended." [place] [facility] [place] and [place]. CEA elevated, CA 19-9 WNL. Surgical oncology, AES, and IR consulted to coordinate plan for further management. Underwent PTC with IR on 2/8 without complication. Plan to allow decompression of the biliary system and repeat imaging to reassess obstruction    - Full liquid diet for nausea and vomiting  -AES Consulted  -Surgical Oncology consulted, appreciate recs     -S/p PTC w/ IR     - Plan for repeat CT A/P when biliary system decompressed     -Will need CT chest for further staging  - Leave drains to gravity drainage until bilirubin stabilizes. Drains can be flushed once daily with normal saline. She will need routine exchange in 8-12 weeks with IR.    - Continue p.r.n. medication for symptomatic relief  - Trend with labs and correct electrolyte abnormalities as needed  - Receiving 1L LR for volume losses with PCT drains    Nausea and vomiting  Patient with worsening nausea, vomiting s/p PTC on 2/9. Will plan aggressive antiemetic regimen and adjust diet as needed    -Aggressive antiemetic regimen  -Full liquid diet    Type 2 diabetes mellitus without complication, without long-term current use of insulin  - Last hemoglobin A1c 5.9, well controlled  - Hold home regimen of Trulicity  - Glucose level reviewed, currently in normal range  - Continue Accu-Cheks q.a.c. and q.h.s. and give low-dose sliding scale insulin as necessary    Mixed hyperlipidemia  Patient with hx HLD presenting with abnormal liver function tests. HLD well controlled on home statin    - Holding statin due to abnormal liver function tests    Graves' disease with exophthalmos  Pt with " hx graves disease last TSH wnl in 2023. Currently without symptoms.    -Continue to monitor      Hypertension associated with diabetes  - Well controlled, continue metoprolol 50 mg p.o. q.h.s., amlodipine 10 mg p.o. daily and valsartan 320 mg p.o. daily    Generalized convulsive epilepsy with intractable epilepsy  Patient with history of epilepsy in the past on levitiracetam 1500 mg qhs. Stable, no reported seizure activity    - Continue home levetiracetam 1500 mg p.o. q.h.s.      VTE Risk Mitigation (From admission, onward)           Ordered     enoxaparin injection 40 mg  Every 24 hours         02/10/25 1040     IP VTE HIGH RISK PATIENT  Once         02/04/25 0219     Place sequential compression device  Until discontinued         02/04/25 0219                    Discharge Planning   ISHMAEL: 2/11/2025     Code Status: Full Code   Medical Readiness for Discharge Date:   Discharge Plan A: Home with family   Discharge Delays: None known at this time        Ildefonso Yao MD  Department of Hospital Medicine   [facility] ahmet - Internal Medicine Telemetry

## 2025-02-11 LAB
ALBUMIN SERPL BCP-MCNC: 2.5 G/DL (ref 3.5–5.2)
ALP SERPL-CCNC: 387 U/L (ref 40–150)
ALT SERPL W/O P-5'-P-CCNC: 175 U/L (ref 10–44)
ANION GAP SERPL CALC-SCNC: 7 MMOL/L (ref 8–16)
AST SERPL-CCNC: 125 U/L (ref 10–40)
BASOPHILS # BLD AUTO: 0.02 K/UL (ref 0–0.2)
BASOPHILS NFR BLD: 0.1 % (ref 0–1.9)
BILIRUB SERPL-MCNC: 5.3 MG/DL (ref 0.1–1)
BUN SERPL-MCNC: 22 MG/DL (ref 8–23)
CALCIUM SERPL-MCNC: 9.7 MG/DL (ref 8.7–10.5)
CHLORIDE SERPL-SCNC: 101 MMOL/L (ref 95–110)
CO2 SERPL-SCNC: 26 MMOL/L (ref 23–29)
CREAT SERPL-MCNC: 0.8 MG/DL (ref 0.5–1.4)
DIFFERENTIAL METHOD BLD: ABNORMAL
EOSINOPHIL # BLD AUTO: 0 K/UL (ref 0–0.5)
EOSINOPHIL NFR BLD: 0 % (ref 0–8)
ERYTHROCYTE [DISTWIDTH] IN BLOOD BY AUTOMATED COUNT: 17.7 % (ref 11.5–14.5)
EST. GFR  (NO RACE VARIABLE): >60 ML/MIN/1.73 M^2
GLUCOSE SERPL-MCNC: 126 MG/DL (ref 70–110)
HCT VFR BLD AUTO: 34.5 % (ref 37–48.5)
HGB BLD-MCNC: 11.8 G/DL (ref 12–16)
IMM GRANULOCYTES # BLD AUTO: 0.06 K/UL (ref 0–0.04)
IMM GRANULOCYTES NFR BLD AUTO: 0.4 % (ref 0–0.5)
LYMPHOCYTES # BLD AUTO: 1.3 K/UL (ref 1–4.8)
LYMPHOCYTES NFR BLD: 9.6 % (ref 18–48)
MAGNESIUM SERPL-MCNC: 1.9 MG/DL (ref 1.6–2.6)
MCH RBC QN AUTO: 30.5 PG (ref 27–31)
MCHC RBC AUTO-ENTMCNC: 34.2 G/DL (ref 32–36)
MCV RBC AUTO: 89 FL (ref 82–98)
MONOCYTES # BLD AUTO: 1 K/UL (ref 0.3–1)
MONOCYTES NFR BLD: 7.6 % (ref 4–15)
NEUTROPHILS # BLD AUTO: 11.2 K/UL (ref 1.8–7.7)
NEUTROPHILS NFR BLD: 82.3 % (ref 38–73)
NRBC BLD-RTO: 0 /100 WBC
PHOSPHATE SERPL-MCNC: 2.8 MG/DL (ref 2.7–4.5)
PLATELET # BLD AUTO: 341 K/UL (ref 150–450)
PMV BLD AUTO: 10.7 FL (ref 9.2–12.9)
POCT GLUCOSE: 121 MG/DL (ref 70–110)
POCT GLUCOSE: 142 MG/DL (ref 70–110)
POCT GLUCOSE: 153 MG/DL (ref 70–110)
POCT GLUCOSE: 156 MG/DL (ref 70–110)
POCT GLUCOSE: 221 MG/DL (ref 70–110)
POTASSIUM SERPL-SCNC: 3.7 MMOL/L (ref 3.5–5.1)
PROT SERPL-MCNC: 7.8 G/DL (ref 6–8.4)
RBC # BLD AUTO: 3.87 M/UL (ref 4–5.4)
SODIUM SERPL-SCNC: 134 MMOL/L (ref 136–145)
WBC # BLD AUTO: 13.66 K/UL (ref 3.9–12.7)

## 2025-02-11 PROCEDURE — 84100 ASSAY OF PHOSPHORUS: CPT | Performed by: HOSPITALIST

## 2025-02-11 PROCEDURE — 36415 COLL VENOUS BLD VENIPUNCTURE: CPT | Performed by: HOSPITALIST

## 2025-02-11 PROCEDURE — 25000003 PHARM REV CODE 250

## 2025-02-11 PROCEDURE — 63600175 PHARM REV CODE 636 W HCPCS: Performed by: HOSPITALIST

## 2025-02-11 PROCEDURE — A4216 STERILE WATER/SALINE, 10 ML: HCPCS | Performed by: PHYSICIAN ASSISTANT

## 2025-02-11 PROCEDURE — 63600175 PHARM REV CODE 636 W HCPCS

## 2025-02-11 PROCEDURE — 80053 COMPREHEN METABOLIC PANEL: CPT | Performed by: HOSPITALIST

## 2025-02-11 PROCEDURE — 83735 ASSAY OF MAGNESIUM: CPT | Performed by: HOSPITALIST

## 2025-02-11 PROCEDURE — 25000003 PHARM REV CODE 250: Performed by: HOSPITALIST

## 2025-02-11 PROCEDURE — 99231 SBSQ HOSP IP/OBS SF/LOW 25: CPT | Mod: GC,,, | Performed by: SURGERY

## 2025-02-11 PROCEDURE — 21400001 HC TELEMETRY ROOM

## 2025-02-11 PROCEDURE — 25000003 PHARM REV CODE 250: Performed by: PHYSICIAN ASSISTANT

## 2025-02-11 PROCEDURE — 85025 COMPLETE CBC W/AUTO DIFF WBC: CPT | Performed by: HOSPITALIST

## 2025-02-11 RX ORDER — LIDOCAINE 50 MG/G
1 PATCH TOPICAL DAILY
Status: DISCONTINUED | OUTPATIENT
Start: 2025-02-11 | End: 2025-02-19 | Stop reason: HOSPADM

## 2025-02-11 RX ORDER — INSULIN ASPART 100 [IU]/ML
0-5 INJECTION, SOLUTION INTRAVENOUS; SUBCUTANEOUS
Status: DISCONTINUED | OUTPATIENT
Start: 2025-02-11 | End: 2025-02-19 | Stop reason: HOSPADM

## 2025-02-11 RX ADMIN — HYDROMORPHONE HYDROCHLORIDE 0.2 MG: 1 INJECTION, SOLUTION INTRAMUSCULAR; INTRAVENOUS; SUBCUTANEOUS at 08:02

## 2025-02-11 RX ADMIN — LATANOPROST 1 DROP: 50 SOLUTION/ DROPS OPHTHALMIC at 08:02

## 2025-02-11 RX ADMIN — PROCHLORPERAZINE EDISYLATE 2.5 MG: 5 INJECTION INTRAMUSCULAR; INTRAVENOUS at 08:02

## 2025-02-11 RX ADMIN — LIDOCAINE 1 PATCH: 50 PATCH CUTANEOUS at 09:02

## 2025-02-11 RX ADMIN — METOPROLOL SUCCINATE 50 MG: 50 TABLET, EXTENDED RELEASE ORAL at 08:02

## 2025-02-11 RX ADMIN — SODIUM CHLORIDE, PRESERVATIVE FREE 10 ML: 5 INJECTION INTRAVENOUS at 08:02

## 2025-02-11 RX ADMIN — ONDANSETRON 4 MG: 2 INJECTION INTRAMUSCULAR; INTRAVENOUS at 06:02

## 2025-02-11 RX ADMIN — SODIUM CHLORIDE, PRESERVATIVE FREE 10 ML: 5 INJECTION INTRAVENOUS at 06:02

## 2025-02-11 RX ADMIN — SODIUM CHLORIDE, PRESERVATIVE FREE 10 ML: 5 INJECTION INTRAVENOUS at 01:02

## 2025-02-11 RX ADMIN — AMLODIPINE BESYLATE 10 MG: 10 TABLET ORAL at 08:02

## 2025-02-11 RX ADMIN — LEVETIRACETAM 1500 MG: 750 TABLET, FILM COATED ORAL at 08:02

## 2025-02-11 RX ADMIN — ENOXAPARIN SODIUM 40 MG: 40 INJECTION SUBCUTANEOUS at 05:02

## 2025-02-11 RX ADMIN — SODIUM CHLORIDE, POTASSIUM CHLORIDE, SODIUM LACTATE AND CALCIUM CHLORIDE 1000 ML: 600; 310; 30; 20 INJECTION, SOLUTION INTRAVENOUS at 04:02

## 2025-02-11 RX ADMIN — ONDANSETRON 4 MG: 2 INJECTION INTRAMUSCULAR; INTRAVENOUS at 12:02

## 2025-02-11 RX ADMIN — ACETAMINOPHEN 1000 MG: 500 TABLET ORAL at 08:02

## 2025-02-11 RX ADMIN — ACETAMINOPHEN 1000 MG: 500 TABLET ORAL at 09:02

## 2025-02-11 RX ADMIN — ONDANSETRON 4 MG: 2 INJECTION INTRAMUSCULAR; INTRAVENOUS at 05:02

## 2025-02-11 RX ADMIN — VALSARTAN 320 MG: 160 TABLET, FILM COATED ORAL at 08:02

## 2025-02-11 NOTE — PHARMACY MED REC
"Admission Medication History     The home medication history was taken by Beverly Retana.    You may go to "Admission" then "Reconcile Home Medications" tabs to review and/or act upon these items.     The home medication list has been updated by the Pharmacy department.   Please read ALL comments highlighted in yellow.   Please address this information as you see fit.    Feel free to contact us if you have any questions or require assistance.      The medications listed below were removed from the home medication list. Please reorder if appropriate:  Patient reports no longer taking the following medication(s):  Valtrex   Vitiamin B   PERIDEX   Medications listed below were obtained from: Patient/family and Analytic software- [employer] Medications   Medication Sig    amLODIPine (NORVASC) 10 MG tablet Take 1 Tablet By Mouth Every Day      atorvastatin (LIPITOR) 80 MG tablet Take 1 Tablet (80 Mg Total) By Mouth Once Daily.      latanoprost 0.005 % ophthalmic solution Instill 1 Drop Into Both Eyes Every Evening      levETIRAcetam (KEPPRA) 750 MG Tab Take 2 Tablets By Mouth Once Daily      meloxicam (MOBIC) 15 MG tablet Take 1 Tablet By Mouth Every Day As Needed For Pain      metoprolol succinate (TOPROL-XL) 50 MG 24 hr tablet Take 1 Tablet By Mouth Every Day In The Evening      multivitamin-minerals-lutein Tab Take 1 Tablet By Mouth Once Daily       mv-mn/iron/folic acid/herb 190 (VITAMIN D3 COMPLETE ORAL) Take 1 Tablet By Mouth Once Daily.      ondansetron (ZOFRAN-ODT) 4 MG TbDL Take 1 Tablet (4 Mg Total) By Mouth Every 6 (Six) Hours As Needed For Nausea       TRULICITY 3 mg/0.5 mL pen injector Inject 3 Mg (0.5 Ml) Under The Skin Once A Week  Sunday      valsartan (DIOVAN) 320 MG tablet Take 1 Tablet (320 Mg Total) By Mouth Once Daily.         Beverly Retana  [id number]                  .          "

## 2025-02-11 NOTE — PROGRESS NOTES
"[facility] - Internal Medicine [facility] Medicine  Progress Note    Patient Name: Katey Cerrato  MRN: [id number]  Patient Class: IP- Inpatient   Admission Date: 2/3/2025  Length of Stay: 7 days  Attending Physician: Juliana Marx MD  Primary Care Provider: Shay Castellano MD        Subjective     Principal Problem:Biliary obstruction        HPI:  Ms. Katey Cerrato is a 69 y.o. female, with PMH of HTN, HLD, DMII, Grave's disease, who presented to [facility] ED on 2/3/25 after labs following an outpatient appointment were abnormal. She states she recently was treated for shingles with Valtrex with rash improvement, but then her urine became discolored and that prompted her visit to her PCP.  At her PCP visit it was noticed that she had an orange coloration to the skin, and yellow color of the eyes.  After her appointment she had outpatient lab testing showing abnormal liver function, which is what prompted her PCP to send her to the ED. Her only new symptom is some low back pain. She states she had a cholecystectomy in the past, and had many gallstones removed at that time. She states her sister had retained gallstones that needed removal in the past. Denies any fevers, chills, trauma, alcohol consumption, Tylenol consumption.  [facility] ED labs showed tbili 7.4, alk phos 538,  and . Lipase normal. INR 1.2. U/A with few bacteria. A  CT showed significant intrahepatic biliary ductal dilatation, mild extrahepatic biliary ductal dilatation, a 9 mm fat containing lesion in the uncinate process of the pancreas.  She was treated with IV fluids, IV Rocephin, Toradol on admission.  MRCP showed intrahepatic bile duct dilation noted with an ill-defined liver mass.  Further workup with MRI abdomen with/without contrast showed "hypoattenuating lesion near the confluence of the right and left hepatic ducts causing severe upstream ductal dilatation concerning for cholangiocarcinoma. Given lesion and " "hyperbilirubinemia decision was made to transfer patient to [facility] [place] [facility] for further workup and evaluation with advanced services, possible ERCP.    On arrival to [facility]. Patient notes generalized pruritus, decreased appetite, nausea, lower back pain. Denies abdominal pain. Pending further workup and management     Overview/Hospital Course:  Patient presented to OSH with jaundice and scleral icterus, along with abnormal labs from clinic. Also noted nausea and back pain.  Noted history of cholecystectomy. Labs showed elevated bilirubin, and imaging with biliary ductal dilatation. Initial CT of the abdomen and pelvis with IV contrast showed significant intrahepatic biliary ductal dilatation and questionable 9 mm fat containing lesion in the uncinate process of the pancreas.  MRCP showed "severe intrahepatic bile duct dilatation, with abrupt narrowing at the confluence of the hepatic ducts where there is a suspected ill-defined liver mass. Repeat imaging with MRI abdomen with and without contrast showed "hypoattenuating lesion near the confluence of the right and left hepatic ducts causing severe upstream ductal dilatation concerning for cholangiocarcinoma.  Further evaluation with ERCP is recommended." Patient transferred to [facility] [place] [facility] for further workup and evaluation. CEA elevated, CA 19-9 WNL. AES and Surgical oncology consulted, recommended PTC for further evaluation. Symptoms controlled with benadryl. PCT placed 2/8.        Interval History: NAEON, VSS, continued nausea and had one episode of vomiting last night. She is tolerating full liquid diet and we will advance diet as tolerable. PCT with 400 ml drainage and has been drinking fluids to compensate. Tbil down trending 5.3 this AM as well other liver function tests, pending final surg/onc recs for inpatient/outpatient planning.      Review of Systems   Constitutional:  Negative for chills and fever.   Respiratory:  Negative for cough, shortness of breath and wheezing.  "   Cardiovascular:  Negative for chest pain and leg swelling.   Gastrointestinal:  Positive for abdominal pain (Sites of PTC), nausea and vomiting. Negative for abdominal distention, blood in stool, constipation and diarrhea.   Genitourinary:  Negative for difficulty urinating and dysuria.   Musculoskeletal:  Positive for back pain.     Objective:     Vital Signs (Most Recent):  Temp: 98.3 °F (36.8 °C) (02/11/25 0810)  Pulse: 73 (02/11/25 0810)  Resp: 18 (02/11/25 0856)  BP: (!) 145/81 (02/11/25 0810)  SpO2: 97 % (02/11/25 0810) Vital Signs (24h Range):  Temp:  [98.3 °F (36.8 °C)-99.3 °F (37.4 °C)] 98.3 °F (36.8 °C)  Pulse:  [70-77] 73  Resp:  [18] 18  SpO2:  [95 %-97 %] 97 %  BP: (120-145)/(70-85) 145/81     Weight: 79.7 kg (175 lb 11.2 oz)  Body mass index is 35.49 kg/m².    Intake/Output Summary (Last 24 hours) at 2/11/2025 1018  Last data filed at 2/11/2025 0946  Gross per 24 hour   Intake 390 ml   Output 1915 ml   Net -1525 ml         Physical Exam  Vitals and nursing note reviewed.   Constitutional:       General: She is not in acute distress.     Appearance: Normal appearance. She is well-developed. She is obese. She is not toxic-appearing or diaphoretic.   HENT:      Head: Normocephalic and atraumatic.      Nose: Nose normal.      Mouth/Throat:      Mouth: Mucous membranes are moist.      Pharynx: Oropharynx is clear. No oropharyngeal exudate.   Eyes:      General: Scleral icterus present.      Extraocular Movements: Extraocular movements intact.      Comments: Exophthalmos noted bilaterally   Neck:      Trachea: No tracheal deviation.   Cardiovascular:      Rate and Rhythm: Normal rate and regular rhythm.      Pulses: Normal pulses.      Heart sounds: Normal heart sounds. No murmur heard.     No friction rub. No gallop.   Pulmonary:      Effort: Pulmonary effort is normal. No respiratory distress.      Breath sounds: Normal breath sounds. No stridor. No wheezing, rhonchi or rales.   Chest:      Chest wall:  No tenderness.   Abdominal:      General: Abdomen is flat. Bowel sounds are normal. There is no distension.      Palpations: Abdomen is soft.      Tenderness: There is abdominal tenderness. There is no guarding or rebound.      Comments: Soft, non distended. Tender to palpation close to sites of PTC  R and L PCT draining to gravity   Musculoskeletal:         General: No deformity. Normal range of motion.      Cervical back: Normal range of motion and neck supple.   Skin:     General: Skin is warm and dry.      Coloration: Skin is jaundiced.      Findings: No erythema or rash.   Neurological:      General: No focal deficit present.      Mental Status: She is alert and oriented to person, place, and time.      Cranial Nerves: No cranial nerve deficit.      Motor: No abnormal muscle tone.   Psychiatric:         Mood and Affect: Mood normal.         Behavior: Behavior normal.         Thought Content: Thought content normal.             Significant Labs: All pertinent labs within the past 24 hours have been reviewed.  Bilirubin:   Recent Labs   Lab 02/07/25  0706 02/08/25  0519 02/09/25  0327 02/10/25  0539 02/11/25  0639   BILITOT 10.3* 13.1* 9.8* 6.0* 5.3*     CBC:   Recent Labs   Lab 02/10/25  0539 02/11/25  0639   WBC 12.37 13.66*   HGB 13.0 11.8*   HCT 38.0 34.5*    341     CMP:   Recent Labs   Lab 02/10/25  0539 02/11/25  0639   * 134*   K 4.6 3.7    101   CO2 23 26   * 126*   BUN 19 22   CREATININE 0.9 0.8   CALCIUM 9.8 9.7   PROT 8.5* 7.8   ALBUMIN 2.7* 2.5*   BILITOT 6.0* 5.3*   ALKPHOS 479* 387*   * 125*   * 175*   ANIONGAP 12 7*       Significant Imaging: I have reviewed all pertinent imaging results/findings within the past 24 hours.    Assessment and Plan     * Biliary obstruction  Liver mass, possible cholangiocarcinoma  Patient presented to OSH with jaundice and scleral icterus, along with abnormal labs from clinic. Also noted nausea and back pain.  Noted history of  "cholecystectomy. Labs showed elevated bilirubin, and imaging with biliary ductal dilatation. Initial CT of the abdomen and pelvis with IV contrast showed significant intrahepatic biliary ductal dilatation and questionable 9 mm fat containing lesion in the uncinate process of the pancreas.  Abdominal ultrasound showed hepatic steatosis and intra hepatic biliary ductal dilatation and status post cholecystectomy. MRCP showed "severe intrahepatic bile duct dilatation, with abrupt narrowing at the confluence of the hepatic ducts where there is a suspected ill-defined liver mass.  This is poorly evaluated without contrast but concerning for neoplasm such as cholangiocarcinoma." Repeat imaging with MRI abdomen with and without contrast showed "hypoattenuating lesion near the confluence of the right and left hepatic ducts causing severe upstream ductal dilatation concerning for cholangiocarcinoma.  Further evaluation with ERCP is recommended." Patient transferred to [facility] [place] further workup and evaluation. CEA elevated, CA 19-9 WNL. Surgical oncology, AES, and IR consulted to coordinate plan for further management. Underwent PTC with IR on 2/8 without complication. Plan to allow decompression of the biliary system and repeat imaging to reassess obstruction    - Full liquid diet tolerated well for nausea and vomiting, will advance diet to solids and continue to monitor and adjust as needed   -AES Consulted  -Surgical Oncology consulted, appreciate recs     -S/p PTC w/ IR     - Plan for repeat CT A/P when biliary system decompressed to be done outpatient      -CT chest for further staging ordered   - Leave drains to gravity drainage until bilirubin stabilizes. Drains can be flushed once daily with normal saline. She will need routine exchange in 8-12 weeks with IR. Surg Onc is following and would like to the drains capped prior to discharge.    - Continue p.r.n. medication for symptomatic relief  - Trend with labs and correct " electrolyte abnormalities as needed  - Replace with LR for volume losses with PCT drains as needed, and patient was encouraged to increase PO fluids intake     Nausea and vomiting  Patient with worsening nausea, vomiting s/p PTC on 2/9. Will plan aggressive antiemetic regimen and adjust diet as needed    -Aggressive antiemetic regimen  -Trial of regular diet       Type 2 diabetes mellitus without complication, without long-term current use of insulin  - Last hemoglobin A1c 5.9, well controlled  - Hold home regimen of Trulicity  - Glucose level reviewed, currently in normal range  - Continue Accu-Cheks q.a.c. and q.h.s. and give low-dose sliding scale insulin as necessary    Mixed hyperlipidemia  Patient with hx HLD presenting with abnormal liver function tests. HLD well controlled on home statin    - Holding statin due to abnormal liver function tests    Graves' disease with exophthalmos  Pt with hx graves disease last TSH wnl in 2023. Currently without symptoms.    -Continue to monitor      Hypertension associated with diabetes  - Well controlled, continue metoprolol 50 mg p.o. q.h.s., amlodipine 10 mg p.o. daily and valsartan 320 mg p.o. daily    Generalized convulsive epilepsy with intractable epilepsy  Patient with history of epilepsy in the past on levitiracetam 1500 mg qhs. Stable, no reported seizure activity    - Continue home levetiracetam 1500 mg p.o. q.h.s.      VTE Risk Mitigation (From admission, onward)           Ordered     enoxaparin injection 40 mg  Every 24 hours         02/10/25 1040     IP VTE HIGH RISK PATIENT  Once         02/04/25 0219     Place sequential compression device  Until discontinued         02/04/25 0219                    Discharge Planning   ISHMAEL: 2/12/2025     Code Status: Full Code   Medical Readiness for Discharge Date:   Discharge Plan A: Home with family   Discharge Delays: None known at this time                    Martina Gaspar MD  Department of Hospital Medicine    [facility] - Internal Medicine Telemetry

## 2025-02-11 NOTE — PLAN OF CARE
Problem: Adult Inpatient Plan of Care  Goal: Plan of Care Review  Outcome: Progressing  Goal: Patient-Specific Goal (Individualized)  Outcome: Progressing  Goal: Absence of Hospital-Acquired Illness or Injury  Outcome: Progressing  Goal: Optimal Comfort and Wellbeing  Outcome: Progressing  Goal: Readiness for Transition of Care  Outcome: Progressing     Problem: Diabetes Comorbidity  Goal: Blood Glucose Level Within Targeted Range  Outcome: Progressing     Problem: Nausea and Vomiting  Goal: Nausea and Vomiting Relief  Outcome: Progressing     Problem: Pain Acute  Goal: Optimal Pain Control and Function  Outcome: Progressing     Problem: Fall Injury Risk  Goal: Absence of Fall and Fall-Related Injury  Outcome: Progressing     Problem: Skin Injury Risk Increased  Goal: Skin Health and Integrity  Outcome: Progressing     Removed rt 20g IV from hand and placed 22g in lft hand. 1L of LR complete.  Bed locked, in lowest position, call light in pt reach.

## 2025-02-11 NOTE — SUBJECTIVE & OBJECTIVE
Interval History:   Denies any symptoms or complaints this AM. Sleepy    Medications:  Continuous Infusions:  Scheduled Meds:   acetaminophen  1,000 mg Oral BID    amLODIPine  10 mg Oral Daily    enoxparin  40 mg Subcutaneous Q24H (prophylaxis, 1700)    latanoprost  1 drop Both Eyes QHS    levETIRAcetam  1,500 mg Oral Nightly    metoprolol succinate  50 mg Oral QHS    ondansetron  4 mg Intravenous Q6H    scopolamine  1 patch Transdermal Q3 Days    sodium chloride 0.9%  10 mL Intravenous Q8H    valsartan  320 mg Oral Daily     PRN Meds:  Current Facility-Administered Medications:     dextrose 50%, 12.5 g, Intravenous, PRN    dextrose 50%, 25 g, Intravenous, PRN    diphenhydrAMINE, 25 mg, Oral, Q6H PRN    diphenhydrAMINE-zinc acetate 2-0.1%, , Topical (Top), TID PRN    glucagon (human recombinant), 1 mg, Intramuscular, PRN    glucose, 16 g, Oral, PRN    glucose, 24 g, Oral, PRN    HYDROmorphone, 0.2 mg, Intravenous, Q6H PRN    ibuprofen, 400 mg, Oral, Q6H PRN    insulin aspart U-100, 0-5 Units, Subcutaneous, Q6H PRN    melatonin, 6 mg, Oral, Nightly PRN    naloxone, 0.02 mg, Intravenous, PRN    oxyCODONE, 5 mg, Oral, Q6H PRN    prochlorperazine, 2.5 mg, Intravenous, Q6H PRN    senna-docusate 8.6-50 mg, 1 tablet, Oral, BID PRN     Review of patient's allergies indicates:   Allergen Reactions    Codeine Nausea Only     Objective:     Vital Signs (Most Recent):  Temp: 99.3 °F (37.4 °C) (02/10/25 1943)  Pulse: 77 (02/10/25 1943)  Resp: 18 (02/10/25 1943)  BP: 120/70 (02/10/25 1943)  SpO2: 95 % (02/10/25 1943) Vital Signs (24h Range):  Temp:  [98.4 °F (36.9 °C)-99.3 °F (37.4 °C)] 99.3 °F (37.4 °C)  Pulse:  [69-77] 77  Resp:  [18] 18  SpO2:  [95 %-97 %] 95 %  BP: (109-127)/(60-85) 120/70     Weight: 79.7 kg (175 lb 11.2 oz)  Body mass index is 35.49 kg/m².    Intake/Output - Last 3 Shifts         02/09 0700  02/10 0659 02/10 0700 02/11 0659 02/11 0700  02/12 0659    P.O. 730 390     I.V. (mL/kg)       IV Piggyback        Total Intake(mL/kg) 730 (9.2) 390 (4.9)     Urine (mL/kg/hr) 0 (0)      Emesis/NG output       Drains 775 1500     Stool 0      Total Output 775 1500     Net -45 -1110            Urine Occurrence 4 x      Stool Occurrence 1 x               Physical Exam  Vitals reviewed.   Constitutional:       General: She is not in acute distress.     Appearance: Normal appearance. She is not toxic-appearing.   HENT:      Head: Normocephalic and atraumatic.      Nose: Nose normal.      Mouth/Throat:      Mouth: Mucous membranes are moist.   Eyes:      General: Scleral icterus present.   Cardiovascular:      Rate and Rhythm: Normal rate.      Pulses: Normal pulses.   Pulmonary:      Effort: No respiratory distress.   Abdominal:      General: Abdomen is flat.      Palpations: Abdomen is soft.      Comments: Soft, non distended. Very mildly tender to deep palpation in the epigastrium. No rebound or guarding.   Bilateral PTCs to bag with bilious output   Neurological:      General: No focal deficit present.      Mental Status: She is alert and oriented to person, place, and time.   Psychiatric:         Mood and Affect: Mood normal.         Behavior: Behavior normal.          Significant Labs:  I have reviewed all pertinent lab results within the past 24 hours.  CBC:   Recent Labs   Lab 02/10/25  0539   WBC 12.37   RBC 4.31   HGB 13.0   HCT 38.0      MCV 88   MCH 30.2   MCHC 34.2     CMP:   Recent Labs   Lab 02/10/25  0539   *   CALCIUM 9.8   ALBUMIN 2.7*   PROT 8.5*   *   K 4.6   CO2 23      BUN 19   CREATININE 0.9   ALKPHOS 479*   *   *   BILITOT 6.0*       Significant Diagnostics:  I have reviewed all pertinent imaging results/findings within the past 24 hours.

## 2025-02-11 NOTE — ASSESSMENT & PLAN NOTE
69F presenting with painless jaundice after being found to have biliary obstruction on recent outpatient lab work. Imaging studies w/ a hypoattenuating mass near the confluence of the right and left hepatic ducts, which is concerning for cholangiocarcinoma. CEA elevated. S/p Bilateral PTC placement 2/8/25. Bili now improving.    -- No tissue diagnosis yet  -- Still needs CT chest for staging  -- Will need repeat liver phase CT at some point once biliary system decompressed for better assessment of mass  -- Bilateral PTCs to gravity  -- Recommend replacing her PTC losses with LR ~1:1  -- Trend CMP  -- Aggressive antiemetic regimen  -- Recommend at least full diet as tolerated to optimize nutrition

## 2025-02-11 NOTE — SUBJECTIVE & OBJECTIVE
Interval History: NAEON, VSS, continued nausea and had one episode of vomiting last night. She is tolerating full liquid diet and we will advance diet as tolerable. PCT with 400 ml drainage and has been drinking fluids to compensate. Tbil down trending 5.3 this AM as well other liver function tests, pending final surg/onc recs for inpatient/outpatient planning.      Review of Systems   Constitutional:  Negative for chills and fever.   Respiratory:  Negative for cough, shortness of breath and wheezing.    Cardiovascular:  Negative for chest pain and leg swelling.   Gastrointestinal:  Positive for abdominal pain (Sites of PTC), nausea and vomiting. Negative for abdominal distention, blood in stool, constipation and diarrhea.   Genitourinary:  Negative for difficulty urinating and dysuria.   Musculoskeletal:  Positive for back pain.     Objective:     Vital Signs (Most Recent):  Temp: 98.3 °F (36.8 °C) (02/11/25 0810)  Pulse: 73 (02/11/25 0810)  Resp: 18 (02/11/25 0856)  BP: (!) 145/81 (02/11/25 0810)  SpO2: 97 % (02/11/25 0810) Vital Signs (24h Range):  Temp:  [98.3 °F (36.8 °C)-99.3 °F (37.4 °C)] 98.3 °F (36.8 °C)  Pulse:  [70-77] 73  Resp:  [18] 18  SpO2:  [95 %-97 %] 97 %  BP: (120-145)/(70-85) 145/81     Weight: 79.7 kg (175 lb 11.2 oz)  Body mass index is 35.49 kg/m².    Intake/Output Summary (Last 24 hours) at 2/11/2025 1018  Last data filed at 2/11/2025 0946  Gross per 24 hour   Intake 390 ml   Output 1915 ml   Net -1525 ml         Physical Exam  Vitals and nursing note reviewed.   Constitutional:       General: She is not in acute distress.     Appearance: Normal appearance. She is well-developed. She is obese. She is not toxic-appearing or diaphoretic.   HENT:      Head: Normocephalic and atraumatic.      Nose: Nose normal.      Mouth/Throat:      Mouth: Mucous membranes are moist.      Pharynx: Oropharynx is clear. No oropharyngeal exudate.   Eyes:      General: Scleral icterus present.      Extraocular  Movements: Extraocular movements intact.      Comments: Exophthalmos noted bilaterally   Neck:      Trachea: No tracheal deviation.   Cardiovascular:      Rate and Rhythm: Normal rate and regular rhythm.      Pulses: Normal pulses.      Heart sounds: Normal heart sounds. No murmur heard.     No friction rub. No gallop.   Pulmonary:      Effort: Pulmonary effort is normal. No respiratory distress.      Breath sounds: Normal breath sounds. No stridor. No wheezing, rhonchi or rales.   Chest:      Chest wall: No tenderness.   Abdominal:      General: Abdomen is flat. Bowel sounds are normal. There is no distension.      Palpations: Abdomen is soft.      Tenderness: There is abdominal tenderness. There is no guarding or rebound.      Comments: Soft, non distended. Tender to palpation close to sites of PTC  R and L PCT draining to gravity   Musculoskeletal:         General: No deformity. Normal range of motion.      Cervical back: Normal range of motion and neck supple.   Skin:     General: Skin is warm and dry.      Coloration: Skin is jaundiced.      Findings: No erythema or rash.   Neurological:      General: No focal deficit present.      Mental Status: She is alert and oriented to person, place, and time.      Cranial Nerves: No cranial nerve deficit.      Motor: No abnormal muscle tone.   Psychiatric:         Mood and Affect: Mood normal.         Behavior: Behavior normal.         Thought Content: Thought content normal.             Significant Labs: All pertinent labs within the past 24 hours have been reviewed.  Bilirubin:   Recent Labs   Lab 02/07/25  0706 02/08/25  0519 02/09/25  0327 02/10/25  0539 02/11/25  0639   BILITOT 10.3* 13.1* 9.8* 6.0* 5.3*     CBC:   Recent Labs   Lab 02/10/25  0539 02/11/25  0639   WBC 12.37 13.66*   HGB 13.0 11.8*   HCT 38.0 34.5*    341     CMP:   Recent Labs   Lab 02/10/25  0539 02/11/25  0639   * 134*   K 4.6 3.7    101   CO2 23 26   * 126*   BUN 19 22    CREATININE 0.9 0.8   CALCIUM 9.8 9.7   PROT 8.5* 7.8   ALBUMIN 2.7* 2.5*   BILITOT 6.0* 5.3*   ALKPHOS 479* 387*   * 125*   * 175*   ANIONGAP 12 7*       Significant Imaging: I have reviewed all pertinent imaging results/findings within the past 24 hours.

## 2025-02-11 NOTE — ASSESSMENT & PLAN NOTE
"Liver mass, possible cholangiocarcinoma  Patient presented to OSH with jaundice and scleral icterus, along with abnormal labs from clinic. Also noted nausea and back pain.  Noted history of cholecystectomy. Labs showed elevated bilirubin, and imaging with biliary ductal dilatation. Initial CT of the abdomen and pelvis with IV contrast showed significant intrahepatic biliary ductal dilatation and questionable 9 mm fat containing lesion in the uncinate process of the pancreas.  Abdominal ultrasound showed hepatic steatosis and intra hepatic biliary ductal dilatation and status post cholecystectomy. MRCP showed "severe intrahepatic bile duct dilatation, with abrupt narrowing at the confluence of the hepatic ducts where there is a suspected ill-defined liver mass.  This is poorly evaluated without contrast but concerning for neoplasm such as cholangiocarcinoma." Repeat imaging with MRI abdomen with and without contrast showed "hypoattenuating lesion near the confluence of the right and left hepatic ducts causing severe upstream ductal dilatation concerning for cholangiocarcinoma.  Further evaluation with ERCP is recommended." [place] [facility] [place]. CEA elevated, CA 19-9 WNL. Surgical oncology, AES, and IR consulted to coordinate plan for further management. Underwent PTC with IR on 2/8 without complication. Plan to allow decompression of the biliary system and repeat imaging to reassess obstruction    - Full liquid diet tolerated well for nausea and vomiting, will advance diet to solids and continue to monitor and adjust as needed   -AES Consulted  -Surgical Oncology consulted, appreciate recs     -S/p PTC w/ IR     - Plan for repeat CT A/P when biliary system decompressed to be done outpatient      -CT chest for further staging ordered   - Leave drains to gravity drainage until bilirubin stabilizes. Drains can be flushed once daily with normal saline. She will need routine " exchange in 8-12 weeks with IR. Surg Onc is following and would like to the drains capped prior to discharge.    - Continue p.r.n. medication for symptomatic relief  - Trend with labs and correct electrolyte abnormalities as needed  - Replace with LR for volume losses with PCT drains as needed, and patient was encouraged to increase PO fluids intake

## 2025-02-11 NOTE — PROGRESS NOTES
[facility] - Internal Medicine Telemetry  Surgical Oncology  Progress Note    Subjective:     History of Present Illness:  Katey Cerrato is a 69 y.o. female with a history of empty sella syndrome, GHD, glaucoma, Grave's w/ ecophthalmos, HLD, HTN, seizures, and DM2 who presents as a transfer for workup of her biliary obstruction. The patient states that she recently saw her PCP after she noticed urine discoloration. She was sent for outpatient lab work, which was suggestive of biliary obstruction so she was sent to the ED. In the ED, her lab work was remarkable for t bili of 8.3, elevated LFTs (280/264), and elevated ALP (507). RUQ and CT A/P w/ significant intrahepatic biliary ductal dilation and mild extra hepatic dilation. MRCP w/ hypoattenuating lesion near the confluence of the right and left hepatic ducts causing severe upstream ductal dilatation concerning for cholangiocarcinoma. Surgical oncology consulted for evaluation.       Post-Op Info:  * No surgery found *         Interval History:   Denies any symptoms or complaints this AM. Sleepy    Medications:  Continuous Infusions:  Scheduled Meds:   acetaminophen  1,000 mg Oral BID    amLODIPine  10 mg Oral Daily    enoxparin  40 mg Subcutaneous Q24H (prophylaxis, 1700)    latanoprost  1 drop Both Eyes QHS    levETIRAcetam  1,500 mg Oral Nightly    metoprolol succinate  50 mg Oral QHS    ondansetron  4 mg Intravenous Q6H    scopolamine  1 patch Transdermal Q3 Days    sodium chloride 0.9%  10 mL Intravenous Q8H    valsartan  320 mg Oral Daily     PRN Meds:  Current Facility-Administered Medications:     dextrose 50%, 12.5 g, Intravenous, PRN    dextrose 50%, 25 g, Intravenous, PRN    diphenhydrAMINE, 25 mg, Oral, Q6H PRN    diphenhydrAMINE-zinc acetate 2-0.1%, , Topical (Top), TID PRN    glucagon (human recombinant), 1 mg, Intramuscular, PRN    glucose, 16 g, Oral, PRN    glucose, 24 g, Oral, PRN    HYDROmorphone, 0.2 mg, Intravenous, Q6H PRN    ibuprofen, 400  mg, Oral, Q6H PRN    insulin aspart U-100, 0-5 Units, Subcutaneous, Q6H PRN    melatonin, 6 mg, Oral, Nightly PRN    naloxone, 0.02 mg, Intravenous, PRN    oxyCODONE, 5 mg, Oral, Q6H PRN    prochlorperazine, 2.5 mg, Intravenous, Q6H PRN    senna-docusate 8.6-50 mg, 1 tablet, Oral, BID PRN     Review of patient's allergies indicates:   Allergen Reactions    Codeine Nausea Only     Objective:     Vital Signs (Most Recent):  Temp: 99.3 °F (37.4 °C) (02/10/25 1943)  Pulse: 77 (02/10/25 1943)  Resp: 18 (02/10/25 1943)  BP: 120/70 (02/10/25 1943)  SpO2: 95 % (02/10/25 1943) Vital Signs (24h Range):  Temp:  [98.4 °F (36.9 °C)-99.3 °F (37.4 °C)] 99.3 °F (37.4 °C)  Pulse:  [69-77] 77  Resp:  [18] 18  SpO2:  [95 %-97 %] 95 %  BP: (109-127)/(60-85) 120/70     Weight: 79.7 kg (175 lb 11.2 oz)  Body mass index is 35.49 kg/m².    Intake/Output - Last 3 Shifts         02/09 0700  02/10 0659 02/10 0700  02/11 0659 02/11 0700  02/12 0659    P.O. 730 390     I.V. (mL/kg)       IV Piggyback       Total Intake(mL/kg) 730 (9.2) 390 (4.9)     Urine (mL/kg/hr) 0 (0)      Emesis/NG output       Drains 775 1500     Stool 0      Total Output 775 1500     Net -45 -1110            Urine Occurrence 4 x      Stool Occurrence 1 x               Physical Exam  Vitals reviewed.   Constitutional:       General: She is not in acute distress.     Appearance: Normal appearance. She is not toxic-appearing.   HENT:      Head: Normocephalic and atraumatic.      Nose: Nose normal.      Mouth/Throat:      Mouth: Mucous membranes are moist.   Eyes:      General: Scleral icterus present.   Cardiovascular:      Rate and Rhythm: Normal rate.      Pulses: Normal pulses.   Pulmonary:      Effort: No respiratory distress.   Abdominal:      General: Abdomen is flat.      Palpations: Abdomen is soft.      Comments: Soft, non distended. Very mildly tender to deep palpation in the epigastrium. No rebound or guarding.   Bilateral PTCs to bag with bilious output    Neurological:      General: No focal deficit present.      Mental Status: She is alert and oriented to person, place, and time.   Psychiatric:         Mood and Affect: Mood normal.         Behavior: Behavior normal.          Significant Labs:  I have reviewed all pertinent lab results within the past 24 hours.  CBC:   Recent Labs   Lab 02/10/25  0539   WBC 12.37   RBC 4.31   HGB 13.0   HCT 38.0      MCV 88   MCH 30.2   MCHC 34.2     CMP:   Recent Labs   Lab 02/10/25  0539   *   CALCIUM 9.8   ALBUMIN 2.7*   PROT 8.5*   *   K 4.6   CO2 23      BUN 19   CREATININE 0.9   ALKPHOS 479*   *   *   BILITOT 6.0*       Significant Diagnostics:  I have reviewed all pertinent imaging results/findings within the past 24 hours.  Assessment/Plan:     * Biliary obstruction  69F presenting with painless jaundice after being found to have biliary obstruction on recent outpatient lab work. Imaging studies w/ a hypoattenuating mass near the confluence of the right and left hepatic ducts, which is concerning for cholangiocarcinoma. CEA elevated. S/p Bilateral PTC placement 2/8/25. Bili now improving.    -- No tissue diagnosis yet  -- Still needs CT chest for staging  -- Will need repeat liver phase CT at some point once biliary system decompressed for better assessment of mass  -- Bilateral PTCs to gravity  -- Recommend replacing her PTC losses with LR ~1:1  -- Trend CMP  -- Aggressive antiemetic regimen  -- Recommend at least full diet as tolerated to optimize nutrition        Eric Mahan MD R4  Surg/onc resident  Attending: [facility] - Internal Medicine Telemetry

## 2025-02-11 NOTE — ASSESSMENT & PLAN NOTE
Patient with worsening nausea, vomiting s/p PTC on 2/9. Will plan aggressive antiemetic regimen and adjust diet as needed    -Aggressive antiemetic regimen  -Trial of regular diet

## 2025-02-12 LAB
ALBUMIN SERPL BCP-MCNC: 2.4 G/DL (ref 3.5–5.2)
ALP SERPL-CCNC: 343 U/L (ref 40–150)
ALT SERPL W/O P-5'-P-CCNC: 152 U/L (ref 10–44)
ANION GAP SERPL CALC-SCNC: 13 MMOL/L (ref 8–16)
AST SERPL-CCNC: 101 U/L (ref 10–40)
BASOPHILS # BLD AUTO: 0.03 K/UL (ref 0–0.2)
BASOPHILS NFR BLD: 0.3 % (ref 0–1.9)
BILIRUB SERPL-MCNC: 4.6 MG/DL (ref 0.1–1)
BUN SERPL-MCNC: 19 MG/DL (ref 8–23)
CALCIUM SERPL-MCNC: 9.8 MG/DL (ref 8.7–10.5)
CHLORIDE SERPL-SCNC: 99 MMOL/L (ref 95–110)
CO2 SERPL-SCNC: 24 MMOL/L (ref 23–29)
CREAT SERPL-MCNC: 0.7 MG/DL (ref 0.5–1.4)
DIFFERENTIAL METHOD BLD: ABNORMAL
EOSINOPHIL # BLD AUTO: 0 K/UL (ref 0–0.5)
EOSINOPHIL NFR BLD: 0.1 % (ref 0–8)
ERYTHROCYTE [DISTWIDTH] IN BLOOD BY AUTOMATED COUNT: 17.2 % (ref 11.5–14.5)
EST. GFR  (NO RACE VARIABLE): >60 ML/MIN/1.73 M^2
GLUCOSE SERPL-MCNC: 124 MG/DL (ref 70–110)
HCT VFR BLD AUTO: 34.7 % (ref 37–48.5)
HGB BLD-MCNC: 11.8 G/DL (ref 12–16)
IMM GRANULOCYTES # BLD AUTO: 0.16 K/UL (ref 0–0.04)
IMM GRANULOCYTES NFR BLD AUTO: 1.4 % (ref 0–0.5)
LYMPHOCYTES # BLD AUTO: 1.4 K/UL (ref 1–4.8)
LYMPHOCYTES NFR BLD: 12.1 % (ref 18–48)
MAGNESIUM SERPL-MCNC: 1.9 MG/DL (ref 1.6–2.6)
MCH RBC QN AUTO: 30.6 PG (ref 27–31)
MCHC RBC AUTO-ENTMCNC: 34 G/DL (ref 32–36)
MCV RBC AUTO: 90 FL (ref 82–98)
MONOCYTES # BLD AUTO: 1 K/UL (ref 0.3–1)
MONOCYTES NFR BLD: 8.5 % (ref 4–15)
NEUTROPHILS # BLD AUTO: 8.8 K/UL (ref 1.8–7.7)
NEUTROPHILS NFR BLD: 77.6 % (ref 38–73)
NRBC BLD-RTO: 0 /100 WBC
PHOSPHATE SERPL-MCNC: 2.9 MG/DL (ref 2.7–4.5)
PLATELET # BLD AUTO: 305 K/UL (ref 150–450)
PMV BLD AUTO: 10.8 FL (ref 9.2–12.9)
POCT GLUCOSE: 133 MG/DL (ref 70–110)
POCT GLUCOSE: 160 MG/DL (ref 70–110)
POCT GLUCOSE: 171 MG/DL (ref 70–110)
POCT GLUCOSE: 175 MG/DL (ref 70–110)
POTASSIUM SERPL-SCNC: 3.7 MMOL/L (ref 3.5–5.1)
PROT SERPL-MCNC: 7.3 G/DL (ref 6–8.4)
RBC # BLD AUTO: 3.86 M/UL (ref 4–5.4)
SODIUM SERPL-SCNC: 136 MMOL/L (ref 136–145)
WBC # BLD AUTO: 11.36 K/UL (ref 3.9–12.7)

## 2025-02-12 PROCEDURE — 63600175 PHARM REV CODE 636 W HCPCS

## 2025-02-12 PROCEDURE — 84100 ASSAY OF PHOSPHORUS: CPT | Performed by: HOSPITALIST

## 2025-02-12 PROCEDURE — 21400001 HC TELEMETRY ROOM

## 2025-02-12 PROCEDURE — 99232 SBSQ HOSP IP/OBS MODERATE 35: CPT | Mod: GC,,, | Performed by: SURGERY

## 2025-02-12 PROCEDURE — 25000003 PHARM REV CODE 250

## 2025-02-12 PROCEDURE — 97161 PT EVAL LOW COMPLEX 20 MIN: CPT

## 2025-02-12 PROCEDURE — 25000003 PHARM REV CODE 250: Performed by: HOSPITALIST

## 2025-02-12 PROCEDURE — 97165 OT EVAL LOW COMPLEX 30 MIN: CPT

## 2025-02-12 PROCEDURE — 83735 ASSAY OF MAGNESIUM: CPT | Performed by: HOSPITALIST

## 2025-02-12 PROCEDURE — 97116 GAIT TRAINING THERAPY: CPT

## 2025-02-12 PROCEDURE — 63600175 PHARM REV CODE 636 W HCPCS: Performed by: HOSPITALIST

## 2025-02-12 PROCEDURE — A4216 STERILE WATER/SALINE, 10 ML: HCPCS | Performed by: PHYSICIAN ASSISTANT

## 2025-02-12 PROCEDURE — 25000003 PHARM REV CODE 250: Performed by: PHYSICIAN ASSISTANT

## 2025-02-12 PROCEDURE — 25500020 PHARM REV CODE 255: Performed by: HOSPITALIST

## 2025-02-12 PROCEDURE — 80053 COMPREHEN METABOLIC PANEL: CPT | Performed by: HOSPITALIST

## 2025-02-12 PROCEDURE — 36415 COLL VENOUS BLD VENIPUNCTURE: CPT | Performed by: HOSPITALIST

## 2025-02-12 PROCEDURE — 85025 COMPLETE CBC W/AUTO DIFF WBC: CPT | Performed by: HOSPITALIST

## 2025-02-12 RX ORDER — KETOROLAC TROMETHAMINE 30 MG/ML
15 INJECTION, SOLUTION INTRAMUSCULAR; INTRAVENOUS EVERY 6 HOURS PRN
Status: CANCELLED | OUTPATIENT
Start: 2025-02-12 | End: 2025-02-15

## 2025-02-12 RX ORDER — ONDANSETRON 4 MG/1
4 TABLET, FILM COATED ORAL EVERY 6 HOURS
Status: DISCONTINUED | OUTPATIENT
Start: 2025-02-12 | End: 2025-02-17

## 2025-02-12 RX ORDER — HYDROMORPHONE HYDROCHLORIDE 2 MG/1
2 TABLET ORAL EVERY 6 HOURS PRN
Status: DISCONTINUED | OUTPATIENT
Start: 2025-02-12 | End: 2025-02-19 | Stop reason: HOSPADM

## 2025-02-12 RX ORDER — KETOROLAC TROMETHAMINE 30 MG/ML
60 INJECTION, SOLUTION INTRAMUSCULAR; INTRAVENOUS
Status: CANCELLED | OUTPATIENT
Start: 2025-02-12

## 2025-02-12 RX ORDER — POTASSIUM CHLORIDE 20 MEQ/1
20 TABLET, EXTENDED RELEASE ORAL ONCE
Status: COMPLETED | OUTPATIENT
Start: 2025-02-12 | End: 2025-02-12

## 2025-02-12 RX ORDER — KETOROLAC TROMETHAMINE 30 MG/ML
INJECTION, SOLUTION INTRAMUSCULAR; INTRAVENOUS
Status: CANCELLED | OUTPATIENT
Start: 2025-02-12

## 2025-02-12 RX ADMIN — IOHEXOL 75 ML: 350 INJECTION, SOLUTION INTRAVENOUS at 01:02

## 2025-02-12 RX ADMIN — SCOPOLAMINE 1 PATCH: 1.5 PATCH, EXTENDED RELEASE TRANSDERMAL at 05:02

## 2025-02-12 RX ADMIN — LEVETIRACETAM 1500 MG: 750 TABLET, FILM COATED ORAL at 09:02

## 2025-02-12 RX ADMIN — LATANOPROST 1 DROP: 50 SOLUTION/ DROPS OPHTHALMIC at 09:02

## 2025-02-12 RX ADMIN — ACETAMINOPHEN 1000 MG: 500 TABLET ORAL at 09:02

## 2025-02-12 RX ADMIN — ONDANSETRON 4 MG: 2 INJECTION INTRAMUSCULAR; INTRAVENOUS at 05:02

## 2025-02-12 RX ADMIN — METOPROLOL SUCCINATE 50 MG: 50 TABLET, EXTENDED RELEASE ORAL at 09:02

## 2025-02-12 RX ADMIN — ACETAMINOPHEN 1000 MG: 500 TABLET ORAL at 08:02

## 2025-02-12 RX ADMIN — ENOXAPARIN SODIUM 40 MG: 40 INJECTION SUBCUTANEOUS at 04:02

## 2025-02-12 RX ADMIN — SODIUM CHLORIDE, PRESERVATIVE FREE 10 ML: 5 INJECTION INTRAVENOUS at 05:02

## 2025-02-12 RX ADMIN — POTASSIUM CHLORIDE 20 MEQ: 1500 TABLET, EXTENDED RELEASE ORAL at 08:02

## 2025-02-12 RX ADMIN — SODIUM CHLORIDE, PRESERVATIVE FREE 10 ML: 5 INJECTION INTRAVENOUS at 02:02

## 2025-02-12 RX ADMIN — SODIUM CHLORIDE, PRESERVATIVE FREE 10 ML: 5 INJECTION INTRAVENOUS at 09:02

## 2025-02-12 RX ADMIN — LIDOCAINE 1 PATCH: 50 PATCH CUTANEOUS at 08:02

## 2025-02-12 RX ADMIN — ONDANSETRON 4 MG: 4 TABLET ORAL at 05:02

## 2025-02-12 NOTE — PLAN OF CARE
Problem: Occupational Therapy  Goal: Occupational Therapy Goal  Description:     Outcome: Met     OT eval complete. Pt is independent with ADLs & functional mobility/transfers & will not require further acute OT services at this time.

## 2025-02-12 NOTE — PROGRESS NOTES
[facility] - Internal Medicine Telemetry  General Surgery  Progress Note    Subjective:     History of Present Illness:  Katey Cerrato is a 69 y.o. female with a history of empty sella syndrome, GHD, glaucoma, Grave's w/ ecophthalmos, HLD, HTN, seizures, and DM2 who presents as a transfer for workup of her biliary obstruction. The patient states that she recently saw her PCP after she noticed urine discoloration. She was sent for outpatient lab work, which was suggestive of biliary obstruction so she was sent to the ED. In the ED, her lab work was remarkable for t bili of 8.3, elevated LFTs (280/264), and elevated ALP (507). RUQ and CT A/P w/ significant intrahepatic biliary ductal dilation and mild extra hepatic dilation. MRCP w/ hypoattenuating lesion near the confluence of the right and left hepatic ducts causing severe upstream ductal dilatation concerning for cholangiocarcinoma. Surgical oncology consulted for evaluation.       Post-Op Info:  * No surgery found *         Interval History:   Capped L PTC yesterday. Bili continues to downtrend. No concerns today    Medications:  Continuous Infusions:  Scheduled Meds:   acetaminophen  1,000 mg Oral BID    amLODIPine  10 mg Oral Daily    enoxparin  40 mg Subcutaneous Q24H (prophylaxis, 1700)    latanoprost  1 drop Both Eyes QHS    levETIRAcetam  1,500 mg Oral Nightly    LIDOcaine  1 patch Transdermal Daily    metoprolol succinate  50 mg Oral QHS    ondansetron  4 mg Intravenous Q6H    scopolamine  1 patch Transdermal Q3 Days    sodium chloride 0.9%  10 mL Intravenous Q8H    valsartan  320 mg Oral Daily     PRN Meds:  Current Facility-Administered Medications:     dextrose 50%, 12.5 g, Intravenous, PRN    dextrose 50%, 25 g, Intravenous, PRN    diphenhydrAMINE, 25 mg, Oral, Q6H PRN    diphenhydrAMINE-zinc acetate 2-0.1%, , Topical (Top), TID PRN    glucagon (human recombinant), 1 mg, Intramuscular, PRN    glucose, 16 g, Oral, PRN    glucose, 24 g, Oral, PRN     HYDROmorphone, 0.2 mg, Intravenous, Q6H PRN    ibuprofen, 400 mg, Oral, Q6H PRN    insulin aspart U-100, 0-5 Units, Subcutaneous, QID (AC + HS) PRN    melatonin, 6 mg, Oral, Nightly PRN    naloxone, 0.02 mg, Intravenous, PRN    oxyCODONE, 5 mg, Oral, Q6H PRN    prochlorperazine, 2.5 mg, Intravenous, Q6H PRN    senna-docusate 8.6-50 mg, 1 tablet, Oral, BID PRN     Review of patient's allergies indicates:   Allergen Reactions    Codeine Nausea Only     Objective:     Vital Signs (Most Recent):  Temp: 97.9 °F (36.6 °C) (02/12/25 0019)  Pulse: 79 (02/12/25 0812)  Resp: 18 (02/12/25 0812)  BP: 101/68 (02/12/25 0812)  SpO2: 97 % (02/12/25 0812) Vital Signs (24h Range):  Temp:  [97.9 °F (36.6 °C)-98.6 °F (37 °C)] 97.9 °F (36.6 °C)  Pulse:  [67-82] 79  Resp:  [16-18] 18  SpO2:  [96 %-98 %] 97 %  BP: (101-142)/(62-84) 101/68     Weight: 79.7 kg (175 lb 11.2 oz)  Body mass index is 35.49 kg/m².    Intake/Output - Last 3 Shifts         02/10 0700  02/11 0659 02/11 0700  02/12 0659 02/12 0700  02/13 0659    P.O. 390 200     Total Intake(mL/kg) 390 (4.9) 200 (2.5)     Urine (mL/kg/hr)  0 (0)     Drains 1500 2290     Stool       Total Output 1500 2290     Net -1110 -2090            Urine Occurrence  2 x              Physical Exam  Vitals reviewed.   Constitutional:       General: She is not in acute distress.     Appearance: Normal appearance. She is not toxic-appearing.   HENT:      Head: Normocephalic and atraumatic.      Nose: Nose normal.      Mouth/Throat:      Mouth: Mucous membranes are moist.   Cardiovascular:      Rate and Rhythm: Normal rate.      Pulses: Normal pulses.   Pulmonary:      Effort: No respiratory distress.   Abdominal:      General: Abdomen is flat.      Palpations: Abdomen is soft.      Comments: Soft, non distended. Very mildly tender to deep palpation in the epigastrium. No rebound or guarding.   L PTC capped  R PTC with bilious output in gravity bag   Neurological:      General: No focal deficit  present.      Mental Status: She is alert and oriented to person, place, and time.   Psychiatric:         Mood and Affect: Mood normal.         Behavior: Behavior normal.          Significant Labs:  I have reviewed all pertinent lab results within the past 24 hours.  CBC:   Recent Labs   Lab 02/12/25  0519   WBC 11.36   RBC 3.86*   HGB 11.8*   HCT 34.7*      MCV 90   MCH 30.6   MCHC 34.0     CMP:   Recent Labs   Lab 02/12/25  0519   *   CALCIUM 9.8   ALBUMIN 2.4*   PROT 7.3      K 3.7   CO2 24   CL 99   BUN 19   CREATININE 0.7   ALKPHOS 343*   *   *   BILITOT 4.6*       Significant Diagnostics:  I have reviewed all pertinent imaging results/findings within the past 24 hours.  Assessment/Plan:     * Biliary obstruction  69F presenting with painless jaundice after being found to have biliary obstruction on recent outpatient lab work. Imaging studies w/ a hypoattenuating mass near the confluence of the right and left hepatic ducts, which is concerning for cholangiocarcinoma. CEA elevated. S/p Bilateral PTC placement 2/8/25. Bili now improving. L PTC capped on 2/11. R PTC draining to gravity.    -- CT C/A/P with liver phasing ordered for staging and operative planning  -- No tissue diagnosis yet  -- L PTC capped  -- R PTC to gravity bag drainage for now  -- Flush b/l drains TID  -- Recommend replacing her PTC losses with LR ~1:1  -- Trend CMP  -- Aggressive antiemetic regimen  -- Recommend at least full diet as tolerated to optimize nutrition        Eric Mahan, MDR4  Surg/onc resident  Attending: [facility] - Internal Medicine Telemetry

## 2025-02-12 NOTE — PROGRESS NOTES
[facility] - Internal Medicine Telemetry  Adult Nutrition  Progress Note    SUMMARY       Recommendations  1. Continue Low sodium diet- Encourage oral intake.   2. Continue Boost GC- recommend d/c if pt consumes >75% of meals   2. Monitor weight and labs.  3. RD to follow up    Goals: Meet % of EEN/EPN by RD f/u date  Nutrition Goal Status: continues   Communication of RD Recs: (POC)      Assessment and Plan  Nutrition Problem  Increased energy needs     Related to (etiology):   Metabolic change     Signs and Symptoms (as evidenced by):   Pancreatic lesion      Interventions/Recommendations (treatment strategy):  Carb-modified diet  Collaboration/referral with providers.      Nutrition Diagnosis Status:   Continues      Malnutrition Assessment     Eyes (Micronutrient): yellow       Weight Loss (Malnutrition): other (see comments) (6% weight loss in 2 months)  Energy Intake (Malnutrition): other (see comments) (Predicted inadequate)   Orbital Region (Subcutaneous Fat Loss): well nourished  Upper Arm Region (Subcutaneous Fat Loss): well nourished  Thoracic and Lumbar Region: well nourished   Anabaptism Region (Muscle Loss): well nourished  Clavicle Bone Region (Muscle Loss): well nourished  Clavicle and Acromion Bone Region (Muscle Loss): well nourished  Scapular Bone Region (Muscle Loss): well nourished  Dorsal Hand (Muscle Loss): well nourished  Patellar Region (Muscle Loss): well nourished  Anterior Thigh Region (Muscle Loss): well nourished  Posterior Calf Region (Muscle Loss): well nourished                 Reason for Assessment    Reason For Assessment: RD follow-up  Diagnosis: gastrointestinal disease (pancreatic lesion)  General Information Comments: Katey Cerrato is a 69 y.o. female with a history of empty sella syndrome, GHD, glaucoma, Grave's w/ ecophthalmos, HLD, HTN, seizures, and DM2 who presents as a transfer from [facility]. workup of her biliary obstruction. Pt reports fair intakes w/ 50% meal  "consumption at bedside. 4.37% weight loss x 2 months- possibly d/t fluid.  NFPE not warranted as pt is well nourished w/ no s/s of malnutrition at this time. RD following.  Nutrition Discharge Planning: adequate nutritional intake  Nutrition Related Social Determinants of Health: SDOH: None Identified    Nutrition/Diet History    Food Allergies: NKFA    Anthropometrics    Height: 4' 11" (149.9 cm)  Height (inches): 59 in  Height Method: Stated  Weight: 79.7 kg (175 lb 11.2 oz)  Weight (lb): 175.7 lb  Weight Method: Bed Scale  Ideal Body Weight (IBW), Female: 95 lb  % Ideal Body Weight, Female (lb): 184.26 %  BMI (Calculated): 35.5  BMI Grade: 35 - 39.9 - obesity - grade II  Weight Loss: unintentional  Usual Body Weight (UBW), k.8 kg ()  % Usual Body Weight: 93.83  % Weight Change From Usual Weight: -6.37 %       Lab/Procedures/Meds    Pertinent Labs Reviewed: reviewed  Pertinent Labs Comments: Glucose 124  Pertinent Medications Reviewed: reviewed  Pertinent Medications Comments: senna docusate    Estimated/Assessed Needs    Weight Used For Calorie Calculations: 79.4 kg (175 lb 0.7 oz)  Energy Calorie Requirements (kcal): 1828  Energy Need Method: [place] (x activity factor 1.5)  Protein Requirements: 103 (1.3 g/kg)  Weight Used For Protein Calculations: 79.4 kg (175 lb 0.7 oz)  Fluid Requirements (mL): 1 ml/kcal (per MD)  Estimated Fluid Requirement Method: RDA Method  RDA Method (mL): 1828  CHO Requirement: 103-137 (45-60%)      Nutrition Prescription Ordered    Current Diet Order: Low sodium diet     Evaluation of Received Nutrient/Fluid Intake    I/O: -2.2 L since admit   % Intake of Estimated Energy Needs: 50-75%  % Meal Intake: 50-75%    Nutrition Risk    Level of Risk/Frequency of Follow-up: moderate     Monitor and Evaluation    Food and Nutrient Intake: energy intake, food and beverage intake  Food and Nutrient Adminstration: diet order  Physical Activity and Function: nutrition-related " ADLs and IADLs  Anthropometric Measurements: weight, weight change, body mass index  Biochemical Data, Medical Tests and Procedures: electrolyte and renal panel, gastrointestinal profile, glucose/endocrine profile, inflammatory profile, lipid profile  Nutrition-Focused Physical Findings: overall appearance     Nutrition Follow-Up    RD Follow-up?: Yes

## 2025-02-12 NOTE — ASSESSMENT & PLAN NOTE
- Last hemoglobin A1c 5.9, well controlled  - D/C home regimen of Trulicity on discharge due to nausea and vomiting which can worsen or exacerbate the symptoms   - Glucose level reviewed, currently in normal range  - Continue Accu-Cheks q.a.c. and q.h.s. and give low-dose sliding scale insulin as necessary

## 2025-02-12 NOTE — SUBJECTIVE & OBJECTIVE
Interval History: Unable to obtain CT last night due to nausea and one vomiting episode. Tbili and LFTs continue to downtrend today. CT CAP to be obtained today. Possible right PTC capping today.     Review of Systems   Constitutional:  Negative for chills and fever.   Respiratory:  Negative for cough, shortness of breath and wheezing.    Cardiovascular:  Negative for chest pain and leg swelling.   Gastrointestinal:  Positive for abdominal pain (Sites of PTC), nausea and vomiting. Negative for abdominal distention, blood in stool, constipation and diarrhea.   Genitourinary:  Negative for difficulty urinating and dysuria.   Musculoskeletal:  Positive for back pain.     Objective:     Vital Signs (Most Recent):  Temp: 97.9 °F (36.6 °C) (02/12/25 1136)  Pulse: 77 (02/12/25 1136)  Resp: 18 (02/12/25 1136)  BP: 112/76 (02/12/25 1136)  SpO2: 98 % (02/12/25 1136) Vital Signs (24h Range):  Temp:  [97.9 °F (36.6 °C)-98.6 °F (37 °C)] 97.9 °F (36.6 °C)  Pulse:  [76-82] 77  Resp:  [16-18] 18  SpO2:  [96 %-98 %] 98 %  BP: (101-142)/(62-84) 112/76     Weight: 79.7 kg (175 lb 11.2 oz)  Body mass index is 35.49 kg/m².    Intake/Output Summary (Last 24 hours) at 2/12/2025 1225  Last data filed at 2/12/2025 1009  Gross per 24 hour   Intake --   Output 2375 ml   Net -2375 ml         Physical Exam  Vitals reviewed.   Constitutional:       General: She is not in acute distress.     Appearance: Normal appearance. She is not toxic-appearing.   HENT:      Head: Normocephalic and atraumatic.      Nose: Nose normal.      Mouth/Throat:      Mouth: Mucous membranes are moist.   Cardiovascular:      Rate and Rhythm: Normal rate.      Pulses: Normal pulses.   Pulmonary:      Effort: No respiratory distress.   Abdominal:      General: Abdomen is flat.      Palpations: Abdomen is soft.      Comments: Soft, non distended. Very mildly tender to deep palpation in the epigastrium. No rebound or guarding.   L PTC capped  R PTC with bilious output in  gravity bag   Neurological:      General: No focal deficit present.      Mental Status: She is alert and oriented to person, place, and time.   Psychiatric:         Mood and Affect: Mood normal.         Behavior: Behavior normal.             Significant Labs: All pertinent labs within the past 24 hours have been reviewed.  CBC:   Recent Labs   Lab 02/11/25 0639 02/12/25  0519   WBC 13.66* 11.36   HGB 11.8* 11.8*   HCT 34.5* 34.7*    305     CMP:   Recent Labs   Lab 02/11/25  0639 02/12/25  0519   * 136   K 3.7 3.7    99   CO2 26 24   * 124*   BUN 22 19   CREATININE 0.8 0.7   CALCIUM 9.7 9.8   PROT 7.8 7.3   ALBUMIN 2.5* 2.4*   BILITOT 5.3* 4.6*   ALKPHOS 387* 343*   * 101*   * 152*   ANIONGAP 7* 13       Significant Imaging: I have reviewed all pertinent imaging results/findings within the past 24 hours.

## 2025-02-12 NOTE — PLAN OF CARE
Katey Cerrato is a 69 y.o. female admitted to [facility] on 2/3/2025 for Biliary obstruction. Katey Cerrato tolerated evaluation well today. Upon arrival, Katey was found supine resting in bed with 7/10 complaints of incisional pain but receptive to start session. She was able to ambulate 320 feet total within 2 trials with RW and Stand-By (A) progressing to Supervision throughout trails. Trial #1 was 310 feet throughout hallway, ending in bathroom, where she completed an Independent toilet transfer. Trial #2 was 10 feet from bathroom to bedside chair. Katey stated she felt good ambulating and denied any inc pain, nausea or dizziness during gait trials. Education provided to pt on equipment needed after d/c. Patient continues to demonstrate the need for low intensity therapy on a scheduled basis exhibited by decreased independence with self-care and functional mobility. Discussed PT role, POC, and goals with patient; verbalized understanding. Katey Cerrato would benefit from acute PT services to promote mobility during this admission and improve return to PLOF.     Problem: Physical Therapy  Goal: Physical Therapy Goal  Description: Goals to be met by:      Patient will increase functional independence with mobility by performin. Sit to stand transfer with Stand-by Assistance and no AD  2. Gait  x 500 feet with Modified [place] using Rolling Walker.   3. Ascend/descend 5 stairs with left Handrails Stand-by Assistance using Rolling Walker.     Outcome: Progressing   Rufus Rodriguez, SPT  2025

## 2025-02-12 NOTE — NURSING
Patient is requesting to have CT done in the morning due to her not feeling well tonight with nausea and pain control. CT technician and KARYN Quick MD notified.

## 2025-02-12 NOTE — ASSESSMENT & PLAN NOTE
69F presenting with painless jaundice after being found to have biliary obstruction on recent outpatient lab work. Imaging studies w/ a hypoattenuating mass near the confluence of the right and left hepatic ducts, which is concerning for cholangiocarcinoma. CEA elevated. S/p Bilateral PTC placement 2/8/25. Bili now improving. L PTC capped on 2/11. R PTC draining to gravity.    -- CT C/A/P with liver phasing ordered for staging and operative planning  -- No tissue diagnosis yet  -- L PTC capped  -- R PTC to gravity bag drainage for now  -- Flush b/l drains TID  -- Recommend replacing her PTC losses with LR ~1:1  -- Trend CMP  -- Aggressive antiemetic regimen  -- Recommend at least full diet as tolerated to optimize nutrition

## 2025-02-12 NOTE — PLAN OF CARE
Problem: Adult Inpatient Plan of Care  Goal: Plan of Care Review  Outcome: Progressing     Problem: Diabetes Comorbidity  Goal: Blood Glucose Level Within Targeted Range  Outcome: Progressing     Problem: Nausea and Vomiting  Goal: Nausea and Vomiting Relief  Outcome: Progressing     Problem: Pain Acute  Goal: Optimal Pain Control and Function  Outcome: Progressing     Problem: Fall Injury Risk  Goal: Absence of Fall and Fall-Related Injury  Outcome: Progressing     Pain and nausea managed this shift with scheduled and prn meds with moderate relief. Left and right biliary drains flushed per orders. Plan of care ongoing.

## 2025-02-12 NOTE — SUBJECTIVE & OBJECTIVE
Interval History:   Capped L PTC yesterday. Bili continues to downtrend. No concerns today    Medications:  Continuous Infusions:  Scheduled Meds:   acetaminophen  1,000 mg Oral BID    amLODIPine  10 mg Oral Daily    enoxparin  40 mg Subcutaneous Q24H (prophylaxis, 1700)    latanoprost  1 drop Both Eyes QHS    levETIRAcetam  1,500 mg Oral Nightly    LIDOcaine  1 patch Transdermal Daily    metoprolol succinate  50 mg Oral QHS    ondansetron  4 mg Intravenous Q6H    scopolamine  1 patch Transdermal Q3 Days    sodium chloride 0.9%  10 mL Intravenous Q8H    valsartan  320 mg Oral Daily     PRN Meds:  Current Facility-Administered Medications:     dextrose 50%, 12.5 g, Intravenous, PRN    dextrose 50%, 25 g, Intravenous, PRN    diphenhydrAMINE, 25 mg, Oral, Q6H PRN    diphenhydrAMINE-zinc acetate 2-0.1%, , Topical (Top), TID PRN    glucagon (human recombinant), 1 mg, Intramuscular, PRN    glucose, 16 g, Oral, PRN    glucose, 24 g, Oral, PRN    HYDROmorphone, 0.2 mg, Intravenous, Q6H PRN    ibuprofen, 400 mg, Oral, Q6H PRN    insulin aspart U-100, 0-5 Units, Subcutaneous, QID (AC + HS) PRN    melatonin, 6 mg, Oral, Nightly PRN    naloxone, 0.02 mg, Intravenous, PRN    oxyCODONE, 5 mg, Oral, Q6H PRN    prochlorperazine, 2.5 mg, Intravenous, Q6H PRN    senna-docusate 8.6-50 mg, 1 tablet, Oral, BID PRN     Review of patient's allergies indicates:   Allergen Reactions    Codeine Nausea Only     Objective:     Vital Signs (Most Recent):  Temp: 97.9 °F (36.6 °C) (02/12/25 0019)  Pulse: 79 (02/12/25 0812)  Resp: 18 (02/12/25 0812)  BP: 101/68 (02/12/25 0812)  SpO2: 97 % (02/12/25 0812) Vital Signs (24h Range):  Temp:  [97.9 °F (36.6 °C)-98.6 °F (37 °C)] 97.9 °F (36.6 °C)  Pulse:  [67-82] 79  Resp:  [16-18] 18  SpO2:  [96 %-98 %] 97 %  BP: (101-142)/(62-84) 101/68     Weight: 79.7 kg (175 lb 11.2 oz)  Body mass index is 35.49 kg/m².    Intake/Output - Last 3 Shifts         02/10 0700 02/11 0659 02/11 0700 02/12 0659 02/12  0700  02/13 0659    P.O. 390 200     Total Intake(mL/kg) 390 (4.9) 200 (2.5)     Urine (mL/kg/hr)  0 (0)     Drains 1500 2290     Stool       Total Output 1500 2290     Net -1110 -2090            Urine Occurrence  2 x              Physical Exam  Vitals reviewed.   Constitutional:       General: She is not in acute distress.     Appearance: Normal appearance. She is not toxic-appearing.   HENT:      Head: Normocephalic and atraumatic.      Nose: Nose normal.      Mouth/Throat:      Mouth: Mucous membranes are moist.   Cardiovascular:      Rate and Rhythm: Normal rate.      Pulses: Normal pulses.   Pulmonary:      Effort: No respiratory distress.   Abdominal:      General: Abdomen is flat.      Palpations: Abdomen is soft.      Comments: Soft, non distended. Very mildly tender to deep palpation in the epigastrium. No rebound or guarding.   L PTC capped  R PTC with bilious output in gravity bag   Neurological:      General: No focal deficit present.      Mental Status: She is alert and oriented to person, place, and time.   Psychiatric:         Mood and Affect: Mood normal.         Behavior: Behavior normal.          Significant Labs:  I have reviewed all pertinent lab results within the past 24 hours.  CBC:   Recent Labs   Lab 02/12/25  0519   WBC 11.36   RBC 3.86*   HGB 11.8*   HCT 34.7*      MCV 90   MCH 30.6   MCHC 34.0     CMP:   Recent Labs   Lab 02/12/25  0519   *   CALCIUM 9.8   ALBUMIN 2.4*   PROT 7.3      K 3.7   CO2 24   CL 99   BUN 19   CREATININE 0.7   ALKPHOS 343*   *   *   BILITOT 4.6*       Significant Diagnostics:  I have reviewed all pertinent imaging results/findings within the past 24 hours.

## 2025-02-12 NOTE — PLAN OF CARE
Will continue to monitor levels. Will continue to manage nausea and vomiting.  Problem: Adult Inpatient Plan of Care  Goal: Plan of Care Review  Outcome: Progressing  Goal: Patient-Specific Goal (Individualized)  Outcome: Progressing  Goal: Absence of Hospital-Acquired Illness or Injury  Outcome: Progressing  Goal: Optimal Comfort and Wellbeing  Outcome: Progressing  Goal: Readiness for Transition of Care  Outcome: Progressing     Problem: Diabetes Comorbidity  Goal: Blood Glucose Level Within Targeted Range  Outcome: Progressing     Problem: Nausea and Vomiting  Goal: Nausea and Vomiting Relief  Outcome: Progressing     Problem: Pain Acute  Goal: Optimal Pain Control and Function  Outcome: Progressing     Problem: Fall Injury Risk  Goal: Absence of Fall and Fall-Related Injury  Outcome: Progressing     Problem: Skin Injury Risk Increased  Goal: Skin Health and Integrity  Outcome: Progressing

## 2025-02-12 NOTE — PROGRESS NOTES
"[facility] - Internal Medicine [facility] Medicine  Progress Note    Patient Name: Katey Cerrato  MRN: [id number]  Patient Class: IP- Inpatient   Admission Date: 2/3/2025  Length of Stay: 8 days  Attending Physician: Juliana Marx MD  Primary Care Provider: Shay Castellano MD        Subjective     Principal Problem:Biliary obstruction        HPI:  Ms. Katey Cerrato is a 69 y.o. female, with PMH of HTN, HLD, DMII, Grave's disease, who presented to [facility] ED on 2/3/25 after labs following an outpatient appointment were abnormal. She states she recently was treated for shingles with Valtrex with rash improvement, but then her urine became discolored and that prompted her visit to her PCP.  At her PCP visit it was noticed that she had an orange coloration to the skin, and yellow color of the eyes.  After her appointment she had outpatient lab testing showing abnormal liver function, which is what prompted her PCP to send her to the ED. Her only new symptom is some low back pain. She states she had a cholecystectomy in the past, and had many gallstones removed at that time. She states her sister had retained gallstones that needed removal in the past. Denies any fevers, chills, trauma, alcohol consumption, Tylenol consumption.  [facility] ED labs showed tbili 7.4, alk phos 538,  and . Lipase normal. INR 1.2. U/A with few bacteria. A  CT showed significant intrahepatic biliary ductal dilatation, mild extrahepatic biliary ductal dilatation, a 9 mm fat containing lesion in the uncinate process of the pancreas.  She was treated with IV fluids, IV Rocephin, Toradol on admission.  MRCP showed intrahepatic bile duct dilation noted with an ill-defined liver mass.  Further workup with MRI abdomen with/without contrast showed "hypoattenuating lesion near the confluence of the right and left hepatic ducts causing severe upstream ductal dilatation concerning for cholangiocarcinoma. Given lesion and " "hyperbilirubinemia decision was made to transfer patient to [facility] for further workup and evaluation with advanced services, possible ERCP.    On arrival to [facility] VSS. Patient notes generalized pruritus, decreased appetite, nausea, lower back pain. Denies abdominal pain. Pending further workup and management     Overview/Hospital Course:  Patient presented to OSH with jaundice and scleral icterus, along with abnormal labs from clinic. Also noted nausea and back pain.  Noted history of cholecystectomy. Labs showed elevated bilirubin, and imaging with biliary ductal dilatation. Initial CT of the abdomen and pelvis with IV contrast showed significant intrahepatic biliary ductal dilatation and questionable 9 mm fat containing lesion in the uncinate process of the pancreas.  MRCP showed "severe intrahepatic bile duct dilatation, with abrupt narrowing at the confluence of the hepatic ducts where there is a suspected ill-defined liver mass. Repeat imaging with MRI abdomen with and without contrast showed "hypoattenuating lesion near the confluence of the right and left hepatic ducts causing severe upstream ductal dilatation concerning for cholangiocarcinoma.  Further evaluation with ERCP is recommended." Patient transferred to [facility] for further workup and evaluation. CEA elevated, CA 19-9 WNL. AES and Surgical oncology consulted, recommended PTC for further evaluation. Symptoms controlled with benadryl. PCT placed 2/8. Surg/ onc Recommend capping both sided PTC prior to discharge. Both PTC drains need to be flushed with saline twice daily. Pending CT chest, abdomen, pelvis liver protocol for staging.     Interval History: Unable to obtain CT last night due to nausea and one vomiting episode. Tbili and LFTs continue to downtrend today. CT CAP to be obtained today. Possible right PTC capping today.     Review of Systems   Constitutional:  Negative for chills and fever.   Respiratory:  Negative for cough, shortness of breath and " wheezing.    Cardiovascular:  Negative for chest pain and leg swelling.   Gastrointestinal:  Positive for abdominal pain (Sites of PTC), nausea and vomiting. Negative for abdominal distention, blood in stool, constipation and diarrhea.   Genitourinary:  Negative for difficulty urinating and dysuria.   Musculoskeletal:  Positive for back pain.     Objective:     Vital Signs (Most Recent):  Temp: 97.9 °F (36.6 °C) (02/12/25 1136)  Pulse: 77 (02/12/25 1136)  Resp: 18 (02/12/25 1136)  BP: 112/76 (02/12/25 1136)  SpO2: 98 % (02/12/25 1136) Vital Signs (24h Range):  Temp:  [97.9 °F (36.6 °C)-98.6 °F (37 °C)] 97.9 °F (36.6 °C)  Pulse:  [76-82] 77  Resp:  [16-18] 18  SpO2:  [96 %-98 %] 98 %  BP: (101-142)/(62-84) 112/76     Weight: 79.7 kg (175 lb 11.2 oz)  Body mass index is 35.49 kg/m².    Intake/Output Summary (Last 24 hours) at 2/12/2025 1225  Last data filed at 2/12/2025 1009  Gross per 24 hour   Intake --   Output 2375 ml   Net -2375 ml         Physical Exam  Vitals reviewed.   Constitutional:       General: She is not in acute distress.     Appearance: Normal appearance. She is not toxic-appearing.   HENT:      Head: Normocephalic and atraumatic.      Nose: Nose normal.      Mouth/Throat:      Mouth: Mucous membranes are moist.   Cardiovascular:      Rate and Rhythm: Normal rate.      Pulses: Normal pulses.   Pulmonary:      Effort: No respiratory distress.   Abdominal:      General: Abdomen is flat.      Palpations: Abdomen is soft.      Comments: Soft, non distended. Very mildly tender to deep palpation in the epigastrium. No rebound or guarding.   L PTC capped  R PTC with bilious output in gravity bag   Neurological:      General: No focal deficit present.      Mental Status: She is alert and oriented to person, place, and time.   Psychiatric:         Mood and Affect: Mood normal.         Behavior: Behavior normal.             Significant Labs: All pertinent labs within the past 24 hours have been reviewed.  CBC:  "  Recent Labs   Lab 02/11/25  0639 02/12/25  0519   WBC 13.66* 11.36   HGB 11.8* 11.8*   HCT 34.5* 34.7*    305     CMP:   Recent Labs   Lab 02/11/25  0639 02/12/25  0519   * 136   K 3.7 3.7    99   CO2 26 24   * 124*   BUN 22 19   CREATININE 0.8 0.7   CALCIUM 9.7 9.8   PROT 7.8 7.3   ALBUMIN 2.5* 2.4*   BILITOT 5.3* 4.6*   ALKPHOS 387* 343*   * 101*   * 152*   ANIONGAP 7* 13       Significant Imaging: I have reviewed all pertinent imaging results/findings within the past 24 hours.    Assessment and Plan     * Biliary obstruction  Liver mass, possible cholangiocarcinoma  Patient presented to OSH with jaundice and scleral icterus, along with abnormal labs from clinic. Also noted nausea and back pain.  Noted history of cholecystectomy. Labs showed elevated bilirubin, and imaging with biliary ductal dilatation. Initial CT of the abdomen and pelvis with IV contrast showed significant intrahepatic biliary ductal dilatation and questionable 9 mm fat containing lesion in the uncinate process of the pancreas.  Abdominal ultrasound showed hepatic steatosis and intra hepatic biliary ductal dilatation and status post cholecystectomy. MRCP showed "severe intrahepatic bile duct dilatation, with abrupt narrowing at the confluence of the hepatic ducts where there is a suspected ill-defined liver mass.  This is poorly evaluated without contrast but concerning for neoplasm such as cholangiocarcinoma." Repeat imaging with MRI abdomen with and without contrast showed "hypoattenuating lesion near the confluence of the right and left hepatic ducts causing severe upstream ductal dilatation concerning for cholangiocarcinoma.  Further evaluation with ERCP is recommended." Patient transferred to [facility] for further workup and evaluation. CEA elevated, CA 19-9 WNL. Surgical oncology, AES, and IR consulted to coordinate plan for further management. Underwent PTC with IR on 2/8 without complication. Plan to " allow decompression of the biliary system and repeat imaging to reassess obstruction    - Full liquid diet tolerated well for nausea and vomiting, will advance diet to solids and continue to monitor and adjust as needed   -AES Consulted  -Surgical Oncology consulted, appreciate recs     -S/p PTC w/ IR     - Plan for repeat CT A/P when biliary system decompressed to be done outpatient      -CT chest, abdomen, and pelvis  for further staging ordered   - Leave drains to gravity drainage until bilirubin stabilizes. Drains can be flushed once daily with normal saline. She will need routine exchange in 8-12 weeks with IR. Surg Onc is following and would like to the drains capped prior to discharge.  Left side is capped, possible right PTC cap today   - Continue p.r.n. medication for symptomatic relief  - Trend with labs and correct electrolyte abnormalities as needed  - Replace with LR for volume losses with PCT drains as needed, and patient was encouraged to increase PO fluids intake     Nausea and vomiting  Patient with worsening nausea, vomiting s/p PTC on 2/9. Will plan aggressive antiemetic regimen and adjust diet as needed    -Aggressive antiemetic regimen  -Trial of regular diet       Type 2 diabetes mellitus without complication, without long-term current use of insulin  - Last hemoglobin A1c 5.9, well controlled  - D/C home regimen of Trulicity on discharge due to nausea and vomiting which can worsen or exacerbate the symptoms   - Glucose level reviewed, currently in normal range  - Continue Accu-Cheks q.a.c. and q.h.s. and give low-dose sliding scale insulin as necessary    Mixed hyperlipidemia  Patient with hx HLD presenting with abnormal liver function tests. HLD well controlled on home statin    - Holding statin due to abnormal liver function tests    Graves' disease with exophthalmos  Pt with hx graves disease last TSH wnl in 2023. Currently without symptoms.    -Continue to monitor         Hypertension  associated with diabetes  - Well controlled, continue metoprolol 50 mg p.o. q.h.s., amlodipine 10 mg p.o. daily and valsartan 320 mg p.o. daily    Generalized convulsive epilepsy with intractable epilepsy  Patient with history of epilepsy in the past on levitiracetam 1500 mg qhs. Stable, no reported seizure activity    - Continue home levetiracetam 1500 mg p.o. q.h.s.      VTE Risk Mitigation (From admission, onward)           Ordered     enoxaparin injection 40 mg  Every 24 hours         02/10/25 1040     IP VTE HIGH RISK PATIENT  Once         02/04/25 0219     Place sequential compression device  Until discontinued         02/04/25 0219                    Discharge Planning   ISHMAEL: 2/15/2025     Code Status: Full Code   Medical Readiness for Discharge Date:   Discharge Plan A: Home with family   Discharge Delays: None known at this time                    Martina Gaspar MD  Department of Hospital Medicine   [facility] - Internal Medicine Telemetry

## 2025-02-12 NOTE — NURSING
Notified KARYN Quick MD for correct sliding scale order. Current scale reflects a Q6hr monitoring schedule, however the patient has orders for AC/HS glucose monitoring.

## 2025-02-12 NOTE — PT/OT/SLP EVAL
Physical Therapy  Evaluation and Treatment    Katey Cerrato   [id number]    Time Tracking:     PT Received On: 02/12/25   PT Start Time: 0911   PT Stop Time: 0947   PT Total Time (min): 36 min    Billable Minutes: Evaluation 13 and Gait Training 23  minutes    Recommendations:     Therapy Intensity Recommendations at Discharge: Low Intensity Therapy     Equipment Needed After Discharge: walker, rolling, bath bench    Barriers to Discharge: None    Justification for Walker HME:  Patient demonstrates a mobility limitation that significantly impairs their ability to participate in one or more mobility related activities of daily living. Patient's mobility limitation cannot be sufficiently resolved with the use of a cane, but can be sufficiently resolved with the use of a rolling walker.The use of a rolling walker will considerably improve their ability to participate in MRADLs. Patient will use the walker on a regular basis at home.     Patient Information:     Recent Surgery: * No surgery found *      Diagnosis: Biliary obstruction    Length of Stay: 8 days    General Precautions: Standard, fall  Orthopedic Precautions: N/A  Brace: N/A    Assessment:     Katey Cerrato is a 69 y.o. female admitted to [facility] on 2/3/2025 for Biliary obstruction. Katey Cerrato tolerated evaluation well today. Upon arrival, Katey was found supine resting in bed with 7/10 complaints of incisional pain but receptive to start session. She was able to ambulate 320 feet total within 2 trials with RW and Stand-By (A) progressing to Supervision throughout trails. Trial #1 was 310 feet throughout hallway, ending in bathroom, where she completed an Independent toilet transfer. Trial #2 was 10 feet from bathroom to bedside chair. Katey stated she felt good ambulating and denied any inc pain, nausea or dizziness during gait trials. Education provided to pt on equipment needed after d/c. Patient continues to demonstrate the need for low intensity  "therapy on a scheduled basis exhibited by decreased independence with self-care and functional mobility. Discussed PT role, POC, and goals with patient; verbalized understanding. Katey Cerrato would benefit from acute PT services to promote mobility during this admission and improve return to PLOF.    Problem List: weakness, impaired endurance, impaired functional mobility, impaired self care skills, gait instability, impaired balance, decreased upper extremity function, pain, impaired cardiopulmonary response to activity    Rehab Prognosis: Good; patient would benefit from acute skilled PT services to address these deficits and reach maximum level of function.    Plan:     Patient to be seen 2 x/week to address the above listed problems via therapeutic activities, gait training, therapeutic exercises, neuromuscular re-education    Plan of Care Expires: 03/14/25  Plan of Care reviewed with: patient    Subjective:     Communicated with RN prior to evaluation, appropriate to see for evaluation.    Pt found supine in bed (HOB elevated) upon PT entry to room, agreeable to evaluation.    Patient commenting: "I do not use my cane"    Does this patient have any cultural, spiritual, [facility]ism conflicts given the current situation? Patient has no barriers to learning. Patient verbalizes understanding of his/her program and goals and demonstrates them correctly. No cultural, spiritual, or educational needs identified.    Past Medical History:   Diagnosis Date    Cataract     Empty sella syndrome     GHD (growth hormone deficiency)     Glaucoma     Graves' disease with exophthalmos     History of vitamin D deficiency     Hyperlipidemia     Hypertension     Seizures     Thyroid nodule     Thyroid nodule     Type II or unspecified type diabetes mellitus without mention of complication, uncontrolled       Past Surgical History:   Procedure Laterality Date    BREAST BIOPSY      CATARACT EXTRACTION W/  INTRAOCULAR LENS IMPLANT " Right 3/15/2021    Procedure: EXTRACTION, CATARACT, WITH IOL INSERTION;  Surgeon: Cj Caban MD;  Location: [facility] OR;  Service: Ophthalmology;  Laterality: Right;    CATARACT EXTRACTION W/  INTRAOCULAR LENS IMPLANT Left 3/29/2021    Procedure: EXTRACTION, CATARACT, WITH IOL INSERTION;  Surgeon: Cj Caban MD;  Location: [facility] OR;  Service: Ophthalmology;  Laterality: Left;    CHOLECYSTECTOMY      HYSTERECTOMY      one ovary intact    INJECTION OF JOINT Left 2/6/2024    Procedure: INJECTION, JOINT LEFT KNEE WITH STEROID;  Surgeon: She Schwartz MD;  Location: [facility] PAIN MGT;  Service: Pain Management;  Laterality: Left;  [phone]    KNEE SURGERY      LYMPH NODE BIOPSY  3010    OOPHORECTOMY         Living Environment:  Pt lives alone in a 1 story house with 5 CONRADO and L HR. Pt has a tub shower with no grab bar or shower chair.     PLOF:  Prior to admission, patient independent in ambulation and all ALDs. Pt drives and enjoys going to [employer] in free time.    DME:  Patient owns or has access to the following DME: cane, straight, bedside commode (Does not use)    Upon discharge, patient will have assistance from family and friends.    Objective:     Patient found with:  (Biliary tube (RUQ))    Pain:  Pain Rating 1: 7/10  Location - Side 1: Right  Location - Orientation 1: generalized  Location 1: abdomen (incision)  Pain Addressed 1: Distraction, Reposition  Pain Rating Post-Intervention 1: 5/10    Cognitive Exam:  Patient is oriented to Person, Place, Time, and Situation.  Patient follows 100% of single-step commands.    Sensation:   Intact at BLE to light touch    Lower Extremity Range of Motion:  Right Lower Extremity: WFL actively  Left Lower Extremity: WFL actively    Lower Extremity Strength:  Right Lower Extremity: WFL  Left Lower Extremity: WFL    Functional Mobility:    Bed Mobility:  Supine to Sitting: Supervision  Scooting towards EOB in sitting: Supervision    Transfers:  Sit to Stand: Stand-By  Assist from EOB with Rolling walker x 1 trial(s)  Toilet Transfer: Independent to transition on/off toilet/commode with no AD x 1 trial(s)    Gait:  320 feet total within 2 trials with RW and Stand-By (A) progressing to Supervision throughout trails. Trial #1 was 310 feet throughout hallway, ending in bathroom, where she completed an Independent toilet transfer. Trial #2 was 10 feet from bathroom to bedside chair. Katey stated she felt good ambulating and denied any inc pain, nausea or dizziness during gait trials.    Assist level: SBA/Supervision - see above  Device: Rolling walker    Balance:  Static Sit: Supervision at EOB    Static Stand: Supervision with Rolling walker    AM-PAC 6 CLICK MOBILITY  Turning over in bed (including adjusting bedclothes, sheets and blankets)?: 4  Sitting down on and standing up from a chair with arms (e.g., wheelchair, bedside commode, etc.): 4  Moving from lying on back to sitting on the side of the bed?: 4  Moving to and from a bed to a chair (including a wheelchair)?: 4  Need to walk in hospital room?: 4  Climbing 3-5 steps with a railing?: 3  Basic Mobility Total Score: 23    Patient was left reclined in bedside chair with all lines intact, call button in reach, and MD present.    Clinical Decision Making for Evaluation Complexity:  1. Body System(s) Examination: 1-2  2. Clinical Presentation: Evolving  3. Evaluation Complexity: Low    GOALS:   Multidisciplinary Problems       Physical Therapy Goals          Problem: Physical Therapy    Goal Priority Disciplines Outcome Interventions   Physical Therapy Goal     PT, PT/OT     Description: Goals to be met by:      Patient will increase functional independence with mobility by performin. Sit to stand transfer with Stand-by Assistance and no AD  2. Gait  x 500 feet with Modified [place] using Rolling Walker.   3. Ascend/descend 5 stairs with left Handrails Stand-by Assistance using Rolling Walker.                           Rufus Rodriguez, SPT  2/12/2025

## 2025-02-12 NOTE — ASSESSMENT & PLAN NOTE
"Liver mass, possible cholangiocarcinoma  Patient presented to OSH with jaundice and scleral icterus, along with abnormal labs from clinic. Also noted nausea and back pain.  Noted history of cholecystectomy. Labs showed elevated bilirubin, and imaging with biliary ductal dilatation. Initial CT of the abdomen and pelvis with IV contrast showed significant intrahepatic biliary ductal dilatation and questionable 9 mm fat containing lesion in the uncinate process of the pancreas.  Abdominal ultrasound showed hepatic steatosis and intra hepatic biliary ductal dilatation and status post cholecystectomy. MRCP showed "severe intrahepatic bile duct dilatation, with abrupt narrowing at the confluence of the hepatic ducts where there is a suspected ill-defined liver mass.  This is poorly evaluated without contrast but concerning for neoplasm such as cholangiocarcinoma." Repeat imaging with MRI abdomen with and without contrast showed "hypoattenuating lesion near the confluence of the right and left hepatic ducts causing severe upstream ductal dilatation concerning for cholangiocarcinoma.  Further evaluation with ERCP is recommended." Patient transferred to [facility] for further workup and evaluation. CEA elevated, CA 19-9 WNL. Surgical oncology, AES, and IR consulted to coordinate plan for further management. Underwent PTC with IR on 2/8 without complication. Plan to allow decompression of the biliary system and repeat imaging to reassess obstruction    - Full liquid diet tolerated well for nausea and vomiting, will advance diet to solids and continue to monitor and adjust as needed   -AES Consulted  -Surgical Oncology consulted, appreciate recs     -S/p PTC w/ IR     - Plan for repeat CT A/P when biliary system decompressed to be done outpatient      -CT chest, abdomen, and pelvis  for further staging ordered   - Leave drains to gravity drainage until bilirubin stabilizes. Drains can be flushed once daily with normal saline. She " will need routine exchange in 8-12 weeks with IR. Surg Onc is following and would like to the drains capped prior to discharge.  Left side is capped, possible right PTC cap today   - Continue p.r.n. medication for symptomatic relief  - Trend with labs and correct electrolyte abnormalities as needed  - Replace with LR for volume losses with PCT drains as needed, and patient was encouraged to increase PO fluids intake

## 2025-02-12 NOTE — PLAN OF CARE
Problem: Adult Inpatient Plan of Care  Goal: Plan of Care Review  Outcome: Progressing  Goal: Patient-Specific Goal (Individualized)  Outcome: Progressing  Goal: Absence of Hospital-Acquired Illness or Injury  Outcome: Progressing  Goal: Optimal Comfort and Wellbeing  Outcome: Progressing  Goal: Readiness for Transition of Care  Outcome: Progressing     Problem: Diabetes Comorbidity  Goal: Blood Glucose Level Within Targeted Range  Outcome: Progressing     Problem: Nausea and Vomiting  Goal: Nausea and Vomiting Relief  Outcome: Progressing     Problem: Pain Acute  Goal: Optimal Pain Control and Function  Outcome: Progressing     Problem: Fall Injury Risk  Goal: Absence of Fall and Fall-Related Injury  Outcome: Progressing     Problem: Skin Injury Risk Increased  Goal: Skin Health and Integrity  Outcome: Progressing

## 2025-02-13 DIAGNOSIS — K83.1 BILIARY OBSTRUCTION: Primary | ICD-10-CM

## 2025-02-13 LAB
ALBUMIN SERPL BCP-MCNC: 2.2 G/DL (ref 3.5–5.2)
ALBUMIN SERPL BCP-MCNC: 2.3 G/DL (ref 3.5–5.2)
ALP SERPL-CCNC: 317 U/L (ref 40–150)
ALP SERPL-CCNC: 336 U/L (ref 40–150)
ALT SERPL W/O P-5'-P-CCNC: 130 U/L (ref 10–44)
ALT SERPL W/O P-5'-P-CCNC: 133 U/L (ref 10–44)
ANION GAP SERPL CALC-SCNC: 10 MMOL/L (ref 8–16)
ANION GAP SERPL CALC-SCNC: 13 MMOL/L (ref 8–16)
AST SERPL-CCNC: 134 U/L (ref 10–40)
AST SERPL-CCNC: 93 U/L (ref 10–40)
BASOPHILS # BLD AUTO: 0.03 K/UL (ref 0–0.2)
BASOPHILS NFR BLD: 0.2 % (ref 0–1.9)
BILIRUB SERPL-MCNC: 4.2 MG/DL (ref 0.1–1)
BILIRUB SERPL-MCNC: 4.2 MG/DL (ref 0.1–1)
BUN SERPL-MCNC: 22 MG/DL (ref 8–23)
BUN SERPL-MCNC: 30 MG/DL (ref 8–23)
CALCIUM SERPL-MCNC: 9.2 MG/DL (ref 8.7–10.5)
CALCIUM SERPL-MCNC: 9.6 MG/DL (ref 8.7–10.5)
CHLORIDE SERPL-SCNC: 97 MMOL/L (ref 95–110)
CHLORIDE SERPL-SCNC: 98 MMOL/L (ref 95–110)
CO2 SERPL-SCNC: 24 MMOL/L (ref 23–29)
CO2 SERPL-SCNC: 25 MMOL/L (ref 23–29)
CREAT SERPL-MCNC: 1 MG/DL (ref 0.5–1.4)
CREAT SERPL-MCNC: 1.4 MG/DL (ref 0.5–1.4)
DIFFERENTIAL METHOD BLD: ABNORMAL
EOSINOPHIL # BLD AUTO: 0 K/UL (ref 0–0.5)
EOSINOPHIL NFR BLD: 0.1 % (ref 0–8)
ERYTHROCYTE [DISTWIDTH] IN BLOOD BY AUTOMATED COUNT: 17.3 % (ref 11.5–14.5)
EST. GFR  (NO RACE VARIABLE): 40.5 ML/MIN/1.73 M^2
EST. GFR  (NO RACE VARIABLE): >60 ML/MIN/1.73 M^2
GLUCOSE SERPL-MCNC: 154 MG/DL (ref 70–110)
GLUCOSE SERPL-MCNC: 175 MG/DL (ref 70–110)
HCT VFR BLD AUTO: 35.7 % (ref 37–48.5)
HGB BLD-MCNC: 12 G/DL (ref 12–16)
IMM GRANULOCYTES # BLD AUTO: 0.13 K/UL (ref 0–0.04)
IMM GRANULOCYTES NFR BLD AUTO: 0.7 % (ref 0–0.5)
LACTATE SERPL-SCNC: 1.8 MMOL/L (ref 0.5–2.2)
LYMPHOCYTES # BLD AUTO: 1.2 K/UL (ref 1–4.8)
LYMPHOCYTES NFR BLD: 6.3 % (ref 18–48)
MAGNESIUM SERPL-MCNC: 2 MG/DL (ref 1.6–2.6)
MCH RBC QN AUTO: 30.8 PG (ref 27–31)
MCHC RBC AUTO-ENTMCNC: 33.6 G/DL (ref 32–36)
MCV RBC AUTO: 92 FL (ref 82–98)
MONOCYTES # BLD AUTO: 1.8 K/UL (ref 0.3–1)
MONOCYTES NFR BLD: 9.4 % (ref 4–15)
NEUTROPHILS # BLD AUTO: 15.6 K/UL (ref 1.8–7.7)
NEUTROPHILS NFR BLD: 83.3 % (ref 38–73)
NRBC BLD-RTO: 0 /100 WBC
PHOSPHATE SERPL-MCNC: 3.4 MG/DL (ref 2.7–4.5)
PLATELET # BLD AUTO: 334 K/UL (ref 150–450)
PMV BLD AUTO: 10.8 FL (ref 9.2–12.9)
POCT GLUCOSE: 169 MG/DL (ref 70–110)
POCT GLUCOSE: 195 MG/DL (ref 70–110)
POCT GLUCOSE: 216 MG/DL (ref 70–110)
POTASSIUM SERPL-SCNC: 3.7 MMOL/L (ref 3.5–5.1)
POTASSIUM SERPL-SCNC: 3.8 MMOL/L (ref 3.5–5.1)
PROT SERPL-MCNC: 7 G/DL (ref 6–8.4)
PROT SERPL-MCNC: 7.6 G/DL (ref 6–8.4)
RBC # BLD AUTO: 3.9 M/UL (ref 4–5.4)
SODIUM SERPL-SCNC: 132 MMOL/L (ref 136–145)
SODIUM SERPL-SCNC: 135 MMOL/L (ref 136–145)
WBC # BLD AUTO: 18.7 K/UL (ref 3.9–12.7)

## 2025-02-13 PROCEDURE — 83735 ASSAY OF MAGNESIUM: CPT | Performed by: HOSPITALIST

## 2025-02-13 PROCEDURE — 63600175 PHARM REV CODE 636 W HCPCS: Performed by: HOSPITALIST

## 2025-02-13 PROCEDURE — 99232 SBSQ HOSP IP/OBS MODERATE 35: CPT | Mod: GC,,, | Performed by: SURGERY

## 2025-02-13 PROCEDURE — 63600175 PHARM REV CODE 636 W HCPCS

## 2025-02-13 PROCEDURE — 25000003 PHARM REV CODE 250: Performed by: HOSPITALIST

## 2025-02-13 PROCEDURE — 25000003 PHARM REV CODE 250: Performed by: PHYSICIAN ASSISTANT

## 2025-02-13 PROCEDURE — 85025 COMPLETE CBC W/AUTO DIFF WBC: CPT

## 2025-02-13 PROCEDURE — 25000003 PHARM REV CODE 250

## 2025-02-13 PROCEDURE — 80053 COMPREHEN METABOLIC PANEL: CPT | Performed by: HOSPITALIST

## 2025-02-13 PROCEDURE — 87040 BLOOD CULTURE FOR BACTERIA: CPT

## 2025-02-13 PROCEDURE — 80053 COMPREHEN METABOLIC PANEL: CPT | Mod: 91

## 2025-02-13 PROCEDURE — A4216 STERILE WATER/SALINE, 10 ML: HCPCS | Performed by: PHYSICIAN ASSISTANT

## 2025-02-13 PROCEDURE — 21400001 HC TELEMETRY ROOM

## 2025-02-13 PROCEDURE — 36415 COLL VENOUS BLD VENIPUNCTURE: CPT | Performed by: HOSPITALIST

## 2025-02-13 PROCEDURE — 84100 ASSAY OF PHOSPHORUS: CPT | Performed by: HOSPITALIST

## 2025-02-13 PROCEDURE — 36415 COLL VENOUS BLD VENIPUNCTURE: CPT

## 2025-02-13 PROCEDURE — 83605 ASSAY OF LACTIC ACID: CPT

## 2025-02-13 RX ORDER — HYDROMORPHONE HYDROCHLORIDE 2 MG/1
2 TABLET ORAL 2 TIMES DAILY PRN
Qty: 14 TABLET | Refills: 0 | Status: SHIPPED | OUTPATIENT
Start: 2025-02-13

## 2025-02-13 RX ORDER — SCOPOLAMINE 1 MG/3D
1 PATCH, EXTENDED RELEASE TRANSDERMAL
Qty: 3 PATCH | Refills: 0 | Status: SHIPPED | OUTPATIENT
Start: 2025-02-15

## 2025-02-13 RX ORDER — ACETAMINOPHEN 500 MG
1000 TABLET ORAL ONCE
Status: COMPLETED | OUTPATIENT
Start: 2025-02-13 | End: 2025-02-13

## 2025-02-13 RX ORDER — ONDANSETRON 4 MG/1
4 TABLET, FILM COATED ORAL EVERY 6 HOURS PRN
Qty: 30 TABLET | Refills: 0 | Status: SHIPPED | OUTPATIENT
Start: 2025-02-13 | End: 2025-02-19

## 2025-02-13 RX ADMIN — ENOXAPARIN SODIUM 40 MG: 40 INJECTION SUBCUTANEOUS at 06:02

## 2025-02-13 RX ADMIN — VANCOMYCIN HYDROCHLORIDE 1500 MG: 1.5 INJECTION, POWDER, LYOPHILIZED, FOR SOLUTION INTRAVENOUS at 10:02

## 2025-02-13 RX ADMIN — ACETAMINOPHEN 1000 MG: 500 TABLET ORAL at 08:02

## 2025-02-13 RX ADMIN — LATANOPROST 1 DROP: 50 SOLUTION/ DROPS OPHTHALMIC at 10:02

## 2025-02-13 RX ADMIN — SODIUM CHLORIDE, PRESERVATIVE FREE 10 ML: 5 INJECTION INTRAVENOUS at 10:02

## 2025-02-13 RX ADMIN — PROCHLORPERAZINE EDISYLATE 2.5 MG: 5 INJECTION INTRAMUSCULAR; INTRAVENOUS at 09:02

## 2025-02-13 RX ADMIN — ONDANSETRON 4 MG: 4 TABLET ORAL at 05:02

## 2025-02-13 RX ADMIN — SODIUM CHLORIDE, PRESERVATIVE FREE 10 ML: 5 INJECTION INTRAVENOUS at 01:02

## 2025-02-13 RX ADMIN — INSULIN ASPART 2 UNITS: 100 INJECTION, SOLUTION INTRAVENOUS; SUBCUTANEOUS at 01:02

## 2025-02-13 RX ADMIN — ONDANSETRON 4 MG: 4 TABLET ORAL at 06:02

## 2025-02-13 RX ADMIN — ONDANSETRON 4 MG: 4 TABLET ORAL at 01:02

## 2025-02-13 RX ADMIN — SODIUM CHLORIDE 1000 ML: 9 INJECTION, SOLUTION INTRAVENOUS at 11:02

## 2025-02-13 RX ADMIN — HYDROMORPHONE HYDROCHLORIDE 2 MG: 2 TABLET ORAL at 12:02

## 2025-02-13 RX ADMIN — LIDOCAINE 1 PATCH: 50 PATCH CUTANEOUS at 09:02

## 2025-02-13 RX ADMIN — ONDANSETRON 4 MG: 4 TABLET ORAL at 12:02

## 2025-02-13 RX ADMIN — HYDROMORPHONE HYDROCHLORIDE 2 MG: 2 TABLET ORAL at 08:02

## 2025-02-13 RX ADMIN — ACETAMINOPHEN 1000 MG: 500 TABLET ORAL at 06:02

## 2025-02-13 RX ADMIN — PROCHLORPERAZINE EDISYLATE 2.5 MG: 5 INJECTION INTRAMUSCULAR; INTRAVENOUS at 10:02

## 2025-02-13 RX ADMIN — AMLODIPINE BESYLATE 10 MG: 10 TABLET ORAL at 09:02

## 2025-02-13 RX ADMIN — SODIUM CHLORIDE, PRESERVATIVE FREE 10 ML: 5 INJECTION INTRAVENOUS at 05:02

## 2025-02-13 RX ADMIN — VALSARTAN 320 MG: 160 TABLET, FILM COATED ORAL at 09:02

## 2025-02-13 NOTE — PLAN OF CARE
[facility] Internal Medicine Telemetry      HOME HEALTH ORDERS  FACE TO FACE ENCOUNTER    Patient Name: Katey Cerrato  YOB: 1955    PCP: Shay Castellano MD   PCP Address: [address] [place] [address] / [place] [address]  PCP Phone Number: [phone]  PCP Fax: [phone]    Encounter Date: 2/3/25    Admit to Home Health    Diagnoses:  Active Hospital Problems    Diagnosis  POA    *Biliary obstruction [K83.1]  Yes     Priority: 1 - High    Fever [R50.9]  Unknown    Nausea and vomiting [R11.2]  Yes    Type 2 diabetes mellitus without complication, without long-term current use of insulin [E11.9]  Yes    Mixed hyperlipidemia [E78.2]  Yes     Chronic    Graves' disease with exophthalmos [E05.00]  Yes    Hypertension associated with diabetes [E11.59, I15.2]  Yes    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       Follow Up Appointments:  Future Appointments   Date Time Provider Department Center   2[facility]27[facility]2025  8[facility]20 [facility]   2/27/2025  9:30 AM Mary Almaraz NP [facility]   2/27/2025 10:00 AM Lucy Palacio RD [facility]   3/11/2025  2:00 PM Bonita Najera MD [facility]YN [facility]   5/19/2025 10:40 AM Mariaelena Thomas MD [facility] DERM [facility]   8/4/2025 11:40 AM Shay Castellano MD [facility] IM [facility]       Allergies:  Review of patient's allergies indicates:   Allergen Reactions    Codeine Nausea Only       Medications: Review discharge medications with patient and family and provide education.    Current Facility-Administered Medications   Medication Dose Route Frequency Provider Last Rate Last Admin    dextrose 50% injection 12.5 g  12.5 g Intravenous PRN Vandana Brown PA-C        dextrose 50% injection 25 g  25 g Intravenous PRN Vandana Brown PA-C        diphenhydrAMINE capsule 25 mg  25 mg Oral  Q6H PRN Zaire Lu MD   25 mg at 02/08/25 0845    diphenhydrAMINE-zinc acetate 2-0.1% cream   Topical (Top) TID PRN Zaire Lu MD        enoxaparin injection 40 mg  40 mg Subcutaneous Q24H (prophylaxis, 1700) Juliana Marx MD   40 mg at 02/18/25 1602    glucagon (human recombinant) injection 1 mg  1 mg Intramuscular PRN Vandana Brown PA-C        glucose chewable tablet 16 g  16 g Oral PRN Vandana Brown PA-C        glucose chewable tablet 24 g  24 g Oral PRN Vandana Brown PA-C        HYDROmorphone tablet 2 mg  2 mg Oral Q6H PRN Ildefonso Yao MD   2 mg at 02/14/25 1026    insulin aspart U-100 pen 0-5 Units  0-5 Units Subcutaneous QID (AC + HS) PRN Maria De Jesus Quick MD   2 Units at 02/13/25 1332    latanoprost 0.005 % ophthalmic solution 1 drop  1 drop Both Eyes QHS Vandana Brown PA-C   1 drop at 02/18/25 2050    levETIRAcetam tablet 1,500 mg  1,500 mg Oral Nightly Dafne Min MD   1,500 mg at 02/18/25 2046    LIDOcaine 5 % patch 1 patch  1 patch Transdermal Daily Martina Gaspar MD   1 patch at 02/19/25 0829    melatonin tablet 6 mg  6 mg Oral Nightly PRN Vandana Brown PA-C        naloxone 0.4 mg/mL injection 0.02 mg  0.02 mg Intravenous PRN Vandana Brown PA-C        ondansetron injection 4 mg  4 mg Intravenous Q8H PRN Selina Winters MD        polyethylene glycol packet 17 g  17 g Oral Daily PRN Martina Gaspar MD   17 g at 02/16/25 1339    scopolamine 1.3-1.5 mg (1 mg over 3 days) 1 patch  1 patch Transdermal Q3 Days Meme Traore MD   1 patch at 02/18/25 0604    senna-docusate 8.6-50 mg per tablet 1 tablet  1 tablet Oral Daily Martina Campos MD        sodium bicarbonate tablet 650 mg  650 mg Oral BID Selina Winters MD   650 mg at 02/19/25 0828    sodium chloride 0.9% flush 10 mL  10 mL Intravenous Q8H Vandana Brown PA-C   10 mL at 02/19/25 0548        Medication List        PAUSE taking these  medications      amLODIPine 10 MG tablet  Wait to take this until your doctor or other care provider tells you to start again.  Commonly known as: NORVASC  TAKE 1 TABLET BY MOUTH EVERY DAY     metoprolol succinate 50 MG 24 hr tablet  Wait to take this until your doctor or other care provider tells you to start again.  Commonly known as: TOPROL-XL  TAKE 1 TABLET BY MOUTH EVERY DAY IN THE EVENING            START taking these medications      HYDROmorphone 2 MG tablet  Commonly known as: DILAUDID  Take 1 tablet (2 mg total) by mouth 2 (two) times daily as needed for Pain.     ondansetron 4 MG tablet  Commonly known as: ZOFRAN  Take 2 tablets (8 mg total) by mouth every 6 (six) hours as needed for Nausea.     scopolamine 1.3-1.5 mg (1 mg over 3 days)  Commonly known as: TRANSDERM-SCOP  Place 1 patch onto the skin Every 3 (three) days.            CHANGE how you take these medications      valsartan 320 MG tablet  Commonly known as: DIOVAN  Take 1 tablet (320 mg total) by mouth once daily.  Notes to patient: Hold until follow up with doctors            CONTINUE taking these medications      atorvastatin 80 MG tablet  Commonly known as: LIPITOR  Take 1 tablet (80 mg total) by mouth once daily.     blood sugar diagnostic Strp  To check BG 1 times daily, to use with insurance preferred meter     blood-glucose meter kit  To check BG 1 times daily, to use with insurance preferred meter     lancets Misc  To check BG 1 times daily, to use with insurance preferred meter     latanoprost 0.005 % ophthalmic solution  INSTILL 1 DROP INTO BOTH EYES EVERY EVENING     levETIRAcetam 750 MG Tab  Commonly known as: KEPPRA  TAKE 2 TABLETS BY MOUTH ONCE  DAILY     multivitamin-minerals-lutein Tab  Take 1 tablet by mouth once daily once daily.     VITAMIN D3 COMPLETE ORAL  Take 1 tablet by mouth once daily.            STOP taking these medications      FLUZONE HIGHDOSE QUAD 23-24  mcg/0.7 mL Syrg  Generic drug: flu vacc [id number](65yr  up)-PF     meloxicam 15 MG tablet  Commonly known as: MOBIC     ondansetron 4 MG Tbdl  Commonly known as: ZOFRAN-ODT     TRULICITY 3 mg/0.5 mL pen injector  Generic drug: dulaglutide                I have seen and examined this patient within the last 30 days. My clinical findings that support the need for the home health skilled services and home bound status are the following:no   Weakness/numbness causing balance and gait disturbance due to Weakness/Debility making it taxing to leave home.     Diet:   diabetic diet 2200 calorie    Labs:  SN to perform labs:  CBC: once as ordered ; one time only and CMP: one time only ; once    Referrals/ Consults  Physical Therapy to evaluate and treat. Evaluate for home safety and equipment needs; Establish/upgrade home exercise program. Perform / instruct on therapeutic exercises, gait training, transfer training, and Range of Motion.   to evaluate for community resources/long-range planning.  Aide to provide assistance with personal care, ADLs, and vital signs.    Activities:   activity as tolerated    Nursing:   Agency to admit patient within 24 hours of hospital discharge unless specified on physician order or at patient request    SN to complete comprehensive assessment including routine vital signs. Instruct on disease process and s/s of complications to report to MD. Review/verify medication list sent home with the patient at time of discharge  and instruct patient/caregiver as needed. Frequency may be adjusted depending on start of care date.     Skilled nurse to perform up to 3 visits PRN for symptoms related to diagnosis    Notify MD if SBP > 160 or < 90; DBP > 90 or < 50; HR > 120 or < 50; Temp > 101; O2 < 88%;     Ok to schedule additional visits based on staff availability and patient request on consecutive days within the home health episode.    When multiple disciplines ordered:    Start of Care occurs on Sunday - Wednesday schedule remaining  discipline evaluations as ordered on separate consecutive days following the start of care.    Thursday SOC -schedule subsequent evaluations Friday and Monday the following week.     Friday - Saturday SOC - schedule subsequent discipline evaluations on consecutive days starting Monday of the following week.    For all post-discharge communication and subsequent orders please contact patient's primary care physician. If unable to reach primary care physician or do not receive response within 30 minutes, please contact PCP and medical team for clinical staff order clarification    Miscellaneous   Routine Skin for Bedridden Patients: Instruct patient/caregiver to apply moisture barrier cream to all skin folds and wet areas in perineal area daily and after baths and all bowel movements.  Diabetic Care:   SN to perform and educate Diabetic management with blood glucose monitoring:, Fingerstick blood sugar AC and HS, and Report CBG < 60 or > 350 to physician.  Assist in flushing and take care of PTC catheters and ensuring skin integrity clean site     Home Health Aide:  Nursing Weekly and Three times weekly, Medical Social Work Twice weekly, and Home Health Aide Three times weekly    Wound Care Orders  no    I certify that this patient is confined to her home and needs intermittent skilled nursing care and physical therapy.

## 2025-02-13 NOTE — ANESTHESIA POSTPROCEDURE EVALUATION
Anesthesia Post Evaluation    Patient: Katey Cerrato    Procedure(s) Performed: * No procedures listed *    Final Anesthesia Type: general      Patient location during evaluation: PACU  Patient participation: Yes- Able to Participate  Level of consciousness: awake  Post-procedure vital signs: reviewed and stable  Pain management: adequate  Airway patency: patent    PONV status at discharge: No PONV  Anesthetic complications: no      Cardiovascular status: blood pressure returned to baseline  Respiratory status: unassisted  Hydration status: euvolemic  Follow-up not needed.              Vitals Value Taken Time   /86 02/12/25 1627   Temp 36.8 °C (98.3 °F) 02/12/25 1627   Pulse 85 02/12/25 1627   Resp 18 02/12/25 1136   SpO2 97 % 02/12/25 1627         Event Time   Out of Recovery 02/08/2025 15:17:00         Pain/Sarita Score: Pain Rating Prior to Med Admin: 8 (2/12/2025  8:34 AM)  Pain Rating Post Med Admin: 5 (2/12/2025  9:34 AM)

## 2025-02-13 NOTE — ASSESSMENT & PLAN NOTE
69F presenting with painless jaundice after being found to have biliary obstruction on recent outpatient lab work. Imaging studies w/ a hypoattenuating mass near the confluence of the right and left hepatic ducts, which is concerning for cholangiocarcinoma. CEA elevated. S/p Bilateral PTC placement 2/8/25. Bili now improving. L PTC capped on 2/11. R PTC capped 2/12. N/v well controlled.    -- Okay to DC today 2/13 with drains capped. Will need to continue to flush both drains at home TID.   -- Will need a chest CT noncon before she leaves or outpatient.  -- Will f/u outpatient with Dr. Glynn in 1 week with labs.  -- No tissue diagnosis yet  -- PTCs capped  -- Flush b/l drains TID  -- Recommend replacing her PTC losses with LR ~1:1  -- Trend CMP  -- Aggressive antiemetic regimen  -- Recommend at least full diet as tolerated to optimize nutrition

## 2025-02-13 NOTE — PROGRESS NOTES
[facility] - Internal Medicine Telemetry  General Surgery  Progress Note    Subjective:     History of Present Illness:  Katey Cerrato is a 69 y.o. female with a history of empty sella syndrome, GHD, glaucoma, Grave's w/ ecophthalmos, HLD, HTN, seizures, and DM2 who presents as a transfer for workup of her biliary obstruction. The patient states that she recently saw her PCP after she noticed urine discoloration. She was sent for outpatient lab work, which was suggestive of biliary obstruction so she was sent to the ED. In the ED, her lab work was remarkable for t bili of 8.3, elevated LFTs (280/264), and elevated ALP (507). RUQ and CT A/P w/ significant intrahepatic biliary ductal dilation and mild extra hepatic dilation. MRCP w/ hypoattenuating lesion near the confluence of the right and left hepatic ducts causing severe upstream ductal dilatation concerning for cholangiocarcinoma. Surgical oncology consulted for evaluation.       Post-Op Info:  * No surgery found *         Interval History: Both PTC drains capped, n/v well-managed with PRNs. Okay to DC today with drains capped.    Medications:  Continuous Infusions:  Scheduled Meds:   acetaminophen  1,000 mg Oral BID    amLODIPine  10 mg Oral Daily    enoxparin  40 mg Subcutaneous Q24H (prophylaxis, 1700)    latanoprost  1 drop Both Eyes QHS    levETIRAcetam  1,500 mg Oral Nightly    LIDOcaine  1 patch Transdermal Daily    metoprolol succinate  50 mg Oral QHS    ondansetron  4 mg Oral Q6H    scopolamine  1 patch Transdermal Q3 Days    sodium chloride 0.9%  10 mL Intravenous Q8H    valsartan  320 mg Oral Daily     PRN Meds:  Current Facility-Administered Medications:     dextrose 50%, 12.5 g, Intravenous, PRN    dextrose 50%, 25 g, Intravenous, PRN    diphenhydrAMINE, 25 mg, Oral, Q6H PRN    diphenhydrAMINE-zinc acetate 2-0.1%, , Topical (Top), TID PRN    glucagon (human recombinant), 1 mg, Intramuscular, PRN    glucose, 16 g, Oral, PRN    glucose, 24 g, Oral,  PRN    HYDROmorphone, 2 mg, Oral, Q6H PRN    ibuprofen, 400 mg, Oral, Q6H PRN    insulin aspart U-100, 0-5 Units, Subcutaneous, QID (AC + HS) PRN    melatonin, 6 mg, Oral, Nightly PRN    naloxone, 0.02 mg, Intravenous, PRN    prochlorperazine, 2.5 mg, Intravenous, Q6H PRN    senna-docusate 8.6-50 mg, 1 tablet, Oral, BID PRN     Review of patient's allergies indicates:   Allergen Reactions    Codeine Nausea Only     Objective:     Vital Signs (Most Recent):  Temp: 98.4 °F (36.9 °C) (02/13/25 0526)  Pulse: 79 (02/13/25 0803)  Resp: 18 (02/13/25 0803)  BP: 100/62 (02/13/25 0803)  SpO2: (!) 93 % (02/13/25 0803) Vital Signs (24h Range):  Temp:  [97.9 °F (36.6 °C)-99.6 °F (37.6 °C)] 98.4 °F (36.9 °C)  Pulse:  [77-86] 79  Resp:  [16-18] 18  SpO2:  [93 %-98 %] 93 %  BP: ()/(62-86) 100/62     Weight: 79.7 kg (175 lb 11.2 oz)  Body mass index is 35.49 kg/m².    Intake/Output - Last 3 Shifts         02/11 0700  02/12 0659 02/12 0700 02/13 0659 02/13 0700 02/14 0659    P.O. 200      Total Intake(mL/kg) 200 (2.5)      Urine (mL/kg/hr) 0 (0)      Drains 2290 500     Total Output 2290 500     Net -2090 -500            Urine Occurrence 2 x 1 x     Stool Occurrence  0 x              Physical Exam  Vitals reviewed.   Constitutional:       General: She is not in acute distress.     Appearance: Normal appearance. She is not toxic-appearing.   HENT:      Head: Normocephalic and atraumatic.      Nose: Nose normal.      Mouth/Throat:      Mouth: Mucous membranes are moist.   Cardiovascular:      Rate and Rhythm: Normal rate.      Pulses: Normal pulses.   Pulmonary:      Effort: No respiratory distress.   Abdominal:      General: Abdomen is flat.      Palpations: Abdomen is soft.      Comments: Soft, non distended. Very mildly tender to deep palpation in the epigastrium. No rebound or guarding.   Both PTC drains capped   Neurological:      General: No focal deficit present.      Mental Status: She is alert and oriented to person,  place, and time.   Psychiatric:         Mood and Affect: Mood normal.         Behavior: Behavior normal.          Significant Labs:  I have reviewed all pertinent lab results within the past 24 hours.  CBC:   Recent Labs   Lab 02/12/25  0519   WBC 11.36   RBC 3.86*   HGB 11.8*   HCT 34.7*      MCV 90   MCH 30.6   MCHC 34.0     CMP:   Recent Labs   Lab 02/13/25  0439   *   CALCIUM 9.2   ALBUMIN 2.2*   PROT 7.0   *   K 3.7   CO2 24   CL 98   BUN 22   CREATININE 1.0   ALKPHOS 317*   *   AST 93*   BILITOT 4.2*       Significant Diagnostics:  I have reviewed all pertinent imaging results/findings within the past 24 hours.  Assessment/Plan:     * Biliary obstruction  69F presenting with painless jaundice after being found to have biliary obstruction on recent outpatient lab work. Imaging studies w/ a hypoattenuating mass near the confluence of the right and left hepatic ducts, which is concerning for cholangiocarcinoma. CEA elevated. S/p Bilateral PTC placement 2/8/25. Bili now improving. L PTC capped on 2/11. R PTC capped 2/12. N/v well controlled.    -- Okay to DC today 2/13 with drains capped. Will need to continue to flush both drains at home at least BID.   -- Will need a chest CT noncon before she leaves or outpatient.  -- Will f/u outpatient with Dr. Glynn in 1 week with labs.  -- No tissue diagnosis yet  -- PTCs capped  -- Flush b/l drains TID  -- Recommend replacing her PTC losses with LR ~1:1  -- Trend CMP  -- Aggressive antiemetic regimen  -- Recommend at least full diet as tolerated to optimize nutrition        Walter Arechiga MD  General Surgery  [facility] - [facility] Medicine Telemetry

## 2025-02-13 NOTE — NURSING
1730 Upon entering pts room. Pt and tech reported while pt was sitting up and then attempted to stand up to get  dressed to leave, became dizzy, L drain began leaking underneath dressing, and pts saying she doesn't feel she's ready to go home since she lives alone.  Temp 102.3 bp 105/71 map 82 O2sat 95% RA, Hr 95    Patient also appears very lethargic,resting with eyes closed but verbally responsive without delay,and talking, though pts conversation isnt consistently appropriate Patient confused about day of the weak.    Paged [facility] on call and informed   Dr Maria De Jesus Quick of the above. Dr Quick came to bedside to see pt., and says she Dc  discharge and  call  Gen surgery to see what they recommend and if they may come see pt. Notified Dr Quick that  tylenol on file is  sched 1000mg due at 9p and pt  doesn't have a tylenol prn for temp ordered.

## 2025-02-13 NOTE — PLAN OF CARE
[facility] scheduled pcp f/u   Future Appointments   Date Time Provider Department Center   2/19/2025 10:20 AM Shay Castellano MD [facility]   2/20/2025  8:10 AM LAB, [facility] CANCER BLDG [facility] LAB  [facility]   2/20/2025  9:00 AM Mary Almaraz, [facility]   2/20/2025 10:00 AM Lucy Palacio RD [facility] INONCSF [facility]   3/11/2025  2:00 PM Bonita Najera MD [facility] OBGYN [facility]   5/19/2025 10:40 AM Mariaelena Thomas MD [facility] DERM [address]   8/4/2025 11:40 AM Shay Castellano MD [facility]

## 2025-02-13 NOTE — DISCHARGE SUMMARY
"Siva Bradshaw - Internal Medicine [facility] Medicine  Discharge Summary      Patient Name: Katey Cerrato  MRN: [id number]  SHAW: [id number]  Patient Class: IP- Inpatient  Admission Date: 2/3/2025  Hospital Length of Stay: 9 days  Discharge Date and Time:  02/13/2025 1:18 PM  Attending Physician: Selina Winters*   Discharging Provider: Martina Gaspar MD  Primary Care Provider: Shay Castellano MD  Hospital Medicine Team: [facility] 3 Martina Gaspar MD  Primary Care Team: [facility] 3    HPI:   Ms. Katey Cerrato is a 69 y.o. female, with PMH of HTN, HLD, DMII, Grave's disease, who presented to [facility] ED on 2/3/25 after labs following an outpatient appointment were abnormal. She states she recently was treated for shingles with Valtrex with rash improvement, but then her urine became discolored and that prompted her visit to her PCP.  At her PCP visit it was noticed that she had an orange coloration to the skin, and yellow color of the eyes.  After her appointment she had outpatient lab testing showing abnormal liver function, which is what prompted her PCP to send her to the ED. Her only new symptom is some low back pain. She states she had a cholecystectomy in the past, and had many gallstones removed at that time. She states her sister had retained gallstones that needed removal in the past. Denies any fevers, chills, trauma, alcohol consumption, Tylenol consumption.  [facility] labs showed tbili 7.4, alk phos 538,  and . Lipase normal. INR 1.2. U/A with few bacteria. A  CT showed significant intrahepatic biliary ductal dilatation, mild extrahepatic biliary ductal dilatation, a 9 mm fat containing lesion in the uncinate process of the pancreas.  She was treated with IV fluids, IV Rocephin, Toradol on admission.  MRCP showed intrahepatic bile duct dilation noted with an ill-defined liver mass.  Further workup with MRI abdomen with/without contrast showed "hypoattenuating " "lesion near the confluence of the right and left hepatic ducts causing severe upstream ductal dilatation concerning for cholangiocarcinoma. Given lesion and hyperbilirubinemia decision was made to transfer [place] [facility] [place] workup and evaluation with advanced services, possible ERCP.    On arrival to [facility]. Patient notes generalized pruritus, decreased appetite, nausea, lower back pain. Denies abdominal pain. Pending further workup and management     * No surgery found *      Hospital Course:   Patient presented to OSH with jaundice and scleral icterus, along with abnormal labs from clinic. Also noted nausea and back pain.  Noted history of cholecystectomy. Labs showed elevated bilirubin, and imaging with biliary ductal dilatation. Initial CT of the abdomen and pelvis with IV contrast showed significant intrahepatic biliary ductal dilatation and questionable 9 mm fat containing lesion in the uncinate process of the pancreas.  MRCP showed "severe intrahepatic bile duct dilatation, with abrupt narrowing at the confluence of the hepatic ducts where there is a suspected ill-defined liver mass. Repeat imaging with MRI abdomen with and without contrast showed "hypoattenuating lesion near the confluence of the right and left hepatic ducts causing severe upstream ductal dilatation concerning for cholangiocarcinoma.  Further evaluation with ERCP is recommended." Patient transferred to [facility] [place] workup and evaluation. CEA elevated, CA 19-9 WNL. AES and Surgical oncology consulted, recommended PTC for further evaluation. Symptoms controlled with benadryl. PCT placed 2/8. Surg/ onc Recommend capping both sided PTC prior to discharge. Both PTC drains need to be flushed with saline twice daily. CT chest abdomen and pelvis w contrast obtained per surg onc reccs and patient will need a CT chest non contrast to be obtained outpatient. Follow up with surg onc next week in clinic and PCP for hospital follow up.  "     Goals of Care Treatment Preferences:  Code Status: Full Code    Physical Exam  Vitals reviewed.   Constitutional:       General: She is not in acute distress.     Appearance: Normal appearance. She is not toxic-appearing.   HENT:      Head: Normocephalic and atraumatic.      Nose: Nose normal.      Mouth/Throat:      Mouth: Mucous membranes are moist.   Cardiovascular:      Rate and Rhythm: Normal rate.      Pulses: Normal pulses.   Pulmonary:      Effort: No respiratory distress.   Abdominal:      General: Abdomen is flat.      Palpations: Abdomen is soft.      Comments: Soft, non distended. Very mildly tender to deep palpation in the epigastrium. No rebound or guarding.   L PTC capped  R PTC capped  Neurological:      General: No focal deficit present.      Mental Status: She is alert and oriented to person, place, and time.   Psychiatric:         Mood and Affect: Mood normal.         Behavior: Behavior normal.     SDOH Screening:  The patient was screened for utility difficulties, food insecurity, transport difficulties, housing insecurity, and interpersonal safety and there were no concerns identified this admission.     Consults:   Consults (From admission, onward)          Status Ordering Provider     Inpatient consult to Social Work  Once        Provider:  (Not yet assigned)    Acknowledged ROMA ISAAC     Inpatient consult to Interventional Radiology  Once        Provider:  (Not yet assigned)    Completed TERI SILVA     Inpatient consult to Surgical Oncology  Once        Provider:  (Not yet assigned)    Completed MAKENZIE LINDO     Inpatient consult to [facility] Endoscopy Service (AES)  Once        Provider:  (Not yet assigned)    MAKENZIE De León            * Biliary obstruction  Liver mass, possible cholangiocarcinoma  Patient presented to OSH with jaundice and scleral icterus, along with abnormal labs from clinic. Also noted nausea and back pain.  Noted history of cholecystectomy.  "Labs showed elevated bilirubin, and imaging with biliary ductal dilatation. Initial CT of the abdomen and pelvis with IV contrast showed significant intrahepatic biliary ductal dilatation and questionable 9 mm fat containing lesion in the uncinate process of the pancreas.  Abdominal ultrasound showed hepatic steatosis and intra hepatic biliary ductal dilatation and status post cholecystectomy. MRCP showed "severe intrahepatic bile duct dilatation, with abrupt narrowing at the confluence of the hepatic ducts where there is a suspected ill-defined liver mass.  This is poorly evaluated without contrast but concerning for neoplasm such as cholangiocarcinoma." Repeat imaging with MRI abdomen with and without contrast showed "hypoattenuating lesion near the confluence of the right and left hepatic ducts causing severe upstream ductal dilatation concerning for cholangiocarcinoma.  Further evaluation with ERCP is recommended." Patient transferred to [facility] for further workup and evaluation. CEA elevated, CA 19-9 WNL. Surgical oncology, AES, and IR consulted to coordinate plan for further management. Underwent PTC with IR on 2/8 without complication. Plan to allow decompression of the biliary system and repeat imaging to reassess obstruction    - Full liquid diet tolerated well for nausea and vomiting, will advance diet to solids and continue to monitor and adjust as needed   -AES Consulted  -Surgical Oncology consulted, appreciate recs     -S/p PTC w/ IR     - Plan for repeat CT A/P when biliary system decompressed to be done outpatient      -CT chest, abdomen, and pelvis  for further staging ordered   - Leave drains to gravity drainage until bilirubin stabilizes. Drains can be flushed once daily with normal saline. She will need routine exchange in 8-12 weeks with IR. Surg Onc is following and would like to the drains capped prior to discharge.  Left side is capped, possible right PTC cap today   - Continue p.r.n. medication " for symptomatic relief  - Trend with labs and correct electrolyte abnormalities as needed  - Replace with LR for volume losses with PCT drains as needed, and patient was encouraged to increase PO fluids intake     Nausea and vomiting  Patient with worsening nausea, vomiting s/p PTC on 2/9. Will plan aggressive antiemetic regimen and adjust diet as needed    -Aggressive antiemetic regimen  -Trial of regular diet       Type 2 diabetes mellitus without complication, without long-term current use of insulin  - Last hemoglobin A1c 5.9, well controlled  - D/C home regimen of Trulicity on discharge due to nausea and vomiting which can worsen or exacerbate the symptoms   - Glucose level reviewed, currently in normal range  - Continue Accu-Cheks q.a.c. and q.h.s. and give low-dose sliding scale insulin as necessary    Mixed hyperlipidemia  Patient with hx HLD presenting with abnormal liver function tests. HLD well controlled on home statin    - Holding statin due to abnormal liver function tests    Graves' disease with exophthalmos  Pt with hx graves disease last TSH wnl in 2023. Currently without symptoms.    -Continue to monitor         Hypertension associated with diabetes  - Well controlled, continue metoprolol 50 mg p.o. q.h.s., amlodipine 10 mg p.o. daily and valsartan 320 mg p.o. daily    Generalized convulsive epilepsy with intractable epilepsy  Patient with history of epilepsy in the past on levitiracetam 1500 mg qhs. Stable, no reported seizure activity    - Continue home levetiracetam 1500 mg p.o. q.h.s.      Final Active Diagnoses:    Diagnosis Date Noted POA    PRINCIPAL PROBLEM:  Biliary obstruction [K83.1] 02/04/2025 Yes    Nausea and vomiting [R11.2] 02/09/2025 Yes    Type 2 diabetes mellitus without complication, without long-term current use of insulin [E11.9] 03/18/2014 Yes    Mixed hyperlipidemia [E78.2]  Yes     Chronic    Graves' disease with exophthalmos [E05.00]  Yes    Hypertension associated with  diabetes [E11.59, I15.2] 09/07/2012 Yes    Generalized convulsive epilepsy with intractable epilepsy [G40.319] 09/07/2012 Yes      Problems Resolved During this Admission:       Discharged Condition: stable    Disposition: Home or Self Care    Follow Up:   Follow-up Information       Mary Almaraz NP Follow up.    Specialty: Surgical Oncology  Why: 2/20/2025 @ 8:10am for labs in [facility] 3rd floor.    2/20/2025 @ 9:00am with Mary Almaraz NP/Dr. Glynn. [facility] 2nd floor.  Contact information:  [address] [facility] [address]  [phone]               Shay Castellano MD Follow up.    Specialty: Internal Medicine  Why: hospital follow up  Contact information:  [address]  [facility] [address]  [facility] [address]  [phone]                           Patient Instructions:      CT Chest Without Contrast   Standing Status: Future Standing Exp. Date: 02/13/26     Order Specific Question Answer Comments   May the Radiologist modify the order per protocol to meet the clinical needs of the patient? Yes        Significant Diagnostic Studies: Labs: CMP   Recent Labs   Lab 02/12/25  0519 02/13/25  0439    132*   K 3.7 3.7   CL 99 98   CO2 24 24   * 154*   BUN 19 22   CREATININE 0.7 1.0   CALCIUM 9.8 9.2   PROT 7.3 7.0   ALBUMIN 2.4* 2.2*   BILITOT 4.6* 4.2*   ALKPHOS 343* 317*   * 93*   * 130*   ANIONGAP 13 10    and CBC   Recent Labs   Lab 02/12/25  0519   WBC 11.36   HGB 11.8*   HCT 34.7*        Radiology: CT scan: CT ABDOMEN PELVIS WITH CONTRAST:   Results for orders placed or performed during the hospital encounter of 02/03/25   CT Abdomen Pelvis With IV Contrast NO Oral Contrast    Narrative    EXAMINATION:  CT OF ABDOMEN PELVIS WITH    CLINICAL HISTORY:  Abdominal pain, acute, nonlocalized;    TECHNIQUE:  5 mm enhanced axial images were obtained from the lung bases through the greater trochanters.  Seventy-five mL of Omnipaque 350 was  injected.    COMPARISON:  None.    FINDINGS:  Significant intrahepatic biliary ductal dilatation is present.  There is mild intrahepatic biliary ductal dilatation up to 8 mm (sagittal image 88)..    Spleen, left kidney and adrenal glands are unremarkable. The gallbladder is surgically absent.    There is a 3.1 cm right renal cyst.    There is a 9 mm fat containing lesion in uncinate process.    There is no definite evidence for abdominal adenopathy or ascites.    There are no pelvic masses or adenopathy.  The appendix is not inflamed.  The uterus is surgically absent.    There is no free fluid in the pelvis.    There is mild bibasilar atelectasis.  There is a retrocrural enlarged lymph node.  There are prominent lymph node adjacent to the right distal thoracic aorta (coronal image 70).    Mild levoscoliosis is present.  There is grade 1 anterolisthesis at L5/S1.  Spondylitic changes are present greatest at L5/S1.      Impression    Significant intrahepatic biliary ductal dilatation.  Mild extrahepatic biliary ductal dilatation.  9 mm fat containing lesion in the uncinate process of the pancreas.  Cholecystectomy.    Retrocrural adenopathy.    This report was flagged in Epic as abnormal.      Electronically signed by: Matilda Florez  Date:    02/04/2025  Time:    00:03    and CT ABDOMEN PELVIS WITHOUT CONTRAST: No results found for this visit on 02/03/25.    Pending Diagnostic Studies:       None           Medications:  Reconciled Home Medications:      Medication List        START taking these medications      HYDROmorphone 2 MG tablet  Commonly known as: DILAUDID  Take 1 tablet (2 mg total) by mouth 2 (two) times daily as needed for Pain.     ondansetron 4 MG tablet  Commonly known as: ZOFRAN  Take 1 tablet (4 mg total) by mouth every 6 (six) hours as needed for Nausea.     scopolamine 1.3-1.5 mg (1 mg over 3 days)  Commonly known as: TRANSDERM-SCOP  Place 1 patch onto the skin Every 3 (three) days.  Start taking  on: February 15, 2025            CHANGE how you take these medications      amLODIPine 10 MG tablet  Commonly known as: NORVASC  TAKE 1 TABLET BY MOUTH EVERY DAY     valsartan 320 MG tablet  Commonly known as: DIOVAN  Take 1 tablet (320 mg total) by mouth once daily.            CONTINUE taking these medications      atorvastatin 80 MG tablet  Commonly known as: LIPITOR  Take 1 tablet (80 mg total) by mouth once daily.     blood sugar diagnostic Strp  To check BG 1 times daily, to use with insurance preferred meter     blood-glucose meter kit  To check BG 1 times daily, to use with insurance preferred meter     lancets Misc  To check BG 1 times daily, to use with insurance preferred meter     latanoprost 0.005 % ophthalmic solution  INSTILL 1 DROP INTO BOTH EYES EVERY EVENING     levETIRAcetam 750 MG Tab  Commonly known as: KEPPRA  TAKE 2 TABLETS BY MOUTH ONCE  DAILY     metoprolol succinate 50 MG 24 hr tablet  Commonly known as: TOPROL-XL  TAKE 1 TABLET BY MOUTH EVERY DAY IN THE EVENING     multivitamin-minerals-lutein Tab  Take 1 tablet by mouth once daily once daily.     VITAMIN D3 COMPLETE ORAL  Take 1 tablet by mouth once daily.            STOP taking these medications      FLUZONE HIGHDOSE QUAD 23-24  mcg/0.7 mL Syrg  Generic drug: flu vacc [id number](65yr up)-PF     meloxicam 15 MG tablet  Commonly known as: MOBIC     ondansetron 4 MG Tbdl  Commonly known as: ZOFRAN-ODT     TRULICITY 3 mg/0.5 mL pen injector  Generic drug: dulaglutide              Indwelling Lines/Drains at time of discharge:   Lines/Drains/Airways       Drain  Duration                  Biliary Tube 02/08/25 1340 10 Fr. LUQ 4 days         Biliary Tube 02/08/25 1341 10 Fr. RUQ 4 days                    Time spent on the discharge of patient: 35 minutes         Martina Gaspar MD  Department of Hospital Medicine  [facility] - Internal Medicine Telemetry

## 2025-02-13 NOTE — SUBJECTIVE & OBJECTIVE
Interval History: Both PTC drains capped, n/v well-managed with PRNs. Okay to DC today with drains capped.    Medications:  Continuous Infusions:  Scheduled Meds:   acetaminophen  1,000 mg Oral BID    amLODIPine  10 mg Oral Daily    enoxparin  40 mg Subcutaneous Q24H (prophylaxis, 1700)    latanoprost  1 drop Both Eyes QHS    levETIRAcetam  1,500 mg Oral Nightly    LIDOcaine  1 patch Transdermal Daily    metoprolol succinate  50 mg Oral QHS    ondansetron  4 mg Oral Q6H    scopolamine  1 patch Transdermal Q3 Days    sodium chloride 0.9%  10 mL Intravenous Q8H    valsartan  320 mg Oral Daily     PRN Meds:  Current Facility-Administered Medications:     dextrose 50%, 12.5 g, Intravenous, PRN    dextrose 50%, 25 g, Intravenous, PRN    diphenhydrAMINE, 25 mg, Oral, Q6H PRN    diphenhydrAMINE-zinc acetate 2-0.1%, , Topical (Top), TID PRN    glucagon (human recombinant), 1 mg, Intramuscular, PRN    glucose, 16 g, Oral, PRN    glucose, 24 g, Oral, PRN    HYDROmorphone, 2 mg, Oral, Q6H PRN    ibuprofen, 400 mg, Oral, Q6H PRN    insulin aspart U-100, 0-5 Units, Subcutaneous, QID (AC + HS) PRN    melatonin, 6 mg, Oral, Nightly PRN    naloxone, 0.02 mg, Intravenous, PRN    prochlorperazine, 2.5 mg, Intravenous, Q6H PRN    senna-docusate 8.6-50 mg, 1 tablet, Oral, BID PRN     Review of patient's allergies indicates:   Allergen Reactions    Codeine Nausea Only     Objective:     Vital Signs (Most Recent):  Temp: 98.4 °F (36.9 °C) (02/13/25 0526)  Pulse: 79 (02/13/25 0803)  Resp: 18 (02/13/25 0803)  BP: 100/62 (02/13/25 0803)  SpO2: (!) 93 % (02/13/25 0803) Vital Signs (24h Range):  Temp:  [97.9 °F (36.6 °C)-99.6 °F (37.6 °C)] 98.4 °F (36.9 °C)  Pulse:  [77-86] 79  Resp:  [16-18] 18  SpO2:  [93 %-98 %] 93 %  BP: ()/(62-86) 100/62     Weight: 79.7 kg (175 lb 11.2 oz)  Body mass index is 35.49 kg/m².    Intake/Output - Last 3 Shifts         02/11 0700  02/12 0659 02/12 0700 02/13 0659 02/13 0700 02/14 0659    P.O. 200       Total Intake(mL/kg) 200 (2.5)      Urine (mL/kg/hr) 0 (0)      Drains 2290 500     Total Output 2290 500     Net -2090 -500            Urine Occurrence 2 x 1 x     Stool Occurrence  0 x              Physical Exam  Vitals reviewed.   Constitutional:       General: She is not in acute distress.     Appearance: Normal appearance. She is not toxic-appearing.   HENT:      Head: Normocephalic and atraumatic.      Nose: Nose normal.      Mouth/Throat:      Mouth: Mucous membranes are moist.   Cardiovascular:      Rate and Rhythm: Normal rate.      Pulses: Normal pulses.   Pulmonary:      Effort: No respiratory distress.   Abdominal:      General: Abdomen is flat.      Palpations: Abdomen is soft.      Comments: Soft, non distended. Very mildly tender to deep palpation in the epigastrium. No rebound or guarding.   Both PTC drains capped   Neurological:      General: No focal deficit present.      Mental Status: She is alert and oriented to person, place, and time.   Psychiatric:         Mood and Affect: Mood normal.         Behavior: Behavior normal.          Significant Labs:  I have reviewed all pertinent lab results within the past 24 hours.  CBC:   Recent Labs   Lab 02/12/25  0519   WBC 11.36   RBC 3.86*   HGB 11.8*   HCT 34.7*      MCV 90   MCH 30.6   MCHC 34.0     CMP:   Recent Labs   Lab 02/13/25  0439   *   CALCIUM 9.2   ALBUMIN 2.2*   PROT 7.0   *   K 3.7   CO2 24   CL 98   BUN 22   CREATININE 1.0   ALKPHOS 317*   *   AST 93*   BILITOT 4.2*       Significant Diagnostics:  I have reviewed all pertinent imaging results/findings within the past 24 hours.

## 2025-02-13 NOTE — PROGRESS NOTES
Met with  pt to review discharge recommendation of home health and is agreeable to plan. Pt also provided with application for [facility] financial assistance per her concern regarding hospital bill.     Patient/family provided list of facilities in-network with patient's payor plan.  Providers that are owned, operated, or affiliated with [facility] are included on the list.     Notified that referral will be sent to below listed facilities from list based on pt's preference:    [facility]  2.  3.  4.  5.      Preferred Facility: [facility]    If an additional preferred facility not listed above is identified, additional referral to be sent.  If above facilities unable to accept, will send additional referrals to in-network providers.

## 2025-02-14 PROBLEM — R50.9 FEVER: Status: ACTIVE | Noted: 2025-02-14

## 2025-02-14 LAB
ALBUMIN SERPL BCP-MCNC: 1.9 G/DL (ref 3.5–5.2)
ALP SERPL-CCNC: 276 U/L (ref 40–150)
ALT SERPL W/O P-5'-P-CCNC: 108 U/L (ref 10–44)
ANION GAP SERPL CALC-SCNC: 10 MMOL/L (ref 8–16)
AST SERPL-CCNC: 91 U/L (ref 10–40)
BACTERIA #/AREA URNS AUTO: ABNORMAL /HPF
BILIRUB SERPL-MCNC: 3.5 MG/DL (ref 0.1–1)
BILIRUB UR QL STRIP: ABNORMAL
BUN SERPL-MCNC: 32 MG/DL (ref 8–23)
CALCIUM SERPL-MCNC: 8.7 MG/DL (ref 8.7–10.5)
CHLORIDE SERPL-SCNC: 102 MMOL/L (ref 95–110)
CLARITY UR REFRACT.AUTO: ABNORMAL
CO2 SERPL-SCNC: 23 MMOL/L (ref 23–29)
COLOR UR AUTO: YELLOW
CREAT SERPL-MCNC: 1.4 MG/DL (ref 0.5–1.4)
EST. GFR  (NO RACE VARIABLE): 40.5 ML/MIN/1.73 M^2
GLUCOSE SERPL-MCNC: 155 MG/DL (ref 70–110)
GLUCOSE UR QL STRIP: NEGATIVE
HGB UR QL STRIP: NEGATIVE
HYALINE CASTS UR QL AUTO: 4 /LPF
INFLUENZA A, MOLECULAR: NOT DETECTED
INFLUENZA B, MOLECULAR: NOT DETECTED
KETONES UR QL STRIP: NEGATIVE
LEUKOCYTE ESTERASE UR QL STRIP: NEGATIVE
MAGNESIUM SERPL-MCNC: 1.9 MG/DL (ref 1.6–2.6)
MICROSCOPIC COMMENT: ABNORMAL
MRSA SCREEN BY PCR: NOT DETECTED
NITRITE UR QL STRIP: NEGATIVE
PH UR STRIP: 6 [PH] (ref 5–8)
PHOSPHATE SERPL-MCNC: 3.3 MG/DL (ref 2.7–4.5)
POCT GLUCOSE: 138 MG/DL (ref 70–110)
POCT GLUCOSE: 164 MG/DL (ref 70–110)
POCT GLUCOSE: 173 MG/DL (ref 70–110)
POCT GLUCOSE: 192 MG/DL (ref 70–110)
POTASSIUM SERPL-SCNC: 3.6 MMOL/L (ref 3.5–5.1)
PROT SERPL-MCNC: 6.5 G/DL (ref 6–8.4)
PROT UR QL STRIP: ABNORMAL
RBC #/AREA URNS AUTO: 5 /HPF (ref 0–4)
RSV AG BY MOLECULAR METHOD: NOT DETECTED
SARS-COV-2 RNA RESP QL NAA+PROBE: NOT DETECTED
SODIUM SERPL-SCNC: 135 MMOL/L (ref 136–145)
SP GR UR STRIP: 1.03 (ref 1–1.03)
SQUAMOUS #/AREA URNS AUTO: 13 /HPF
URN SPEC COLLECT METH UR: ABNORMAL
VANCOMYCIN SERPL-MCNC: 7.1 UG/ML
WBC #/AREA URNS AUTO: 11 /HPF (ref 0–5)
WBC CLUMPS UR QL AUTO: ABNORMAL

## 2025-02-14 PROCEDURE — A4216 STERILE WATER/SALINE, 10 ML: HCPCS | Performed by: PHYSICIAN ASSISTANT

## 2025-02-14 PROCEDURE — 25000003 PHARM REV CODE 250: Performed by: HOSPITALIST

## 2025-02-14 PROCEDURE — 63600175 PHARM REV CODE 636 W HCPCS

## 2025-02-14 PROCEDURE — 81001 URINALYSIS AUTO W/SCOPE: CPT

## 2025-02-14 PROCEDURE — 21400001 HC TELEMETRY ROOM

## 2025-02-14 PROCEDURE — 25000003 PHARM REV CODE 250

## 2025-02-14 PROCEDURE — 80202 ASSAY OF VANCOMYCIN: CPT | Performed by: HOSPITALIST

## 2025-02-14 PROCEDURE — 84100 ASSAY OF PHOSPHORUS: CPT | Performed by: HOSPITALIST

## 2025-02-14 PROCEDURE — 97110 THERAPEUTIC EXERCISES: CPT | Mod: CQ

## 2025-02-14 PROCEDURE — 25000003 PHARM REV CODE 250: Performed by: PHYSICIAN ASSISTANT

## 2025-02-14 PROCEDURE — 63600175 PHARM REV CODE 636 W HCPCS: Performed by: HOSPITALIST

## 2025-02-14 PROCEDURE — 87086 URINE CULTURE/COLONY COUNT: CPT

## 2025-02-14 PROCEDURE — 36415 COLL VENOUS BLD VENIPUNCTURE: CPT | Performed by: HOSPITALIST

## 2025-02-14 PROCEDURE — 87641 MR-STAPH DNA AMP PROBE: CPT

## 2025-02-14 PROCEDURE — 99232 SBSQ HOSP IP/OBS MODERATE 35: CPT | Mod: GC,,, | Performed by: SURGERY

## 2025-02-14 PROCEDURE — 83735 ASSAY OF MAGNESIUM: CPT | Performed by: HOSPITALIST

## 2025-02-14 PROCEDURE — 97116 GAIT TRAINING THERAPY: CPT | Mod: CQ

## 2025-02-14 PROCEDURE — 80053 COMPREHEN METABOLIC PANEL: CPT | Performed by: HOSPITALIST

## 2025-02-14 PROCEDURE — 0241U SARS-COV2 (COVID) WITH FLU/RSV BY PCR: CPT

## 2025-02-14 RX ORDER — AMOXICILLIN 250 MG
1 CAPSULE ORAL DAILY
Status: DISCONTINUED | OUTPATIENT
Start: 2025-02-14 | End: 2025-02-14

## 2025-02-14 RX ORDER — POLYETHYLENE GLYCOL 3350 17 G/17G
17 POWDER, FOR SOLUTION ORAL DAILY
Status: DISCONTINUED | OUTPATIENT
Start: 2025-02-14 | End: 2025-02-14

## 2025-02-14 RX ORDER — AMOXICILLIN 250 MG
1 CAPSULE ORAL DAILY PRN
Status: DISCONTINUED | OUTPATIENT
Start: 2025-02-14 | End: 2025-02-19 | Stop reason: HOSPADM

## 2025-02-14 RX ADMIN — SENNOSIDES AND DOCUSATE SODIUM 1 TABLET: 50; 8.6 TABLET ORAL at 09:02

## 2025-02-14 RX ADMIN — ONDANSETRON 4 MG: 4 TABLET ORAL at 05:02

## 2025-02-14 RX ADMIN — SODIUM CHLORIDE, POTASSIUM CHLORIDE, SODIUM LACTATE AND CALCIUM CHLORIDE 500 ML: 600; 310; 30; 20 INJECTION, SOLUTION INTRAVENOUS at 09:02

## 2025-02-14 RX ADMIN — ONDANSETRON 4 MG: 4 TABLET ORAL at 01:02

## 2025-02-14 RX ADMIN — HYDROMORPHONE HYDROCHLORIDE 2 MG: 2 TABLET ORAL at 10:02

## 2025-02-14 RX ADMIN — SODIUM CHLORIDE, PRESERVATIVE FREE 10 ML: 5 INJECTION INTRAVENOUS at 04:02

## 2025-02-14 RX ADMIN — SODIUM CHLORIDE, POTASSIUM CHLORIDE, SODIUM LACTATE AND CALCIUM CHLORIDE 500 ML: 600; 310; 30; 20 INJECTION, SOLUTION INTRAVENOUS at 05:02

## 2025-02-14 RX ADMIN — LEVETIRACETAM 1500 MG: 750 TABLET, FILM COATED ORAL at 08:02

## 2025-02-14 RX ADMIN — PIPERACILLIN SODIUM AND TAZOBACTAM SODIUM 4.5 G: 4; .5 INJECTION, POWDER, FOR SOLUTION INTRAVENOUS at 08:02

## 2025-02-14 RX ADMIN — PIPERACILLIN SODIUM AND TAZOBACTAM SODIUM 4.5 G: 4; .5 INJECTION, POWDER, FOR SOLUTION INTRAVENOUS at 05:02

## 2025-02-14 RX ADMIN — POLYETHYLENE GLYCOL 3350 17 G: 17 POWDER, FOR SOLUTION ORAL at 09:02

## 2025-02-14 RX ADMIN — SODIUM CHLORIDE, PRESERVATIVE FREE 10 ML: 5 INJECTION INTRAVENOUS at 02:02

## 2025-02-14 RX ADMIN — LIDOCAINE 1 PATCH: 50 PATCH CUTANEOUS at 08:02

## 2025-02-14 RX ADMIN — LATANOPROST 1 DROP: 50 SOLUTION/ DROPS OPHTHALMIC at 08:02

## 2025-02-14 RX ADMIN — ENOXAPARIN SODIUM 40 MG: 40 INJECTION SUBCUTANEOUS at 05:02

## 2025-02-14 RX ADMIN — PIPERACILLIN SODIUM AND TAZOBACTAM SODIUM 4.5 G: 4; .5 INJECTION, POWDER, FOR SOLUTION INTRAVENOUS at 12:02

## 2025-02-14 NOTE — NURSING
Mid abdominal Left and Right biliary drain flushed with 10 ccs NS at 9a,12n, 3p, 6p today. Sites both dry and intact at this time.

## 2025-02-14 NOTE — CARE UPDATE
"RAPID RESPONSE NURSE CHART REVIEW        Chart Reviewed: 02/13/2025 10:45 PM    MRN: [id number]  Bed: 1105/1105 A    Dx: Biliary obstruction    Katey Cerrato has a past medical history of Cataract, Empty sella syndrome, GHD (growth hormone deficiency), Glaucoma, Graves' disease with exophthalmos, History of vitamin D deficiency, Hyperlipidemia, Hypertension, Seizures, Thyroid nodule, Thyroid nodule, and Type II or unspecified type diabetes mellitus without mention of complication, uncontrolled.    Last VS: BP 94/62 (BP Location: Left arm, Patient Position: Lying)   Pulse 72   Temp 98.6 °F (37 °C) (Oral)   Resp 18   Ht 4' 11" (1.499 m)   Wt 79.7 kg (175 lb 11.2 oz)   SpO2 96%   Breastfeeding No   BMI 35.49 kg/m²     24H Vital Sign Range:  Temp:  [98.4 °F (36.9 °C)-102.3 °F (39.1 °C)]   Pulse:  []   Resp:  [16-18]   BP: ()/(45-73)   SpO2:  [93 %-97 %]     Level of Consciousness (AVPU): alert    Recent Labs     02/11/25  0639 02/12/25  0519 02/13/25  1829   WBC 13.66* 11.36 18.70*   HGB 11.8* 11.8* 12.0   HCT 34.5* 34.7* 35.7*    305 334       Recent Labs     02/11/25  0639 02/12/25  0519 02/13/25  0439 02/13/25  1829   * 136 132* 135*   K 3.7 3.7 3.7 3.8    99 98 97   CO2 26 24 24 25   BUN 22 19 22 30*   CREATININE 0.8 0.7 1.0 1.4   * 124* 154* 175*   PHOS 2.8 2.9 3.4  --    MG 1.9 1.9 2.0  --           OXYGEN:             MEWS score: 4    Bedside Blaire CLEMENS contacted for hypotension reports patient asymptomatic but receiving 1L IVF bolus and infectious workup in process. No additional concerns verbalized at this time. Instructed to call [phone] for further concerns or assistance.    Daniella Blackwood RN      "

## 2025-02-14 NOTE — NURSING
1830 repeat temp 101 oral. Administered tylenol as ordered, gave originally scheduled lovenox, and zofran. I informed pt I was giving her her lovenox, showed her syringe, pt verbalized she understood, then asked if she had to drink it. Pt was not disoriented earlier.   Dr Maria De Jseus Quick informed via secure chat. Infectious work up including cxr, lab ordered..

## 2025-02-14 NOTE — NURSING
Patient's BP 80/45 manually. HR 84. Patient aaox4 denies lightheadedness/dizziness, just still c/o nausea. Prn compazine IVP given. KARYN Quick MD notified.

## 2025-02-14 NOTE — PLAN OF CARE
Problem: Adult Inpatient Plan of Care  Goal: Plan of Care Review  Outcome: Progressing     Problem: Diabetes Comorbidity  Goal: Blood Glucose Level Within Targeted Range  Outcome: Progressing     Problem: Nausea and Vomiting  Goal: Nausea and Vomiting Relief  Outcome: Progressing     Problem: Pain Acute  Goal: Optimal Pain Control and Function  Outcome: Progressing     Problem: Fall Injury Risk  Goal: Absence of Fall and Fall-Related Injury  Outcome: Progressing     Nausea management continued. Left and right biliary drains flushed per orders. Urine culture collected. Plan of care ongoing.

## 2025-02-14 NOTE — PROGRESS NOTES
[facility]  General Surgery  Progress Note    Subjective:     History of Present Illness:  Katey Cerrato is a 69 y.o. female with a history of empty sella syndrome, GHD, glaucoma, Grave's w/ ecophthalmos, HLD, HTN, seizures, and DM2 who presents as a transfer for workup of her biliary obstruction. The patient states that she recently saw her PCP after she noticed urine discoloration. She was sent for outpatient lab work, which was suggestive of biliary obstruction so she was sent to the ED. In the ED, her lab work was remarkable for t bili of 8.3, elevated LFTs (280/264), and elevated ALP (507). RUQ and CT A/P w/ significant intrahepatic biliary ductal dilation and mild extra hepatic dilation. MRCP w/ hypoattenuating lesion near the confluence of the right and left hepatic ducts causing severe upstream ductal dilatation concerning for cholangiocarcinoma. Surgical oncology consulted for evaluation.       Post-Op Info:  * No surgery found *         Interval History: Fevered overnight, Tmax to 102.3F. Tbili still downtrending, 3.5 from 4.2. Otherwise feeling well this morning, ambulating with PT. Having some drainage from around the L PTC.    Medications:  Continuous Infusions:  Scheduled Meds:   enoxparin  40 mg Subcutaneous Q24H (prophylaxis, 1700)    latanoprost  1 drop Both Eyes QHS    levETIRAcetam  1,500 mg Oral Nightly    LIDOcaine  1 patch Transdermal Daily    ondansetron  4 mg Oral Q6H    piperacillin-tazobactam (Zosyn) IV (PEDS and ADULTS) (extended infusion is not appropriate)  4.5 g Intravenous Q8H    scopolamine  1 patch Transdermal Q3 Days    sodium chloride 0.9%  10 mL Intravenous Q8H     PRN Meds:  Current Facility-Administered Medications:     dextrose 50%, 12.5 g, Intravenous, PRN    dextrose 50%, 25 g, Intravenous, PRN    diphenhydrAMINE, 25 mg, Oral, Q6H PRN    diphenhydrAMINE-zinc acetate 2-0.1%, , Topical (Top), TID PRN    glucagon (human recombinant), 1 mg,  Intramuscular, PRN    glucose, 16 g, Oral, PRN    glucose, 24 g, Oral, PRN    HYDROmorphone, 2 mg, Oral, Q6H PRN    insulin aspart U-100, 0-5 Units, Subcutaneous, QID (AC + HS) PRN    melatonin, 6 mg, Oral, Nightly PRN    naloxone, 0.02 mg, Intravenous, PRN    prochlorperazine, 2.5 mg, Intravenous, Q6H PRN    senna-docusate 8.6-50 mg, 1 tablet, Oral, Daily PRN    Pharmacy to dose Vancomycin consult, , , Once **AND** vancomycin - pharmacy to dose, , Intravenous, pharmacy to manage frequency     Review of patient's allergies indicates:   Allergen Reactions    Codeine Nausea Only     Objective:     Vital Signs (Most Recent):  Temp: 99.2 °F (37.3 °C) (02/14/25 1124)  Pulse: 84 (02/14/25 1124)  Resp: 17 (02/14/25 1124)  BP: 132/82 (02/14/25 1124)  SpO2: 99 % (02/14/25 1124) Vital Signs (24h Range):  Temp:  [98.4 °F (36.9 °C)-102.3 °F (39.1 °C)] 99.2 °F (37.3 °C)  Pulse:  [] 84  Resp:  [17-18] 17  SpO2:  [95 %-99 %] 99 %  BP: ()/(45-82) 132/82     Weight: 79.7 kg (175 lb 11.2 oz)  Body mass index is 35.49 kg/m².    Intake/Output - Last 3 Shifts         02/12 0700  02/13 0659 02/13 0700  02/14 0659 02/14 0700  02/15 0659    P.O.       Total Intake(mL/kg)       Urine (mL/kg/hr)  450 (0.2)     Drains 500 80 40    Total Output 500 530 40    Net -500 -530 -40           Urine Occurrence 1 x 3 x     Stool Occurrence 0 x               Physical Exam  Vitals reviewed.   Constitutional:       General: She is not in acute distress.     Appearance: Normal appearance. She is not toxic-appearing.   HENT:      Head: Normocephalic and atraumatic.      Nose: Nose normal.      Mouth/Throat:      Mouth: Mucous membranes are moist.   Cardiovascular:      Rate and Rhythm: Normal rate.      Pulses: Normal pulses.   Pulmonary:      Effort: No respiratory distress.   Abdominal:      General: Abdomen is flat.      Palpations: Abdomen is soft.      Comments: Soft, non distended. Very mildly tender to deep palpation in the epigastrium.  No rebound or guarding.   Both PTC drains capped, some drainage around L PTC   Neurological:      General: No focal deficit present.      Mental Status: She is alert and oriented to person, place, and time.   Psychiatric:         Mood and Affect: Mood normal.         Behavior: Behavior normal.          Significant Labs:  I have reviewed all pertinent lab results within the past 24 hours.  CBC:   Recent Labs   Lab 02/13/25  1829   WBC 18.70*   RBC 3.90*   HGB 12.0   HCT 35.7*      MCV 92   MCH 30.8   MCHC 33.6     CMP:   Recent Labs   Lab 02/14/25  0516   *   CALCIUM 8.7   ALBUMIN 1.9*   PROT 6.5   *   K 3.6   CO2 23      BUN 32*   CREATININE 1.4   ALKPHOS 276*   *   AST 91*   BILITOT 3.5*       Significant Diagnostics:  I have reviewed all pertinent imaging results/findings within the past 24 hours.  Assessment/Plan:     * Biliary obstruction  69F presenting with painless jaundice after being found to have biliary obstruction on recent outpatient lab work. Imaging studies w/ a hypoattenuating mass near the confluence of the right and left hepatic ducts, which is concerning for cholangiocarcinoma. CEA elevated. S/p Bilateral PTC placement 2/8/25. Tbili still downtrending. L PTC capped on 2/11. R PTC capped 2/12. N/v well controlled. Having some drainage from L PTC.    -- Place bilateral PTCs to gravity  -- Will need a chest CT noncon before she leaves or outpatient  -- Will f/u outpatient with Dr. Glynn in 1 week with labs  -- No tissue diagnosis yet  -- PTCs capped  -- Flush b/l drains TID  -- Recommend replacing her PTC losses with LR ~1:1  -- Trend CMP  -- Aggressive antiemetic regimen  -- Recommend at least full diet as tolerated to optimize nutrition        Walter Arechiga MD  General Surgery  [facility]

## 2025-02-14 NOTE — CARE UPDATE
6:00 pm 2/13. Notified by nursing staff that patient was attempting to stand up to get dressed to leave and became dizzy.  L Midline drain was noted to have increased yellow/brown output.   Temp 102.3 bp 105/71 map 82 O2sat 95% RA, Hr 95.  Patient evaluated at bedside. She is Aox3. Reports increased pain around L PTC. Endorses dizziness, fever and chills. Abdomen slightly TTP.   General surgery notified. They will come to evaluate patient tonight. They do not think we need repeat imaging at this time, but agree with repeat labs and infectious workup.  Discharge delayed due to these events.    Maria De Jesus Quick MD

## 2025-02-14 NOTE — SUBJECTIVE & OBJECTIVE
Interval History: Fevered overnight, Tmax to 102.3F. Tbili still downtrending, 3.5 from 4.2. Otherwise feeling well this morning, ambulating with PT. Having some drainage from around the L PTC.    Medications:  Continuous Infusions:  Scheduled Meds:   enoxparin  40 mg Subcutaneous Q24H (prophylaxis, 1700)    latanoprost  1 drop Both Eyes QHS    levETIRAcetam  1,500 mg Oral Nightly    LIDOcaine  1 patch Transdermal Daily    ondansetron  4 mg Oral Q6H    piperacillin-tazobactam (Zosyn) IV (PEDS and ADULTS) (extended infusion is not appropriate)  4.5 g Intravenous Q8H    scopolamine  1 patch Transdermal Q3 Days    sodium chloride 0.9%  10 mL Intravenous Q8H     PRN Meds:  Current Facility-Administered Medications:     dextrose 50%, 12.5 g, Intravenous, PRN    dextrose 50%, 25 g, Intravenous, PRN    diphenhydrAMINE, 25 mg, Oral, Q6H PRN    diphenhydrAMINE-zinc acetate 2-0.1%, , Topical (Top), TID PRN    glucagon (human recombinant), 1 mg, Intramuscular, PRN    glucose, 16 g, Oral, PRN    glucose, 24 g, Oral, PRN    HYDROmorphone, 2 mg, Oral, Q6H PRN    insulin aspart U-100, 0-5 Units, Subcutaneous, QID (AC + HS) PRN    melatonin, 6 mg, Oral, Nightly PRN    naloxone, 0.02 mg, Intravenous, PRN    prochlorperazine, 2.5 mg, Intravenous, Q6H PRN    senna-docusate 8.6-50 mg, 1 tablet, Oral, Daily PRN    Pharmacy to dose Vancomycin consult, , , Once **AND** vancomycin - pharmacy to dose, , Intravenous, pharmacy to manage frequency     Review of patient's allergies indicates:   Allergen Reactions    Codeine Nausea Only     Objective:     Vital Signs (Most Recent):  Temp: 99.2 °F (37.3 °C) (02/14/25 1124)  Pulse: 84 (02/14/25 1124)  Resp: 17 (02/14/25 1124)  BP: 132/82 (02/14/25 1124)  SpO2: 99 % (02/14/25 1124) Vital Signs (24h Range):  Temp:  [98.4 °F (36.9 °C)-102.3 °F (39.1 °C)] 99.2 °F (37.3 °C)  Pulse:  [] 84  Resp:  [17-18] 17  SpO2:  [95 %-99 %] 99 %  BP: ()/(45-82) 132/82     Weight: 79.7 kg (175 lb 11.2  oz)  Body mass index is 35.49 kg/m².    Intake/Output - Last 3 Shifts         02/12 0700  02/13 0659 02/13 0700  02/14 0659 02/14 0700  02/15 0659    P.O.       Total Intake(mL/kg)       Urine (mL/kg/hr)  450 (0.2)     Drains 500 80 40    Total Output 500 530 40    Net -500 -530 -40           Urine Occurrence 1 x 3 x     Stool Occurrence 0 x               Physical Exam  Vitals reviewed.   Constitutional:       General: She is not in acute distress.     Appearance: Normal appearance. She is not toxic-appearing.   HENT:      Head: Normocephalic and atraumatic.      Nose: Nose normal.      Mouth/Throat:      Mouth: Mucous membranes are moist.   Cardiovascular:      Rate and Rhythm: Normal rate.      Pulses: Normal pulses.   Pulmonary:      Effort: No respiratory distress.   Abdominal:      General: Abdomen is flat.      Palpations: Abdomen is soft.      Comments: Soft, non distended. Very mildly tender to deep palpation in the epigastrium. No rebound or guarding.   Both PTC drains capped, some drainage around L PTC   Neurological:      General: No focal deficit present.      Mental Status: She is alert and oriented to person, place, and time.   Psychiatric:         Mood and Affect: Mood normal.         Behavior: Behavior normal.          Significant Labs:  I have reviewed all pertinent lab results within the past 24 hours.  CBC:   Recent Labs   Lab 02/13/25  1829   WBC 18.70*   RBC 3.90*   HGB 12.0   HCT 35.7*      MCV 92   MCH 30.8   MCHC 33.6     CMP:   Recent Labs   Lab 02/14/25  0516   *   CALCIUM 8.7   ALBUMIN 1.9*   PROT 6.5   *   K 3.6   CO2 23      BUN 32*   CREATININE 1.4   ALKPHOS 276*   *   AST 91*   BILITOT 3.5*       Significant Diagnostics:  I have reviewed all pertinent imaging results/findings within the past 24 hours.

## 2025-02-14 NOTE — PROGRESS NOTES
Pharmacokinetic Initial Assessment: IV Vancomycin    Assessment/Plan:    Initiate intravenous vancomycin with loading dose of 1500 mg once with subsequent doses when random concentrations are less than 20 mcg/mL.  - Ms. Cerrato's Scr is increasing quickly (0.7 --> 1.0 --> 1.4 within a 36 hour period).   - Will pulse dose for now in the setting of rapidly increasing Scr.  - Desired empiric serum trough concentration is 10 to 20 mcg/mL.  - Draw vancomycin random level on 2/14 at 2200.    Pharmacy will continue to follow and monitor vancomycin.      Please contact pharmacy at extension [phone] with any questions regarding this assessment.     Thank you for the consult,   Lilly Velasquez       Patient brief summary:  Katey Cerrato is a 69 y.o. female initiated on antimicrobial therapy with IV Vancomycin for treatment of suspected sepsis    Actual Body Weight:   79.7 kg    Renal Function:   Estimated Creatinine Clearance: 36.1 mL/min (based on SCr of 1.4 mg/dL).    Dialysis Method (if applicable):  N/A

## 2025-02-14 NOTE — ASSESSMENT & PLAN NOTE
"Liver mass, possible cholangiocarcinoma  Patient presented to OSH with jaundice and scleral icterus, along with abnormal labs from clinic. Also noted nausea and back pain.  Noted history of cholecystectomy. Labs showed elevated bilirubin, and imaging with biliary ductal dilatation. Initial CT of the abdomen and pelvis with IV contrast showed significant intrahepatic biliary ductal dilatation and questionable 9 mm fat containing lesion in the uncinate process of the pancreas.  Abdominal ultrasound showed hepatic steatosis and intra hepatic biliary ductal dilatation and status post cholecystectomy. MRCP showed "severe intrahepatic bile duct dilatation, with abrupt narrowing at the confluence of the hepatic ducts where there is a suspected ill-defined liver mass.  This is poorly evaluated without contrast but concerning for neoplasm such as cholangiocarcinoma." Repeat imaging with MRI abdomen with and without contrast showed "hypoattenuating lesion near the confluence of the right and left hepatic ducts causing severe upstream ductal dilatation concerning for cholangiocarcinoma.  Further evaluation with ERCP is recommended." Patient transferred to [facility] [place], [place] for further workup and evaluation. CEA elevated, CA 19-9 WNL. Surgical oncology, AES, and IR consulted to coordinate plan for further management. Underwent PTC with IR on 2/8 without complication. Plan to allow decompression of the biliary system and repeat imaging to reassess obstruction    - Full liquid diet tolerated well for nausea and vomiting, will advance diet to solids and continue to monitor and adjust as needed   -AES Consulted  -Surgical Oncology consulted, appreciate recs     -S/p PTC w/ IR     - Plan for repeat CT A/P when biliary system decompressed to be done outpatient      -CT chest, abdomen, and pelvis  for further staging ordered   - Leave drains to gravity drainage until bilirubin stabilizes. Drains can be flushed once daily with normal saline. She " will need routine exchange in 8-12 weeks with IR. Surg Onc is following and would like to the drains capped prior to discharge.  Left side is capped, Right side capped. Might be uncapped and allow to drain to gravity on 2/14   - Continue p.r.n. medication for symptomatic relief  - Trend with labs and correct electrolyte abnormalities as needed  - Replace with LR for volume losses with PCT drains as needed, and patient was encouraged to increase PO fluids intake

## 2025-02-14 NOTE — ASSESSMENT & PLAN NOTE
69F presenting with painless jaundice after being found to have biliary obstruction on recent outpatient lab work. Imaging studies w/ a hypoattenuating mass near the confluence of the right and left hepatic ducts, which is concerning for cholangiocarcinoma. CEA elevated. S/p Bilateral PTC placement 2/8/25. Tbili still downtrending. L PTC capped on 2/11. R PTC capped 2/12. N/v well controlled. Having some drainage from L PTC.    -- Place bilateral PTCs to gravity  -- Will need a chest CT noncon before she leaves or outpatient  -- Will f/u outpatient with Dr. Glynn in 1 week with labs  -- No tissue diagnosis yet  -- PTCs capped  -- Flush b/l drains TID  -- Recommend replacing her PTC losses with LR ~1:1  -- Trend CMP  -- Aggressive antiemetic regimen  -- Recommend at least full diet as tolerated to optimize nutrition

## 2025-02-14 NOTE — NURSING
Left biliary drain leaking through dressing again. Surgery MD at bedside to assess. Dressing change completed with MD's assistance. Both drains flushed per orders.

## 2025-02-14 NOTE — CARE UPDATE
RAPID RESPONSE NURSE FOLLOW-UP NOTE       Followed up with patient for  rounding .     Patient receiving 1L IVF bolus over 2hrs, mid-infusion repeat BP: 94/62 (72).    No acute issues at this time. Reviewed plan of care with Bedside RNBlaire .   Team will continue to follow.  Please call Rapid Response RN, Daniella Blackwood RN with any questions or concerns at [phone].

## 2025-02-14 NOTE — ASSESSMENT & PLAN NOTE
Patient had a fever of 102 on 2/13 with elevated wbc to 18, was not able to get discharged,  started on IV zosyn and vanc with infectious workup obtained.     - Continue IV vanc and Zosyn, deescalate  abx as appropriate   - F/u bcx NGTD   - CXR unremarkable  - US abdomen w Moderate intrahepatic ductal dilatation, with partially imaged biliary drains. No evidence of abscess    - F/u Flu, covid   - UA is unremarkable   - LA unremarkable   - PTC drain sites are clean and no signs of infection

## 2025-02-14 NOTE — SUBJECTIVE & OBJECTIVE
Interval History: Patient was unable to be discharged on 2/13 due to elevate wbc and fever of 102, infectious workup was obtained and abx started     Review of Systems   Constitutional:  Positive for fever. Negative for chills.   Respiratory:  Negative for cough, shortness of breath and wheezing.    Cardiovascular:  Negative for chest pain and leg swelling.   Gastrointestinal:  Positive for abdominal pain (Sites of PTC) and nausea. Negative for abdominal distention, blood in stool, constipation, diarrhea and vomiting.   Genitourinary:  Negative for difficulty urinating and dysuria.   Musculoskeletal:  Positive for back pain.     Objective:     Vital Signs (Most Recent):  Temp: 99.2 °F (37.3 °C) (02/14/25 1124)  Pulse: 84 (02/14/25 1124)  Resp: 17 (02/14/25 1124)  BP: 132/82 (02/14/25 1124)  SpO2: 99 % (02/14/25 1124) Vital Signs (24h Range):  Temp:  [98.4 °F (36.9 °C)-102.3 °F (39.1 °C)] 99.2 °F (37.3 °C)  Pulse:  [] 84  Resp:  [17-18] 17  SpO2:  [95 %-99 %] 99 %  BP: ()/(45-82) 132/82     Weight: 79.7 kg (175 lb 11.2 oz)  Body mass index is 35.49 kg/m².    Intake/Output Summary (Last 24 hours) at 2/14/2025 1326  Last data filed at 2/14/2025 1026  Gross per 24 hour   Intake --   Output 530 ml   Net -530 ml         Physical Exam  Vitals and nursing note reviewed.   Constitutional:       General: She is not in acute distress.     Appearance: Normal appearance. She is not toxic-appearing.   HENT:      Head: Normocephalic and atraumatic.      Nose: Nose normal.      Mouth/Throat:      Mouth: Mucous membranes are moist.   Cardiovascular:      Rate and Rhythm: Normal rate.      Pulses: Normal pulses.   Pulmonary:      Effort: No respiratory distress.   Abdominal:      General: Abdomen is flat.      Palpations: Abdomen is soft.      Comments: Soft, non distended. tender to deep palpation in the epigastrium around drains sites. No rebound or guarding.   L PTC in place   R PTC in place   Musculoskeletal:       Right lower leg: No edema.      Left lower leg: No edema.   Skin:     General: Skin is warm and dry.   Neurological:      General: No focal deficit present.      Mental Status: She is alert and oriented to person, place, and time.   Psychiatric:         Mood and Affect: Mood normal.         Behavior: Behavior normal.             Significant Labs: All pertinent labs within the past 24 hours have been reviewed.  CBC:   Recent Labs   Lab 02/13/25  1829   WBC 18.70*   HGB 12.0   HCT 35.7*        CMP:   Recent Labs   Lab 02/13/25  0439 02/13/25  1829 02/14/25  0516   * 135* 135*   K 3.7 3.8 3.6   CL 98 97 102   CO2 24 25 23   * 175* 155*   BUN 22 30* 32*   CREATININE 1.0 1.4 1.4   CALCIUM 9.2 9.6 8.7   PROT 7.0 7.6 6.5   ALBUMIN 2.2* 2.3* 1.9*   BILITOT 4.2* 4.2* 3.5*   ALKPHOS 317* 336* 276*   AST 93* 134* 91*   * 133* 108*   ANIONGAP 10 13 10       Significant Imaging: I have reviewed all pertinent imaging results/findings within the past 24 hours.

## 2025-02-14 NOTE — SUBJECTIVE & OBJECTIVE
Interval History: patient remains Afebrile, HDS and feel okay, no signs of infectious source so far with infectious workup, will continue to follow up       Review of Systems   Constitutional:  Negative for chills and fever.   Respiratory:  Negative for cough, shortness of breath and wheezing.    Cardiovascular:  Negative for chest pain and leg swelling.   Gastrointestinal:  Positive for abdominal pain (Sites of PTC) and nausea. Negative for abdominal distention, blood in stool, constipation, diarrhea and vomiting.   Genitourinary:  Negative for difficulty urinating and dysuria.   Musculoskeletal:  Positive for back pain.     Objective:     Vital Signs (Most Recent):  Temp: 99.2 °F (37.3 °C) (02/14/25 1124)  Pulse: 84 (02/14/25 1124)  Resp: 17 (02/14/25 1124)  BP: 132/82 (02/14/25 1124)  SpO2: 99 % (02/14/25 1124) Vital Signs (24h Range):  Temp:  [98.4 °F (36.9 °C)-102.3 °F (39.1 °C)] 99.2 °F (37.3 °C)  Pulse:  [] 84  Resp:  [17-18] 17  SpO2:  [95 %-99 %] 99 %  BP: ()/(45-82) 132/82     Weight: 79.7 kg (175 lb 11.2 oz)  Body mass index is 35.49 kg/m².    Intake/Output Summary (Last 24 hours) at 2/14/2025 1333  Last data filed at 2/14/2025 1026  Gross per 24 hour   Intake --   Output 530 ml   Net -530 ml         Physical Exam  Vitals and nursing note reviewed.   Constitutional:       General: She is not in acute distress.     Appearance: Normal appearance. She is not toxic-appearing.   HENT:      Head: Normocephalic and atraumatic.      Nose: Nose normal.      Mouth/Throat:      Mouth: Mucous membranes are moist.   Cardiovascular:      Rate and Rhythm: Normal rate.      Heart sounds: No murmur heard.  Pulmonary:      Effort: Pulmonary effort is normal. No respiratory distress.      Breath sounds: Normal breath sounds. No wheezing or rales.   Abdominal:      General: Abdomen is flat.      Palpations: Abdomen is soft.      Comments: Soft, non distended. tender to deep palpation in the epigastrium around  drains sites. No rebound or guarding.   L PTC in place   R PTC in place   Musculoskeletal:      Right lower leg: No edema.      Left lower leg: No edema.   Skin:     General: Skin is warm and dry.   Neurological:      General: No focal deficit present.      Mental Status: She is alert and oriented to person, place, and time.   Psychiatric:         Mood and Affect: Mood normal.         Behavior: Behavior normal.             Significant Labs: All pertinent labs within the past 24 hours have been reviewed.  CBC:   Recent Labs   Lab 02/13/25  1829   WBC 18.70*   HGB 12.0   HCT 35.7*        CMP:   Recent Labs   Lab 02/13/25  0439 02/13/25  1829 02/14/25  0516   * 135* 135*   K 3.7 3.8 3.6   CL 98 97 102   CO2 24 25 23   * 175* 155*   BUN 22 30* 32*   CREATININE 1.0 1.4 1.4   CALCIUM 9.2 9.6 8.7   PROT 7.0 7.6 6.5   ALBUMIN 2.2* 2.3* 1.9*   BILITOT 4.2* 4.2* 3.5*   ALKPHOS 317* 336* 276*   AST 93* 134* 91*   * 133* 108*   ANIONGAP 10 13 10       Significant Imaging: I have reviewed all pertinent imaging results/findings within the past 24 hours.

## 2025-02-14 NOTE — PT/OT/SLP PROGRESS
"Physical Therapy Treatment    Patient Name:  Katey Cerrato   MRN:  [id number]    Recommendations:     Discharge Recommendations: Low Intensity Therapy  Discharge Equipment Recommendations: walker, rolling, bath bench  Barriers to discharge: None    Assessment:     Katey Cerrato is a 69 y.o. female admitted with a medical diagnosis of Biliary obstruction.  She presents with the following impairments/functional limitations: pain, weakness. Pt was agreeable to OOB activities and ambulation trials. Pt tolerated decreased ambulation but was also having increased drainage from PTC. Pt will need continued skilled PT to return to PLOF.    Rehab Prognosis: Good; patient would benefit from acute skilled PT services to address these deficits and reach maximum level of function.    Recent Surgery: * No surgery found *      Plan:     During this hospitalization, patient to be seen 2 x/week to address the identified rehab impairments via gait training, therapeutic activities, therapeutic exercises, neuromuscular re-education and progress toward the following goals:    Plan of Care Expires:  03/14/25    Subjective     Chief Complaint: "I really need to get moving around"  Patient/Family Comments/goals: Return to PLOF  Pain/Comfort:  Pain Rating 1:  (not rated)  Location 1: abdomen  Pain Addressed 1: Distraction      Objective:     Communicated with RN prior to session.  Patient found up in chair with  (Biliary drain) upon PT entry to room.     General Precautions: Standard, fall  Orthopedic Precautions: N/A  Braces: N/A  Respiratory Status: Room air     Functional Mobility:  Bed Mobility:     Scooting: modified independence  Transfers:     Sit to Stand:  modified independence with rolling walker  Gait: Patient gait trained FWB/WBAT: bilateral lower extremity ~300  feet on level tile with Rolling Walker with Stand-by Assistance.  Pt with demonstarting a  reciprocal gait with decreased saniya, decreased velocity of limb motion, " decreased step length, decreased stride length, and decreased toe-to-floor clearance.Impairments contributing to gait deviations include impaired balance, pain, impaired postural control, decreased strength, and decreased endurance.   Balance: Sitting: Independent; Standing: Supervision- SBA for unsteadiness with dynamic mobility    Therapeutic Exercise: Patient performed 1 set(s) of 10 repetitions of  the following seated exercises: ankle pumps, hip abduction, and hip adduction for bilateral LE. Patient required skilled PT for instruction of exercises and appropriate cues to perform exercises safely and appropriately.       AM-PAC 6 CLICK MOBILITY  Turning over in bed (including adjusting bedclothes, sheets and blankets)?: 4  Sitting down on and standing up from a chair with arms (e.g., wheelchair, bedside commode, etc.): 4  Moving from lying on back to sitting on the side of the bed?: 3  Moving to and from a bed to a chair (including a wheelchair)?: 4  Need to walk in hospital room?: 3  Climbing 3-5 steps with a railing?: 3  Basic Mobility Total Score: 21       EDUCATION  Patient provided with daily orientation and goals of this PT session.  Encouraged patient to perform daily exercises & mobility to increase endurance and decrease effects of bedrest. Time provided for therapeutic counseling and discussion of health disposition. All questions answered to patient's satisfaction, within scope of PT practice; voiced no other concerns. White board updated in patient's room, RN notified of session.    Patient left up in chair with all lines intact, call button in reach, and RN notified..    GOALS:   Multidisciplinary Problems       Physical Therapy Goals          Problem: Physical Therapy    Goal Priority Disciplines Outcome Interventions   Physical Therapy Goal     PT, PT/OT Progressing    Description: Goals to be met by:      Patient will increase functional independence with mobility by performin. Sit to  stand transfer with Stand-by Assistance and no AD  2. Gait  x 500 feet with Modified [place] using Rolling Walker.   3. Ascend/descend 5 stairs with left Handrails Stand-by Assistance using Rolling Walker.                          Time Tracking:     PT Received On: 02/14/25  PT Start Time: 1250     PT Stop Time: 1319  PT Total Time (min): 29 min     Billable Minutes: Gait Training 20 and Therapeutic Exercise 9    Treatment Type: Treatment  PT/PTA: PTA     Number of PTA visits since last PT visit: 1 02/14/2025

## 2025-02-14 NOTE — PLAN OF CARE
[facility] Medicine Telemetry  Discharge Reassessment    Primary Care Provider: Shay Castellano MD    Expected Discharge Date: 2/17/2025    Reassessment (most recent)       Discharge Reassessment - 02/14/25 1748          Discharge Reassessment    Assessment Type Discharge Planning Reassessment (P)      Did the patient's condition or plan change since previous assessment? Yes (P)    plan to dc on 2/13 failed due to bp, dizziness, drainage    Discharge Plan A Home Health (P)      Discharge Plan B Home (P)      Why the patient remains in the hospital Requires continued medical care (P)         Post-Acute Status    Post-Acute Authorization Home Health (P)      Home Health Status Set-up Complete/Auth obtained (P)    [facility] accepted    Discharge Delays None known at this time (P)

## 2025-02-14 NOTE — PROGRESS NOTES
"[facility] Internal Medicine [facility] Medicine  Progress Note    Patient Name: Katey Cerrato  MRN: [id number]  Patient Class: IP- Inpatient   Admission Date: 2/3/2025  Length of Stay: 10 days  Attending Physician: Selina Winters*  Primary Care Provider: Shay Castellano MD        Subjective     Principal Problem:Biliary obstruction        HPI:  Ms. Katey Cerrato is a 69 y.o. female, with PMH of HTN, HLD, DMII, Grave's disease, who [place] [facility] after labs following an outpatient appointment were abnormal. She states she recently was treated for shingles with Valtrex with rash improvement, but then her urine became discolored and that prompted her visit to her PCP.  At her PCP visit it was noticed that she had an orange coloration to the skin, and yellow color of the eyes.  After her appointment she had outpatient lab testing showing abnormal liver function, which is what prompted her PCP to send her to the ED. Her only new symptom is some low back pain. She states she had a cholecystectomy in the past, and had many gallstones removed at that time. She states her sister had retained gallstones that needed removal in the past. Denies any fevers, chills, trauma, alcohol consumption, Tylenol consumption.  [facility] labs showed tbili 7.4, alk phos 538,  and . Lipase normal. INR 1.2. U/A with few bacteria. A  CT showed significant intrahepatic biliary ductal dilatation, mild extrahepatic biliary ductal dilatation, a 9 mm fat containing lesion in the uncinate process of the pancreas.  She was treated with IV fluids, IV Rocephin, Toradol on admission.  MRCP showed intrahepatic bile duct dilation noted with an ill-defined liver mass.  Further workup with MRI abdomen with/without contrast showed "hypoattenuating lesion near the confluence of the right and left hepatic ducts causing severe upstream ductal dilatation concerning for cholangiocarcinoma. Given lesion and " "hyperbilirubinemia decision was made [place] [facility] [place] and evaluation with advanced services, possible ERCP.    [place] [facility] Patient notes generalized pruritus, decreased appetite, nausea, lower back pain. Denies abdominal pain. Pending further workup and management     Overview/Hospital Course:  Patient presented to OSH with jaundice and scleral icterus, along with abnormal labs from clinic. Also noted nausea and back pain.  Noted history of cholecystectomy. Labs showed elevated bilirubin, and imaging with biliary ductal dilatation. Initial CT of the abdomen and pelvis with IV contrast showed significant intrahepatic biliary ductal dilatation and questionable 9 mm fat containing lesion in the uncinate process of the pancreas.  MRCP showed "severe intrahepatic bile duct dilatation, with abrupt narrowing at the confluence of the hepatic ducts where there is a suspected ill-defined liver mass. Repeat imaging with MRI abdomen with and without contrast showed "hypoattenuating lesion near the confluence of the right and left hepatic ducts causing severe upstream ductal dilatation concerning for cholangiocarcinoma.  Further evaluation with ERCP is recommended." [place] [facility] [place] and [place]. CEA elevated, CA 19-9 WNL. AES and Surgical oncology consulted, recommended PTC for further evaluation. Symptoms controlled with benadryl. PCT placed 2/8. Surg/ onc Recommend capping both sided PTC prior to discharge. Both PTC drains need to be flushed with saline twice daily. CT chest abdomen and pelvis w contrast obtained per surg onc reccs and patient will need a CT chest non contrast for stagin. Patient was unable to be discharged due to fever and elevated WBC on 2/13 and infectious workup was started. Infectious workup remains negative to date and patient received Zosyn and Vanc.      Interval History: Patient was unable to be discharged on 2/13 due to elevate " wbc and fever of 102, infectious workup was obtained and abx started     Review of Systems   Constitutional:  Positive for fever. Negative for chills.   Respiratory:  Negative for cough, shortness of breath and wheezing.    Cardiovascular:  Negative for chest pain and leg swelling.   Gastrointestinal:  Positive for abdominal pain (Sites of PTC) and nausea. Negative for abdominal distention, blood in stool, constipation, diarrhea and vomiting.   Genitourinary:  Negative for difficulty urinating and dysuria.   Musculoskeletal:  Positive for back pain.     Objective:     Vital Signs (Most Recent):  Temp: 99.2 °F (37.3 °C) (02/14/25 1124)  Pulse: 84 (02/14/25 1124)  Resp: 17 (02/14/25 1124)  BP: 132/82 (02/14/25 1124)  SpO2: 99 % (02/14/25 1124) Vital Signs (24h Range):  Temp:  [98.4 °F (36.9 °C)-102.3 °F (39.1 °C)] 99.2 °F (37.3 °C)  Pulse:  [] 84  Resp:  [17-18] 17  SpO2:  [95 %-99 %] 99 %  BP: ()/(45-82) 132/82     Weight: 79.7 kg (175 lb 11.2 oz)  Body mass index is 35.49 kg/m².    Intake/Output Summary (Last 24 hours) at 2/14/2025 1326  Last data filed at 2/14/2025 1026  Gross per 24 hour   Intake --   Output 530 ml   Net -530 ml         Physical Exam  Vitals and nursing note reviewed.   Constitutional:       General: She is not in acute distress.     Appearance: Normal appearance. She is not toxic-appearing.   HENT:      Head: Normocephalic and atraumatic.      Nose: Nose normal.      Mouth/Throat:      Mouth: Mucous membranes are moist.   Cardiovascular:      Rate and Rhythm: Normal rate.      Pulses: Normal pulses.   Pulmonary:      Effort: No respiratory distress.   Abdominal:      General: Abdomen is flat.      Palpations: Abdomen is soft.      Comments: Soft, non distended. tender to deep palpation in the epigastrium around drains sites. No rebound or guarding.   L PTC in place   R PTC in place   Musculoskeletal:      Right lower leg: No edema.      Left lower leg: No edema.   Skin:     General:  "Skin is warm and dry.   Neurological:      General: No focal deficit present.      Mental Status: She is alert and oriented to person, place, and time.   Psychiatric:         Mood and Affect: Mood normal.         Behavior: Behavior normal.             Significant Labs: All pertinent labs within the past 24 hours have been reviewed.  CBC:   Recent Labs   Lab 02/13/25  1829   WBC 18.70*   HGB 12.0   HCT 35.7*        CMP:   Recent Labs   Lab 02/13/25  0439 02/13/25  1829 02/14/25  0516   * 135* 135*   K 3.7 3.8 3.6   CL 98 97 102   CO2 24 25 23   * 175* 155*   BUN 22 30* 32*   CREATININE 1.0 1.4 1.4   CALCIUM 9.2 9.6 8.7   PROT 7.0 7.6 6.5   ALBUMIN 2.2* 2.3* 1.9*   BILITOT 4.2* 4.2* 3.5*   ALKPHOS 317* 336* 276*   AST 93* 134* 91*   * 133* 108*   ANIONGAP 10 13 10       Significant Imaging: I have reviewed all pertinent imaging results/findings within the past 24 hours.    Assessment and Plan     * Biliary obstruction  Liver mass, possible cholangiocarcinoma  Patient presented to OSH with jaundice and scleral icterus, along with abnormal labs from clinic. Also noted nausea and back pain.  Noted history of cholecystectomy. Labs showed elevated bilirubin, and imaging with biliary ductal dilatation. Initial CT of the abdomen and pelvis with IV contrast showed significant intrahepatic biliary ductal dilatation and questionable 9 mm fat containing lesion in the uncinate process of the pancreas.  Abdominal ultrasound showed hepatic steatosis and intra hepatic biliary ductal dilatation and status post cholecystectomy. MRCP showed "severe intrahepatic bile duct dilatation, with abrupt narrowing at the confluence of the hepatic ducts where there is a suspected ill-defined liver mass.  This is poorly evaluated without contrast but concerning for neoplasm such as cholangiocarcinoma." Repeat imaging with MRI abdomen with and without contrast showed "hypoattenuating lesion near the confluence of the " "right and left hepatic ducts causing severe upstream ductal dilatation concerning for cholangiocarcinoma.  Further evaluation with ERCP is recommended." [place] [facility] [place]. CEA elevated, CA 19-9 WNL. Surgical oncology, AES, and IR consulted to coordinate plan for further management. Underwent PTC with IR on 2/8 without complication. Plan to allow decompression of the biliary system and repeat imaging to reassess obstruction    - Full liquid diet tolerated well for nausea and vomiting, will advance diet to solids and continue to monitor and adjust as needed   -AES Consulted  -Surgical Oncology consulted, appreciate recs     -S/p PTC w/ IR     - Plan for repeat CT A/P when biliary system decompressed to be done outpatient      -CT chest, abdomen, and pelvis  for further staging ordered   - Leave drains to gravity drainage until bilirubin stabilizes. Drains can be flushed once daily with normal saline. She will need routine exchange in 8-12 weeks with IR. Surg Onc is following and would like to the drains capped prior to discharge.  Left side is capped, possible right PTC cap today   - Continue p.r.n. medication for symptomatic relief  - Trend with labs and correct electrolyte abnormalities as needed  - Replace with LR for volume losses with PCT drains as needed, and patient was encouraged to increase PO fluids intake     Fever  Patient had a fever of 102 on 2/13 with elevated wbc to 18, was started on IV zosyn and vanc with infectious workup obtained.     - Continue IV vanc and Zosyn, deescalate  abx as appropriate   - F/u bcx NGTD   - CXR unremarkable  - US abdomen w Moderate intrahepatic ductal dilatation, with partially imaged biliary drains. No evidence of abscess    - F/u Flu, covid   - UA is unremarkable   - LA unremarkable   - PTC drain sites are clean and no signs of infection       Nausea and vomiting  Patient with worsening nausea, vomiting s/p PTC on 2/9. Will plan " aggressive antiemetic regimen and adjust diet as needed    -Aggressive antiemetic regimen  -Trial of regular diet       Type 2 diabetes mellitus without complication, without long-term current use of insulin  - Last hemoglobin A1c 5.9, well controlled  - D/C home regimen of Trulicity on discharge due to nausea and vomiting which can worsen or exacerbate the symptoms   - Glucose level reviewed, currently in normal range  - Continue Accu-Cheks q.a.c. and q.h.s. and give low-dose sliding scale insulin as necessary    Mixed hyperlipidemia  Patient with hx HLD presenting with abnormal liver function tests. HLD well controlled on home statin    - Holding statin due to abnormal liver function tests    Graves' disease with exophthalmos  Pt with hx graves disease last TSH wnl in 2023. Currently without symptoms.    -Continue to monitor         Hypertension associated with diabetes  - Well controlled, continue metoprolol 50 mg p.o. q.h.s., amlodipine 10 mg p.o. daily and valsartan 320 mg p.o. daily    Generalized convulsive epilepsy with intractable epilepsy  Patient with history of epilepsy in the past on levitiracetam 1500 mg qhs. Stable, no reported seizure activity    - Continue home levetiracetam 1500 mg p.o. q.h.s.      VTE Risk Mitigation (From admission, onward)           Ordered     enoxaparin injection 40 mg  Every 24 hours         02/10/25 1040     IP VTE HIGH RISK PATIENT  Once         02/04/25 0219     Place sequential compression device  Until discontinued         02/04/25 0219                    Discharge Planning   ISHMAEL: 2/17/2025     Code Status: Full Code   Medical Readiness for Discharge Date:   Discharge Plan A: Home with family   Discharge Delays: None known at this time                    Martina Gaspar MD  Department of Hospital Medicine   [facility] Medicine Telemetry

## 2025-02-14 NOTE — PROGRESS NOTES
"[facility] - Internal Medicine [facility] Medicine  Progress Note    Patient Name: Katey Cerrato  MRN: [id number]  Patient Class: IP- Inpatient   Admission Date: 2/3/2025  Length of Stay: 10 days  Attending Physician: Selina Winters*  Primary Care Provider: Shay Castellano MD        Subjective     Principal Problem:Biliary obstruction        HPI:  Ms. Katey Cerrato is a 69 y.o. female, with PMH of HTN, HLD, DMII, Grave's disease, who presented to [facility] ED on 2/3/25 after labs following an outpatient appointment were abnormal. She states she recently was treated for shingles with Valtrex with rash improvement, but then her urine became discolored and that prompted her visit to her PCP.  At her PCP visit it was noticed that she had an orange coloration to the skin, and yellow color of the eyes.  After her appointment she had outpatient lab testing showing abnormal liver function, which is what prompted her PCP to send her to the ED. Her only new symptom is some low back pain. She states she had a cholecystectomy in the past, and had many gallstones removed at that time. She states her sister had retained gallstones that needed removal in the past. Denies any fevers, chills, trauma, alcohol consumption, Tylenol consumption.  [facility] ED labs showed tbili 7.4, alk phos 538,  and . Lipase normal. INR 1.2. U/A with few bacteria. A  CT showed significant intrahepatic biliary ductal dilatation, mild extrahepatic biliary ductal dilatation, a 9 mm fat containing lesion in the uncinate process of the pancreas.  She was treated with IV fluids, IV Rocephin, Toradol on admission.  MRCP showed intrahepatic bile duct dilation noted with an ill-defined liver mass.  Further workup with MRI abdomen with/without contrast showed "hypoattenuating lesion near the confluence of the right and left hepatic ducts causing severe upstream ductal dilatation concerning for cholangiocarcinoma. Given lesion and " "hyperbilirubinemia decision was made to transfer patient to [facility] [place] for further workup and evaluation with advanced services, possible ERCP.    On arrival to [facility] VSS. Patient notes generalized pruritus, decreased appetite, nausea, lower back pain. Denies abdominal pain. Pending further workup and management     Overview/Hospital Course:  Patient presented to OSH with jaundice and scleral icterus, along with abnormal labs from clinic. Also noted nausea and back pain.  Noted history of cholecystectomy. Labs showed elevated bilirubin, and imaging with biliary ductal dilatation. Initial CT of the abdomen and pelvis with IV contrast showed significant intrahepatic biliary ductal dilatation and questionable 9 mm fat containing lesion in the uncinate process of the pancreas.  MRCP showed "severe intrahepatic bile duct dilatation, with abrupt narrowing at the confluence of the hepatic ducts where there is a suspected ill-defined liver mass. Repeat imaging with MRI abdomen with and without contrast showed "hypoattenuating lesion near the confluence of the right and left hepatic ducts causing severe upstream ductal dilatation concerning for cholangiocarcinoma.  Further evaluation with ERCP is recommended." Patient transferred to [facility] [place] for further workup and evaluation. CEA elevated, CA 19-9 WNL. AES and Surgical oncology consulted, recommended PTC for further evaluation. Symptoms controlled with benadryl. PCT placed 2/8. Surg/ onc Recommend capping both sided PTC prior to discharge. Both PTC drains need to be flushed with saline twice daily. CT chest abdomen and pelvis w contrast obtained per surg onc reccs and patient will need a CT chest non contrast for stagin. Patient was unable to be discharged due to fever and elevated WBC on 2/13 and infectious workup was started. Infectious workup remains negative to date and patient received Zosyn and Vanc.      Interval History: patient remains Afebrile, HDS and feel okay, no signs of " infectious source so far with infectious workup, will continue to follow up       Review of Systems   Constitutional:  Negative for chills and fever.   Respiratory:  Negative for cough, shortness of breath and wheezing.    Cardiovascular:  Negative for chest pain and leg swelling.   Gastrointestinal:  Positive for abdominal pain (Sites of PTC) and nausea. Negative for abdominal distention, blood in stool, constipation, diarrhea and vomiting.   Genitourinary:  Negative for difficulty urinating and dysuria.   Musculoskeletal:  Positive for back pain.     Objective:     Vital Signs (Most Recent):  Temp: 99.2 °F (37.3 °C) (02/14/25 1124)  Pulse: 84 (02/14/25 1124)  Resp: 17 (02/14/25 1124)  BP: 132/82 (02/14/25 1124)  SpO2: 99 % (02/14/25 1124) Vital Signs (24h Range):  Temp:  [98.4 °F (36.9 °C)-102.3 °F (39.1 °C)] 99.2 °F (37.3 °C)  Pulse:  [] 84  Resp:  [17-18] 17  SpO2:  [95 %-99 %] 99 %  BP: ()/(45-82) 132/82     Weight: 79.7 kg (175 lb 11.2 oz)  Body mass index is 35.49 kg/m².    Intake/Output Summary (Last 24 hours) at 2/14/2025 1333  Last data filed at 2/14/2025 1026  Gross per 24 hour   Intake --   Output 530 ml   Net -530 ml         Physical Exam  Vitals and nursing note reviewed.   Constitutional:       General: She is not in acute distress.     Appearance: Normal appearance. She is not toxic-appearing.   HENT:      Head: Normocephalic and atraumatic.      Nose: Nose normal.      Mouth/Throat:      Mouth: Mucous membranes are moist.   Cardiovascular:      Rate and Rhythm: Normal rate.      Heart sounds: No murmur heard.  Pulmonary:      Effort: Pulmonary effort is normal. No respiratory distress.      Breath sounds: Normal breath sounds. No wheezing or rales.   Abdominal:      General: Abdomen is flat.      Palpations: Abdomen is soft.      Comments: Soft, non distended. tender to deep palpation in the epigastrium around drains sites. No rebound or guarding.   L PTC in place   R PTC in place  "  Musculoskeletal:      Right lower leg: No edema.      Left lower leg: No edema.   Skin:     General: Skin is warm and dry.   Neurological:      General: No focal deficit present.      Mental Status: She is alert and oriented to person, place, and time.   Psychiatric:         Mood and Affect: Mood normal.         Behavior: Behavior normal.             Significant Labs: All pertinent labs within the past 24 hours have been reviewed.  CBC:   Recent Labs   Lab 02/13/25  1829   WBC 18.70*   HGB 12.0   HCT 35.7*        CMP:   Recent Labs   Lab 02/13/25  0439 02/13/25  1829 02/14/25  0516   * 135* 135*   K 3.7 3.8 3.6   CL 98 97 102   CO2 24 25 23   * 175* 155*   BUN 22 30* 32*   CREATININE 1.0 1.4 1.4   CALCIUM 9.2 9.6 8.7   PROT 7.0 7.6 6.5   ALBUMIN 2.2* 2.3* 1.9*   BILITOT 4.2* 4.2* 3.5*   ALKPHOS 317* 336* 276*   AST 93* 134* 91*   * 133* 108*   ANIONGAP 10 13 10       Significant Imaging: I have reviewed all pertinent imaging results/findings within the past 24 hours.    Assessment and Plan     * Biliary obstruction  Liver mass, possible cholangiocarcinoma  Patient presented to OSH with jaundice and scleral icterus, along with abnormal labs from clinic. Also noted nausea and back pain.  Noted history of cholecystectomy. Labs showed elevated bilirubin, and imaging with biliary ductal dilatation. Initial CT of the abdomen and pelvis with IV contrast showed significant intrahepatic biliary ductal dilatation and questionable 9 mm fat containing lesion in the uncinate process of the pancreas.  Abdominal ultrasound showed hepatic steatosis and intra hepatic biliary ductal dilatation and status post cholecystectomy. MRCP showed "severe intrahepatic bile duct dilatation, with abrupt narrowing at the confluence of the hepatic ducts where there is a suspected ill-defined liver mass.  This is poorly evaluated without contrast but concerning for neoplasm such as cholangiocarcinoma." Repeat imaging " "with MRI abdomen with and without contrast showed "hypoattenuating lesion near the confluence of the right and left hepatic ducts causing severe upstream ductal dilatation concerning for cholangiocarcinoma.  Further evaluation with ERCP is recommended." Patient transferred to [facility] for further workup and evaluation. CEA elevated, CA 19-9 WNL. Surgical oncology, AES, and IR consulted to coordinate plan for further management. Underwent PTC with IR on 2/8 without complication. Plan to allow decompression of the biliary system and repeat imaging to reassess obstruction    - Full liquid diet tolerated well for nausea and vomiting, will advance diet to solids and continue to monitor and adjust as needed   -AES Consulted  -Surgical Oncology consulted, appreciate recs     -S/p PTC w/ IR     - Plan for repeat CT A/P when biliary system decompressed to be done outpatient      -CT chest, abdomen, and pelvis  for further staging ordered   - Leave drains to gravity drainage until bilirubin stabilizes. Drains can be flushed once daily with normal saline. She will need routine exchange in 8-12 weeks with IR. Surg Onc is following and would like to the drains capped prior to discharge.  Left side is capped, Right side capped. Might be uncapped and allow to drain to gravity on 2/14   - Continue p.r.n. medication for symptomatic relief  - Trend with labs and correct electrolyte abnormalities as needed  - Replace with LR for volume losses with PCT drains as needed, and patient was encouraged to increase PO fluids intake     Fever  Patient had a fever of 102 on 2/13 with elevated wbc to 18, was not able to get discharged,  started on IV zosyn and vanc with infectious workup obtained.     - Continue IV vanc and Zosyn, deescalate  abx as appropriate   - F/u bcx NGTD   - CXR unremarkable  - US abdomen w Moderate intrahepatic ductal dilatation, with partially imaged biliary drains. No evidence of abscess    - F/u Flu, covid   - UA is " unremarkable   - LA unremarkable   - PTC drain sites are clean and no signs of infection       Nausea and vomiting  Patient with worsening nausea, vomiting s/p PTC on 2/9. Will plan aggressive antiemetic regimen and adjust diet as needed    -Aggressive antiemetic regimen  -Trial of regular diet       Type 2 diabetes mellitus without complication, without long-term current use of insulin  - Last hemoglobin A1c 5.9, well controlled  - D/C home regimen of Trulicity on discharge due to nausea and vomiting which can worsen or exacerbate the symptoms   - Glucose level reviewed, currently in normal range  - Continue Accu-Cheks q.a.c. and q.h.s. and give low-dose sliding scale insulin as necessary    Mixed hyperlipidemia  Patient with hx HLD presenting with abnormal liver function tests. HLD well controlled on home statin    - Holding statin due to abnormal liver function tests    Graves' disease with exophthalmos  Pt with hx graves disease last TSH wnl in 2023. Currently without symptoms.    -Continue to monitor         Hypertension associated with diabetes  - Well controlled, continue metoprolol 50 mg p.o. q.h.s., amlodipine 10 mg p.o. daily and valsartan 320 mg p.o. daily    Generalized convulsive epilepsy with intractable epilepsy  Patient with history of epilepsy in the past on levitiracetam 1500 mg qhs. Stable, no reported seizure activity    - Continue home levetiracetam 1500 mg p.o. q.h.s.      VTE Risk Mitigation (From admission, onward)           Ordered     enoxaparin injection 40 mg  Every 24 hours         02/10/25 1040     IP VTE HIGH RISK PATIENT  Once         02/04/25 0219     Place sequential compression device  Until discontinued         02/04/25 0219                    Discharge Planning   ISHMAEL: 2/17/2025     Code Status: Full Code   Medical Readiness for Discharge Date:   Discharge Plan A: Home with family   Discharge Delays: None known at this time                    Martina Gaspar MD  Department of  [facility] Medicine   [facility] - Internal Medicine Telemetry

## 2025-02-15 LAB
ALBUMIN SERPL BCP-MCNC: 1.9 G/DL (ref 3.5–5.2)
ALP SERPL-CCNC: 280 U/L (ref 40–150)
ALT SERPL W/O P-5'-P-CCNC: 93 U/L (ref 10–44)
ANION GAP SERPL CALC-SCNC: 9 MMOL/L (ref 8–16)
AST SERPL-CCNC: 73 U/L (ref 10–40)
BACTERIA UR CULT: NORMAL
BASOPHILS # BLD AUTO: 0.04 K/UL (ref 0–0.2)
BASOPHILS NFR BLD: 0.3 % (ref 0–1.9)
BILIRUB SERPL-MCNC: 2.8 MG/DL (ref 0.1–1)
BUN SERPL-MCNC: 21 MG/DL (ref 8–23)
CALCIUM SERPL-MCNC: 9 MG/DL (ref 8.7–10.5)
CHLORIDE SERPL-SCNC: 105 MMOL/L (ref 95–110)
CO2 SERPL-SCNC: 21 MMOL/L (ref 23–29)
CREAT SERPL-MCNC: 0.9 MG/DL (ref 0.5–1.4)
DIFFERENTIAL METHOD BLD: ABNORMAL
EOSINOPHIL # BLD AUTO: 0.2 K/UL (ref 0–0.5)
EOSINOPHIL NFR BLD: 1 % (ref 0–8)
ERYTHROCYTE [DISTWIDTH] IN BLOOD BY AUTOMATED COUNT: 17.6 % (ref 11.5–14.5)
EST. GFR  (NO RACE VARIABLE): >60 ML/MIN/1.73 M^2
GLUCOSE SERPL-MCNC: 177 MG/DL (ref 70–110)
HCT VFR BLD AUTO: 31.6 % (ref 37–48.5)
HGB BLD-MCNC: 10.6 G/DL (ref 12–16)
IMM GRANULOCYTES # BLD AUTO: 0.17 K/UL (ref 0–0.04)
IMM GRANULOCYTES NFR BLD AUTO: 1.1 % (ref 0–0.5)
LYMPHOCYTES # BLD AUTO: 1.7 K/UL (ref 1–4.8)
LYMPHOCYTES NFR BLD: 10.9 % (ref 18–48)
MAGNESIUM SERPL-MCNC: 2.1 MG/DL (ref 1.6–2.6)
MCH RBC QN AUTO: 30.5 PG (ref 27–31)
MCHC RBC AUTO-ENTMCNC: 33.5 G/DL (ref 32–36)
MCV RBC AUTO: 91 FL (ref 82–98)
MONOCYTES # BLD AUTO: 1.1 K/UL (ref 0.3–1)
MONOCYTES NFR BLD: 7.2 % (ref 4–15)
NEUTROPHILS # BLD AUTO: 12.2 K/UL (ref 1.8–7.7)
NEUTROPHILS NFR BLD: 79.5 % (ref 38–73)
NRBC BLD-RTO: 0 /100 WBC
PHOSPHATE SERPL-MCNC: 2.4 MG/DL (ref 2.7–4.5)
PLATELET # BLD AUTO: 289 K/UL (ref 150–450)
PMV BLD AUTO: 11.2 FL (ref 9.2–12.9)
POCT GLUCOSE: 152 MG/DL (ref 70–110)
POCT GLUCOSE: 165 MG/DL (ref 70–110)
POCT GLUCOSE: 178 MG/DL (ref 70–110)
POCT GLUCOSE: 182 MG/DL (ref 70–110)
POTASSIUM SERPL-SCNC: 3.8 MMOL/L (ref 3.5–5.1)
PROT SERPL-MCNC: 6.6 G/DL (ref 6–8.4)
RBC # BLD AUTO: 3.47 M/UL (ref 4–5.4)
SODIUM SERPL-SCNC: 135 MMOL/L (ref 136–145)
WBC # BLD AUTO: 15.37 K/UL (ref 3.9–12.7)

## 2025-02-15 PROCEDURE — 85025 COMPLETE CBC W/AUTO DIFF WBC: CPT

## 2025-02-15 PROCEDURE — 25000003 PHARM REV CODE 250

## 2025-02-15 PROCEDURE — 21400001 HC TELEMETRY ROOM

## 2025-02-15 PROCEDURE — A4216 STERILE WATER/SALINE, 10 ML: HCPCS | Performed by: PHYSICIAN ASSISTANT

## 2025-02-15 PROCEDURE — 63600175 PHARM REV CODE 636 W HCPCS

## 2025-02-15 PROCEDURE — 25000003 PHARM REV CODE 250: Performed by: HOSPITALIST

## 2025-02-15 PROCEDURE — 83735 ASSAY OF MAGNESIUM: CPT | Performed by: HOSPITALIST

## 2025-02-15 PROCEDURE — 84100 ASSAY OF PHOSPHORUS: CPT | Performed by: HOSPITALIST

## 2025-02-15 PROCEDURE — 25000003 PHARM REV CODE 250: Performed by: PHYSICIAN ASSISTANT

## 2025-02-15 PROCEDURE — 36415 COLL VENOUS BLD VENIPUNCTURE: CPT | Performed by: HOSPITALIST

## 2025-02-15 PROCEDURE — 36415 COLL VENOUS BLD VENIPUNCTURE: CPT

## 2025-02-15 PROCEDURE — 80053 COMPREHEN METABOLIC PANEL: CPT | Performed by: HOSPITALIST

## 2025-02-15 PROCEDURE — 63600175 PHARM REV CODE 636 W HCPCS: Performed by: HOSPITALIST

## 2025-02-15 RX ORDER — VANCOMYCIN 1.75 GRAM/500 ML IN 0.9 % SODIUM CHLORIDE INTRAVENOUS
1750 ONCE
Status: COMPLETED | OUTPATIENT
Start: 2025-02-15 | End: 2025-02-15

## 2025-02-15 RX ORDER — SODIUM,POTASSIUM PHOSPHATES 280-250MG
1 POWDER IN PACKET (EA) ORAL ONCE
Status: COMPLETED | OUTPATIENT
Start: 2025-02-15 | End: 2025-02-15

## 2025-02-15 RX ADMIN — ONDANSETRON 4 MG: 4 TABLET ORAL at 05:02

## 2025-02-15 RX ADMIN — SODIUM CHLORIDE, PRESERVATIVE FREE 10 ML: 5 INJECTION INTRAVENOUS at 02:02

## 2025-02-15 RX ADMIN — LEVETIRACETAM 1500 MG: 750 TABLET, FILM COATED ORAL at 08:02

## 2025-02-15 RX ADMIN — SODIUM CHLORIDE, POTASSIUM CHLORIDE, SODIUM LACTATE AND CALCIUM CHLORIDE 1000 ML: 600; 310; 30; 20 INJECTION, SOLUTION INTRAVENOUS at 12:02

## 2025-02-15 RX ADMIN — LIDOCAINE 1 PATCH: 50 PATCH CUTANEOUS at 08:02

## 2025-02-15 RX ADMIN — SODIUM CHLORIDE, PRESERVATIVE FREE 10 ML: 5 INJECTION INTRAVENOUS at 08:02

## 2025-02-15 RX ADMIN — SODIUM CHLORIDE, PRESERVATIVE FREE 10 ML: 5 INJECTION INTRAVENOUS at 06:02

## 2025-02-15 RX ADMIN — ENOXAPARIN SODIUM 40 MG: 40 INJECTION SUBCUTANEOUS at 04:02

## 2025-02-15 RX ADMIN — SODIUM CHLORIDE, POTASSIUM CHLORIDE, SODIUM LACTATE AND CALCIUM CHLORIDE 1000 ML: 600; 310; 30; 20 INJECTION, SOLUTION INTRAVENOUS at 02:02

## 2025-02-15 RX ADMIN — VANCOMYCIN HYDROCHLORIDE 1750 MG: 500 INJECTION, POWDER, LYOPHILIZED, FOR SOLUTION INTRAVENOUS at 04:02

## 2025-02-15 RX ADMIN — ONDANSETRON 4 MG: 4 TABLET ORAL at 11:02

## 2025-02-15 RX ADMIN — PIPERACILLIN SODIUM AND TAZOBACTAM SODIUM 4.5 G: 4; .5 INJECTION, POWDER, FOR SOLUTION INTRAVENOUS at 04:02

## 2025-02-15 RX ADMIN — SCOPOLAMINE 1 PATCH: 1.5 PATCH, EXTENDED RELEASE TRANSDERMAL at 06:02

## 2025-02-15 RX ADMIN — SODIUM CHLORIDE, POTASSIUM CHLORIDE, SODIUM LACTATE AND CALCIUM CHLORIDE 500 ML: 600; 310; 30; 20 INJECTION, SOLUTION INTRAVENOUS at 12:02

## 2025-02-15 RX ADMIN — LATANOPROST 1 DROP: 50 SOLUTION/ DROPS OPHTHALMIC at 08:02

## 2025-02-15 RX ADMIN — SODIUM CHLORIDE, PRESERVATIVE FREE 10 ML: 5 INJECTION INTRAVENOUS at 12:02

## 2025-02-15 RX ADMIN — PIPERACILLIN SODIUM AND TAZOBACTAM SODIUM 4.5 G: 4; .5 INJECTION, POWDER, FOR SOLUTION INTRAVENOUS at 12:02

## 2025-02-15 RX ADMIN — ONDANSETRON 4 MG: 4 TABLET ORAL at 12:02

## 2025-02-15 RX ADMIN — POTASSIUM & SODIUM PHOSPHATES POWDER PACK 280-160-250 MG 1 PACKET: 280-160-250 PACK at 08:02

## 2025-02-15 RX ADMIN — PIPERACILLIN SODIUM AND TAZOBACTAM SODIUM 4.5 G: 4; .5 INJECTION, POWDER, FOR SOLUTION INTRAVENOUS at 08:02

## 2025-02-15 NOTE — NURSING
"1100   Notified Hosp med 3 via secure chat  L  drain output about 40 cc yellow drainage prior to me flushing drain proceeded with flush. Pt reported "pain with flush" pain resolved once flushed. R drainage was about 10cc. Patient very alert today, orientedx4, some abdominal pain reported, otherwise denies any other complaints  at this time. Per Dr SUJATA Arechiga is surgery MD on service.    14:15 Dr Arechiga arrived to bedside changed  L dressing and place PCT drains.     1500 Requested clarification of  flush orders.Dr Arechiga left 2 drainage bags on her windowsill. Ok for nursing to reinforce or change dressing as needed but leaking should slow down.    1645 Tech reported bp 88/46  1655  Manual BP is 88/46, asymptomatic Notified \[facility]\"" med 3  service via secure chat.  L drain output 130cc yellow yanet drainage R 75cc    Recheck Bp in R arm per Dr Richards request R manual BP   R 82/48 accurate cuff size. LR 500cc bolus ordered.  Pts still very alert an talkative, denies symptoms at this time.   1820  Notified  \[facility]\"" med team via secure chat of repeat BP post bolus manual bp118/58    L drain 200 R drain 300 just letting you know since outputs increased.    "

## 2025-02-15 NOTE — ASSESSMENT & PLAN NOTE
"Liver mass, possible cholangiocarcinoma  Patient presented to OSH with jaundice and scleral icterus, along with abnormal labs from clinic. Also noted nausea and back pain.  Noted history of cholecystectomy. Labs showed elevated bilirubin, and imaging with biliary ductal dilatation. Initial CT of the abdomen and pelvis with IV contrast showed significant intrahepatic biliary ductal dilatation and questionable 9 mm fat containing lesion in the uncinate process of the pancreas.  Abdominal ultrasound showed hepatic steatosis and intra hepatic biliary ductal dilatation and status post cholecystectomy. MRCP showed "severe intrahepatic bile duct dilatation, with abrupt narrowing at the confluence of the hepatic ducts where there is a suspected ill-defined liver mass.  This is poorly evaluated without contrast but concerning for neoplasm such as cholangiocarcinoma." Repeat imaging with MRI abdomen with and without contrast showed "hypoattenuating lesion near the confluence of the right and left hepatic ducts causing severe upstream ductal dilatation concerning for cholangiocarcinoma.  Further evaluation with ERCP is recommended." Patient transferred to [facility] [place] for further workup and evaluation. CEA elevated, CA 19-9 WNL. Surgical oncology, AES, and IR consulted to coordinate plan for further management. Underwent PTC with IR on 2/8 without complication. Plan to allow decompression of the biliary system and repeat imaging to reassess obstruction    - Full liquid diet tolerated well for nausea and vomiting, will advance diet to solids and continue to monitor and adjust as needed   -AES Consulted  -Surgical Oncology consulted, appreciate recs     -S/p PTC w/ IR     - Plan for repeat CT A/P when biliary system decompressed to be done outpatient      -CT chest, abdomen, and pelvis  for further staging ordered   - Leave drains to gravity drainage until bilirubin stabilizes. Drains can be flushed once daily with normal saline. She " will need routine exchange in 8-12 weeks with IR. Surg Onc is following and would like to the drains capped prior to discharge.  Left side is capped, Right side capped. Might be uncapped and allow to drain to gravity on 2/14   - Continue p.r.n. medication for symptomatic relief  - Trend with labs and correct electrolyte abnormalities as needed  - Replace with LR for volume losses with PCT drains as needed, and patient was encouraged to increase PO fluids intake

## 2025-02-15 NOTE — SUBJECTIVE & OBJECTIVE
Interval History: Hypotensive overnight responsive to IVFs after increase PCT drainage, continued WBC elevation, no fevers, PCT draining to gravity, IV abx, additional 1L IVFs ordered this afternoon    Review of Systems  Objective:     Vital Signs (Most Recent):  Temp: 98 °F (36.7 °C) (02/15/25 1121)  Pulse: 75 (02/15/25 1121)  Resp: 18 (02/15/25 1121)  BP: 104/69 (02/15/25 1121)  SpO2: 99 % (02/15/25 1121) Vital Signs (24h Range):  Temp:  [97.3 °F (36.3 °C)-99.6 °F (37.6 °C)] 98 °F (36.7 °C)  Pulse:  [69-95] 75  Resp:  [17-18] 18  SpO2:  [95 %-99 %] 99 %  BP: ()/(45-71) 104/69     Weight: 79.7 kg (175 lb 11.2 oz)  Body mass index is 35.49 kg/m².    Intake/Output Summary (Last 24 hours) at 2/15/2025 1215  Last data filed at 2/15/2025 1141  Gross per 24 hour   Intake --   Output 3205 ml   Net -3205 ml         Physical Exam  Vitals and nursing note reviewed.   Constitutional:       General: She is not in acute distress.     Appearance: Normal appearance. She is not toxic-appearing.   HENT:      Head: Normocephalic and atraumatic.      Nose: Nose normal.      Mouth/Throat:      Mouth: Mucous membranes are moist.   Cardiovascular:      Rate and Rhythm: Normal rate.      Pulses: Normal pulses.      Heart sounds: No murmur heard.  Pulmonary:      Effort: Pulmonary effort is normal. No respiratory distress.      Breath sounds: Normal breath sounds. No wheezing or rales.   Abdominal:      General: Abdomen is flat.      Palpations: Abdomen is soft.      Comments: Soft, non distended. Very mildly tender to deep palpation in the epigastrium. No rebound or guarding.   Both PTC drains capped, some drainage around L PTC   Musculoskeletal:      Right lower leg: No edema.      Left lower leg: No edema.   Skin:     General: Skin is warm and dry.   Neurological:      General: No focal deficit present.      Mental Status: She is alert and oriented to person, place, and time.   Psychiatric:         Mood and Affect: Mood normal.          Behavior: Behavior normal.             Significant Labs: All pertinent labs within the past 24 hours have been reviewed.  CBC:   Recent Labs   Lab 02/13/25  1829 02/15/25  0926   WBC 18.70* 15.37*   HGB 12.0 10.6*   HCT 35.7* 31.6*    289     CMP:   Recent Labs   Lab 02/13/25 1829 02/14/25  0516 02/15/25  0233   * 135* 135*   K 3.8 3.6 3.8   CL 97 102 105   CO2 25 23 21*   * 155* 177*   BUN 30* 32* 21   CREATININE 1.4 1.4 0.9   CALCIUM 9.6 8.7 9.0   PROT 7.6 6.5 6.6   ALBUMIN 2.3* 1.9* 1.9*   BILITOT 4.2* 3.5* 2.8*   ALKPHOS 336* 276* 280*   * 91* 73*   * 108* 93*   ANIONGAP 13 10 9       Significant Imaging: I have reviewed all pertinent imaging results/findings within the past 24 hours.

## 2025-02-15 NOTE — PROGRESS NOTES
Pharmacokinetic Assessment Follow Up: IV Vancomycin    Vancomycin serum concentration assessment(s):    The random level was drawn correctly and can be used to guide therapy at this time. The measurement is below the desired definitive target range of 10 to 20 mcg/mL.    Vancomycin Regimen Plan:    Re-dose when the random level is less than 20 mcg/mL, next level to be drawn at 0300 on 2/16/2025    Drug levels (last 3 results):  Recent Labs   Lab Result Units 02/14/25  2141   Vancomycin, Random ug/mL 7.1       Pharmacy will continue to follow and monitor vancomycin.    Please contact pharmacy at extension [phone] for questions regarding this assessment.    Thank you for the consult,   Jose Luz       Patient brief summary:  Katey Cerrato is a 69 y.o. female initiated on antimicrobial therapy with IV Vancomycin for treatment of sepsis        Drug Allergies:   Review of patient's allergies indicates:   Allergen Reactions    Codeine Nausea Only       Actual Body Weight:   79.7kg    Renal Function:   Estimated Creatinine Clearance: 36.1 mL/min (based on SCr of 1.4 mg/dL).,     Dialysis Method (if applicable):  N/A    CBC (last 72 hours):  Recent Labs   Lab Result Units 02/12/25  0519 02/13/25  1829   WBC K/uL 11.36 18.70*   Hemoglobin g/dL 11.8* 12.0   Hematocrit % 34.7* 35.7*   Platelets K/uL 305 334   Gran % % 77.6* 83.3*   Lymph % % 12.1* 6.3*   Mono % % 8.5 9.4   Eosinophil % % 0.1 0.1   Basophil % % 0.3 0.2   Differential Method  Automated Automated       Metabolic Panel (last 72 hours):  Recent Labs   Lab Result Units 02/12/25  0519 02/13/25  0439 02/13/25  1829 02/14/25  0439 02/14/25  0516   Sodium mmol/L 136 132* 135*  --  135*   Potassium mmol/L 3.7 3.7 3.8  --  3.6   Chloride mmol/L 99 98 97  --  102   CO2 mmol/L 24 24 25  --  23   Glucose mg/dL 124* 154* 175*  --  155*   Glucose, UA   --   --   --  Negative  --    BUN mg/dL 19 22 30*  --  32*   Creatinine mg/dL 0.7 1.0 1.4  --  1.4   Albumin g/dL 2.4*  2.2* 2.3*  --  1.9*   Total Bilirubin mg/dL 4.6* 4.2* 4.2*  --  3.5*   Alkaline Phosphatase U/L 343* 317* 336*  --  276*   AST U/L 101* 93* 134*  --  91*   ALT U/L 152* 130* 133*  --  108*   Magnesium mg/dL 1.9 2.0  --   --  1.9   Phosphorus mg/dL 2.9 3.4  --   --  3.3       Vancomycin Administrations:  vancomycin given in the last 96 hours                     vancomycin 1,500 mg in 0.9% NaCl 250 mL IVPB (admixture device) (mg) 1,500 mg New Bag 02/13/25 2202                    Microbiologic Results:  Microbiology Results (last 7 days)       Procedure Component Value Units Date/Time    MRSA Screen by PCR [[id number]] Collected: 02/14/25 1707    Order Status: Completed Specimen: Nasal Swab Updated: 02/14/25 2233     MRSA SCREEN BY PCR Not Detected    Blood culture [[id number]] Collected: 02/13/25 1825    Order Status: Completed Specimen: Blood Updated: 02/14/25 2022     Blood Culture, Routine No Growth to date      No Growth to date    Blood culture [[id number]] Collected: 02/13/25 1825    Order Status: Completed Specimen: Blood Updated: 02/14/25 2022     Blood Culture, Routine No Growth to date      No Growth to date    Urine culture [[id number]] Collected: 02/14/25 0439    Order Status: No result Specimen: Urine Updated: 02/14/25 0504

## 2025-02-15 NOTE — ASSESSMENT & PLAN NOTE
Patient had a fever of 102 on 2/13 with elevated wbc to 18, was not able to get discharged,  started on IV zosyn and vanc with infectious workup obtained.     - Continue IV vanc and Zosyn for 1 more day, deescalate  abx as appropriate   - F/u bcx NGTD   - CXR unremarkable  - US abdomen w Moderate intrahepatic ductal dilatation, with partially imaged biliary drains. No evidence of abscess    - F/u Flu, covid   - UA is unremarkable   - LA unremarkable   - PTC drain sites are clean and no signs of infection

## 2025-02-15 NOTE — NURSING
Low blood pressure reading; Left drain 600 with leaking and soaked dressing; right drain 150. Messaged Med Team 3 night team, LR bolus ordered and administered

## 2025-02-15 NOTE — PROGRESS NOTES
"[facility] - Internal Medicine [facility] Medicine  Progress Note    Patient Name: Katey Cerrato  MRN: [id number]  Patient Class: IP- Inpatient   Admission Date: 2/3/2025  Length of Stay: 11 days  Attending Physician: Selina Winters*  Primary Care Provider: Shay Castellano MD        Subjective     Principal Problem:Biliary obstruction        HPI:  Ms. Katey Cerrato is a 69 y.o. female, with PMH of HTN, HLD, DMII, Grave's disease, who presented to [facility] ED on 2/3/25 after labs following an outpatient appointment were abnormal. She states she recently was treated for shingles with Valtrex with rash improvement, but then her urine became discolored and that prompted her visit to her PCP.  At her PCP visit it was noticed that she had an orange coloration to the skin, and yellow color of the eyes.  After her appointment she had outpatient lab testing showing abnormal liver function, which is what prompted her PCP to send her to the ED. Her only new symptom is some low back pain. She states she had a cholecystectomy in the past, and had many gallstones removed at that time. She states her sister had retained gallstones that needed removal in the past. Denies any fevers, chills, trauma, alcohol consumption, Tylenol consumption.  [facility] ED labs showed tbili 7.4, alk phos 538,  and . Lipase normal. INR 1.2. U/A with few bacteria. A  CT showed significant intrahepatic biliary ductal dilatation, mild extrahepatic biliary ductal dilatation, a 9 mm fat containing lesion in the uncinate process of the pancreas.  She was treated with IV fluids, IV Rocephin, Toradol on admission.  MRCP showed intrahepatic bile duct dilation noted with an ill-defined liver mass.  Further workup with MRI abdomen with/without contrast showed "hypoattenuating lesion near the confluence of the right and left hepatic ducts causing severe upstream ductal dilatation concerning for cholangiocarcinoma. Given lesion and " "hyperbilirubinemia decision was made to transfer patient to [facility] for further workup and evaluation with advanced services, possible ERCP.    On arrival to [facility] VSS. Patient notes generalized pruritus, decreased appetite, nausea, lower back pain. Denies abdominal pain. Pending further workup and management     Overview/Hospital Course:  Patient presented to OSH with jaundice and scleral icterus, along with abnormal labs from clinic. Also noted nausea and back pain.  Noted history of cholecystectomy. Labs showed elevated bilirubin, and imaging with biliary ductal dilatation. Initial CT of the abdomen and pelvis with IV contrast showed significant intrahepatic biliary ductal dilatation and questionable 9 mm fat containing lesion in the uncinate process of the pancreas.  MRCP showed "severe intrahepatic bile duct dilatation, with abrupt narrowing at the confluence of the hepatic ducts where there is a suspected ill-defined liver mass. Repeat imaging with MRI abdomen with and without contrast showed "hypoattenuating lesion near the confluence of the right and left hepatic ducts causing severe upstream ductal dilatation concerning for cholangiocarcinoma.  Further evaluation with ERCP is recommended." Patient transferred to [facility] for further workup and evaluation. CEA elevated, CA 19-9 WNL. AES and Surgical oncology consulted, recommended PTC for further evaluation. Symptoms controlled with benadryl. PCT placed 2/8. Surg/ onc Recommend capping both sided PTC prior to discharge. Both PTC drains need to be flushed with saline twice daily. CT chest abdomen and pelvis w contrast obtained per surg onc reccs and patient will need a CT chest non contrast for stagin. Patient was unable to be discharged due to fever and elevated WBC on 2/13 and infectious workup was started. Infectious workup remains negative to date and patient received Zosyn and Vanc.      Interval History: Hypotensive overnight responsive to IVFs after increase PCT " drainage, continued WBC elevation, no fevers, PCT draining to gravity, IV abx, additional 1L IVFs ordered this afternoon    Review of Systems  Objective:     Vital Signs (Most Recent):  Temp: 98 °F (36.7 °C) (02/15/25 1121)  Pulse: 75 (02/15/25 1121)  Resp: 18 (02/15/25 1121)  BP: 104/69 (02/15/25 1121)  SpO2: 99 % (02/15/25 1121) Vital Signs (24h Range):  Temp:  [97.3 °F (36.3 °C)-99.6 °F (37.6 °C)] 98 °F (36.7 °C)  Pulse:  [69-95] 75  Resp:  [17-18] 18  SpO2:  [95 %-99 %] 99 %  BP: ()/(45-71) 104/69     Weight: 79.7 kg (175 lb 11.2 oz)  Body mass index is 35.49 kg/m².    Intake/Output Summary (Last 24 hours) at 2/15/2025 1215  Last data filed at 2/15/2025 1141  Gross per 24 hour   Intake --   Output 3205 ml   Net -3205 ml         Physical Exam  Vitals and nursing note reviewed.   Constitutional:       General: She is not in acute distress.     Appearance: Normal appearance. She is not toxic-appearing.   HENT:      Head: Normocephalic and atraumatic.      Nose: Nose normal.      Mouth/Throat:      Mouth: Mucous membranes are moist.   Cardiovascular:      Rate and Rhythm: Normal rate.      Pulses: Normal pulses.      Heart sounds: No murmur heard.  Pulmonary:      Effort: Pulmonary effort is normal. No respiratory distress.      Breath sounds: Normal breath sounds. No wheezing or rales.   Abdominal:      General: Abdomen is flat.      Palpations: Abdomen is soft.      Comments: Soft, non distended. Very mildly tender to deep palpation in the epigastrium. No rebound or guarding.   Both PTC drains capped, some drainage around L PTC   Musculoskeletal:      Right lower leg: No edema.      Left lower leg: No edema.   Skin:     General: Skin is warm and dry.   Neurological:      General: No focal deficit present.      Mental Status: She is alert and oriented to person, place, and time.   Psychiatric:         Mood and Affect: Mood normal.         Behavior: Behavior normal.             Significant Labs: All pertinent  "labs within the past 24 hours have been reviewed.  CBC:   Recent Labs   Lab 02/13/25  1829 02/15/25  0926   WBC 18.70* 15.37*   HGB 12.0 10.6*   HCT 35.7* 31.6*    289     CMP:   Recent Labs   Lab 02/13/25  1829 02/14/25  0516 02/15/25  0233   * 135* 135*   K 3.8 3.6 3.8   CL 97 102 105   CO2 25 23 21*   * 155* 177*   BUN 30* 32* 21   CREATININE 1.4 1.4 0.9   CALCIUM 9.6 8.7 9.0   PROT 7.6 6.5 6.6   ALBUMIN 2.3* 1.9* 1.9*   BILITOT 4.2* 3.5* 2.8*   ALKPHOS 336* 276* 280*   * 91* 73*   * 108* 93*   ANIONGAP 13 10 9       Significant Imaging: I have reviewed all pertinent imaging results/findings within the past 24 hours.    Assessment and Plan     * Biliary obstruction  Liver mass, possible cholangiocarcinoma  Patient presented to OSH with jaundice and scleral icterus, along with abnormal labs from clinic. Also noted nausea and back pain.  Noted history of cholecystectomy. Labs showed elevated bilirubin, and imaging with biliary ductal dilatation. Initial CT of the abdomen and pelvis with IV contrast showed significant intrahepatic biliary ductal dilatation and questionable 9 mm fat containing lesion in the uncinate process of the pancreas.  Abdominal ultrasound showed hepatic steatosis and intra hepatic biliary ductal dilatation and status post cholecystectomy. MRCP showed "severe intrahepatic bile duct dilatation, with abrupt narrowing at the confluence of the hepatic ducts where there is a suspected ill-defined liver mass.  This is poorly evaluated without contrast but concerning for neoplasm such as cholangiocarcinoma." Repeat imaging with MRI abdomen with and without contrast showed "hypoattenuating lesion near the confluence of the right and left hepatic ducts causing severe upstream ductal dilatation concerning for cholangiocarcinoma.  Further evaluation with ERCP is recommended." Patient transferred to [facility] for further workup and evaluation. CEA elevated, CA 19-9 WNL. Surgical " oncology, AES, and IR consulted to coordinate plan for further management. Underwent PTC with IR on 2/8 without complication. Plan to allow decompression of the biliary system and repeat imaging to reassess obstruction    - Full liquid diet tolerated well for nausea and vomiting, will advance diet to solids and continue to monitor and adjust as needed   -AES Consulted  -Surgical Oncology consulted, appreciate recs     -S/p PTC w/ IR     - Plan for repeat CT A/P when biliary system decompressed to be done outpatient      -CT chest, abdomen, and pelvis  for further staging ordered   - Leave drains to gravity drainage until bilirubin stabilizes. Drains can be flushed once daily with normal saline. She will need routine exchange in 8-12 weeks with IR. Surg Onc is following and would like to the drains capped prior to discharge.  Left side is capped, Right side capped. Might be uncapped and allow to drain to gravity on 2/14   - Continue p.r.n. medication for symptomatic relief  - Trend with labs and correct electrolyte abnormalities as needed  - Replace with LR for volume losses with PCT drains as needed, and patient was encouraged to increase PO fluids intake     Fever  Patient had a fever of 102 on 2/13 with elevated wbc to 18, was not able to get discharged,  started on IV zosyn and vanc with infectious workup obtained.     - Continue IV vanc and Zosyn for 1 more day, deescalate  abx as appropriate   - F/u bcx NGTD   - CXR unremarkable  - US abdomen w Moderate intrahepatic ductal dilatation, with partially imaged biliary drains. No evidence of abscess    - F/u Flu, covid   - UA is unremarkable   - LA unremarkable   - PTC drain sites are clean and no signs of infection       Nausea and vomiting  Patient with worsening nausea, vomiting s/p PTC on 2/9. Will plan aggressive antiemetic regimen and adjust diet as needed    -Aggressive antiemetic regimen  -Trial of regular diet       Type 2 diabetes mellitus without  complication, without long-term current use of insulin  - Last hemoglobin A1c 5.9, well controlled  - D/C home regimen of Trulicity on discharge due to nausea and vomiting which can worsen or exacerbate the symptoms   - Glucose level reviewed, currently in normal range  - Continue Accu-Cheks q.a.c. and q.h.s. and give low-dose sliding scale insulin as necessary    Mixed hyperlipidemia  Patient with hx HLD presenting with abnormal liver function tests. HLD well controlled on home statin    - Holding statin due to abnormal liver function tests    Graves' disease with exophthalmos  Pt with hx graves disease last TSH wnl in 2023. Currently without symptoms.    -Continue to monitor         Hypertension associated with diabetes  - Well controlled, continue metoprolol 50 mg p.o. q.h.s., amlodipine 10 mg p.o. daily and valsartan 320 mg p.o. daily    Generalized convulsive epilepsy with intractable epilepsy  Patient with history of epilepsy in the past on levitiracetam 1500 mg qhs. Stable, no reported seizure activity    - Continue home levetiracetam 1500 mg p.o. q.h.s.      VTE Risk Mitigation (From admission, onward)           Ordered     enoxaparin injection 40 mg  Every 24 hours         02/10/25 1040     IP VTE HIGH RISK PATIENT  Once         02/04/25 0219     Place sequential compression device  Until discontinued         02/04/25 0219                    Discharge Planning   ISHMAEL: 2/17/2025     Code Status: Full Code   Medical Readiness for Discharge Date:   Discharge Plan A: Home Health   Discharge Delays: None known at this time                  Ildefonso Yao MD  Department of Hospital Medicine   [facility] - Internal Medicine Telemetry

## 2025-02-15 NOTE — NURSING
BP 85/50 after 500 LR bolus paged Med Team 3. Another order of 1L bolus administered. Patient reported no complaints.   Left drain: 200 ml   Right drain: 100 ml

## 2025-02-16 LAB
ALBUMIN SERPL BCP-MCNC: 2 G/DL (ref 3.5–5.2)
ALP SERPL-CCNC: 369 U/L (ref 40–150)
ALT SERPL W/O P-5'-P-CCNC: 92 U/L (ref 10–44)
ANION GAP SERPL CALC-SCNC: 12 MMOL/L (ref 8–16)
AST SERPL-CCNC: 77 U/L (ref 10–40)
BILIRUB SERPL-MCNC: 2.6 MG/DL (ref 0.1–1)
BUN SERPL-MCNC: 15 MG/DL (ref 8–23)
CALCIUM SERPL-MCNC: 9.5 MG/DL (ref 8.7–10.5)
CHLORIDE SERPL-SCNC: 107 MMOL/L (ref 95–110)
CO2 SERPL-SCNC: 17 MMOL/L (ref 23–29)
CREAT SERPL-MCNC: 0.8 MG/DL (ref 0.5–1.4)
EST. GFR  (NO RACE VARIABLE): >60 ML/MIN/1.73 M^2
GLUCOSE SERPL-MCNC: 154 MG/DL (ref 70–110)
MAGNESIUM SERPL-MCNC: 1.8 MG/DL (ref 1.6–2.6)
PHOSPHATE SERPL-MCNC: 2.8 MG/DL (ref 2.7–4.5)
POCT GLUCOSE: 133 MG/DL (ref 70–110)
POCT GLUCOSE: 153 MG/DL (ref 70–110)
POCT GLUCOSE: 169 MG/DL (ref 70–110)
POCT GLUCOSE: 194 MG/DL (ref 70–110)
POTASSIUM SERPL-SCNC: 4.4 MMOL/L (ref 3.5–5.1)
PROT SERPL-MCNC: 7 G/DL (ref 6–8.4)
SODIUM SERPL-SCNC: 136 MMOL/L (ref 136–145)
VANCOMYCIN SERPL-MCNC: 8.7 UG/ML

## 2025-02-16 PROCEDURE — 25000003 PHARM REV CODE 250

## 2025-02-16 PROCEDURE — 63600175 PHARM REV CODE 636 W HCPCS: Performed by: HOSPITALIST

## 2025-02-16 PROCEDURE — 80202 ASSAY OF VANCOMYCIN: CPT | Performed by: HOSPITALIST

## 2025-02-16 PROCEDURE — 83735 ASSAY OF MAGNESIUM: CPT | Performed by: HOSPITALIST

## 2025-02-16 PROCEDURE — 84100 ASSAY OF PHOSPHORUS: CPT | Performed by: HOSPITALIST

## 2025-02-16 PROCEDURE — 25000003 PHARM REV CODE 250: Performed by: HOSPITALIST

## 2025-02-16 PROCEDURE — 21400001 HC TELEMETRY ROOM

## 2025-02-16 PROCEDURE — 63600175 PHARM REV CODE 636 W HCPCS

## 2025-02-16 PROCEDURE — 25000003 PHARM REV CODE 250: Performed by: PHYSICIAN ASSISTANT

## 2025-02-16 PROCEDURE — 80053 COMPREHEN METABOLIC PANEL: CPT | Performed by: HOSPITALIST

## 2025-02-16 PROCEDURE — A4216 STERILE WATER/SALINE, 10 ML: HCPCS | Performed by: PHYSICIAN ASSISTANT

## 2025-02-16 PROCEDURE — 36415 COLL VENOUS BLD VENIPUNCTURE: CPT | Performed by: HOSPITALIST

## 2025-02-16 RX ORDER — POLYETHYLENE GLYCOL 3350 17 G/17G
17 POWDER, FOR SOLUTION ORAL DAILY PRN
Status: DISCONTINUED | OUTPATIENT
Start: 2025-02-16 | End: 2025-02-19 | Stop reason: HOSPADM

## 2025-02-16 RX ORDER — ELECTROLYTES/DEXTROSE
200 SOLUTION, ORAL ORAL
Status: DISCONTINUED | OUTPATIENT
Start: 2025-02-16 | End: 2025-02-17

## 2025-02-16 RX ORDER — METRONIDAZOLE 500 MG/1
500 TABLET ORAL EVERY 12 HOURS
Status: DISCONTINUED | OUTPATIENT
Start: 2025-02-17 | End: 2025-02-18

## 2025-02-16 RX ORDER — CIPROFLOXACIN 750 MG/1
750 TABLET, FILM COATED ORAL EVERY 12 HOURS
Status: DISCONTINUED | OUTPATIENT
Start: 2025-02-17 | End: 2025-02-18

## 2025-02-16 RX ORDER — CIPROFLOXACIN 500 MG/1
500 TABLET ORAL EVERY 12 HOURS
Status: DISCONTINUED | OUTPATIENT
Start: 2025-02-17 | End: 2025-02-16

## 2025-02-16 RX ORDER — VANCOMYCIN 1.75 GRAM/500 ML IN 0.9 % SODIUM CHLORIDE INTRAVENOUS
1750 ONCE
Status: DISCONTINUED | OUTPATIENT
Start: 2025-02-16 | End: 2025-02-16

## 2025-02-16 RX ADMIN — ENOXAPARIN SODIUM 40 MG: 40 INJECTION SUBCUTANEOUS at 05:02

## 2025-02-16 RX ADMIN — SODIUM CHLORIDE, PRESERVATIVE FREE 10 ML: 5 INJECTION INTRAVENOUS at 10:02

## 2025-02-16 RX ADMIN — ONDANSETRON 4 MG: 4 TABLET ORAL at 05:02

## 2025-02-16 RX ADMIN — PIPERACILLIN SODIUM AND TAZOBACTAM SODIUM 4.5 G: 4; .5 INJECTION, POWDER, FOR SOLUTION INTRAVENOUS at 01:02

## 2025-02-16 RX ADMIN — Medication 200 ML: at 11:02

## 2025-02-16 RX ADMIN — ONDANSETRON 4 MG: 4 TABLET ORAL at 01:02

## 2025-02-16 RX ADMIN — SODIUM CHLORIDE, PRESERVATIVE FREE 10 ML: 5 INJECTION INTRAVENOUS at 01:02

## 2025-02-16 RX ADMIN — POLYETHYLENE GLYCOL 3350 17 G: 17 POWDER, FOR SOLUTION ORAL at 01:02

## 2025-02-16 RX ADMIN — Medication 200 ML: at 07:02

## 2025-02-16 RX ADMIN — PIPERACILLIN SODIUM AND TAZOBACTAM SODIUM 4.5 G: 4; .5 INJECTION, POWDER, FOR SOLUTION INTRAVENOUS at 08:02

## 2025-02-16 RX ADMIN — ONDANSETRON 4 MG: 4 TABLET ORAL at 11:02

## 2025-02-16 RX ADMIN — SODIUM CHLORIDE, POTASSIUM CHLORIDE, SODIUM LACTATE AND CALCIUM CHLORIDE 500 ML: 600; 310; 30; 20 INJECTION, SOLUTION INTRAVENOUS at 06:02

## 2025-02-16 RX ADMIN — LIDOCAINE 1 PATCH: 50 PATCH CUTANEOUS at 08:02

## 2025-02-16 RX ADMIN — LATANOPROST 1 DROP: 50 SOLUTION/ DROPS OPHTHALMIC at 08:02

## 2025-02-16 RX ADMIN — Medication 200 ML: at 03:02

## 2025-02-16 RX ADMIN — LEVETIRACETAM 1500 MG: 750 TABLET, FILM COATED ORAL at 08:02

## 2025-02-16 RX ADMIN — SODIUM CHLORIDE, PRESERVATIVE FREE 10 ML: 5 INJECTION INTRAVENOUS at 06:02

## 2025-02-16 RX ADMIN — VANCOMYCIN HYDROCHLORIDE 1750 MG: 500 INJECTION, POWDER, LYOPHILIZED, FOR SOLUTION INTRAVENOUS at 08:02

## 2025-02-16 NOTE — PROGRESS NOTES
Pharmacokinetic Assessment Follow Up: IV Vancomycin    Vancomycin serum concentration assessment(s):    The random level was drawn correctly and can be used to guide therapy at this time. The measurement is below the desired definitive target range of 10 to 20 mcg/mL.    Vancomycin Regimen Plan:    Will redose with IV vancomycin 1750 mg x 1 dose this morning  Re-dose when the random level is less than 20 mcg/mL, next level to be drawn at 0430 on 2/17 with AM labs    Drug levels (last 3 results):  Recent Labs   Lab Result Units 02/14/25  2141 02/16/25  0238   Vancomycin, Random ug/mL 7.1 8.7       Pharmacy will continue to follow and monitor vancomycin.    Please contact pharmacy at extension [phone] for questions regarding this assessment.    Thank you for the consult,   Kenzie Sargent PharmD       Patient brief summary:  Katey Cerrato is a 69 y.o. female initiated on antimicrobial therapy with IV Vancomycin for treatment of sepsis      Drug Allergies:   Review of patient's allergies indicates:   Allergen Reactions    Codeine Nausea Only       Actual Body Weight:   79.7 kg    Renal Function:   Estimated Creatinine Clearance: 63.2 mL/min (based on SCr of 0.8 mg/dL).    Dialysis Method (if applicable):  N/A    CBC (last 72 hours):  Recent Labs   Lab Result Units 02/13/25  1829 02/15/25  0926   WBC K/uL 18.70* 15.37*   Hemoglobin g/dL 12.0 10.6*   Hematocrit % 35.7* 31.6*   Platelets K/uL 334 289   Gran % % 83.3* 79.5*   Lymph % % 6.3* 10.9*   Mono % % 9.4 7.2   Eosinophil % % 0.1 1.0   Basophil % % 0.2 0.3   Differential Method  Automated Automated       Metabolic Panel (last 72 hours):  Recent Labs   Lab Result Units 02/13/25  1829 02/14/25  0439 02/14/25  0516 02/15/25  0233 02/16/25  0238   Sodium mmol/L 135*  --  135* 135* 136   Potassium mmol/L 3.8  --  3.6 3.8 4.4   Chloride mmol/L 97  --  102 105 107   CO2 mmol/L 25  --  23 21* 17*   Glucose mg/dL 175*  --  155* 177* 154*   Glucose, UA   --  Negative  --   --    --    BUN mg/dL 30*  --  32* 21 15   Creatinine mg/dL 1.4  --  1.4 0.9 0.8   Albumin g/dL 2.3*  --  1.9* 1.9* 2.0*   Total Bilirubin mg/dL 4.2*  --  3.5* 2.8* 2.6*   Alkaline Phosphatase U/L 336*  --  276* 280* 369*   AST U/L 134*  --  91* 73* 77*   ALT U/L 133*  --  108* 93* 92*   Magnesium mg/dL  --   --  1.9 2.1 1.8   Phosphorus mg/dL  --   --  3.3 2.4* 2.8       Vancomycin Administrations:  vancomycin given in the last 96 hours                     vancomycin 1750 mg in 0.9% sodium chloride 500 mL IVPB (mg) 1,750 mg New Bag 02/15/25 0432    vancomycin 1,500 mg in 0.9% NaCl 250 mL IVPB (admixture device) (mg) 1,500 mg New Bag 02/13/25 2202                    Microbiologic Results:  Microbiology Results (last 7 days)       Procedure Component Value Units Date/Time    Blood culture [[id number]] Collected: 02/13/25 1825    Order Status: Completed Specimen: Blood Updated: 02/15/25 2022     Blood Culture, Routine No Growth to date      No Growth to date      No Growth to date    Blood culture [[id number]] Collected: 02/13/25 1825    Order Status: Completed Specimen: Blood Updated: 02/15/25 2022     Blood Culture, Routine No Growth to date      No Growth to date      No Growth to date    Urine culture [[id number]] Collected: 02/14/25 0439    Order Status: Completed Specimen: Urine Updated: 02/15/25 0940     Urine Culture, Routine No significant growth    Narrative:      Specimen Source->Urine    MRSA Screen by PCR [[id number]] Collected: 02/14/25 1707    Order Status: Completed Specimen: Nasal Swab Updated: 02/14/25 2233     MRSA SCREEN BY PCR Not Detected

## 2025-02-16 NOTE — PROGRESS NOTES
Pharmacist Renal Dose Adjustment Note    Katey Cerrato is a 69 y.o. female being treated with the medication ciprofloxacin    Patient Data:    Vital Signs (Most Recent):  Temp: 98.2 °F (36.8 °C) (02/16/25 1101)  Pulse: 70 (02/16/25 1101)  Resp: 18 (02/16/25 0809)  BP: 130/76 (02/16/25 1101)  SpO2: 98 % (02/16/25 1101) Vital Signs (72h Range):  Temp:  [97.3 °F (36.3 °C)-102.3 °F (39.1 °C)]   Pulse:  []   Resp:  [17-18]   BP: ()/(45-82)   SpO2:  [95 %-99 %]      Recent Labs   Lab 02/14/25  0516 02/15/25  0233 02/16/25  0238   CREATININE 1.4 0.9 0.8     Serum creatinine: 0.8 mg/dL 02/16/25 0238  Estimated creatinine clearance: 63.2 mL/min    Medication:ciprofloxacin dose: 500 mg frequency BID will be changed to medication:ciprofloxacin dose:750 mg frequency:BID    Pharmacist's Name: Lucy Gamino  Pharmacist's Extension: [phone]

## 2025-02-16 NOTE — ASSESSMENT & PLAN NOTE
"Liver mass, possible cholangiocarcinoma  Patient presented to OSH with jaundice and scleral icterus, along with abnormal labs from clinic. Also noted nausea and back pain.  Noted history of cholecystectomy. Labs showed elevated bilirubin, and imaging with biliary ductal dilatation. Initial CT of the abdomen and pelvis with IV contrast showed significant intrahepatic biliary ductal dilatation and questionable 9 mm fat containing lesion in the uncinate process of the pancreas.  Abdominal ultrasound showed hepatic steatosis and intra hepatic biliary ductal dilatation and status post cholecystectomy. MRCP showed "severe intrahepatic bile duct dilatation, with abrupt narrowing at the confluence of the hepatic ducts where there is a suspected ill-defined liver mass.  This is poorly evaluated without contrast but concerning for neoplasm such as cholangiocarcinoma." Repeat imaging with MRI abdomen with and without contrast showed "hypoattenuating lesion near the confluence of the right and left hepatic ducts causing severe upstream ductal dilatation concerning for cholangiocarcinoma.  Further evaluation with ERCP is recommended." Patient transferred to [facility] for further workup and evaluation. CEA elevated, CA 19-9 WNL. Surgical oncology, AES, and IR consulted to coordinate plan for further management. Underwent PTC with IR on 2/8 without complication. Plan to allow decompression of the biliary system and repeat imaging to reassess obstruction    - Full liquid diet tolerated well for nausea and vomiting, will advance diet to solids and continue to monitor and adjust as needed   -AES Consulted  -Surgical Oncology consulted, appreciate recs     -S/p PTC w/ IR     - Plan for repeat CT A/P when biliary system decompressed to be done outpatient      -CT chest, abdomen, and pelvis  for further staging ordered   - Leave drains to gravity drainage until bilirubin stabilizes. Drains can be flushed once daily with normal saline. She " will need routine exchange in 8-12 weeks with IR. Surg Onc is following and would like to the drains capped prior to discharge.  Left side is capped, Right side capped. Uncapped and allow to drain to gravity on 2/14   - Continue p.r.n. medication for symptomatic relief  - Trend with labs and correct electrolyte abnormalities as needed  - Replace with LR for volume losses with PCT drains as needed, and patient was encouraged to increase PO fluids intake

## 2025-02-16 NOTE — ASSESSMENT & PLAN NOTE
Patient with worsening nausea, vomiting s/p PTC on 2/9. Will plan aggressive antiemetic regimen and adjust diet as needed    -Aggressive antiemetic regimen  -Tolerating PO and regular diet

## 2025-02-16 NOTE — ASSESSMENT & PLAN NOTE
69F presenting with painless jaundice after being found to have biliary obstruction on recent outpatient lab work. Imaging studies w/ a hypoattenuating mass near the confluence of the right and left hepatic ducts, which is concerning for cholangiocarcinoma. CEA elevated. S/p Bilateral PTC placement 2/8/25. Tbili still downtrending. L PTC capped on 2/11. R PTC capped 2/12. N/v well controlled. Having some drainage from L PTC.    -- Keep bilateral PTCs to gravity  -- Will need a chest CT noncon before she leaves or outpatient  -- No tissue diagnosis yet  -- PTCs capped  -- Flush b/l drains TID  -- Recommend replacing her PTC losses with LR ~1:1  -- Trend CMP  -- Aggressive antiemetic regimen  -- Recommend at least full diet as tolerated to optimize nutrition

## 2025-02-16 NOTE — PROGRESS NOTES
"[facility] Medicine [facility] Medicine  Progress Note    Patient Name: Katey Cerrato  MRN: [id number]  Patient Class: IP- Inpatient   Admission Date: 2/3/2025  Length of Stay: 12 days  Attending Physician: Selina Winters*  Primary Care Provider: Shay Castellano MD        Subjective     Principal Problem:Biliary obstruction        HPI:  Ms. Katey Cerrato is a 69 y.o. female, with PMH of HTN, HLD, DMII, Grave's disease, who presented to [facility] ED on 2/3/25 after labs following an outpatient appointment were abnormal. She states she recently was treated for shingles with Valtrex with rash improvement, but then her urine became discolored and that prompted her visit to her PCP.  At her PCP visit it was noticed that she had an orange coloration to the skin, and yellow color of the eyes.  After her appointment she had outpatient lab testing showing abnormal liver function, which is what prompted her PCP to send her to the ED. Her only new symptom is some low back pain. She states she had a cholecystectomy in the past, and had many gallstones removed at that time. She states her sister had retained gallstones that needed removal in the past. Denies any fevers, chills, trauma, alcohol consumption, Tylenol consumption.  [facility] ED labs showed tbili 7.4, alk phos 538,  and . Lipase normal. INR 1.2. U/A with few bacteria. A  CT showed significant intrahepatic biliary ductal dilatation, mild extrahepatic biliary ductal dilatation, a 9 mm fat containing lesion in the uncinate process of the pancreas.  She was treated with IV fluids, IV Rocephin, Toradol on admission.  MRCP showed intrahepatic bile duct dilation noted with an ill-defined liver mass.  Further workup with MRI abdomen with/without contrast showed "hypoattenuating lesion near the confluence of the right and left hepatic ducts causing severe upstream ductal dilatation concerning for cholangiocarcinoma. Given lesion and " "hyperbilirubinemia decision was made to transfer patient to [facility] for further workup and evaluation with advanced services, possible ERCP.    On arrival to [facility] VSS. Patient notes generalized pruritus, decreased appetite, nausea, lower back pain. Denies abdominal pain. Pending further workup and management     Overview/Hospital Course:  Patient presented to OSH with jaundice and scleral icterus, along with abnormal labs from clinic. Also noted nausea and back pain.  Noted history of cholecystectomy. Labs showed elevated bilirubin, and imaging with biliary ductal dilatation. Initial CT of the abdomen and pelvis with IV contrast showed significant intrahepatic biliary ductal dilatation and questionable 9 mm fat containing lesion in the uncinate process of the pancreas.  MRCP showed "severe intrahepatic bile duct dilatation, with abrupt narrowing at the confluence of the hepatic ducts where there is a suspected ill-defined liver mass. Repeat imaging with MRI abdomen with and without contrast showed "hypoattenuating lesion near the confluence of the right and left hepatic ducts causing severe upstream ductal dilatation concerning for cholangiocarcinoma.  Further evaluation with ERCP is recommended." Patient transferred to [facility] for further workup and evaluation. CEA elevated, CA 19-9 WNL. AES and Surgical oncology consulted, recommended PTC for further evaluation. Symptoms controlled with benadryl. PCT placed 2/8. Surg/ onc Recommend capping both sided PTC prior to discharge. Both PTC drains need to be flushed with saline twice daily. CT chest abdomen and pelvis w contrast obtained per surg onc reccs and patient will need a CT chest non contrast for stagin. Patient was unable to be discharged due to fever and elevated WBC on 2/13 and infectious workup was started. Infectious workup remains negative to date and patient received Zosyn and Vanc.       Interval History: : NAEON. Having some leak around drain sites. PTCs to " gravity. Tbili and LFTs continues to downtrend      Review of Systems  Objective:     Vital Signs (Most Recent):  Temp: 98.4 °F (36.9 °C) (02/16/25 0809)  Pulse: 62 (02/16/25 0809)  Resp: 18 (02/16/25 0809)  BP: 110/72 (02/16/25 0809)  SpO2: 97 % (02/16/25 0809) Vital Signs (24h Range):  Temp:  [98 °F (36.7 °C)-98.5 °F (36.9 °C)] 98.4 °F (36.9 °C)  Pulse:  [62-88] 62  Resp:  [18] 18  SpO2:  [97 %-99 %] 97 %  BP: (100-112)/(69-74) 110/72     Weight: 79.7 kg (175 lb 11.2 oz)  Body mass index is 35.49 kg/m².    Intake/Output Summary (Last 24 hours) at 2/16/2025 0857  Last data filed at 2/16/2025 0659  Gross per 24 hour   Intake 400 ml   Output 2950 ml   Net -2550 ml         Physical Exam  Vitals and nursing note reviewed.   Constitutional:       General: She is not in acute distress.     Appearance: Normal appearance. She is not toxic-appearing.   HENT:      Head: Normocephalic and atraumatic.      Nose: Nose normal.      Mouth/Throat:      Mouth: Mucous membranes are moist.   Cardiovascular:      Rate and Rhythm: Normal rate.      Pulses: Normal pulses.      Heart sounds: No murmur heard.  Pulmonary:      Effort: Pulmonary effort is normal. No respiratory distress.      Breath sounds: Normal breath sounds. No wheezing or rales.   Abdominal:      General: Abdomen is flat.      Palpations: Abdomen is soft.      Comments: Soft, non distended. Very mildly tender to deep palpation in the epigastrium. No rebound or guarding.   Both PTC drains capped, some drainage around L PTC   Musculoskeletal:      Right lower leg: No edema.      Left lower leg: No edema.   Skin:     General: Skin is warm and dry.   Neurological:      General: No focal deficit present.      Mental Status: She is alert and oriented to person, place, and time.   Psychiatric:         Mood and Affect: Mood normal.         Behavior: Behavior normal.             Significant Labs: All pertinent labs within the past 24 hours have been reviewed.  CBC:   Recent  "Labs   Lab 02/15/25  0926   WBC 15.37*   HGB 10.6*   HCT 31.6*        CMP:   Recent Labs   Lab 02/15/25  0233 02/16/25  0238   * 136   K 3.8 4.4    107   CO2 21* 17*   * 154*   BUN 21 15   CREATININE 0.9 0.8   CALCIUM 9.0 9.5   PROT 6.6 7.0   ALBUMIN 1.9* 2.0*   BILITOT 2.8* 2.6*   ALKPHOS 280* 369*   AST 73* 77*   ALT 93* 92*   ANIONGAP 9 12       Significant Imaging: I have reviewed all pertinent imaging results/findings within the past 24 hours.    Assessment and Plan     * Biliary obstruction  Liver mass, possible cholangiocarcinoma  Patient presented to OSH with jaundice and scleral icterus, along with abnormal labs from clinic. Also noted nausea and back pain.  Noted history of cholecystectomy. Labs showed elevated bilirubin, and imaging with biliary ductal dilatation. Initial CT of the abdomen and pelvis with IV contrast showed significant intrahepatic biliary ductal dilatation and questionable 9 mm fat containing lesion in the uncinate process of the pancreas.  Abdominal ultrasound showed hepatic steatosis and intra hepatic biliary ductal dilatation and status post cholecystectomy. MRCP showed "severe intrahepatic bile duct dilatation, with abrupt narrowing at the confluence of the hepatic ducts where there is a suspected ill-defined liver mass.  This is poorly evaluated without contrast but concerning for neoplasm such as cholangiocarcinoma." Repeat imaging with MRI abdomen with and without contrast showed "hypoattenuating lesion near the confluence of the right and left hepatic ducts causing severe upstream ductal dilatation concerning for cholangiocarcinoma.  Further evaluation with ERCP is recommended." [place] [facility] [place] and [place]. CEA elevated, CA 19-9 WNL. Surgical oncology, AES, and IR consulted to coordinate plan for further management. Underwent PTC with IR on 2/8 without complication. Plan to allow decompression of the biliary system and " repeat imaging to reassess obstruction    - Full liquid diet tolerated well for nausea and vomiting, will advance diet to solids and continue to monitor and adjust as needed   -AES Consulted  -Surgical Oncology consulted, appreciate recs     -S/p PTC w/ IR     - Plan for repeat CT A/P when biliary system decompressed to be done outpatient      -CT chest, abdomen, and pelvis  for further staging ordered   - Leave drains to gravity drainage until bilirubin stabilizes. Drains can be flushed once daily with normal saline. She will need routine exchange in 8-12 weeks with IR. Surg Onc is following and would like to the drains capped prior to discharge.  Left side is capped, Right side capped. Uncapped and allow to drain to gravity on 2/14   - Continue p.r.n. medication for symptomatic relief  - Trend with labs and correct electrolyte abnormalities as needed  - Replace with LR for volume losses with PCT drains as needed, and patient was encouraged to increase PO fluids intake     Fever  Patient had a fever of 102 on 2/13 with elevated wbc to 18, was not able to get discharged,  started on IV zosyn and vanc with infectious workup obtained.     - Stopped IV vanc and Zosyn on 2/16, continue Cipr and Metro for intraabdominal infection for more 4 days   - F/u bcx NGTD   - CXR unremarkable  - US abdomen w Moderate intrahepatic ductal dilatation, with partially imaged biliary drains. No evidence of abscess    - F/u Flu, covid   - UA is unremarkable   - LA unremarkable   - PTC drain sites are clean and no signs of infection       Nausea and vomiting  Patient with worsening nausea, vomiting s/p PTC on 2/9. Will plan aggressive antiemetic regimen and adjust diet as needed    -Aggressive antiemetic regimen  -Tolerating PO and regular diet         Type 2 diabetes mellitus without complication, without long-term current use of insulin  - Last hemoglobin A1c 5.9, well controlled  - D/C home regimen of Trulicity on discharge due to  nausea and vomiting which can worsen or exacerbate the symptoms   - Glucose level reviewed, currently in normal range  - Continue Accu-Cheks q.a.c. and q.h.s. and give low-dose sliding scale insulin as necessary    Mixed hyperlipidemia  Patient with hx HLD presenting with abnormal liver function tests. HLD well controlled on home statin    - Holding statin due to abnormal liver function tests    Graves' disease with exophthalmos  Pt with hx graves disease last TSH wnl in 2023. Currently without symptoms.    -Continue to monitor         Hypertension associated with diabetes  - Well controlled, continue metoprolol 50 mg p.o. q.h.s., amlodipine 10 mg p.o. daily and valsartan 320 mg p.o. daily when medically appropriate       Generalized convulsive epilepsy with intractable epilepsy  Patient with history of epilepsy in the past on levitiracetam 1500 mg qhs. Stable, no reported seizure activity    - Continue home levetiracetam 1500 mg p.o. q.h.s.      VTE Risk Mitigation (From admission, onward)           Ordered     enoxaparin injection 40 mg  Every 24 hours         02/10/25 1040     IP VTE HIGH RISK PATIENT  Once         02/04/25 0219     Place sequential compression device  Until discontinued         02/04/25 0219                    Discharge Planning   ISHMAEL: 2/17/2025     Code Status: Full Code   Medical Readiness for Discharge Date:   Discharge Plan A: Home Health   Discharge Delays: None known at this time                    Martina Gaspar MD  Department of Hospital Medicine   [facility] - Internal Medicine Telemetry

## 2025-02-16 NOTE — SUBJECTIVE & OBJECTIVE
Interval History: NAEON. Having some abd pain around drain sites. PTCs to gravity. Tbili continues to downtrend.    Medications:  Continuous Infusions:  Scheduled Meds:   electrolytes-dextrose  200 mL Oral Q4H    enoxparin  40 mg Subcutaneous Q24H (prophylaxis, 1700)    latanoprost  1 drop Both Eyes QHS    levETIRAcetam  1,500 mg Oral Nightly    LIDOcaine  1 patch Transdermal Daily    ondansetron  4 mg Oral Q6H    piperacillin-tazobactam (Zosyn) IV (PEDS and ADULTS) (extended infusion is not appropriate)  4.5 g Intravenous Q8H    scopolamine  1 patch Transdermal Q3 Days    sodium chloride 0.9%  10 mL Intravenous Q8H    vancomycin (VANCOCIN) IV (PEDS and ADULTS)  1,750 mg Intravenous Once     PRN Meds:  Current Facility-Administered Medications:     dextrose 50%, 12.5 g, Intravenous, PRN    dextrose 50%, 25 g, Intravenous, PRN    diphenhydrAMINE, 25 mg, Oral, Q6H PRN    diphenhydrAMINE-zinc acetate 2-0.1%, , Topical (Top), TID PRN    glucagon (human recombinant), 1 mg, Intramuscular, PRN    glucose, 16 g, Oral, PRN    glucose, 24 g, Oral, PRN    HYDROmorphone, 2 mg, Oral, Q6H PRN    insulin aspart U-100, 0-5 Units, Subcutaneous, QID (AC + HS) PRN    melatonin, 6 mg, Oral, Nightly PRN    naloxone, 0.02 mg, Intravenous, PRN    polyethylene glycol, 17 g, Oral, Daily PRN    prochlorperazine, 2.5 mg, Intravenous, Q6H PRN    senna-docusate 8.6-50 mg, 1 tablet, Oral, Daily PRN    Pharmacy to dose Vancomycin consult, , , Once **AND** vancomycin - pharmacy to dose, , Intravenous, pharmacy to manage frequency     Review of patient's allergies indicates:   Allergen Reactions    Codeine Nausea Only     Objective:     Vital Signs (Most Recent):  Temp: 98.2 °F (36.8 °C) (02/16/25 0535)  Pulse: 68 (02/16/25 0535)  Resp: 18 (02/16/25 0535)  BP: 100/70 (02/16/25 0535)  SpO2: 98 % (02/16/25 0535) Vital Signs (24h Range):  Temp:  [98 °F (36.7 °C)-98.5 °F (36.9 °C)] 98.2 °F (36.8 °C)  Pulse:  [68-88] 68  Resp:  [18] 18  SpO2:  [97 %-99  %] 98 %  BP: (100-112)/(69-74) 100/70     Weight: 79.7 kg (175 lb 11.2 oz)  Body mass index is 35.49 kg/m².    Intake/Output - Last 3 Shifts         02/14 0700  02/15 0659 02/15 0700  02/16 0659 02/16 0700  02/17 0659    P.O.  400     Total Intake(mL/kg)  400 (5)     Urine (mL/kg/hr) 0 (0)      Drains 2345 3150     Total Output 2345 3150     Net -2345 -2750            Urine Occurrence 5 x               Physical Exam  Vitals reviewed.   Constitutional:       General: She is not in acute distress.     Appearance: Normal appearance. She is not toxic-appearing.   HENT:      Head: Normocephalic and atraumatic.      Nose: Nose normal.      Mouth/Throat:      Mouth: Mucous membranes are moist.   Cardiovascular:      Rate and Rhythm: Normal rate.      Pulses: Normal pulses.   Pulmonary:      Effort: No respiratory distress.   Abdominal:      General: Abdomen is flat.      Palpations: Abdomen is soft.      Comments: Soft, non distended. Mildly tender to deep palpation in the epigastrium. No rebound or guarding.   Both PTC drains to gravity with bilious output.   Neurological:      General: No focal deficit present.      Mental Status: She is alert and oriented to person, place, and time.   Psychiatric:         Mood and Affect: Mood normal.         Behavior: Behavior normal.          Significant Labs:  I have reviewed all pertinent lab results within the past 24 hours.  CBC:   Recent Labs   Lab 02/15/25  0926   WBC 15.37*   RBC 3.47*   HGB 10.6*   HCT 31.6*      MCV 91   MCH 30.5   MCHC 33.5     CMP:   Recent Labs   Lab 02/16/25  0238   *   CALCIUM 9.5   ALBUMIN 2.0*   PROT 7.0      K 4.4   CO2 17*      BUN 15   CREATININE 0.8   ALKPHOS 369*   ALT 92*   AST 77*   BILITOT 2.6*       Significant Diagnostics:  I have reviewed all pertinent imaging results/findings within the past 24 hours.

## 2025-02-16 NOTE — PROGRESS NOTES
Pharmacokinetic Assessment Follow Up: IV Vancomycin    Vancomycin order has been discontinued. Pharmacy will sign off. Please reconsult as needed!     Thank you for the consult,   Ta Salgado

## 2025-02-16 NOTE — PROGRESS NOTES
[facility] - Internal Medicine Telemetry  General Surgery  Progress Note    Subjective:     History of Present Illness:  Katey Cerrato is a 69 y.o. female with a history of empty sella syndrome, GHD, glaucoma, Grave's w/ ecophthalmos, HLD, HTN, seizures, and DM2 who presents as a transfer for workup of her biliary obstruction. The patient states that she recently saw her PCP after she noticed urine discoloration. She was sent for outpatient lab work, which was suggestive of biliary obstruction so she was sent to the ED. In the ED, her lab work was remarkable for t bili of 8.3, elevated LFTs (280/264), and elevated ALP (507). RUQ and CT A/P w/ significant intrahepatic biliary ductal dilation and mild extra hepatic dilation. MRCP w/ hypoattenuating lesion near the confluence of the right and left hepatic ducts causing severe upstream ductal dilatation concerning for cholangiocarcinoma. Surgical oncology consulted for evaluation.       Post-Op Info:  * No surgery found *         Interval History: NAEON. Having some abd pain around drain sites. PTCs to gravity. Tbili continues to downtrend.    Medications:  Continuous Infusions:  Scheduled Meds:   electrolytes-dextrose  200 mL Oral Q4H    enoxparin  40 mg Subcutaneous Q24H (prophylaxis, 1700)    latanoprost  1 drop Both Eyes QHS    levETIRAcetam  1,500 mg Oral Nightly    LIDOcaine  1 patch Transdermal Daily    ondansetron  4 mg Oral Q6H    piperacillin-tazobactam (Zosyn) IV (PEDS and ADULTS) (extended infusion is not appropriate)  4.5 g Intravenous Q8H    scopolamine  1 patch Transdermal Q3 Days    sodium chloride 0.9%  10 mL Intravenous Q8H    vancomycin (VANCOCIN) IV (PEDS and ADULTS)  1,750 mg Intravenous Once     PRN Meds:  Current Facility-Administered Medications:     dextrose 50%, 12.5 g, Intravenous, PRN    dextrose 50%, 25 g, Intravenous, PRN    diphenhydrAMINE, 25 mg, Oral, Q6H PRN    diphenhydrAMINE-zinc acetate 2-0.1%, , Topical (Top), TID PRN    glucagon  (human recombinant), 1 mg, Intramuscular, PRN    glucose, 16 g, Oral, PRN    glucose, 24 g, Oral, PRN    HYDROmorphone, 2 mg, Oral, Q6H PRN    insulin aspart U-100, 0-5 Units, Subcutaneous, QID (AC + HS) PRN    melatonin, 6 mg, Oral, Nightly PRN    naloxone, 0.02 mg, Intravenous, PRN    polyethylene glycol, 17 g, Oral, Daily PRN    prochlorperazine, 2.5 mg, Intravenous, Q6H PRN    senna-docusate 8.6-50 mg, 1 tablet, Oral, Daily PRN    Pharmacy to dose Vancomycin consult, , , Once **AND** vancomycin - pharmacy to dose, , Intravenous, pharmacy to manage frequency     Review of patient's allergies indicates:   Allergen Reactions    Codeine Nausea Only     Objective:     Vital Signs (Most Recent):  Temp: 98.2 °F (36.8 °C) (02/16/25 0535)  Pulse: 68 (02/16/25 0535)  Resp: 18 (02/16/25 0535)  BP: 100/70 (02/16/25 0535)  SpO2: 98 % (02/16/25 0535) Vital Signs (24h Range):  Temp:  [98 °F (36.7 °C)-98.5 °F (36.9 °C)] 98.2 °F (36.8 °C)  Pulse:  [68-88] 68  Resp:  [18] 18  SpO2:  [97 %-99 %] 98 %  BP: (100-112)/(69-74) 100/70     Weight: 79.7 kg (175 lb 11.2 oz)  Body mass index is 35.49 kg/m².    Intake/Output - Last 3 Shifts         02/14 0700  02/15 0659 02/15 0700  02/16 0659 02/16 0700  02/17 0659    P.O.  400     Total Intake(mL/kg)  400 (5)     Urine (mL/kg/hr) 0 (0)      Drains 2345 3150     Total Output 2345 3150     Net -2345 -2750            Urine Occurrence 5 x               Physical Exam  Vitals reviewed.   Constitutional:       General: She is not in acute distress.     Appearance: Normal appearance. She is not toxic-appearing.   HENT:      Head: Normocephalic and atraumatic.      Nose: Nose normal.      Mouth/Throat:      Mouth: Mucous membranes are moist.   Cardiovascular:      Rate and Rhythm: Normal rate.      Pulses: Normal pulses.   Pulmonary:      Effort: No respiratory distress.   Abdominal:      General: Abdomen is flat.      Palpations: Abdomen is soft.      Comments: Soft, non distended. Mildly tender  to deep palpation in the epigastrium. No rebound or guarding.   Both PTC drains to gravity with bilious output.   Neurological:      General: No focal deficit present.      Mental Status: She is alert and oriented to person, place, and time.   Psychiatric:         Mood and Affect: Mood normal.         Behavior: Behavior normal.          Significant Labs:  I have reviewed all pertinent lab results within the past 24 hours.  CBC:   Recent Labs   Lab 02/15/25  0926   WBC 15.37*   RBC 3.47*   HGB 10.6*   HCT 31.6*      MCV 91   MCH 30.5   MCHC 33.5     CMP:   Recent Labs   Lab 02/16/25  0238   *   CALCIUM 9.5   ALBUMIN 2.0*   PROT 7.0      K 4.4   CO2 17*      BUN 15   CREATININE 0.8   ALKPHOS 369*   ALT 92*   AST 77*   BILITOT 2.6*       Significant Diagnostics:  I have reviewed all pertinent imaging results/findings within the past 24 hours.  Assessment/Plan:     * Biliary obstruction  69F presenting with painless jaundice after being found to have biliary obstruction on recent outpatient lab work. Imaging studies w/ a hypoattenuating mass near the confluence of the right and left hepatic ducts, which is concerning for cholangiocarcinoma. CEA elevated. S/p Bilateral PTC placement 2/8/25. Tbili still downtrending. L PTC capped on 2/11. R PTC capped 2/12. N/v well controlled. Having some drainage from L PTC.    -- Keep bilateral PTCs to gravity  -- Will need a chest CT noncon before she leaves or outpatient  -- No tissue diagnosis yet  -- PTCs capped  -- Flush b/l drains TID  -- Recommend replacing her PTC losses with LR ~1:1  -- Trend CMP  -- Aggressive antiemetic regimen  -- Recommend at least full diet as tolerated to optimize nutrition        Walter Arechiga MD  General Surgery  [facility] Telemetry

## 2025-02-16 NOTE — ASSESSMENT & PLAN NOTE
Patient had a fever of 102 on 2/13 with elevated wbc to 18, was not able to get discharged,  started on IV zosyn and vanc with infectious workup obtained.     - Stopped IV vanc and Zosyn on 2/16, continue Cipr and Metro for intraabdominal infection for more 4 days   - F/u bcx NGTD   - CXR unremarkable  - US abdomen w Moderate intrahepatic ductal dilatation, with partially imaged biliary drains. No evidence of abscess    - F/u Flu, covid   - UA is unremarkable   - LA unremarkable   - PTC drain sites are clean and no signs of infection

## 2025-02-16 NOTE — SUBJECTIVE & OBJECTIVE
Interval History: : NAEON. Having some leak around drain sites. PTCs to gravity. Tbili and LFTs continues to downtrend      Review of Systems  Objective:     Vital Signs (Most Recent):  Temp: 98.4 °F (36.9 °C) (02/16/25 0809)  Pulse: 62 (02/16/25 0809)  Resp: 18 (02/16/25 0809)  BP: 110/72 (02/16/25 0809)  SpO2: 97 % (02/16/25 0809) Vital Signs (24h Range):  Temp:  [98 °F (36.7 °C)-98.5 °F (36.9 °C)] 98.4 °F (36.9 °C)  Pulse:  [62-88] 62  Resp:  [18] 18  SpO2:  [97 %-99 %] 97 %  BP: (100-112)/(69-74) 110/72     Weight: 79.7 kg (175 lb 11.2 oz)  Body mass index is 35.49 kg/m².    Intake/Output Summary (Last 24 hours) at 2/16/2025 0857  Last data filed at 2/16/2025 0659  Gross per 24 hour   Intake 400 ml   Output 2950 ml   Net -2550 ml         Physical Exam  Vitals and nursing note reviewed.   Constitutional:       General: She is not in acute distress.     Appearance: Normal appearance. She is not toxic-appearing.   HENT:      Head: Normocephalic and atraumatic.      Nose: Nose normal.      Mouth/Throat:      Mouth: Mucous membranes are moist.   Cardiovascular:      Rate and Rhythm: Normal rate.      Pulses: Normal pulses.      Heart sounds: No murmur heard.  Pulmonary:      Effort: Pulmonary effort is normal. No respiratory distress.      Breath sounds: Normal breath sounds. No wheezing or rales.   Abdominal:      General: Abdomen is flat.      Palpations: Abdomen is soft.      Comments: Soft, non distended. Very mildly tender to deep palpation in the epigastrium. No rebound or guarding.   Both PTC drains capped, some drainage around L PTC   Musculoskeletal:      Right lower leg: No edema.      Left lower leg: No edema.   Skin:     General: Skin is warm and dry.   Neurological:      General: No focal deficit present.      Mental Status: She is alert and oriented to person, place, and time.   Psychiatric:         Mood and Affect: Mood normal.         Behavior: Behavior normal.             Significant Labs: All  pertinent labs within the past 24 hours have been reviewed.  CBC:   Recent Labs   Lab 02/15/25  0926   WBC 15.37*   HGB 10.6*   HCT 31.6*        CMP:   Recent Labs   Lab 02/15/25  0233 02/16/25  0238   * 136   K 3.8 4.4    107   CO2 21* 17*   * 154*   BUN 21 15   CREATININE 0.9 0.8   CALCIUM 9.0 9.5   PROT 6.6 7.0   ALBUMIN 1.9* 2.0*   BILITOT 2.8* 2.6*   ALKPHOS 280* 369*   AST 73* 77*   ALT 93* 92*   ANIONGAP 9 12       Significant Imaging: I have reviewed all pertinent imaging results/findings within the past 24 hours.

## 2025-02-16 NOTE — ASSESSMENT & PLAN NOTE
- Well controlled, continue metoprolol 50 mg p.o. q.h.s., amlodipine 10 mg p.o. daily and valsartan 320 mg p.o. daily when medically appropriate

## 2025-02-17 LAB
ALBUMIN SERPL BCP-MCNC: 2.1 G/DL (ref 3.5–5.2)
ALP SERPL-CCNC: 387 U/L (ref 40–150)
ALT SERPL W/O P-5'-P-CCNC: 97 U/L (ref 10–44)
ANION GAP SERPL CALC-SCNC: 8 MMOL/L (ref 8–16)
AST SERPL-CCNC: 78 U/L (ref 10–40)
BILIRUB SERPL-MCNC: 2.6 MG/DL (ref 0.1–1)
BUN SERPL-MCNC: 16 MG/DL (ref 8–23)
CALCIUM SERPL-MCNC: 9.9 MG/DL (ref 8.7–10.5)
CHLORIDE SERPL-SCNC: 113 MMOL/L (ref 95–110)
CO2 SERPL-SCNC: 13 MMOL/L (ref 23–29)
CREAT SERPL-MCNC: 0.8 MG/DL (ref 0.5–1.4)
EST. GFR  (NO RACE VARIABLE): >60 ML/MIN/1.73 M^2
GLUCOSE SERPL-MCNC: 105 MG/DL (ref 70–110)
MAGNESIUM SERPL-MCNC: 1.9 MG/DL (ref 1.6–2.6)
PHOSPHATE SERPL-MCNC: 3.6 MG/DL (ref 2.7–4.5)
POCT GLUCOSE: 110 MG/DL (ref 70–110)
POCT GLUCOSE: 136 MG/DL (ref 70–110)
POTASSIUM SERPL-SCNC: 4.2 MMOL/L (ref 3.5–5.1)
PROT SERPL-MCNC: 7.3 G/DL (ref 6–8.4)
SODIUM SERPL-SCNC: 134 MMOL/L (ref 136–145)

## 2025-02-17 PROCEDURE — 21400001 HC TELEMETRY ROOM

## 2025-02-17 PROCEDURE — 63600175 PHARM REV CODE 636 W HCPCS: Performed by: HOSPITALIST

## 2025-02-17 PROCEDURE — 63600175 PHARM REV CODE 636 W HCPCS

## 2025-02-17 PROCEDURE — 80053 COMPREHEN METABOLIC PANEL: CPT | Performed by: HOSPITALIST

## 2025-02-17 PROCEDURE — 99231 SBSQ HOSP IP/OBS SF/LOW 25: CPT | Mod: GC,,, | Performed by: SURGERY

## 2025-02-17 PROCEDURE — 25000003 PHARM REV CODE 250: Performed by: HOSPITALIST

## 2025-02-17 PROCEDURE — 36415 COLL VENOUS BLD VENIPUNCTURE: CPT | Performed by: HOSPITALIST

## 2025-02-17 PROCEDURE — 25000003 PHARM REV CODE 250

## 2025-02-17 PROCEDURE — A4216 STERILE WATER/SALINE, 10 ML: HCPCS | Performed by: PHYSICIAN ASSISTANT

## 2025-02-17 PROCEDURE — 84100 ASSAY OF PHOSPHORUS: CPT | Performed by: HOSPITALIST

## 2025-02-17 PROCEDURE — 25000003 PHARM REV CODE 250: Performed by: PHYSICIAN ASSISTANT

## 2025-02-17 PROCEDURE — 83735 ASSAY OF MAGNESIUM: CPT | Performed by: HOSPITALIST

## 2025-02-17 RX ORDER — PROCHLORPERAZINE EDISYLATE 5 MG/ML
2.5 INJECTION INTRAMUSCULAR; INTRAVENOUS EVERY 6 HOURS PRN
Status: DISCONTINUED | OUTPATIENT
Start: 2025-02-17 | End: 2025-02-18

## 2025-02-17 RX ORDER — ONDANSETRON 4 MG/1
4 TABLET, FILM COATED ORAL EVERY 6 HOURS PRN
Status: DISCONTINUED | OUTPATIENT
Start: 2025-02-17 | End: 2025-02-18

## 2025-02-17 RX ADMIN — ONDANSETRON 4 MG: 4 TABLET ORAL at 06:02

## 2025-02-17 RX ADMIN — ENOXAPARIN SODIUM 40 MG: 40 INJECTION SUBCUTANEOUS at 04:02

## 2025-02-17 RX ADMIN — ONDANSETRON 4 MG: 4 TABLET ORAL at 12:02

## 2025-02-17 RX ADMIN — CIPROFLOXACIN HYDROCHLORIDE 750 MG: 750 TABLET, FILM COATED ORAL at 08:02

## 2025-02-17 RX ADMIN — SODIUM BICARBONATE: 84 INJECTION, SOLUTION INTRAVENOUS at 03:02

## 2025-02-17 RX ADMIN — SODIUM CHLORIDE, PRESERVATIVE FREE 10 ML: 5 INJECTION INTRAVENOUS at 09:02

## 2025-02-17 RX ADMIN — Medication 200 ML: at 12:02

## 2025-02-17 RX ADMIN — METRONIDAZOLE 500 MG: 500 TABLET ORAL at 08:02

## 2025-02-17 RX ADMIN — CIPROFLOXACIN HYDROCHLORIDE 750 MG: 750 TABLET, FILM COATED ORAL at 09:02

## 2025-02-17 RX ADMIN — LIDOCAINE 1 PATCH: 50 PATCH CUTANEOUS at 08:02

## 2025-02-17 RX ADMIN — SODIUM CHLORIDE, PRESERVATIVE FREE 10 ML: 5 INJECTION INTRAVENOUS at 06:02

## 2025-02-17 RX ADMIN — Medication 200 ML: at 11:02

## 2025-02-17 RX ADMIN — SODIUM CHLORIDE, PRESERVATIVE FREE 10 ML: 5 INJECTION INTRAVENOUS at 02:02

## 2025-02-17 RX ADMIN — LATANOPROST 1 DROP: 50 SOLUTION/ DROPS OPHTHALMIC at 09:02

## 2025-02-17 RX ADMIN — Medication 200 ML: at 07:02

## 2025-02-17 RX ADMIN — SODIUM CHLORIDE, SODIUM LACTATE, POTASSIUM CHLORIDE, AND CALCIUM CHLORIDE 500 ML: .6; .31; .03; .02 INJECTION, SOLUTION INTRAVENOUS at 07:02

## 2025-02-17 RX ADMIN — LEVETIRACETAM 1500 MG: 750 TABLET, FILM COATED ORAL at 09:02

## 2025-02-17 RX ADMIN — METRONIDAZOLE 500 MG: 500 TABLET ORAL at 09:02

## 2025-02-17 RX ADMIN — ONDANSETRON HYDROCHLORIDE 4 MG: 4 TABLET, FILM COATED ORAL at 03:02

## 2025-02-17 NOTE — ASSESSMENT & PLAN NOTE
"Liver mass, possible cholangiocarcinoma  Patient presented to OSH with jaundice and scleral icterus, along with abnormal labs from clinic. Also noted nausea and back pain.  Noted history of cholecystectomy. Labs showed elevated bilirubin, and imaging with biliary ductal dilatation. Initial CT of the abdomen and pelvis with IV contrast showed significant intrahepatic biliary ductal dilatation and questionable 9 mm fat containing lesion in the uncinate process of the pancreas.  Abdominal ultrasound showed hepatic steatosis and intra hepatic biliary ductal dilatation and status post cholecystectomy. MRCP showed "severe intrahepatic bile duct dilatation, with abrupt narrowing at the confluence of the hepatic ducts where there is a suspected ill-defined liver mass.  This is poorly evaluated without contrast but concerning for neoplasm such as cholangiocarcinoma." Repeat imaging with MRI abdomen with and without contrast showed "hypoattenuating lesion near the confluence of the right and left hepatic ducts causing severe upstream ductal dilatation concerning for cholangiocarcinoma.  Further evaluation with ERCP is recommended." Patient transferred to [facility] [place] workup and evaluation. CEA elevated, CA 19-9 WNL. Surgical oncology, AES, and IR consulted to coordinate plan for further management. Underwent PTC with IR on 2/8 without complication. Plan to allow decompression of the biliary system and repeat imaging to reassess obstruction    - Full liquid diet tolerated well for nausea and vomiting, will advance diet to solids and continue to monitor and adjust as needed   -AES Consulted  -Surgical Oncology consulted, appreciate recs     -S/p PTC w/ IR     - Plan for repeat CT A/P when biliary system decompressed to be done outpatient      -CT chest, abdomen, and pelvis  for further staging ordered   - Drains can be flushed once daily with normal saline. She will need routine exchange in 8-12 weeks with IR. Surg Onc is " following and would like to the drains capped prior to discharge.  Left side is capped, Right side capped again on 2/17, Left open to drain to gravity.   - Continue p.r.n. medication for symptomatic relief  - Trend with labs and correct electrolyte abnormalities as needed  - Replace with LR for volume losses with PCT drains as needed, and patient was encouraged to increase PO fluids intake

## 2025-02-17 NOTE — PROGRESS NOTES
"[facility]  Progress Note    Patient Name: Katey Cerrato  MRN: [id number]  Patient Class: IP- Inpatient   Admission Date: 2/3/2025  Length of Stay: 13 days  Attending Physician: Selina Winters*  Primary Care Provider: Shay Castellano MD        Subjective     Principal Problem:Biliary obstruction        HPI:  Ms. Katey Cerrato is a 69 y.o. female, with PMH of HTN, HLD, DMII, Grave's disease, who presented to [facility] ED on 2/3/25 after labs following an outpatient appointment were abnormal. She states she recently was treated for shingles with Valtrex with rash improvement, but then her urine became discolored and that prompted her visit to her PCP.  At her PCP visit it was noticed that she had an orange coloration to the skin, and yellow color of the eyes.  After her appointment she had outpatient lab testing showing abnormal liver function, which is what prompted her PCP to send her to the ED. Her only new symptom is some low back pain. She states she had a cholecystectomy in the past, and had many gallstones removed at that time. She states her sister had retained gallstones that needed removal in the past. Denies any fevers, chills, trauma, alcohol consumption, Tylenol consumption.  [facility] ED labs showed tbili 7.4, alk phos 538,  and . Lipase normal. INR 1.2. U/A with few bacteria. A  CT showed significant intrahepatic biliary ductal dilatation, mild extrahepatic biliary ductal dilatation, a 9 mm fat containing lesion in the uncinate process of the pancreas.  She was treated with IV fluids, IV Rocephin, Toradol on admission.  MRCP showed intrahepatic bile duct dilation noted with an ill-defined liver mass.  Further workup with MRI abdomen with/without contrast showed "hypoattenuating lesion near the confluence of the right and left hepatic ducts causing severe upstream ductal dilatation concerning for cholangiocarcinoma. Given lesion and " "hyperbilirubinemia decision was made to transfer patient to [facility] [place] for further workup and evaluation with advanced services, possible ERCP.    On arrival to [facility] VSS. Patient notes generalized pruritus, decreased appetite, nausea, lower back pain. Denies abdominal pain. Pending further workup and management     Overview/Hospital Course:  Patient presented to OSH with jaundice and scleral icterus, along with abnormal labs from clinic. Also noted nausea and back pain.  Noted history of cholecystectomy. Labs showed elevated bilirubin, and imaging with biliary ductal dilatation. Initial CT of the abdomen and pelvis with IV contrast showed significant intrahepatic biliary ductal dilatation and questionable 9 mm fat containing lesion in the uncinate process of the pancreas.  MRCP showed "severe intrahepatic bile duct dilatation, with abrupt narrowing at the confluence of the hepatic ducts where there is a suspected ill-defined liver mass. Repeat imaging with MRI abdomen with and without contrast showed "hypoattenuating lesion near the confluence of the right and left hepatic ducts causing severe upstream ductal dilatation concerning for cholangiocarcinoma.  Further evaluation with ERCP is recommended." Patient transferred to [facility] [place] for further workup and evaluation. CEA elevated, CA 19-9 WNL. AES and Surgical oncology consulted, recommended PTC for further evaluation. Symptoms controlled with benadryl. PCT placed 2/8. Surg/ onc Recommend capping both sided PTC prior to discharge. Both PTC drains need to be flushed with saline twice daily. CT chest abdomen and pelvis w contrast obtained per surg onc reccs and patient will need a CT chest non contrast for stagin. Patient was unable to be discharged due to fever and elevated WBC on 2/13 and infectious workup was started. Infectious workup remains negative to date and patient received Zosyn and Vanc.       Interval History: ELIZABETH, right drain capped.  will discuss with surg onc " regarding plan for discharge and drains management     Review of Systems  Objective:     Vital Signs (Most Recent):  Temp: 98.5 °F (36.9 °C) (02/17/25 0758)  Pulse: 60 (02/17/25 0758)  Resp: 18 (02/17/25 0758)  BP: 111/75 (02/17/25 0758)  SpO2: 100 % (02/17/25 0758) Vital Signs (24h Range):  Temp:  [98.2 °F (36.8 °C)-98.5 °F (36.9 °C)] 98.5 °F (36.9 °C)  Pulse:  [60-73] 60  Resp:  [18] 18  SpO2:  [98 %-100 %] 100 %  BP: ()/(62-76) 111/75     Weight: 79.7 kg (175 lb 11.2 oz)  Body mass index is 35.49 kg/m².    Intake/Output Summary (Last 24 hours) at 2/17/2025 1044  Last data filed at 2/17/2025 0800  Gross per 24 hour   Intake 640 ml   Output 1675 ml   Net -1035 ml         Physical Exam  Vitals and nursing note reviewed.   Constitutional:       General: She is not in acute distress.     Appearance: Normal appearance. She is not toxic-appearing.   HENT:      Head: Normocephalic and atraumatic.      Nose: Nose normal.      Mouth/Throat:      Mouth: Mucous membranes are moist.   Cardiovascular:      Rate and Rhythm: Normal rate.      Pulses: Normal pulses.      Heart sounds: No murmur heard.  Pulmonary:      Effort: Pulmonary effort is normal. No respiratory distress.      Breath sounds: Normal breath sounds. No wheezing or rales.   Abdominal:      General: Abdomen is flat.      Palpations: Abdomen is soft.      Comments: Soft, non distended. Very mildly tender to deep palpation in the epigastrium. No rebound or guarding.   Right drain capped   Left drain to gravity   Musculoskeletal:      Right lower leg: No edema.      Left lower leg: No edema.   Skin:     General: Skin is warm and dry.   Neurological:      General: No focal deficit present.      Mental Status: She is alert and oriented to person, place, and time.   Psychiatric:         Mood and Affect: Mood normal.         Behavior: Behavior normal.             Significant Labs: All pertinent labs within the past 24 hours have been reviewed.  CBC: No results  "for input(s): "WBC", "HGB", "HCT", "PLT" in the last 48 hours.  CMP:   Recent Labs   Lab 02/16/25  0238 02/17/25  0515    134*   K 4.4 4.2    113*   CO2 17* 13*   * 105   BUN 15 16   CREATININE 0.8 0.8   CALCIUM 9.5 9.9   PROT 7.0 7.3   ALBUMIN 2.0* 2.1*   BILITOT 2.6* 2.6*   ALKPHOS 369* 387*   AST 77* 78*   ALT 92* 97*   ANIONGAP 12 8       Significant Imaging: I have reviewed all pertinent imaging results/findings within the past 24 hours.    Assessment and Plan     * Biliary obstruction  Liver mass, possible cholangiocarcinoma  Patient presented to OSH with jaundice and scleral icterus, along with abnormal labs from clinic. Also noted nausea and back pain.  Noted history of cholecystectomy. Labs showed elevated bilirubin, and imaging with biliary ductal dilatation. Initial CT of the abdomen and pelvis with IV contrast showed significant intrahepatic biliary ductal dilatation and questionable 9 mm fat containing lesion in the uncinate process of the pancreas.  Abdominal ultrasound showed hepatic steatosis and intra hepatic biliary ductal dilatation and status post cholecystectomy. MRCP showed "severe intrahepatic bile duct dilatation, with abrupt narrowing at the confluence of the hepatic ducts where there is a suspected ill-defined liver mass.  This is poorly evaluated without contrast but concerning for neoplasm such as cholangiocarcinoma." Repeat imaging with MRI abdomen with and without contrast showed "hypoattenuating lesion near the confluence of the right and left hepatic ducts causing severe upstream ductal dilatation concerning for cholangiocarcinoma.  Further evaluation with ERCP is recommended." Patient transferred to [facility] [place] for further workup and evaluation. CEA elevated, CA 19-9 WNL. Surgical oncology, AES, and IR consulted to coordinate plan for further management. Underwent PTC with IR on 2/8 without complication. Plan to allow decompression of the biliary system and repeat imaging " to reassess obstruction    - Full liquid diet tolerated well for nausea and vomiting, will advance diet to solids and continue to monitor and adjust as needed   -AES Consulted  -Surgical Oncology consulted, appreciate recs     -S/p PTC w/ IR     - Plan for repeat CT A/P when biliary system decompressed to be done outpatient      -CT chest, abdomen, and pelvis  for further staging ordered   - Drains can be flushed once daily with normal saline. She will need routine exchange in 8-12 weeks with IR. Surg Onc is following and would like to the drains capped prior to discharge.  Left side is capped, Right side capped again on 2/17, Left open to drain to gravity.   - Continue p.r.n. medication for symptomatic relief  - Trend with labs and correct electrolyte abnormalities as needed  - Replace with LR for volume losses with PCT drains as needed, and patient was encouraged to increase PO fluids intake     Fever  Patient had a fever of 102 on 2/13 with elevated wbc to 18, was not able to get discharged,  started on IV zosyn and vanc with infectious workup obtained.     - Stopped IV vanc and Zosyn on 2/16, continue Cipr and Metro for intraabdominal infection for  total of 7 days   - F/u bcx NGTD   - CXR unremarkable  - US abdomen w Moderate intrahepatic ductal dilatation, with partially imaged biliary drains. No evidence of abscess    - F/u Flu, covid   - UA is unremarkable   - LA unremarkable   - PTC drain sites are clean and no signs of infection       Nausea and vomiting  Patient with worsening nausea, vomiting s/p PTC on 2/9. Will plan aggressive antiemetic regimen and adjust diet as needed    -Aggressive antiemetic regimen  -Tolerating PO and regular diet         Type 2 diabetes mellitus without complication, without long-term current use of insulin  - Last hemoglobin A1c 5.9, well controlled  - D/C home regimen of Trulicity on discharge due to nausea and vomiting which can worsen or exacerbate the symptoms   - Glucose  level reviewed, currently in normal range  - Continue Accu-Cheks q.a.c. and q.h.s. and give low-dose sliding scale insulin as necessary    Mixed hyperlipidemia  Patient with hx HLD presenting with abnormal liver function tests. HLD well controlled on home statin    - Holding statin due to abnormal liver function tests    Graves' disease with exophthalmos  Pt with hx graves disease last TSH wnl in 2023. Currently without symptoms.    -Continue to monitor         Hypertension associated with diabetes  - Well controlled, continue metoprolol 50 mg p.o. q.h.s., amlodipine 10 mg p.o. daily and valsartan 320 mg p.o. daily when medically appropriate       Generalized convulsive epilepsy with intractable epilepsy  Patient with history of epilepsy in the past on levitiracetam 1500 mg qhs. Stable, no reported seizure activity    - Continue home levetiracetam 1500 mg p.o. q.h.s.      VTE Risk Mitigation (From admission, onward)           Ordered     enoxaparin injection 40 mg  Every 24 hours         02/10/25 1040     IP VTE HIGH RISK PATIENT  Once         02/04/25 0219     Place sequential compression device  Until discontinued         02/04/25 0219                    Discharge Planning   ISHMAEL: 2/19/2025     Code Status: Full Code   Medical Readiness for Discharge Date:   Discharge Plan A: Home Health   Discharge Delays: None known at this time                    Martina Gaspar MD  Department of Hospital Medicine   [facility] - Internal Medicine Telemetry

## 2025-02-17 NOTE — SUBJECTIVE & OBJECTIVE
Interval History: NAEON, right drain capped.  will discuss with surg onc regarding plan for discharge and drains management     Review of Systems  Objective:     Vital Signs (Most Recent):  Temp: 98.5 °F (36.9 °C) (02/17/25 0758)  Pulse: 60 (02/17/25 0758)  Resp: 18 (02/17/25 0758)  BP: 111/75 (02/17/25 0758)  SpO2: 100 % (02/17/25 0758) Vital Signs (24h Range):  Temp:  [98.2 °F (36.8 °C)-98.5 °F (36.9 °C)] 98.5 °F (36.9 °C)  Pulse:  [60-73] 60  Resp:  [18] 18  SpO2:  [98 %-100 %] 100 %  BP: ()/(62-76) 111/75     Weight: 79.7 kg (175 lb 11.2 oz)  Body mass index is 35.49 kg/m².    Intake/Output Summary (Last 24 hours) at 2/17/2025 1044  Last data filed at 2/17/2025 0800  Gross per 24 hour   Intake 640 ml   Output 1675 ml   Net -1035 ml         Physical Exam  Vitals and nursing note reviewed.   Constitutional:       General: She is not in acute distress.     Appearance: Normal appearance. She is not toxic-appearing.   HENT:      Head: Normocephalic and atraumatic.      Nose: Nose normal.      Mouth/Throat:      Mouth: Mucous membranes are moist.   Cardiovascular:      Rate and Rhythm: Normal rate.      Pulses: Normal pulses.      Heart sounds: No murmur heard.  Pulmonary:      Effort: Pulmonary effort is normal. No respiratory distress.      Breath sounds: Normal breath sounds. No wheezing or rales.   Abdominal:      General: Abdomen is flat.      Palpations: Abdomen is soft.      Comments: Soft, non distended. Very mildly tender to deep palpation in the epigastrium. No rebound or guarding.   Right drain capped   Left drain to gravity   Musculoskeletal:      Right lower leg: No edema.      Left lower leg: No edema.   Skin:     General: Skin is warm and dry.   Neurological:      General: No focal deficit present.      Mental Status: She is alert and oriented to person, place, and time.   Psychiatric:         Mood and Affect: Mood normal.         Behavior: Behavior normal.             Significant Labs: All  "pertinent labs within the past 24 hours have been reviewed.  CBC: No results for input(s): "WBC", "HGB", "HCT", "PLT" in the last 48 hours.  CMP:   Recent Labs   Lab 02/16/25  0238 02/17/25  0515    134*   K 4.4 4.2    113*   CO2 17* 13*   * 105   BUN 15 16   CREATININE 0.8 0.8   CALCIUM 9.5 9.9   PROT 7.0 7.3   ALBUMIN 2.0* 2.1*   BILITOT 2.6* 2.6*   ALKPHOS 369* 387*   AST 77* 78*   ALT 92* 97*   ANIONGAP 12 8       Significant Imaging: I have reviewed all pertinent imaging results/findings within the past 24 hours.  "

## 2025-02-17 NOTE — ASSESSMENT & PLAN NOTE
69F presenting with painless jaundice after being found to have biliary obstruction on recent outpatient lab work. Imaging studies w/ a hypoattenuating mass near the confluence of the right and left hepatic ducts, which is concerning for cholangiocarcinoma. CEA elevated. S/p Bilateral PTC placement 2/8/25. Tbili still downtrending. L PTC capped on 2/11. R PTC capped 2/12. Fevered 2/13 overnight so both drains replaced to gravity. Now progressing towards attempts at capping drains again.    -- R drain capped this AM on rounds  -- Keep L PTC to gravity bag drainage  -- No tissue diagnosis yet  -- Flush b/l drains TID  -- Recommend replacing her PTC losses with LR ~1:1  -- Trend CMP  -- Aggressive antiemetic regimen  -- Recommend at least full diet as tolerated to optimize nutrition

## 2025-02-17 NOTE — PROGRESS NOTES
[facility] - Internal Medicine Telemetry  Surgical Oncology  Progress Note    Subjective:     History of Present Illness:  Katey Cerrato is a 69 y.o. female with a history of empty sella syndrome, GHD, glaucoma, Grave's w/ ecophthalmos, HLD, HTN, seizures, and DM2 who presents as a transfer for workup of her biliary obstruction. The patient states that she recently saw her PCP after she noticed urine discoloration. She was sent for outpatient lab work, which was suggestive of biliary obstruction so she was sent to the ED. In the ED, her lab work was remarkable for t bili of 8.3, elevated LFTs (280/264), and elevated ALP (507). RUQ and CT A/P w/ significant intrahepatic biliary ductal dilation and mild extra hepatic dilation. MRCP w/ hypoattenuating lesion near the confluence of the right and left hepatic ducts causing severe upstream ductal dilatation concerning for cholangiocarcinoma. Surgical oncology consulted for evaluation.       Post-Op Info:  * No surgery found *         Interval History:   No acute events overnight. Feels well. No n/v or fevers/chills. Denies pain.      Medications:  Continuous Infusions:  Scheduled Meds:   ciprofloxacin HCl  750 mg Oral Q12H    electrolytes-dextrose  200 mL Oral Q4H    enoxparin  40 mg Subcutaneous Q24H (prophylaxis, 1700)    lactated ringers  500 mL Intravenous Once    latanoprost  1 drop Both Eyes QHS    levETIRAcetam  1,500 mg Oral Nightly    LIDOcaine  1 patch Transdermal Daily    metroNIDAZOLE  500 mg Oral Q12H    ondansetron  4 mg Oral Q6H    scopolamine  1 patch Transdermal Q3 Days    sodium chloride 0.9%  10 mL Intravenous Q8H      PRN Meds:  Current Facility-Administered Medications:     dextrose 50%, 12.5 g, Intravenous, PRN    dextrose 50%, 25 g, Intravenous, PRN    diphenhydrAMINE, 25 mg, Oral, Q6H PRN    diphenhydrAMINE-zinc acetate 2-0.1%, , Topical (Top), TID PRN    glucagon (human recombinant), 1 mg, Intramuscular, PRN    glucose, 16 g, Oral, PRN    glucose,  24 g, Oral, PRN    HYDROmorphone, 2 mg, Oral, Q6H PRN    insulin aspart U-100, 0-5 Units, Subcutaneous, QID (AC + HS) PRN    melatonin, 6 mg, Oral, Nightly PRN    naloxone, 0.02 mg, Intravenous, PRN    polyethylene glycol, 17 g, Oral, Daily PRN    prochlorperazine, 2.5 mg, Intravenous, Q6H PRN    senna-docusate 8.6-50 mg, 1 tablet, Oral, Daily PRN           Review of patient's allergies indicates:   Allergen Reactions    Codeine Nausea Only      Objective:      Vital Signs (Most Recent):  Temp: 98.5 °F (36.9 °C) (02/17/25 0758)  Pulse: 60 (02/17/25 0758)  Resp: 18 (02/17/25 0758)  BP: 111/75 (02/17/25 0758)  SpO2: 100 % (02/17/25 0758) Vital Signs (24h Range):  Temp:  [98.2 °F (36.8 °C)-98.5 °F (36.9 °C)] 98.5 °F (36.9 °C)  Pulse:  [60-73] 60  Resp:  [18] 18  SpO2:  [98 %-100 %] 100 %  BP: ()/(62-76) 111/75      Weight: 79.7 kg (175 lb 11.2 oz)  Body mass index is 35.49 kg/m².     Intake/Output - Last 3 Shifts           02/15 0700  02/16 0659 02/16 0700  02/17 0659 02/17 0700  02/18 0659     P.O. 400 400       Total Intake(mL/kg) 400 (5) 400 (5)       Urine (mL/kg/hr)           Drains 3150 2400       Total Output 3150 2400       Net -2750 -2000                           Physical Exam  Vitals reviewed.   Constitutional:       General: She is not in acute distress.     Appearance: Normal appearance. She is not toxic-appearing.   HENT:      Head: Normocephalic and atraumatic.      Nose: Nose normal.      Mouth/Throat:      Mouth: Mucous membranes are moist.   Cardiovascular:      Rate and Rhythm: Normal rate.      Pulses: Normal pulses.   Pulmonary:      Effort: No respiratory distress.   Abdominal:      General: Abdomen is flat.      Palpations: Abdomen is soft.      Comments: Soft, non distended. Mildly tender to deep palpation in the epigastrium. No rebound or guarding.   Both PTC drains to gravity with bilious output. Capped R drain on rounds this AM   Neurological:      General: No focal deficit present.       Mental Status: She is alert and oriented to person, place, and time.   Psychiatric:         Mood and Affect: Mood normal.         Behavior: Behavior normal.         Significant Labs:  I have reviewed all pertinent lab results within the past 24 hours.  CBC:       Recent Labs   Lab 02/15/25  0926   WBC 15.37*   RBC 3.47*   HGB 10.6*   HCT 31.6*      MCV 91   MCH 30.5   MCHC 33.5      CMP:       Recent Labs   Lab 02/17/25  0515      CALCIUM 9.9   ALBUMIN 2.1*   PROT 7.3   *   K 4.2   CO2 13*   *   BUN 16   CREATININE 0.8   ALKPHOS 387*   ALT 97*   AST 78*   BILITOT 2.6*         Significant Diagnostics:  I have reviewed all pertinent imaging results/findings within the past 24 hours.    Assessment/Plan:     * Biliary obstruction  69F presenting with painless jaundice after being found to have biliary obstruction on recent outpatient lab work. Imaging studies w/ a hypoattenuating mass near the confluence of the right and left hepatic ducts, which is concerning for cholangiocarcinoma. CEA elevated. S/p Bilateral PTC placement 2/8/25. Tbili still downtrending. L PTC capped on 2/11. R PTC capped 2/12. Fevered 2/13 overnight so both drains replaced to gravity. Now progressing towards attempts at capping drains again.    -- R drain capped this AM on rounds  -- Keep L PTC to gravity bag drainage  -- No tissue diagnosis yet  -- Flush b/l drains TID  -- Recommend replacing her PTC losses with LR ~1:1  -- Trend CMP  -- Aggressive antiemetic regimen  -- Recommend at least full diet as tolerated to optimize nutrition        Eric Mahan, MDR4  Surg/onc resident  Staff: [facility] - Internal Medicine Telemetry

## 2025-02-17 NOTE — SUBJECTIVE & OBJECTIVE
Interval History:   No acute events overnight. Feels well. No n/v or fevers/chills. Denies pain.     Medications:  Continuous Infusions:  Scheduled Meds:   ciprofloxacin HCl  750 mg Oral Q12H    electrolytes-dextrose  200 mL Oral Q4H    enoxparin  40 mg Subcutaneous Q24H (prophylaxis, 1700)    lactated ringers  500 mL Intravenous Once    latanoprost  1 drop Both Eyes QHS    levETIRAcetam  1,500 mg Oral Nightly    LIDOcaine  1 patch Transdermal Daily    metroNIDAZOLE  500 mg Oral Q12H    ondansetron  4 mg Oral Q6H    scopolamine  1 patch Transdermal Q3 Days    sodium chloride 0.9%  10 mL Intravenous Q8H     PRN Meds:  Current Facility-Administered Medications:     dextrose 50%, 12.5 g, Intravenous, PRN    dextrose 50%, 25 g, Intravenous, PRN    diphenhydrAMINE, 25 mg, Oral, Q6H PRN    diphenhydrAMINE-zinc acetate 2-0.1%, , Topical (Top), TID PRN    glucagon (human recombinant), 1 mg, Intramuscular, PRN    glucose, 16 g, Oral, PRN    glucose, 24 g, Oral, PRN    HYDROmorphone, 2 mg, Oral, Q6H PRN    insulin aspart U-100, 0-5 Units, Subcutaneous, QID (AC + HS) PRN    melatonin, 6 mg, Oral, Nightly PRN    naloxone, 0.02 mg, Intravenous, PRN    polyethylene glycol, 17 g, Oral, Daily PRN    prochlorperazine, 2.5 mg, Intravenous, Q6H PRN    senna-docusate 8.6-50 mg, 1 tablet, Oral, Daily PRN     Review of patient's allergies indicates:   Allergen Reactions    Codeine Nausea Only     Objective:     Vital Signs (Most Recent):  Temp: 98.5 °F (36.9 °C) (02/17/25 0758)  Pulse: 60 (02/17/25 0758)  Resp: 18 (02/17/25 0758)  BP: 111/75 (02/17/25 0758)  SpO2: 100 % (02/17/25 0758) Vital Signs (24h Range):  Temp:  [98.2 °F (36.8 °C)-98.5 °F (36.9 °C)] 98.5 °F (36.9 °C)  Pulse:  [60-73] 60  Resp:  [18] 18  SpO2:  [98 %-100 %] 100 %  BP: ()/(62-76) 111/75     Weight: 79.7 kg (175 lb 11.2 oz)  Body mass index is 35.49 kg/m².    Intake/Output - Last 3 Shifts         02/15 0700  02/16 0659 02/16 0700 02/17 0659 02/17 0700 02/18  0659    P.O. 400 400     Total Intake(mL/kg) 400 (5) 400 (5)     Urine (mL/kg/hr)       Drains 3150 2400     Total Output 3150 2400     Net -2750 -2000                     Physical Exam  Vitals reviewed.   Constitutional:       General: She is not in acute distress.     Appearance: Normal appearance. She is not toxic-appearing.   HENT:      Head: Normocephalic and atraumatic.      Nose: Nose normal.      Mouth/Throat:      Mouth: Mucous membranes are moist.   Cardiovascular:      Rate and Rhythm: Normal rate.      Pulses: Normal pulses.   Pulmonary:      Effort: No respiratory distress.   Abdominal:      General: Abdomen is flat.      Palpations: Abdomen is soft.      Comments: Soft, non distended. Mildly tender to deep palpation in the epigastrium. No rebound or guarding.   Both PTC drains to gravity with bilious output. Capped R drain on rounds this AM   Neurological:      General: No focal deficit present.      Mental Status: She is alert and oriented to person, place, and time.   Psychiatric:         Mood and Affect: Mood normal.         Behavior: Behavior normal.          Significant Labs:  I have reviewed all pertinent lab results within the past 24 hours.  CBC:   Recent Labs   Lab 02/15/25  0926   WBC 15.37*   RBC 3.47*   HGB 10.6*   HCT 31.6*      MCV 91   MCH 30.5   MCHC 33.5     CMP:   Recent Labs   Lab 02/17/25  0515      CALCIUM 9.9   ALBUMIN 2.1*   PROT 7.3   *   K 4.2   CO2 13*   *   BUN 16   CREATININE 0.8   ALKPHOS 387*   ALT 97*   AST 78*   BILITOT 2.6*       Significant Diagnostics:  I have reviewed all pertinent imaging results/findings within the past 24 hours.

## 2025-02-17 NOTE — ASSESSMENT & PLAN NOTE
Patient had a fever of 102 on 2/13 with elevated wbc to 18, was not able to get discharged,  started on IV zosyn and vanc with infectious workup obtained.     - Stopped IV vanc and Zosyn on 2/16, continue Cipr and Metro for intraabdominal infection for  total of 7 days   - F/u bcx NGTD   - CXR unremarkable  - US abdomen w Moderate intrahepatic ductal dilatation, with partially imaged biliary drains. No evidence of abscess    - F/u Flu, covid   - UA is unremarkable   - LA unremarkable   - PTC drain sites are clean and no signs of infection

## 2025-02-18 LAB
ALBUMIN SERPL BCP-MCNC: 2.2 G/DL (ref 3.5–5.2)
ALP SERPL-CCNC: 405 U/L (ref 40–150)
ALT SERPL W/O P-5'-P-CCNC: 106 U/L (ref 10–44)
ANION GAP SERPL CALC-SCNC: 11 MMOL/L (ref 8–16)
AST SERPL-CCNC: 89 U/L (ref 10–40)
BACTERIA BLD CULT: NORMAL
BACTERIA BLD CULT: NORMAL
BILIRUB SERPL-MCNC: 2.4 MG/DL (ref 0.1–1)
BUN SERPL-MCNC: 19 MG/DL (ref 8–23)
CALCIUM SERPL-MCNC: 9.6 MG/DL (ref 8.7–10.5)
CHLORIDE SERPL-SCNC: 109 MMOL/L (ref 95–110)
CO2 SERPL-SCNC: 17 MMOL/L (ref 23–29)
CREAT SERPL-MCNC: 0.9 MG/DL (ref 0.5–1.4)
EST. GFR  (NO RACE VARIABLE): >60 ML/MIN/1.73 M^2
GLUCOSE SERPL-MCNC: 127 MG/DL (ref 70–110)
MAGNESIUM SERPL-MCNC: 1.7 MG/DL (ref 1.6–2.6)
OHS QRS DURATION: 100 MS
OHS QTC CALCULATION: 421 MS
PHOSPHATE SERPL-MCNC: 3 MG/DL (ref 2.7–4.5)
POCT GLUCOSE: 111 MG/DL (ref 70–110)
POCT GLUCOSE: 120 MG/DL (ref 70–110)
POCT GLUCOSE: 140 MG/DL (ref 70–110)
POCT GLUCOSE: 145 MG/DL (ref 70–110)
POTASSIUM SERPL-SCNC: 3.8 MMOL/L (ref 3.5–5.1)
PROT SERPL-MCNC: 7.3 G/DL (ref 6–8.4)
SODIUM SERPL-SCNC: 137 MMOL/L (ref 136–145)

## 2025-02-18 PROCEDURE — 83735 ASSAY OF MAGNESIUM: CPT | Performed by: HOSPITALIST

## 2025-02-18 PROCEDURE — 80053 COMPREHEN METABOLIC PANEL: CPT | Performed by: HOSPITALIST

## 2025-02-18 PROCEDURE — 21400001 HC TELEMETRY ROOM

## 2025-02-18 PROCEDURE — 93005 ELECTROCARDIOGRAM TRACING: CPT

## 2025-02-18 PROCEDURE — 25000003 PHARM REV CODE 250: Performed by: PHYSICIAN ASSISTANT

## 2025-02-18 PROCEDURE — 25000003 PHARM REV CODE 250: Performed by: HOSPITALIST

## 2025-02-18 PROCEDURE — 25000003 PHARM REV CODE 250

## 2025-02-18 PROCEDURE — 63600175 PHARM REV CODE 636 W HCPCS: Performed by: HOSPITALIST

## 2025-02-18 PROCEDURE — 84100 ASSAY OF PHOSPHORUS: CPT | Performed by: HOSPITALIST

## 2025-02-18 PROCEDURE — 36415 COLL VENOUS BLD VENIPUNCTURE: CPT | Performed by: HOSPITALIST

## 2025-02-18 PROCEDURE — 25500020 PHARM REV CODE 255: Performed by: HOSPITALIST

## 2025-02-18 PROCEDURE — 93010 ELECTROCARDIOGRAM REPORT: CPT | Mod: ,,, | Performed by: INTERNAL MEDICINE

## 2025-02-18 PROCEDURE — A4216 STERILE WATER/SALINE, 10 ML: HCPCS | Performed by: PHYSICIAN ASSISTANT

## 2025-02-18 PROCEDURE — 63600175 PHARM REV CODE 636 W HCPCS

## 2025-02-18 RX ORDER — OXYCODONE HYDROCHLORIDE 5 MG/1
5 TABLET ORAL EVERY 6 HOURS PRN
Refills: 0 | Status: CANCELLED | OUTPATIENT
Start: 2025-02-18

## 2025-02-18 RX ORDER — ONDANSETRON HYDROCHLORIDE 2 MG/ML
4 INJECTION, SOLUTION INTRAVENOUS EVERY 8 HOURS PRN
Status: DISCONTINUED | OUTPATIENT
Start: 2025-02-18 | End: 2025-02-19 | Stop reason: HOSPADM

## 2025-02-18 RX ORDER — CIPROFLOXACIN 750 MG/1
750 TABLET, FILM COATED ORAL EVERY 12 HOURS
Status: COMPLETED | OUTPATIENT
Start: 2025-02-18 | End: 2025-02-18

## 2025-02-18 RX ORDER — SODIUM BICARBONATE 650 MG/1
650 TABLET ORAL 2 TIMES DAILY
Status: DISCONTINUED | OUTPATIENT
Start: 2025-02-18 | End: 2025-02-19 | Stop reason: HOSPADM

## 2025-02-18 RX ORDER — SODIUM CHLORIDE 9 MG/ML
INJECTION, SOLUTION INTRAVENOUS CONTINUOUS
Status: DISCONTINUED | OUTPATIENT
Start: 2025-02-18 | End: 2025-02-18

## 2025-02-18 RX ORDER — METRONIDAZOLE 500 MG/1
500 TABLET ORAL EVERY 12 HOURS
Status: COMPLETED | OUTPATIENT
Start: 2025-02-18 | End: 2025-02-18

## 2025-02-18 RX ORDER — SODIUM CHLORIDE, SODIUM LACTATE, POTASSIUM CHLORIDE, CALCIUM CHLORIDE 600; 310; 30; 20 MG/100ML; MG/100ML; MG/100ML; MG/100ML
INJECTION, SOLUTION INTRAVENOUS CONTINUOUS
Status: ACTIVE | OUTPATIENT
Start: 2025-02-18 | End: 2025-02-19

## 2025-02-18 RX ADMIN — SODIUM CHLORIDE, POTASSIUM CHLORIDE, SODIUM LACTATE AND CALCIUM CHLORIDE 500 ML: 600; 310; 30; 20 INJECTION, SOLUTION INTRAVENOUS at 08:02

## 2025-02-18 RX ADMIN — CIPROFLOXACIN HYDROCHLORIDE 750 MG: 750 TABLET, FILM COATED ORAL at 08:02

## 2025-02-18 RX ADMIN — LIDOCAINE 1 PATCH: 50 PATCH CUTANEOUS at 08:02

## 2025-02-18 RX ADMIN — IOHEXOL 100 ML: 350 INJECTION, SOLUTION INTRAVENOUS at 12:02

## 2025-02-18 RX ADMIN — SODIUM CHLORIDE, PRESERVATIVE FREE 10 ML: 5 INJECTION INTRAVENOUS at 02:02

## 2025-02-18 RX ADMIN — METRONIDAZOLE 500 MG: 500 TABLET ORAL at 08:02

## 2025-02-18 RX ADMIN — LEVETIRACETAM 1500 MG: 750 TABLET, FILM COATED ORAL at 08:02

## 2025-02-18 RX ADMIN — SODIUM BICARBONATE 650 MG TABLET 650 MG: at 09:02

## 2025-02-18 RX ADMIN — SCOPOLAMINE 1 PATCH: 1.5 PATCH, EXTENDED RELEASE TRANSDERMAL at 06:02

## 2025-02-18 RX ADMIN — SODIUM BICARBONATE 650 MG TABLET 650 MG: at 08:02

## 2025-02-18 RX ADMIN — SODIUM CHLORIDE, PRESERVATIVE FREE 10 ML: 5 INJECTION INTRAVENOUS at 10:02

## 2025-02-18 RX ADMIN — LATANOPROST 1 DROP: 50 SOLUTION/ DROPS OPHTHALMIC at 08:02

## 2025-02-18 RX ADMIN — SODIUM CHLORIDE, POTASSIUM CHLORIDE, SODIUM LACTATE AND CALCIUM CHLORIDE: 600; 310; 30; 20 INJECTION, SOLUTION INTRAVENOUS at 08:02

## 2025-02-18 RX ADMIN — SODIUM CHLORIDE, PRESERVATIVE FREE 10 ML: 5 INJECTION INTRAVENOUS at 06:02

## 2025-02-18 RX ADMIN — ENOXAPARIN SODIUM 40 MG: 40 INJECTION SUBCUTANEOUS at 04:02

## 2025-02-18 RX ADMIN — SODIUM CHLORIDE, POTASSIUM CHLORIDE, SODIUM LACTATE AND CALCIUM CHLORIDE: 600; 310; 30; 20 INJECTION, SOLUTION INTRAVENOUS at 02:02

## 2025-02-18 NOTE — ASSESSMENT & PLAN NOTE
69F presenting with painless jaundice after being found to have biliary obstruction on recent outpatient lab work. Imaging studies w/ a hypoattenuating mass near the confluence of the right and left hepatic ducts, which is concerning for cholangiocarcinoma. CEA elevated. S/p Bilateral PTC placement 2/8/25. Tbili still downtrending. L PTC capped on 2/11. R PTC capped 2/12. Fevered 2/13 overnight so both drains replaced to gravity. Now progressing towards attempts at capping drains again.    -- R drain capped on 2/17  -- Will discuss capped L drain this morning  -- Flush b/l drains TID  -- Recommend replacing her PTC losses with LR ~1:1  -- Trend CMP  -- Aggressive antiemetic regimen

## 2025-02-18 NOTE — PLAN OF CARE
Problem: Adult Inpatient Plan of Care  Goal: Plan of Care Review  Outcome: Progressing  Goal: Patient-Specific Goal (Individualized)  Outcome: Progressing  Goal: Absence of Hospital-Acquired Illness or Injury  Outcome: Progressing  Goal: Optimal Comfort and Wellbeing  Outcome: Progressing  Goal: Readiness for Transition of Care  Outcome: Progressing     Problem: Diabetes Comorbidity  Goal: Blood Glucose Level Within Targeted Range  Outcome: Progressing     Problem: Nausea and Vomiting  Goal: Nausea and Vomiting Relief  Outcome: Progressing     Problem: Pain Acute  Goal: Optimal Pain Control and Function  Outcome: Progressing

## 2025-02-18 NOTE — ASSESSMENT & PLAN NOTE
"Liver mass, possible cholangiocarcinoma  Patient presented to OSH with jaundice and scleral icterus, along with abnormal labs from clinic. Also noted nausea and back pain.  Noted history of cholecystectomy. Labs showed elevated bilirubin, and imaging with biliary ductal dilatation. Initial CT of the abdomen and pelvis with IV contrast showed significant intrahepatic biliary ductal dilatation and questionable 9 mm fat containing lesion in the uncinate process of the pancreas.  Abdominal ultrasound showed hepatic steatosis and intra hepatic biliary ductal dilatation and status post cholecystectomy. MRCP showed "severe intrahepatic bile duct dilatation, with abrupt narrowing at the confluence of the hepatic ducts where there is a suspected ill-defined liver mass.  This is poorly evaluated without contrast but concerning for neoplasm such as cholangiocarcinoma." Repeat imaging with MRI abdomen with and without contrast showed "hypoattenuating lesion near the confluence of the right and left hepatic ducts causing severe upstream ductal dilatation concerning for cholangiocarcinoma.  Further evaluation with ERCP is recommended." Patient transferred to [facility] for further workup and evaluation. CEA elevated, CA 19-9 WNL. Surgical oncology, AES, and IR consulted to coordinate plan for further management. Underwent PTC with IR on 2/8 without complication. Plan to allow decompression of the biliary system and repeat imaging to reassess obstruction    - Full liquid diet tolerated well for nausea and vomiting, will advance diet to solids and continue to monitor and adjust as needed   -AES Consulted  -Surgical Oncology consulted, appreciate recs     -S/p PTC w/ IR     - Plan for repeat CT A/P when biliary system decompressed to be done outpatient      -CT chest, abdomen, and pelvis  for further staging ordered   - Drains can be flushed once daily with normal saline. She will need routine exchange in 8-12 weeks with IR. Surg Onc is " following and would like to the drains capped prior to discharge.  Left side is capped, Right side capped again on 2/17, Left open to drain to gravity.   - Continue p.r.n. medication for symptomatic relief  - Trend with labs and correct electrolyte abnormalities as needed  - Replace with LR for volume losses with PCT drains as needed, and patient was encouraged to increase PO fluids intake   - Pending L drain cap by surg and final reccs from surg onc, stable to  discharge from medicine perspective

## 2025-02-18 NOTE — PROGRESS NOTES
[facility] - Internal Medicine Telemetry  General Surgery  Progress Note    Subjective:     History of Present Illness:  Katey Cerrato is a 69 y.o. female with a history of empty sella syndrome, GHD, glaucoma, Grave's w/ ecophthalmos, HLD, HTN, seizures, and DM2 who presents as a transfer for workup of her biliary obstruction. The patient states that she recently saw her PCP after she noticed urine discoloration. She was sent for outpatient lab work, which was suggestive of biliary obstruction so she was sent to the ED. In the ED, her lab work was remarkable for t bili of 8.3, elevated LFTs (280/264), and elevated ALP (507). RUQ and CT A/P w/ significant intrahepatic biliary ductal dilation and mild extra hepatic dilation. MRCP w/ hypoattenuating lesion near the confluence of the right and left hepatic ducts causing severe upstream ductal dilatation concerning for cholangiocarcinoma. Surgical oncology consulted for evaluation.       Post-Op Info:  * No surgery found *         Interval History:   NAEO  Some drainage around the L (uncapped) PTC which had 1.7 L of output. R PTC remains capped  T Bili is 2.4 from 2.6    Medications:  Continuous Infusions:  Scheduled Meds:   ciprofloxacin HCl  750 mg Oral Q12H    enoxparin  40 mg Subcutaneous Q24H (prophylaxis, 1700)    lactated ringers  500 mL Intravenous Once    latanoprost  1 drop Both Eyes QHS    levETIRAcetam  1,500 mg Oral Nightly    LIDOcaine  1 patch Transdermal Daily    metroNIDAZOLE  500 mg Oral Q12H    scopolamine  1 patch Transdermal Q3 Days    sodium chloride 0.9%  10 mL Intravenous Q8H     PRN Meds:  Current Facility-Administered Medications:     dextrose 50%, 12.5 g, Intravenous, PRN    dextrose 50%, 25 g, Intravenous, PRN    diphenhydrAMINE, 25 mg, Oral, Q6H PRN    diphenhydrAMINE-zinc acetate 2-0.1%, , Topical (Top), TID PRN    glucagon (human recombinant), 1 mg, Intramuscular, PRN    glucose, 16 g, Oral, PRN    glucose, 24 g, Oral, PRN    HYDROmorphone,  2 mg, Oral, Q6H PRN    insulin aspart U-100, 0-5 Units, Subcutaneous, QID (AC + HS) PRN    melatonin, 6 mg, Oral, Nightly PRN    naloxone, 0.02 mg, Intravenous, PRN    ondansetron, 4 mg, Oral, Q6H PRN    polyethylene glycol, 17 g, Oral, Daily PRN    prochlorperazine, 2.5 mg, Intravenous, Q6H PRN    senna-docusate 8.6-50 mg, 1 tablet, Oral, Daily PRN     Review of patient's allergies indicates:   Allergen Reactions    Codeine Nausea Only     Objective:     Vital Signs (Most Recent):  Temp: 98.3 °F (36.8 °C) (02/18/25 0800)  Pulse: 71 (02/18/25 0800)  Resp: 17 (02/18/25 0800)  BP: (!) 101/57 (02/18/25 0800)  SpO2: 95 % (02/18/25 0800) Vital Signs (24h Range):  Temp:  [97.8 °F (36.6 °C)-98.3 °F (36.8 °C)] 98.3 °F (36.8 °C)  Pulse:  [67-71] 71  Resp:  [17-18] 17  SpO2:  [95 %-99 %] 95 %  BP: ()/(57-79) 101/57     Weight: 79.7 kg (175 lb 11.2 oz)  Body mass index is 35.49 kg/m².    Intake/Output - Last 3 Shifts         02/16 0700  02/17 0659 02/17 0700  02/18 0659 02/18 0700  02/19 0659    P.O. 400 720     Total Intake(mL/kg) 400 (5) 720 (9)     Urine (mL/kg/hr)  0 (0)     Emesis/NG output  300     Drains 2400 1775     Total Output 2400 2075     Net -2000 -1355            Urine Occurrence  2 x              Physical Exam  Vitals reviewed.   Constitutional:       General: She is not in acute distress.     Appearance: Normal appearance. She is not toxic-appearing.   HENT:      Head: Normocephalic and atraumatic.      Nose: Nose normal.      Mouth/Throat:      Mouth: Mucous membranes are moist.   Eyes:      General: No scleral icterus.     Extraocular Movements: Extraocular movements intact.   Cardiovascular:      Rate and Rhythm: Normal rate and regular rhythm.      Pulses: Normal pulses.   Pulmonary:      Effort: Pulmonary effort is normal. No respiratory distress.   Abdominal:      General: Abdomen is flat.      Palpations: Abdomen is soft.      Tenderness: There is no abdominal tenderness.      Comments: Soft, non  distended. Mildly tender to deep palpation in the epigastrium. No rebound or guarding.   L PTC to gravity with some bilious leakage around the tube. R PTC capped   Musculoskeletal:      Cervical back: Normal range of motion and neck supple.      Right lower leg: No edema.      Left lower leg: No edema.   Skin:     General: Skin is warm and dry.   Neurological:      General: No focal deficit present.      Mental Status: She is alert and oriented to person, place, and time.   Psychiatric:         Mood and Affect: Mood normal.         Behavior: Behavior normal.          Significant Labs:  I have reviewed all pertinent lab results within the past 24 hours.  CBC:   Recent Labs   Lab 02/15/25  0926   WBC 15.37*   RBC 3.47*   HGB 10.6*   HCT 31.6*      MCV 91   MCH 30.5   MCHC 33.5     CMP:   Recent Labs   Lab 02/18/25  0543   *   CALCIUM 9.6   ALBUMIN 2.2*   PROT 7.3      K 3.8   CO2 17*      BUN 19   CREATININE 0.9   ALKPHOS 405*   *   AST 89*   BILITOT 2.4*       Significant Diagnostics:  I have reviewed all pertinent imaging results/findings within the past 24 hours.  Assessment/Plan:     * Biliary obstruction  69F presenting with painless jaundice after being found to have biliary obstruction on recent outpatient lab work. Imaging studies w/ a hypoattenuating mass near the confluence of the right and left hepatic ducts, which is concerning for cholangiocarcinoma. CEA elevated. S/p Bilateral PTC placement 2/8/25. Tbili still downtrending. L PTC capped on 2/11. R PTC capped 2/12. Fevered 2/13 overnight so both drains replaced to gravity. Now progressing towards attempts at capping drains again.    -- R drain capped on 2/17  -- Will discuss capped L drain this morning  -- Flush b/l drains TID  -- Recommend replacing her PTC losses with LR ~1:1  -- Trend CMP  -- Aggressive antiemetic regimen        Kenroy Stafford MD  General Surgery  [facility] - Internal Medicine Telemetry

## 2025-02-18 NOTE — SUBJECTIVE & OBJECTIVE
Interval History:   NAEO  Some drainage around the L (uncapped) PTC which had 1.7 L of output. R PTC remains capped  T Bili is 2.4 from 2.6    Medications:  Continuous Infusions:  Scheduled Meds:   ciprofloxacin HCl  750 mg Oral Q12H    enoxparin  40 mg Subcutaneous Q24H (prophylaxis, 1700)    lactated ringers  500 mL Intravenous Once    latanoprost  1 drop Both Eyes QHS    levETIRAcetam  1,500 mg Oral Nightly    LIDOcaine  1 patch Transdermal Daily    metroNIDAZOLE  500 mg Oral Q12H    scopolamine  1 patch Transdermal Q3 Days    sodium chloride 0.9%  10 mL Intravenous Q8H     PRN Meds:  Current Facility-Administered Medications:     dextrose 50%, 12.5 g, Intravenous, PRN    dextrose 50%, 25 g, Intravenous, PRN    diphenhydrAMINE, 25 mg, Oral, Q6H PRN    diphenhydrAMINE-zinc acetate 2-0.1%, , Topical (Top), TID PRN    glucagon (human recombinant), 1 mg, Intramuscular, PRN    glucose, 16 g, Oral, PRN    glucose, 24 g, Oral, PRN    HYDROmorphone, 2 mg, Oral, Q6H PRN    insulin aspart U-100, 0-5 Units, Subcutaneous, QID (AC + HS) PRN    melatonin, 6 mg, Oral, Nightly PRN    naloxone, 0.02 mg, Intravenous, PRN    ondansetron, 4 mg, Oral, Q6H PRN    polyethylene glycol, 17 g, Oral, Daily PRN    prochlorperazine, 2.5 mg, Intravenous, Q6H PRN    senna-docusate 8.6-50 mg, 1 tablet, Oral, Daily PRN     Review of patient's allergies indicates:   Allergen Reactions    Codeine Nausea Only     Objective:     Vital Signs (Most Recent):  Temp: 98.3 °F (36.8 °C) (02/18/25 0800)  Pulse: 71 (02/18/25 0800)  Resp: 17 (02/18/25 0800)  BP: (!) 101/57 (02/18/25 0800)  SpO2: 95 % (02/18/25 0800) Vital Signs (24h Range):  Temp:  [97.8 °F (36.6 °C)-98.3 °F (36.8 °C)] 98.3 °F (36.8 °C)  Pulse:  [67-71] 71  Resp:  [17-18] 17  SpO2:  [95 %-99 %] 95 %  BP: ()/(57-79) 101/57     Weight: 79.7 kg (175 lb 11.2 oz)  Body mass index is 35.49 kg/m².    Intake/Output - Last 3 Shifts         02/16 0700 02/17 0659 02/17 0700 02/18 0659 02/18  0700  02/19 0659    P.O. 400 720     Total Intake(mL/kg) 400 (5) 720 (9)     Urine (mL/kg/hr)  0 (0)     Emesis/NG output  300     Drains 2400 1775     Total Output 2400 2075     Net -2000 -1355            Urine Occurrence  2 x              Physical Exam  Vitals reviewed.   Constitutional:       General: She is not in acute distress.     Appearance: Normal appearance. She is not toxic-appearing.   HENT:      Head: Normocephalic and atraumatic.      Nose: Nose normal.      Mouth/Throat:      Mouth: Mucous membranes are moist.   Eyes:      General: No scleral icterus.     Extraocular Movements: Extraocular movements intact.   Cardiovascular:      Rate and Rhythm: Normal rate and regular rhythm.      Pulses: Normal pulses.   Pulmonary:      Effort: Pulmonary effort is normal. No respiratory distress.   Abdominal:      General: Abdomen is flat.      Palpations: Abdomen is soft.      Tenderness: There is no abdominal tenderness.      Comments: Soft, non distended. Mildly tender to deep palpation in the epigastrium. No rebound or guarding.   L PTC to gravity with some bilious leakage around the tube. R PTC capped   Musculoskeletal:      Cervical back: Normal range of motion and neck supple.      Right lower leg: No edema.      Left lower leg: No edema.   Skin:     General: Skin is warm and dry.   Neurological:      General: No focal deficit present.      Mental Status: She is alert and oriented to person, place, and time.   Psychiatric:         Mood and Affect: Mood normal.         Behavior: Behavior normal.          Significant Labs:  I have reviewed all pertinent lab results within the past 24 hours.  CBC:   Recent Labs   Lab 02/15/25  0926   WBC 15.37*   RBC 3.47*   HGB 10.6*   HCT 31.6*      MCV 91   MCH 30.5   MCHC 33.5     CMP:   Recent Labs   Lab 02/18/25  0543   *   CALCIUM 9.6   ALBUMIN 2.2*   PROT 7.3      K 3.8   CO2 17*      BUN 19   CREATININE 0.9   ALKPHOS 405*   *   AST 89*    BILITOT 2.4*       Significant Diagnostics:  I have reviewed all pertinent imaging results/findings within the past 24 hours.

## 2025-02-18 NOTE — SUBJECTIVE & OBJECTIVE
Interval History: NAEON, pending left drain cap by surg, tolerating PO intake       Review of Systems  Objective:     Vital Signs (Most Recent):  Temp: 98 °F (36.7 °C) (02/18/25 1146)  Pulse: 82 (02/18/25 1146)  Resp: 17 (02/18/25 0800)  BP: 97/66 (02/18/25 1146)  SpO2: 100 % (02/18/25 1146) Vital Signs (24h Range):  Temp:  [97.8 °F (36.6 °C)-98.3 °F (36.8 °C)] 98 °F (36.7 °C)  Pulse:  [67-82] 82  Resp:  [17-18] 17  SpO2:  [95 %-100 %] 100 %  BP: ()/(57-79) 97/66     Weight: 75.3 kg (165 lb 14.4 oz)  Body mass index is 33.51 kg/m².    Intake/Output Summary (Last 24 hours) at 2/18/2025 1205  Last data filed at 2/18/2025 0811  Gross per 24 hour   Intake 240 ml   Output 1875 ml   Net -1635 ml         Physical Exam  Vitals reviewed.   Constitutional:       General: She is not in acute distress.     Appearance: Normal appearance. She is not toxic-appearing.   HENT:      Head: Normocephalic and atraumatic.      Nose: Nose normal.      Mouth/Throat:      Mouth: Mucous membranes are moist.   Eyes:      General: No scleral icterus.     Extraocular Movements: Extraocular movements intact.   Cardiovascular:      Rate and Rhythm: Normal rate and regular rhythm.      Pulses: Normal pulses.   Pulmonary:      Effort: Pulmonary effort is normal. No respiratory distress.   Abdominal:      General: Abdomen is flat.      Palpations: Abdomen is soft.      Tenderness: There is no abdominal tenderness.      Comments: Soft, non distended. Mildly tender to deep palpation in the epigastrium. No rebound or guarding.   L PTC to gravity with some bilious leakage around the tube. R PTC capped   Musculoskeletal:      Cervical back: Normal range of motion and neck supple.      Right lower leg: No edema.      Left lower leg: No edema.   Skin:     General: Skin is warm and dry.   Neurological:      General: No focal deficit present.      Mental Status: She is alert and oriented to person, place, and time.   Psychiatric:         Mood and  "Affect: Mood normal.         Behavior: Behavior normal.             Significant Labs: All pertinent labs within the past 24 hours have been reviewed.  CBC: No results for input(s): "WBC", "HGB", "HCT", "PLT" in the last 48 hours.  CMP:   Recent Labs   Lab 02/17/25  0515 02/18/25  0543   * 137   K 4.2 3.8   * 109   CO2 13* 17*    127*   BUN 16 19   CREATININE 0.8 0.9   CALCIUM 9.9 9.6   PROT 7.3 7.3   ALBUMIN 2.1* 2.2*   BILITOT 2.6* 2.4*   ALKPHOS 387* 405*   AST 78* 89*   ALT 97* 106*   ANIONGAP 8 11       Significant Imaging: I have reviewed all pertinent imaging results/findings within the past 24 hours.  "

## 2025-02-18 NOTE — PROGRESS NOTES
"[facility]  Progress Note    Patient Name: Katey Cerrato  MRN: [id number]  Patient Class: IP- Inpatient   Admission Date: 2/3/2025  Length of Stay: 14 days  Attending Physician: Selina Winters*  Primary Care Provider: Shay aCstellano MD        Subjective     Principal Problem:Biliary obstruction        HPI:  Ms. Katey Cerrato is a 69 y.o. female, with PMH of HTN, HLD, DMII, Grave's disease, who presented to [facility] ED on 2/3/25 after labs following an outpatient appointment were abnormal. She states she recently was treated for shingles with Valtrex with rash improvement, but then her urine became discolored and that prompted her visit to her PCP.  At her PCP visit it was noticed that she had an orange coloration to the skin, and yellow color of the eyes.  After her appointment she had outpatient lab testing showing abnormal liver function, which is what prompted her PCP to send her to the ED. Her only new symptom is some low back pain. She states she had a cholecystectomy in the past, and had many gallstones removed at that time. She states her sister had retained gallstones that needed removal in the past. Denies any fevers, chills, trauma, alcohol consumption, Tylenol consumption.  [facility] ED labs showed tbili 7.4, alk phos 538,  and . Lipase normal. INR 1.2. U/A with few bacteria. A  CT showed significant intrahepatic biliary ductal dilatation, mild extrahepatic biliary ductal dilatation, a 9 mm fat containing lesion in the uncinate process of the pancreas.  She was treated with IV fluids, IV Rocephin, Toradol on admission.  MRCP showed intrahepatic bile duct dilation noted with an ill-defined liver mass.  Further workup with MRI abdomen with/without contrast showed "hypoattenuating lesion near the confluence of the right and left hepatic ducts causing severe upstream ductal dilatation concerning for cholangiocarcinoma. Given lesion and " "hyperbilirubinemia decision was made to transfer patient to [facility] for further workup and evaluation with advanced services, possible ERCP.    On arrival to [facility] VSS. Patient notes generalized pruritus, decreased appetite, nausea, lower back pain. Denies abdominal pain. Pending further workup and management     Overview/Hospital Course:  Patient presented to OSH with jaundice and scleral icterus, along with abnormal labs from clinic. Also noted nausea and back pain.  Noted history of cholecystectomy. Labs showed elevated bilirubin, and imaging with biliary ductal dilatation. Initial CT of the abdomen and pelvis with IV contrast showed significant intrahepatic biliary ductal dilatation and questionable 9 mm fat containing lesion in the uncinate process of the pancreas.  MRCP showed "severe intrahepatic bile duct dilatation, with abrupt narrowing at the confluence of the hepatic ducts where there is a suspected ill-defined liver mass. Repeat imaging with MRI abdomen with and without contrast showed "hypoattenuating lesion near the confluence of the right and left hepatic ducts causing severe upstream ductal dilatation concerning for cholangiocarcinoma.  Further evaluation with ERCP is recommended." Patient transferred to [facility] for further workup and evaluation. CEA elevated, CA 19-9 WNL. AES and Surgical oncology consulted, recommended PTC for further evaluation. Symptoms controlled with benadryl. PCT placed 2/8. Surg/ onc Recommend capping both sided PTC prior to discharge. Both PTC drains need to be flushed with saline twice daily. CT chest abdomen and pelvis w contrast obtained per surg onc reccs and patient will need a CT chest non contrast for stagin. Patient was unable to be discharged due to fever and elevated WBC on 2/13 and infectious workup was started. Infectious workup remains negative to date and patient received Zosyn and Vanc. De escalated to flagyl and cipro for intraabdominal infx. Stable for discharge, " pending surg reccs       Interval History: ELIZABETH, pending left drain cap by surg, tolerating PO intake       Review of Systems  Objective:     Vital Signs (Most Recent):  Temp: 98 °F (36.7 °C) (02/18/25 1146)  Pulse: 82 (02/18/25 1146)  Resp: 17 (02/18/25 0800)  BP: 97/66 (02/18/25 1146)  SpO2: 100 % (02/18/25 1146) Vital Signs (24h Range):  Temp:  [97.8 °F (36.6 °C)-98.3 °F (36.8 °C)] 98 °F (36.7 °C)  Pulse:  [67-82] 82  Resp:  [17-18] 17  SpO2:  [95 %-100 %] 100 %  BP: ()/(57-79) 97/66     Weight: 75.3 kg (165 lb 14.4 oz)  Body mass index is 33.51 kg/m².    Intake/Output Summary (Last 24 hours) at 2/18/2025 1205  Last data filed at 2/18/2025 0811  Gross per 24 hour   Intake 240 ml   Output 1875 ml   Net -1635 ml         Physical Exam  Vitals reviewed.   Constitutional:       General: She is not in acute distress.     Appearance: Normal appearance. She is not toxic-appearing.   HENT:      Head: Normocephalic and atraumatic.      Nose: Nose normal.      Mouth/Throat:      Mouth: Mucous membranes are moist.   Eyes:      General: No scleral icterus.     Extraocular Movements: Extraocular movements intact.   Cardiovascular:      Rate and Rhythm: Normal rate and regular rhythm.      Pulses: Normal pulses.   Pulmonary:      Effort: Pulmonary effort is normal. No respiratory distress.   Abdominal:      General: Abdomen is flat.      Palpations: Abdomen is soft.      Tenderness: There is no abdominal tenderness.      Comments: Soft, non distended. Mildly tender to deep palpation in the epigastrium. No rebound or guarding.   L PTC to gravity with some bilious leakage around the tube. R PTC capped   Musculoskeletal:      Cervical back: Normal range of motion and neck supple.      Right lower leg: No edema.      Left lower leg: No edema.   Skin:     General: Skin is warm and dry.   Neurological:      General: No focal deficit present.      Mental Status: She is alert and oriented to person, place, and time.  "  Psychiatric:         Mood and Affect: Mood normal.         Behavior: Behavior normal.             Significant Labs: All pertinent labs within the past 24 hours have been reviewed.  CBC: No results for input(s): "WBC", "HGB", "HCT", "PLT" in the last 48 hours.  CMP:   Recent Labs   Lab 02/17/25  0515 02/18/25  0543   * 137   K 4.2 3.8   * 109   CO2 13* 17*    127*   BUN 16 19   CREATININE 0.8 0.9   CALCIUM 9.9 9.6   PROT 7.3 7.3   ALBUMIN 2.1* 2.2*   BILITOT 2.6* 2.4*   ALKPHOS 387* 405*   AST 78* 89*   ALT 97* 106*   ANIONGAP 8 11       Significant Imaging: I have reviewed all pertinent imaging results/findings within the past 24 hours.    Assessment and Plan     * Biliary obstruction  Liver mass, possible cholangiocarcinoma  Patient presented to OSH with jaundice and scleral icterus, along with abnormal labs from clinic. Also noted nausea and back pain.  Noted history of cholecystectomy. Labs showed elevated bilirubin, and imaging with biliary ductal dilatation. Initial CT of the abdomen and pelvis with IV contrast showed significant intrahepatic biliary ductal dilatation and questionable 9 mm fat containing lesion in the uncinate process of the pancreas.  Abdominal ultrasound showed hepatic steatosis and intra hepatic biliary ductal dilatation and status post cholecystectomy. MRCP showed "severe intrahepatic bile duct dilatation, with abrupt narrowing at the confluence of the hepatic ducts where there is a suspected ill-defined liver mass.  This is poorly evaluated without contrast but concerning for neoplasm such as cholangiocarcinoma." Repeat imaging with MRI abdomen with and without contrast showed "hypoattenuating lesion near the confluence of the right and left hepatic ducts causing severe upstream ductal dilatation concerning for cholangiocarcinoma.  Further evaluation with ERCP is recommended." Patient transferred to [facility] for further workup and evaluation. CEA elevated, CA 19-9 WNL. " Surgical oncology, AES, and IR consulted to coordinate plan for further management. Underwent PTC with IR on 2/8 without complication. Plan to allow decompression of the biliary system and repeat imaging to reassess obstruction    - Full liquid diet tolerated well for nausea and vomiting, will advance diet to solids and continue to monitor and adjust as needed   -AES Consulted  -Surgical Oncology consulted, appreciate recs     -S/p PTC w/ IR     - Plan for repeat CT A/P when biliary system decompressed to be done outpatient      -CT chest, abdomen, and pelvis  for further staging ordered   - Drains can be flushed once daily with normal saline. She will need routine exchange in 8-12 weeks with IR. Surg Onc is following and would like to the drains capped prior to discharge.  Left side is capped, Right side capped again on 2/17, Left open to drain to gravity.   - Continue p.r.n. medication for symptomatic relief  - Trend with labs and correct electrolyte abnormalities as needed  - Replace with LR for volume losses with PCT drains as needed, and patient was encouraged to increase PO fluids intake   - Pending L drain cap by surg and final reccs from surg onc, stable to  discharge from medicine perspective       Fever  Patient had a fever of 102 on 2/13 with elevated wbc to 18, was not able to get discharged,  started on IV zosyn and vanc with infectious workup obtained.     - Stopped IV vanc and Zosyn on 2/16, continue Cipr and Metro for intraabdominal infection for  total of 7 days   - F/u bcx NGTD   - CXR unremarkable  - US abdomen w Moderate intrahepatic ductal dilatation, with partially imaged biliary drains. No evidence of abscess    - F/u Flu, covid   - UA is unremarkable   - LA unremarkable   - PTC drain sites are clean and no signs of infection       Nausea and vomiting  Patient with worsening nausea, vomiting s/p PTC on 2/9. Will plan aggressive antiemetic regimen and adjust diet as needed    -Aggressive  antiemetic regimen  -Tolerating PO and regular diet         Type 2 diabetes mellitus without complication, without long-term current use of insulin  - Last hemoglobin A1c 5.9, well controlled  - D/C home regimen of Trulicity on discharge due to nausea and vomiting which can worsen or exacerbate the symptoms   - Glucose level reviewed, currently in normal range  - Continue Accu-Cheks q.a.c. and q.h.s. and give low-dose sliding scale insulin as necessary    Mixed hyperlipidemia  Patient with hx HLD presenting with abnormal liver function tests. HLD well controlled on home statin    - Holding statin due to abnormal liver function tests    Graves' disease with exophthalmos  Pt with hx graves disease last TSH wnl in 2023. Currently without symptoms.    -Continue to monitor         Hypertension associated with diabetes  - Well controlled, continue metoprolol 50 mg p.o. q.h.s., amlodipine 10 mg p.o. daily and valsartan 320 mg p.o. daily when medically appropriate       Generalized convulsive epilepsy with intractable epilepsy  Patient with history of epilepsy in the past on levitiracetam 1500 mg qhs. Stable, no reported seizure activity    - Continue home levetiracetam 1500 mg p.o. q.h.s.      VTE Risk Mitigation (From admission, onward)           Ordered     enoxaparin injection 40 mg  Every 24 hours         02/10/25 1040     IP VTE HIGH RISK PATIENT  Once         02/04/25 0219     Place sequential compression device  Until discontinued         02/04/25 0219                    Discharge Planning   ISHMAEL: 2/19/2025     Code Status: Full Code   Medical Readiness for Discharge Date:   Discharge Plan A: Home Health   Discharge Delays: None known at this time                    Martina Gaspar MD  Department of Hospital Medicine   [facility] - Internal Medicine Telemetry

## 2025-02-19 VITALS
HEART RATE: 63 BPM | RESPIRATION RATE: 17 BRPM | OXYGEN SATURATION: 97 % | DIASTOLIC BLOOD PRESSURE: 90 MMHG | WEIGHT: 165.88 LBS | HEIGHT: 59 IN | TEMPERATURE: 98 F | BODY MASS INDEX: 33.44 KG/M2 | SYSTOLIC BLOOD PRESSURE: 152 MMHG

## 2025-02-19 PROBLEM — D64.9 ANEMIA: Status: ACTIVE | Noted: 2025-02-19

## 2025-02-19 LAB
ALBUMIN SERPL BCP-MCNC: 2.3 G/DL (ref 3.5–5.2)
ALP SERPL-CCNC: 431 U/L (ref 40–150)
ALT SERPL W/O P-5'-P-CCNC: 121 U/L (ref 10–44)
ANION GAP SERPL CALC-SCNC: 10 MMOL/L (ref 8–16)
AST SERPL-CCNC: 104 U/L (ref 10–40)
BASOPHILS # BLD AUTO: 0.06 K/UL (ref 0–0.2)
BASOPHILS NFR BLD: 0.6 % (ref 0–1.9)
BILIRUB SERPL-MCNC: 2.3 MG/DL (ref 0.1–1)
BUN SERPL-MCNC: 16 MG/DL (ref 8–23)
CALCIUM SERPL-MCNC: 9.7 MG/DL (ref 8.7–10.5)
CHLORIDE SERPL-SCNC: 110 MMOL/L (ref 95–110)
CO2 SERPL-SCNC: 18 MMOL/L (ref 23–29)
CREAT SERPL-MCNC: 1 MG/DL (ref 0.5–1.4)
DIFFERENTIAL METHOD BLD: ABNORMAL
EOSINOPHIL # BLD AUTO: 0.2 K/UL (ref 0–0.5)
EOSINOPHIL NFR BLD: 1.5 % (ref 0–8)
ERYTHROCYTE [DISTWIDTH] IN BLOOD BY AUTOMATED COUNT: 17 % (ref 11.5–14.5)
EST. GFR  (NO RACE VARIABLE): >60 ML/MIN/1.73 M^2
GLUCOSE SERPL-MCNC: 114 MG/DL (ref 70–110)
HCT VFR BLD AUTO: 33.8 % (ref 37–48.5)
HGB BLD-MCNC: 10.8 G/DL (ref 12–16)
IMM GRANULOCYTES # BLD AUTO: 0.19 K/UL (ref 0–0.04)
IMM GRANULOCYTES NFR BLD AUTO: 1.9 % (ref 0–0.5)
LYMPHOCYTES # BLD AUTO: 2 K/UL (ref 1–4.8)
LYMPHOCYTES NFR BLD: 19.7 % (ref 18–48)
MAGNESIUM SERPL-MCNC: 1.8 MG/DL (ref 1.6–2.6)
MCH RBC QN AUTO: 30.2 PG (ref 27–31)
MCHC RBC AUTO-ENTMCNC: 32 G/DL (ref 32–36)
MCV RBC AUTO: 94 FL (ref 82–98)
MONOCYTES # BLD AUTO: 1 K/UL (ref 0.3–1)
MONOCYTES NFR BLD: 9.7 % (ref 4–15)
NEUTROPHILS # BLD AUTO: 6.6 K/UL (ref 1.8–7.7)
NEUTROPHILS NFR BLD: 66.6 % (ref 38–73)
NRBC BLD-RTO: 0 /100 WBC
PLATELET # BLD AUTO: 404 K/UL (ref 150–450)
PMV BLD AUTO: 10.5 FL (ref 9.2–12.9)
POCT GLUCOSE: 123 MG/DL (ref 70–110)
POCT GLUCOSE: 149 MG/DL (ref 70–110)
POCT GLUCOSE: 160 MG/DL (ref 70–110)
POTASSIUM SERPL-SCNC: 3.8 MMOL/L (ref 3.5–5.1)
PROT SERPL-MCNC: 7.2 G/DL (ref 6–8.4)
RBC # BLD AUTO: 3.58 M/UL (ref 4–5.4)
SODIUM SERPL-SCNC: 138 MMOL/L (ref 136–145)
WBC # BLD AUTO: 9.88 K/UL (ref 3.9–12.7)

## 2025-02-19 PROCEDURE — 80053 COMPREHEN METABOLIC PANEL: CPT | Performed by: HOSPITALIST

## 2025-02-19 PROCEDURE — 63600175 PHARM REV CODE 636 W HCPCS

## 2025-02-19 PROCEDURE — A4216 STERILE WATER/SALINE, 10 ML: HCPCS | Performed by: PHYSICIAN ASSISTANT

## 2025-02-19 PROCEDURE — 25000003 PHARM REV CODE 250: Performed by: PHYSICIAN ASSISTANT

## 2025-02-19 PROCEDURE — 63600175 PHARM REV CODE 636 W HCPCS: Performed by: HOSPITALIST

## 2025-02-19 PROCEDURE — 97116 GAIT TRAINING THERAPY: CPT | Mod: CQ

## 2025-02-19 PROCEDURE — 25000003 PHARM REV CODE 250

## 2025-02-19 PROCEDURE — 36415 COLL VENOUS BLD VENIPUNCTURE: CPT | Performed by: HOSPITALIST

## 2025-02-19 PROCEDURE — 25000003 PHARM REV CODE 250: Performed by: HOSPITALIST

## 2025-02-19 PROCEDURE — 85025 COMPLETE CBC W/AUTO DIFF WBC: CPT | Performed by: HOSPITALIST

## 2025-02-19 PROCEDURE — 83735 ASSAY OF MAGNESIUM: CPT | Performed by: HOSPITALIST

## 2025-02-19 RX ORDER — ONDANSETRON HYDROCHLORIDE 8 MG/1
8 TABLET, FILM COATED ORAL EVERY 8 HOURS PRN
Qty: 42 TABLET | Refills: 0 | Status: CANCELLED | OUTPATIENT
Start: 2025-02-19

## 2025-02-19 RX ORDER — SODIUM BICARBONATE 650 MG/1
650 TABLET ORAL 2 TIMES DAILY
Qty: 60 TABLET | Refills: 0 | Status: CANCELLED | OUTPATIENT
Start: 2025-02-19 | End: 2025-03-21

## 2025-02-19 RX ORDER — SODIUM BICARBONATE 650 MG/1
650 TABLET ORAL 2 TIMES DAILY
Qty: 30 TABLET | Refills: 0 | Status: SHIPPED | OUTPATIENT
Start: 2025-02-19 | End: 2025-03-08

## 2025-02-19 RX ORDER — ONDANSETRON 4 MG/1
8 TABLET, FILM COATED ORAL EVERY 6 HOURS PRN
Qty: 30 TABLET | Refills: 0 | Status: SHIPPED | OUTPATIENT
Start: 2025-02-19

## 2025-02-19 RX ADMIN — SODIUM CHLORIDE, PRESERVATIVE FREE 10 ML: 5 INJECTION INTRAVENOUS at 03:02

## 2025-02-19 RX ADMIN — LIDOCAINE 1 PATCH: 50 PATCH CUTANEOUS at 08:02

## 2025-02-19 RX ADMIN — SODIUM BICARBONATE 650 MG TABLET 650 MG: at 08:02

## 2025-02-19 RX ADMIN — ENOXAPARIN SODIUM 40 MG: 40 INJECTION SUBCUTANEOUS at 04:02

## 2025-02-19 RX ADMIN — POTASSIUM BICARBONATE 25 MEQ: 978 TABLET, EFFERVESCENT ORAL at 08:02

## 2025-02-19 RX ADMIN — SODIUM CHLORIDE, PRESERVATIVE FREE 10 ML: 5 INJECTION INTRAVENOUS at 05:02

## 2025-02-19 RX ADMIN — SODIUM CHLORIDE, POTASSIUM CHLORIDE, SODIUM LACTATE AND CALCIUM CHLORIDE 1000 ML: 600; 310; 30; 20 INJECTION, SOLUTION INTRAVENOUS at 08:02

## 2025-02-19 NOTE — PROGRESS NOTES
[facility] Internal Medicine Telemetry  General Surgery  Progress Note    Subjective:     History of Present Illness:  Katey Cerrato is a 69 y.o. female with a history of empty sella syndrome, GHD, glaucoma, Grave's w/ ecophthalmos, HLD, HTN, seizures, and DM2 who presents as a transfer for workup of her biliary obstruction. The patient states that she recently saw her PCP after she noticed urine discoloration. She was sent for outpatient lab work, which was suggestive of biliary obstruction so she was sent to the ED. In the ED, her lab work was remarkable for t bili of 8.3, elevated LFTs (280/264), and elevated ALP (507). RUQ and CT A/P w/ significant intrahepatic biliary ductal dilation and mild extra hepatic dilation. MRCP w/ hypoattenuating lesion near the confluence of the right and left hepatic ducts causing severe upstream ductal dilatation concerning for cholangiocarcinoma. Surgical oncology consulted for evaluation.       Post-Op Info:  * No surgery found *         Interval History:   NAEO  Both drains remained capped overnight  Tbili 2.3 from 2.4    Medications:  Continuous Infusions:  Scheduled Meds:   enoxparin  40 mg Subcutaneous Q24H (prophylaxis, 1700)    latanoprost  1 drop Both Eyes QHS    levETIRAcetam  1,500 mg Oral Nightly    LIDOcaine  1 patch Transdermal Daily    scopolamine  1 patch Transdermal Q3 Days    sodium bicarbonate  650 mg Oral BID    sodium chloride 0.9%  10 mL Intravenous Q8H     PRN Meds:  Current Facility-Administered Medications:     dextrose 50%, 12.5 g, Intravenous, PRN    dextrose 50%, 25 g, Intravenous, PRN    diphenhydrAMINE, 25 mg, Oral, Q6H PRN    diphenhydrAMINE-zinc acetate 2-0.1%, , Topical (Top), TID PRN    glucagon (human recombinant), 1 mg, Intramuscular, PRN    glucose, 16 g, Oral, PRN    glucose, 24 g, Oral, PRN    HYDROmorphone, 2 mg, Oral, Q6H PRN    insulin aspart U-100, 0-5 Units, Subcutaneous, QID (AC + HS) PRN    melatonin, 6 mg, Oral, Nightly PRN     naloxone, 0.02 mg, Intravenous, PRN    ondansetron, 4 mg, Intravenous, Q8H PRN    polyethylene glycol, 17 g, Oral, Daily PRN    senna-docusate 8.6-50 mg, 1 tablet, Oral, Daily PRN     Review of patient's allergies indicates:   Allergen Reactions    Codeine Nausea Only     Objective:     Vital Signs (Most Recent):  Temp: 98 °F (36.7 °C) (02/19/25 0731)  Pulse: 64 (02/19/25 0731)  Resp: 18 (02/19/25 0549)  BP: 123/79 (02/19/25 0731)  SpO2: 100 % (02/19/25 0731) Vital Signs (24h Range):  Temp:  [97.6 °F (36.4 °C)-98.5 °F (36.9 °C)] 98 °F (36.7 °C)  Pulse:  [64-82] 64  Resp:  [17-18] 18  SpO2:  [94 %-100 %] 100 %  BP: ()/(66-81) 123/79     Weight: 75.3 kg (165 lb 14.4 oz)  Body mass index is 33.51 kg/m².    Intake/Output - Last 3 Shifts         02/17 0700  02/18 0659 02/18 0700  02/19 0659 02/19 0700  02/20 0659    P.O. 720 240     Total Intake(mL/kg) 720 (9.6) 240 (3.2)     Urine (mL/kg/hr) 0 (0) 0 (0)     Emesis/NG output 300      Drains 1775 400     Stool  0     Total Output 2075 400     Net -1355 -160            Urine Occurrence 2 x 2 x     Stool Occurrence  2 x              Physical Exam  Vitals reviewed.   Constitutional:       General: She is not in acute distress.     Appearance: Normal appearance. She is not toxic-appearing.   HENT:      Head: Normocephalic and atraumatic.      Nose: Nose normal.      Mouth/Throat:      Mouth: Mucous membranes are moist.   Eyes:      General: No scleral icterus.     Extraocular Movements: Extraocular movements intact.   Cardiovascular:      Rate and Rhythm: Normal rate and regular rhythm.      Pulses: Normal pulses.   Pulmonary:      Effort: Pulmonary effort is normal. No respiratory distress.   Abdominal:      General: Abdomen is flat.      Palpations: Abdomen is soft.      Tenderness: There is no abdominal tenderness.      Comments: Soft, non distended. Mildly tender to deep palpation in the epigastrium. No rebound or guarding.   Bilateral PTCs are capped    Musculoskeletal:      Cervical back: Normal range of motion and neck supple.      Right lower leg: No edema.      Left lower leg: No edema.   Skin:     General: Skin is warm and dry.   Neurological:      General: No focal deficit present.      Mental Status: She is alert and oriented to person, place, and time.   Psychiatric:         Mood and Affect: Mood normal.         Behavior: Behavior normal.          Significant Labs:  I have reviewed all pertinent lab results within the past 24 hours.  CBC:   Recent Labs   Lab 02/19/25  0554   WBC 9.88   RBC 3.58*   HGB 10.8*   HCT 33.8*      MCV 94   MCH 30.2   MCHC 32.0     CMP:   Recent Labs   Lab 02/19/25  0554   *   CALCIUM 9.7   ALBUMIN 2.3*   PROT 7.2      K 3.8   CO2 18*      BUN 16   CREATININE 1.0   ALKPHOS 431*   *   *   BILITOT 2.3*       Significant Diagnostics:  I have reviewed all pertinent imaging results/findings within the past 24 hours.  Assessment/Plan:     * Biliary obstruction  69F presenting with painless jaundice after being found to have biliary obstruction on recent outpatient lab work. Imaging studies w/ a hypoattenuating mass near the confluence of the right and left hepatic ducts, which is concerning for cholangiocarcinoma. CEA elevated. S/p Bilateral PTC placement 2/8/25. Tbili still downtrending. L PTC capped on 2/11. R PTC capped 2/12. Fevered 2/13 overnight so both drains replaced to gravity. Now progressing towards attempts at capping drains again.      -- R drain capped on 2/17. L Drain capped in the PM of 2/18  -- Flush b/l drains TID  -- OK to discharge home from our perspective. She will need to flush her drains at home TID, and she feels comfortable with this.  -- If she fevers at home, she should place both PTCs to gravity and call our clinic  -- I will reach out to our clinic to arrange follow up next week        Kenroy Stafford MD  General Surgery  [facility] Telemetry

## 2025-02-19 NOTE — DISCHARGE SUMMARY
[facility] Bradshaw - Internal Medicine [facility] Medicine  Discharge Summary      Patient Name: Katey Cerrato  MRN: [id number]  SHAW: [id number]  Patient Class: IP- Inpatient  Admission Date: 2/3/2025  Hospital Length of Stay: 15 days  Discharge Date and Time:  02/19/2025 12:23 PM  Attending Physician: Selian Winters*   Discharging Provider: Martina Gaspar MD  Primary Care Provider: Shay Castellano MD  Hospital Medicine Team: [facility] 3 Martina Gaspar MD  Primary Care Team: [facility] 3    HPI:   Ms. Katey Cerrato is a 69 y.o. female, with PMH of HTN, HLD, DMII, Grave's disease, who presented to [facility] ED on 2/3/25 after labs following an outpatient appointment were abnormal. She states she recently was treated for shingles with Valtrex with rash improvement, but then her urine became discolored and that prompted her visit to her PCP.  At her PCP visit it was noticed that she had an orange coloration to the skin, and yellow color of the eyes.  After her appointment she had outpatient lab testing showing abnormal liver function, which is what prompted her PCP to send her to the ED. Her only new symptom is some low back pain. She states she had a cholecystectomy in the past, and had many gallstones removed at that time. She states her sister had retained gallstones that needed removal in the past. Denies any fevers, chills, trauma, alcohol consumption, Tylenol consumption.  [facility] labs showed tbili 7.4, alk phos 538,  and . Lipase normal. INR 1.2. U/A with few bacteria. A  CT showed significant intrahepatic biliary ductal dilatation, mild extrahepatic biliary ductal dilatation, a 9 mm fat containing lesion in the uncinate process of the pancreas.  She was treated with IV fluids, IV Rocephin, Toradol on admission.  MRCP showed intrahepatic bile duct dilation noted with an ill-defined liver mass.  Further workup with MRI abdomen with/without contrast showed  ""hypoattenuating lesion near the confluence of the right and left hepatic ducts causing severe upstream ductal dilatation concerning for cholangiocarcinoma. Given lesion and hyperbilirubinemia decision was made to transfer patient to [facility] for further workup and evaluation with advanced services, possible ERCP.    On arrival to [facility] VSS. Patient notes generalized pruritus, decreased appetite, nausea, lower back pain. Denies abdominal pain. Pending further workup and management     * No surgery found *      Hospital Course:   Patient presented to OSH with jaundice and scleral icterus, along with abnormal labs from clinic. Also noted nausea and back pain.  Noted history of cholecystectomy. Labs showed elevated bilirubin, and imaging with biliary ductal dilatation. Initial CT of the abdomen and pelvis with IV contrast showed significant intrahepatic biliary ductal dilatation and questionable 9 mm fat containing lesion in the uncinate process of the pancreas.  MRCP showed "severe intrahepatic bile duct dilatation, with abrupt narrowing at the confluence of the hepatic ducts where there is a suspected ill-defined liver mass. Repeat imaging with MRI abdomen with and without contrast showed "hypoattenuating lesion near the confluence of the right and left hepatic ducts causing severe upstream ductal dilatation concerning for cholangiocarcinoma.  Further evaluation with ERCP is recommended." Patient transferred to [facility] for further workup and evaluation. CEA elevated, CA 19-9 WNL. AES and Surgical oncology consulted, recommended PTC for further evaluation. Symptoms controlled with benadryl. PCT placed 2/8. Surg/ onc Recommend capping both sided PTC prior to discharge. Both PTC drains need to be flushed with saline twice daily. CT chest abdomen and pelvis w contrast obtained per surg onc reccs and patient will need a CT chest non contrast for stagin. Patient was unable to be discharged due to fever and elevated WBC on 2/13 and " infectious workup was started. Infectious workup remains negative to date and patient received Zosyn and Vanc. Finished a course of  flagyl and cipro for intraabdominal infx. Stable for discharge home with HH orders, and flush her drains at home TID, with surg onc clinic follow up in a week. If she fevers at home, needs to open the drains to gravity and call surg onc clinic.      Physical Exam  Vitals reviewed.   Constitutional:       General: She is not in acute distress.     Appearance: Normal appearance. She is not toxic-appearing.   HENT:      Head: Normocephalic and atraumatic.      Nose: Nose normal.      Mouth/Throat:      Mouth: Mucous membranes are moist.   Eyes:      General: No scleral icterus.     Extraocular Movements: Extraocular movements intact.   Cardiovascular:      Rate and Rhythm: Normal rate and regular rhythm.      Pulses: Normal pulses.   Pulmonary:      Effort: Pulmonary effort is normal. No respiratory distress.   Abdominal:      General: Abdomen is flat.      Palpations: Abdomen is soft.      Tenderness: There is no abdominal tenderness.      Comments: Soft, non distended. Mildly tender to deep palpation in the epigastrium. No rebound or guarding.   L PTC and R PTC capped   Musculoskeletal:      Cervical back: Normal range of motion and neck supple.      Right lower leg: No edema.      Left lower leg: No edema.   Skin:     General: Skin is warm and dry.   Neurological:      General: No focal deficit present.      Mental Status: She is alert and oriented to person, place, and time.   Psychiatric:         Mood and Affect: Mood normal.         Behavior: Behavior normal.        Goals of Care Treatment Preferences:  Code Status: Full Code      SDOH Screening:  The patient was screened for utility difficulties, food insecurity, transport difficulties, housing insecurity, and interpersonal safety and there were no concerns identified this admission.     Consults:   Consults (From admission,  "onward)          Status Ordering Provider     Inpatient consult to Social Work  Once        Provider:  (Not yet assigned)    Acknowledged ROMA ISAAC     Inpatient consult to Interventional Radiology  Once        Provider:  (Not yet assigned)    Completed TERI SILVA     Inpatient consult to Surgical Oncology  Once        Provider:  (Not yet assigned)    Completed MAKENZIE LINDO     Inpatient consult to Advanced Endoscopy Service (AES)  Once        Provider:  (Not yet assigned)    Completed MAKENZIE LINDO            * Biliary obstruction  Liver mass, possible cholangiocarcinoma  Patient presented to OSH with jaundice and scleral icterus, along with abnormal labs from clinic. Also noted nausea and back pain.  Noted history of cholecystectomy. Labs showed elevated bilirubin, and imaging with biliary ductal dilatation. Initial CT of the abdomen and pelvis with IV contrast showed significant intrahepatic biliary ductal dilatation and questionable 9 mm fat containing lesion in the uncinate process of the pancreas.  Abdominal ultrasound showed hepatic steatosis and intra hepatic biliary ductal dilatation and status post cholecystectomy. MRCP showed "severe intrahepatic bile duct dilatation, with abrupt narrowing at the confluence of the hepatic ducts where there is a suspected ill-defined liver mass.  This is poorly evaluated without contrast but concerning for neoplasm such as cholangiocarcinoma." Repeat imaging with MRI abdomen with and without contrast showed "hypoattenuating lesion near the confluence of the right and left hepatic ducts causing severe upstream ductal dilatation concerning for cholangiocarcinoma.  Further evaluation with ERCP is recommended." Patient transferred to [facility] for further workup and evaluation. CEA elevated, CA 19-9 WNL. Surgical oncology, AES, and IR consulted to coordinate plan for further management. Underwent PTC with IR on 2/8 without complication. Plan to allow decompression " of the biliary system and repeat imaging to reassess obstruction    - Full liquid diet tolerated well for nausea and vomiting, will advance diet to solids and continue to monitor and adjust as needed   -AES Consulted  -Surgical Oncology consulted, appreciate recs     -S/p PTC w/ IR     - Plan for repeat CT A/P when biliary system decompressed to be done outpatient      -CT chest, abdomen, and pelvis  for further staging ordered   - Drains can be flushed once daily with normal saline. She will need routine exchange in 8-12 weeks with IR. Surg Onc is following and would like to the drains capped prior to discharge.  Left side is capped, Right side capped again on 2/17, Left open to drain to gravity.   - Continue p.r.n. medication for symptomatic relief  - Trend with labs and correct electrolyte abnormalities as needed  - Replace with LR for volume losses with PCT drains as needed, and patient was encouraged to increase PO fluids intake   - Pending L drain cap by surg and final reccs from surg onc, stable to  discharge from medicine perspective       Fever  Patient had a fever of 102 on 2/13 with elevated wbc to 18, was not able to get discharged,  started on IV zosyn and vanc with infectious workup obtained.     - Stopped IV vanc and Zosyn on 2/16, continue Cipr and Metro for intraabdominal infection for  total of 7 days   - F/u bcx NGTD   - CXR unremarkable  - US abdomen w Moderate intrahepatic ductal dilatation, with partially imaged biliary drains. No evidence of abscess    - F/u Flu, covid   - UA is unremarkable   - LA unremarkable   - PTC drain sites are clean and no signs of infection       Nausea and vomiting  Patient with worsening nausea, vomiting s/p PTC on 2/9. Will plan aggressive antiemetic regimen and adjust diet as needed    -Aggressive antiemetic regimen  -Tolerating PO and regular diet         Type 2 diabetes mellitus without complication, without long-term current use of insulin  - Last hemoglobin  A1c 5.9, well controlled  - D/C home regimen of Trulicity on discharge due to nausea and vomiting which can worsen or exacerbate the symptoms   - Glucose level reviewed, currently in normal range  - Continue Accu-Cheks q.a.c. and q.h.s. and give low-dose sliding scale insulin as necessary    Mixed hyperlipidemia  Patient with hx HLD presenting with abnormal liver function tests. HLD well controlled on home statin    - Holding statin due to abnormal liver function tests    Graves' disease with exophthalmos  Pt with hx graves disease last TSH wnl in 2023. Currently without symptoms.    -Continue to monitor         Hypertension associated with diabetes  - Well controlled, continue metoprolol 50 mg p.o. q.h.s., amlodipine 10 mg p.o. daily and valsartan 320 mg p.o. daily when medically appropriate       Generalized convulsive epilepsy with intractable epilepsy  Patient with history of epilepsy in the past on levitiracetam 1500 mg qhs. Stable, no reported seizure activity    - Continue home levetiracetam 1500 mg p.o. q.h.s.      Final Active Diagnoses:    Diagnosis Date Noted POA    PRINCIPAL PROBLEM:  Biliary obstruction [K83.1] 02/04/2025 Yes    Fever [R50.9] 02/14/2025 Unknown    Nausea and vomiting [R11.2] 02/09/2025 Yes    Type 2 diabetes mellitus without complication, without long-term current use of insulin [E11.9] 03/18/2014 Yes    Mixed hyperlipidemia [E78.2]  Yes     Chronic    Graves' disease with exophthalmos [E05.00]  Yes    Hypertension associated with diabetes [E11.59, I15.2] 09/07/2012 Yes    Generalized convulsive epilepsy with intractable epilepsy [G40.319] 09/07/2012 Yes      Problems Resolved During this Admission:       Discharged Condition: stable    Disposition: Home or Self Care    Follow Up:   Contact information for follow-up providers       Mary Almaraz NP Follow up.    Specialty: Surgical Oncology  Why: 2/20/2025 @ 8:10am for labs in [facility] 3rd floor.    2/20/2025 @ 9:00am with  Mary Almaraz NP/Dr. Glynn. [facility] 2nd floor.  Contact information:  [address] [facility] [address]  [phone]               Shay Castellano MD Follow up.    Specialty: Internal Medicine  Why: hospital follow up  Contact information:  [address]  [facility] [address]  [phone]                       Contact information for after-discharge care       Home Medical Care       Critical access hospital .    Service: Home Nursing  Contact information:  [address] [place] [address]  [phone]                                 Patient Instructions:      CT Chest Without Contrast   Standing Status: Future Standing Exp. Date: 02/13/26     Order Specific Question Answer Comments   May the Radiologist modify the order per protocol to meet the clinical needs of the patient? Yes        Significant Diagnostic Studies: Labs: CMP   Recent Labs   Lab 02/18/25  0543 02/19/25  0554    138   K 3.8 3.8    110   CO2 17* 18*   * 114*   BUN 19 16   CREATININE 0.9 1.0   CALCIUM 9.6 9.7   PROT 7.3 7.2   ALBUMIN 2.2* 2.3*   BILITOT 2.4* 2.3*   ALKPHOS 405* 431*   AST 89* 104*   * 121*   ANIONGAP 11 10    and CBC   Recent Labs   Lab 02/19/25  0554   WBC 9.88   HGB 10.8*   HCT 33.8*          Pending Diagnostic Studies:       None           Medications:  Reconciled Home Medications:      Medication List        PAUSE taking these medications      amLODIPine 10 MG tablet  Wait to take this until your doctor or other care provider tells you to start again.  Commonly known as: NORVASC  TAKE 1 TABLET BY MOUTH EVERY DAY     metoprolol succinate 50 MG 24 hr tablet  Wait to take this until your doctor or other care provider tells you to start again.  Commonly known as: TOPROL-XL  TAKE 1 TABLET BY MOUTH EVERY DAY IN THE EVENING            START taking these medications      HYDROmorphone 2 MG tablet  Commonly known as: DILAUDID  Take 1 tablet (2  mg total) by mouth 2 (two) times daily as needed for Pain.     ondansetron 4 MG tablet  Commonly known as: ZOFRAN  Take 2 tablets (8 mg total) by mouth every 6 (six) hours as needed for Nausea.     scopolamine 1.3-1.5 mg (1 mg over 3 days)  Commonly known as: TRANSDERM-SCOP  Place 1 patch onto the skin Every 3 (three) days.            CHANGE how you take these medications      valsartan 320 MG tablet  Commonly known as: DIOVAN  Take 1 tablet (320 mg total) by mouth once daily.  Notes to patient: Hold until follow up with doctors            CONTINUE taking these medications      atorvastatin 80 MG tablet  Commonly known as: LIPITOR  Take 1 tablet (80 mg total) by mouth once daily.     blood sugar diagnostic Strp  To check BG 1 times daily, to use with insurance preferred meter     blood-glucose meter kit  To check BG 1 times daily, to use with insurance preferred meter     lancets Misc  To check BG 1 times daily, to use with insurance preferred meter     latanoprost 0.005 % ophthalmic solution  INSTILL 1 DROP INTO BOTH EYES EVERY EVENING     levETIRAcetam 750 MG Tab  Commonly known as: KEPPRA  TAKE 2 TABLETS BY MOUTH ONCE  DAILY     multivitamin-minerals-lutein Tab  Take 1 tablet by mouth once daily once daily.     VITAMIN D3 COMPLETE ORAL  Take 1 tablet by mouth once daily.            STOP taking these medications      FLUZONE HIGHDOSE QUAD 23-24  mcg/0.7 mL Syrg  Generic drug: flu vacc [id number](65yr up)-PF     meloxicam 15 MG tablet  Commonly known as: MOBIC     ondansetron 4 MG Tbdl  Commonly known as: ZOFRAN-ODT     TRULICITY 3 mg/0.5 mL pen injector  Generic drug: dulaglutide              Indwelling Lines/Drains at time of discharge:   Lines/Drains/Airways       Drain  Duration                  Biliary Tube 02/08/25 1340 10 Fr. LUQ 10 days         Biliary Tube 02/08/25 1341 10 Fr. RUQ 10 days                    Time spent on the discharge of patient: 35 minutes         Martina Gaspar MD  Department  of Hospital Medicine  [facility] - Internal Medicine Telemetry

## 2025-02-19 NOTE — PT/OT/SLP PROGRESS
"Physical Therapy Treatment    Patient Name:  Katey Cerrato   MRN:  [id number]    Recommendations:     Discharge Recommendations: Low Intensity Therapy  Discharge Equipment Recommendations: walker, rolling, bath bench  Barriers to discharge: None    Assessment:     Katey Cerrato is a 69 y.o. female admitted with a medical diagnosis of Biliary obstruction.  She presents with the following impairments/functional limitations: pain, weakness. Pt demonstrated improvement in all functional mobility.     Rehab Prognosis: Good; patient would benefit from acute skilled PT services to address these deficits and reach maximum level of function.    Recent Surgery: * No surgery found *      Plan:     During this hospitalization, patient to be seen 2 x/week to address the identified rehab impairments via gait training, therapeutic activities, therapeutic exercises, neuromuscular re-education and progress toward the following goals:    Plan of Care Expires:  03/14/25    Subjective     Chief Complaint: "I can't eat the food here"  Patient/Family Comments/goals: Return home  Pain/Comfort:  Pain Rating 1: 0/10      Objective:     Communicated with JAYLEEN Lane prior to session.  Patient found HOB elevated with  (Biliary drain) upon PT entry to room.     General Precautions: Standard, fall  Orthopedic Precautions: N/A  Braces: N/A  Respiratory Status: Room air     Functional Mobility:  Bed Mobility:     Rolling Left:  independence  Scooting: independence  Supine to Sit: modified independence  Transfers:     Sit to Stand:  modified independence with rolling walker  Gait: Gait:   Patient ambulated: 200'   Patient required: independence and supervision  Patient used:  rolling walker   Gait Pattern observed: reciprocal gait  Gait Deviation(s): decreased step length, wide base of support, decreased foot clearance, and decreased saniya  Impairments due to: decreased strength and decreased endurance    Balance: Sitting: Independent; Standing: " Supervision-Independent  Stairs:  Pt ascended/descended 9 stair(s) with No Assistive Device with left handrails with Stand-by Assistance.       AM-PAC 6 CLICK MOBILITY  Turning over in bed (including adjusting bedclothes, sheets and blankets)?: 4  Sitting down on and standing up from a chair with arms (e.g., wheelchair, bedside commode, etc.): 4  Moving from lying on back to sitting on the side of the bed?: 4  Moving to and from a bed to a chair (including a wheelchair)?: 4  Need to walk in hospital room?: 4  Climbing 3-5 steps with a railing?: 4  Basic Mobility Total Score: 24       Treatment & Education:  Pt educated on there purpose, goals and benefits of today's session. Pt informed on progress made and improved tolerance to activity today. Pt was given VC's throughout session for sequencing, hand placements and safety. Pt was encourage to follow commands to improve outcome of activities being performed.     Patient left up in chair with all lines intact and call button in reach..    GOALS:   Multidisciplinary Problems       Physical Therapy Goals          Problem: Physical Therapy    Goal Priority Disciplines Outcome Interventions   Physical Therapy Goal     PT, PT/OT Progressing    Description: Goals to be met by:      Patient will increase functional independence with mobility by performin. Sit to stand transfer with Stand-by Assistance and no AD MET   2. Gait  x 500 feet with Modified [place] using Rolling Walker.   3. Ascend/descend 5 stairs with left Handrails Stand-by Assistance using Rolling Walker. MET                        Time Tracking:     PT Received On: 25  PT Start Time: 1046     PT Stop Time: 1104  PT Total Time (min): 18 min     Billable Minutes: Gait Training 18    Treatment Type: Treatment  PT/PTA: PTA     Number of PTA visits since last PT visit: 2     2025

## 2025-02-19 NOTE — ASSESSMENT & PLAN NOTE
69F presenting with painless jaundice after being found to have biliary obstruction on recent outpatient lab work. Imaging studies w/ a hypoattenuating mass near the confluence of the right and left hepatic ducts, which is concerning for cholangiocarcinoma. CEA elevated. S/p Bilateral PTC placement 2/8/25. Tbili still downtrending. L PTC capped on 2/11. R PTC capped 2/12. Fevered 2/13 overnight so both drains replaced to gravity. Now progressing towards attempts at capping drains again.      -- R drain capped on 2/17. L Drain capped in the PM of 2/18  -- Flush b/l drains TID  -- OK to discharge home from our perspective. She will need to flush her drains at home TID, and she feels comfortable with this.  -- If she fevers at home, she should place both PTCs to gravity and call our clinic  -- I will reach out to our clinic to arrange follow up next week

## 2025-02-19 NOTE — PLAN OF CARE
Problem: Physical Therapy  Goal: Physical Therapy Goal  Description: Goals to be met by:      Patient will increase functional independence with mobility by performin. Sit to stand transfer with Stand-by Assistance and no AD MET   2. Gait  x 500 feet with Modified [place] using Rolling Walker.   3. Ascend/descend 5 stairs with left Handrails Stand-by Assistance using Rolling Walker. MET     Outcome: Progressing

## 2025-02-19 NOTE — SUBJECTIVE & OBJECTIVE
Interval History:   NAEO  Both drains remained capped overnight  Tbili 2.3 from 2.4    Medications:  Continuous Infusions:  Scheduled Meds:   enoxparin  40 mg Subcutaneous Q24H (prophylaxis, 1700)    latanoprost  1 drop Both Eyes QHS    levETIRAcetam  1,500 mg Oral Nightly    LIDOcaine  1 patch Transdermal Daily    scopolamine  1 patch Transdermal Q3 Days    sodium bicarbonate  650 mg Oral BID    sodium chloride 0.9%  10 mL Intravenous Q8H     PRN Meds:  Current Facility-Administered Medications:     dextrose 50%, 12.5 g, Intravenous, PRN    dextrose 50%, 25 g, Intravenous, PRN    diphenhydrAMINE, 25 mg, Oral, Q6H PRN    diphenhydrAMINE-zinc acetate 2-0.1%, , Topical (Top), TID PRN    glucagon (human recombinant), 1 mg, Intramuscular, PRN    glucose, 16 g, Oral, PRN    glucose, 24 g, Oral, PRN    HYDROmorphone, 2 mg, Oral, Q6H PRN    insulin aspart U-100, 0-5 Units, Subcutaneous, QID (AC + HS) PRN    melatonin, 6 mg, Oral, Nightly PRN    naloxone, 0.02 mg, Intravenous, PRN    ondansetron, 4 mg, Intravenous, Q8H PRN    polyethylene glycol, 17 g, Oral, Daily PRN    senna-docusate 8.6-50 mg, 1 tablet, Oral, Daily PRN     Review of patient's allergies indicates:   Allergen Reactions    Codeine Nausea Only     Objective:     Vital Signs (Most Recent):  Temp: 98 °F (36.7 °C) (02/19/25 0731)  Pulse: 64 (02/19/25 0731)  Resp: 18 (02/19/25 0549)  BP: 123/79 (02/19/25 0731)  SpO2: 100 % (02/19/25 0731) Vital Signs (24h Range):  Temp:  [97.6 °F (36.4 °C)-98.5 °F (36.9 °C)] 98 °F (36.7 °C)  Pulse:  [64-82] 64  Resp:  [17-18] 18  SpO2:  [94 %-100 %] 100 %  BP: ()/(66-81) 123/79     Weight: 75.3 kg (165 lb 14.4 oz)  Body mass index is 33.51 kg/m².    Intake/Output - Last 3 Shifts         02/17 0700 02/18 0659 02/18 0700 02/19 0659 02/19 0700 02/20 0659    P.O. 720 240     Total Intake(mL/kg) 720 (9.6) 240 (3.2)     Urine (mL/kg/hr) 0 (0) 0 (0)     Emesis/NG output 300      Drains 1775 400     Stool  0     Total Output  1032 400     Net -9033 -160            Urine Occurrence 2 x 2 x     Stool Occurrence  2 x              Physical Exam  Vitals reviewed.   Constitutional:       General: She is not in acute distress.     Appearance: Normal appearance. She is not toxic-appearing.   HENT:      Head: Normocephalic and atraumatic.      Nose: Nose normal.      Mouth/Throat:      Mouth: Mucous membranes are moist.   Eyes:      General: No scleral icterus.     Extraocular Movements: Extraocular movements intact.   Cardiovascular:      Rate and Rhythm: Normal rate and regular rhythm.      Pulses: Normal pulses.   Pulmonary:      Effort: Pulmonary effort is normal. No respiratory distress.   Abdominal:      General: Abdomen is flat.      Palpations: Abdomen is soft.      Tenderness: There is no abdominal tenderness.      Comments: Soft, non distended. Mildly tender to deep palpation in the epigastrium. No rebound or guarding.   Bilateral PTCs are capped   Musculoskeletal:      Cervical back: Normal range of motion and neck supple.      Right lower leg: No edema.      Left lower leg: No edema.   Skin:     General: Skin is warm and dry.   Neurological:      General: No focal deficit present.      Mental Status: She is alert and oriented to person, place, and time.   Psychiatric:         Mood and Affect: Mood normal.         Behavior: Behavior normal.          Significant Labs:  I have reviewed all pertinent lab results within the past 24 hours.  CBC:   Recent Labs   Lab 02/19/25  0554   WBC 9.88   RBC 3.58*   HGB 10.8*   HCT 33.8*      MCV 94   MCH 30.2   MCHC 32.0     CMP:   Recent Labs   Lab 02/19/25  0554   *   CALCIUM 9.7   ALBUMIN 2.3*   PROT 7.2      K 3.8   CO2 18*      BUN 16   CREATININE 1.0   ALKPHOS 431*   *   *   BILITOT 2.3*       Significant Diagnostics:  I have reviewed all pertinent imaging results/findings within the past 24 hours.

## 2025-02-20 ENCOUNTER — PATIENT OUTREACH (OUTPATIENT)
Dept: ADMINISTRATIVE | Facility: CLINIC | Age: 70
End: 2025-02-20
Payer: MEDICARE

## 2025-02-20 DIAGNOSIS — R26.9 GAIT DIFFICULTY: Primary | ICD-10-CM

## 2025-02-21 ENCOUNTER — TELEPHONE (OUTPATIENT)
Dept: SURGERY | Facility: CLINIC | Age: 70
End: 2025-02-21
Payer: MEDICARE

## 2025-02-21 ENCOUNTER — PATIENT MESSAGE (OUTPATIENT)
Dept: ORTHOPEDICS | Facility: CLINIC | Age: 70
End: 2025-02-21
Payer: MEDICARE

## 2025-02-21 NOTE — TELEPHONE ENCOUNTER
Spoke with patient and scheduled hospital follow- up below:     2/26/2025 11:00 AM (40 min)  [facility]   HOSPITAL FOLLOW UP   Authorization not required   [facility] ([facility])   Shay Castellano MD     Patient informed the following medications below paused:      amlodipine besylate 10 MG TAKE 1 TABLET BY MOUTH EVERY DAY  Paused since Wed 2/19/2025 until manually resumed      valsartan 320 mg Oral Daily  Paused since Wed 2/19/2025 until manually resumed    Patient also requested rollating walker with seat(pended)    Also. Patient requesting sodium chloride 0.9% (NORMAL SALINE FLUSH) injection to be sent to   [facility]

## 2025-02-21 NOTE — PROGRESS NOTES
C3 nurse spoke with Katey Cerrato  for a TCC post hospital discharge follow up call. The patient does not have a scheduled HOSFU appointment with Shay Castellano MD  within 5-7 days post hospital discharge date 2/19/25. C3 nurse was unable to schedule HOSFU appointment in [facility].    Message sent to PCP staff requesting they contact patient and schedule follow up appointment.

## 2025-02-21 NOTE — TELEPHONE ENCOUNTER
Pt was unable to get saline flushes through  and was denied by insurance. Spoke to pt on 2/20/25 and she agreed to  6 day supply of saline flushes from clinic on 2/21/25. Pt has not collected supply, reminder call placed and detailed msg left on pts vm with contact info.

## 2025-02-26 ENCOUNTER — LAB VISIT (OUTPATIENT)
Dept: LAB | Facility: OTHER | Age: 70
End: 2025-02-26
Attending: SURGERY
Payer: MEDICARE

## 2025-02-26 ENCOUNTER — OFFICE VISIT (OUTPATIENT)
Dept: INTERNAL MEDICINE | Facility: CLINIC | Age: 70
End: 2025-02-26
Payer: MEDICARE

## 2025-02-26 ENCOUNTER — TELEPHONE (OUTPATIENT)
Dept: INTERNAL MEDICINE | Facility: CLINIC | Age: 70
End: 2025-02-26

## 2025-02-26 VITALS
OXYGEN SATURATION: 100 % | WEIGHT: 164.69 LBS | DIASTOLIC BLOOD PRESSURE: 74 MMHG | SYSTOLIC BLOOD PRESSURE: 133 MMHG | HEART RATE: 83 BPM | HEIGHT: 59 IN | BODY MASS INDEX: 33.2 KG/M2

## 2025-02-26 DIAGNOSIS — Z09 HOSPITAL DISCHARGE FOLLOW-UP: Primary | ICD-10-CM

## 2025-02-26 DIAGNOSIS — K83.1 BILIARY OBSTRUCTION: ICD-10-CM

## 2025-02-26 DIAGNOSIS — I10 ESSENTIAL HYPERTENSION: Chronic | ICD-10-CM

## 2025-02-26 DIAGNOSIS — R53.81 PHYSICAL DECONDITIONING: ICD-10-CM

## 2025-02-26 LAB
ALBUMIN SERPL BCP-MCNC: 2.7 G/DL (ref 3.5–5.2)
ALP SERPL-CCNC: 433 U/L (ref 40–150)
ALT SERPL W/O P-5'-P-CCNC: 62 U/L (ref 10–44)
ANION GAP SERPL CALC-SCNC: 9 MMOL/L (ref 8–16)
AST SERPL-CCNC: 61 U/L (ref 10–40)
BASOPHILS # BLD AUTO: 0.05 K/UL (ref 0–0.2)
BASOPHILS NFR BLD: 0.7 % (ref 0–1.9)
BILIRUB SERPL-MCNC: 2 MG/DL (ref 0.1–1)
BUN SERPL-MCNC: 5 MG/DL (ref 8–23)
CALCIUM SERPL-MCNC: 9.7 MG/DL (ref 8.7–10.5)
CHLORIDE SERPL-SCNC: 105 MMOL/L (ref 95–110)
CO2 SERPL-SCNC: 25 MMOL/L (ref 23–29)
CREAT SERPL-MCNC: 0.8 MG/DL (ref 0.5–1.4)
DIFFERENTIAL METHOD BLD: ABNORMAL
EOSINOPHIL # BLD AUTO: 0 K/UL (ref 0–0.5)
EOSINOPHIL NFR BLD: 0.6 % (ref 0–8)
ERYTHROCYTE [DISTWIDTH] IN BLOOD BY AUTOMATED COUNT: 15.9 % (ref 11.5–14.5)
EST. GFR  (NO RACE VARIABLE): >60 ML/MIN/1.73 M^2
GLUCOSE SERPL-MCNC: 126 MG/DL (ref 70–110)
HCT VFR BLD AUTO: 31.9 % (ref 37–48.5)
HGB BLD-MCNC: 10.7 G/DL (ref 12–16)
IMM GRANULOCYTES # BLD AUTO: 0.03 K/UL (ref 0–0.04)
IMM GRANULOCYTES NFR BLD AUTO: 0.4 % (ref 0–0.5)
LYMPHOCYTES # BLD AUTO: 1.9 K/UL (ref 1–4.8)
LYMPHOCYTES NFR BLD: 27 % (ref 18–48)
MCH RBC QN AUTO: 30.2 PG (ref 27–31)
MCHC RBC AUTO-ENTMCNC: 33.5 G/DL (ref 32–36)
MCV RBC AUTO: 90 FL (ref 82–98)
MONOCYTES # BLD AUTO: 0.7 K/UL (ref 0.3–1)
MONOCYTES NFR BLD: 9.4 % (ref 4–15)
NEUTROPHILS # BLD AUTO: 4.4 K/UL (ref 1.8–7.7)
NEUTROPHILS NFR BLD: 61.9 % (ref 38–73)
NRBC BLD-RTO: 0 /100 WBC
PLATELET # BLD AUTO: 423 K/UL (ref 150–450)
PMV BLD AUTO: 9.3 FL (ref 9.2–12.9)
POTASSIUM SERPL-SCNC: 3.7 MMOL/L (ref 3.5–5.1)
PROT SERPL-MCNC: 7.7 G/DL (ref 6–8.4)
RBC # BLD AUTO: 3.54 M/UL (ref 4–5.4)
SODIUM SERPL-SCNC: 139 MMOL/L (ref 136–145)
WBC # BLD AUTO: 7.12 K/UL (ref 3.9–12.7)

## 2025-02-26 PROCEDURE — 3008F BODY MASS INDEX DOCD: CPT | Mod: CPTII,S$GLB,, | Performed by: STUDENT IN AN ORGANIZED HEALTH CARE EDUCATION/TRAINING PROGRAM

## 2025-02-26 PROCEDURE — 3061F NEG MICROALBUMINURIA REV: CPT | Mod: CPTII,S$GLB,, | Performed by: STUDENT IN AN ORGANIZED HEALTH CARE EDUCATION/TRAINING PROGRAM

## 2025-02-26 PROCEDURE — 80053 COMPREHEN METABOLIC PANEL: CPT | Performed by: SURGERY

## 2025-02-26 PROCEDURE — 3044F HG A1C LEVEL LT 7.0%: CPT | Mod: CPTII,S$GLB,, | Performed by: STUDENT IN AN ORGANIZED HEALTH CARE EDUCATION/TRAINING PROGRAM

## 2025-02-26 PROCEDURE — 85025 COMPLETE CBC W/AUTO DIFF WBC: CPT | Performed by: SURGERY

## 2025-02-26 PROCEDURE — 99214 OFFICE O/P EST MOD 30 MIN: CPT | Mod: S$GLB,,, | Performed by: STUDENT IN AN ORGANIZED HEALTH CARE EDUCATION/TRAINING PROGRAM

## 2025-02-26 PROCEDURE — 3288F FALL RISK ASSESSMENT DOCD: CPT | Mod: CPTII,S$GLB,, | Performed by: STUDENT IN AN ORGANIZED HEALTH CARE EDUCATION/TRAINING PROGRAM

## 2025-02-26 PROCEDURE — 1101F PT FALLS ASSESS-DOCD LE1/YR: CPT | Mod: CPTII,S$GLB,, | Performed by: STUDENT IN AN ORGANIZED HEALTH CARE EDUCATION/TRAINING PROGRAM

## 2025-02-26 PROCEDURE — 3066F NEPHROPATHY DOC TX: CPT | Mod: CPTII,S$GLB,, | Performed by: STUDENT IN AN ORGANIZED HEALTH CARE EDUCATION/TRAINING PROGRAM

## 2025-02-26 PROCEDURE — 3075F SYST BP GE 130 - 139MM HG: CPT | Mod: CPTII,S$GLB,, | Performed by: STUDENT IN AN ORGANIZED HEALTH CARE EDUCATION/TRAINING PROGRAM

## 2025-02-26 PROCEDURE — 99999 PR PBB SHADOW E&M-EST. PATIENT-LVL III: CPT | Mod: PBBFAC,,, | Performed by: STUDENT IN AN ORGANIZED HEALTH CARE EDUCATION/TRAINING PROGRAM

## 2025-02-26 PROCEDURE — 3072F LOW RISK FOR RETINOPATHY: CPT | Mod: CPTII,S$GLB,, | Performed by: STUDENT IN AN ORGANIZED HEALTH CARE EDUCATION/TRAINING PROGRAM

## 2025-02-26 PROCEDURE — 3078F DIAST BP <80 MM HG: CPT | Mod: CPTII,S$GLB,, | Performed by: STUDENT IN AN ORGANIZED HEALTH CARE EDUCATION/TRAINING PROGRAM

## 2025-02-26 PROCEDURE — 1125F AMNT PAIN NOTED PAIN PRSNT: CPT | Mod: CPTII,S$GLB,, | Performed by: STUDENT IN AN ORGANIZED HEALTH CARE EDUCATION/TRAINING PROGRAM

## 2025-02-26 NOTE — PROGRESS NOTES
"Oncology Nutrition Assessment for Medical Nutrition Therapy  Initial Visit    Katey Cerrato   1955    Referring Provider: Zaire Glynn MD      Reason for Visit: nutrition counseling and education    PMHx:   Past Medical History:   Diagnosis Date    Cataract     Empty sella syndrome     GHD (growth hormone deficiency)     Glaucoma     Graves' disease with exophthalmos     History of vitamin D deficiency     Hyperlipidemia     Hypertension     Seizures     Thyroid nodule     Thyroid nodule     Type II or unspecified type diabetes mellitus without mention of complication, uncontrolled        Nutrition Assessment    This is a 69 y.o.female with a medical diagnosis of biliary obstruction s/p recent hospitalization with PTC drain placement x2. She reports overall appetite is poor, mostly due to nausea. She has scopolamine patches and takes Zofran, reports they help some. She reports she used to eat 3 meals/day but has not lately. She states since her  passed away in September she has not gotten back to her normal routine. Then hospitalization has made it even harder. She is trying to eat smaller portions, states he did tolerate a smoothie from [employer], yogurt, and chicken noodle soup. She is trying to drink more fluid, recently got Powerade and Pedialyte. She reports she tried Boost in the hospital but did not tolerate well, states it makes her nauseous. She reports bowels are moving regularly.     Weight: 74.75 kg (164 lb 12.7 oz)   Height: 4' 11" (149.9 cm)   BMI: 33.3    Usual BW: 185lb  Weight Change: 20lb loss over 2 months    Allergies: Codeine    Current Medications:  Current Medications[1]    Food/medication interactions noted: none    Vitamins/Supplements: none reported    Labs: Reviewed    Nutrition Diagnosis    Problem: moderate malnutrition  Etiology: appetite loss and nausea  Signs/Symptoms: reports of inadequate PO intake, weight loss, and both fat & muscle wasting " present    Nutrition Intervention    Nutrition Prescription   1719 kcals (23kcal/kg)  75-90g protein (1.0-1.2g/kg)   1719mL fluid (23mL/kg)    Recommendations:  Eat small frequent meals/snacks - aim for 5-6/day  Make meals/snacks high in calories and protein - examples reviewed  Drink at least 64oz fluid/day  Reinforced the importance of taking nausea meds as needed    Materials Provided/Reviewed: none    Nutrition Monitoring and Evaluation    Monitor: diet education needs, energy intake, and weight status    Goals: weight maintenance and prevent further weight loss    Follow up: Patient provided with contact information and advised to call/message with questions or to make future appointment if further intervention is needed.    Communication to referring provider/care team: note available in chart; discussed with GINI Almaraz NP    Counseling time: 20 minutes      Lucy Palacio MS, RD, LDN  Senior Clinical Dietitian  [facility]        [1]   Current Outpatient Medications:     [Paused] amLODIPine (NORVASC) 10 MG tablet, TAKE 1 TABLET BY MOUTH EVERY DAY (Patient not taking: Reported on 2/20/2025), Disp: 90 tablet, Rfl: 3    atorvastatin (LIPITOR) 80 MG tablet, Take 1 tablet (80 mg total) by mouth once daily., Disp: 90 tablet, Rfl: 3    blood sugar diagnostic Strp, To check BG 1 times daily, to use with insurance preferred meter, Disp: 100 each, Rfl: 3    blood-glucose meter kit, To check BG 1 times daily, to use with insurance preferred meter, Disp: 1 each, Rfl: 0    HYDROmorphone (DILAUDID) 2 MG tablet, Take 1 tablet (2 mg total) by mouth 2 (two) times daily as needed for Pain., Disp: 14 tablet, Rfl: 0    lancets Misc, To check BG 1 times daily, to use with insurance preferred meter, Disp: 100 each, Rfl: 3    latanoprost 0.005 % ophthalmic solution, INSTILL 1 DROP INTO BOTH EYES EVERY EVENING, Disp: 7.5 mL, Rfl: 3    levETIRAcetam (KEPPRA) 750 MG Tab, TAKE 2 TABLETS BY MOUTH ONCE  DAILY,  Disp: 180 tablet, Rfl: 1    [Paused] metoprolol succinate (TOPROL-XL) 50 MG 24 hr tablet, TAKE 1 TABLET BY MOUTH EVERY DAY IN THE EVENING (Patient not taking: Reported on 2/20/2025), Disp: 90 tablet, Rfl: 3    multivitamin-minerals-lutein Tab, Take 1 tablet by mouth once daily once daily., Disp: , Rfl:     mv-mn/iron/folic acid/herb 190 (VITAMIN D3 COMPLETE ORAL), Take 1 tablet by mouth once daily., Disp: , Rfl:     ondansetron (ZOFRAN) 4 MG tablet, Take 2 tablets (8 mg total) by mouth every 6 (six) hours as needed for Nausea. (Patient not taking: Reported on 2/20/2025), Disp: 30 tablet, Rfl: 0    scopolamine (TRANSDERM-SCOP) 1.3-1.5 mg (1 mg over 3 days), Place 1 patch onto the skin Every 3 (three) days. (Patient not taking: Reported on 2/20/2025), Disp: 3 patch, Rfl: 0    sodium bicarbonate 650 MG tablet, Take 1 tablet (650 mg total) by mouth 2 (two) times daily. for 15 days (Patient not taking: Reported on 2/20/2025), Disp: 30 tablet, Rfl: 0    sodium chloride 0.9% (NORMAL SALINE FLUSH) injection, 10 mLs by Intra-Catheter route once daily. Flush both drains once daily with 10 mL, Disp: 600 mL, Rfl: 0    [Paused] valsartan (DIOVAN) 320 MG tablet, Take 1 tablet (320 mg total) by mouth once daily. (Patient not taking: Reported on 2/20/2025), Disp: 90 tablet, Rfl: 2

## 2025-02-26 NOTE — PROGRESS NOTES
[facility]  Subjective:     Patient ID: Katey Cerrato is a 69 y.o. female.  History of Present Illness    CHIEF COMPLAINT:  Patient presents today for hospital follow-up after being recently hospitalized for biliary obstruction.    CURRENT SYMPTOMS AND STATUS:  She reports abdominal pain and significant weight loss due to poor oral intake. She has percutaneous tubes with drainage bags in place, experiencing some leakage issues and noting fluid at the insertion site, which was assessed by home health nurse and deemed non-concerning.     HOME HEALTH CARE:  She receives home health care 3 times weekly, with one visit sometimes conducted virtually. She has been trained on tube care and reports feeling comfortable with cleaning and maintaining the tube independently.    MEDICATIONS:  She continues Keppra for seizure prevention. All blood pressure medications are currently on hold as per hospital instructions. She takes Tylenol for pain management, noting previous severe nausea with opioid medications. Trulicity has been discontinued for now    MEDICAL HISTORY:  She underwent gallbladder surgery in 2010. She has a history of two seizures, with one attributed to taking arthrotec for arthritis which reportedly interfered with medication absorption. She denies recent seizure activity.    FAMILY HISTORY:  She has a significant family history of breast cancer. Her mother was diagnosed with breast cancer and is still living. Her twin sister had breast cancer and passed away two years ago.       Current Outpatient Medications   Medication Instructions    [Paused] amLODIPine (NORVASC) 10 MG tablet TAKE 1 TABLET BY MOUTH EVERY DAY    atorvastatin (LIPITOR) 80 mg, Oral, Daily    blood sugar diagnostic Strp To check BG 1 times daily, to use with insurance preferred meter    blood-glucose meter kit To check BG 1 times daily, to use with insurance preferred meter    HYDROmorphone (DILAUDID) 2 mg, Oral, 2  "times daily PRN    lancets Misc To check BG 1 times daily, to use with insurance preferred meter    latanoprost 0.005 % ophthalmic solution 1 drop, Both Eyes    levETIRAcetam (KEPPRA) 1,500 mg, Oral    [Paused] metoprolol succinate (TOPROL-XL) 50 mg, Oral, Nightly    multivitamin-minerals-lutein Tab 1 tablet, Daily    mv-mn/iron/folic acid/herb 190 (VITAMIN D3 COMPLETE ORAL) 1 tablet, Daily    ondansetron (ZOFRAN) 8 mg, Oral, Every 6 hours PRN    scopolamine (TRANSDERM-SCOP) 1.3-1.5 mg (1 mg over 3 days) 1 patch, Transdermal, Every 3 days    sodium bicarbonate 650 mg, Oral, 2 times daily    sodium chloride 0.9% (NORMAL SALINE FLUSH) injection 10 mLs, Intra-Catheter, Daily, Flush both drains once daily with 10 mL    [Paused] valsartan (DIOVAN) 320 mg, Oral, Daily     Objective:      Body mass index is 33.26 kg/m².  Vitals:    02/26/25 1046   BP: 133/74   Pulse: 83   SpO2: 100%   Weight: 74.7 kg (164 lb 10.9 oz)   Height: 4' 11" (1.499 m)   PainSc:   4     Physical Exam   Physical Exam    General: No acute distress. Normal appearance.  Head: Normocephalic.  Eyes: Extraocular movements intact.  Neck: No thyromegaly.  Cardiovascular: Normal rate and rhythm. No murmur heard.  Lungs: Pulmonary effort is normal. Normal breath sounds. No wheezing. No rales.  Abdomen: Flat.  Musculoskeletal: No edema lower extremities.  Skin: Warm. Dry.  Neurological: Alert.  Psychiatric: Normal mood. Normal behavior.       Assessment:       1. Hospital discharge follow-up    2. Essential hypertension    3. Biliary obstruction        Plan:   Impression (dictated)   Plan (software generated and edited)   Assessment & Plan    Patient seen today for hospital follow-up visit. She was recently hospitalized for biliary obstruction with intrahepatic ductal dilation. At this point, the underlying etiology remains unclear as we are yet to get a tissue diagnosis.  She has outpatient surgery follow-up scheduled for tomorrow. Concern is for " cholangiocarcinoma.     She is no longer on Trulicity and was advised to hold all of her antihypertensives. Blood pressure today is normal. Discussed that this can happen after an acute illness and that she should monitor her blood pressure closely and let us know if it becomes elevated (150s/90s) again or which time can restart a lower dose of Valsartan.    She will have labs today to check her bilirubin and bicarbonate levels. She has not been taking the sodium bicarbonate since discharge. Has not needed hydromorphone.    Patient is able to ambulate slowly but would benefit from a Rolator  for safety as she recovers from her hospitalization. She is receiving home health who help with dressing changes of her biliary cholecystostomy tubes. She feels comfortable changing the dressing herself as well.    Plan to follow up in about one month to reevaluate blood pressure or sooner as need           Katey's mobility limitation cannot be sufficiently resolved by the use of a cane. Her functional mobility deficit can be sufficiently resolved with the use of a Rollator. Patient's mobility limitation significantly impairs their ability to participate in one of more activities of daily living.  The use of a Rollator will significantly improve the patient's ability to participate in MRADLS and the patient will use it on regular basis in the home.           Hospital discharge follow-up    Essential hypertension    Biliary obstruction        All of your core healthy metrics are met.    Follow up in about 8 weeks (around 4/23/2025). or sooner prn (as needed)          Shay Castellano  [facility]  [address] [address]  Phone [phone]  Fax [phone]    Part of this note is dictated using the M*Modal Fluency Direct word recognition program. It may contain word recognition mistakes or wrong word substitutions (commonly he/she and is/was substitutions) that were missed  on review.    Part of this note was generated with the assistance of ambient listening technology. Verbal consent was obtained by the patient and accompanying visitor(s) for the recording of patient appointment to facilitate this note. I attest to having reviewed and edited the generated note for accuracy, though some syntax or spelling errors may persist. Please contact the author of this note for any clarification.

## 2025-02-26 NOTE — TELEPHONE ENCOUNTER
"Faxed the following below:  - completed [facility] cover sheet  -  Office Visit    2/26/2025  [facility] - Internal Medicine       Shay Castellano MD  Internal Medicine Hospital discharge follow-up +3 more  Dx Follow-up  Reason for Visit   - WALKER FOR HOME USE [] (Order [id number])  General Supply  Date and Time: 2/26/2025 12:47 PM Department: [facility] Internal Medicine Rel By/Authorizing: Shay Castellano MD     to [facility]'s [employer] fax# below/office# [phone]. I have attached a confirmation faxed message below:    Your fax has been successfully sent to [phone] at [phone].  ------------------------------------------------------------  From: [id number]  ------------------------------------------------------------  2/26/2025 4:18:48 PM Transmission Record          Sent to [phone] with remote ID "[id number]      "          Result: (0/339;0/0) Success          Page record: 1 - 11          Elapsed time: 04:08 on channel 45       "

## 2025-02-27 ENCOUNTER — OFFICE VISIT (OUTPATIENT)
Dept: SURGERY | Facility: CLINIC | Age: 70
End: 2025-02-27
Payer: MEDICARE

## 2025-02-27 ENCOUNTER — CLINICAL SUPPORT (OUTPATIENT)
Dept: HEMATOLOGY/ONCOLOGY | Facility: CLINIC | Age: 70
End: 2025-02-27
Payer: MEDICARE

## 2025-02-27 VITALS
OXYGEN SATURATION: 98 % | HEART RATE: 81 BPM | SYSTOLIC BLOOD PRESSURE: 145 MMHG | HEIGHT: 59 IN | WEIGHT: 164.81 LBS | DIASTOLIC BLOOD PRESSURE: 66 MMHG | BODY MASS INDEX: 33.23 KG/M2

## 2025-02-27 DIAGNOSIS — K83.1 BILIARY OBSTRUCTION: ICD-10-CM

## 2025-02-27 DIAGNOSIS — Z71.3 NUTRITIONAL COUNSELING: Primary | ICD-10-CM

## 2025-02-27 DIAGNOSIS — K83.1 BILIARY OBSTRUCTION: Primary | ICD-10-CM

## 2025-02-27 DIAGNOSIS — E44.0 MODERATE MALNUTRITION: ICD-10-CM

## 2025-02-27 DIAGNOSIS — C24.0 HILAR CHOLANGIOCARCINOMA: Primary | ICD-10-CM

## 2025-02-27 PROCEDURE — 3008F BODY MASS INDEX DOCD: CPT | Mod: CPTII,S$GLB,,

## 2025-02-27 PROCEDURE — 1101F PT FALLS ASSESS-DOCD LE1/YR: CPT | Mod: CPTII,S$GLB,,

## 2025-02-27 PROCEDURE — 3066F NEPHROPATHY DOC TX: CPT | Mod: CPTII,S$GLB,,

## 2025-02-27 PROCEDURE — 99999 PR PBB SHADOW E&M-EST. PATIENT-LVL III: CPT | Mod: PBBFAC,,,

## 2025-02-27 PROCEDURE — 1111F DSCHRG MED/CURRENT MED MERGE: CPT | Mod: CPTII,S$GLB,,

## 2025-02-27 PROCEDURE — 3288F FALL RISK ASSESSMENT DOCD: CPT | Mod: CPTII,S$GLB,,

## 2025-02-27 PROCEDURE — 99215 OFFICE O/P EST HI 40 MIN: CPT | Mod: S$GLB,,,

## 2025-02-27 PROCEDURE — 1125F AMNT PAIN NOTED PAIN PRSNT: CPT | Mod: CPTII,S$GLB,,

## 2025-02-27 PROCEDURE — 3061F NEG MICROALBUMINURIA REV: CPT | Mod: CPTII,S$GLB,,

## 2025-02-27 PROCEDURE — 3077F SYST BP >= 140 MM HG: CPT | Mod: CPTII,S$GLB,,

## 2025-02-27 PROCEDURE — 3078F DIAST BP <80 MM HG: CPT | Mod: CPTII,S$GLB,,

## 2025-02-27 PROCEDURE — 3044F HG A1C LEVEL LT 7.0%: CPT | Mod: CPTII,S$GLB,,

## 2025-02-27 PROCEDURE — 1159F MED LIST DOCD IN RCRD: CPT | Mod: CPTII,S$GLB,,

## 2025-02-27 PROCEDURE — 3072F LOW RISK FOR RETINOPATHY: CPT | Mod: CPTII,S$GLB,,

## 2025-02-27 NOTE — PROGRESS NOTES
"Encounter Date:  2025    Patient ID: Katey Cerrato  Age:  69 y.o. :  1955    Chief Complaint:  followup of biliary obstruction     Interval History:  Ms. Cerrato returns to clinic for a hospital follow up of biliary obstruction. She presented to OSH with jaundice and scleral icterus. Initial CT of the abdomen and pelvis with IV contrast showed significant intrahepatic biliary ductal dilatation and questionable 9 mm fat containing lesion in the uncinate process of the pancreas. Subsequent MRCP showed "severe intrahepatic bile duct dilatation, with abrupt narrowing at the confluence of the hepatic ducts where there is a suspected ill-defined liver mass. Repeat imaging with MRI abdomen with and without contrast showed "hypoattenuating lesion near the confluence of the right and left hepatic ducts causing severe upstream ductal dilatation concerning for cholangiocarcinoma.  Further evaluation with ERCP is recommended." Patient transferred to [facility] for further workup and evaluation. CEA elevated, CA 19-9 WNL. PCT placed . She was discharged and PTC x2 remains capped.    She reports nausea and vomiting daily with poor appetite. She is using a scopolamine patch for nausea. Having bowel function. Both PTCs remain capped with minimal drainage from the sites. Denies fever, chills, SOB, chest pain.     New Data:  Imaging:  I personally reviewed the following images:   25: CT A/P:  Impression:  1. Redemonstration of bilateral internal external biliary drains in place, with distal tips terminating in the duodenum.  Distal left biliary drain terminates without pigtail configuration.  Mild persistent intrahepatic biliary ductal dilatation.  All of these findings appear similar compared to 2025 without significant detrimental change.  2. Few areas of mesenteric stranding and nodularity adjacent to the duodenum, splenic flexure of the colon, and descending colon, as described above.  Increased conspicuity " of these areas compared to 02/03/2025.  Findings are nonspecific, may represent infectious or inflammatory changes, however given history of cholangiocarcinoma, sequela of peritoneal metastasis cannot be excluded.  Attention on follow-up imaging is recommended.  3. Multiple scattered prominent lymph nodes without pathologic enlargement by size criteria.  4. Other findings, as above.     Labs:    Lab Results   Component Value Date    WBC 7.12 02/26/2025    HGB 10.7 (L) 02/26/2025    HCT 31.9 (L) 02/26/2025    MCV 90 02/26/2025     02/26/2025       Chemistry        Component Value Date/Time     02/26/2025 1205    K 3.7 02/26/2025 1205     02/26/2025 1205    CO2 25 02/26/2025 1205    BUN 5 (L) 02/26/2025 1205    CREATININE 0.8 02/26/2025 1205     (H) 02/26/2025 1205        Component Value Date/Time    CALCIUM 9.7 02/26/2025 1205    ALKPHOS 433 (H) 02/26/2025 1205    AST 61 (H) 02/26/2025 1205    ALT 62 (H) 02/26/2025 1205    BILITOT 2.0 (H) 02/26/2025 1205    ESTGFRAFRICA >60 07/06/2022 0820    EGFRNONAA >60 07/06/2022 0820        Past Medical History:   Diagnosis Date    Cataract     Empty sella syndrome     GHD (growth hormone deficiency)     Glaucoma     Graves' disease with exophthalmos     History of vitamin D deficiency     Hyperlipidemia     Hypertension     Seizures     Thyroid nodule     Thyroid nodule     Type II or unspecified type diabetes mellitus without mention of complication, uncontrolled      Past Surgical History:   Procedure Laterality Date    BREAST BIOPSY      CATARACT EXTRACTION W/  INTRAOCULAR LENS IMPLANT Right 3/15/2021    Procedure: EXTRACTION, CATARACT, WITH IOL INSERTION;  Surgeon: Cj Caban MD;  Location: [facility] OR;  Service: Ophthalmology;  Laterality: Right;    CATARACT EXTRACTION W/  INTRAOCULAR LENS IMPLANT Left 3/29/2021    Procedure: EXTRACTION, CATARACT, WITH IOL INSERTION;  Surgeon: Cj Caban MD;  Location: [facility] OR;  Service: Ophthalmology;   "Laterality: Left;    CHOLECYSTECTOMY      HYSTERECTOMY      one ovary intact    INJECTION OF JOINT Left 2/6/2024    Procedure: INJECTION, JOINT LEFT KNEE WITH STEROID;  Surgeon: She Schwartz MD;  Location: [facility];  Service: Pain Management;  Laterality: Left;  [phone]    KNEE SURGERY      LYMPH NODE BIOPSY  3010    OOPHORECTOMY       Medications Ordered Prior to Encounter[1]  Review of patient's allergies indicates:   Allergen Reactions    Codeine Nausea Only     Family History:  Her family history includes Breast cancer (age of onset: 60) in her sister; Breast cancer (age of onset: 78) in her mother; Cancer in her mother; Cataracts in her mother; Glaucoma in her mother; Heart disease in her father, mother, and sister; Hypertension in her brother; No Known Problems in her brother, maternal aunt, maternal grandfather, maternal grandmother, maternal uncle, paternal aunt, paternal grandfather, paternal grandmother, paternal uncle, sister, son, and another family member; Tremor in her mother.     Social History:   reports that she has never smoked. She has never used smokeless tobacco. She reports that she does not drink alcohol and does not use drugs.     ROS:     Review of Systems   Constitutional:  Positive for activity change, appetite change and unexpected weight change. Negative for chills, fatigue and fever.   HENT: Negative.     Respiratory: Negative.     Cardiovascular: Negative.    Gastrointestinal:  Positive for nausea and vomiting.   Genitourinary: Negative.    Neurological: Negative.    Hematological: Negative.      Pertinent positive/negatives detailed in HPI, all other systems negative.     Physical Exam:  BP (!) 145/66 (BP Location: Left arm, Patient Position: Sitting)   Pulse 81   Ht 4' 11" (1.499 m)   Wt 74.7 kg (164 lb 12.7 oz)   SpO2 98%   BMI 33.28 kg/m²     Constitutional:  Non-toxic, no acute distress.   Eyes:  Sclerae anicteric, gaze symmetrical  Resp:  Easy work of " "breathing  Abd:  Soft, non-tender, no masses, no ascites, PTC x2 in place  Musculoskeletal:  Ambulatory, gait assisted with walker, muscle wasting.  Extremities are symmetrical without lymphedema.  Neuro:  No gross deficits  Psych:  Awake, alert, oriented.  Answers and asks questions appropriately      ICD-10-CM ICD-9-CM    1. Biliary obstruction  K83.1 576.2       Plan    Ms. Cerrato returns to clinic for a hospital follow up of biliary obstruction. She presented to OSH with jaundice and scleral icterus. Initial CT of the abdomen and pelvis with IV contrast showed significant intrahepatic biliary ductal dilatation and questionable 9 mm fat containing lesion in the uncinate process of the pancreas. Subsequent MRCP showed "severe intrahepatic bile duct dilatation, with abrupt narrowing at the confluence of the hepatic ducts where there is a suspected ill-defined liver mass. Repeat imaging with MRI abdomen with and without contrast showed "hypoattenuating lesion near the confluence of the right and left hepatic ducts causing severe upstream ductal dilatation concerning for cholangiocarcinoma.  Further evaluation with ERCP is recommended." Patient transferred to [facility] for further workup and evaluation. CEA elevated, CA 19-9 WNL. PCT placed 2/8. She was discharged and PTC x2 remains capped.    She reports nausea and vomiting daily with poor appetite. She is using a scopolamine patch for nausea. Having bowel function. Both PTCs remain capped with minimal drainage from the sites.   Discussed that this is is most likely a cholangiocarcinoma although we have not been able to get a tissue diagnosis at this time. Will get a PET scan to evaluate for metastasis as well as possible site for biopsy. Discussed with her that likely she will not be a candidate for surgery as it presently stands. If we are able to get a tissue diagnosis and treat her with chemotherapy and she has a good response there is a very small chance that surgery " would be an option at that time.   Questions were asked and answered to patient's satisfaction.  We discussed the need for continued clinical/radiographic/endoscopic follow-up.  Will see her back after PET to discuss results.     Follow up in about 4 weeks (around 3/27/2025), or if symptoms worsen or fail to improve.      Pt seen in conjunction with Dr. Glynn today.         Mary Almaraz NP  Upper GI / Hepatobiliary Surgical Oncology  [facility] [place], [place]  Office: [phone]  Fax: [phone]               [1]   Current Outpatient Medications on File Prior to Visit   Medication Sig Dispense Refill    atorvastatin (LIPITOR) 80 MG tablet Take 1 tablet (80 mg total) by mouth once daily. 90 tablet 3    blood sugar diagnostic Strp To check BG 1 times daily, to use with insurance preferred meter 100 each 3    HYDROmorphone (DILAUDID) 2 MG tablet Take 1 tablet (2 mg total) by mouth 2 (two) times daily as needed for Pain. 14 tablet 0    lancets Misc To check BG 1 times daily, to use with insurance preferred meter 100 each 3    latanoprost 0.005 % ophthalmic solution INSTILL 1 DROP INTO BOTH EYES EVERY EVENING 7.5 mL 3    levETIRAcetam (KEPPRA) 750 MG Tab TAKE 2 TABLETS BY MOUTH ONCE  DAILY 180 tablet 1    multivitamin-minerals-lutein Tab Take 1 tablet by mouth once daily once daily.      mv-mn/iron/folic acid/herb 190 (VITAMIN D3 COMPLETE ORAL) Take 1 tablet by mouth once daily.      ondansetron (ZOFRAN) 4 MG tablet Take 2 tablets (8 mg total) by mouth every 6 (six) hours as needed for Nausea. 30 tablet 0    scopolamine (TRANSDERM-SCOP) 1.3-1.5 mg (1 mg over 3 days) Place 1 patch onto the skin Every 3 (three) days. 3 patch 0    sodium chloride 0.9% (NORMAL SALINE FLUSH) injection 10 mLs by Intra-Catheter route once daily. Flush both drains once daily with 10 mL 600 mL 0    [Paused] amLODIPine (NORVASC) 10 MG tablet TAKE 1 TABLET BY MOUTH EVERY DAY (Patient not taking: Reported on 2/27/2025) 90 tablet  3    blood-glucose meter kit To check BG 1 times daily, to use with insurance preferred meter 1 each 0    [Paused] metoprolol succinate (TOPROL-XL) 50 MG 24 hr tablet TAKE 1 TABLET BY MOUTH EVERY DAY IN THE EVENING (Patient not taking: Reported on 2/27/2025) 90 tablet 3    sodium bicarbonate 650 MG tablet Take 1 tablet (650 mg total) by mouth 2 (two) times daily. for 15 days (Patient not taking: Reported on 2/27/2025) 30 tablet 0    [Paused] valsartan (DIOVAN) 320 MG tablet Take 1 tablet (320 mg total) by mouth once daily. (Patient not taking: Reported on 2/27/2025) 90 tablet 2     No current facility-administered medications on file prior to visit.

## 2025-02-28 ENCOUNTER — TELEPHONE (OUTPATIENT)
Dept: ENDOSCOPY | Facility: HOSPITAL | Age: 70
End: 2025-02-28
Payer: MEDICARE

## 2025-02-28 DIAGNOSIS — C24.0 HILAR CHOLANGIOCARCINOMA: Primary | ICD-10-CM

## 2025-02-28 DIAGNOSIS — R93.2 ABNORMAL FINDINGS ON DIAGNOSTIC IMAGING OF LIVER AND BILIARY TRACT: ICD-10-CM

## 2025-02-28 NOTE — PROGRESS NOTES
Seen with Mary. Her bilirubin is down to 2. Her drains are capped. She continues to have some drainage, primarily around the left sided drain, but the drain flushes easily. She continues to have nausea and vomiting. She arrives with a walker. Based on her continued physical decline, I do not think she will be a surgical candidate. 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 She does have some mildly enlarged regional and distant lymph nodes. I would like to get a PET CT to see if there is a target for a biopsy. I will also review the imaging with IR and AES to see what the best option would be for biopsy.     Zaire Glynn MD  Surgical Oncology

## 2025-02-28 NOTE — TELEPHONE ENCOUNTER
Referral for procedure from [facility]      Spoke to patient to schedule procedure(s) Upper Endoscopy Ultrasound (EUS)       Physician to perform procedure(s) Dr. GINI Caban  Date of Procedure (s) 3/10/25  Arrival Time 11:00 AM  Time of Procedure(s) 12:00 PM   Location of Procedure(s) [address]  Type of Rx Prep sent to patient: N/A  Instructions provided to patient via [facility]    Patient was informed on the following information and verbalized understanding. Screening questionnaire reviewed with patient and complete. If procedure requires anesthesia, a responsible adult needs to be present to accompany the patient home, patient cannot drive after receiving anesthesia. Appointment details are tentative, especially check-in time. Patient will receive a prep-op call 7 days prior to confirm check-in time for procedure. If applicable the patient should contact their pharmacy to verify Rx for procedure prep is ready for pick-up. Patient was advised to call the scheduling department at [phone] if pharmacy states no Rx is available. Patient was advised to call the endoscopy scheduling department if any questions or concerns arise.       Endoscopy Scheduling Department

## 2025-03-06 ENCOUNTER — TELEPHONE (OUTPATIENT)
Dept: ENDOSCOPY | Facility: HOSPITAL | Age: 70
End: 2025-03-06
Payer: MEDICARE

## 2025-03-06 NOTE — TELEPHONE ENCOUNTER
Patient called in re: EUS on 03/10/25 at [facility]. Attempted to assist patient with questions. Patient insist she wanted to speak with Crystal. Call transferred. Understanding verbalized.

## 2025-03-10 ENCOUNTER — HOSPITAL ENCOUNTER (OUTPATIENT)
Facility: HOSPITAL | Age: 70
Discharge: HOME | End: 2025-03-10
Attending: INTERNAL MEDICINE | Admitting: INTERNAL MEDICINE
Payer: MEDICARE

## 2025-03-10 ENCOUNTER — ANESTHESIA EVENT (OUTPATIENT)
Dept: ENDOSCOPY | Facility: HOSPITAL | Age: 70
End: 2025-03-10
Payer: MEDICARE

## 2025-03-10 ENCOUNTER — ANESTHESIA (OUTPATIENT)
Dept: ENDOSCOPY | Facility: HOSPITAL | Age: 70
End: 2025-03-10
Payer: MEDICARE

## 2025-03-10 VITALS
DIASTOLIC BLOOD PRESSURE: 92 MMHG | SYSTOLIC BLOOD PRESSURE: 178 MMHG | WEIGHT: 155 LBS | HEART RATE: 79 BPM | BODY MASS INDEX: 31.25 KG/M2 | HEIGHT: 59 IN | TEMPERATURE: 98 F | OXYGEN SATURATION: 99 % | RESPIRATION RATE: 18 BRPM

## 2025-03-10 DIAGNOSIS — R93.5 ABNORMAL CT OF THE ABDOMEN: ICD-10-CM

## 2025-03-10 DIAGNOSIS — K83.1 BILIARY OBSTRUCTION: Primary | ICD-10-CM

## 2025-03-10 PROCEDURE — D9220A PRA ANESTHESIA: Mod: ANES,,, | Performed by: ANESTHESIOLOGY

## 2025-03-10 PROCEDURE — 43242 EGD US FINE NEEDLE BX/ASPIR: CPT | Mod: ,,, | Performed by: INTERNAL MEDICINE

## 2025-03-10 PROCEDURE — 63600175 PHARM REV CODE 636 W HCPCS: Performed by: NURSE ANESTHETIST, CERTIFIED REGISTERED

## 2025-03-10 PROCEDURE — D9220A PRA ANESTHESIA: Mod: CRNA,,, | Performed by: NURSE ANESTHETIST, CERTIFIED REGISTERED

## 2025-03-10 PROCEDURE — 25000003 PHARM REV CODE 250: Performed by: NURSE ANESTHETIST, CERTIFIED REGISTERED

## 2025-03-10 PROCEDURE — 88173 CYTOPATH EVAL FNA REPORT: CPT | Performed by: PATHOLOGY

## 2025-03-10 PROCEDURE — 88342 IMHCHEM/IMCYTCHM 1ST ANTB: CPT | Performed by: PATHOLOGY

## 2025-03-10 PROCEDURE — 27202131 HC NEEDLE, FNB SINGLE (ANY): Performed by: INTERNAL MEDICINE

## 2025-03-10 PROCEDURE — 37000009 HC ANESTHESIA EA ADD 15 MINS: Performed by: INTERNAL MEDICINE

## 2025-03-10 PROCEDURE — 88305 TISSUE EXAM BY PATHOLOGIST: CPT | Performed by: PATHOLOGY

## 2025-03-10 PROCEDURE — 88341 IMHCHEM/IMCYTCHM EA ADD ANTB: CPT | Performed by: PATHOLOGY

## 2025-03-10 PROCEDURE — 37000008 HC ANESTHESIA 1ST 15 MINUTES: Performed by: INTERNAL MEDICINE

## 2025-03-10 PROCEDURE — 43242 EGD US FINE NEEDLE BX/ASPIR: CPT | Performed by: INTERNAL MEDICINE

## 2025-03-10 RX ORDER — PROPOFOL 10 MG/ML
INJECTION, EMULSION INTRAVENOUS CONTINUOUS PRN
Status: DISCONTINUED | OUTPATIENT
Start: 2025-03-10 | End: 2025-03-10

## 2025-03-10 RX ORDER — ONDANSETRON HYDROCHLORIDE 2 MG/ML
4 INJECTION, SOLUTION INTRAVENOUS DAILY PRN
Status: DISCONTINUED | OUTPATIENT
Start: 2025-03-10 | End: 2025-03-10 | Stop reason: HOSPADM

## 2025-03-10 RX ORDER — GLUCAGON 1 MG
1 KIT INJECTION
Status: DISCONTINUED | OUTPATIENT
Start: 2025-03-10 | End: 2025-03-10 | Stop reason: HOSPADM

## 2025-03-10 RX ORDER — LIDOCAINE HYDROCHLORIDE 20 MG/ML
INJECTION INTRAVENOUS
Status: DISCONTINUED | OUTPATIENT
Start: 2025-03-10 | End: 2025-03-10

## 2025-03-10 RX ORDER — FENTANYL CITRATE 50 UG/ML
25 INJECTION, SOLUTION INTRAMUSCULAR; INTRAVENOUS EVERY 5 MIN PRN
Status: DISCONTINUED | OUTPATIENT
Start: 2025-03-10 | End: 2025-03-10 | Stop reason: HOSPADM

## 2025-03-10 RX ORDER — SODIUM CHLORIDE 0.9 % (FLUSH) 0.9 %
10 SYRINGE (ML) INJECTION
Status: DISCONTINUED | OUTPATIENT
Start: 2025-03-10 | End: 2025-03-10 | Stop reason: HOSPADM

## 2025-03-10 RX ORDER — PROPOFOL 10 MG/ML
VIAL (ML) INTRAVENOUS
Status: DISCONTINUED | OUTPATIENT
Start: 2025-03-10 | End: 2025-03-10

## 2025-03-10 RX ADMIN — LIDOCAINE HYDROCHLORIDE 100 MG: 20 INJECTION INTRAVENOUS at 11:03

## 2025-03-10 RX ADMIN — PROPOFOL 90 MG: 10 INJECTION, EMULSION INTRAVENOUS at 11:03

## 2025-03-10 RX ADMIN — SODIUM CHLORIDE: 0.9 INJECTION, SOLUTION INTRAVENOUS at 11:03

## 2025-03-10 RX ADMIN — PROPOFOL 200 MCG/KG/MIN: 10 INJECTION, EMULSION INTRAVENOUS at 11:03

## 2025-03-10 NOTE — TRANSFER OF CARE
Anesthesia Transfer of Care Note    Patient: Katey Cerrato    Procedure(s) Performed: Procedure(s) (LRB):  ULTRASOUND, UPPER GI TRACT, ENDOSCOPIC (N/A)    Patient location: [facility]    Anesthesia Type: general    Transport from OR: Transported from OR on room air with adequate spontaneous ventilation    Post pain: adequate analgesia    Post assessment: no apparent anesthetic complications    Post vital signs: stable    Level of consciousness: responds to stimulation and sedated    Nausea/Vomiting: no nausea/vomiting    Complications: none    Transfer of care protocol was followed      Last vitals: Visit Vitals  /79   Pulse 73   Temp 36   Resp 20   Ht    Wt    SpO2 99%   Breastfeeding    BMI

## 2025-03-10 NOTE — PROVATION PATIENT INSTRUCTIONS
Discharge Summary/Instructions after an Endoscopic Procedure  Patient Name: Katey Cerrato  Patient MRN: [id number]  Patient YOB: 1955  Monday, March 10, 2025  Tim Caban MD  Dear patient,  As a result of recent federal legislation (The Federal Cures Act), you may   receive lab or pathology results from your procedure in your [facility]   account before your physician is able to contact you. Your physician or   their representative will relay the results to you with their   recommendations at their soonest availability.  Thank you,  RESTRICTIONS:  During your procedure today, you received medications for sedation.  These   medications may affect your judgment, balance and coordination.  Therefore,   for 24 hours, you have the following restrictions:   - DO NOT drive a car, operate machinery, make legal/financial decisions,   sign important papers or drink alcohol.    ACTIVITY:  Today: no heavy lifting, straining or running due to procedural   sedation/anesthesia.  The following day: return to full activity including work.  DIET:  Eat and drink normally unless instructed otherwise.     TREATMENT FOR COMMON SIDE EFFECTS:  - Mild abdominal pain, nausea, belching, bloating or excessive gas:  rest,   eat lightly and use a heating pad.  - Sore Throat: treat with throat lozenges and/or gargle with warm salt   water.  - Because air was used during the procedure, expelling large amounts of air   from your rectum or belching is normal.  - If a bowel prep was taken, you may not have a bowel movement for 1-3 days.    This is normal.  SYMPTOMS TO WATCH FOR AND REPORT TO YOUR PHYSICIAN:  1. Abdominal pain or bloating, other than gas cramps.  2. Chest pain.  3. Back pain.  4. Signs of infection such as: chills or fever occurring within 24 hours   after the procedure.  5. Rectal bleeding, which would show as bright red, maroon, or black stools.   (A tablespoon of blood from the rectum is not serious, especially if    hemorrhoids are present.)  6. Vomiting.  7. Weakness or dizziness.  GO DIRECTLY TO THE NEAREST EMERGENCY ROOM IF YOU HAVE ANY OF THE FOLLOWING:      Difficulty breathing              Chills and/or fever over 101 F   Persistent vomiting and/or vomiting blood   Severe abdominal pain   Severe chest pain   Black, tarry stools   Bleeding- more than one tablespoon   Any other symptom or condition that you feel may need urgent attention  Your doctor recommends these additional instructions:  If any biopsies were taken, your doctors clinic will contact you in 1 to 2   weeks with any results.  - Discharge patient to home (ambulatory).   - Resume previous diet; Discharge to home (ambulatory); Resume outpatient   medications  - Return to referring physician as previously scheduled.   - Await path results.  For questions, problems or results please call your physician - Tim Caban MD at Work:  [phone].  [facility] PHONE NUMBER: [phone]  IF A COMPLICATION OR EMERGENCY SITUATION ARISES AND YOU ARE UNABLE TO REACH   YOUR PHYSICIAN - GO DIRECTLY TO THE EMERGENCY ROOM.  Tim Caban MD  3/10/2025 12:01:13 PM  This report has been verified and signed electronically.  Dear patient,  As a result of recent federal legislation (The Federal Cures Act), you may   receive lab or pathology results from your procedure in your [facility]   account before your physician is able to contact you. Your physician or   their representative will relay the results to you with their   recommendations at their soonest availability.  Thank you,  PROVATION

## 2025-03-10 NOTE — ANESTHESIA PREPROCEDURE EVALUATION
"                                                                                                             03/10/2025  Katey Cerrato is a 69 y.o., female.  Pre-operative evaluation for Procedure(s) (LRB):  ULTRASOUND, UPPER GI TRACT, ENDOSCOPIC (N/A)    Katey Cerrato is a 69 y.o. female       Problem List[1]    Past Surgical History:   Procedure Laterality Date    BREAST BIOPSY      CATARACT EXTRACTION W/  INTRAOCULAR LENS IMPLANT Right 3/15/2021    Procedure: EXTRACTION, CATARACT, WITH IOL INSERTION;  Surgeon: Cj Caban MD;  Location: [facility] OR;  Service: Ophthalmology;  Laterality: Right;    CATARACT EXTRACTION W/  INTRAOCULAR LENS IMPLANT Left 3/29/2021    Procedure: EXTRACTION, CATARACT, WITH IOL INSERTION;  Surgeon: Cj Caban MD;  Location: [facility] OR;  Service: Ophthalmology;  Laterality: Left;    CHOLECYSTECTOMY      HYSTERECTOMY      one ovary intact    INJECTION OF JOINT Left 2/6/2024    Procedure: INJECTION, JOINT LEFT KNEE WITH STEROID;  Surgeon: She Schwartz MD;  Location: [facility]IN [facility]GT;  Service: Pain Management;  Laterality: Left;  [phone]    KNEE SURGERY      LYMPH NODE BIOPSY  3010    OOPHORECTOMY         Social History[2]    Medications Ordered Prior to Encounter[3]    Review of patient's allergies indicates:   Allergen Reactions    Codeine Nausea Only         CBC: No results for input(s): "WBC", "RBC", "HGB", "HCT", "PLT", "MCV", "MCH", "MCHC" in the last 72 hours.    CMP: No results for input(s): "NA", "K", "CL", "CO2", "BUN", "CREATININE", "GLU", "MG", "PHOS", "CALCIUM", "ALBUMIN", "PROT", "ALKPHOS", "ALT", "AST", "BILITOT" in the last 72 hours.    INR  No results for input(s): "PT", "INR", "PROTIME", "APTT" in the last 72 hours.      Diagnostic Studies:    EKG:   Results for orders placed or performed during the hospital encounter of 02/03/25   EKG 12-lead    Collection Time: 02/18/25  2:12 PM   Result Value Ref Range    QRS Duration 100 ms    [facility] QTC Calculation 421 ms    " "Narrative    Test Reason : R94.31,    Vent. Rate :  71 BPM     Atrial Rate :  71 BPM     P-R Int : 128 ms          QRS Dur : 100 ms      QT Int : 388 ms       P-R-T Axes :  45  -9  30 degrees    QTcB Int : 421 ms    Normal sinus rhythm  Moderate voltage criteria for LVH, may be normal variant ( R in aVL ,  [place] product )  Borderline Abnormal ECG  When compared with ECG of 09-Feb-2025 10:03,  Nonspecific T wave abnormality no longer evident in Anterior leads  Criteria for LVH is now present  Confirmed by Ash Parham ([id number]) on 2/18/2025 3:22:20 PM    Referred By: AAAREFERRAL SELF           Confirmed By: Ash Parham       TTE:  No results found for this or any previous visit.  No results found for: "EF"   No results found for this or any previous visit.    DINA:  No results found for this or any previous visit.    Stress Test:  No results found for this or any previous visit.       LHC:  No results found for this or any previous visit.       PFT:  No results found for: "FEV1", "FVC", "DQY3HKK", "TLC", "DLCO"     ALLERGIES:     Review of patient's allergies indicates:   Allergen Reactions    Codeine Nausea Only     LDA:          Lines/Drains/Airways       Drain  Duration                  Biliary Tube 02/08/25 1340 10 Fr. LUQ 29 days         Biliary Tube 02/08/25 1341 10 Fr. RUQ 29 days                   Anesthesia Evaluation      Airway   Mallampati: II  TM distance: > 6 cm  Neck ROM: Normal ROM  Dental    (+) Intact    Pulmonary    Cardiovascular   (+) hypertension    Rate: Normal    Neuro/Psych      GI/Hepatic/Renal      Endo/Other    (+) diabetes mellitus  Abdominal                           Pre-op Assessment    I have reviewed the Patient Summary Reports.     I have reviewed the Nursing Notes. I have reviewed the NPO Status.   I have reviewed the Medications.     Review of Systems  Anesthesia Hx:  No problems with previous Anesthesia             Denies Family Hx of Anesthesia complications.    Denies " Personal Hx of Anesthesia complications.                    Cardiovascular:     Hypertension                                          Pulmonary:  Pulmonary Normal                       Renal/:  Renal/ Normal                 Neurological:  Neurology Normal                                      Endocrine:  Diabetes               Physical Exam  General: Well nourished    Airway:  Mallampati: II   Mouth Opening: Normal  TM Distance: > 6 cm  Tongue: Normal  Neck ROM: Normal ROM    Dental:  Intact    Chest/Lungs:  Normal Respiratory Rate    Heart:  Rate: Normal        Anesthesia Plan  Type of Anesthesia, risks & benefits discussed:    Anesthesia Type: Gen Natural Airway, MAC  Intra-op Monitoring Plan: Standard ASA Monitors  Post Op Pain Control Plan: multimodal analgesia and IV/PO Opioids PRN  Induction:  IV  Airway Plan: Direct, Post-Induction  Informed Consent: Informed consent signed with the Patient and all parties understand the risks and agree with anesthesia plan.  All questions answered. Patient consented to blood products? Yes  ASA Score: 3  Day of Surgery Review of History & Physical: H&P Update referred to the surgeon/provider.    Ready For Surgery From Anesthesia Perspective.     .           [1]   Patient Active Problem List  Diagnosis    Generalized convulsive epilepsy with intractable epilepsy    Hypertension associated with diabetes    Diabetes mellitus type 2 in obese    Obesity (BMI 30-39.9)    Essential hypertension    Graves' disease with exophthalmos    Empty sella syndrome    Mixed hyperlipidemia    Type 2 diabetes mellitus without complication, without long-term current use of insulin    Vaginal vault prolapse, posthysterectomy    Urge incontinence    Carpal tunnel syndrome of right wrist    Nuclear sclerosis, bilateral    Post-operative state    Nuclear sclerotic cataract of left eye    Severe obesity    Post-traumatic osteoarthritis of left knee    Peripheral vascular disease, unspecified     Primary osteoarthritis of both knees    Decreased range of motion (ROM) of left knee    Decreased strength of lower extremity    Biliary obstruction    Nausea and vomiting    Fever    Anemia   [2]   Social History  Socioeconomic History    Marital status:    Occupational History     Employer: [employer]   Tobacco Use    Smoking status: Never    Smokeless tobacco: Never   Substance and Sexual Activity    Alcohol use: No     Comment: none    Drug use: No    Sexual activity: Yes     Partners: Male     Comment: m  works in dietary,  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     Social Drivers of Health     Financial Resource Strain: Low Risk  (2/4/2025)    Overall Financial Resource Strain (CARDIA)     Difficulty of Paying Living Expenses: Not hard at all   Food Insecurity: No Food Insecurity (2/4/2025)    Hunger Vital Sign     Worried About Running Out of Food in the Last Year: Never true     Ran Out of Food in the Last Year: Never true   Transportation Needs: No Transportation Needs (2/4/2025)    TRANSPORTATION NEEDS     Transportation : No   Physical Activity: Inactive (2/4/2025)    Exercise Vital Sign     Days of Exercise per Week: 0 days     Minutes of Exercise per Session: 0 min   Stress: Stress Concern Present (2/4/2025)    [employer] [place] [employer] - Occupational Stress Questionnaire     Feeling of Stress : Very much   Housing Stability: Unknown (2/4/2025)    Housing Stability Vital Sign     Unable to Pay for Housing in the Last Year: No     Homeless in the Last Year: No   [3]   No current facility-administered medications on file prior to encounter.     Current Outpatient Medications on File Prior to Encounter   Medication Sig Dispense Refill    [Paused] amLODIPine (NORVASC) 10 MG tablet TAKE 1 TABLET BY MOUTH EVERY DAY (Patient not taking: Reported on 2/27/2025) 90 tablet 3    atorvastatin (LIPITOR) 80 MG tablet Take 1 tablet (80 mg total) by mouth once daily. 90 tablet 3    blood  sugar diagnostic Strp To check BG 1 times daily, to use with insurance preferred meter 100 each 3    blood-glucose meter kit To check BG 1 times daily, to use with insurance preferred meter 1 each 0    HYDROmorphone (DILAUDID) 2 MG tablet Take 1 tablet (2 mg total) by mouth 2 (two) times daily as needed for Pain. 14 tablet 0    lancets Misc To check BG 1 times daily, to use with insurance preferred meter 100 each 3    latanoprost 0.005 % ophthalmic solution INSTILL 1 DROP INTO BOTH EYES EVERY EVENING 7.5 mL 3    levETIRAcetam (KEPPRA) 750 MG Tab TAKE 2 TABLETS BY MOUTH ONCE  DAILY 180 tablet 1    [Paused] metoprolol succinate (TOPROL-XL) 50 MG 24 hr tablet TAKE 1 TABLET BY MOUTH EVERY DAY IN THE EVENING (Patient not taking: Reported on 2/27/2025) 90 tablet 3    multivitamin-minerals-lutein Tab Take 1 tablet by mouth once daily once daily.      mv-mn/iron/folic acid/herb 190 (VITAMIN D3 COMPLETE ORAL) Take 1 tablet by mouth once daily.      ondansetron (ZOFRAN) 4 MG tablet Take 2 tablets (8 mg total) by mouth every 6 (six) hours as needed for Nausea. 30 tablet 0    scopolamine (TRANSDERM-SCOP) 1.3-1.5 mg (1 mg over 3 days) Place 1 patch onto the skin Every 3 (three) days. 3 patch 0    sodium bicarbonate 650 MG tablet Take 1 tablet (650 mg total) by mouth 2 (two) times daily. for 15 days (Patient not taking: Reported on 2/27/2025) 30 tablet 0    sodium chloride 0.9% (NORMAL SALINE FLUSH) injection 10 mLs by Intra-Catheter route once daily. Flush both drains once daily with 10 mL 600 mL 0    [Paused] valsartan (DIOVAN) 320 MG tablet Take 1 tablet (320 mg total) by mouth once daily. (Patient not taking: Reported on 2/27/2025) 90 tablet 2

## 2025-03-10 NOTE — H&P
Short Stay Endoscopy History and Physical    PCP - Shay Castellano MD  Referring Physician - Zaire Glynn MD  [address] [place] [address]    Procedure - EUS  ASA - per anesthesia  Mallampati - per anesthesia  History of Anesthesia problems - no  Family history Anesthesia problems -  no   Plan of anesthesia - General    HPI:  This is a 69 y.o. female here for evaluation of: biliary stricture    Reflux - no  Dysphagia - no  Abdominal pain - no  Diarrhea - no    ROS:  Constitutional: No fevers, chills, No weight loss  CV: No chest pain  Pulm: No cough, No shortness of breath  GI: see HPI    Medical History:  has a past medical history of Cataract, Empty sella syndrome, GHD (growth hormone deficiency), Glaucoma, Graves' disease with exophthalmos, History of vitamin D deficiency, Hyperlipidemia, Hypertension, Seizures, Thyroid nodule, Thyroid nodule, and Type II or unspecified type diabetes mellitus without mention of complication, uncontrolled.    Surgical History:  has a past surgical history that includes Cholecystectomy; Lymph node biopsy (3010); Knee surgery; Hysterectomy; Breast biopsy; Oophorectomy; Cataract extraction w/  intraocular lens implant (Right, 3/15/2021); Cataract extraction w/  intraocular lens implant (Left, 3/29/2021); and Injection of joint (Left, 2/6/2024).    Family History: family history includes Breast cancer (age of onset: 60) in her sister; Breast cancer (age of onset: 78) in her mother; Cancer in her mother; Cataracts in her mother; Glaucoma in her mother; Heart disease in her father, mother, and sister; Hypertension in her brother; No Known Problems in her brother, maternal aunt, maternal grandfather, maternal grandmother, maternal uncle, paternal aunt, paternal grandfather, paternal grandmother, paternal uncle, sister, son, and another family member; Tremor in her mother..    Social History:  reports that she has never smoked. She has never used smokeless  tobacco. She reports that she does not drink alcohol and does not use drugs.    Review of patient's allergies indicates:   Allergen Reactions    Codeine Nausea Only       Medications:   Prescriptions Prior to Admission[1]    Physical Exam:    Vital Signs:   Vitals:    03/10/25 1101   BP: (!) 153/78   Pulse: 88   Resp: 16   Temp: 98.1 °F (36.7 °C)       General Appearance: Well appearing in no acute distress  Lungs: no labored breathing  CVS:  regular rate  Abdomen: non tender    Labs:  Lab Results   Component Value Date    WBC 7.12 02/26/2025    HGB 10.7 (L) 02/26/2025    HCT 31.9 (L) 02/26/2025     02/26/2025    CHOL 174 02/03/2025    TRIG 85 02/03/2025    HDL 44 02/03/2025    ALT 62 (H) 02/26/2025    AST 61 (H) 02/26/2025     02/26/2025    K 3.7 02/26/2025     02/26/2025    CREATININE 0.8 02/26/2025    BUN 5 (L) 02/26/2025    CO2 25 02/26/2025    TSH 1.542 08/03/2023    INR 1.2 02/07/2025    HGBA1C 5.9 (H) 02/03/2025       I have explained the risks and benefits of this endoscopic procedure to the patient including but not limited to bleeding, inflammation, infection, perforation, and death.      Tim Caban MD        [1]   Medications Prior to Admission   Medication Sig Dispense Refill Last Dose/Taking    atorvastatin (LIPITOR) 80 MG tablet Take 1 tablet (80 mg total) by mouth once daily. 90 tablet 3 3/9/2025    HYDROmorphone (DILAUDID) 2 MG tablet Take 1 tablet (2 mg total) by mouth 2 (two) times daily as needed for Pain. 14 tablet 0 3/9/2025    levETIRAcetam (KEPPRA) 750 MG Tab TAKE 2 TABLETS BY MOUTH ONCE  DAILY 180 tablet 1 3/9/2025    ondansetron (ZOFRAN) 4 MG tablet Take 2 tablets (8 mg total) by mouth every 6 (six) hours as needed for Nausea. 30 tablet 0 Past Month    [Paused] amLODIPine (NORVASC) 10 MG tablet TAKE 1 TABLET BY MOUTH EVERY DAY (Patient not taking: Reported on 2/27/2025) 90 tablet 3     blood sugar diagnostic Strp To check BG 1 times daily, to use with insurance  preferred meter 100 each 3     blood-glucose meter kit To check BG 1 times daily, to use with insurance preferred meter 1 each 0     lancets Misc To check BG 1 times daily, to use with insurance preferred meter 100 each 3     latanoprost 0.005 % ophthalmic solution INSTILL 1 DROP INTO BOTH EYES EVERY EVENING 7.5 mL 3     [Paused] metoprolol succinate (TOPROL-XL) 50 MG 24 hr tablet TAKE 1 TABLET BY MOUTH EVERY DAY IN THE EVENING (Patient not taking: Reported on 2/27/2025) 90 tablet 3     multivitamin-minerals-lutein Tab Take 1 tablet by mouth once daily once daily.       mv-mn/iron/folic acid/herb 190 (VITAMIN D3 COMPLETE ORAL) Take 1 tablet by mouth once daily.       scopolamine (TRANSDERM-SCOP) 1.3-1.5 mg (1 mg over 3 days) Place 1 patch onto the skin Every 3 (three) days. 3 patch 0 More than a month    sodium bicarbonate 650 MG tablet Take 1 tablet (650 mg total) by mouth 2 (two) times daily. for 15 days (Patient not taking: Reported on 2/27/2025) 30 tablet 0     sodium chloride 0.9% (NORMAL SALINE FLUSH) injection 10 mLs by Intra-Catheter route once daily. Flush both drains once daily with 10 mL 600 mL 0     [Paused] valsartan (DIOVAN) 320 MG tablet Take 1 tablet (320 mg total) by mouth once daily. (Patient not taking: Reported on 2/27/2025) 90 tablet 2

## 2025-03-11 ENCOUNTER — OFFICE VISIT (OUTPATIENT)
Dept: OBSTETRICS AND GYNECOLOGY | Facility: CLINIC | Age: 70
End: 2025-03-11
Payer: MEDICARE

## 2025-03-11 VITALS
WEIGHT: 156.5 LBS | DIASTOLIC BLOOD PRESSURE: 70 MMHG | BODY MASS INDEX: 31.55 KG/M2 | HEIGHT: 59 IN | SYSTOLIC BLOOD PRESSURE: 120 MMHG

## 2025-03-11 DIAGNOSIS — E78.2 MIXED HYPERLIPIDEMIA: Chronic | ICD-10-CM

## 2025-03-11 DIAGNOSIS — E66.9 DIABETES MELLITUS TYPE 2 IN OBESE: ICD-10-CM

## 2025-03-11 DIAGNOSIS — E05.00 GRAVES' DISEASE WITH EXOPHTHALMOS: ICD-10-CM

## 2025-03-11 DIAGNOSIS — E11.69 DIABETES MELLITUS TYPE 2 IN OBESE: ICD-10-CM

## 2025-03-11 DIAGNOSIS — Z01.419 ENCOUNTER FOR GYNECOLOGICAL EXAMINATION WITHOUT ABNORMAL FINDING: Primary | ICD-10-CM

## 2025-03-11 DIAGNOSIS — E66.9 OBESITY (BMI 30-39.9): ICD-10-CM

## 2025-03-11 DIAGNOSIS — I10 ESSENTIAL HYPERTENSION: Chronic | ICD-10-CM

## 2025-03-11 DIAGNOSIS — Z12.31 VISIT FOR SCREENING MAMMOGRAM: ICD-10-CM

## 2025-03-11 DIAGNOSIS — G40.319 GENERALIZED CONVULSIVE EPILEPSY WITH INTRACTABLE EPILEPSY: ICD-10-CM

## 2025-03-11 PROCEDURE — 3074F SYST BP LT 130 MM HG: CPT | Mod: CPTII,S$GLB,, | Performed by: OBSTETRICS & GYNECOLOGY

## 2025-03-11 PROCEDURE — 3072F LOW RISK FOR RETINOPATHY: CPT | Mod: CPTII,S$GLB,, | Performed by: OBSTETRICS & GYNECOLOGY

## 2025-03-11 PROCEDURE — 3066F NEPHROPATHY DOC TX: CPT | Mod: CPTII,S$GLB,, | Performed by: OBSTETRICS & GYNECOLOGY

## 2025-03-11 PROCEDURE — 1101F PT FALLS ASSESS-DOCD LE1/YR: CPT | Mod: CPTII,S$GLB,, | Performed by: OBSTETRICS & GYNECOLOGY

## 2025-03-11 PROCEDURE — 4010F ACE/ARB THERAPY RXD/TAKEN: CPT | Mod: CPTII,S$GLB,, | Performed by: OBSTETRICS & GYNECOLOGY

## 2025-03-11 PROCEDURE — 3288F FALL RISK ASSESSMENT DOCD: CPT | Mod: CPTII,S$GLB,, | Performed by: OBSTETRICS & GYNECOLOGY

## 2025-03-11 PROCEDURE — 3078F DIAST BP <80 MM HG: CPT | Mod: CPTII,S$GLB,, | Performed by: OBSTETRICS & GYNECOLOGY

## 2025-03-11 PROCEDURE — 99397 PER PM REEVAL EST PAT 65+ YR: CPT | Mod: S$GLB,,, | Performed by: OBSTETRICS & GYNECOLOGY

## 2025-03-11 PROCEDURE — 3061F NEG MICROALBUMINURIA REV: CPT | Mod: CPTII,S$GLB,, | Performed by: OBSTETRICS & GYNECOLOGY

## 2025-03-11 PROCEDURE — 3044F HG A1C LEVEL LT 7.0%: CPT | Mod: CPTII,S$GLB,, | Performed by: OBSTETRICS & GYNECOLOGY

## 2025-03-11 PROCEDURE — 3008F BODY MASS INDEX DOCD: CPT | Mod: CPTII,S$GLB,, | Performed by: OBSTETRICS & GYNECOLOGY

## 2025-03-11 PROCEDURE — 99999 PR PBB SHADOW E&M-EST. PATIENT-LVL III: CPT | Mod: PBBFAC,,, | Performed by: OBSTETRICS & GYNECOLOGY

## 2025-03-11 PROCEDURE — 1126F AMNT PAIN NOTED NONE PRSNT: CPT | Mod: CPTII,S$GLB,, | Performed by: OBSTETRICS & GYNECOLOGY

## 2025-03-11 NOTE — PROGRESS NOTES
HISTORY OF PRESENT ILLNESS:    Katey Cerrato is a 69 y.o. female,   presents today for her routine visit and has no complaints.      Past Medical History:   Diagnosis Date    Cataract     Empty sella syndrome     GHD (growth hormone deficiency)     Glaucoma     Graves' disease with exophthalmos     History of vitamin D deficiency     Hyperlipidemia     Hypertension     Seizures     Thyroid nodule     Thyroid nodule     Type II or unspecified type diabetes mellitus without mention of complication, uncontrolled        Past Surgical History:   Procedure Laterality Date    BREAST BIOPSY      CATARACT EXTRACTION W/  INTRAOCULAR LENS IMPLANT Right 3/15/2021    Procedure: EXTRACTION, CATARACT, WITH IOL INSERTION;  Surgeon: Cj Caban MD;  Location: [facility] OR;  Service: Ophthalmology;  Laterality: Right;    CATARACT EXTRACTION W/  INTRAOCULAR LENS IMPLANT Left 3/29/2021    Procedure: EXTRACTION, CATARACT, WITH IOL INSERTION;  Surgeon: Cj Caban MD;  Location: [facility] OR;  Service: Ophthalmology;  Laterality: Left;    CHOLECYSTECTOMY      ENDOSCOPIC ULTRASOUND OF UPPER GASTROINTESTINAL TRACT N/A 3/10/2025    Procedure: ULTRASOUND, UPPER GI TRACT, ENDOSCOPIC;  Surgeon: Tim Caban MD;  Location: [facility] ([address] [place] [address]);  Service: Endoscopy;  Laterality: N/A;  : EUS for FNA of lymph nodes-instructions emailed-    HYSTERECTOMY      one ovary intact    INJECTION OF JOINT Left 2024    Procedure: INJECTION, JOINT LEFT KNEE WITH STEROID;  Surgeon: She Schwartz MD;  [facility];  Service: Pain Management;  Laterality: Left;  [phone]    KNEE SURGERY      LYMPH NODE BIOPSY  3010    OOPHORECTOMY         MEDICATIONS AND ALLERGIES:      Current Outpatient Medications:     [Paused] amLODIPine (NORVASC) 10 MG tablet, TAKE 1 TABLET BY MOUTH EVERY DAY, Disp: 90 tablet, Rfl: 3    atorvastatin (LIPITOR) 80 MG tablet, Take 1 tablet (80 mg total) by mouth once daily., Disp: 90 tablet, Rfl: 3     blood sugar diagnostic Strp, To check BG 1 times daily, to use with insurance preferred meter, Disp: 100 each, Rfl: 3    HYDROmorphone (DILAUDID) 2 MG tablet, Take 1 tablet (2 mg total) by mouth 2 (two) times daily as needed for Pain., Disp: 14 tablet, Rfl: 0    lancets Misc, To check BG 1 times daily, to use with insurance preferred meter, Disp: 100 each, Rfl: 3    latanoprost 0.005 % ophthalmic solution, INSTILL 1 DROP INTO BOTH EYES EVERY EVENING, Disp: 7.5 mL, Rfl: 3    levETIRAcetam (KEPPRA) 750 MG Tab, TAKE 2 TABLETS BY MOUTH ONCE  DAILY, Disp: 180 tablet, Rfl: 1    [Paused] metoprolol succinate (TOPROL-XL) 50 MG 24 hr tablet, TAKE 1 TABLET BY MOUTH EVERY DAY IN THE EVENING, Disp: 90 tablet, Rfl: 3    multivitamin-minerals-lutein Tab, Take 1 tablet by mouth once daily once daily., Disp: , Rfl:     mv-mn/iron/folic acid/herb 190 (VITAMIN D3 COMPLETE ORAL), Take 1 tablet by mouth once daily., Disp: , Rfl:     ondansetron (ZOFRAN) 4 MG tablet, Take 2 tablets (8 mg total) by mouth every 6 (six) hours as needed for Nausea., Disp: 30 tablet, Rfl: 0    scopolamine (TRANSDERM-SCOP) 1.3-1.5 mg (1 mg over 3 days), Place 1 patch onto the skin Every 3 (three) days., Disp: 3 patch, Rfl: 0    sodium chloride 0.9% (NORMAL SALINE FLUSH) injection, 10 mLs by Intra-Catheter route once daily. Flush both drains once daily with 10 mL, Disp: 600 mL, Rfl: 0    [Paused] valsartan (DIOVAN) 320 MG tablet, Take 1 tablet (320 mg total) by mouth once daily., Disp: 90 tablet, Rfl: 2    blood-glucose meter kit, To check BG 1 times daily, to use with insurance preferred meter, Disp: 1 each, Rfl: 0    sodium bicarbonate 650 MG tablet, Take 1 tablet (650 mg total) by mouth 2 (two) times daily. for 15 days (Patient not taking: Reported on 2/27/2025), Disp: 30 tablet, Rfl: 0  No current facility-administered medications for this visit.    Review of patient's allergies indicates:   Allergen Reactions    Codeine Nausea Only       Family History    Problem Relation Name Age of Onset    Cancer Mother      Cataracts Mother      Glaucoma Mother          shunt    Heart disease Mother      Tremor Mother      Breast cancer Mother  78    Heart disease Father 64   chf     Heart disease Sister      No Known Problems Sister      Breast cancer Sister  60    Hypertension Brother      No Known Problems Brother      No Known Problems Maternal Aunt      No Known Problems Maternal Uncle      No Known Problems Paternal Aunt      No Known Problems Paternal Uncle      No Known Problems Maternal Grandmother      No Known Problems Maternal Grandfather      No Known Problems Paternal Grandmother      No Known Problems Paternal Grandfather      No Known Problems Son      No Known Problems Other         Social History     Socioeconomic History    Marital status:    Occupational History     Employer: [employer]   Tobacco Use    Smoking status: Never    Smokeless tobacco: Never   Substance and Sexual Activity    Alcohol use: No     Comment: none    Drug use: No    Sexual activity: Yes     Partners: Male     Comment: m  works in dietary,  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     Social Drivers of Health     Financial Resource Strain: Low Risk  (2/4/2025)    Overall Financial Resource Strain (CARDIA)     Difficulty of Paying Living Expenses: Not hard at all   Food Insecurity: No Food Insecurity (2/4/2025)    Hunger Vital Sign     Worried About Running Out of Food in the Last Year: Never true     Ran Out of Food in the Last Year: Never true   Transportation Needs: No Transportation Needs (2/4/2025)    TRANSPORTATION NEEDS     Transportation : No   Physical Activity: Inactive (2/4/2025)    Exercise Vital Sign     Days of Exercise per Week: 0 days     Minutes of Exercise per Session: 0 min   Stress: Stress Concern Present (2/4/2025)    [employer] - Occupational Stress Questionnaire     Feeling of Stress : Very much   Housing Stability:  "Unknown (2/4/2025)    Housing Stability Vital Sign     Unable to Pay for Housing in the Last Year: No     Homeless in the Last Year: No     COMPREHENSIVE GYN HISTORY:  PAP History: Denies abnormal Paps.  Infection History: Denies STDs. Denies PID.  Benign History: Denies uterine fibroids. Denies ovarian cysts. Denies endometriosis. Denies other conditions.  Cancer History: Denies cervical cancer. Denies uterine cancer or hyperplasia. Denies ovarian cancer. Denies vulvar cancer or pre-cancer. Denies vaginal cancer or pre-cancer. Denies breast cancer. Denies colon cancer.  Sexual Activity History: Reports currently being sexually active  Menstrual History: Denies menses. Pt is  not on ERT.     ROS:  GENERAL: No weight changes. No swelling. No fatigue. No fever.  CARDIOVASCULAR: No chest pain. No shortness of breath. No leg cramps.   NEUROLOGICAL: No headaches. No vision changes.  BREASTS: No pain. No lumps. No discharge.  ABDOMEN: No pain. No nausea. No vomiting. No diarrhea. No constipation.  REPRODUCTIVE: No abnormal bleeding.  VULVA: No pain. No lesions. No itching.  VAGINA: No relaxation. No itching. No odor. No discharge. No lesions.  URINARY: No incontinence. No nocturia. No frequency. No dysuria.    /70 (BP Location: Right arm, Patient Position: Sitting)   Ht 4' 11" (1.499 m)   Wt 71 kg (156 lb 8.4 oz)   BMI 31.61 kg/m²     PE:  APPEARANCE: Well nourished, well developed, in no acute distress.  AFFECT: WNL, alert and oriented x 3.  SKIN: No acne or hirsutism.  NECK: Neck symmetric without masses or thyromegaly.  NODES: No inguinal, cervical, axillary or femoral lymph node enlargement.  CHEST: Good respiratory effort.   ABDOMEN: Soft. No tenderness or masses. No hepatosplenomegaly. No hernias.  BREASTS: Symmetrical, no skin changes or visible lesions. No palpable masses, nipple discharge bilaterally.  PELVIC: ATROPHIC EXTERNAL FEMALE GENITALIA without lesions. Normal hair distribution. Adequate " perineal body, normal urethral meatus. VAGINA DRY without lesions or discharge. 2nd-3rd degree cystocele, no rectocele. Bimanual exam shows CERVIX and UTERUS to be SURGICALLY ABSENT. Adnexa without masses or tenderness.  EXTREMITIES: No edema.    DIAGNOSIS:  1. Encounter for gynecological examination without abnormal finding    2. Visit for screening mammogram    3. Generalized convulsive epilepsy with intractable epilepsy    4. Essential hypertension    5. Mixed hyperlipidemia    6. Diabetes mellitus type 2 in obese    7. Graves' disease with exophthalmos    8. Obesity (BMI 30-39.9)      COUNSELING:  The patient was counseled today on  -osteoporosis prevention, calcium supplementation, regular weight bearing exercise.  -ACS PAP guidelines (no paps), with recommendations for yearly pelvic exams as her uterus and cervix were removed for benign reasons and ovaries remain;  -recommendation for yearly mammogram;  -to see her primary care physician for all other health maintenance.    FOLLOW-UP with me for next routine visit.

## 2025-03-11 NOTE — ANESTHESIA POSTPROCEDURE EVALUATION
Anesthesia Post Evaluation    Patient: Katey Cerrato    Procedure(s) Performed: Procedure(s) (LRB):  ULTRASOUND, UPPER GI TRACT, ENDOSCOPIC (N/A)    Final Anesthesia Type: general      Patient location during evaluation: PACU  Patient participation: Yes- Able to Participate  Level of consciousness: awake and alert  Post-procedure vital signs: reviewed and stable  Pain management: adequate  Airway patency: patent    PONV status at discharge: No PONV  Anesthetic complications: no      Cardiovascular status: hemodynamically stable  Respiratory status: spontaneous ventilation  Follow-up not needed.              Vitals Value Taken Time   /92 03/10/25 12:45   Temp 36.7 °C (98.1 °F) 03/10/25 11:47   Pulse 75 03/10/25 13:00   Resp 20 03/10/25 12:59   SpO2 97 % 03/10/25 13:00   Vitals shown include unfiled device data.      No case tracking events are documented in the log.      Pain/Sarita Score: Sarita Score: 10 (3/10/2025 12:44 PM)

## 2025-03-12 ENCOUNTER — TELEPHONE (OUTPATIENT)
Dept: SURGERY | Facility: CLINIC | Age: 70
End: 2025-03-12
Payer: MEDICARE

## 2025-03-12 NOTE — TELEPHONE ENCOUNTER
HH nurse called with concerns about the amount of drainage leaking from ptc sites. Pt has no s/s infection at the moment but reports having to change gauze dressings multiple times a day. Pt has no issues with flushing daily and reports drainage from within the drainage tubes. She was advised to cont to keep sites as clean as possible and cover as needed with gauze to help with leakage. She states her sodium bicarbonate pill makes her nauseated and is asking if she needs to cont taking them. She also would like to know when she has to resume her Trulicity + Bp pills that were d/c'd upon hospital discharge. All of the above was communicated to Dr. Glynn and pt was advised to stop taking sodium bicarb now, cont to hold Bp pills + Trulicity for the time being. Pt was offered a clinic appt tmrw with GINI Almaraz NP for drain concerns but will wait for her clinic appt next week on 3/20. Pt aware of upcoming PET scan ppt/fasting instructions on Monday 3/17 and clinic appt on 3/20.

## 2025-03-13 LAB
COMMENT: ABNORMAL
FINAL PATHOLOGIC DIAGNOSIS: ABNORMAL

## 2025-03-14 ENCOUNTER — TELEPHONE (OUTPATIENT)
Dept: HEMATOLOGY/ONCOLOGY | Facility: CLINIC | Age: 70
End: 2025-03-14
Payer: MEDICARE

## 2025-03-17 ENCOUNTER — HOSPITAL ENCOUNTER (OUTPATIENT)
Dept: RADIOLOGY | Facility: HOSPITAL | Age: 70
Discharge: HOME | End: 2025-03-17
Attending: SURGERY
Payer: MEDICARE

## 2025-03-17 ENCOUNTER — TELEPHONE (OUTPATIENT)
Dept: SURGERY | Facility: CLINIC | Age: 70
End: 2025-03-17
Payer: MEDICARE

## 2025-03-17 DIAGNOSIS — C24.0 HILAR CHOLANGIOCARCINOMA: ICD-10-CM

## 2025-03-17 LAB — POCT GLUCOSE: 111 MG/DL (ref 70–110)

## 2025-03-17 PROCEDURE — 78815 PET IMAGE W/CT SKULL-THIGH: CPT | Mod: 26,PI,, | Performed by: NUCLEAR MEDICINE

## 2025-03-17 PROCEDURE — 78815 PET IMAGE W/CT SKULL-THIGH: CPT | Mod: TC

## 2025-03-17 PROCEDURE — A9552 F18 FDG: HCPCS | Performed by: SURGERY

## 2025-03-17 RX ORDER — FLUDEOXYGLUCOSE F18 500 MCI/ML
10.76 INJECTION INTRAVENOUS
Status: COMPLETED | OUTPATIENT
Start: 2025-03-17 | End: 2025-03-17

## 2025-03-17 RX ADMIN — FLUDEOXYGLUCOSE F-18 10.76 MILLICURIE: 500 INJECTION INTRAVENOUS at 08:03

## 2025-03-17 NOTE — TELEPHONE ENCOUNTER
I discussed the pathology from her EUS and lymph node biopsy and the results of her PET CT with her. The lymph node biopsy showed metastatic adenocarcinoma in a lymph node. The PET showed extraregional lymphadenopathy. This is all consistent with metastatic hilar cholangiocarcinoma. She is scheduled to see Dr. Johnson this week. We discussed that the primary treatment will be palliative systemic therapy.

## 2025-03-18 NOTE — PROGRESS NOTES
"  CC:  Unresectable hilar cholangiocarcinoma, MMR-proficient    HPI:   Ms. Cerrato is a 70 yo woman with empty sella syndrome, growth hormone deficiency, grave's disease with exophthalmos, HTN, seizures, T2DM, who presents today for further management of unresectable hilar cholangiocarcinoma.   She presented to OSH with jaundice and scleral icterus. Initial CT of the abdomen and pelvis with IV contrast showed significant intrahepatic biliary ductal dilatation and questionable 9 mm fat containing lesion in the uncinate process of the pancreas. Subsequent MRCP showed "severe intrahepatic bile duct dilatation, with abrupt narrowing at the confluence of the hepatic ducts where there is a suspected ill-defined liver mass. Repeat imaging with MRI abdomen with and without contrast showed "hypoattenuating lesion near the confluence of the right and left hepatic ducts causing severe upstream ductal dilatation concerning for cholangiocarcinoma.  Further evaluation with ERCP is recommended." Patient transferred to [facility] for further workup and evaluation. CEA elevated, CA 19-9 WNL. PCT placed 2/8/25. She was discharged and PTC x2 remains capped.  2/18/25: CT A/P:  Impression:  1. Redemonstration of bilateral internal external biliary drains in place, with distal tips terminating in the duodenum.  Distal left biliary drain terminates without pigtail configuration.  Mild persistent intrahepatic biliary ductal dilatation.  All of these findings appear similar compared to 02/12/2025 without significant detrimental change.  2. Few areas of mesenteric stranding and nodularity adjacent to the duodenum, splenic flexure of the colon, and descending colon, as described above.  Increased conspicuity of these areas compared to 02/03/2025.  Findings are nonspecific, may represent infectious or inflammatory changes, however given history of cholangiocarcinoma, sequela of peritoneal metastasis cannot be excluded.  Attention on follow-up imaging " is recommended.  3. Multiple scattered prominent lymph nodes without pathologic enlargement by size criteria.  4. Other findings, as above.  Upper EUS with ceferino hepatis lymph node biopsy on 3/10/25 showed moderately differentiated metastatic adenocarcinoma. MMR-proficient.   Seen by Dr Glynn. Dr Glynn does not feel she is a surgical candidate nor the disease is resectable. There is distant lymph node involvement on PET. Recc palliative chemo +/- chemoradiation if she responds well.  Patient presents today for further management. Noted N/V every time she changes the gauze of her leaking PTC tube site. Not eating. Followed by dietician. Feels weak.       ECOG: 3     Past Medical History:   Diagnosis Date    Cataract     Empty sella syndrome     GHD (growth hormone deficiency)     Glaucoma     Graves' disease with exophthalmos     History of vitamin D deficiency     Hyperlipidemia     Hypertension     Seizures     Thyroid nodule     Thyroid nodule     Type II or unspecified type diabetes mellitus without mention of complication, uncontrolled         Past Surgical History:   Procedure Laterality Date    BREAST BIOPSY      CATARACT EXTRACTION W/  INTRAOCULAR LENS IMPLANT Right 3/15/2021    Procedure: EXTRACTION, CATARACT, WITH IOL INSERTION;  Surgeon: Cj Caban MD;  Location: [facility] OR;  Service: Ophthalmology;  Laterality: Right;    CATARACT EXTRACTION W/  INTRAOCULAR LENS IMPLANT Left 3/29/2021    Procedure: EXTRACTION, CATARACT, WITH IOL INSERTION;  Surgeon: Cj Caban MD;  Location: [facility] OR;  Service: Ophthalmology;  Laterality: Left;    CHOLECYSTECTOMY      ENDOSCOPIC ULTRASOUND OF UPPER GASTROINTESTINAL TRACT N/A 3/10/2025    Procedure: ULTRASOUND, UPPER GI TRACT, ENDOSCOPIC;  Surgeon: Tim Caban MD;  Location: [facility] ([address] [place] [address]);  Service: Endoscopy;  Laterality: N/A;  2/28: EUS for FNA of lymph nodes-instructions emailed-ch    HYSTERECTOMY      one ovary intact    INJECTION OF  JOINT Left 2/6/2024    Procedure: INJECTION, JOINT LEFT KNEE WITH STEROID;  Surgeon: She Schwartz MD;  Location[facility];  Service: Pain Management;  Laterality: Left;  [phone]    KNEE SURGERY      LYMPH NODE BIOPSY  3010    OOPHORECTOMY         Family History   Problem Relation Name Age of Onset    Cancer Mother      Cataracts Mother      Glaucoma Mother          shunt    Heart disease Mother      Tremor Mother      Breast cancer Mother  78    Heart disease Father 64   chf     Heart disease Sister      No Known Problems Sister      Breast cancer Sister  60    Hypertension Brother      No Known Problems Brother      No Known Problems Maternal Aunt      No Known Problems Maternal Uncle      No Known Problems Paternal Aunt      No Known Problems Paternal Uncle      No Known Problems Maternal Grandmother      No Known Problems Maternal Grandfather      No Known Problems Paternal Grandmother      No Known Problems Paternal Grandfather      No Known Problems Son      No Known Problems Other         Social History[1]    Review of Systems   Constitutional:  Positive for appetite change, fatigue and unexpected weight change. Negative for chills and fever.   HENT:  Negative for mouth sores, nosebleeds, tinnitus, trouble swallowing and voice change.    Eyes:  Negative for pain, redness and visual disturbance.   Respiratory:  Negative for cough, shortness of breath and wheezing.    Cardiovascular:  Negative for chest pain, palpitations and leg swelling.   Gastrointestinal:  Positive for nausea and vomiting. Negative for abdominal distention, abdominal pain, blood in stool, constipation and diarrhea.   Endocrine: Negative for polydipsia, polyphagia and polyuria.   Genitourinary:  Negative for flank pain, frequency and hematuria.   Musculoskeletal:  Negative for arthralgias, back pain, gait problem, joint swelling, myalgias, neck pain and neck stiffness.   Skin:  Negative for color  change, pallor, rash and wound.   Neurological:  Positive for weakness. Negative for tremors, seizures, syncope, speech difficulty, light-headedness, numbness and headaches.   Hematological:  Negative for adenopathy. Does not bruise/bleed easily.   Psychiatric/Behavioral:  Negative for confusion, dysphoric mood and self-injury. The patient is not nervous/anxious.    All other systems reviewed and are negative.      Objective:  Physical Exam  Vitals reviewed.   Constitutional:       General: She is not in acute distress.     Appearance: She is well-developed. She is ill-appearing. She is not diaphoretic.   HENT:      Head: Normocephalic and atraumatic.      Mouth/Throat:      Pharynx: No oropharyngeal exudate.   Eyes:      General: Scleral icterus present.      Conjunctiva/sclera: Conjunctivae normal.      Pupils: Pupils are equal, round, and reactive to light.   Neck:      Thyroid: No thyromegaly.      Vascular: No JVD.   Cardiovascular:      Rate and Rhythm: Normal rate and regular rhythm.      Heart sounds: Normal heart sounds. No murmur heard.     No friction rub. No gallop.   Pulmonary:      Effort: Pulmonary effort is normal. No respiratory distress.      Breath sounds: Normal breath sounds. No wheezing or rales.   Abdominal:      General: Bowel sounds are normal. There is no distension.      Palpations: Abdomen is soft. There is no mass.      Tenderness: There is no abdominal tenderness. There is no rebound.      Hernia: No hernia is present.   Musculoskeletal:         General: No tenderness or deformity. Normal range of motion.      Cervical back: Normal range of motion and neck supple.   Lymphadenopathy:      Cervical: No cervical adenopathy.      Upper Body:      Right upper body: No supraclavicular adenopathy.      Left upper body: No supraclavicular adenopathy.   Skin:     General: Skin is warm and dry.      Coloration: Skin is not pale.      Findings: No erythema or rash.   Neurological:      Mental  Status: She is alert and oriented to person, place, and time.      Cranial Nerves: No cranial nerve deficit.      Motor: No abnormal muscle tone.   Psychiatric:         Behavior: Behavior normal.         Thought Content: Thought content normal.         Judgment: Judgment normal.       Labs:  Prior labs reviewed. Bilirubin 2.0. CA 19-9 non     Imaging Data:  PET 3/17/25:  Impression:     In patient with recent lymph node biopsy demonstrating metastatic adenocarcinoma, multiple tracer avid upper abdominal lymph nodes with index lesions as detailed above.  No definitive focal tracer avid lesion in the liver.     Stable position of bilateral internal/external biliary drains with mild intrahepatic biliary ductal dilatation.     Decrease mesenteric stranding adjacent to the duodenum and descending colon as seen on prior CT.       Assessment and plan:    1. Cholangiocarcinoma    2. Secondary malignant neoplasm of intra-abdominal lymph nodes    3. Anorexia    4. Intractable nausea and vomiting    5. Biliary obstruction      1.2  - Ms Saqib is a 68 yo woman with empty sella syndrome, growth hormone deficiency, grave's disease with exophthalmos, HTN, seizures, T2DM, who presents today for further management of unresectable hilar cholangiocarcinoma, MMR-proficient  - Long discussion with patient and daughter-in-law re the diagnosis, natural history and prognosis of an unresectable hilar cholangiocarcinoma. Cancer is not curable but treatable. The goal of treatment is palliative, with hopes of containing the cancer, slowing down growth, and prolonging life.   - discussed palliative chemoimmunotherapy with cis/gem/durv. Reviewed side effects in detail. Handouts provided to patient  - she is weak. Will hold off on audiogram for now.   - consult IR for port placement  - return in 2 weeks to start chemo  - PGX, tempus blood today  - symptom management see below    3.  - add periactin tid  - add zyprexa 5 mg at night  -  refer to palliative    4.  - add zyprexa 5 mg at night  - add phenergan prn in addition to zofran    5.  - long discussion. Most likely she will need these tubes indefinitely. Needs tube exchange. Dr Glynn has reached out to Dr Ramirez for tube exchange    I spent 60 minutes reviewing the chart, interpreting laboratory result and imaging data, coordinating patient's care, with at least 50% of the time on face-to-face counseling.   Route Chart for Scheduling    Med Onc Chart Routing  Urgent    Follow up with physician . Check with prior auth re cis/gem/durv. see ANA M in 2 weeks with CBC, CMP, TSH then get chemo same day (patient is very weak). see me or ANA M in 4 weeks with labs then chemo same day. please schedule patient to see palliative   Follow up with ANA M    Infusion scheduling note New or changed treatment   cis/gem/durv every 2 weeks   Injection scheduling note    Labs CBC, CMP and TSH   Scheduling:  Preferred lab: same day as chemo. patient very weak.  Lab interval: every 2 weeks     Imaging    Pharmacy appointment    Other referrals       Additional referrals needed  palliative                     [1]  Social History  Socioeconomic History    Marital status:    Occupational History     Employer: [employer]   Tobacco Use    Smoking status: Never    Smokeless tobacco: Never   Substance and Sexual Activity    Alcohol use: No     Comment: none    Drug use: No    Sexual activity: Yes     Partners: Male     Comment: m  works in dietary,  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     Social Drivers of Health     Financial Resource Strain: Low Risk  (2/4/2025)    Overall Financial Resource Strain (CARDIA)     Difficulty of Paying Living Expenses: Not hard at all   Food Insecurity: No Food Insecurity (2/4/2025)    Hunger Vital Sign     Worried About Running Out of Food in the Last Year: Never true     Ran Out of Food in the Last Year: Never true   Transportation Needs: No Transportation Needs  (2/4/2025)    TRANSPORTATION NEEDS     Transportation : No   Physical Activity: Inactive (2/4/2025)    Exercise Vital Sign     Days of Exercise per Week: 0 days     Minutes of Exercise per Session: 0 min   Stress: Stress Concern Present (2/4/2025)    [employer] - Occupational Stress Questionnaire     Feeling of Stress : Very much   Housing Stability: Unknown (2/4/2025)    Housing Stability Vital Sign     Unable to Pay for Housing in the Last Year: No     Homeless in the Last Year: No

## 2025-03-19 ENCOUNTER — TELEPHONE (OUTPATIENT)
Dept: HEMATOLOGY/ONCOLOGY | Facility: CLINIC | Age: 70
End: 2025-03-19
Payer: MEDICARE

## 2025-03-19 ENCOUNTER — OFFICE VISIT (OUTPATIENT)
Dept: HEMATOLOGY/ONCOLOGY | Facility: CLINIC | Age: 70
End: 2025-03-19
Payer: MEDICARE

## 2025-03-19 ENCOUNTER — LAB VISIT (OUTPATIENT)
Dept: LAB | Facility: HOSPITAL | Age: 70
End: 2025-03-19
Attending: INTERNAL MEDICINE
Payer: MEDICARE

## 2025-03-19 VITALS
HEIGHT: 59 IN | OXYGEN SATURATION: 98 % | DIASTOLIC BLOOD PRESSURE: 82 MMHG | BODY MASS INDEX: 31.61 KG/M2 | SYSTOLIC BLOOD PRESSURE: 133 MMHG | HEART RATE: 83 BPM

## 2025-03-19 DIAGNOSIS — C22.1 CHOLANGIOCARCINOMA: Primary | ICD-10-CM

## 2025-03-19 DIAGNOSIS — R53.83 FATIGUE, UNSPECIFIED TYPE: ICD-10-CM

## 2025-03-19 DIAGNOSIS — R11.2 INTRACTABLE NAUSEA AND VOMITING: ICD-10-CM

## 2025-03-19 DIAGNOSIS — K83.1 BILIARY OBSTRUCTION: ICD-10-CM

## 2025-03-19 DIAGNOSIS — R63.0 ANOREXIA: ICD-10-CM

## 2025-03-19 DIAGNOSIS — C24.0 HILAR CHOLANGIOCARCINOMA: Primary | ICD-10-CM

## 2025-03-19 DIAGNOSIS — C22.1 CHOLANGIOCARCINOMA: ICD-10-CM

## 2025-03-19 DIAGNOSIS — C77.2 SECONDARY MALIGNANT NEOPLASM OF INTRA-ABDOMINAL LYMPH NODES: ICD-10-CM

## 2025-03-19 PROBLEM — E66.01 SEVERE OBESITY: Status: RESOLVED | Noted: 2022-07-12 | Resolved: 2025-03-19

## 2025-03-19 LAB
ALBUMIN SERPL BCP-MCNC: 2.2 G/DL (ref 3.5–5.2)
ALP SERPL-CCNC: 375 U/L (ref 40–150)
ALT SERPL W/O P-5'-P-CCNC: 20 U/L (ref 10–44)
ANION GAP SERPL CALC-SCNC: 15 MMOL/L (ref 8–16)
AST SERPL-CCNC: 30 U/L (ref 10–40)
BASOPHILS # BLD AUTO: 0.02 K/UL (ref 0–0.2)
BASOPHILS NFR BLD: 0.2 % (ref 0–1.9)
BILIRUB SERPL-MCNC: 1.7 MG/DL (ref 0.1–1)
BUN SERPL-MCNC: 9 MG/DL (ref 8–23)
CALCIUM SERPL-MCNC: 9.5 MG/DL (ref 8.7–10.5)
CHLORIDE SERPL-SCNC: 96 MMOL/L (ref 95–110)
CO2 SERPL-SCNC: 25 MMOL/L (ref 23–29)
CREAT SERPL-MCNC: 0.7 MG/DL (ref 0.5–1.4)
DIFFERENTIAL METHOD BLD: ABNORMAL
EOSINOPHIL # BLD AUTO: 0 K/UL (ref 0–0.5)
EOSINOPHIL NFR BLD: 0.1 % (ref 0–8)
ERYTHROCYTE [DISTWIDTH] IN BLOOD BY AUTOMATED COUNT: 15.6 % (ref 11.5–14.5)
EST. GFR  (NO RACE VARIABLE): >60 ML/MIN/1.73 M^2
GLUCOSE SERPL-MCNC: 101 MG/DL (ref 70–110)
HCT VFR BLD AUTO: 31.6 % (ref 37–48.5)
HGB BLD-MCNC: 10.5 G/DL (ref 12–16)
IMM GRANULOCYTES # BLD AUTO: 0.07 K/UL (ref 0–0.04)
IMM GRANULOCYTES NFR BLD AUTO: 0.6 % (ref 0–0.5)
LYMPHOCYTES # BLD AUTO: 1.6 K/UL (ref 1–4.8)
LYMPHOCYTES NFR BLD: 14.5 % (ref 18–48)
MCH RBC QN AUTO: 29.4 PG (ref 27–31)
MCHC RBC AUTO-ENTMCNC: 33.2 G/DL (ref 32–36)
MCV RBC AUTO: 89 FL (ref 82–98)
MONOCYTES # BLD AUTO: 0.7 K/UL (ref 0.3–1)
MONOCYTES NFR BLD: 6.1 % (ref 4–15)
NEUTROPHILS # BLD AUTO: 8.6 K/UL (ref 1.8–7.7)
NEUTROPHILS NFR BLD: 78.5 % (ref 38–73)
NRBC BLD-RTO: 0 /100 WBC
PLATELET # BLD AUTO: 404 K/UL (ref 150–450)
PMV BLD AUTO: 9.6 FL (ref 9.2–12.9)
POTASSIUM SERPL-SCNC: 3.3 MMOL/L (ref 3.5–5.1)
PROT SERPL-MCNC: 8.1 G/DL (ref 6–8.4)
RBC # BLD AUTO: 3.57 M/UL (ref 4–5.4)
SODIUM SERPL-SCNC: 136 MMOL/L (ref 136–145)
WBC # BLD AUTO: 10.94 K/UL (ref 3.9–12.7)

## 2025-03-19 PROCEDURE — 1125F AMNT PAIN NOTED PAIN PRSNT: CPT | Mod: CPTII,S$GLB,, | Performed by: INTERNAL MEDICINE

## 2025-03-19 PROCEDURE — G2211 COMPLEX E/M VISIT ADD ON: HCPCS | Mod: S$GLB,,, | Performed by: INTERNAL MEDICINE

## 2025-03-19 PROCEDURE — 3079F DIAST BP 80-89 MM HG: CPT | Mod: CPTII,S$GLB,, | Performed by: INTERNAL MEDICINE

## 2025-03-19 PROCEDURE — 3061F NEG MICROALBUMINURIA REV: CPT | Mod: CPTII,S$GLB,, | Performed by: INTERNAL MEDICINE

## 2025-03-19 PROCEDURE — 99999 PR PBB SHADOW E&M-EST. PATIENT-LVL IV: CPT | Mod: PBBFAC,,, | Performed by: INTERNAL MEDICINE

## 2025-03-19 PROCEDURE — 3008F BODY MASS INDEX DOCD: CPT | Mod: CPTII,S$GLB,, | Performed by: INTERNAL MEDICINE

## 2025-03-19 PROCEDURE — 80053 COMPREHEN METABOLIC PANEL: CPT | Performed by: INTERNAL MEDICINE

## 2025-03-19 PROCEDURE — 1160F RVW MEDS BY RX/DR IN RCRD: CPT | Mod: CPTII,S$GLB,, | Performed by: INTERNAL MEDICINE

## 2025-03-19 PROCEDURE — 85025 COMPLETE CBC W/AUTO DIFF WBC: CPT | Performed by: INTERNAL MEDICINE

## 2025-03-19 PROCEDURE — 3072F LOW RISK FOR RETINOPATHY: CPT | Mod: CPTII,S$GLB,, | Performed by: INTERNAL MEDICINE

## 2025-03-19 PROCEDURE — 3066F NEPHROPATHY DOC TX: CPT | Mod: CPTII,S$GLB,, | Performed by: INTERNAL MEDICINE

## 2025-03-19 PROCEDURE — 3075F SYST BP GE 130 - 139MM HG: CPT | Mod: CPTII,S$GLB,, | Performed by: INTERNAL MEDICINE

## 2025-03-19 PROCEDURE — 3288F FALL RISK ASSESSMENT DOCD: CPT | Mod: CPTII,S$GLB,, | Performed by: INTERNAL MEDICINE

## 2025-03-19 PROCEDURE — 1111F DSCHRG MED/CURRENT MED MERGE: CPT | Mod: CPTII,S$GLB,, | Performed by: INTERNAL MEDICINE

## 2025-03-19 PROCEDURE — 1159F MED LIST DOCD IN RCRD: CPT | Mod: CPTII,S$GLB,, | Performed by: INTERNAL MEDICINE

## 2025-03-19 PROCEDURE — 3044F HG A1C LEVEL LT 7.0%: CPT | Mod: CPTII,S$GLB,, | Performed by: INTERNAL MEDICINE

## 2025-03-19 PROCEDURE — 1101F PT FALLS ASSESS-DOCD LE1/YR: CPT | Mod: CPTII,S$GLB,, | Performed by: INTERNAL MEDICINE

## 2025-03-19 PROCEDURE — 99205 OFFICE O/P NEW HI 60 MIN: CPT | Mod: S$GLB,,, | Performed by: INTERNAL MEDICINE

## 2025-03-19 RX ORDER — OLANZAPINE 5 MG/1
5 TABLET ORAL NIGHTLY
Qty: 30 TABLET | Refills: 2 | Status: SHIPPED | OUTPATIENT
Start: 2025-03-19 | End: 2026-03-19

## 2025-03-19 RX ORDER — CYPROHEPTADINE HYDROCHLORIDE 4 MG/1
4 TABLET ORAL 3 TIMES DAILY
Qty: 90 TABLET | Refills: 3 | Status: SHIPPED | OUTPATIENT
Start: 2025-03-19

## 2025-03-19 RX ORDER — PROMETHAZINE HYDROCHLORIDE 25 MG/1
25 TABLET ORAL EVERY 6 HOURS PRN
Qty: 120 TABLET | Refills: 2 | Status: SHIPPED | OUTPATIENT
Start: 2025-03-19

## 2025-03-19 RX ORDER — LIDOCAINE AND PRILOCAINE 25; 25 MG/G; MG/G
CREAM TOPICAL ONCE
Qty: 30 G | Refills: 2 | Status: SHIPPED | OUTPATIENT
Start: 2025-03-19 | End: 2025-03-20

## 2025-03-19 NOTE — TELEPHONE ENCOUNTER
----- Message from JAYLEEN Diop sent at 3/19/2025 10:07 AM CDT -----  Regarding: FW: Return call  Contact: [phone]  Pt returning Dayami's call, Marcelle.- Chikis  ----- Message -----  From: Callie Chino  Sent: 3/19/2025   9:41 AM CDT  To: Alex [facility] Staff  Subject: Return call                                      Type:  Patient Returning CallWho Called:The pt Who Left Message for Patient:Dayami Sarkar RN Does the patient know what this is regarding?:Appt Would the patient rather a call back or a response via [facility]? Call back Best Call Back Number: [phone] Information: Thank you.

## 2025-03-21 ENCOUNTER — TELEPHONE (OUTPATIENT)
Dept: SURGERY | Facility: CLINIC | Age: 70
End: 2025-03-21
Payer: MEDICARE

## 2025-03-21 ENCOUNTER — TELEPHONE (OUTPATIENT)
Dept: PALLIATIVE MEDICINE | Facility: CLINIC | Age: 70
End: 2025-03-21
Payer: MEDICARE

## 2025-03-21 ENCOUNTER — PATIENT MESSAGE (OUTPATIENT)
Dept: PALLIATIVE MEDICINE | Facility: CLINIC | Age: 70
End: 2025-03-21
Payer: MEDICARE

## 2025-03-21 DIAGNOSIS — C24.0 HILAR CHOLANGIOCARCINOMA: Primary | ICD-10-CM

## 2025-03-21 NOTE — TELEPHONE ENCOUNTER
Unable to reach pt to discuss port placement on Tuesday, 3/25. Left detailed msg with contact info.

## 2025-03-21 NOTE — TELEPHONE ENCOUNTER
Pt is in agreement with port placement on Tuesday, 3/25. Pt is requesting that her brother is informed of location and directions to hospital.

## 2025-03-21 NOTE — PRE-PROCEDURE INSTRUCTIONS
PreOp Instructions given:   - Verbal medication information (what to hold and what to take)   - NPO guidelines 2300  - Arrival place directions given; time to be given the day before procedure by the   Surgeon's Office DOSC   - Bathing with antibacterial soap   - Don't wear any jewelry or bring any valuables AM of surgery   - No makeup or moisturizer to face   - No perfume/cologne, powder, lotions or aftershave   Pt. verbalized understanding.   Pt denies any h/o Anesthesia/Sedation complications or side effects.  Patient does not know arrival time.  Explained that this information comes from the surgeon's office and if they haven't heard from them by 2 or 3 pm to call the office.  Patient stated an understanding.     Msg sent to Ignacia Coffey RN re: pt's questions

## 2025-03-24 ENCOUNTER — TELEPHONE (OUTPATIENT)
Dept: SURGERY | Facility: CLINIC | Age: 70
End: 2025-03-24
Payer: MEDICARE

## 2025-03-24 ENCOUNTER — ANESTHESIA EVENT (OUTPATIENT)
Dept: SURGERY | Facility: HOSPITAL | Age: 70
End: 2025-03-24
Payer: MEDICARE

## 2025-03-24 DIAGNOSIS — Z00.00 ENCOUNTER FOR MEDICARE ANNUAL WELLNESS EXAM: ICD-10-CM

## 2025-03-24 NOTE — ANESTHESIA PREPROCEDURE EVALUATION
[facility]  Anesthesia Pre-Operative Evaluation         Patient Name: Katey Cerrato  YOB: 1955  MRN: [id number]    SUBJECTIVE:     Pre-operative evaluation for Procedure(s) (LRB):  INSERTION, SINGLE LUMEN CATHETER WITH PORT, WITH FLUOROSCOPIC GUIDANCE (N/A)     03/24/2025    Katey Cerrato is a 69 y.o. female w/ a significant PMHx of HTN, epilepsy, PVD, T2DM, and cholangiocarcinoma.    Patient now presents for the above procedure(s).      LDA:        Biliary Tube 02/08/25 1340 10 Fr. LUQ (Active)   Dressing Status Clean;Dry;Intact 03/10/25 1330   Number of days: 43            Biliary Tube 02/08/25 1341 10 Fr. RUQ (Active)   Number of days: 43       Prev airway: None documented.    Drips: None documented.      Problem List[1]    Review of patient's allergies indicates:   Allergen Reactions    Codeine Nausea Only       Current Inpatient Medications:      Medications Ordered Prior to Encounter[2]    Past Surgical History:   Procedure Laterality Date    BREAST BIOPSY      CATARACT EXTRACTION W/  INTRAOCULAR LENS IMPLANT Right 3/15/2021    Procedure: EXTRACTION, CATARACT, WITH IOL INSERTION;  Surgeon: Cj Caban MD;  Location: [facility];  Service: Ophthalmology;  Laterality: Right;    CATARACT EXTRACTION W/  INTRAOCULAR LENS IMPLANT Left 3/29/2021    Procedure: EXTRACTION, CATARACT, WITH IOL INSERTION;  Surgeon: Cj Caban MD;  Location: [facility] OR;  Service: Ophthalmology;  Laterality: Left;    CHOLECYSTECTOMY      ENDOSCOPIC ULTRASOUND OF UPPER GASTROINTESTINAL TRACT N/A 3/10/2025    Procedure: ULTRASOUND, UPPER GI TRACT, ENDOSCOPIC;  Surgeon: Tim Caban MD;  Location: [facility] ENDO ([address] [place] [address]);  Service: Endoscopy;  Laterality: N/A;  2/28: EUS for FNA of lymph nodes-instructions emailed-ch    HYSTERECTOMY      one ovary intact    INJECTION OF JOINT Left 2/6/2024    Procedure: INJECTION, JOINT LEFT KNEE WITH STEROID;  Surgeon: She Schwartz MD;  Location: [facility];   Service: Pain Management;  Laterality: Left;  [phone]    KNEE SURGERY      LYMPH NODE BIOPSY  3010    OOPHORECTOMY         Social History[3]    OBJECTIVE:     Vital Signs Range (Last 24H):         Significant Labs:  Lab Results   Component Value Date    WBC 10.94 03/19/2025    HGB 10.5 (L) 03/19/2025    HCT 31.6 (L) 03/19/2025     03/19/2025    CHOL 174 02/03/2025    TRIG 85 02/03/2025    HDL 44 02/03/2025    ALT 20 03/19/2025    AST 30 03/19/2025     03/19/2025    K 3.3 (L) 03/19/2025    CL 96 03/19/2025    CREATININE 0.7 03/19/2025    BUN 9 03/19/2025    CO2 25 03/19/2025    TSH 1.542 08/03/2023    INR 1.2 02/07/2025    HGBA1C 5.9 (H) 02/03/2025       Diagnostic Studies: No relevant studies.    EKG:   Results for orders placed or performed during the hospital encounter of 02/03/25   EKG 12-lead    Collection Time: 02/18/25  2:12 PM   Result Value Ref Range    QRS Duration 100 ms    [facility] QTC Calculation 421 ms    Narrative    Test Reason : R94.31,    Vent. Rate :  71 BPM     Atrial Rate :  71 BPM     P-R Int : 128 ms          QRS Dur : 100 ms      QT Int : 388 ms       P-R-T Axes :  45  -9  30 degrees    QTcB Int : 421 ms    Normal sinus rhythm  Moderate voltage criteria for LVH, may be normal variant ( R in aVL ,  Ollie product )  Borderline Abnormal ECG  When compared with ECG of 09-Feb-2025 10:03,  Nonspecific T wave abnormality no longer evident in Anterior leads  Criteria for LVH is now present  Confirmed by Ash Parham ([id number]) on 2/18/2025 3:22:20 PM    Referred By: AAAREFERRAL SELF           Confirmed By: Ash Parham       2D ECHO:  TTE:  No results found for this or any previous visit.    DINA:  No results found for this or any previous visit.    ASSESSMENT/PLAN:                                                                                                           03/24/2025  Katey Cerrato is a 69 y.o., female.      Pre-op Assessment    I have reviewed the Patient Summary  Reports.     I have reviewed the Nursing Notes. I have reviewed the NPO Status.   I have reviewed the Medications.     Review of Systems  Anesthesia Hx:  No problems with previous Anesthesia             Denies Family Hx of Anesthesia complications.    Denies Personal Hx of Anesthesia complications.                    Cardiovascular:     Hypertension                                          Pulmonary:  Pulmonary Normal                       Renal/:  Renal/ Normal                 Neurological:  Neurology Normal                                      Endocrine:  Diabetes               Physical Exam  General: Well nourished    Airway:  Mallampati: II   Mouth Opening: Normal  TM Distance: > 6 cm  Tongue: Normal  Neck ROM: Normal ROM    Dental:  Intact  Multiple missing teeth      Anesthesia Plan  Type of Anesthesia, risks & benefits discussed:    Anesthesia Type: MAC, Gen Natural Airway  Intra-op Monitoring Plan: Standard ASA Monitors  Post Op Pain Control Plan: multimodal analgesia  Induction:  IV  Informed Consent: Informed consent signed with the Patient and all parties understand the risks and agree with anesthesia plan.  All questions answered.   ASA Score: 3  Day of Surgery Review of History & Physical: H&P Update referred to the surgeon/provider.    Ready For Surgery From Anesthesia Perspective.     .           [1]   Patient Active Problem List  Diagnosis    Generalized convulsive epilepsy with intractable epilepsy    Hypertension associated with diabetes    Diabetes mellitus type 2 in obese    Obesity (BMI 30-39.9)    Essential hypertension    Graves' disease with exophthalmos    Empty sella syndrome    Mixed hyperlipidemia    Type 2 diabetes mellitus without complication, without long-term current use of insulin    Vaginal vault prolapse, posthysterectomy    Urge incontinence    Carpal tunnel syndrome of right wrist    Nuclear sclerosis, bilateral    Post-operative state    Nuclear sclerotic cataract of left  eye    Post-traumatic osteoarthritis of left knee    Peripheral vascular disease, unspecified    Primary osteoarthritis of both knees    Decreased range of motion (ROM) of left knee    Decreased strength of lower extremity    Biliary obstruction    Nausea and vomiting    Fever    Anemia    Cholangiocarcinoma   [2]   No current facility-administered medications on file prior to encounter.     Current Outpatient Medications on File Prior to Encounter   Medication Sig Dispense Refill    [Paused] amLODIPine (NORVASC) 10 MG tablet TAKE 1 TABLET BY MOUTH EVERY DAY (Patient not taking: Reported on 3/19/2025) 90 tablet 3    atorvastatin (LIPITOR) 80 MG tablet Take 1 tablet (80 mg total) by mouth once daily. (Patient not taking: Reported on 3/21/2025) 90 tablet 3    blood sugar diagnostic Strp To check BG 1 times daily, to use with insurance preferred meter 100 each 3    blood-glucose meter kit To check BG 1 times daily, to use with insurance preferred meter 1 each 0    cyproheptadine (PERIACTIN) 4 mg tablet Take 1 tablet (4 mg total) by mouth 3 (three) times daily. 90 tablet 3    HYDROmorphone (DILAUDID) 2 MG tablet Take 1 tablet (2 mg total) by mouth 2 (two) times daily as needed for Pain. 14 tablet 0    lancets Misc To check BG 1 times daily, to use with insurance preferred meter 100 each 3    latanoprost 0.005 % ophthalmic solution INSTILL 1 DROP INTO BOTH EYES EVERY EVENING 7.5 mL 3    levETIRAcetam (KEPPRA) 750 MG Tab TAKE 2 TABLETS BY MOUTH ONCE  DAILY (Patient taking differently: Take 1,500 mg by mouth nightly.) 180 tablet 1    [Paused] metoprolol succinate (TOPROL-XL) 50 MG 24 hr tablet TAKE 1 TABLET BY MOUTH EVERY DAY IN THE EVENING (Patient not taking: Reported on 3/19/2025) 90 tablet 3    multivitamin-minerals-lutein Tab Take 1 tablet by mouth once daily once daily.      mv-mn/iron/folic acid/herb 190 (VITAMIN D3 COMPLETE ORAL) Take 1 tablet by mouth once daily.      OLANZapine (ZYPREXA) 5 MG tablet Take 1 tablet  (5 mg total) by mouth nightly. 30 tablet 2    ondansetron (ZOFRAN) 4 MG tablet Take 2 tablets (8 mg total) by mouth every 6 (six) hours as needed for Nausea. 30 tablet 0    promethazine (PHENERGAN) 25 MG tablet Take 1 tablet (25 mg total) by mouth every 6 (six) hours as needed for Nausea. 120 tablet 2    scopolamine (TRANSDERM-SCOP) 1.3-1.5 mg (1 mg over 3 days) Place 1 patch onto the skin Every 3 (three) days. 3 patch 0    sodium bicarbonate 650 MG tablet Take 1 tablet (650 mg total) by mouth 2 (two) times daily. for 15 days (Patient not taking: Reported on 2/27/2025) 30 tablet 0    [Paused] valsartan (DIOVAN) 320 MG tablet Take 1 tablet (320 mg total) by mouth once daily. (Patient not taking: Reported on 3/19/2025) 90 tablet 2   [3]   Social History  Socioeconomic History    Marital status:    Occupational History     Employer: [employer]   Tobacco Use    Smoking status: Never    Smokeless tobacco: Never   Substance and Sexual Activity    Alcohol use: No     Comment: none    Drug use: No    Sexual activity: Yes     Partners: Male     Comment: m  works in dietary,  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     Social Drivers of Health     Financial Resource Strain: Low Risk  (2/4/2025)    Overall Financial Resource Strain (CARDIA)     Difficulty of Paying Living Expenses: Not hard at all   Food Insecurity: No Food Insecurity (2/4/2025)    Hunger Vital Sign     Worried About Running Out of Food in the Last Year: Never true     Ran Out of Food in the Last Year: Never true   Transportation Needs: No Transportation Needs (2/4/2025)    TRANSPORTATION NEEDS     Transportation : No   Physical Activity: Inactive (2/4/2025)    Exercise Vital Sign     Days of Exercise per Week: 0 days     Minutes of Exercise per Session: 0 min   Stress: Stress Concern Present (2/4/2025)    [employer] - Occupational Stress Questionnaire     Feeling of Stress : Very much   Housing Stability: Unknown  (2/4/2025)    Housing Stability Vital Sign     Unable to Pay for Housing in the Last Year: No     Homeless in the Last Year: No

## 2025-03-24 NOTE — TELEPHONE ENCOUNTER
Spoke to Ms. Cerrato and confirmed procedure for 3/25/25 with Dr. Glynn. Informed to arrive at the [facility] of Surgery Center on the 2nd floor on [facility] [address] for 0530 and surgery time is approximately 0700. Surgery Instructions provided as follows:  instructed to remain NPO solids 8 hours prior to surgery, clear liquids until 2 hours prior to surgery, to shower with antibacterial soap the night before surgery and morning of surgery before arrival, not to apply any lotions, powders or deodorant, remove all metal and jewelry, to wear comfortable clothes, and leave all valuables at home. Medications reviewed and  instructed to hold medications on DOS. Confirmed pt  will have transportation home and family members will accompany pt on DOS. Instructed to bring surgery folder on DOS. Pt verbalized understanding to all of the above.

## 2025-03-25 ENCOUNTER — ANESTHESIA (OUTPATIENT)
Dept: SURGERY | Facility: HOSPITAL | Age: 70
End: 2025-03-25
Payer: MEDICARE

## 2025-03-25 ENCOUNTER — HOSPITAL ENCOUNTER (OUTPATIENT)
Facility: HOSPITAL | Age: 70
Discharge: HOME | End: 2025-03-25
Attending: SURGERY | Admitting: SURGERY
Payer: MEDICARE

## 2025-03-25 VITALS
WEIGHT: 156.5 LBS | DIASTOLIC BLOOD PRESSURE: 80 MMHG | OXYGEN SATURATION: 95 % | HEART RATE: 73 BPM | TEMPERATURE: 98 F | HEIGHT: 59 IN | RESPIRATION RATE: 20 BRPM | SYSTOLIC BLOOD PRESSURE: 170 MMHG | BODY MASS INDEX: 31.55 KG/M2

## 2025-03-25 DIAGNOSIS — C24.0 HILAR CHOLANGIOCARCINOMA: ICD-10-CM

## 2025-03-25 LAB
POCT GLUCOSE: 119 MG/DL (ref 70–110)
POCT GLUCOSE: 177 MG/DL (ref 70–110)

## 2025-03-25 PROCEDURE — 36561 INSERT TUNNELED CV CATH: CPT | Mod: RT,,, | Performed by: SURGERY

## 2025-03-25 PROCEDURE — 71000015 HC POSTOP RECOV 1ST HR: Performed by: SURGERY

## 2025-03-25 PROCEDURE — 63600175 PHARM REV CODE 636 W HCPCS: Performed by: SURGERY

## 2025-03-25 PROCEDURE — 37000008 HC ANESTHESIA 1ST 15 MINUTES: Performed by: SURGERY

## 2025-03-25 PROCEDURE — 25000003 PHARM REV CODE 250

## 2025-03-25 PROCEDURE — 36000707: Performed by: SURGERY

## 2025-03-25 PROCEDURE — 63600175 PHARM REV CODE 636 W HCPCS

## 2025-03-25 PROCEDURE — 37000009 HC ANESTHESIA EA ADD 15 MINS: Performed by: SURGERY

## 2025-03-25 PROCEDURE — D9220A PRA ANESTHESIA: Mod: ,,, | Performed by: ANESTHESIOLOGY

## 2025-03-25 PROCEDURE — 71000044 HC DOSC ROUTINE RECOVERY FIRST HOUR: Performed by: SURGERY

## 2025-03-25 PROCEDURE — 77001 FLUOROGUIDE FOR VEIN DEVICE: CPT | Mod: 26,,, | Performed by: SURGERY

## 2025-03-25 PROCEDURE — 82962 GLUCOSE BLOOD TEST: CPT | Performed by: SURGERY

## 2025-03-25 PROCEDURE — C1788 PORT, INDWELLING, IMP: HCPCS | Performed by: SURGERY

## 2025-03-25 PROCEDURE — 36000706: Performed by: SURGERY

## 2025-03-25 DEVICE — KIT POWERPORT SINGLE 8FR: Type: IMPLANTABLE DEVICE | Site: CHEST | Status: FUNCTIONAL

## 2025-03-25 RX ORDER — FENTANYL CITRATE 50 UG/ML
INJECTION, SOLUTION INTRAMUSCULAR; INTRAVENOUS
Status: DISCONTINUED | OUTPATIENT
Start: 2025-03-25 | End: 2025-03-25

## 2025-03-25 RX ORDER — HEPARIN 100 UNIT/ML
SYRINGE INTRAVENOUS
Status: DISCONTINUED | OUTPATIENT
Start: 2025-03-25 | End: 2025-03-25 | Stop reason: HOSPADM

## 2025-03-25 RX ORDER — BUPIVACAINE HYDROCHLORIDE 2.5 MG/ML
INJECTION, SOLUTION EPIDURAL; INFILTRATION; INTRACAUDAL; PERINEURAL
Status: DISCONTINUED | OUTPATIENT
Start: 2025-03-25 | End: 2025-03-25 | Stop reason: HOSPADM

## 2025-03-25 RX ORDER — GLUCAGON 1 MG
1 KIT INJECTION
Status: DISCONTINUED | OUTPATIENT
Start: 2025-03-25 | End: 2025-03-25 | Stop reason: HOSPADM

## 2025-03-25 RX ORDER — SODIUM CHLORIDE 0.9 % (FLUSH) 0.9 %
10 SYRINGE (ML) INJECTION
Status: DISCONTINUED | OUTPATIENT
Start: 2025-03-25 | End: 2025-03-25 | Stop reason: HOSPADM

## 2025-03-25 RX ORDER — CEFAZOLIN 2 G/1
2 INJECTION, POWDER, FOR SOLUTION INTRAMUSCULAR; INTRAVENOUS
Status: DISCONTINUED | OUTPATIENT
Start: 2025-03-25 | End: 2025-03-25 | Stop reason: HOSPADM

## 2025-03-25 RX ORDER — PROPOFOL 10 MG/ML
VIAL (ML) INTRAVENOUS CONTINUOUS PRN
Status: DISCONTINUED | OUTPATIENT
Start: 2025-03-25 | End: 2025-03-25

## 2025-03-25 RX ORDER — SODIUM CITRATE AND CITRIC ACID MONOHYDRATE 334; 500 MG/5ML; MG/5ML
30 SOLUTION ORAL ONCE
Status: COMPLETED | OUTPATIENT
Start: 2025-03-25 | End: 2025-03-25

## 2025-03-25 RX ORDER — FAMOTIDINE 10 MG/ML
INJECTION, SOLUTION INTRAVENOUS
Status: DISCONTINUED | OUTPATIENT
Start: 2025-03-25 | End: 2025-03-25

## 2025-03-25 RX ORDER — ONDANSETRON HYDROCHLORIDE 2 MG/ML
INJECTION, SOLUTION INTRAVENOUS
Status: DISCONTINUED | OUTPATIENT
Start: 2025-03-25 | End: 2025-03-25

## 2025-03-25 RX ORDER — FENTANYL CITRATE 50 UG/ML
25 INJECTION, SOLUTION INTRAMUSCULAR; INTRAVENOUS EVERY 5 MIN PRN
Status: DISCONTINUED | OUTPATIENT
Start: 2025-03-25 | End: 2025-03-25 | Stop reason: HOSPADM

## 2025-03-25 RX ORDER — FENTANYL CITRATE 50 UG/ML
25 INJECTION, SOLUTION INTRAMUSCULAR; INTRAVENOUS ONCE
Refills: 0 | Status: COMPLETED | OUTPATIENT
Start: 2025-03-25 | End: 2025-03-25

## 2025-03-25 RX ADMIN — CEFAZOLIN 2 G: 2 INJECTION, POWDER, FOR SOLUTION INTRAMUSCULAR; INTRAVENOUS at 07:03

## 2025-03-25 RX ADMIN — FENTANYL CITRATE 50 MCG: 50 INJECTION INTRAMUSCULAR; INTRAVENOUS at 07:03

## 2025-03-25 RX ADMIN — FAMOTIDINE 10 MG: 10 INJECTION, SOLUTION INTRAVENOUS at 06:03

## 2025-03-25 RX ADMIN — FENTANYL CITRATE 25 MCG: 50 INJECTION INTRAMUSCULAR; INTRAVENOUS at 08:03

## 2025-03-25 RX ADMIN — SODIUM CHLORIDE: 0.9 INJECTION, SOLUTION INTRAVENOUS at 06:03

## 2025-03-25 RX ADMIN — ONDANSETRON 4 MG: 2 INJECTION INTRAMUSCULAR; INTRAVENOUS at 07:03

## 2025-03-25 RX ADMIN — SODIUM CITRATE AND CITRIC ACID MONOHYDRATE 30 ML: 500; 334 SOLUTION ORAL at 07:03

## 2025-03-25 RX ADMIN — PROPOFOL 50 MG: 10 INJECTION, EMULSION INTRAVENOUS at 07:03

## 2025-03-25 RX ADMIN — PROPOFOL 25 MCG/KG/MIN: 10 INJECTION, EMULSION INTRAVENOUS at 07:03

## 2025-03-25 NOTE — H&P
Surgical Oncology  History and Physical    Patient Name: Katey Cerrato  YOB: 1955 (69 y.o.)  MRN: [id number]  Today's Date: 03/25/2025    Referring Md:   Zaire Glynn Md  [address] [place] [address]    SUBJECTIVE:     Chief Complaint: Cholangiocarcinoma    History of Present Illness:  Katey Cerrato is a 69 y.o. female with recently diagnosed cholangiocarcinoma presenting to outpatient surgery for planned port placement. She is complaining of ongoing leakage around her biliary drains, but otherwise feeling well. No significant change since being seen in clinic.    Review of patient's allergies indicates:   Allergen Reactions    Codeine Nausea Only     Past Medical History:   Diagnosis Date    Cataract     Empty sella syndrome     GHD (growth hormone deficiency)     Glaucoma     Graves' disease with exophthalmos     History of vitamin D deficiency     Hyperlipidemia     Hypertension     Seizures     Thyroid nodule     Thyroid nodule     Type II or unspecified type diabetes mellitus without mention of complication, uncontrolled      Past Surgical History:   Procedure Laterality Date    BREAST BIOPSY      CATARACT EXTRACTION W/  INTRAOCULAR LENS IMPLANT Right 3/15/2021    Procedure: EXTRACTION, CATARACT, WITH IOL INSERTION;  Surgeon: Cj Caban MD;  Location: [facility];  Service: Ophthalmology;  Laterality: Right;    CATARACT EXTRACTION W/  INTRAOCULAR LENS IMPLANT Left 3/29/2021    Procedure: EXTRACTION, CATARACT, WITH IOL INSERTION;  Surgeon: Cj Caban MD;  Location: [facility];  Service: Ophthalmology;  Laterality: Left;    CHOLECYSTECTOMY      ENDOSCOPIC ULTRASOUND OF UPPER GASTROINTESTINAL TRACT N/A 3/10/2025    Procedure: ULTRASOUND, UPPER GI TRACT, ENDOSCOPIC;  Surgeon: Tim Caban MD;  Location: [address]);  Service: Endoscopy;  Laterality: N/A;  2/28: EUS for FNA of lymph nodes-instructions emailed-ch    HYSTERECTOMY      one ovary intact    INJECTION OF  "JOINT Left 2/6/2024    Procedure: INJECTION, JOINT LEFT KNEE WITH STEROID;  Surgeon: She Schwartz MD;  Location: [facility];  Service: Pain Management;  Laterality: Left;  [phone]    KNEE SURGERY      LYMPH NODE BIOPSY  3010    OOPHORECTOMY       Family History   Problem Relation Name Age of Onset    Cancer Mother      Cataracts Mother      Glaucoma Mother          shunt    Heart disease Mother      Tremor Mother      Breast cancer Mother  78    Heart disease Father 64   chf     Heart disease Sister      No Known Problems Sister      Breast cancer Sister  60    Hypertension Brother      No Known Problems Brother      No Known Problems Maternal Aunt      No Known Problems Maternal Uncle      No Known Problems Paternal Aunt      No Known Problems Paternal Uncle      No Known Problems Maternal Grandmother      No Known Problems Maternal Grandfather      No Known Problems Paternal Grandmother      No Known Problems Paternal Grandfather      No Known Problems Son      No Known Problems Other       Social History[1]     Review of Systems:  Review of Systems   Constitutional:  Negative for chills and fever.   Respiratory:  Negative for cough and shortness of breath.    Cardiovascular:  Negative for chest pain.   Gastrointestinal:  Negative for abdominal pain, constipation, nausea and vomiting.   Genitourinary:  Negative for dysuria.   Neurological:  Negative for dizziness.       OBJECTIVE:     Vital Signs (Most Recent)  /75   Pulse 81   Temp 98.1 °F (36.7 °C) (Temporal)   Resp 10   Ht 4' 11" (1.499 m)   Wt 71 kg (156 lb 8.4 oz)   SpO2 100%   Breastfeeding No   BMI 31.61 kg/m²     Physical Exam:  Physical Exam  Vitals reviewed.   Constitutional:       Appearance: Normal appearance.   Cardiovascular:      Rate and Rhythm: Normal rate.      Pulses: Normal pulses.   Pulmonary:      Effort: Pulmonary effort is normal.   Abdominal:      Palpations: Abdomen is soft.      Tenderness: There is no " abdominal tenderness.      Comments: 2x biliary drains in place   Skin:     General: Skin is warm and dry.   Neurological:      General: No focal deficit present.      Mental Status: She is alert and oriented to person, place, and time.           ASSESSMENT/PLAN:     Katey Cerrato is a 69 y.o. female with cholangiocarcinoma presenting for planned port placement for chemotherapy. All questions answered. Consent signed.    - Proceed with port placement, right v left    Michael Bland MD  [facility]         [1]   Social History  Tobacco Use    Smoking status: Never    Smokeless tobacco: Never   Substance Use Topics    Alcohol use: No     Comment: none    Drug use: No

## 2025-03-25 NOTE — TRANSFER OF CARE
"Anesthesia Transfer of Care Note    Patient: Katey Cerrato    Procedure(s) Performed: Procedure(s) (LRB):  INSERTION, SINGLE LUMEN CATHETER WITH PORT, WITH FLUOROSCOPIC GUIDANCE (N/A)    Patient location: PACU    Anesthesia Type: MAC    Transport from OR: Transported from OR on 6-10 L/min O2 by face mask with adequate spontaneous ventilation    Post pain: adequate analgesia    Post assessment: no apparent anesthetic complications    Post vital signs: stable    Level of consciousness: awake and alert    Nausea/Vomiting: no nausea/vomiting    Complications: none    Transfer of care protocol was followed      Last vitals: Visit Vitals  /75   Pulse 81   Temp 36.7 °C (98.1 °F) (Temporal)   Resp 10   Ht 4' 11" (1.499 m)   Wt 71 kg (156 lb 8.4 oz)   SpO2 100%   Breastfeeding No   BMI 31.61 kg/m²     "

## 2025-03-25 NOTE — BRIEF OP NOTE
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    Estimated Blood Loss: * No values recorded between 3/25/2025  6:54 AM and 3/25/2025  8:12 AM *         Specimens:   Specimen (24h ago, onward)      None            * No specimens in log *        Discharge Note    OUTCOME: Patient tolerated treatment/procedure well without complication and is now ready for discharge.    DISPOSITION: Home or Self Care    FINAL DIAGNOSIS:  <principal problem not specified>    FOLLOWUP: With primary care provider    DISCHARGE INSTRUCTIONS:    Discharge Procedure Orders   Ice to affected area     Notify your health care provider if you experience any of the following:  temperature >100.4     Notify your health care provider if you experience any of the following:  persistent nausea and vomiting or diarrhea     Notify your health care provider if you experience any of the following:  severe uncontrolled pain     Notify your health care provider if you experience any of the following:  redness, tenderness, or signs of infection (pain, swelling, redness, odor or green/yellow discharge around incision site)     No dressing needed   Order Comments: Ok to shower. No submerging of the wound for 2 weeks. Dermabond glue will fall off on its own in 1-2 weeks.     Activity as tolerated

## 2025-03-25 NOTE — OP NOTE
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 I discussed the risks and benefits of port placement including but not limited to: infection, bleeding, pneumothorax, port malposition or migration, injury to adjacent structures, vascular injury, venous thrombosis or stenosis, need for further procedures, cardiovascular and pulmonary complications, and other imponderables. The patient understands the risks and benefits of surgery and signed written informed consent to proceed.     Details:  The patient was transported to the operating room and satisfactory anesthesia established.  Preoperative antibiotics were administered.  The patient was placed in the supine position with arms tucked, and extremities were padded and protected throughout. The neck and chest were prepped and draped. An appropriate timeout was performed.    The patient was placed in Trendelenburg position. The right internal jugular vein was identified under ultrasound guidance. The access point was compressed with an adson and local anesthetic injected at this site. An 11 blade was used to make a skin nick. The opening  was widened with a hemostat. An 18 gauge needle was used to access the vein perpendicular to the skin under ultrasound guidance. The wire was passed through the needle, and position was confirmed with fluoroscopy. The pocket was marked, injected with lidocaine, and incised with a 15 blade inferior and parallel to the right clavicle. A skin flap was raised and hemostasis obtained. This was packed with a ray-rose.  The tunneler with the tip bent was used to tunnel from the pocket to the neck incision where it was grasped with a hemostat. The port was secured to the pocket after removing the ray-rose with 2-0 Vicryl suture. The catheter was then measured by lying the catheter over the wire under fluoroscopy. It was measured with Debakey forceps to lay approximately 2 cm below the right mainstem bronchus and cut at this location. The dilator was advanced on the wire under fluoroscopy. The catheter was advanced through the peel away catheter. A final fluoroscopy picture was obtained to be saved to the patient's chart to confirm catheter position. The catheter aspirated blood. 3 cc of heparin was injected into the port. The incisions were closed with 3-0 Vicryl, 4-0 Monocryl, and dermabond. The patient tolerated the procedure well and was brought to PACU in stable condition. A chest x-ray will be performed in the PACU.      All needle, lap, and sponge counts were reported as correct. I communicated the intraoperative findings with the family following the procedure.       Implants:   Implant Name Type Inv. Item Serial No.  Lot No. LRB No. Used Action   KIT POWERPORT SINGLE 8FR - [id number]  KIT POWERPORT SINGLE 8FR  [employer] [id number] Right 1 Implanted       Specimens:   Specimen (24h ago, onward)      None                    Condition: Good    Disposition: PACU - hemodynamically stable.    Attestation: I was present and scrubbed for the entire procedure.    Zaire Glynn MD  Staff Surgeon  Surgical Oncology

## 2025-03-26 ENCOUNTER — TELEPHONE (OUTPATIENT)
Dept: INTERVENTIONAL RADIOLOGY/VASCULAR | Facility: CLINIC | Age: 70
End: 2025-03-26
Payer: MEDICARE

## 2025-03-26 NOTE — ANESTHESIA POSTPROCEDURE EVALUATION
Anesthesia Post Evaluation    Patient: Katey Cerrato    Procedure(s) Performed: Procedure(s) (LRB):  INSERTION, SINGLE LUMEN CATHETER WITH PORT, WITH FLUOROSCOPIC GUIDANCE (N/A)    Final Anesthesia Type: general      Patient location during evaluation: PACU  Patient participation: Yes- Able to Participate  Level of consciousness: awake  Post-procedure vital signs: reviewed and stable  Pain management: adequate  Airway patency: patent    PONV status at discharge: No PONV  Anesthetic complications: no      Cardiovascular status: blood pressure returned to baseline  Respiratory status: unassisted  Hydration status: euvolemic  Follow-up not needed.              Vitals Value Taken Time   /83 03/25/25 09:20   Temp 36.5 °C (97.7 °F) 03/25/25 08:10   Pulse 68 03/25/25 09:20   Resp 19 03/25/25 09:20   SpO2 100 % 03/25/25 09:20         No case tracking events are documented in the log.      Pain/Sarita Score: Pain Rating Prior to Med Admin: 8 (3/25/2025  8:45 AM)  Sarita Score: 9 (3/25/2025  8:45 AM)

## 2025-03-27 ENCOUNTER — PATIENT MESSAGE (OUTPATIENT)
Dept: INTERVENTIONAL RADIOLOGY/VASCULAR | Facility: HOSPITAL | Age: 70
End: 2025-03-27
Payer: MEDICARE

## 2025-03-29 NOTE — TELEPHONE ENCOUNTER
No care due was identified.  [facility] Embedded Care Due Messages. Reference number: [id number].   10/23/2023 1:16:11 AM CDT   Patient arrives to ED reports strong intermittent right abdominal pain.  Denies vaginal discharge.     Patient reports falling yesterday hitting her back and arm.     Patient is 27 weeks pregnant. OBGYN Dr. Johnson

## 2025-03-31 ENCOUNTER — TELEPHONE (OUTPATIENT)
Dept: INTERVENTIONAL RADIOLOGY/VASCULAR | Facility: HOSPITAL | Age: 70
End: 2025-03-31
Payer: MEDICARE

## 2025-03-31 NOTE — NURSING
Pre-procedure call complete.  2 patient identifier used (name and ).      No food after midnight.  You may drink clear liquids (sports drinks, clear juices) until 2 hours before arrival time.  Clear liquids include only water, black coffee (no creamer), clear oral rehydration drinks, clear sports drinks and clear fruit juices.  Clear liquids do NOT include anything with pulp or food particles (chicken broth, ice cream, yogurt, jello, etc).  NO orange juice, pulpy juices, or apple cider.  If you can read newsprint through the liquid, it qualifies as clear.  IF UNSURE, drink water instead.      Have NOTHING BY MOUTH 2 hours before arrival time.  Medication the morning of your procedure may be taken with a sip of water     Pt aware will need someone to provide transport home and monitor pt 8 hours post procedure.  No driving for at least 24 hours after procedure.   Patient advised to take blood pressure and seizure medications with a sip of water morning of procedure.  Patient verbalized aware of which medications to take.  Do not take sleep medication (including OTC) the night before procedure.  Arrival time and location given.  Expected length of stay reviewed.  Covid screening completed.  Pt verbalized understanding of all pre-procedure instructions.  Written instructions and directions sent to patient in [facility].

## 2025-04-01 ENCOUNTER — TELEPHONE (OUTPATIENT)
Dept: HEMATOLOGY/ONCOLOGY | Facility: CLINIC | Age: 70
End: 2025-04-01
Payer: MEDICARE

## 2025-04-01 NOTE — PROGRESS NOTES
[facility]  Subjective:     Patient ID: Katey Cerrato is a 69 y.o. female.  History of Present Illness    CHIEF COMPLAINT:  Patient presents today as a same-day urgent care visit for rash    PCP is Dr. Juarez, patient is new to me.     HISTORY OF PRESENT ILLNESS:  She reports a recurrent shingles outbreak that started 8 days ago with bumps and itching on her back, spreading to the chest area in the past 4-5 days. She states this outbreak is the same location, but more severe than her previous episode for which she was prescribed valtrex. Associated symptoms include back pain and intermittent sharp, electric-like pain lasting approximately 10 minutes. She reports itching across the top of her back, with affected areas cycling between drying up and intense itching. She had a shingles vaccination approximately 4-5 years ago.    RECENT ILLNESSES:  She reports having a cold after [place]. She also experienced recent GI issues, including severe constipation that was treated with Miralax, followed by three days of diarrhea.    MEDICATIONS:  She takes Meloxicam 15 mg as needed and Ondansetron for nausea.     SOCIAL HISTORY:  She is a recent , with her  passing away on September 11th of last year. She reports significant stress related to succession issues and administrative matters.      ROS:  General: -fever, -chills, -fatigue, -weight gain, -weight loss  Eyes: -vision changes, -redness, -discharge  ENT: -ear pain, -nasal congestion, -sore throat  Cardiovascular: -chest pain, -palpitations, -lower extremity edema  Respiratory: -cough, -shortness of breath  Gastrointestinal: -abdominal pain, -nausea, -vomiting, +diarrhea, +constipation, -blood in stool  Genitourinary: -dysuria, -hematuria, -frequency  Musculoskeletal: -joint pain, -muscle pain, +back pain  Skin: -rash, -lesion, +itching  Neurological: -headache, -dizziness, -numbness, -tingling  Psychiatric: -anxiety, -depression,  -sleep difficulty       Current Outpatient Medications   Medication Instructions    [Paused] amLODIPine (NORVASC) 10 MG tablet TAKE 1 TABLET BY MOUTH EVERY DAY    atorvastatin (LIPITOR) 80 mg, Oral, Daily    blood sugar diagnostic Strp To check BG 1 times daily, to use with insurance preferred meter    blood-glucose meter kit To check BG 1 times daily, to use with insurance preferred meter    cyproheptadine (PERIACTIN) 4 mg, Oral, 3 times daily    HYDROmorphone (DILAUDID) 2 mg, Oral, 2 times daily PRN    lancets Misc To check BG 1 times daily, to use with insurance preferred meter    latanoprost 0.005 % ophthalmic solution 1 drop, Both Eyes    levETIRAcetam (KEPPRA) 1,500 mg, Oral    [Paused] metoprolol succinate (TOPROL-XL) 50 mg, Oral, Nightly    multivitamin-minerals-lutein Tab 1 tablet, Daily    mv-mn/iron/folic acid/herb 190 (VITAMIN D3 COMPLETE ORAL) 1 tablet, Daily    OLANZapine (ZYPREXA) 5 mg, Oral, Nightly    ondansetron (ZOFRAN) 8 mg, Oral, Every 6 hours PRN    promethazine (PHENERGAN) 25 mg, Oral, Every 6 hours PRN    scopolamine (TRANSDERM-SCOP) 1.3-1.5 mg (1 mg over 3 days) 1 patch, Transdermal, Every 3 days    sodium bicarbonate 650 mg, Oral, 2 times daily    [Paused] valsartan (DIOVAN) 320 mg, Oral, Daily     Social History     Socioeconomic History    Marital status:    Occupational History     Employer: [employer]   Tobacco Use    Smoking status: Never    Smokeless tobacco: Never   Substance and Sexual Activity    Alcohol use: No     Comment: none    Drug use: No    Sexual activity: Yes     Partners: Male     Comment: m  works in dietary,  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     Social Drivers of Health     Financial Resource Strain: Low Risk  (2/4/2025)    Overall Financial Resource Strain (CARDIA)     Difficulty of Paying Living Expenses: Not hard at all   Food Insecurity: No Food Insecurity (2/4/2025)    Hunger Vital Sign     Worried About Running Out of Food in the Last Year:  Never true     Ran Out of Food in the Last Year: Never true   Transportation Needs: No Transportation Needs (2/4/2025)    TRANSPORTATION NEEDS     Transportation : No   Physical Activity: Inactive (2/4/2025)    Exercise Vital Sign     Days of Exercise per Week: 0 days     Minutes of Exercise per Session: 0 min   Stress: Stress Concern Present (2/4/2025)    [employer] - Occupational Stress Questionnaire     Feeling of Stress : Very much   Housing Stability: Unknown (2/4/2025)    Housing Stability Vital Sign     Unable to Pay for Housing in the Last Year: No     Homeless in the Last Year: No     Objective:      Body mass index is 34.75 kg/m².  Vitals:    01/27/25 0957   BP: 120/80   Pulse: 86   SpO2: 98%   Weight: 80.7 kg (177 lb 14.6 oz)   Height: 5' (1.524 m)   PainSc:   4   PainLoc: Back       Physical Exam    General: No acute distress. Well-developed. Well-nourished.  Eyes: EOMI. Sclerae anicteric.  HENT: Normocephalic. Atraumatic. Nares patent. Moist oral mucosa.  Ears: Bilateral TMs clear. Bilateral EACs clear.  Cardiovascular: Regular rate. Regular rhythm. No murmurs. No rubs. No gallops. Normal S1, S2.  Respiratory: Normal respiratory effort. Clear to auscultation bilaterally. No rales. No rhonchi. No wheezing.  Abdomen: Soft. Non-tender. Non-distended. Normoactive bowel sounds.  Musculoskeletal: No  obvious deformity.  Extremities: No lower extremity edema.  Neurological: Alert & oriented x3. No slurred speech. Normal gait.  Psychiatric: Normal mood. Normal affect. Good insight. Good judgment.  Skin: Warm. Dry. Rash present on left back and shoulder      Assessment:       1. Herpes zoster with complication        Plan:   Assessment & Plan    IMPRESSION:  - Considered recurrent shingles based on patient's history and presentation  - Will refill antiviral medication to prevent further lesion development  - Considered patient's recent stressors as potential trigger for shingles  outbreak  - Noted that back lesions are healing while new lesions have appeared on chest/abdomen, possibly complicated course  - Pain manageable with current pain medication    SHINGLES:  - Considerd diagnosis based on presentation and history.  - Examined the rash, noting healing lesions on the back and new lesions on the abdomen  - Educated patient on the contagious nature of shingles through direct contact with open vesicles and the potential for post-herpetic neuralgia pain.  - Instructed to keep affected areas dry, especially in skin folds, wear cotton clothing, and use a cloth at home.  - Emphasized hand hygiene and avoiding scratching lesions.  - Prescribed Valtrex (antiviral) 1000mg 3 times daily for 7 days.  - Instructed to contact the office if new vesicles or rashes appear after 7 days of treatment.    KNEE PAIN:  - Noted the patient's scheduled knee surgery and need for therapy.    INCONTINENCE:  - Noted the patient's use of incontinence products.  - Recommend keeping the area dry and using cotton underwear to manage the condition.    GASTROINTESTINAL ISSUES:  - Assessed the patient's recent episode of constipation followed by diarrhea as resolved.  - Noted the patient's use of Miralax (polyethylene glycol) to treat constipation.    STRESS MANAGEMENT:  - Acknowledged the patient's stress and life challenges related to the loss of their spouse, including associated financial and legal issues.    FOLLOW UP:  - Scheduled follow-up for next Tuesday.  - Informed the patient that they may cancel the Tuesday appointment if feeling better and follow up on February 10th as originally scheduled with Dr. Juarez.         Herpes zoster with complication  - valACYclovir (VALTREX) 1000 MG tablet; Take 1 tablet (1,000 mg total) by mouth 3 (three) times daily. for 7 days  Dispense: 21 tablet; Refill: 0        All of your core healthy metrics are met.    No follow-ups on file. or sooner prn (as needed)        Lyly  MD Bre  Part of this note is dictated using the [employer] Fluency Direct word recognition program. It may contain word recognition mistakes or wrong word substitutions (commonly he/she and is/was substitutions) that were missed on review.    Part of this note was generated with the assistance of ambient listening technology. Verbal consent was obtained by the patient and accompanying visitor(s) for the recording of patient appointment to facilitate this note. I attest to having reviewed and edited the generated note for accuracy, though some syntax or spelling errors may persist. Please contact the author of this note for any clarification.

## 2025-04-01 NOTE — TELEPHONE ENCOUNTER
Pt dependent on family members to drive her to appt.  Reviewed 4/4 schedule.  She said it's too early and asked for resched. She wsnt aware she ws starting chemo.  She did say she knows her chemo tx if q2w and sometimes the labs-visit-chemo can't be scheduled same day.    Spoke with brother Pedro Keys [phone]  and his wife Mellisa Keys.  Moved labs to tomorrow at 9:20 at main [facility].  Keep visit & infusion apptmnts of Fri.  Explained to the couple that chemo is q2w and pt will need labs-visit before a cycle.  Pt to consider giving them access to MyChart.      ----- Message from Jes sent at 4/1/2025 11:50 AM CDT -----  Regarding: Nurse  Contact: pt  Type: Requesting to speak with Kendra Called: PT  Regarding: Patient AdviseWould the patient rather a call back or a response via [facility]? Call [facility]   Call Back Number:  [phone]  Additional Information: Regarding Appointments on 4/04/2025

## 2025-04-02 ENCOUNTER — HOSPITAL ENCOUNTER (OUTPATIENT)
Dept: INTERVENTIONAL RADIOLOGY/VASCULAR | Facility: HOSPITAL | Age: 70
Discharge: HOME | End: 2025-04-02
Attending: FAMILY MEDICINE
Payer: MEDICARE

## 2025-04-02 VITALS
RESPIRATION RATE: 16 BRPM | OXYGEN SATURATION: 96 % | BODY MASS INDEX: 31.25 KG/M2 | DIASTOLIC BLOOD PRESSURE: 70 MMHG | TEMPERATURE: 98 F | WEIGHT: 155 LBS | HEART RATE: 73 BPM | SYSTOLIC BLOOD PRESSURE: 140 MMHG | HEIGHT: 59 IN

## 2025-04-02 DIAGNOSIS — K83.1 BILIARY OBSTRUCTION: ICD-10-CM

## 2025-04-02 LAB — POCT GLUCOSE: 138 MG/DL (ref 70–110)

## 2025-04-02 PROCEDURE — 47536 EXCHANGE BILIARY DRG CATH: CPT

## 2025-04-02 PROCEDURE — 25500020 PHARM REV CODE 255: Performed by: FAMILY MEDICINE

## 2025-04-02 PROCEDURE — C1769 GUIDE WIRE: HCPCS

## 2025-04-02 PROCEDURE — 47536 EXCHANGE BILIARY DRG CATH: CPT | Mod: ,,, | Performed by: STUDENT IN AN ORGANIZED HEALTH CARE EDUCATION/TRAINING PROGRAM

## 2025-04-02 PROCEDURE — C1894 INTRO/SHEATH, NON-LASER: HCPCS

## 2025-04-02 PROCEDURE — C1887 CATHETER, GUIDING: HCPCS

## 2025-04-02 PROCEDURE — C1729 CATH, DRAINAGE: HCPCS

## 2025-04-02 PROCEDURE — 27201423 OPTIME MED/SURG SUP & DEVICES STERILE SUPPLY

## 2025-04-02 PROCEDURE — 63600175 PHARM REV CODE 636 W HCPCS: Performed by: STUDENT IN AN ORGANIZED HEALTH CARE EDUCATION/TRAINING PROGRAM

## 2025-04-02 RX ORDER — LIDOCAINE HYDROCHLORIDE 10 MG/ML
1 INJECTION, SOLUTION EPIDURAL; INFILTRATION; INTRACAUDAL; PERINEURAL ONCE
Status: DISCONTINUED | OUTPATIENT
Start: 2025-04-02 | End: 2025-04-03 | Stop reason: HOSPADM

## 2025-04-02 RX ORDER — LIDOCAINE HYDROCHLORIDE 10 MG/ML
INJECTION, SOLUTION EPIDURAL; INFILTRATION; INTRACAUDAL; PERINEURAL
Status: COMPLETED | OUTPATIENT
Start: 2025-04-02 | End: 2025-04-02

## 2025-04-02 RX ORDER — MIDAZOLAM HYDROCHLORIDE 1 MG/ML
INJECTION, SOLUTION INTRAMUSCULAR; INTRAVENOUS
Status: COMPLETED | OUTPATIENT
Start: 2025-04-02 | End: 2025-04-02

## 2025-04-02 RX ORDER — SODIUM CHLORIDE 9 MG/ML
INJECTION, SOLUTION INTRAVENOUS CONTINUOUS
Status: DISCONTINUED | OUTPATIENT
Start: 2025-04-02 | End: 2025-04-03 | Stop reason: HOSPADM

## 2025-04-02 RX ORDER — CEFTRIAXONE 1 G/1
INJECTION, POWDER, FOR SOLUTION INTRAMUSCULAR; INTRAVENOUS
Status: COMPLETED | OUTPATIENT
Start: 2025-04-02 | End: 2025-04-02

## 2025-04-02 RX ORDER — FENTANYL CITRATE 50 UG/ML
INJECTION, SOLUTION INTRAMUSCULAR; INTRAVENOUS
Status: COMPLETED | OUTPATIENT
Start: 2025-04-02 | End: 2025-04-02

## 2025-04-02 RX ADMIN — MIDAZOLAM HYDROCHLORIDE 0.5 MG: 2 INJECTION, SOLUTION INTRAMUSCULAR; INTRAVENOUS at 01:04

## 2025-04-02 RX ADMIN — FENTANYL CITRATE 25 MCG: 50 INJECTION, SOLUTION INTRAMUSCULAR; INTRAVENOUS at 01:04

## 2025-04-02 RX ADMIN — IOHEXOL 30 ML: 300 INJECTION, SOLUTION INTRAVENOUS at 01:04

## 2025-04-02 RX ADMIN — CEFTRIAXONE 1 G: 1 INJECTION, POWDER, FOR SOLUTION INTRAMUSCULAR; INTRAVENOUS at 01:04

## 2025-04-02 RX ADMIN — LIDOCAINE HYDROCHLORIDE 3 ML: 10 INJECTION, SOLUTION EPIDURAL; INFILTRATION; INTRACAUDAL; PERINEURAL at 01:04

## 2025-04-02 RX ADMIN — FENTANYL CITRATE 50 MCG: 50 INJECTION, SOLUTION INTRAMUSCULAR; INTRAVENOUS at 01:04

## 2025-04-02 NOTE — PLAN OF CARE
Biliary drain exchange x2  procedure completed. Patient tolerated well. Patient AAOx3, no distress noted, respirations even and unlabored, VSS. Right and Left procedure site clean, dry, and intact; no bleeding or hematoma noted. Patient to be transferred to MPU for 1 hour post-procedural recovery per MD. Report to be given at bedside to RN.

## 2025-04-02 NOTE — DISCHARGE SUMMARY
Radiology Discharge Summary      Hospital Course: No complications    Admit Date: 4/2/2025  Discharge Date: 04/02/2025     Instructions Given to Patient: Yes  Diet: Resume prior diet  Activity: Activity as tolerated and no driving for today    Description of Condition on Discharge: Stable  Vital Signs (Most Recent): Temp: 98.2 °F (36.8 °C) (04/02/25 0954)  Pulse: 76 (04/02/25 1332)  Resp: 18 (04/02/25 1332)  BP: (!) 143/67 (04/02/25 1332)  SpO2: 100 % (04/02/25 1332)    Discharge Disposition: Home    Discharge Diagnosis: biliary obstruction     Follow-up:   - Leave to gravity drainage x24 hours then cap as tolerated. If patient develops pain, fever, chills, or leakage, open back to gravity and call IR.   - Flush the drain with normal saline 10 cc daily  - Return to IR in 8 weeks unless issues occur sooner    Mariaelena Maguire MD

## 2025-04-02 NOTE — PLAN OF CARE
Pt arrived to  for biliary drain change. Pt oriented to unit and staff. Plan of care reviewed with patient, patient verbalizes understanding. Comfort measures utilized. Pt safely transferred from stretcher to procedural table. Fall risk reviewed with patient, fall risk interventions maintained. Safety strap applied, positioner pillows utilized to minimize pressure points. Blankets applied. Pt prepped and draped utilizing standard sterile technique. Patient placed on continuous monitoring, as required by sedation policy. Timeouts to be completed utilizing standard universal time-out, per department and facility policy. RN to remain at bedside, continuous monitoring maintained. Pt resting comfortably. Denies pain/discomfort. See flow sheets for monitoring, medication administration, and updates.

## 2025-04-02 NOTE — PLAN OF CARE
Patient arrived to room. PIV placed, x2. Admit assessment completed. Both drain tubes present, dags CDI. Plan of care discussed with patient. Will monitor. Call light within reach, instructed in use.

## 2025-04-02 NOTE — SEDATION DOCUMENTATION
Pt discharged home with family from [facility] via wheelchair. Pt verbalized understanding of written and verbal discharge instructions. Written discharge instructions given to patient. Discontinued PIV site CDI, without swelling or bleeding. Questions asked by patient answered to patient's satisfaction. Pt able to describe and return demonstration of removing the drainage bag and capping the biliary drains.

## 2025-04-02 NOTE — DISCHARGE INSTRUCTIONS
"Biliary Drain Discharge Instructions    KEEP BOTH DRAINAGE BAGS ATTACHED TO THE DRAINS FOR 24 HOURS.    AFTER 24 HOURS "CAP" EACH DRAIN AND DISCARD THE DRAINAGE BAGS    FLUSH EACH DRAIN ONCE DAILY WITH ONE SALINE SYRINGE      What is a biliary drain?   Bile is produced by your liver to help digest food. It is stored in the gallbladder. A biliary drain is placed when bile cannot naturally flow from the liver through the bile ducts and into the intestines.   It helps drain the bile from the liver in two ways:   Internal-external drain: Allows bile to drain into the intestines as well as outside your body into a collection bag.     Your provider will explain the type of drain and how long you should expect it to stay in place. Generally the drain must stay in for at least 4 weeks. While the drain is there, you will have follow-up care with your provider regularly. Drains are generally replaced every 8-10 weeks. If you do not have an appointment and feel it is time for the tube to be exchanged, please call [phone].       Diet and Activity    Continue with your regular diet and previously prescribed medications.    Avoid any activity that may result in pulling on the drain.    Do not submerge the drain. This means do not swim, sit in a hot tub, soak in a tub bath, or do anything that involves putting the drain under water.     Emptying the Collection Bag   Empty the collection bag into the toilet when it is half full, or at least once each day. Your provider will let you know if you should keep track of the amount.     1. Turn the knob at the bottom of the bag and drain into toilet. If your provider has told you to track the contents, empty the bag into a measuring container and record the amount.   2. Wash your hands.   3. Change outside tube and bag weekly. These can be bought from any medical supplies store.     Showering   While it is okay to shower with your drain, do not soak in any water with your drain. " Soaking may lead to infection.      First 2 weeks: Before taking a shower, cover the dressing with a double layer of plastic wrap (like Saran wrap) where it enters the skin. Tape down the edges. After the shower, remove the plastic and apply a clean bandage.    After 2 weeks: You may shower without the plastic wrap. Rinse well. Pat the area dry and apply a clean bandage.     Drain Care   Change your dressing at least every 2 days, or if it becomes dirty or wet.   1. Wash your hands.   2. Remove the old bandage carefully. Try not to pull on the drain or stitches.   3. If the skin needs to be cleaned, use a gauze or cotton swab to gently clean it with soap and water.   4. Wait for your skin to dry.   5. Apply a sterile 4x4 gauze over the tube where it enters your body. Be careful not to kink the drain.   6. Secure with paper tape.   7. Wash your hands.     You may or may not need to flush the drain. Your provider will discuss this with you.     Call your provider immediately or seek emergency medical attention if you experience any of the following symptoms. This information does not replace medical advice from your healthcare provider. Your experience may differ from that of the typical patient. Talk to your provider if you have any questions about this document, your condition, or your treatment plan.    New or worsening yellowing of your skin/eyes    Fever    New or worsening belly pain    Nausea or vomiting    New or worsening redness or swelling where the drain meets the skin    Pus leaking around the drain    The drain begins to leak, breaks, or falls out Troubleshooting    If you have any of the above symptoms and the drain is capped, uncap the drain and attach the drainage bag.    If the drain is already attached to a drainage bag, remove the bandage and check to see if the drain is twisted. If these steps do not improve your symptoms, call your provider immediately, or go to the nearest emergency room, or  call 911.         Flushing instructions  If you are told to flush your drain You will need to flush your biliary drainwith 10 cc of normal saline each day.   1. Gather supplies: 10 mL of sterile normal saline solution, syringe, sterile gauze, paper tape, and rubbing alcohol (liquid or single-use alcohol wipes).   2. Wash your hands.   3. Unscrew the drainage bag from drain (place a towel beneath to catch any drips).   4. Wipe drain with alcohol.   5. Fill the syringe with 10 mL of normal saline solution.   6. Attach syringe to drain.   7. Gently inject normal saline into drain. Do NOT pull back on the syringe.   8. Unscrew syringe.   9. Reattach drainage bag.   10. Wash your hands and dispose of supplies.         Capping instructions  If you are told to cap your drain, you should cap the drain 24 hours after the procedure.     Remember, if you experience any of the following symptoms, try uncapping the drain and attaching the drainage bag:    New or worsening yellowing of your skin/eyes    Fever    New or worsening belly pain    Nausea or vomiting    Increased drainage around the tube    If this does not improve your symptoms, call your provider immediately or seek emergency medical attention.     Interventional Radiology Clinic    [phone], choose prompt 3 Monday - Friday, 8:00 am - 4:00 pm    [phone] After hours and on holidays. Ask to speak with the interventional radiologist on call.

## 2025-04-02 NOTE — PROCEDURES
Interventional Radiology Post-Procedure Note    Pre Op Diagnosis: Biliary obstruction, leaking from left-sided drain  Post Op Diagnosis: Same    Procedure:  Biliary drain exchange     Procedure performed by: Mariaelena Maguire MD    Written Informed Consent Obtained: Yes  Specimen Removed: No  Estimated Blood Loss: Minimal    Findings:     Cholangiogram through existing right tube demonstrates satisfactory position of indwelling internal/external biliary drain with persistent dilation of right-sided ducts. Upsized to 12F right internal/external biliary drain.   Cholangiogram through existing left tube demonstrates retracted position with pigtail loop above level of obstruction. Exchanged for new 10F left internal/external biliary drain WITH extended sideholes added additional 2cm.       Plan:    - Leave to gravity drainage x24 hours then cap as tolerated. If patient develops pain, fever, chills, or leakage, open back to gravity and call IR.   - Flush the drain with normal saline 10 cc daily  - Return to IR in 8 weeks unless issues occur sooner        Mariaelena Maguire MD

## 2025-04-02 NOTE — H&P
Radiology History & Physical      SUBJECTIVE:     Chief Complaint: biliary drain exchange    History of Present Illness:  Katey Cerrato is a 69 y.o. female with PMHx including unresectable hilar cholangiocarcinoma, HTN, DM II, Kina's disease, and empty sella syndrome with growth hormone deficiency who presents for biliary drain exchange.    Right and left sided internal-external biliary drains were initially placed 2/8/25. They have been capped since she was discharged home. She reports that the left-sided drain has had frequent leakage around the drain and she has to change her gauze dressing 4 times per day.    Past Medical History:   Diagnosis Date    Cataract     Empty sella syndrome     GHD (growth hormone deficiency)     Glaucoma     Graves' disease with exophthalmos     Hyperlipidemia     Hypertension     Seizures     Thyroid nodule     Thyroid nodule     Type II or unspecified type diabetes mellitus without mention of complication, uncontrolled      Past Surgical History:   Procedure Laterality Date    BREAST BIOPSY      CATARACT EXTRACTION W/  INTRAOCULAR LENS IMPLANT Right 3/15/2021    Procedure: EXTRACTION, CATARACT, WITH IOL INSERTION;  Surgeon: Cj Caban MD;  Location: [facility];  Service: Ophthalmology;  Laterality: Right;    CATARACT EXTRACTION W/  INTRAOCULAR LENS IMPLANT Left 3/29/2021    Procedure: EXTRACTION, CATARACT, WITH IOL INSERTION;  Surgeon: Cj Caban MD;  Location: [facility];  Service: Ophthalmology;  Laterality: Left;    CHOLECYSTECTOMY      ENDOSCOPIC ULTRASOUND OF UPPER GASTROINTESTINAL TRACT N/A 3/10/2025    Procedure: ULTRASOUND, UPPER GI TRACT, ENDOSCOPIC;  Surgeon: Tim Caban MD;  Location: [facility] ([address] [place] [address]);  Service: Endoscopy;  Laterality: N/A;  2/28: EUS for FNA of lymph nodes-instructions emailed-ch    HYSTERECTOMY      one ovary intact    INJECTION OF JOINT Left 2/6/2024    Procedure: INJECTION, JOINT LEFT KNEE WITH STEROID;  Surgeon: She Schwartz MD;   Location: [facility];  Service: Pain Management;  Laterality: Left;  [phone]    INSERTION OF TUNNELED CENTRAL VENOUS CATHETER (CVC) WITH SUBCUTANEOUS PORT N/A 3/25/2025    Procedure: INSERTION, SINGLE LUMEN CATHETER WITH PORT, WITH FLUOROSCOPIC GUIDANCE;  Surgeon: Zaire Glynn MD;  Location: [facility] OR [address] [place] [address];  Service: General;  Laterality: N/A;    KNEE SURGERY      LYMPH NODE BIOPSY  3010    OOPHORECTOMY         Home Meds:   Prior to Admission medications    Medication Sig Start Date End Date Taking? Authorizing Provider   latanoprost 0.005 % ophthalmic solution INSTILL 1 DROP INTO BOTH EYES EVERY EVENING 10/3/24  Yes Malia Puri, CEASAR   levETIRAcetam (KEPPRA) 750 MG Tab TAKE 2 TABLETS BY MOUTH ONCE  DAILY  Patient taking differently: Take 1,500 mg by mouth nightly. 12/13/24  Yes Bethel Boykin MD   multivitamin-minerals-lutein Tab Take 1 tablet by mouth once daily once daily.   Yes Provider, Historical   mv-mn/iron/folic acid/herb 190 (VITAMIN D3 COMPLETE ORAL) Take 1 tablet by mouth once daily.   Yes Provider, Historical   ondansetron (ZOFRAN) 4 MG tablet Take 2 tablets (8 mg total) by mouth every 6 (six) hours as needed for Nausea. 2/19/25  Yes Martina Gaspar MD   amLODIPine (NORVASC) 10 MG tablet TAKE 1 TABLET BY MOUTH EVERY DAY  Patient not taking: Reported on 3/19/2025 9/9/24   Rene Juarez MD   atorvastatin (LIPITOR) 80 MG tablet Take 1 tablet (80 mg total) by mouth once daily.  Patient not taking: Reported on 3/21/2025 7/29/24   Rene Juarez MD   blood sugar diagnostic Strp To check BG 1 times daily, to use with insurance preferred meter 4/14/20   Baltazar England MD   blood-glucose meter kit To check BG 1 times daily, to use with insurance preferred meter 4/14/20 2/3/23  Baltazar England MD   cyproheptadine (PERIACTIN) 4 mg tablet Take 1 tablet (4 mg total) by mouth 3 (three) times daily. 3/19/25   Fabiola Johnson MD   HYDROmorphone (DILAUDID) 2  MG tablet Take 1 tablet (2 mg total) by mouth 2 (two) times daily as needed for Pain. 2/13/25   Martina Gaspar MD   lancets [facility] To check BG 1 times daily, to use with insurance preferred meter 4/14/20   Baltazar England MD   metoprolol succinate (TOPROL-XL) 50 MG 24 hr tablet TAKE 1 TABLET BY MOUTH EVERY DAY IN THE EVENING  Patient not taking: Reported on 3/19/2025 10/5/23   Rene Juarez MD   OLANZapine (ZYPREXA) 5 MG tablet Take 1 tablet (5 mg total) by mouth nightly. 3/19/25 3/19/26  Fabiola Johnson MD   promethazine (PHENERGAN) 25 MG tablet Take 1 tablet (25 mg total) by mouth every 6 (six) hours as needed for Nausea. 3/19/25   Fabiola Johnson MD   scopolamine (TRANSDERM-SCOP) 1.3-1.5 mg (1 mg over 3 days) Place 1 patch onto the skin Every 3 (three) days. 2/15/25   Martina Gaspar MD   sodium bicarbonate 650 MG tablet Take 1 tablet (650 mg total) by mouth 2 (two) times daily. for 15 days  Patient not taking: Reported on 2/27/2025 2/19/25 3/8/25  Selina Winters MD   valsartan (DIOVAN) 320 MG tablet Take 1 tablet (320 mg total) by mouth once daily.  Patient not taking: Reported on 3/19/2025 12/16/24   Rene Juarez MD     Anticoagulants/Antiplatelets: no anticoagulation    Allergies:   Review of patient's allergies indicates:   Allergen Reactions    Codeine Nausea Only     Sedation History:  no adverse reactions    Review of Systems:   Hematological: no known coagulopathies  Respiratory: no shortness of breath  Cardiovascular: no chest pain  Gastrointestinal: no abdominal pain  Genito-Urinary: no dysuria  Musculoskeletal: negative  Neurological: no TIA or stroke symptoms         OBJECTIVE:     Vital Signs (Most Recent)  Temp: 98.2 °F (36.8 °C) (04/02/25 0954)  Pulse: 85 (04/02/25 1003)  Resp: 18 (04/02/25 0954)  BP: (!) 161/74 (04/02/25 1000)  SpO2: 100 % (04/02/25 0954)    Physical Exam:  ASA: class 3  Mallampati: class II    General: no acute distress  Mental Status:  alert and oriented to person, place and time  HEENT: normocephalic, atraumatic  Chest: unlabored breathing  Heart: regular heart rate  Abdomen: left biliary drain has gauze dressing in place with bilious material. Skin surrounding the drain is erythematous with mild skin breakdown.  Extremity: moves all extremities    Laboratory  Lab Results   Component Value Date    INR 1.2 02/07/2025       Lab Results   Component Value Date    WBC 9.37 04/02/2025    HGB 9.9 (L) 04/02/2025    HCT 29.8 (L) 04/02/2025    MCV 88 04/02/2025     04/02/2025      Lab Results   Component Value Date     03/19/2025     (L) 04/02/2025    K 3.3 (L) 04/02/2025    CL 91 (L) 04/02/2025    CO2 28 04/02/2025    BUN 6 (L) 04/02/2025    CREATININE 0.9 04/02/2025    CALCIUM 9.0 04/02/2025    MG 1.8 02/19/2025    ALT 22 04/02/2025    AST 33 04/02/2025    ALBUMIN 2.3 (L) 04/02/2025    BILITOT 1.6 (H) 04/02/2025    BILIDIR 0.2 10/11/2012       ASSESSMENT/PLAN:     Sedation Plan: up to moderate sedation.    After thorough discussion with the patient and her family regarding the nature of the procedure and its associated risks and benefits, she elected to proceed, and formal consent was obtained. All questions were answered. Patient will undergo percutaneous cholangiogram with biliary drain exchange.    Zaire Fields MD PGY-2  Department of Radiology  [facility]

## 2025-04-03 DIAGNOSIS — K83.1 BILIARY OBSTRUCTION: Primary | ICD-10-CM

## 2025-04-04 ENCOUNTER — OFFICE VISIT (OUTPATIENT)
Dept: HEMATOLOGY/ONCOLOGY | Facility: CLINIC | Age: 70
End: 2025-04-04
Payer: MEDICARE

## 2025-04-04 ENCOUNTER — INFUSION (OUTPATIENT)
Dept: INFUSION THERAPY | Facility: HOSPITAL | Age: 70
End: 2025-04-04
Payer: MEDICARE

## 2025-04-04 VITALS
DIASTOLIC BLOOD PRESSURE: 75 MMHG | SYSTOLIC BLOOD PRESSURE: 166 MMHG | RESPIRATION RATE: 18 BRPM | TEMPERATURE: 97 F | BODY MASS INDEX: 33.34 KG/M2 | HEIGHT: 59 IN | WEIGHT: 165.38 LBS | HEART RATE: 77 BPM

## 2025-04-04 VITALS
HEART RATE: 79 BPM | RESPIRATION RATE: 18 BRPM | DIASTOLIC BLOOD PRESSURE: 79 MMHG | BODY MASS INDEX: 31.31 KG/M2 | HEIGHT: 59 IN | OXYGEN SATURATION: 99 % | TEMPERATURE: 97 F | SYSTOLIC BLOOD PRESSURE: 118 MMHG

## 2025-04-04 DIAGNOSIS — E87.6 HYPOKALEMIA: ICD-10-CM

## 2025-04-04 DIAGNOSIS — C22.1 CHOLANGIOCARCINOMA: Primary | ICD-10-CM

## 2025-04-04 DIAGNOSIS — R53.83 FATIGUE, UNSPECIFIED TYPE: ICD-10-CM

## 2025-04-04 DIAGNOSIS — K83.1 BILIARY OBSTRUCTION: ICD-10-CM

## 2025-04-04 DIAGNOSIS — R11.2 INTRACTABLE NAUSEA AND VOMITING: ICD-10-CM

## 2025-04-04 DIAGNOSIS — C77.2 SECONDARY MALIGNANT NEOPLASM OF INTRA-ABDOMINAL LYMPH NODES: ICD-10-CM

## 2025-04-04 DIAGNOSIS — R63.0 ANOREXIA: ICD-10-CM

## 2025-04-04 PROCEDURE — A4216 STERILE WATER/SALINE, 10 ML: HCPCS | Performed by: INTERNAL MEDICINE

## 2025-04-04 PROCEDURE — 99999 PR PBB SHADOW E&M-EST. PATIENT-LVL IV: CPT | Mod: PBBFAC,,, | Performed by: PHYSICIAN ASSISTANT

## 2025-04-04 PROCEDURE — 63600175 PHARM REV CODE 636 W HCPCS: Mod: JZ,TB | Performed by: INTERNAL MEDICINE

## 2025-04-04 PROCEDURE — 96367 TX/PROPH/DG ADDL SEQ IV INF: CPT

## 2025-04-04 PROCEDURE — 25000003 PHARM REV CODE 250: Performed by: INTERNAL MEDICINE

## 2025-04-04 PROCEDURE — 96417 CHEMO IV INFUS EACH ADDL SEQ: CPT

## 2025-04-04 PROCEDURE — 96413 CHEMO IV INFUSION 1 HR: CPT

## 2025-04-04 RX ORDER — EPINEPHRINE 0.3 MG/.3ML
0.3 INJECTION SUBCUTANEOUS ONCE AS NEEDED
Status: CANCELLED | OUTPATIENT
Start: 2025-04-04

## 2025-04-04 RX ORDER — HEPARIN 100 UNIT/ML
500 SYRINGE INTRAVENOUS
OUTPATIENT
Start: 2025-04-16

## 2025-04-04 RX ORDER — EPINEPHRINE 0.3 MG/.3ML
0.3 INJECTION SUBCUTANEOUS ONCE AS NEEDED
OUTPATIENT
Start: 2025-04-16

## 2025-04-04 RX ORDER — POTASSIUM CHLORIDE 20 MEQ/1
40 TABLET, EXTENDED RELEASE ORAL ONCE
Status: CANCELLED
Start: 2025-04-04 | End: 2025-04-04

## 2025-04-04 RX ORDER — HEPARIN 100 UNIT/ML
500 SYRINGE INTRAVENOUS
Status: CANCELLED | OUTPATIENT
Start: 2025-04-04

## 2025-04-04 RX ORDER — DIPHENHYDRAMINE HYDROCHLORIDE 50 MG/ML
50 INJECTION, SOLUTION INTRAMUSCULAR; INTRAVENOUS ONCE AS NEEDED
OUTPATIENT
Start: 2025-04-16

## 2025-04-04 RX ORDER — DIPHENHYDRAMINE HYDROCHLORIDE 50 MG/ML
50 INJECTION, SOLUTION INTRAMUSCULAR; INTRAVENOUS ONCE AS NEEDED
Status: DISCONTINUED | OUTPATIENT
Start: 2025-04-04 | End: 2025-04-04 | Stop reason: HOSPADM

## 2025-04-04 RX ORDER — POTASSIUM CHLORIDE 20 MEQ/1
40 TABLET, EXTENDED RELEASE ORAL ONCE
Status: CANCELLED
Start: 2025-04-04

## 2025-04-04 RX ORDER — SODIUM CHLORIDE 0.9 % (FLUSH) 0.9 %
10 SYRINGE (ML) INJECTION
Status: DISCONTINUED | OUTPATIENT
Start: 2025-04-04 | End: 2025-04-04 | Stop reason: HOSPADM

## 2025-04-04 RX ORDER — SODIUM CHLORIDE 0.9 % (FLUSH) 0.9 %
10 SYRINGE (ML) INJECTION
Status: CANCELLED | OUTPATIENT
Start: 2025-04-04

## 2025-04-04 RX ORDER — HEPARIN 100 UNIT/ML
500 SYRINGE INTRAVENOUS
Status: DISCONTINUED | OUTPATIENT
Start: 2025-04-04 | End: 2025-04-04 | Stop reason: HOSPADM

## 2025-04-04 RX ORDER — DIPHENHYDRAMINE HYDROCHLORIDE 50 MG/ML
50 INJECTION, SOLUTION INTRAMUSCULAR; INTRAVENOUS ONCE AS NEEDED
Status: CANCELLED | OUTPATIENT
Start: 2025-04-04

## 2025-04-04 RX ORDER — POTASSIUM CHLORIDE 20 MEQ/1
40 TABLET, EXTENDED RELEASE ORAL ONCE
Status: COMPLETED | OUTPATIENT
Start: 2025-04-04 | End: 2025-04-04

## 2025-04-04 RX ORDER — SODIUM CHLORIDE 0.9 % (FLUSH) 0.9 %
10 SYRINGE (ML) INJECTION
OUTPATIENT
Start: 2025-04-16

## 2025-04-04 RX ORDER — EPINEPHRINE 0.3 MG/.3ML
0.3 INJECTION SUBCUTANEOUS ONCE AS NEEDED
Status: DISCONTINUED | OUTPATIENT
Start: 2025-04-04 | End: 2025-04-04 | Stop reason: HOSPADM

## 2025-04-04 RX ADMIN — SODIUM CHLORIDE 1500 MG: 9 INJECTION, SOLUTION INTRAVENOUS at 10:04

## 2025-04-04 RX ADMIN — GEMCITABINE 1800 MG: 38 INJECTION, SOLUTION INTRAVENOUS at 01:04

## 2025-04-04 RX ADMIN — HEPARIN 500 UNITS: 100 SYRINGE at 03:04

## 2025-04-04 RX ADMIN — CISPLATIN 44 MG: 1 INJECTION, SOLUTION INTRAVENOUS at 12:04

## 2025-04-04 RX ADMIN — POTASSIUM CHLORIDE 40 MEQ: 1500 TABLET, EXTENDED RELEASE ORAL at 10:04

## 2025-04-04 RX ADMIN — SODIUM CHLORIDE 500 ML: 9 INJECTION, SOLUTION INTRAVENOUS at 02:04

## 2025-04-04 RX ADMIN — POTASSIUM CHLORIDE 500 ML/HR: 2 INJECTION, SOLUTION, CONCENTRATE INTRAVENOUS at 09:04

## 2025-04-04 RX ADMIN — SODIUM CHLORIDE, PRESERVATIVE FREE 10 ML: 5 INJECTION INTRAVENOUS at 03:04

## 2025-04-04 RX ADMIN — DEXAMETHASONE SODIUM PHOSPHATE 0.25 MG: 4 INJECTION, SOLUTION INTRA-ARTICULAR; INTRALESIONAL; INTRAMUSCULAR; INTRAVENOUS; SOFT TISSUE at 12:04

## 2025-04-04 RX ADMIN — FOSAPREPITANT 150 MG: 150 INJECTION, POWDER, LYOPHILIZED, FOR SOLUTION INTRAVENOUS at 11:04

## 2025-04-04 NOTE — PLAN OF CARE
1600-Pt tolerated Imfinzi, Cisplatin, and Gemzar infusion well. No complaints or complications reported. Vital Signs stable. PAC blood return noted, Heparin locked and PAC needle removed. Pt aware to call provider with any questions or  concerns, aware of upcoming appointments, ambulatory wheeled from clinic in w/c, no distress noted.

## 2025-04-04 NOTE — PROGRESS NOTES
"  CC:  Unresectable hilar cholangiocarcinoma, MMR-proficient    Oncological History copied from medical chart:      Ms. Cerrato is a 68 yo woman with empty sella syndrome, growth hormone deficiency, grave's disease with exophthalmos, HTN, seizures, T2DM, who presents today for further management of unresectable hilar cholangiocarcinoma.   She presented to OSH with jaundice and scleral icterus. Initial CT of the abdomen and pelvis with IV contrast showed significant intrahepatic biliary ductal dilatation and questionable 9 mm fat containing lesion in the uncinate process of the pancreas. Subsequent MRCP showed "severe intrahepatic bile duct dilatation, with abrupt narrowing at the confluence of the hepatic ducts where there is a suspected ill-defined liver mass. Repeat imaging with MRI abdomen with and without contrast showed "hypoattenuating lesion near the confluence of the right and left hepatic ducts causing severe upstream ductal dilatation concerning for cholangiocarcinoma.  Further evaluation with ERCP is recommended." Patient transferred to [facility] for further workup and evaluation. CEA elevated, CA 19-9 WNL. PCT placed 2/8/25. She was discharged and PTC x2 remains capped.  2/18/25: CT A/P:  Impression:  1. Redemonstration of bilateral internal external biliary drains in place, with distal tips terminating in the duodenum.  Distal left biliary drain terminates without pigtail configuration.  Mild persistent intrahepatic biliary ductal dilatation.  All of these findings appear similar compared to 02/12/2025 without significant detrimental change.  2. Few areas of mesenteric stranding and nodularity adjacent to the duodenum, splenic flexure of the colon, and descending colon, as described above.  Increased conspicuity of these areas compared to 02/03/2025.  Findings are nonspecific, may represent infectious or inflammatory changes, however given history of cholangiocarcinoma, sequela of peritoneal metastasis cannot " be excluded.  Attention on follow-up imaging is recommended.  3. Multiple scattered prominent lymph nodes without pathologic enlargement by size criteria.  4. Other findings, as above.  Upper EUS with ceferino hepatis lymph node biopsy on 3/10/25 showed moderately differentiated metastatic adenocarcinoma. MMR-proficient.   Seen by Dr Glynn. Dr Glynn does not feel she is a surgical candidate nor the disease is resectable. There is distant lymph node involvement on PET. Recc palliative chemo +/- chemoradiation if she responds well.  Patient presents today for further management.     Interval History: MS Del Toro returns today to begin chemo-immunotherapy for the treatment of cholangiocarcinoma. She will begin in [place]/Cis/Durvalumab. Noted N/V every time she changes the gauze of her leaking PTC tube site. Not eating much but does tolerate fresh potatoes and broccoli well. She also tries to drink protein drinks. Milk products and fried foods do not sit well with her. Followed by dietician. Feels weak but ready to begin treatment. No report of fever, chills    ECOG: 3. Had port placed on 3/25/2025 with tube exchange on 4/2/2025. Presents with her brother and sister-in-law    Past Medical History:   Diagnosis Date    Cataract     Empty sella syndrome     GHD (growth hormone deficiency)     Glaucoma     Graves' disease with exophthalmos     Hyperlipidemia     Hypertension     Seizures     Thyroid nodule     Thyroid nodule     Type II or unspecified type diabetes mellitus without mention of complication, uncontrolled         Past Surgical History:   Procedure Laterality Date    BREAST BIOPSY      CATARACT EXTRACTION W/  INTRAOCULAR LENS IMPLANT Right 3/15/2021    Procedure: EXTRACTION, CATARACT, WITH IOL INSERTION;  Surgeon: Cj Caban MD;  Location: [facility];  Service: Ophthalmology;  Laterality: Right;    CATARACT EXTRACTION W/  INTRAOCULAR LENS IMPLANT Left 3/29/2021    Procedure: EXTRACTION, CATARACT, WITH IOL INSERTION;   Surgeon: Cj Caban MD;  Location: [facility] OR;  Service: Ophthalmology;  Laterality: Left;    CHOLECYSTECTOMY      ENDOSCOPIC ULTRASOUND OF UPPER GASTROINTESTINAL TRACT N/A 3/10/2025    Procedure: ULTRASOUND, UPPER GI TRACT, ENDOSCOPIC;  Surgeon: Tim Caban MD;  Location: [facility] ENDO (2ND FLR);  Service: Endoscopy;  Laterality: N/A;  2/28: EUS for FNA of lymph nodes-instructions emailed-ch    HYSTERECTOMY      one ovary intact    INJECTION OF JOINT Left 2/6/2024    Procedure: INJECTION, JOINT LEFT KNEE WITH STEROID;  Surgeon: She Schwartz MD;  Location: [facility] PAIN MGT;  Service: Pain Management;  Laterality: Left;  [phone]    INSERTION OF TUNNELED CENTRAL VENOUS CATHETER (CVC) WITH SUBCUTANEOUS PORT N/A 3/25/2025    Procedure: INSERTION, SINGLE LUMEN CATHETER WITH PORT, WITH FLUOROSCOPIC GUIDANCE;  Surgeon: Zaire Glynn MD;  Location: [facility] 2ND FLR;  Service: General;  Laterality: N/A;    KNEE SURGERY      LYMPH NODE BIOPSY  3010    OOPHORECTOMY         Family History   Problem Relation Name Age of Onset    Cancer Mother      Cataracts Mother      Glaucoma Mother          shunt    Heart disease Mother      Tremor Mother      Breast cancer Mother  78    Heart disease Father 64   chf     Heart disease Sister      No Known Problems Sister      Breast cancer Sister  60    Hypertension Brother      No Known Problems Brother      No Known Problems Maternal Aunt      No Known Problems Maternal Uncle      No Known Problems Paternal Aunt      No Known Problems Paternal Uncle      No Known Problems Maternal Grandmother      No Known Problems Maternal Grandfather      No Known Problems Paternal Grandmother      No Known Problems Paternal Grandfather      No Known Problems Son      No Known Problems Other         Social History[1]    Review of Systems   Constitutional:  Positive for appetite change, fatigue and unexpected weight change. Negative for chills and fever.   HENT:  Negative for mouth sores,  nosebleeds, tinnitus, trouble swallowing and voice change.    Eyes:  Negative for pain, redness and visual disturbance.   Respiratory:  Negative for cough, shortness of breath and wheezing.    Cardiovascular:  Negative for chest pain, palpitations and leg swelling.   Gastrointestinal:  Positive for nausea and vomiting. Negative for abdominal distention, abdominal pain, blood in stool, constipation and diarrhea.   Endocrine: Negative for polydipsia, polyphagia and polyuria.   Genitourinary:  Negative for flank pain, frequency and hematuria.   Musculoskeletal:  Negative for arthralgias, back pain, gait problem, joint swelling, myalgias, neck pain and neck stiffness.   Skin:  Negative for color change, pallor, rash and wound.   Neurological:  Positive for weakness. Negative for tremors, seizures, syncope, speech difficulty, light-headedness, numbness and headaches.   Hematological:  Negative for adenopathy. Does not bruise/bleed easily.   Psychiatric/Behavioral:  Negative for confusion, dysphoric mood and self-injury. The patient is not nervous/anxious.    All other systems reviewed and are negative.      Objective:  Physical Exam  Vitals reviewed.   Constitutional:       General: She is not in acute distress.     Appearance: She is well-developed. She is ill-appearing. She is not diaphoretic.   HENT:      Head: Normocephalic and atraumatic.      Mouth/Throat:      Pharynx: No oropharyngeal exudate.   Eyes:      General: Scleral icterus present.      Conjunctiva/sclera: Conjunctivae normal.      Pupils: Pupils are equal, round, and reactive to light.   Neck:      Thyroid: No thyromegaly.      Vascular: No JVD.   Cardiovascular:      Rate and Rhythm: Normal rate and regular rhythm.      Heart sounds: Normal heart sounds. No murmur heard.     No friction rub. No gallop.   Pulmonary:      Effort: Pulmonary effort is normal. No respiratory distress.      Breath sounds: Normal breath sounds. No wheezing or rales.    Abdominal:      General: Bowel sounds are normal. There is no distension.      Palpations: Abdomen is soft. There is no mass.      Tenderness: There is no abdominal tenderness. There is no rebound.      Hernia: No hernia is present.   Musculoskeletal:         General: No tenderness or deformity. Normal range of motion.      Cervical back: Normal range of motion and neck supple.   Lymphadenopathy:      Cervical: No cervical adenopathy.      Upper Body:      Right upper body: No supraclavicular adenopathy.      Left upper body: No supraclavicular adenopathy.   Skin:     General: Skin is warm and dry.      Coloration: Skin is not pale.      Findings: No erythema or rash.   Neurological:      Mental Status: She is alert and oriented to person, place, and time.      Cranial Nerves: No cranial nerve deficit.      Motor: No abnormal muscle tone.   Psychiatric:         Behavior: Behavior normal.         Thought Content: Thought content normal.         Judgment: Judgment normal.       Labs:  I have personally reviewed the patient's lab work today, including CBC and CMP. ANC is adequate for treatment  Bilirubin 1.6. CA 19-9 non   K 3.3    Imaging Data:  PET 3/17/25:  Impression:     In patient with recent lymph node biopsy demonstrating metastatic adenocarcinoma, multiple tracer avid upper abdominal lymph nodes with index lesions as detailed above.  No definitive focal tracer avid lesion in the liver.     Stable position of bilateral internal/external biliary drains with mild intrahepatic biliary ductal dilatation.     Decrease mesenteric stranding adjacent to the duodenum and descending colon as seen on prior CT.       Assessment and plan:    1. Cholangiocarcinoma    2. Secondary malignant neoplasm of intra-abdominal lymph nodes    3. Anorexia    4. Biliary obstruction    5. Intractable nausea and vomiting    6. Fatigue, unspecified type    7. Hypokalemia        1.2  - Ms Saqib is a 68 yo woman with empty sella  syndrome, growth hormone deficiency, grave's disease with exophthalmos, HTN, seizures, T2DM, who presents today for further management of unresectable hilar cholangiocarcinoma, MMR-proficient  - Long discussion with patient and daughter-in-law re the diagnosis, natural history and prognosis of an unresectable hilar cholangiocarcinoma. Cancer is not curable but treatable. The goal of treatment is palliative, with hopes of containing the cancer, slowing down growth, and prolonging life.   - discussed palliative chemoimmunotherapy with cis/gem/durv. Reviewed side effects in detail. Handouts were provided to patient during previous visit. She returns today to begin. We did hold off on performing the audiogram due to how weak she was feeling. Will perform in the future if needed.   - Had port placed on 3/25/2025 without complication    - doing ok today. MS Del Toro returns today to begin chemo-immunotherapy for the treatment of cholangiocarcinoma. She will begin in [place]/Cis/Durvalumab. Noted N/V every time she changes the gauze of her leaking PTC tube site. Not eating much but does tolerate fresh potatoes and broccoli well. Feels weak but ready to begin treatment. No report of fever, chills  - I have personally reviewed the patient's lab work today, including CBC and CMP. ANC is adequate for treatment. K 3.3 and bili 1.6  - Proceed with cycle 1 of [place]/Cis/Durvalumab    Patient was consented for chemotherapy today 4/4/2025 .   An extensive discussion was had which included a thorough discussion of the risk and benefits of treatment and alternatives.  Risks, including but not limited to, possible hair loss, bone marrow damage (anemia, thrombocytopenia, immune suppression, neutropenia), damage to body organs (brain, heart, liver, kidney, lungs, nervous system, skin, and others), allergic reactions, sterility, nausea/vomiting, constipation/diarrhea, sores in the mouth, secondary cancers, local damage at possible injection  sites, and rarely death were all discussed.  The patient agrees with the plan, and all questions have been answered to their satisfaction.  Consent was signed the patient, provider, and a third party witness.      - RTC in 2 weeks for cycle 2.     - PGX, tempus blood were drawn    3.  - Start periactin tid  - Start zyprexa 5 mg at night  - referred to palliative. Will help to schedule.     4.  - Start zyprexa 5 mg at night  - Take phenergan prn in addition to zofran    5.  - long discussion. Most likely she will need these tubes indefinitely. Needs tube exchange. This was completed on 4/2/2025. She tolerated this procedure well.   Will continue to monitor.     7.   - K 3.3. Will add 40 meq of K to her infusion today. Will continue to monitor.     RTC in 2 weeks for cycle 2    Patient and family members displayed understanding of the above encounter and treatment plan. All thoughtful questions were answered to their satisfaction. Patient was advised to notify the care team or proceed to the ER if signs and symptoms worsen.     45 minutes were spent today on this encounter including face to face time with the patient, data gathering/interpretation and documentation. Greater than 50% of this time involved counseling or coordination of care. I have provided the patient with an opportunity to ask questions and have all questions answered to patient's satisfaction.     Visit today included increased complexity associated with the care of the episodic problem chemotherapy  addressed and managing the longitudinal care of the patient due to the serious and/or complex managed problem(s) GI malignancies/cancer. In addition, the above encounter addresses an illness that poses a significant threat to life, bodily function and overall performance status.      code applied: patient requires or will require a continuous, longitudinal, and active collaborative plan of care related to this patient's health condition, GI  malignancies/cancer --the management of which requires the direction of a practitioner with specialized clinical knowledge, skill, and expertise       LISA Covington, PA-C [facility]  Dept of Hematology/Oncology  ABHINAV to GI Oncology team         Route Chart for Scheduling    Med Onc Chart Routing      Follow up with physician 4 weeks. [place]/Cis/Durvalumab with Durvalumab q4 weeks   Follow up with ANA M 2 weeks and 6 weeks. [place]/Cis/Durvalumab with Durvalumab q4 weeks   Infusion scheduling note    Injection scheduling note    Labs CBC, CMP and TSH   Scheduling:  Preferred lab:  Lab interval: every 2 weeks     Imaging    Pharmacy appointment    Other referrals                Treatment Plan Information   OP CISPLATIN GEMCITABINE D1,15  DURVALUMAB D1 Q4W Fabiola Johnson MD   Associated diagnosis: Cholangiocarcinoma   noted on 3/19/2025   Line of treatment: First Line  Treatment Goal: Palliative     Upcoming Treatment Dates - OP CISPLATIN GEMCITABINE D1,15  DURVALUMAB D1 Q4W    4/4/2025       Chemotherapy       durvalumab (IMFINZI) 1,500 mg in 0.9% NaCl SolP 280 mL chemo infusion       CISplatin (Platinol) 25 mg/m2 = 44 mg in 0.9% NaCl 294 mL chemo infusion       gemcitabine (GEMZAR) 1,770 mg in 0.9% NaCl 267.7 mL chemo infusion       Pre-Hydration       0.9% NaCl 500 mL with magnesium sulfate 1 g, potassium chloride 10 mEq infusion       Post-Hydration       sodium chloride 0.9% bolus 500 mL 500 mL       Antiemetics       palonosetron (ALOXI) 0.25 mg with Dexamethasone (DECADRON) 12 mg in NS 50 mL IVPB       fosaprepitant 150 mg in 0.9% NaCl 150 mL IVPB  4/18/2025       Chemotherapy       CISplatin (Platinol) 25 mg/m2 = 44 mg in 0.9% NaCl 294 mL chemo infusion       gemcitabine (GEMZAR) 1,770 mg in 0.9% NaCl 267.7 mL chemo infusion       Pre-Hydration       0.9% NaCl 500 mL with magnesium sulfate 1 g, potassium chloride 10 mEq infusion       Post-Hydration       sodium chloride 0.9% bolus 500 mL 500 mL        Antiemetics       palonosetron (ALOXI) 0.25 mg with Dexamethasone (DECADRON) 12 mg in NS 50 mL IVPB       fosaprepitant 150 mg in 0.9% NaCl 150 mL IVPB  5/2/2025       Chemotherapy       durvalumab (IMFINZI) 1,500 mg in 0.9% NaCl SolP 280 mL chemo infusion       CISplatin (Platinol) 25 mg/m2 = 44 mg in 0.9% NaCl 294 mL chemo infusion       gemcitabine (GEMZAR) 1,770 mg in 0.9% NaCl 267.7 mL chemo infusion       Pre-Hydration       0.9% NaCl 500 mL with magnesium sulfate 1 g, potassium chloride 10 mEq infusion       Post-Hydration       sodium chloride 0.9% bolus 500 mL 500 mL       Antiemetics       palonosetron (ALOXI) 0.25 mg with Dexamethasone (DECADRON) 12 mg in NS 50 mL IVPB       fosaprepitant 150 mg in 0.9% NaCl 150 mL IVPB  5/16/2025       Chemotherapy       CISplatin (Platinol) 25 mg/m2 = 44 mg in 0.9% NaCl 294 mL chemo infusion       gemcitabine (GEMZAR) 1,770 mg in 0.9% NaCl 267.7 mL chemo infusion       Pre-Hydration       0.9% NaCl 500 mL with magnesium sulfate 1 g, potassium chloride 10 mEq infusion       Post-Hydration       sodium chloride 0.9% bolus 500 mL 500 mL       Antiemetics       palonosetron (ALOXI) 0.25 mg with Dexamethasone (DECADRON) 12 mg in NS 50 mL IVPB       fosaprepitant 150 mg in 0.9% NaCl 150 mL IVPB               [1]   Social History  Socioeconomic History    Marital status:    Occupational History     Employer: [employer]   Tobacco Use    Smoking status: Never    Smokeless tobacco: Never   Substance and Sexual Activity    Alcohol use: No     Comment: none    Drug use: No    Sexual activity: Yes     Partners: Male     Comment: m  works in dietary,  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     Social Drivers of Health     Financial Resource Strain: Low Risk  (2/4/2025)    Overall Financial Resource Strain (CARDIA)     Difficulty of Paying Living Expenses: Not hard at all   Food Insecurity: No Food Insecurity (2/4/2025)    Hunger Vital Sign     Worried About Running Out  of Food in the Last Year: Never true     Ran Out of Food in the Last Year: Never true   Transportation Needs: No Transportation Needs (2/4/2025)    TRANSPORTATION NEEDS     Transportation : No   Physical Activity: Inactive (2/4/2025)    Exercise Vital Sign     Days of Exercise per Week: 0 days     Minutes of Exercise per Session: 0 min   Stress: Stress Concern Present (2/4/2025)    [place] [employer] [place] [employer] - Occupational Stress Questionnaire     Feeling of Stress : Very much   Housing Stability: Unknown (2/4/2025)    Housing Stability Vital Sign     Unable to Pay for Housing in the Last Year: No     Homeless in the Last Year: No

## 2025-04-07 DIAGNOSIS — C22.1 CHOLANGIOCARCINOMA: Primary | ICD-10-CM

## 2025-04-08 ENCOUNTER — TELEPHONE (OUTPATIENT)
Dept: INTERNAL MEDICINE | Facility: CLINIC | Age: 70
End: 2025-04-08
Payer: MEDICARE

## 2025-04-08 ENCOUNTER — TELEPHONE (OUTPATIENT)
Dept: HEMATOLOGY/ONCOLOGY | Facility: CLINIC | Age: 70
End: 2025-04-08
Payer: MEDICARE

## 2025-04-08 NOTE — TELEPHONE ENCOUNTER
----- Message from Cherelle sent at 4/8/2025 10:35 AM CDT -----  Name of Who is Calling: (abdulaziz) [facility] What is the request in detail:nurse requesting a call back regarding pt bowl movement. Pt stated she went to the bathroom on Thursday 4/3/25 and also stated it is not uncommon for her to go that long without using the bathroom. Please contact to further discuss and advise.  Can the clinic reply by [facility]:call What Number to Call Back if not in [facility]:[phone] ask for Abdulaziz

## 2025-04-08 NOTE — TELEPHONE ENCOUNTER
Patient states she has no appetite in which causing irregular bowel movements. Instructed to to eat 6 meals daily but no appetite. Patient states she's been eating soupsPatient states she's sleeping more than usual. She says she's found out that there's a lot of food that she can't eat. 2 tubes placed in stomach 2 months ago that are now replaced and may be permanent that was done on last Wednesday. They don't leak as much since the replacement. Patient has another procedure Friday. Patient states she's overwhelmed. Walking around with her rollator without any falls. Started Chemotherapy and have to build up her strength to do so. Patient has to gain a lb a week. Patient     Verbalized with Dr. Castellano    Fax from Roger Elliott(Client Missed Visit Report- 3/19/2025) reviewed and placed in my file cabinet. Fax will be sent to scan after 3 months!

## 2025-04-08 NOTE — NURSING
Discussed malnutrition screening and current symptoms. Patient is established with HHC and PCP. She declines follow-up at this time. Desires to continue recovery. Declines virtual visits. Mentions transportation can be difficult, has too many appts currently. Declines assistance with support for groceries/food prep. Declines social support. Mentions she lost her  last year. Notes she is a picky eater and has a friend who will go to the grocery store for her. Plans to setup [employer] delivery. Will continue to monitor. Notes she is eating more and gaining one pound per week as goal. Will notify team. JAYLEEN Graham.

## 2025-04-10 ENCOUNTER — TELEPHONE (OUTPATIENT)
Dept: ORTHOPEDICS | Facility: CLINIC | Age: 70
End: 2025-04-10
Payer: MEDICARE

## 2025-04-10 NOTE — TELEPHONE ENCOUNTER
I called the patient today regarding her voice message. I left a message for the patient to call me back. I left my name and phone number.        ----- Message from Benson Márquez sent at 4/10/2025  1:01 PM CDT -----  Regarding: appt concern  Contact: pt [phone]  Type:  Needs Medical AdviceWho Called:  Katey is calling to cancel her appt  in may and her surgery Would the patient rather a call back or a response via [facility]?  Call back Best Call Back Number: pt [phone] Additional Information:

## 2025-04-16 ENCOUNTER — TELEPHONE (OUTPATIENT)
Dept: INTERNAL MEDICINE | Facility: CLINIC | Age: 70
End: 2025-04-16
Payer: MEDICARE

## 2025-04-16 ENCOUNTER — TELEPHONE (OUTPATIENT)
Dept: ORTHOPEDICS | Facility: CLINIC | Age: 70
End: 2025-04-16
Payer: MEDICARE

## 2025-04-16 NOTE — TELEPHONE ENCOUNTER
I called the patient today regarding her surgery. The patient called previously stating that she might want to cancel her surgery. I left a message for the patient to call me back. I left my name and phone number.

## 2025-04-16 NOTE — TELEPHONE ENCOUNTER
Fax from Roger Elliott in regards to client missed visit report reviewed and placed in my file cabinet. Fax will be sent to scan after 3 months!

## 2025-04-17 ENCOUNTER — INFUSION (OUTPATIENT)
Dept: INFUSION THERAPY | Facility: HOSPITAL | Age: 70
End: 2025-04-17
Payer: MEDICARE

## 2025-04-17 ENCOUNTER — TELEPHONE (OUTPATIENT)
Dept: PALLIATIVE MEDICINE | Facility: CLINIC | Age: 70
End: 2025-04-17
Payer: MEDICARE

## 2025-04-17 ENCOUNTER — TELEPHONE (OUTPATIENT)
Dept: HEMATOLOGY/ONCOLOGY | Facility: CLINIC | Age: 70
End: 2025-04-17
Payer: MEDICARE

## 2025-04-17 ENCOUNTER — OFFICE VISIT (OUTPATIENT)
Dept: HEMATOLOGY/ONCOLOGY | Facility: CLINIC | Age: 70
End: 2025-04-17
Payer: MEDICARE

## 2025-04-17 VITALS
OXYGEN SATURATION: 98 % | TEMPERATURE: 97 F | HEIGHT: 59 IN | SYSTOLIC BLOOD PRESSURE: 87 MMHG | HEART RATE: 84 BPM | WEIGHT: 147.63 LBS | DIASTOLIC BLOOD PRESSURE: 61 MMHG | BODY MASS INDEX: 29.76 KG/M2 | RESPIRATION RATE: 15 BRPM

## 2025-04-17 VITALS
SYSTOLIC BLOOD PRESSURE: 135 MMHG | OXYGEN SATURATION: 98 % | DIASTOLIC BLOOD PRESSURE: 67 MMHG | HEART RATE: 75 BPM | TEMPERATURE: 97 F | HEIGHT: 59 IN | RESPIRATION RATE: 18 BRPM | BODY MASS INDEX: 29.76 KG/M2 | WEIGHT: 147.63 LBS

## 2025-04-17 DIAGNOSIS — E87.6 HYPOKALEMIA: ICD-10-CM

## 2025-04-17 DIAGNOSIS — R63.0 ANOREXIA: ICD-10-CM

## 2025-04-17 DIAGNOSIS — C77.2 SECONDARY MALIGNANT NEOPLASM OF INTRA-ABDOMINAL LYMPH NODES: ICD-10-CM

## 2025-04-17 DIAGNOSIS — R11.2 INTRACTABLE NAUSEA AND VOMITING: ICD-10-CM

## 2025-04-17 DIAGNOSIS — K83.1 BILIARY OBSTRUCTION: ICD-10-CM

## 2025-04-17 DIAGNOSIS — C22.1 CHOLANGIOCARCINOMA: Primary | ICD-10-CM

## 2025-04-17 DIAGNOSIS — R53.83 FATIGUE, UNSPECIFIED TYPE: ICD-10-CM

## 2025-04-17 PROCEDURE — 63600175 PHARM REV CODE 636 W HCPCS: Performed by: INTERNAL MEDICINE

## 2025-04-17 PROCEDURE — 4010F ACE/ARB THERAPY RXD/TAKEN: CPT | Mod: CPTII,S$GLB,, | Performed by: PHYSICIAN ASSISTANT

## 2025-04-17 PROCEDURE — A4216 STERILE WATER/SALINE, 10 ML: HCPCS | Performed by: INTERNAL MEDICINE

## 2025-04-17 PROCEDURE — 3078F DIAST BP <80 MM HG: CPT | Mod: CPTII,S$GLB,, | Performed by: PHYSICIAN ASSISTANT

## 2025-04-17 PROCEDURE — 96417 CHEMO IV INFUS EACH ADDL SEQ: CPT

## 2025-04-17 PROCEDURE — 99215 OFFICE O/P EST HI 40 MIN: CPT | Mod: S$GLB,,, | Performed by: PHYSICIAN ASSISTANT

## 2025-04-17 PROCEDURE — 96367 TX/PROPH/DG ADDL SEQ IV INF: CPT

## 2025-04-17 PROCEDURE — 1101F PT FALLS ASSESS-DOCD LE1/YR: CPT | Mod: CPTII,S$GLB,, | Performed by: PHYSICIAN ASSISTANT

## 2025-04-17 PROCEDURE — 96413 CHEMO IV INFUSION 1 HR: CPT

## 2025-04-17 PROCEDURE — 1160F RVW MEDS BY RX/DR IN RCRD: CPT | Mod: CPTII,S$GLB,, | Performed by: PHYSICIAN ASSISTANT

## 2025-04-17 PROCEDURE — 3061F NEG MICROALBUMINURIA REV: CPT | Mod: CPTII,S$GLB,, | Performed by: PHYSICIAN ASSISTANT

## 2025-04-17 PROCEDURE — 3074F SYST BP LT 130 MM HG: CPT | Mod: CPTII,S$GLB,, | Performed by: PHYSICIAN ASSISTANT

## 2025-04-17 PROCEDURE — 3008F BODY MASS INDEX DOCD: CPT | Mod: CPTII,S$GLB,, | Performed by: PHYSICIAN ASSISTANT

## 2025-04-17 PROCEDURE — 3072F LOW RISK FOR RETINOPATHY: CPT | Mod: CPTII,S$GLB,, | Performed by: PHYSICIAN ASSISTANT

## 2025-04-17 PROCEDURE — 1126F AMNT PAIN NOTED NONE PRSNT: CPT | Mod: CPTII,S$GLB,, | Performed by: PHYSICIAN ASSISTANT

## 2025-04-17 PROCEDURE — 25000003 PHARM REV CODE 250: Performed by: INTERNAL MEDICINE

## 2025-04-17 PROCEDURE — G2211 COMPLEX E/M VISIT ADD ON: HCPCS | Mod: S$GLB,,, | Performed by: PHYSICIAN ASSISTANT

## 2025-04-17 PROCEDURE — 99999 PR PBB SHADOW E&M-EST. PATIENT-LVL V: CPT | Mod: PBBFAC,,, | Performed by: PHYSICIAN ASSISTANT

## 2025-04-17 PROCEDURE — 3066F NEPHROPATHY DOC TX: CPT | Mod: CPTII,S$GLB,, | Performed by: PHYSICIAN ASSISTANT

## 2025-04-17 PROCEDURE — 3044F HG A1C LEVEL LT 7.0%: CPT | Mod: CPTII,S$GLB,, | Performed by: PHYSICIAN ASSISTANT

## 2025-04-17 PROCEDURE — 3288F FALL RISK ASSESSMENT DOCD: CPT | Mod: CPTII,S$GLB,, | Performed by: PHYSICIAN ASSISTANT

## 2025-04-17 PROCEDURE — 1159F MED LIST DOCD IN RCRD: CPT | Mod: CPTII,S$GLB,, | Performed by: PHYSICIAN ASSISTANT

## 2025-04-17 RX ORDER — HEPARIN 100 UNIT/ML
500 SYRINGE INTRAVENOUS
Status: DISCONTINUED | OUTPATIENT
Start: 2025-04-17 | End: 2025-04-17 | Stop reason: HOSPADM

## 2025-04-17 RX ORDER — DIPHENHYDRAMINE HYDROCHLORIDE 50 MG/ML
50 INJECTION, SOLUTION INTRAMUSCULAR; INTRAVENOUS ONCE AS NEEDED
Status: DISCONTINUED | OUTPATIENT
Start: 2025-04-17 | End: 2025-04-17 | Stop reason: HOSPADM

## 2025-04-17 RX ORDER — SODIUM CHLORIDE 0.9 % (FLUSH) 0.9 %
10 SYRINGE (ML) INJECTION
Status: DISCONTINUED | OUTPATIENT
Start: 2025-04-17 | End: 2025-04-17 | Stop reason: HOSPADM

## 2025-04-17 RX ORDER — EPINEPHRINE 0.3 MG/.3ML
0.3 INJECTION SUBCUTANEOUS ONCE AS NEEDED
Status: DISCONTINUED | OUTPATIENT
Start: 2025-04-17 | End: 2025-04-17 | Stop reason: HOSPADM

## 2025-04-17 RX ADMIN — HEPARIN 500 UNITS: 100 SYRINGE at 03:04

## 2025-04-17 RX ADMIN — POTASSIUM CHLORIDE 500 ML/HR: 2 INJECTION, SOLUTION, CONCENTRATE INTRAVENOUS at 11:04

## 2025-04-17 RX ADMIN — SODIUM CHLORIDE 500 ML: 9 INJECTION, SOLUTION INTRAVENOUS at 03:04

## 2025-04-17 RX ADMIN — FOSAPREPITANT 150 MG: 150 INJECTION, POWDER, LYOPHILIZED, FOR SOLUTION INTRAVENOUS at 12:04

## 2025-04-17 RX ADMIN — DEXAMETHASONE SODIUM PHOSPHATE 0.25 MG: 4 INJECTION, SOLUTION INTRA-ARTICULAR; INTRALESIONAL; INTRAMUSCULAR; INTRAVENOUS; SOFT TISSUE at 01:04

## 2025-04-17 RX ADMIN — GEMCITABINE 1800 MG: 38 INJECTION, SOLUTION INTRAVENOUS at 02:04

## 2025-04-17 RX ADMIN — SODIUM CHLORIDE, PRESERVATIVE FREE 10 ML: 5 INJECTION INTRAVENOUS at 03:04

## 2025-04-17 RX ADMIN — CISPLATIN 44 MG: 1 INJECTION, SOLUTION INTRAVENOUS at 01:04

## 2025-04-17 NOTE — Clinical Note
Hi team. Would it be possible to schedule this patient for consult visit for Palliative Care? I have placed the referral for her. Thank you so much

## 2025-04-17 NOTE — PROGRESS NOTES
"  CC:  Unresectable hilar cholangiocarcinoma, MMR-proficient    Oncological History copied from medical chart:      Ms. Cerrato is a 68 yo woman with empty sella syndrome, growth hormone deficiency, grave's disease with exophthalmos, HTN, seizures, T2DM, who presents today for further management of unresectable hilar cholangiocarcinoma.   She presented to OSH with jaundice and scleral icterus. Initial CT of the abdomen and pelvis with IV contrast showed significant intrahepatic biliary ductal dilatation and questionable 9 mm fat containing lesion in the uncinate process of the pancreas. Subsequent MRCP showed "severe intrahepatic bile duct dilatation, with abrupt narrowing at the confluence of the hepatic ducts where there is a suspected ill-defined liver mass. Repeat imaging with MRI abdomen with and without contrast showed "hypoattenuating lesion near the confluence of the right and left hepatic ducts causing severe upstream ductal dilatation concerning for cholangiocarcinoma.  Further evaluation with ERCP is recommended." Patient transferred to [facility] for further workup and evaluation. CEA elevated, CA 19-9 WNL. PCT placed 2/8/25. She was discharged and PTC x2 remains capped.  2/18/25: CT A/P:  Impression:  1. Redemonstration of bilateral internal external biliary drains in place, with distal tips terminating in the duodenum.  Distal left biliary drain terminates without pigtail configuration.  Mild persistent intrahepatic biliary ductal dilatation.  All of these findings appear similar compared to 02/12/2025 without significant detrimental change.  2. Few areas of mesenteric stranding and nodularity adjacent to the duodenum, splenic flexure of the colon, and descending colon, as described above.  Increased conspicuity of these areas compared to 02/03/2025.  Findings are nonspecific, may represent infectious or inflammatory changes, however given history of cholangiocarcinoma, sequela of peritoneal metastasis cannot " be excluded.  Attention on follow-up imaging is recommended.  3. Multiple scattered prominent lymph nodes without pathologic enlargement by size criteria.  4. Other findings, as above.  Upper EUS with ceferino hepatis lymph node biopsy on 3/10/25 showed moderately differentiated metastatic adenocarcinoma. MMR-proficient.   Seen by Dr Glynn. Dr Glynn does not feel she is a surgical candidate nor the disease is resectable. There is distant lymph node involvement on PET. Recc palliative chemo +/- chemoradiation if she responds well.  Patient presents today for further management.     Interval History: MS Del Toro returns today for cycle 2 of [place]/Cis with Durvalumab q4 weeks. She did pretty well with cycle 1 of chemotherapy. However, she did have some episodes of vomiting over the past couple of days. She was not aware that she could take her nausea medication as needed. She was also only wanting to eat vegetables without much protein. She is functioning ok and trying to be active. No fever or chills. Still needs physical support by wheelchair.     ECOG: 3. Had port placed on 3/25/2025 with tube exchange on 4/2/2025. Presents with her brother and sister-in-law    Past Medical History:   Diagnosis Date    Cataract     Empty sella syndrome     GHD (growth hormone deficiency)     Glaucoma     Graves' disease with exophthalmos     Hyperlipidemia     Hypertension     Seizures     Thyroid nodule     Thyroid nodule     Type II or unspecified type diabetes mellitus without mention of complication, uncontrolled         Past Surgical History:   Procedure Laterality Date    BREAST BIOPSY      CATARACT EXTRACTION W/  INTRAOCULAR LENS IMPLANT Right 3/15/2021    Procedure: EXTRACTION, CATARACT, WITH IOL INSERTION;  Surgeon: Cj Caban MD;  Location: [facility];  Service: Ophthalmology;  Laterality: Right;    CATARACT EXTRACTION W/  INTRAOCULAR LENS IMPLANT Left 3/29/2021    Procedure: EXTRACTION, CATARACT, WITH IOL INSERTION;  Surgeon:  Cj Caban MD;  Location: [facility] OR;  Service: Ophthalmology;  Laterality: Left;    CHOLECYSTECTOMY      ENDOSCOPIC ULTRASOUND OF UPPER GASTROINTESTINAL TRACT N/A 3/10/2025    Procedure: ULTRASOUND, UPPER GI TRACT, ENDOSCOPIC;  Surgeon: Tim Caban MD;  Location: [facility] ENDO (2ND FLR);  Service: Endoscopy;  Laterality: N/A;  2/28: EUS for FNA of lymph nodes-instructions emailed-ch    HYSTERECTOMY      one ovary intact    INJECTION OF JOINT Left 2/6/2024    Procedure: INJECTION, JOINT LEFT KNEE WITH STEROID;  Surgeon: She Schwartz MD;  Location: [facility] PAIN MGT;  Service: Pain Management;  Laterality: Left;  [phone]    INSERTION OF TUNNELED CENTRAL VENOUS CATHETER (CVC) WITH SUBCUTANEOUS PORT N/A 3/25/2025    Procedure: INSERTION, SINGLE LUMEN CATHETER WITH PORT, WITH FLUOROSCOPIC GUIDANCE;  Surgeon: Zaire Glynn MD;  Location: [facility] 2ND FLR;  Service: General;  Laterality: N/A;    KNEE SURGERY      LYMPH NODE BIOPSY  3010    OOPHORECTOMY         Family History   Problem Relation Name Age of Onset    Cancer Mother      Cataracts Mother      Glaucoma Mother          shunt    Heart disease Mother      Tremor Mother      Breast cancer Mother  78    Heart disease Father 64   chf     Heart disease Sister      No Known Problems Sister      Breast cancer Sister  60    Hypertension Brother      No Known Problems Brother      No Known Problems Maternal Aunt      No Known Problems Maternal Uncle      No Known Problems Paternal Aunt      No Known Problems Paternal Uncle      No Known Problems Maternal Grandmother      No Known Problems Maternal Grandfather      No Known Problems Paternal Grandmother      No Known Problems Paternal Grandfather      No Known Problems Son      No Known Problems Other         Social History[1]    Review of Systems   Constitutional:  Positive for appetite change, fatigue and unexpected weight change. Negative for chills and fever.   HENT:  Negative for mouth sores, nosebleeds,  tinnitus, trouble swallowing and voice change.    Eyes:  Negative for pain, redness and visual disturbance.   Respiratory:  Negative for cough, shortness of breath and wheezing.    Cardiovascular:  Negative for chest pain, palpitations and leg swelling.   Gastrointestinal:  Positive for nausea and vomiting. Negative for abdominal distention, abdominal pain, blood in stool, constipation and diarrhea.   Endocrine: Negative for polydipsia, polyphagia and polyuria.   Genitourinary:  Negative for flank pain, frequency and hematuria.   Musculoskeletal:  Negative for arthralgias, back pain, gait problem, joint swelling, myalgias, neck pain and neck stiffness.   Skin:  Negative for color change, pallor, rash and wound.   Neurological:  Positive for weakness. Negative for tremors, seizures, syncope, speech difficulty, light-headedness, numbness and headaches.   Hematological:  Negative for adenopathy. Does not bruise/bleed easily.   Psychiatric/Behavioral:  Negative for confusion, dysphoric mood and self-injury. The patient is not nervous/anxious.    All other systems reviewed and are negative.      Objective:  Physical Exam  Vitals reviewed.   Constitutional:       General: She is not in acute distress.     Appearance: She is well-developed. She is ill-appearing. She is not diaphoretic.   HENT:      Head: Normocephalic and atraumatic.      Mouth/Throat:      Pharynx: No oropharyngeal exudate.   Eyes:      General: Scleral icterus present.      Conjunctiva/sclera: Conjunctivae normal.      Pupils: Pupils are equal, round, and reactive to light.   Neck:      Thyroid: No thyromegaly.      Vascular: No JVD.   Cardiovascular:      Rate and Rhythm: Normal rate and regular rhythm.      Heart sounds: Normal heart sounds. No murmur heard.     No friction rub. No gallop.   Pulmonary:      Effort: Pulmonary effort is normal. No respiratory distress.      Breath sounds: Normal breath sounds. No wheezing or rales.   Abdominal:       General: Bowel sounds are normal. There is no distension.      Palpations: Abdomen is soft. There is no mass.      Tenderness: There is no abdominal tenderness. There is no rebound.      Hernia: No hernia is present.   Musculoskeletal:         General: No tenderness or deformity. Normal range of motion.      Cervical back: Normal range of motion and neck supple.   Lymphadenopathy:      Cervical: No cervical adenopathy.      Upper Body:      Right upper body: No supraclavicular adenopathy.      Left upper body: No supraclavicular adenopathy.   Skin:     General: Skin is warm and dry.      Coloration: Skin is not pale.      Findings: No erythema or rash.   Neurological:      Mental Status: She is alert and oriented to person, place, and time.      Cranial Nerves: No cranial nerve deficit.      Motor: No abnormal muscle tone.   Psychiatric:         Behavior: Behavior normal.         Thought Content: Thought content normal.         Judgment: Judgment normal.       Labs:  I have personally reviewed the patient's lab work today, including CBC and CMP. ANC is adequate for treatment  Bilirubin 1.2. CA 19-9 non   K 3.4    Imaging Data:  PET 3/17/25:  Impression:     In patient with recent lymph node biopsy demonstrating metastatic adenocarcinoma, multiple tracer avid upper abdominal lymph nodes with index lesions as detailed above.  No definitive focal tracer avid lesion in the liver.     Stable position of bilateral internal/external biliary drains with mild intrahepatic biliary ductal dilatation.     Decrease mesenteric stranding adjacent to the duodenum and descending colon as seen on prior CT.       Assessment and plan:    No diagnosis found.      1.2  - Ms Saqib is a 70 yo woman with empty sella syndrome, growth hormone deficiency, grave's disease with exophthalmos, HTN, seizures, T2DM, who presents today for further management of unresectable hilar cholangiocarcinoma, MMR-proficient  - Long discussion with  patient and daughter-in-law re the diagnosis, natural history and prognosis of an unresectable hilar cholangiocarcinoma. Cancer is not curable but treatable. The goal of treatment is palliative, with hopes of containing the cancer, slowing down growth, and prolonging life.   - discussed palliative chemoimmunotherapy with cis/gem/durv. Reviewed side effects in detail. Handouts were provided to patient during previous visit. She returns today to begin. We did hold off on performing the audiogram due to how weak she was feeling. Will perform in the future if needed.   - Had port placed on 3/25/2025 without complication    - MS Del Toro returns today for cycle 2 of [place]/Cis with Durvalumab q4 weeks. She did pretty well with cycle 1 of chemotherapy. However, she did have some episodes of vomiting over the past couple of days. She was not aware that she could take her nausea medication as needed. She was also only wanting to eat vegetables without much protein. She is functioning ok and trying to be active. No fever or chills. Still needs physical support by wheelchair. She began [place]/Cis/Durvalumab on 4/4/2025. No report of fever, chills. Overall, did pretty well with cycle 1 considering how she started.   - I have personally reviewed the patient's lab work today, including CBC and CMP. ANC is adequate for treatment. K 3.4 and bili 1.2  - Proceed with cycle 2 of [place]/Cis    - RTC in 2 weeks for cycle 3. Will repeat scans after cycle 4    - PGX, tempus blood were drawn    3.  - Continue periactin tid. Discussed with her and the family that she needs to eat foods high in protein and calories.   - Continue zyprexa 5 mg at night  - referred to palliative. Will help to schedule.     4.  - Continue zyprexa 5 mg at night  - Take phenergan prn in addition to zofran    5.  - long discussion. Most likely she will need these tubes indefinitely. Needs tube exchange. This was completed on 4/2/2025. She tolerated this procedure well.    Will continue to monitor. Bili continues to improve, 1.2 today    7.   - K 3.4. Will continue with K supplement to her infusion today. Will continue to monitor.     RTC in 2 weeks for cycle 3    Patient and family members displayed understanding of the above encounter and treatment plan. All thoughtful questions were answered to their satisfaction. Patient was advised to notify the care team or proceed to the ER if signs and symptoms worsen.     45 minutes were spent today on this encounter including face to face time with the patient, data gathering/interpretation and documentation. Greater than 50% of this time involved counseling or coordination of care. I have provided the patient with an opportunity to ask questions and have all questions answered to patient's satisfaction.     Visit today included increased complexity associated with the care of the episodic problem chemotherapy  addressed and managing the longitudinal care of the patient due to the serious and/or complex managed problem(s) GI malignancies/cancer. In addition, the above encounter addresses an illness that poses a significant threat to life, bodily function and overall performance status.      code applied: patient requires or will require a continuous, longitudinal, and active collaborative plan of care related to this patient's health condition, GI malignancies/cancer --the management of which requires the direction of a practitioner with specialized clinical knowledge, skill, and expertise       LISA Covington, PA-C  [facility]  Dept of Hematology/Oncology  PABlairC to GI Oncology team         Route Chart for Scheduling    Med Onc Chart Routing      Follow up with physician 4 weeks and 6 weeks. [place]/Cis, cycle 4. See Dr Johnson in 6 weeks with lab work, CT CAP and treatment discussion with [place]/Cis/Durvalumab   Follow up with ANA M 2 weeks and 4 weeks. [place]/Cis/Durvalumab, cycle 3. [place]/Cis in 8 weeks   Infusion scheduling note     Injection scheduling note    Labs CBC, CMP and TSH   Scheduling:  Preferred lab:  Lab interval: every 2 weeks     Imaging CT chest abdomen pelvis   Please schedule in 6 weeks prior to clinic visit with Dr Johnson   Pharmacy appointment    Other referrals                Treatment Plan Information   OP CISPLATIN GEMCITABINE D1,15  DURVALUMAB D1 Q4W Fabiola Johnson MD   Associated diagnosis: Cholangiocarcinoma   noted on 3/19/2025   Line of treatment: First Line  Treatment Goal: Palliative     Upcoming Treatment Dates - OP CISPLATIN GEMCITABINE D1,15  DURVALUMAB D1 Q4W    4/16/2025       Chemotherapy       CISplatin (Platinol) 25 mg/m2 = 44 mg in 0.9% NaCl 294 mL chemo infusion       gemcitabine (GEMZAR) 1,770 mg in 0.9% NaCl 267.7 mL chemo infusion       Pre-Hydration       0.9% NaCl 500 mL with magnesium sulfate 1 g, potassium chloride 10 mEq infusion       Post-Hydration       sodium chloride 0.9% bolus 500 mL 500 mL       Antiemetics       palonosetron 0.25mg/dexAMETHasone 12mg in NS IVPB 0.25 mg 50 mL       fosaprepitant 150 mg in 0.9% NaCl 150 mL IVPB  4/30/2025       Chemotherapy       durvalumab (IMFINZI) 1,500 mg in 0.9% NaCl SolP 280 mL chemo infusion       CISplatin (Platinol) 25 mg/m2 = 44 mg in 0.9% NaCl 294 mL chemo infusion       gemcitabine (GEMZAR) 1,770 mg in 0.9% NaCl 267.7 mL chemo infusion       Pre-Hydration       0.9% NaCl 500 mL with magnesium sulfate 1 g, potassium chloride 10 mEq infusion       Post-Hydration       sodium chloride 0.9% bolus 500 mL 500 mL       Antiemetics       palonosetron (ALOXI) 0.25 mg with Dexamethasone (DECADRON) 12 mg in NS 50 mL IVPB       fosaprepitant 150 mg in 0.9% NaCl 150 mL IVPB  5/14/2025       Chemotherapy       CISplatin (Platinol) 25 mg/m2 = 44 mg in 0.9% NaCl 294 mL chemo infusion       gemcitabine (GEMZAR) 1,770 mg in 0.9% NaCl 267.7 mL chemo infusion       Pre-Hydration       0.9% NaCl 500 mL with magnesium sulfate 1 g, potassium chloride 10 mEq infusion        Post-Hydration       sodium chloride 0.9% bolus 500 mL 500 mL       Antiemetics       palonosetron (ALOXI) 0.25 mg with Dexamethasone (DECADRON) 12 mg in NS 50 mL IVPB       fosaprepitant 150 mg in 0.9% NaCl 150 mL IVPB  5/28/2025       Chemotherapy       durvalumab (IMFINZI) 1,500 mg in 0.9% NaCl SolP 280 mL chemo infusion       CISplatin (Platinol) 25 mg/m2 = 44 mg in 0.9% NaCl 294 mL chemo infusion       gemcitabine (GEMZAR) 1,770 mg in 0.9% NaCl 267.7 mL chemo infusion       Pre-Hydration       0.9% NaCl 500 mL with magnesium sulfate 1 g, potassium chloride 10 mEq infusion       Post-Hydration       sodium chloride 0.9% bolus 500 mL 500 mL       Antiemetics       palonosetron (ALOXI) 0.25 mg with Dexamethasone (DECADRON) 12 mg in NS 50 mL IVPB       fosaprepitant 150 mg in 0.9% NaCl 150 mL IVPB                 [1]   Social History  Socioeconomic History    Marital status:    Occupational History     Employer: [employer]   Tobacco Use    Smoking status: Never    Smokeless tobacco: Never   Substance and Sexual Activity    Alcohol use: No     Comment: none    Drug use: No    Sexual activity: Yes     Partners: Male     Comment: m  works in dietary,  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     Social Drivers of Health     Financial Resource Strain: Low Risk  (2/4/2025)    Overall Financial Resource Strain (CARDIA)     Difficulty of Paying Living Expenses: Not hard at all   Food Insecurity: No Food Insecurity (2/4/2025)    Hunger Vital Sign     Worried About Running Out of Food in the Last Year: Never true     Ran Out of Food in the Last Year: Never true   Transportation Needs: No Transportation Needs (2/4/2025)    TRANSPORTATION NEEDS     Transportation : No   Physical Activity: Inactive (2/4/2025)    Exercise Vital Sign     Days of Exercise per Week: 0 days     Minutes of Exercise per Session: 0 min   Stress: Stress Concern Present (2/4/2025)    [employer] [place] [employer] - Occupational  Stress Questionnaire     Feeling of Stress : Very much   Housing Stability: Unknown (2/4/2025)    Housing Stability Vital Sign     Unable to Pay for Housing in the Last Year: No     Homeless in the Last Year: No

## 2025-04-17 NOTE — PLAN OF CARE
"BP (!) 87/61 (Patient Position: Sitting)   Pulse 84   Temp 97.3 °F (36.3 °C)   Resp 18   Ht 4' 11" (1.499 m)   Wt 67 kg (147 lb 9.6 oz)   SpO2 98%   BMI 29.81 kg/m² Pleasant, alert and oriented patient to Chemo Infusion C0D15 Cisplatin/Gemzar/IVF's - VSS and RCW port accessed with blood return observed, flushed with NS, dressing applied and patient tolerated procedure well - patient tolerated treatment with no AVE's, port flushed with NS/Heparin, blood return observed, port de-accessed, band-aide applied and patient discharged to home with no concerns - RTC on 5/1/25  "

## 2025-04-22 ENCOUNTER — TELEPHONE (OUTPATIENT)
Dept: ORTHOPEDICS | Facility: CLINIC | Age: 70
End: 2025-04-22
Payer: MEDICARE

## 2025-04-22 NOTE — TELEPHONE ENCOUNTER
I called the patient today regarding her surgery. Patient said that she is cancelling her surgery because she needs to figure out her social support and is currently getting chemo tx for breast cancer. The patient verbalized understanding and has no further questions.

## 2025-04-24 ENCOUNTER — TELEPHONE (OUTPATIENT)
Dept: INTERVENTIONAL RADIOLOGY/VASCULAR | Facility: CLINIC | Age: 70
End: 2025-04-24
Payer: MEDICARE

## 2025-04-28 ENCOUNTER — OFFICE VISIT (OUTPATIENT)
Dept: INTERNAL MEDICINE | Facility: CLINIC | Age: 70
End: 2025-04-28
Payer: MEDICARE

## 2025-04-28 ENCOUNTER — TELEPHONE (OUTPATIENT)
Dept: INTERNAL MEDICINE | Facility: CLINIC | Age: 70
End: 2025-04-28
Payer: MEDICARE

## 2025-04-28 VITALS
HEART RATE: 108 BPM | SYSTOLIC BLOOD PRESSURE: 110 MMHG | OXYGEN SATURATION: 99 % | DIASTOLIC BLOOD PRESSURE: 62 MMHG | WEIGHT: 144.38 LBS | BODY MASS INDEX: 29.17 KG/M2

## 2025-04-28 DIAGNOSIS — R11.0 CHEMOTHERAPY-INDUCED NAUSEA: Primary | ICD-10-CM

## 2025-04-28 DIAGNOSIS — C22.1 CHOLANGIOCARCINOMA: ICD-10-CM

## 2025-04-28 DIAGNOSIS — T45.1X5A CHEMOTHERAPY-INDUCED NAUSEA: Primary | ICD-10-CM

## 2025-04-28 PROCEDURE — 3072F LOW RISK FOR RETINOPATHY: CPT | Mod: CPTII,S$GLB,, | Performed by: STUDENT IN AN ORGANIZED HEALTH CARE EDUCATION/TRAINING PROGRAM

## 2025-04-28 PROCEDURE — 3288F FALL RISK ASSESSMENT DOCD: CPT | Mod: CPTII,S$GLB,, | Performed by: STUDENT IN AN ORGANIZED HEALTH CARE EDUCATION/TRAINING PROGRAM

## 2025-04-28 PROCEDURE — 99214 OFFICE O/P EST MOD 30 MIN: CPT | Mod: S$GLB,,, | Performed by: STUDENT IN AN ORGANIZED HEALTH CARE EDUCATION/TRAINING PROGRAM

## 2025-04-28 PROCEDURE — 3074F SYST BP LT 130 MM HG: CPT | Mod: CPTII,S$GLB,, | Performed by: STUDENT IN AN ORGANIZED HEALTH CARE EDUCATION/TRAINING PROGRAM

## 2025-04-28 PROCEDURE — 99999 PR PBB SHADOW E&M-EST. PATIENT-LVL III: CPT | Mod: PBBFAC,,, | Performed by: STUDENT IN AN ORGANIZED HEALTH CARE EDUCATION/TRAINING PROGRAM

## 2025-04-28 PROCEDURE — 1101F PT FALLS ASSESS-DOCD LE1/YR: CPT | Mod: CPTII,S$GLB,, | Performed by: STUDENT IN AN ORGANIZED HEALTH CARE EDUCATION/TRAINING PROGRAM

## 2025-04-28 PROCEDURE — 4010F ACE/ARB THERAPY RXD/TAKEN: CPT | Mod: CPTII,S$GLB,, | Performed by: STUDENT IN AN ORGANIZED HEALTH CARE EDUCATION/TRAINING PROGRAM

## 2025-04-28 PROCEDURE — 3078F DIAST BP <80 MM HG: CPT | Mod: CPTII,S$GLB,, | Performed by: STUDENT IN AN ORGANIZED HEALTH CARE EDUCATION/TRAINING PROGRAM

## 2025-04-28 PROCEDURE — 3066F NEPHROPATHY DOC TX: CPT | Mod: CPTII,S$GLB,, | Performed by: STUDENT IN AN ORGANIZED HEALTH CARE EDUCATION/TRAINING PROGRAM

## 2025-04-28 PROCEDURE — 1125F AMNT PAIN NOTED PAIN PRSNT: CPT | Mod: CPTII,S$GLB,, | Performed by: STUDENT IN AN ORGANIZED HEALTH CARE EDUCATION/TRAINING PROGRAM

## 2025-04-28 PROCEDURE — 3061F NEG MICROALBUMINURIA REV: CPT | Mod: CPTII,S$GLB,, | Performed by: STUDENT IN AN ORGANIZED HEALTH CARE EDUCATION/TRAINING PROGRAM

## 2025-04-28 PROCEDURE — 3044F HG A1C LEVEL LT 7.0%: CPT | Mod: CPTII,S$GLB,, | Performed by: STUDENT IN AN ORGANIZED HEALTH CARE EDUCATION/TRAINING PROGRAM

## 2025-04-28 PROCEDURE — 3008F BODY MASS INDEX DOCD: CPT | Mod: CPTII,S$GLB,, | Performed by: STUDENT IN AN ORGANIZED HEALTH CARE EDUCATION/TRAINING PROGRAM

## 2025-04-28 RX ORDER — ONDANSETRON 4 MG/1
4 TABLET, ORALLY DISINTEGRATING ORAL 2 TIMES DAILY PRN
Qty: 60 TABLET | Refills: 1 | Status: SHIPPED | OUTPATIENT
Start: 2025-04-28

## 2025-04-28 NOTE — PROGRESS NOTES
[facility]  Subjective:     Patient ID: Katey Cerrato is a 69 y.o. female.  History of Present Illness    CHIEF COMPLAINT:  Patient presents today for routine follow-up while undergoing chemotherapy for cholangiocarcinoma    CURRENT SYMPTOMS:  She reports a cough that started last week accompanied by fever and chills. The cough occurs throughout both day and night.    CHEMOTHERAPY SIDE EFFECTS:  She experiences fatigue and nausea as side effects of chemotherapy, which interfere with her ability to perform housework. She has difficulty following the dietician's recommendation of six small meals daily and is considering alternative food options to improve intake.    BILIARY DRAIN:  Her biliary drain was replaced on April 2nd and is scheduled for replacement every 8 weeks. She reports variable drainage amounts since replacement, typically minimal output. However, three days ago she experienced increased output throughout the day requiring four collection device changes.    MEDICATIONS:  She continues Keppra and ondansetron 4mg for nausea management. Valsartan, Amlodipine, and Lipitor have been discontinued.       Current Outpatient Medications   Medication Instructions    blood sugar diagnostic Strp To check BG 1 times daily, to use with insurance preferred meter    blood-glucose meter kit To check BG 1 times daily, to use with insurance preferred meter    cyproheptadine (PERIACTIN) 4 mg, Oral, 3 times daily    HYDROmorphone (DILAUDID) 2 mg, Oral, 2 times daily PRN    lancets Misc To check BG 1 times daily, to use with insurance preferred meter    latanoprost 0.005 % ophthalmic solution 1 drop, Both Eyes    levETIRAcetam (KEPPRA) 1,500 mg, Oral    [Paused] metoprolol succinate (TOPROL-XL) 50 mg, Oral, Nightly    multivitamin-minerals-lutein Tab 1 tablet, Daily    mv-mn/iron/folic acid/herb 190 (VITAMIN D3 COMPLETE ORAL) 1 tablet, Daily    OLANZapine (ZYPREXA) 5 mg, Oral, Nightly     ondansetron (ZOFRAN-ODT) 4 mg, Oral, 2 times daily PRN    promethazine (PHENERGAN) 25 mg, Oral, Every 6 hours PRN    scopolamine (TRANSDERM-SCOP) 1.3-1.5 mg (1 mg over 3 days) 1 patch, Transdermal, Every 3 days    sodium bicarbonate 650 mg, Oral, 2 times daily     Objective:      Body mass index is 29.17 kg/m².  Vitals:    04/28/25 0930   BP: 110/62   Pulse: 108   SpO2: 99%   Weight: 65.5 kg (144 lb 6.4 oz)   PainSc:   5   PainLoc: Leg     Physical Exam   Physical Exam    General: No acute distress. Normal appearance.  Head: Normocephalic.  Eyes: Extraocular movements intact.  Neck: No thyromegaly.  Cardiovascular: Normal rate and rhythm. No murmur heard.  Lungs: Pulmonary effort is normal. Normal breath sounds. No wheezing. No rales.  Abdomen: Flat.  Musculoskeletal: No edema lower extremities.  Skin: Warm. Dry.  Neurological: Alert.  Psychiatric: Normal mood. Normal behavior.       Assessment:       1. Chemotherapy-induced nausea    2. Cholangiocarcinoma        Plan:   Impression (dictated)   Plan (software generated and edited)   Assessment & Plan    Patient seen today for routine follow-up. She's currently undergoing chemotherapy for cholangiocarcinoma.  She has not yet met with palliative care.  She is feeling good both physically and mentally at this time.  She has a good support system. Though she does live alone, she carries her phone with her while in the house and has neighbors who check on her frequently. She has family on the [place] who are able to help if needed.  Discussed the diagnosis of cholangiocarcinoma in general as an aggressive cancer though I advise that any details regarding prognosis would be better explained by her oncologist.   Refilled Zofran for nausea.  She reports she has not needed the nightly Zyprexa.   She continues to have drains and has had no issues with them recently.  Follow up as needed or in a few months as scheduled.      Chemotherapy-induced nausea  -     ondansetron  (ZOFRAN-ODT) 4 MG TbDL; Take 1 tablet (4 mg total) by mouth 2 (two) times daily as needed (Nausea).  Dispense: 60 tablet; Refill: 1    Cholangiocarcinoma        All of your core healthy metrics are met.    No follow-ups on file. or sooner prn (as needed)          Shay Castellano  [facility]  [address] [address]  Phone [phone]  Fax [phone]    Part of this note is dictated using the [employer] Fluency Direct word recognition program. It may contain word recognition mistakes or wrong word substitutions (commonly he/she and is/was substitutions) that were missed on review.    Part of this note was generated with the assistance of ambient listening technology. Verbal consent was obtained by the patient and accompanying visitor(s) for the recording of patient appointment to facilitate this note. I attest to having reviewed and edited the generated note for accuracy, though some syntax or spelling errors may persist. Please contact the author of this note for any clarification.

## 2025-04-28 NOTE — TELEPHONE ENCOUNTER
Placed RTA forms(patient completed her portion) on Dr. Castellano's keyboard to finish his portion. Patient informed will scan forms into her chart once completed and then we'll mail it off for her.

## 2025-04-29 ENCOUNTER — TELEPHONE (OUTPATIENT)
Dept: INTERNAL MEDICINE | Facility: CLINIC | Age: 70
End: 2025-04-29
Payer: MEDICARE

## 2025-04-29 DIAGNOSIS — C22.1 CHOLANGIOCARCINOMA: Primary | ICD-10-CM

## 2025-04-29 RX ORDER — SODIUM CHLORIDE 0.9 % (FLUSH) 0.9 %
3 SYRINGE (ML) INJECTION EVERY 6 HOURS PRN
Qty: 60 ML | Refills: 2 | Status: SHIPPED | OUTPATIENT
Start: 2025-04-29 | End: 2025-05-01 | Stop reason: SDUPTHER

## 2025-04-29 NOTE — TELEPHONE ENCOUNTER
----- Message from Panteraebonyin sent at 4/29/2025  2:37 PM CDT -----  Type : Patient Call  Who Called : Patient   Does the patient know what this is regarding?: Pt is requesting a call back from the providers nurse in regards to ordering supplies . Please Advise   Would the patient rather a call back or a response via [facility]? Call   Best Call Back Number: [phone]   Additional Information:

## 2025-04-29 NOTE — TELEPHONE ENCOUNTER
Patient is asking to have saline flush sent in to her pharmacy. She said she was getting if from  but has been discharged so she has to get it herself. She has tubes in her stomach which Dr. Castellano is aware of she said she flush 2-3 times daily depending on how much the tubes leak. She's not sure if [facility] will pay for this but she need it     Saline flush pended  Please check before signing

## 2025-04-30 ENCOUNTER — LAB VISIT (OUTPATIENT)
Dept: LAB | Facility: HOSPITAL | Age: 70
End: 2025-04-30
Payer: MEDICARE

## 2025-04-30 ENCOUNTER — TELEPHONE (OUTPATIENT)
Dept: INTERNAL MEDICINE | Facility: CLINIC | Age: 70
End: 2025-04-30
Payer: MEDICARE

## 2025-04-30 ENCOUNTER — OFFICE VISIT (OUTPATIENT)
Dept: HEMATOLOGY/ONCOLOGY | Facility: CLINIC | Age: 70
End: 2025-04-30
Payer: MEDICARE

## 2025-04-30 VITALS
OXYGEN SATURATION: 98 % | HEIGHT: 59 IN | DIASTOLIC BLOOD PRESSURE: 57 MMHG | TEMPERATURE: 98 F | BODY MASS INDEX: 29.16 KG/M2 | RESPIRATION RATE: 16 BRPM | SYSTOLIC BLOOD PRESSURE: 100 MMHG | HEART RATE: 101 BPM | WEIGHT: 144.63 LBS

## 2025-04-30 DIAGNOSIS — K83.1 BILIARY OBSTRUCTION: ICD-10-CM

## 2025-04-30 DIAGNOSIS — R53.83 FATIGUE, UNSPECIFIED TYPE: ICD-10-CM

## 2025-04-30 DIAGNOSIS — C77.2 SECONDARY MALIGNANT NEOPLASM OF INTRA-ABDOMINAL LYMPH NODES: ICD-10-CM

## 2025-04-30 DIAGNOSIS — C22.1 CHOLANGIOCARCINOMA: ICD-10-CM

## 2025-04-30 DIAGNOSIS — R63.0 ANOREXIA: ICD-10-CM

## 2025-04-30 DIAGNOSIS — R11.2 INTRACTABLE NAUSEA AND VOMITING: ICD-10-CM

## 2025-04-30 DIAGNOSIS — E87.6 HYPOKALEMIA: ICD-10-CM

## 2025-04-30 DIAGNOSIS — C22.1 CHOLANGIOCARCINOMA: Primary | ICD-10-CM

## 2025-04-30 LAB
ABSOLUTE EOSINOPHIL (OHS): 0 K/UL
ABSOLUTE MONOCYTE (OHS): 0.89 K/UL (ref 0.3–1)
ABSOLUTE NEUTROPHIL COUNT (OHS): 9.29 K/UL (ref 1.8–7.7)
ALBUMIN SERPL BCP-MCNC: 2.1 G/DL (ref 3.5–5.2)
ALP SERPL-CCNC: 370 UNIT/L (ref 40–150)
ALT SERPL W/O P-5'-P-CCNC: 21 UNIT/L (ref 10–44)
ANION GAP (OHS): 13 MMOL/L (ref 8–16)
AST SERPL-CCNC: 34 UNIT/L (ref 11–45)
BASOPHILS # BLD AUTO: 0.02 K/UL
BASOPHILS NFR BLD AUTO: 0.2 %
BILIRUB SERPL-MCNC: 1.2 MG/DL (ref 0.1–1)
BUN SERPL-MCNC: 7 MG/DL (ref 8–23)
CALCIUM SERPL-MCNC: 7.9 MG/DL (ref 8.7–10.5)
CHLORIDE SERPL-SCNC: 95 MMOL/L (ref 95–110)
CO2 SERPL-SCNC: 26 MMOL/L (ref 23–29)
CREAT SERPL-MCNC: 0.8 MG/DL (ref 0.5–1.4)
ERYTHROCYTE [DISTWIDTH] IN BLOOD BY AUTOMATED COUNT: 16.7 % (ref 11.5–14.5)
GFR SERPLBLD CREATININE-BSD FMLA CKD-EPI: >60 ML/MIN/1.73/M2
GLUCOSE SERPL-MCNC: 169 MG/DL (ref 70–110)
HCT VFR BLD AUTO: 21.8 % (ref 37–48.5)
HGB BLD-MCNC: 7.4 GM/DL (ref 12–16)
IMM GRANULOCYTES # BLD AUTO: 0.1 K/UL (ref 0–0.04)
IMM GRANULOCYTES NFR BLD AUTO: 0.9 % (ref 0–0.5)
LYMPHOCYTES # BLD AUTO: 0.79 K/UL (ref 1–4.8)
MCH RBC QN AUTO: 28.9 PG (ref 27–31)
MCHC RBC AUTO-ENTMCNC: 33.9 G/DL (ref 32–36)
MCV RBC AUTO: 85 FL (ref 82–98)
NUCLEATED RBC (/100WBC) (OHS): 0 /100 WBC
PLATELET # BLD AUTO: 477 K/UL (ref 150–450)
PMV BLD AUTO: 9.5 FL (ref 9.2–12.9)
POTASSIUM SERPL-SCNC: 2.9 MMOL/L (ref 3.5–5.1)
PROT SERPL-MCNC: 7 GM/DL (ref 6–8.4)
RBC # BLD AUTO: 2.56 M/UL (ref 4–5.4)
RELATIVE EOSINOPHIL (OHS): 0 %
RELATIVE LYMPHOCYTE (OHS): 7.1 % (ref 18–48)
RELATIVE MONOCYTE (OHS): 8 % (ref 4–15)
RELATIVE NEUTROPHIL (OHS): 83.8 % (ref 38–73)
SODIUM SERPL-SCNC: 134 MMOL/L (ref 136–145)
TSH SERPL-ACNC: 0.55 UIU/ML (ref 0.4–4)
WBC # BLD AUTO: 11.09 K/UL (ref 3.9–12.7)

## 2025-04-30 PROCEDURE — 84443 ASSAY THYROID STIM HORMONE: CPT

## 2025-04-30 PROCEDURE — 82040 ASSAY OF SERUM ALBUMIN: CPT

## 2025-04-30 PROCEDURE — G2211 COMPLEX E/M VISIT ADD ON: HCPCS | Mod: S$GLB,,, | Performed by: PHYSICIAN ASSISTANT

## 2025-04-30 PROCEDURE — 3074F SYST BP LT 130 MM HG: CPT | Mod: CPTII,S$GLB,, | Performed by: PHYSICIAN ASSISTANT

## 2025-04-30 PROCEDURE — 36415 COLL VENOUS BLD VENIPUNCTURE: CPT

## 2025-04-30 PROCEDURE — 3288F FALL RISK ASSESSMENT DOCD: CPT | Mod: CPTII,S$GLB,, | Performed by: PHYSICIAN ASSISTANT

## 2025-04-30 PROCEDURE — 3066F NEPHROPATHY DOC TX: CPT | Mod: CPTII,S$GLB,, | Performed by: PHYSICIAN ASSISTANT

## 2025-04-30 PROCEDURE — 3008F BODY MASS INDEX DOCD: CPT | Mod: CPTII,S$GLB,, | Performed by: PHYSICIAN ASSISTANT

## 2025-04-30 PROCEDURE — 1101F PT FALLS ASSESS-DOCD LE1/YR: CPT | Mod: CPTII,S$GLB,, | Performed by: PHYSICIAN ASSISTANT

## 2025-04-30 PROCEDURE — 1125F AMNT PAIN NOTED PAIN PRSNT: CPT | Mod: CPTII,S$GLB,, | Performed by: PHYSICIAN ASSISTANT

## 2025-04-30 PROCEDURE — 99999 PR PBB SHADOW E&M-EST. PATIENT-LVL IV: CPT | Mod: PBBFAC,,, | Performed by: PHYSICIAN ASSISTANT

## 2025-04-30 PROCEDURE — 3044F HG A1C LEVEL LT 7.0%: CPT | Mod: CPTII,S$GLB,, | Performed by: PHYSICIAN ASSISTANT

## 2025-04-30 PROCEDURE — 4010F ACE/ARB THERAPY RXD/TAKEN: CPT | Mod: CPTII,S$GLB,, | Performed by: PHYSICIAN ASSISTANT

## 2025-04-30 PROCEDURE — 3061F NEG MICROALBUMINURIA REV: CPT | Mod: CPTII,S$GLB,, | Performed by: PHYSICIAN ASSISTANT

## 2025-04-30 PROCEDURE — 3078F DIAST BP <80 MM HG: CPT | Mod: CPTII,S$GLB,, | Performed by: PHYSICIAN ASSISTANT

## 2025-04-30 PROCEDURE — 85025 COMPLETE CBC W/AUTO DIFF WBC: CPT

## 2025-04-30 PROCEDURE — 3072F LOW RISK FOR RETINOPATHY: CPT | Mod: CPTII,S$GLB,, | Performed by: PHYSICIAN ASSISTANT

## 2025-04-30 PROCEDURE — 99215 OFFICE O/P EST HI 40 MIN: CPT | Mod: S$GLB,,, | Performed by: PHYSICIAN ASSISTANT

## 2025-04-30 RX ORDER — SODIUM CHLORIDE 0.9 % (FLUSH) 0.9 %
10 SYRINGE (ML) INJECTION
Status: CANCELLED | OUTPATIENT
Start: 2025-05-01

## 2025-04-30 RX ORDER — EPINEPHRINE 0.3 MG/.3ML
0.3 INJECTION SUBCUTANEOUS ONCE AS NEEDED
Status: CANCELLED | OUTPATIENT
Start: 2025-05-01

## 2025-04-30 RX ORDER — HEPARIN 100 UNIT/ML
500 SYRINGE INTRAVENOUS
Status: CANCELLED | OUTPATIENT
Start: 2025-05-01

## 2025-04-30 RX ORDER — DIPHENHYDRAMINE HYDROCHLORIDE 50 MG/ML
50 INJECTION, SOLUTION INTRAMUSCULAR; INTRAVENOUS ONCE AS NEEDED
Status: CANCELLED | OUTPATIENT
Start: 2025-05-01

## 2025-04-30 NOTE — TELEPHONE ENCOUNTER
Called patient, she was told Dr. Castellano sent in the saline flush syringes for her also her insurance my not cover the cost so she can find out from the pharmacy the price before picking it up

## 2025-04-30 NOTE — TELEPHONE ENCOUNTER
Scanned patient signed RTA forms in her chart and placed original copy in lanza envelope placed in lanza basket near  staff to be picked up by the

## 2025-04-30 NOTE — PROGRESS NOTES
"  CC:  Unresectable hilar cholangiocarcinoma, MMR-proficient    Oncological History copied from medical chart:      Ms. Cerrato is a 70 yo woman with empty sella syndrome, growth hormone deficiency, grave's disease with exophthalmos, HTN, seizures, T2DM, who presents today for further management of unresectable hilar cholangiocarcinoma.   She presented to OSH with jaundice and scleral icterus. Initial CT of the abdomen and pelvis with IV contrast showed significant intrahepatic biliary ductal dilatation and questionable 9 mm fat containing lesion in the uncinate process of the pancreas. Subsequent MRCP showed "severe intrahepatic bile duct dilatation, with abrupt narrowing at the confluence of the hepatic ducts where there is a suspected ill-defined liver mass. Repeat imaging with MRI abdomen with and without contrast showed "hypoattenuating lesion near the confluence of the right and left hepatic ducts causing severe upstream ductal dilatation concerning for cholangiocarcinoma.  Further evaluation with ERCP is recommended." Patient transferred to [facility] for further workup and evaluation. CEA elevated, CA 19-9 WNL. PCT placed 2/8/25. She was discharged and PTC x2 remains capped.  2/18/25: CT A/P:  Impression:  1. Redemonstration of bilateral internal external biliary drains in place, with distal tips terminating in the duodenum.  Distal left biliary drain terminates without pigtail configuration.  Mild persistent intrahepatic biliary ductal dilatation.  All of these findings appear similar compared to 02/12/2025 without significant detrimental change.  2. Few areas of mesenteric stranding and nodularity adjacent to the duodenum, splenic flexure of the colon, and descending colon, as described above.  Increased conspicuity of these areas compared to 02/03/2025.  Findings are nonspecific, may represent infectious or inflammatory changes, however given history of cholangiocarcinoma, sequela of peritoneal metastasis cannot " be excluded.  Attention on follow-up imaging is recommended.  3. Multiple scattered prominent lymph nodes without pathologic enlargement by size criteria.  4. Other findings, as above.  Upper EUS with ceferino hepatis lymph node biopsy on 3/10/25 showed moderately differentiated metastatic adenocarcinoma. MMR-proficient.   Seen by Dr Glynn. Dr Glynn does not feel she is a surgical candidate nor the disease is resectable. There is distant lymph node involvement on PET. Recc palliative chemo +/- chemoradiation if she responds well.  Patient presents today for further management.     Interval History: MS Del Toro returns today for cycle 3 of [place]/Cis with Durvalumab q4 weeks. Did well with cycle 2 of chemotherapy. She is trying to eat and focus on protein. She was also only wanting to eat vegetables without much protein. She is functioning ok and trying to be active. No fever or chills. Still needs physical support by wheelchair. No fever or chills. No nausea or vomiting.     ECO. Had port placed on 3/25/2025 with tube exchange on 2025. Presents alone today    Past Medical History:   Diagnosis Date    Cataract     Empty sella syndrome     GHD (growth hormone deficiency)     Glaucoma     Graves' disease with exophthalmos     Hyperlipidemia     Hypertension     Seizures     Thyroid nodule     Thyroid nodule     Type II or unspecified type diabetes mellitus without mention of complication, uncontrolled         Past Surgical History:   Procedure Laterality Date    BREAST BIOPSY      CATARACT EXTRACTION W/  INTRAOCULAR LENS IMPLANT Right 3/15/2021    Procedure: EXTRACTION, CATARACT, WITH IOL INSERTION;  Surgeon: Cj Caban MD;  Location: [facility] OR;  Service: Ophthalmology;  Laterality: Right;    CATARACT EXTRACTION W/  INTRAOCULAR LENS IMPLANT Left 3/29/2021    Procedure: EXTRACTION, CATARACT, WITH IOL INSERTION;  Surgeon: Cj Caban MD;  Location: [facility] OR;  Service: Ophthalmology;  Laterality: Left;     CHOLECYSTECTOMY      ENDOSCOPIC ULTRASOUND OF UPPER GASTROINTESTINAL TRACT N/A 3/10/2025    Procedure: ULTRASOUND, UPPER GI TRACT, ENDOSCOPIC;  Surgeon: Tim Caban MD;  Location: [facility] ENDO (2ND FLR);  Service: Endoscopy;  Laterality: N/A;  2/28: EUS for FNA of lymph nodes-instructions emailed-    HYSTERECTOMY      one ovary intact    INJECTION OF JOINT Left 2/6/2024    Procedure: INJECTION, JOINT LEFT KNEE WITH STEROID;  Surgeon: She Schwartz MD;  Location: [facility] PAIN MGT;  Service: Pain Management;  Laterality: Left;  [phone]    INSERTION OF TUNNELED CENTRAL VENOUS CATHETER (CVC) WITH SUBCUTANEOUS PORT N/A 3/25/2025    Procedure: INSERTION, SINGLE LUMEN CATHETER WITH PORT, WITH FLUOROSCOPIC GUIDANCE;  Surgeon: Zaire Glynn MD;  Location: [facility] OR [address] [place] [address];  Service: General;  Laterality: N/A;    KNEE SURGERY      LYMPH NODE BIOPSY  3010    OOPHORECTOMY         Family History   Problem Relation Name Age of Onset    Cancer Mother      Cataracts Mother      Glaucoma Mother          shunt    Heart disease Mother      Tremor Mother      Breast cancer Mother  78    Heart disease Father 64   chf     Heart disease Sister      No Known Problems Sister      Breast cancer Sister  60    Hypertension Brother      No Known Problems Brother      No Known Problems Maternal Aunt      No Known Problems Maternal Uncle      No Known Problems Paternal Aunt      No Known Problems Paternal Uncle      No Known Problems Maternal Grandmother      No Known Problems Maternal Grandfather      No Known Problems Paternal Grandmother      No Known Problems Paternal Grandfather      No Known Problems Son      No Known Problems Other         Social History[1]    Review of Systems   Constitutional:  Positive for appetite change, fatigue and unexpected weight change. Negative for chills and fever.   HENT:  Negative for mouth sores, nosebleeds, tinnitus, trouble swallowing and voice change.    Eyes:  Negative for pain, redness  and visual disturbance.   Respiratory:  Negative for cough, shortness of breath and wheezing.    Cardiovascular:  Negative for chest pain, palpitations and leg swelling.   Gastrointestinal:  Positive for nausea and vomiting. Negative for abdominal distention, abdominal pain, blood in stool, constipation and diarrhea.   Endocrine: Negative for polydipsia, polyphagia and polyuria.   Genitourinary:  Negative for flank pain, frequency and hematuria.   Musculoskeletal:  Negative for arthralgias, back pain, gait problem, joint swelling, myalgias, neck pain and neck stiffness.   Skin:  Negative for color change, pallor, rash and wound.   Neurological:  Positive for weakness. Negative for tremors, seizures, syncope, speech difficulty, light-headedness, numbness and headaches.   Hematological:  Negative for adenopathy. Does not bruise/bleed easily.   Psychiatric/Behavioral:  Negative for confusion, dysphoric mood and self-injury. The patient is not nervous/anxious.    All other systems reviewed and are negative.      Objective:  Physical Exam  Vitals reviewed.   Constitutional:       General: She is not in acute distress.     Appearance: She is well-developed. She is not ill-appearing or diaphoretic.      Comments: Presents in a wheelchair today   HENT:      Head: Normocephalic and atraumatic.      Mouth/Throat:      Mouth: Mucous membranes are moist.      Pharynx: No oropharyngeal exudate.   Eyes:      General: Scleral icterus present.      Conjunctiva/sclera: Conjunctivae normal.      Pupils: Pupils are equal, round, and reactive to light.   Neck:      Thyroid: No thyromegaly.      Vascular: No JVD.   Cardiovascular:      Rate and Rhythm: Normal rate and regular rhythm.      Heart sounds: Normal heart sounds. No murmur heard.     No friction rub. No gallop.   Pulmonary:      Effort: Pulmonary effort is normal. No respiratory distress.      Breath sounds: Normal breath sounds. No wheezing or rales.   Abdominal:       General: Bowel sounds are normal. There is no distension.      Palpations: Abdomen is soft. There is no mass.      Tenderness: There is no abdominal tenderness. There is no rebound.      Hernia: No hernia is present.   Musculoskeletal:         General: No tenderness or deformity. Normal range of motion.      Cervical back: Normal range of motion and neck supple.      Comments: Presents in a wheelchair   Lymphadenopathy:      Cervical: No cervical adenopathy.      Upper Body:      Right upper body: No supraclavicular adenopathy.      Left upper body: No supraclavicular adenopathy.   Skin:     General: Skin is warm and dry.      Coloration: Skin is not pale.      Findings: No erythema or rash.   Neurological:      Mental Status: She is alert and oriented to person, place, and time. Mental status is at baseline.      Cranial Nerves: No cranial nerve deficit.      Motor: No abnormal muscle tone.   Psychiatric:         Behavior: Behavior normal.         Thought Content: Thought content normal.         Judgment: Judgment normal.       Labs:  I have personally reviewed the patient's lab work today, including CBC and CMP. ANC is adequate for treatment  Bilirubin 1.2. CA 19-9 non   K 2.9    Imaging Data:  PET 3/17/25:  Impression:     In patient with recent lymph node biopsy demonstrating metastatic adenocarcinoma, multiple tracer avid upper abdominal lymph nodes with index lesions as detailed above.  No definitive focal tracer avid lesion in the liver.     Stable position of bilateral internal/external biliary drains with mild intrahepatic biliary ductal dilatation.     Decrease mesenteric stranding adjacent to the duodenum and descending colon as seen on prior CT.       Assessment and plan:    1. Cholangiocarcinoma    2. Secondary malignant neoplasm of intra-abdominal lymph nodes    3. Anorexia    4. Intractable nausea and vomiting    5. Biliary obstruction    6. Fatigue, unspecified type    7. Hypokalemia           1.2  - Ms Cerrato is a 68 yo woman with empty sella syndrome, growth hormone deficiency, grave's disease with exophthalmos, HTN, seizures, T2DM, who presents today for further management of unresectable hilar cholangiocarcinoma, MMR-proficient  - Long discussion with patient and daughter-in-law re the diagnosis, natural history and prognosis of an unresectable hilar cholangiocarcinoma. Cancer is not curable but treatable. The goal of treatment is palliative, with hopes of containing the cancer, slowing down growth, and prolonging life.   - discussed palliative chemoimmunotherapy with cis/gem/durv. Reviewed side effects in detail. Handouts were provided to patient during previous visit. She returns today to begin. We did hold off on performing the audiogram due to how weak she was feeling. Will perform in the future if needed.   - Had port placed on 3/25/2025 without complication    - MS Del Toro returns today for cycle 2 of [place]/Cis with Durvalumab q4 weeks. She did pretty well with cycle 1 of chemotherapy. However, she did have some episodes of vomiting over the past couple of days. She was not aware that she could take her nausea medication as needed. She was also only wanting to eat vegetables without much protein. She is functioning ok and trying to be active. No fever or chills. Still needs physical support by wheelchair. She began [place]/Cis/Durvalumab on 4/4/2025. No report of fever, chills. Overall, did pretty well with cycle 1 considering how she started.   - I have personally reviewed the patient's lab work today, including CBC and CMP. ANC is adequate for treatment. K 3.4 and bili 1.2  - Proceed with cycle 3 of [place]/Cis/Durvalumab tomorrow    - RTC in 2 weeks for cycle 4. Will repeat scans after cycle 4    - PGX, tempus blood were drawn    3.  - Continue periactin tid. Discussed with her and the family that she needs to eat foods high in protein and calories.   - Continue zyprexa 5 mg at night  - referred  to palliative. Will help to schedule.     4.  - Continue zyprexa 5 mg at night  - Take phenergan prn in addition to zofran    5.  - long discussion. Most likely she will need these tubes indefinitely. Needs tube exchange. This was completed on 4/2/2025. She tolerated this procedure well.   Will continue to monitor. Bili continues to improve, 1.2 today    7.   - K 2.9. Will continue with K supplement to her infusion today. Will continue to monitor.     RTC in 2 weeks for cycle 4, cycle 2 day 15    Patient and family members displayed understanding of the above encounter and treatment plan. All thoughtful questions were answered to their satisfaction. Patient was advised to notify the care team or proceed to the ER if signs and symptoms worsen.     45 minutes were spent today on this encounter including face to face time with the patient, data gathering/interpretation and documentation. Greater than 50% of this time involved counseling or coordination of care. I have provided the patient with an opportunity to ask questions and have all questions answered to patient's satisfaction.     Visit today included increased complexity associated with the care of the episodic problem chemotherapy  addressed and managing the longitudinal care of the patient due to the serious and/or complex managed problem(s) GI malignancies/cancer. In addition, the above encounter addresses an illness that poses a significant threat to life, bodily function and overall performance status.      code applied: patient requires or will require a continuous, longitudinal, and active collaborative plan of care related to this patient's health condition, GI malignancies/cancer --the management of which requires the direction of a practitioner with specialized clinical knowledge, skill, and expertise       LISA Covington, PA-C [facility]  Dept of Hematology/Oncology  ABHINAV to GI Oncology team         Route Chart for  Scheduling    Med Onc Chart Routing      Follow up with physician 2 weeks and 6 weeks. See Dr Johnson in 2 weeks with lab work, CT CAP and treatment discussion with [place]/Cis. Marvell/Cis in 6 weeks   Follow up with ANA M 4 weeks. [place]/Cis/Durvalumab   Infusion scheduling note    Injection scheduling note    Labs CBC, CMP and TSH   Scheduling:  Preferred lab:  Lab interval: every 2 weeks     Imaging CT chest abdomen pelvis   Scheduled.   Pharmacy appointment    Other referrals                Treatment Plan Information   OP CISPLATIN GEMCITABINE D1,15  DURVALUMAB D1 Q4W Fabiola Johnson MD   Associated diagnosis: Cholangiocarcinoma   noted on 3/19/2025   Line of treatment: First Line  Treatment Goal: Palliative     Upcoming Treatment Dates - OP CISPLATIN GEMCITABINE D1,15  DURVALUMAB D1 Q4W    4/30/2025       Chemotherapy       durvalumab (IMFINZI) 1,500 mg in 0.9% NaCl SolP 280 mL chemo infusion       CISplatin (Platinol) 25 mg/m2 = 44 mg in 0.9% NaCl 294 mL chemo infusion       gemcitabine (GEMZAR) 1,770 mg in 0.9% NaCl 267.7 mL chemo infusion       Pre-Hydration       0.9% NaCl 500 mL with magnesium sulfate 1 g, potassium chloride 10 mEq infusion       Post-Hydration       sodium chloride 0.9% bolus 500 mL 500 mL       Antiemetics       palonosetron (ALOXI) 0.25 mg with Dexamethasone (DECADRON) 12 mg in NS 50 mL IVPB       fosaprepitant 150 mg in 0.9% NaCl 150 mL IVPB  5/14/2025       Chemotherapy       CISplatin (Platinol) 25 mg/m2 = 44 mg in 0.9% NaCl 294 mL chemo infusion       gemcitabine (GEMZAR) 1,770 mg in 0.9% NaCl 267.7 mL chemo infusion       Pre-Hydration       0.9% NaCl 500 mL with magnesium sulfate 1 g, potassium chloride 10 mEq infusion       Post-Hydration       sodium chloride 0.9% bolus 500 mL 500 mL       Antiemetics       palonosetron (ALOXI) 0.25 mg with Dexamethasone (DECADRON) 12 mg in NS 50 mL IVPB       fosaprepitant 150 mg in 0.9% NaCl 150 mL IVPB  5/28/2025       Chemotherapy       durvalumab (IMFINZI)  1,500 mg in 0.9% NaCl SolP 280 mL chemo infusion       CISplatin (Platinol) 25 mg/m2 = 44 mg in 0.9% NaCl 294 mL chemo infusion       gemcitabine (GEMZAR) 1,770 mg in 0.9% NaCl 267.7 mL chemo infusion       Pre-Hydration       0.9% NaCl 500 mL with magnesium sulfate 1 g, potassium chloride 10 mEq infusion       Post-Hydration       sodium chloride 0.9% bolus 500 mL 500 mL       Antiemetics       fosaprepitant 150 mg in 0.9% NaCl 150 mL IVPB       palonosetron (ALOXI) 0.25 mg with Dexamethasone (DECADRON) 12 mg in NS 50 mL IVPB  6/11/2025       Chemotherapy       CISplatin (Platinol) 25 mg/m2 = 44 mg in 0.9% NaCl 294 mL chemo infusion       gemcitabine (GEMZAR) 1,770 mg in 0.9% NaCl 267.7 mL chemo infusion       Pre-Hydration       0.9% NaCl 500 mL with magnesium sulfate 1 g, potassium chloride 10 mEq infusion       Post-Hydration       sodium chloride 0.9% bolus 500 mL 500 mL       Antiemetics       palonosetron (ALOXI) 0.25 mg with Dexamethasone (DECADRON) 12 mg in NS 50 mL IVPB       fosaprepitant 150 mg in 0.9% NaCl 150 mL IVPB                   [1]   Social History  Socioeconomic History    Marital status:    Occupational History     Employer: [employer]   Tobacco Use    Smoking status: Never    Smokeless tobacco: Never   Substance and Sexual Activity    Alcohol use: No     Comment: none    Drug use: No    Sexual activity: Yes     Partners: Male     Comment: m  works in dietary,  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     Social Drivers of Health     Financial Resource Strain: Low Risk  (2/4/2025)    Overall Financial Resource Strain (CARDIA)     Difficulty of Paying Living Expenses: Not hard at all   Food Insecurity: No Food Insecurity (2/4/2025)    Hunger Vital Sign     Worried About Running Out of Food in the Last Year: Never true     Ran Out of Food in the Last Year: Never true   Transportation Needs: No Transportation Needs (2/4/2025)    TRANSPORTATION NEEDS     Transportation : No   Physical  Activity: Inactive (2/4/2025)    Exercise Vital Sign     Days of Exercise per Week: 0 days     Minutes of Exercise per Session: 0 min   Stress: Stress Concern Present (2/4/2025)    [employer] [place] [employer] - Occupational Stress Questionnaire     Feeling of Stress : Very much   Housing Stability: Unknown (2/4/2025)    Housing Stability Vital Sign     Unable to Pay for Housing in the Last Year: No     Homeless in the Last Year: No

## 2025-05-01 ENCOUNTER — TELEPHONE (OUTPATIENT)
Dept: INTERNAL MEDICINE | Facility: CLINIC | Age: 70
End: 2025-05-01
Payer: MEDICARE

## 2025-05-01 ENCOUNTER — INFUSION (OUTPATIENT)
Dept: INFUSION THERAPY | Facility: HOSPITAL | Age: 70
End: 2025-05-01
Attending: PHYSICIAN ASSISTANT
Payer: MEDICARE

## 2025-05-01 VITALS
DIASTOLIC BLOOD PRESSURE: 81 MMHG | HEIGHT: 59 IN | OXYGEN SATURATION: 99 % | SYSTOLIC BLOOD PRESSURE: 142 MMHG | BODY MASS INDEX: 29.16 KG/M2 | HEART RATE: 78 BPM | TEMPERATURE: 98 F | WEIGHT: 144.63 LBS | RESPIRATION RATE: 18 BRPM

## 2025-05-01 DIAGNOSIS — C22.1 CHOLANGIOCARCINOMA: Primary | ICD-10-CM

## 2025-05-01 DIAGNOSIS — C22.1 CHOLANGIOCARCINOMA: ICD-10-CM

## 2025-05-01 PROCEDURE — 96417 CHEMO IV INFUS EACH ADDL SEQ: CPT

## 2025-05-01 PROCEDURE — 96367 TX/PROPH/DG ADDL SEQ IV INF: CPT

## 2025-05-01 PROCEDURE — 63600175 PHARM REV CODE 636 W HCPCS: Mod: JZ,TB | Performed by: PHYSICIAN ASSISTANT

## 2025-05-01 PROCEDURE — 96413 CHEMO IV INFUSION 1 HR: CPT

## 2025-05-01 PROCEDURE — 25000003 PHARM REV CODE 250: Performed by: PHYSICIAN ASSISTANT

## 2025-05-01 PROCEDURE — A4216 STERILE WATER/SALINE, 10 ML: HCPCS | Performed by: PHYSICIAN ASSISTANT

## 2025-05-01 RX ORDER — SODIUM CHLORIDE 0.9 % (FLUSH) 0.9 %
10 SYRINGE (ML) INJECTION
Status: DISCONTINUED | OUTPATIENT
Start: 2025-05-01 | End: 2025-05-01 | Stop reason: HOSPADM

## 2025-05-01 RX ORDER — HEPARIN 100 UNIT/ML
500 SYRINGE INTRAVENOUS
Status: DISCONTINUED | OUTPATIENT
Start: 2025-05-01 | End: 2025-05-01 | Stop reason: HOSPADM

## 2025-05-01 RX ORDER — EPINEPHRINE 0.3 MG/.3ML
0.3 INJECTION SUBCUTANEOUS ONCE AS NEEDED
Status: DISCONTINUED | OUTPATIENT
Start: 2025-05-01 | End: 2025-05-01 | Stop reason: HOSPADM

## 2025-05-01 RX ORDER — SODIUM CHLORIDE 0.9 % (FLUSH) 0.9 %
3 SYRINGE (ML) INJECTION EVERY 6 HOURS PRN
Qty: 60 ML | Refills: 2 | Status: SHIPPED | OUTPATIENT
Start: 2025-05-01 | End: 2025-05-02 | Stop reason: SDUPTHER

## 2025-05-01 RX ORDER — SODIUM CHLORIDE 0.9 % (FLUSH) 0.9 %
3 SYRINGE (ML) INJECTION EVERY 6 HOURS PRN
Qty: 60 ML | Refills: 2 | Status: SHIPPED | OUTPATIENT
Start: 2025-05-01 | End: 2025-05-01 | Stop reason: SDUPTHER

## 2025-05-01 RX ORDER — DIPHENHYDRAMINE HYDROCHLORIDE 50 MG/ML
50 INJECTION, SOLUTION INTRAMUSCULAR; INTRAVENOUS ONCE AS NEEDED
Status: DISCONTINUED | OUTPATIENT
Start: 2025-05-01 | End: 2025-05-01 | Stop reason: HOSPADM

## 2025-05-01 RX ADMIN — FOSAPREPITANT 150 MG: 150 INJECTION, POWDER, LYOPHILIZED, FOR SOLUTION INTRAVENOUS at 10:05

## 2025-05-01 RX ADMIN — CISPLATIN 44 MG: 1 INJECTION, SOLUTION INTRAVENOUS at 11:05

## 2025-05-01 RX ADMIN — SODIUM CHLORIDE 1500 MG: 9 INJECTION, SOLUTION INTRAVENOUS at 09:05

## 2025-05-01 RX ADMIN — SODIUM CHLORIDE 500 ML: 9 INJECTION, SOLUTION INTRAVENOUS at 12:05

## 2025-05-01 RX ADMIN — GEMCITABINE 1800 MG: 38 INJECTION, SOLUTION INTRAVENOUS at 12:05

## 2025-05-01 RX ADMIN — SODIUM CHLORIDE, PRESERVATIVE FREE 10 ML: 5 INJECTION INTRAVENOUS at 01:05

## 2025-05-01 RX ADMIN — HEPARIN 500 UNITS: 100 SYRINGE at 01:05

## 2025-05-01 RX ADMIN — DEXAMETHASONE SODIUM PHOSPHATE 0.25 MG: 4 INJECTION, SOLUTION INTRA-ARTICULAR; INTRALESIONAL; INTRAMUSCULAR; INTRAVENOUS; SOFT TISSUE at 10:05

## 2025-05-01 RX ADMIN — POTASSIUM CHLORIDE 500 ML/HR: 2 INJECTION, SOLUTION, CONCENTRATE INTRAVENOUS at 08:05

## 2025-05-01 NOTE — TELEPHONE ENCOUNTER
----- Message from Panteraebonyin sent at 5/1/2025 10:08 AM CDT -----  Type: RX REFILL REQUEST  Who Called: Patient   Refill or New Rx: refill   Rx Name and Strength: sodium chloride 0.9% (NORMAL SALINE FLUSH) injection  Pt would you be out of this prescription on tomorrow . Please give the pt a call back once this have been sent over. Please Advise   Would the patient rather a call back or a response via [facility]? Call   Best Call Back Number: [phone]  Additional Information:  [facility]/pharmacy #[id number] - [place], [place] - [id number] [address]: [phone] Fax: [phone]

## 2025-05-01 NOTE — PLAN OF CARE
"BP (!) 115/53 (Patient Position: Sitting)   Pulse 91   Temp 98 °F (36.7 °C)   Resp 18   Ht 4' 11" (1.499 m)   Wt 65.6 kg (144 lb 10 oz)   SpO2 99%   BMI 29.21 kg/m²  Pleasant, alert and oriented patient to Chemo Infusion C1D1 Imfinzi/Cisplatin/Gemzar/IVF's - VSS and RCW port accessed with blood return observed, flushed with NS, dressing applied and patient tolerated procedure well - patient tolerated treatment with no AVE's, port flushed with NS/Heparin, blood return observed, port de-accessed, band-aide applied and patient discharged to home with no concerns - RTC on 5/13/25    "

## 2025-05-01 NOTE — TELEPHONE ENCOUNTER
Refill Routing Note   Medication(s) are not appropriate for processing by [facility] for the following reason(s):        Outside of protocol    [facility] action(s):  Route             Appointments  past 12m or future 3m with PCP    Date Provider   Last Visit   4/28/2025 Shay Castellano MD   Next Visit   8/4/2025 Shay Castellano MD   ED visits in past 90 days: 0        Note composed:11:20 AM 05/01/2025

## 2025-05-02 RX ORDER — SODIUM CHLORIDE 0.9 % (FLUSH) 0.9 %
3 SYRINGE (ML) INJECTION EVERY 6 HOURS PRN
Qty: 60 ML | Refills: 11 | Status: SHIPPED | OUTPATIENT
Start: 2025-05-02

## 2025-05-13 ENCOUNTER — OFFICE VISIT (OUTPATIENT)
Dept: HEMATOLOGY/ONCOLOGY | Facility: CLINIC | Age: 70
End: 2025-05-13
Payer: MEDICARE

## 2025-05-13 ENCOUNTER — LAB VISIT (OUTPATIENT)
Dept: LAB | Facility: HOSPITAL | Age: 70
End: 2025-05-13
Attending: INTERNAL MEDICINE
Payer: MEDICARE

## 2025-05-13 ENCOUNTER — INFUSION (OUTPATIENT)
Dept: INFUSION THERAPY | Facility: HOSPITAL | Age: 70
End: 2025-05-13
Payer: MEDICARE

## 2025-05-13 ENCOUNTER — TELEPHONE (OUTPATIENT)
Dept: HEMATOLOGY/ONCOLOGY | Facility: CLINIC | Age: 70
End: 2025-05-13

## 2025-05-13 VITALS
BODY MASS INDEX: 27.88 KG/M2 | HEART RATE: 105 BPM | HEIGHT: 59 IN | SYSTOLIC BLOOD PRESSURE: 126 MMHG | WEIGHT: 138.31 LBS | RESPIRATION RATE: 20 BRPM | DIASTOLIC BLOOD PRESSURE: 70 MMHG

## 2025-05-13 VITALS
SYSTOLIC BLOOD PRESSURE: 111 MMHG | DIASTOLIC BLOOD PRESSURE: 78 MMHG | BODY MASS INDEX: 27.88 KG/M2 | HEART RATE: 139 BPM | TEMPERATURE: 98 F | HEIGHT: 59 IN | RESPIRATION RATE: 20 BRPM | WEIGHT: 138.31 LBS | OXYGEN SATURATION: 92 %

## 2025-05-13 DIAGNOSIS — E44.0 MODERATE MALNUTRITION: ICD-10-CM

## 2025-05-13 DIAGNOSIS — D63.0 ANEMIA IN NEOPLASTIC DISEASE: ICD-10-CM

## 2025-05-13 DIAGNOSIS — D49.9 IMMUNODEFICIENCY SECONDARY TO NEOPLASM: ICD-10-CM

## 2025-05-13 DIAGNOSIS — E11.9 TYPE 2 DIABETES MELLITUS WITHOUT COMPLICATION, WITHOUT LONG-TERM CURRENT USE OF INSULIN: ICD-10-CM

## 2025-05-13 DIAGNOSIS — C22.1 CHOLANGIOCARCINOMA: Primary | ICD-10-CM

## 2025-05-13 DIAGNOSIS — K83.1 BILIARY OBSTRUCTION: ICD-10-CM

## 2025-05-13 DIAGNOSIS — D84.81 IMMUNODEFICIENCY SECONDARY TO NEOPLASM: ICD-10-CM

## 2025-05-13 DIAGNOSIS — R53.83 FATIGUE, UNSPECIFIED TYPE: ICD-10-CM

## 2025-05-13 DIAGNOSIS — D84.821 IMMUNODEFICIENCY SECONDARY TO CHEMOTHERAPY: ICD-10-CM

## 2025-05-13 DIAGNOSIS — Z79.69 IMMUNODEFICIENCY SECONDARY TO CHEMOTHERAPY: ICD-10-CM

## 2025-05-13 DIAGNOSIS — I10 ESSENTIAL HYPERTENSION: Chronic | ICD-10-CM

## 2025-05-13 DIAGNOSIS — C22.1 CHOLANGIOCARCINOMA: ICD-10-CM

## 2025-05-13 DIAGNOSIS — C77.2 SECONDARY MALIGNANT NEOPLASM OF INTRA-ABDOMINAL LYMPH NODES: ICD-10-CM

## 2025-05-13 DIAGNOSIS — D53.9 NUTRITIONAL ANEMIA: ICD-10-CM

## 2025-05-13 DIAGNOSIS — R63.0 ANOREXIA: ICD-10-CM

## 2025-05-13 DIAGNOSIS — R11.2 INTRACTABLE NAUSEA AND VOMITING: ICD-10-CM

## 2025-05-13 DIAGNOSIS — T45.1X5A IMMUNODEFICIENCY SECONDARY TO CHEMOTHERAPY: ICD-10-CM

## 2025-05-13 LAB
ABSOLUTE EOSINOPHIL (OHS): 0 K/UL
ABSOLUTE MONOCYTE (OHS): 0.65 K/UL (ref 0.3–1)
ABSOLUTE NEUTROPHIL COUNT (OHS): 11.87 K/UL (ref 1.8–7.7)
ALBUMIN SERPL BCP-MCNC: 2.3 G/DL (ref 3.5–5.2)
ALP SERPL-CCNC: 396 UNIT/L (ref 40–150)
ALT SERPL W/O P-5'-P-CCNC: 21 UNIT/L (ref 10–44)
ANION GAP (OHS): 22 MMOL/L (ref 8–16)
AST SERPL-CCNC: 37 UNIT/L (ref 11–45)
BASOPHILS # BLD AUTO: 0.03 K/UL
BASOPHILS NFR BLD AUTO: 0.2 %
BILIRUB SERPL-MCNC: 1.5 MG/DL (ref 0.1–1)
BUN SERPL-MCNC: 14 MG/DL (ref 8–23)
CALCIUM SERPL-MCNC: 7.8 MG/DL (ref 8.7–10.5)
CHLORIDE SERPL-SCNC: 91 MMOL/L (ref 95–110)
CO2 SERPL-SCNC: 24 MMOL/L (ref 23–29)
CREAT SERPL-MCNC: 0.9 MG/DL (ref 0.5–1.4)
ERYTHROCYTE [DISTWIDTH] IN BLOOD BY AUTOMATED COUNT: 18.4 % (ref 11.5–14.5)
GFR SERPLBLD CREATININE-BSD FMLA CKD-EPI: >60 ML/MIN/1.73/M2
GLUCOSE SERPL-MCNC: 114 MG/DL (ref 70–110)
HCT VFR BLD AUTO: 23 % (ref 37–48.5)
HGB BLD-MCNC: 7.1 GM/DL (ref 12–16)
IMM GRANULOCYTES # BLD AUTO: 0.14 K/UL (ref 0–0.04)
IMM GRANULOCYTES NFR BLD AUTO: 1 % (ref 0–0.5)
LYMPHOCYTES # BLD AUTO: 1.2 K/UL (ref 1–4.8)
MCH RBC QN AUTO: 28 PG (ref 27–31)
MCHC RBC AUTO-ENTMCNC: 30.9 G/DL (ref 32–36)
MCV RBC AUTO: 91 FL (ref 82–98)
NUCLEATED RBC (/100WBC) (OHS): 0 /100 WBC
PLATELET # BLD AUTO: 283 K/UL (ref 150–450)
PMV BLD AUTO: 10.6 FL (ref 9.2–12.9)
POTASSIUM SERPL-SCNC: 3 MMOL/L (ref 3.5–5.1)
PROT SERPL-MCNC: 7.3 GM/DL (ref 6–8.4)
RBC # BLD AUTO: 2.54 M/UL (ref 4–5.4)
RELATIVE EOSINOPHIL (OHS): 0 %
RELATIVE LYMPHOCYTE (OHS): 8.6 % (ref 18–48)
RELATIVE MONOCYTE (OHS): 4.7 % (ref 4–15)
RELATIVE NEUTROPHIL (OHS): 85.5 % (ref 38–73)
SODIUM SERPL-SCNC: 137 MMOL/L (ref 136–145)
TSH SERPL-ACNC: 0.69 UIU/ML (ref 0.4–4)
WBC # BLD AUTO: 13.89 K/UL (ref 3.9–12.7)

## 2025-05-13 PROCEDURE — 96417 CHEMO IV INFUS EACH ADDL SEQ: CPT

## 2025-05-13 PROCEDURE — 3061F NEG MICROALBUMINURIA REV: CPT | Mod: CPTII,S$GLB,, | Performed by: INTERNAL MEDICINE

## 2025-05-13 PROCEDURE — 96361 HYDRATE IV INFUSION ADD-ON: CPT

## 2025-05-13 PROCEDURE — 96367 TX/PROPH/DG ADDL SEQ IV INF: CPT

## 2025-05-13 PROCEDURE — 63600175 PHARM REV CODE 636 W HCPCS: Performed by: INTERNAL MEDICINE

## 2025-05-13 PROCEDURE — 99999 PR PBB SHADOW E&M-EST. PATIENT-LVL V: CPT | Mod: PBBFAC,,, | Performed by: INTERNAL MEDICINE

## 2025-05-13 PROCEDURE — 3008F BODY MASS INDEX DOCD: CPT | Mod: CPTII,S$GLB,, | Performed by: INTERNAL MEDICINE

## 2025-05-13 PROCEDURE — 36415 COLL VENOUS BLD VENIPUNCTURE: CPT

## 2025-05-13 PROCEDURE — 1101F PT FALLS ASSESS-DOCD LE1/YR: CPT | Mod: CPTII,S$GLB,, | Performed by: INTERNAL MEDICINE

## 2025-05-13 PROCEDURE — 96413 CHEMO IV INFUSION 1 HR: CPT

## 2025-05-13 PROCEDURE — 1159F MED LIST DOCD IN RCRD: CPT | Mod: CPTII,S$GLB,, | Performed by: INTERNAL MEDICINE

## 2025-05-13 PROCEDURE — 25000003 PHARM REV CODE 250: Performed by: INTERNAL MEDICINE

## 2025-05-13 PROCEDURE — 3288F FALL RISK ASSESSMENT DOCD: CPT | Mod: CPTII,S$GLB,, | Performed by: INTERNAL MEDICINE

## 2025-05-13 PROCEDURE — 3072F LOW RISK FOR RETINOPATHY: CPT | Mod: CPTII,S$GLB,, | Performed by: INTERNAL MEDICINE

## 2025-05-13 PROCEDURE — 3066F NEPHROPATHY DOC TX: CPT | Mod: CPTII,S$GLB,, | Performed by: INTERNAL MEDICINE

## 2025-05-13 PROCEDURE — 1125F AMNT PAIN NOTED PAIN PRSNT: CPT | Mod: CPTII,S$GLB,, | Performed by: INTERNAL MEDICINE

## 2025-05-13 PROCEDURE — 3074F SYST BP LT 130 MM HG: CPT | Mod: CPTII,S$GLB,, | Performed by: INTERNAL MEDICINE

## 2025-05-13 PROCEDURE — 99215 OFFICE O/P EST HI 40 MIN: CPT | Mod: S$GLB,,, | Performed by: INTERNAL MEDICINE

## 2025-05-13 PROCEDURE — 1160F RVW MEDS BY RX/DR IN RCRD: CPT | Mod: CPTII,S$GLB,, | Performed by: INTERNAL MEDICINE

## 2025-05-13 PROCEDURE — 3078F DIAST BP <80 MM HG: CPT | Mod: CPTII,S$GLB,, | Performed by: INTERNAL MEDICINE

## 2025-05-13 PROCEDURE — G2211 COMPLEX E/M VISIT ADD ON: HCPCS | Mod: S$GLB,,, | Performed by: INTERNAL MEDICINE

## 2025-05-13 PROCEDURE — 3044F HG A1C LEVEL LT 7.0%: CPT | Mod: CPTII,S$GLB,, | Performed by: INTERNAL MEDICINE

## 2025-05-13 PROCEDURE — 4010F ACE/ARB THERAPY RXD/TAKEN: CPT | Mod: CPTII,S$GLB,, | Performed by: INTERNAL MEDICINE

## 2025-05-13 RX ORDER — HEPARIN 100 UNIT/ML
500 SYRINGE INTRAVENOUS
Status: DISCONTINUED | OUTPATIENT
Start: 2025-05-13 | End: 2025-05-13 | Stop reason: HOSPADM

## 2025-05-13 RX ORDER — EPINEPHRINE 0.3 MG/.3ML
0.3 INJECTION SUBCUTANEOUS ONCE AS NEEDED
Status: DISCONTINUED | OUTPATIENT
Start: 2025-05-13 | End: 2025-05-13 | Stop reason: HOSPADM

## 2025-05-13 RX ORDER — SODIUM CHLORIDE 0.9 % (FLUSH) 0.9 %
10 SYRINGE (ML) INJECTION
Status: CANCELLED | OUTPATIENT
Start: 2025-05-15

## 2025-05-13 RX ORDER — EPINEPHRINE 0.3 MG/.3ML
0.3 INJECTION SUBCUTANEOUS ONCE AS NEEDED
Status: CANCELLED | OUTPATIENT
Start: 2025-05-15

## 2025-05-13 RX ORDER — POTASSIUM CHLORIDE 20 MEQ/1
40 TABLET, EXTENDED RELEASE ORAL ONCE
Status: CANCELLED
Start: 2025-05-13

## 2025-05-13 RX ORDER — POTASSIUM CHLORIDE 20 MEQ/1
40 TABLET, EXTENDED RELEASE ORAL ONCE
Status: COMPLETED | OUTPATIENT
Start: 2025-05-13 | End: 2025-05-13

## 2025-05-13 RX ORDER — SODIUM CHLORIDE 0.9 % (FLUSH) 0.9 %
10 SYRINGE (ML) INJECTION
Status: DISCONTINUED | OUTPATIENT
Start: 2025-05-13 | End: 2025-05-13 | Stop reason: HOSPADM

## 2025-05-13 RX ORDER — DIPHENHYDRAMINE HYDROCHLORIDE 50 MG/ML
50 INJECTION, SOLUTION INTRAMUSCULAR; INTRAVENOUS ONCE AS NEEDED
Status: CANCELLED | OUTPATIENT
Start: 2025-05-15

## 2025-05-13 RX ORDER — HEPARIN 100 UNIT/ML
500 SYRINGE INTRAVENOUS
Status: CANCELLED | OUTPATIENT
Start: 2025-05-15

## 2025-05-13 RX ORDER — DIPHENHYDRAMINE HYDROCHLORIDE 50 MG/ML
50 INJECTION, SOLUTION INTRAMUSCULAR; INTRAVENOUS ONCE AS NEEDED
Status: DISCONTINUED | OUTPATIENT
Start: 2025-05-13 | End: 2025-05-13 | Stop reason: HOSPADM

## 2025-05-13 RX ADMIN — FOSAPREPITANT 150 MG: 150 INJECTION, POWDER, LYOPHILIZED, FOR SOLUTION INTRAVENOUS at 11:05

## 2025-05-13 RX ADMIN — DEXAMETHASONE SODIUM PHOSPHATE 0.25 MG: 4 INJECTION, SOLUTION INTRA-ARTICULAR; INTRALESIONAL; INTRAMUSCULAR; INTRAVENOUS; SOFT TISSUE at 11:05

## 2025-05-13 RX ADMIN — GEMCITABINE 1600 MG: 38 INJECTION, SOLUTION INTRAVENOUS at 01:05

## 2025-05-13 RX ADMIN — POTASSIUM CHLORIDE 40 MEQ: 1500 TABLET, EXTENDED RELEASE ORAL at 10:05

## 2025-05-13 RX ADMIN — HEPARIN 500 UNITS: 100 SYRINGE at 02:05

## 2025-05-13 RX ADMIN — CISPLATIN 41 MG: 1 INJECTION, SOLUTION INTRAVENOUS at 12:05

## 2025-05-13 RX ADMIN — SODIUM CHLORIDE 500 ML: 9 INJECTION, SOLUTION INTRAVENOUS at 01:05

## 2025-05-13 RX ADMIN — POTASSIUM CHLORIDE 500 ML/HR: 2 INJECTION, SOLUTION, CONCENTRATE INTRAVENOUS at 10:05

## 2025-05-13 NOTE — PROGRESS NOTES
"                                                         PROGRESS NOTE    Subjective:       Patient ID: Katey Cerrato is a 69 y.o. female.    Chief Complaint: follow up for cholangiocarcinoma    Diagnosis:  Unresectable hilar cholangiocarcinoma, MMR-proficient  Tempus blood 4/17/25: KRAS G12V, FBXW7 mutation, ctDNA 0.3%, TMB 3.8  PGX: DPYD and UGT1A1 normal metabolizer.     Oncologic History:  1. Ms. Cerrato is a 68 yo woman with empty sella syndrome, growth hormone deficiency, grave's disease with exophthalmos, HTN, seizures, T2DM, who presents today for further management of unresectable hilar cholangiocarcinoma.   She presented to OSH with jaundice and scleral icterus. Initial CT of the abdomen and pelvis with IV contrast showed significant intrahepatic biliary ductal dilatation and questionable 9 mm fat containing lesion in the uncinate process of the pancreas. Subsequent MRCP showed "severe intrahepatic bile duct dilatation, with abrupt narrowing at the confluence of the hepatic ducts where there is a suspected ill-defined liver mass. Repeat imaging with MRI abdomen with and without contrast showed "hypoattenuating lesion near the confluence of the right and left hepatic ducts causing severe upstream ductal dilatation concerning for cholangiocarcinoma.  Further evaluation with ERCP is recommended." [place] [facility] [place] and [place]. CEA elevated, CA 19-9 WNL. PCT placed 2/8/25. She was discharged and PTC x2 remains capped.  2/18/25: CT A/P:  Impression:  1. Redemonstration of bilateral internal external biliary drains in place, with distal tips terminating in the duodenum.  Distal left biliary drain terminates without pigtail configuration.  Mild persistent intrahepatic biliary ductal dilatation.  All of these findings appear similar compared to 02/12/2025 without significant detrimental change.  2. Few areas of mesenteric stranding and nodularity adjacent to the duodenum, splenic " flexure of the colon, and descending colon, as described above.  Increased conspicuity of these areas compared to 2025.  Findings are nonspecific, may represent infectious or inflammatory changes, however given history of cholangiocarcinoma, sequela of peritoneal metastasis cannot be excluded.  Attention on follow-up imaging is recommended.  3. Multiple scattered prominent lymph nodes without pathologic enlargement by size criteria.  4. Other findings, as above.  Upper EUS with ceferino hepatis lymph node biopsy on 3/10/25 showed moderately differentiated metastatic adenocarcinoma. MMR-proficient.   Seen by Dr Glynn. Dr Glynn does not feel she is a surgical candidate nor the disease is resectable. There is distant lymph node involvement on PET. Recc palliative chemo +/- chemoradiation if she responds well.  Patient presents today for further management. Noted N/V every time she changes the gauze of her leaking PTC tube site. Not eating. Followed by dietician. Feels weak.   Discussed palliative chemo with cis/gem/durv  2. Started cis/gem/durv every 2 weeks on 2025     Interval History:   Ms Del Toro returns today for cycle 2 day 15 of cis/gem/durv. Does not feel much side effects from chemo. Still has a lot of nausea and decreased appetite. Family is preparing meal for her.     ECO    ROS:   A ten-point system review is obtained and negative except for what was stated in the Interval History.     Physical Examination:   Vital signs reviewed. I manually counted her pulse. 90  General: well hydrated, well developed, in no acute distress. Walks into the clinic using a walker  HEENT: normocephalic, EOMI, anicteric sclerae  Neck: supple, no JVD, thyromegaly, cervical or supraclavicular lymphadenopathy  Lungs: clear breath sounds bilaterally, no wheezing, rales, or rhonchi  Heart: RRR, no M/R/G  Abdomen: soft, no tenderness, non-distended, no hepatosplenomegaly, mass, or hernia. BS present  Extremities: no  clubbing, cyanosis, or edema  Skin: no rash, ulcer, or open wounds  Neuro: alert and oriented x 4, no focal neuro deficit  Psych: pleasant and appropriate mood and affect    Objective:     Laboratory Data:  Labs from today pending. CA 19-9 non-    Imaging Data:  FDG PET 3/17/25:  Impression:     In patient with recent lymph node biopsy demonstrating metastatic adenocarcinoma, multiple tracer avid upper abdominal lymph nodes with index lesions as detailed above.  No definitive focal tracer avid lesion in the liver.     Stable position of bilateral internal/external biliary drains with mild intrahepatic biliary ductal dilatation.     Decrease mesenteric stranding adjacent to the duodenum and descending colon as seen on prior CT.       Assessment and Plan:     1. Cholangiocarcinoma    2. Secondary malignant neoplasm of intra-abdominal lymph nodes    3. Immunodeficiency secondary to chemotherapy    4. Immunodeficiency secondary to neoplasm    5. Anorexia    6. Intractable nausea and vomiting    7. Biliary obstruction    8. Moderate malnutrition    9. Essential hypertension    10. Type 2 diabetes mellitus without complication, without long-term current use of insulin    11. Anemia in neoplastic disease      1-4  - Ms Saqib is a 70 yo woman with empty sella syndrome, growth hormone deficiency, grave's disease with exophthalmos, HTN, seizures, T2DM, who presents today for continued chemo for unresectable hilar cholangiocarcinoma, MMR-proficient   - started cis/gem/durv on 4/4/25  - tolerating chemo well. Cycle 2 day 15 today. Dose re-adjusted for today's BSA given 16% weight loss  - RTC in 2 weeks with restaging scan prior  - clinically she is improving. She is not in a wheelchair anymore and does not seem to be as weak as before    5.  - c/w periactin  - she declined palliative visit. Today I spent a long time explaining the service of palliative care. She is actively getting chemo now so they are not for  hospice. Seeing palliative care will get her symptoms better controlled. Patient understands and agrees with the plan.   - refer to palliative medicine    6.  - 2/2 malignancy  - c/w antiemetics  - f/u with palliative    7.  - s/p cholestomy tube  - f/u with IR for tube exchange    8.  - seen by dietician    9.  - BP controlled  - c/w current medication    10.  - monitor BS    11.  - Hb above 7  - check nutritional lab given poor oral intake  - transfuse when Hb below 7    Follow-up:     RTC in 2 weeks  Knows to call in the interval if any problems arise.    Electronically signed by Fabiola Johnson    Route Chart for Scheduling    Med Onc Chart Routing  Urgent    Follow up with physician . Please schedule patient to see palliative. keep other appts. please link ferritin, iron/TIBC, folate, B12 type and screen to the next lab appt   Follow up with ANA M    Infusion scheduling note    Injection scheduling note    Labs    Imaging    Pharmacy appointment    Other referrals         Additional referrals needed  palliative         Treatment Plan Information   OP CISPLATIN GEMCITABINE D1,15  DURVALUMAB D1 Q4W Fabiola Johnson MD   Associated diagnosis: Cholangiocarcinoma   noted on 3/19/2025   Line of treatment: First Line  Treatment Goal: Palliative     Upcoming Treatment Dates - OP CISPLATIN GEMCITABINE D1,15  DURVALUMAB D1 Q4W    5/15/2025       Chemotherapy       CISplatin (Platinol) 25 mg/m2 = 41 mg in 0.9% NaCl 291 mL chemo infusion       gemcitabine (GEMZAR) 1,620 mg in 0.9% NaCl 266.2 mL chemo infusion       Pre-Hydration       0.9% NaCl 500 mL with magnesium sulfate 1 g, potassium chloride 10 mEq infusion       Post-Hydration       sodium chloride 0.9% bolus 500 mL 500 mL       Antiemetics       palonosetron 0.25mg/dexAMETHasone 12mg in NS IVPB 0.25 mg 50 mL       fosaprepitant 150 mg in 0.9% NaCl 150 mL IVPB  5/29/2025       Chemotherapy       durvalumab (IMFINZI) 1,500 mg in 0.9% NaCl SolP 280 mL chemo infusion        CISplatin (Platinol) 25 mg/m2 = 41 mg in 0.9% NaCl 291 mL chemo infusion       gemcitabine (GEMZAR) 1,620 mg in 0.9% NaCl 266.2 mL chemo infusion       Pre-Hydration       0.9% NaCl 500 mL with magnesium sulfate 1 g, potassium chloride 10 mEq infusion       Post-Hydration       sodium chloride 0.9% bolus 500 mL 500 mL       Antiemetics       fosaprepitant 150 mg in 0.9% NaCl 150 mL IVPB       palonosetron (ALOXI) 0.25 mg with Dexamethasone (DECADRON) 12 mg in NS 50 mL IVPB  6/12/2025       Chemotherapy       CISplatin (Platinol) 25 mg/m2 = 41 mg in 0.9% NaCl 291 mL chemo infusion       gemcitabine (GEMZAR) 1,620 mg in 0.9% NaCl 266.2 mL chemo infusion       Pre-Hydration       0.9% NaCl 500 mL with magnesium sulfate 1 g, potassium chloride 10 mEq infusion       Post-Hydration       sodium chloride 0.9% bolus 500 mL 500 mL       Antiemetics       fosaprepitant 150 mg in 0.9% NaCl 150 mL IVPB       palonosetron (ALOXI) 0.25 mg with Dexamethasone (DECADRON) 12 mg in NS 50 mL IVPB  6/26/2025       Chemotherapy       durvalumab (IMFINZI) 1,500 mg in 0.9% NaCl SolP 280 mL chemo infusion       CISplatin (Platinol) 25 mg/m2 = 41 mg in 0.9% NaCl 291 mL chemo infusion       gemcitabine (GEMZAR) 1,620 mg in 0.9% NaCl 266.2 mL chemo infusion       Pre-Hydration       0.9% NaCl 500 mL with magnesium sulfate 1 g, potassium chloride 10 mEq infusion       Post-Hydration       sodium chloride 0.9% bolus 500 mL 500 mL       Antiemetics       fosaprepitant 150 mg in 0.9% NaCl 150 mL IVPB       palonosetron (ALOXI) 0.25 mg with Dexamethasone (DECADRON) 12 mg in NS 50 mL IVPB

## 2025-05-13 NOTE — TELEPHONE ENCOUNTER
RN returned patient's phone call. Unable to reach patient as patient did not answer. RN left [facility] requesting call back.

## 2025-05-13 NOTE — TELEPHONE ENCOUNTER
----- Message from Beverly sent at 5/13/2025  1:59 PM CDT -----  Type:  Needs Medical AdviceWho Called: Lorintoms (please be specific):  How long has patient had these symptoms:  Pharmacy name and phone #:  Would the patient rather a call back or a response via [facility]? callBe Call Back Number: [phone] Information: Regarding a package Dr Johnson suggested

## 2025-05-14 ENCOUNTER — TELEPHONE (OUTPATIENT)
Dept: HEMATOLOGY/ONCOLOGY | Facility: CLINIC | Age: 70
End: 2025-05-14
Payer: MEDICARE

## 2025-05-14 NOTE — TELEPHONE ENCOUNTER
RN returned patient's phone about needing medical supplies for cholecystomy drain since she has been discharged from  services and is unable to get these supplies. RN have gathered requested supplies for the patient. Patient will have friend  supplies from clinic on tomorrow morning.

## 2025-05-14 NOTE — TELEPHONE ENCOUNTER
"----- Message from Kaleb sent at 5/14/2025  1:20 PM CDT -----  Consult/AdvisoryName Of Caller: SelfContact Preference?: [phone] Name: Trey patient feel the need to be seen today? NoWhat is the nature of the call?: Calling to speak w/ Shania about recent chemo visitAdditional Notes: "Thank you for all that you do for our patients"  "

## 2025-05-21 ENCOUNTER — TELEPHONE (OUTPATIENT)
Dept: PALLIATIVE MEDICINE | Facility: CLINIC | Age: 70
End: 2025-05-21
Payer: MEDICARE

## 2025-05-23 ENCOUNTER — PATIENT MESSAGE (OUTPATIENT)
Dept: INTERVENTIONAL RADIOLOGY/VASCULAR | Facility: HOSPITAL | Age: 70
End: 2025-05-23
Payer: MEDICARE

## 2025-05-23 ENCOUNTER — HOSPITAL ENCOUNTER (INPATIENT)
Facility: HOSPITAL | Age: 70
LOS: 5 days | Discharge: HOME HEALTH | DRG: 919 | End: 2025-05-28
Attending: STUDENT IN AN ORGANIZED HEALTH CARE EDUCATION/TRAINING PROGRAM | Admitting: INTERNAL MEDICINE
Payer: MEDICARE

## 2025-05-23 DIAGNOSIS — E16.2 HYPOGLYCEMIA: ICD-10-CM

## 2025-05-23 DIAGNOSIS — I26.99 PULMONARY EMBOLISM: ICD-10-CM
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
## 2025-05-23 DIAGNOSIS — M25.579 ANKLE PAIN: ICD-10-CM

## 2025-05-23 DIAGNOSIS — R07.9 CHEST PAIN: ICD-10-CM

## 2025-05-23 DIAGNOSIS — D64.9 SYMPTOMATIC ANEMIA: Primary | ICD-10-CM

## 2025-05-23 DIAGNOSIS — C22.1 CHOLANGIOCARCINOMA: ICD-10-CM

## 2025-05-23 PROBLEM — E46 MALNUTRITION: Status: ACTIVE | Noted: 2025-05-23

## 2025-05-23 PROBLEM — E86.0 DEHYDRATION: Status: ACTIVE | Noted: 2025-05-23

## 2025-05-23 PROBLEM — D69.6 THROMBOCYTOPENIA: Status: ACTIVE | Noted: 2025-05-23

## 2025-05-23 PROBLEM — N17.9 AKI (ACUTE KIDNEY INJURY): Status: ACTIVE | Noted: 2025-05-23

## 2025-05-23 PROBLEM — Z46.82 ENCOUNTER FOR BILIARY DRAINAGE TUBE PLACEMENT: Status: ACTIVE | Noted: 2025-05-23

## 2025-05-23 PROBLEM — E87.6 HYPOKALEMIA: Status: ACTIVE | Noted: 2025-05-23

## 2025-05-23 PROBLEM — L02.91 ABSCESS: Status: ACTIVE | Noted: 2025-05-23

## 2025-05-23 LAB
ABORH RETYPE: NORMAL
ABSOLUTE EOSINOPHIL (OHS): 0 K/UL
ABSOLUTE MONOCYTE (OHS): 0.61 K/UL (ref 0.3–1)
ABSOLUTE NEUTROPHIL COUNT (OHS): 15.85 K/UL (ref 1.8–7.7)
ALBUMIN SERPL BCP-MCNC: 2.4 G/DL (ref 3.5–5.2)
ALP SERPL-CCNC: 274 UNIT/L (ref 40–150)
ALT SERPL W/O P-5'-P-CCNC: 11 UNIT/L (ref 10–44)
ANION GAP (OHS): 19 MMOL/L (ref 8–16)
APTT PPP: 22 SECONDS (ref 21–32)
AST SERPL-CCNC: 19 UNIT/L (ref 11–45)
BACTERIA #/AREA URNS AUTO: NORMAL /HPF
BASOPHILS # BLD AUTO: 0.02 K/UL
BASOPHILS NFR BLD AUTO: 0.1 %
BILIRUB SERPL-MCNC: 1.2 MG/DL (ref 0.1–1)
BILIRUB UR QL STRIP.AUTO: ABNORMAL
BIPAP: 0
BIPAP: 0
BNP SERPL-MCNC: 153 PG/ML (ref 0–99)
BUN SERPL-MCNC: 36 MG/DL (ref 8–23)
CALCIUM SERPL-MCNC: 8.6 MG/DL (ref 8.7–10.5)
CHLORIDE SERPL-SCNC: 99 MMOL/L (ref 95–110)
CK SERPL-CCNC: 17 U/L (ref 20–180)
CLARITY UR: ABNORMAL
CO2 SERPL-SCNC: 30 MMOL/L (ref 23–29)
COLOR UR AUTO: YELLOW
CREAT SERPL-MCNC: 1.7 MG/DL (ref 0.5–1.4)
ERYTHROCYTE [DISTWIDTH] IN BLOOD BY AUTOMATED COUNT: 18.9 % (ref 11.5–14.5)
FERRITIN SERPL-MCNC: 5261 NG/ML (ref 20–300)
FIO2: 21 %
FIO2: 21 %
FOLATE SERPL-MCNC: 5.5 NG/ML (ref 4–24)
GFR SERPLBLD CREATININE-BSD FMLA CKD-EPI: 32 ML/MIN/1.73/M2
GLUCOSE SERPL-MCNC: 140 MG/DL (ref 70–110)
GLUCOSE UR QL STRIP: NEGATIVE
HCT VFR BLD AUTO: 21.4 % (ref 37–48.5)
HCV AB SERPL QL IA: NORMAL
HGB BLD-MCNC: 6.7 GM/DL (ref 12–16)
HGB UR QL STRIP: NEGATIVE
HIV 1+2 AB+HIV1 P24 AG SERPL QL IA: NORMAL
HOLD SPECIMEN: NORMAL
HOLD SPECIMEN: NORMAL
HYALINE CASTS UR QL AUTO: 0 /LPF (ref 0–1)
IMM GRANULOCYTES # BLD AUTO: 0.33 K/UL (ref 0–0.04)
IMM GRANULOCYTES NFR BLD AUTO: 1.8 % (ref 0–0.5)
INDIRECT COOMBS: NORMAL
INR PPP: 1.4 (ref 0.8–1.2)
IRON SATN MFR SERPL: 30 % (ref 20–50)
IRON SERPL-MCNC: 49 UG/DL (ref 30–160)
KETONES UR QL STRIP: ABNORMAL
LDH SERPL L TO P-CCNC: 1.6 MMOL/L
LDH SERPL L TO P-CCNC: 3.1 MMOL/L (ref 0.5–2.2)
LDH SERPL-CCNC: 650 U/L (ref 110–260)
LEUKOCYTE ESTERASE UR QL STRIP: NEGATIVE
LYMPHOCYTES # BLD AUTO: 1.35 K/UL (ref 1–4.8)
MCH RBC QN AUTO: 28 PG (ref 27–31)
MCHC RBC AUTO-ENTMCNC: 31.3 G/DL (ref 32–36)
MCV RBC AUTO: 90 FL (ref 82–98)
MICROSCOPIC COMMENT: NORMAL
NITRITE UR QL STRIP: NEGATIVE
NUCLEATED RBC (/100WBC) (OHS): 1 /100 WBC
OHS QRS DURATION: 90 MS
OHS QTC CALCULATION: 479 MS
PH UR STRIP: 6 [PH]
PLATELET # BLD AUTO: 177 K/UL (ref 150–450)
PMV BLD AUTO: 11.1 FL (ref 9.2–12.9)
POC PERFORMED BY: ABNORMAL
POC PERFORMED BY: NORMAL
POC TEMPERATURE: 37 C
POC TEMPERATURE: 37 C
POTASSIUM SERPL-SCNC: 3.1 MMOL/L (ref 3.5–5.1)
PROCALCITONIN SERPL-MCNC: 3.18 NG/ML
PROT SERPL-MCNC: 7.5 GM/DL (ref 6–8.4)
PROT UR QL STRIP: ABNORMAL
PROTHROMBIN TIME: 14.6 SECONDS (ref 9–12.5)
RBC # BLD AUTO: 2.39 M/UL (ref 4–5.4)
RBC #/AREA URNS AUTO: 1 /HPF (ref 0–4)
RELATIVE EOSINOPHIL (OHS): 0 %
RELATIVE LYMPHOCYTE (OHS): 7.4 % (ref 18–48)
RELATIVE MONOCYTE (OHS): 3.4 % (ref 4–15)
RELATIVE NEUTROPHIL (OHS): 87.3 % (ref 38–73)
RETICS/RBC NFR AUTO: 3.9 % (ref 0.5–2.5)
RH BLD: NORMAL
SODIUM SERPL-SCNC: 148 MMOL/L (ref 136–145)
SP GR UR STRIP: 1.02
SPECIMEN OUTDATE: NORMAL
SPECIMEN SOURCE: ABNORMAL
SPECIMEN SOURCE: NORMAL
SQUAMOUS #/AREA URNS AUTO: 1 /HPF
TIBC SERPL-MCNC: 161 UG/DL (ref 250–450)
TRANSFERRIN SERPL-MCNC: 109 MG/DL (ref 200–375)
TROPONIN I SERPL HS-MCNC: 30 NG/L
TROPONIN I SERPL HS-MCNC: 37 NG/L
TROPONIN I SERPL HS-MCNC: 40 NG/L
UROBILINOGEN UR STRIP-ACNC: ABNORMAL EU/DL
VIT B12 SERPL-MCNC: 767 PG/ML (ref 210–950)
WBC # BLD AUTO: 18.16 K/UL (ref 3.9–12.7)
WBC #/AREA URNS AUTO: 3 /HPF (ref 0–5)

## 2025-05-23 PROCEDURE — 99900035 HC TECH TIME PER 15 MIN (STAT)

## 2025-05-23 PROCEDURE — 86901 BLOOD TYPING SEROLOGIC RH(D): CPT

## 2025-05-23 PROCEDURE — 82803 BLOOD GASES ANY COMBINATION: CPT

## 2025-05-23 PROCEDURE — 82607 VITAMIN B-12: CPT | Performed by: INTERNAL MEDICINE

## 2025-05-23 PROCEDURE — 82728 ASSAY OF FERRITIN: CPT | Performed by: INTERNAL MEDICINE

## 2025-05-23 PROCEDURE — 85610 PROTHROMBIN TIME: CPT

## 2025-05-23 PROCEDURE — 96365 THER/PROPH/DIAG IV INF INIT: CPT

## 2025-05-23 PROCEDURE — 85025 COMPLETE CBC W/AUTO DIFF WBC: CPT | Performed by: STUDENT IN AN ORGANIZED HEALTH CARE EDUCATION/TRAINING PROGRAM

## 2025-05-23 PROCEDURE — 96367 TX/PROPH/DG ADDL SEQ IV INF: CPT

## 2025-05-23 PROCEDURE — 84484 ASSAY OF TROPONIN QUANT: CPT | Performed by: INTERNAL MEDICINE

## 2025-05-23 PROCEDURE — 84145 PROCALCITONIN (PCT): CPT | Performed by: INTERNAL MEDICINE

## 2025-05-23 PROCEDURE — 85730 THROMBOPLASTIN TIME PARTIAL: CPT

## 2025-05-23 PROCEDURE — 83605 ASSAY OF LACTIC ACID: CPT

## 2025-05-23 PROCEDURE — 94761 N-INVAS EAR/PLS OXIMETRY MLT: CPT

## 2025-05-23 PROCEDURE — 99223 1ST HOSP IP/OBS HIGH 75: CPT | Mod: 95,NSCH,, | Performed by: INTERNAL MEDICINE

## 2025-05-23 PROCEDURE — 99285 EMERGENCY DEPT VISIT HI MDM: CPT | Mod: 25

## 2025-05-23 PROCEDURE — 87389 HIV-1 AG W/HIV-1&-2 AB AG IA: CPT | Performed by: EMERGENCY MEDICINE

## 2025-05-23 PROCEDURE — 25000003 PHARM REV CODE 250

## 2025-05-23 PROCEDURE — 86803 HEPATITIS C AB TEST: CPT | Performed by: EMERGENCY MEDICINE

## 2025-05-23 PROCEDURE — 80053 COMPREHEN METABOLIC PANEL: CPT | Performed by: STUDENT IN AN ORGANIZED HEALTH CARE EDUCATION/TRAINING PROGRAM

## 2025-05-23 PROCEDURE — 93010 ELECTROCARDIOGRAM REPORT: CPT | Mod: ,,, | Performed by: INTERNAL MEDICINE

## 2025-05-23 PROCEDURE — 85060 BLOOD SMEAR INTERPRETATION: CPT | Mod: ,,, | Performed by: PATHOLOGY

## 2025-05-23 PROCEDURE — 12000002 HC ACUTE/MED SURGE SEMI-PRIVATE ROOM

## 2025-05-23 PROCEDURE — 84466 ASSAY OF TRANSFERRIN: CPT | Performed by: INTERNAL MEDICINE

## 2025-05-23 PROCEDURE — 82550 ASSAY OF CK (CPK): CPT | Performed by: INTERNAL MEDICINE

## 2025-05-23 PROCEDURE — 87040 BLOOD CULTURE FOR BACTERIA: CPT | Performed by: STUDENT IN AN ORGANIZED HEALTH CARE EDUCATION/TRAINING PROGRAM

## 2025-05-23 PROCEDURE — 83880 ASSAY OF NATRIURETIC PEPTIDE: CPT

## 2025-05-23 PROCEDURE — 84484 ASSAY OF TROPONIN QUANT: CPT

## 2025-05-23 PROCEDURE — 25500020 PHARM REV CODE 255: Performed by: STUDENT IN AN ORGANIZED HEALTH CARE EDUCATION/TRAINING PROGRAM

## 2025-05-23 PROCEDURE — 83615 LACTATE (LD) (LDH) ENZYME: CPT | Performed by: INTERNAL MEDICINE

## 2025-05-23 PROCEDURE — 25500020 PHARM REV CODE 255: Performed by: HOSPITALIST

## 2025-05-23 PROCEDURE — 81001 URINALYSIS AUTO W/SCOPE: CPT | Performed by: STUDENT IN AN ORGANIZED HEALTH CARE EDUCATION/TRAINING PROGRAM

## 2025-05-23 PROCEDURE — 82746 ASSAY OF FOLIC ACID SERUM: CPT | Performed by: INTERNAL MEDICINE

## 2025-05-23 PROCEDURE — 63600175 PHARM REV CODE 636 W HCPCS

## 2025-05-23 PROCEDURE — 93005 ELECTROCARDIOGRAM TRACING: CPT

## 2025-05-23 PROCEDURE — 85045 AUTOMATED RETICULOCYTE COUNT: CPT | Performed by: INTERNAL MEDICINE

## 2025-05-23 RX ORDER — POTASSIUM CHLORIDE 20 MEQ/1
40 TABLET, EXTENDED RELEASE ORAL ONCE
Status: COMPLETED | OUTPATIENT
Start: 2025-05-24 | End: 2025-05-24

## 2025-05-23 RX ORDER — IBUPROFEN 200 MG
16 TABLET ORAL
Status: DISCONTINUED | OUTPATIENT
Start: 2025-05-23 | End: 2025-05-26

## 2025-05-23 RX ORDER — HEPARIN SODIUM,PORCINE/D5W 25000/250
0-40 INTRAVENOUS SOLUTION INTRAVENOUS CONTINUOUS
Status: DISCONTINUED | OUTPATIENT
Start: 2025-05-23 | End: 2025-05-25

## 2025-05-23 RX ORDER — ONDANSETRON HYDROCHLORIDE 2 MG/ML
4 INJECTION, SOLUTION INTRAVENOUS EVERY 8 HOURS PRN
Status: DISCONTINUED | OUTPATIENT
Start: 2025-05-23 | End: 2025-05-28 | Stop reason: HOSPADM

## 2025-05-23 RX ORDER — GLUCAGON 1 MG
1 KIT INJECTION
Status: DISCONTINUED | OUTPATIENT
Start: 2025-05-23 | End: 2025-05-26

## 2025-05-23 RX ORDER — TALC
6 POWDER (GRAM) TOPICAL NIGHTLY PRN
Status: DISCONTINUED | OUTPATIENT
Start: 2025-05-23 | End: 2025-05-28 | Stop reason: HOSPADM

## 2025-05-23 RX ORDER — ALUMINUM HYDROXIDE, MAGNESIUM HYDROXIDE, AND SIMETHICONE 1200; 120; 1200 MG/30ML; MG/30ML; MG/30ML
30 SUSPENSION ORAL 4 TIMES DAILY PRN
Status: DISCONTINUED | OUTPATIENT
Start: 2025-05-23 | End: 2025-05-28 | Stop reason: HOSPADM

## 2025-05-23 RX ORDER — POLYETHYLENE GLYCOL 3350 17 G/17G
17 POWDER, FOR SOLUTION ORAL DAILY
Status: DISCONTINUED | OUTPATIENT
Start: 2025-05-24 | End: 2025-05-26

## 2025-05-23 RX ORDER — NALOXONE HCL 0.4 MG/ML
0.02 VIAL (ML) INJECTION
Status: DISCONTINUED | OUTPATIENT
Start: 2025-05-23 | End: 2025-05-28 | Stop reason: HOSPADM

## 2025-05-23 RX ORDER — IBUPROFEN 200 MG
24 TABLET ORAL
Status: DISCONTINUED | OUTPATIENT
Start: 2025-05-23 | End: 2025-05-26

## 2025-05-23 RX ORDER — CYPROHEPTADINE HYDROCHLORIDE 4 MG/1
4 TABLET ORAL 3 TIMES DAILY
Status: DISCONTINUED | OUTPATIENT
Start: 2025-05-24 | End: 2025-05-26

## 2025-05-23 RX ORDER — ACETAMINOPHEN 325 MG/1
650 TABLET ORAL EVERY 8 HOURS PRN
Status: DISCONTINUED | OUTPATIENT
Start: 2025-05-23 | End: 2025-05-25

## 2025-05-23 RX ORDER — CEFEPIME HYDROCHLORIDE 1 G/1
1 INJECTION, POWDER, FOR SOLUTION INTRAMUSCULAR; INTRAVENOUS
Status: DISCONTINUED | OUTPATIENT
Start: 2025-05-24 | End: 2025-05-25

## 2025-05-23 RX ORDER — LEVETIRACETAM 750 MG/1
1500 TABLET ORAL NIGHTLY
Status: DISCONTINUED | OUTPATIENT
Start: 2025-05-24 | End: 2025-05-25

## 2025-05-23 RX ORDER — OLANZAPINE 5 MG/1
5 TABLET, FILM COATED ORAL NIGHTLY
Status: DISCONTINUED | OUTPATIENT
Start: 2025-05-23 | End: 2025-05-28 | Stop reason: HOSPADM

## 2025-05-23 RX ORDER — ACETAMINOPHEN 325 MG/1
650 TABLET ORAL EVERY 4 HOURS PRN
Status: DISCONTINUED | OUTPATIENT
Start: 2025-05-23 | End: 2025-05-25

## 2025-05-23 RX ORDER — SODIUM CHLORIDE 9 MG/ML
INJECTION, SOLUTION INTRAVENOUS CONTINUOUS
Status: DISCONTINUED | OUTPATIENT
Start: 2025-05-24 | End: 2025-05-25

## 2025-05-23 RX ORDER — SODIUM CHLORIDE 0.9 % (FLUSH) 0.9 %
10 SYRINGE (ML) INJECTION EVERY 12 HOURS PRN
Status: DISCONTINUED | OUTPATIENT
Start: 2025-05-23 | End: 2025-05-28 | Stop reason: HOSPADM

## 2025-05-23 RX ORDER — MORPHINE SULFATE 2 MG/ML
2 INJECTION, SOLUTION INTRAMUSCULAR; INTRAVENOUS EVERY 4 HOURS PRN
Refills: 0 | Status: DISCONTINUED | OUTPATIENT
Start: 2025-05-23 | End: 2025-05-25

## 2025-05-23 RX ORDER — METRONIDAZOLE 500 MG/100ML
500 INJECTION, SOLUTION INTRAVENOUS
Status: DISCONTINUED | OUTPATIENT
Start: 2025-05-24 | End: 2025-05-25

## 2025-05-23 RX ORDER — HYDROCODONE BITARTRATE AND ACETAMINOPHEN 500; 5 MG/1; MG/1
TABLET ORAL
Status: DISCONTINUED | OUTPATIENT
Start: 2025-05-24 | End: 2025-05-28 | Stop reason: HOSPADM

## 2025-05-23 RX ADMIN — VANCOMYCIN HYDROCHLORIDE 1250 MG: 1.25 INJECTION, POWDER, LYOPHILIZED, FOR SOLUTION INTRAVENOUS at 05:05

## 2025-05-23 RX ADMIN — PIPERACILLIN AND TAZOBACTAM 4.5 G: 4; .5 INJECTION, POWDER, LYOPHILIZED, FOR SOLUTION INTRAVENOUS; PARENTERAL at 05:05

## 2025-05-23 RX ADMIN — IOHEXOL 55 ML: 350 INJECTION, SOLUTION INTRAVENOUS at 09:05

## 2025-05-23 RX ADMIN — IOHEXOL 75 ML: 350 INJECTION, SOLUTION INTRAVENOUS at 07:05

## 2025-05-24 LAB
ABO + RH BLD: NORMAL
ABSOLUTE EOSINOPHIL (OHS): 0 K/UL
ABSOLUTE EOSINOPHIL (OHS): 0 K/UL
ABSOLUTE MONOCYTE (OHS): 0.92 K/UL (ref 0.3–1)
ABSOLUTE MONOCYTE (OHS): 0.96 K/UL (ref 0.3–1)
ABSOLUTE NEUTROPHIL COUNT (OHS): 11.29 K/UL (ref 1.8–7.7)
ABSOLUTE NEUTROPHIL COUNT (OHS): 11.8 K/UL (ref 1.8–7.7)
ALBUMIN SERPL BCP-MCNC: 2.1 G/DL (ref 3.5–5.2)
ALBUMIN SERPL BCP-MCNC: 2.2 G/DL (ref 3.5–5.2)
ALP SERPL-CCNC: 232 UNIT/L (ref 40–150)
ALT SERPL W/O P-5'-P-CCNC: 10 UNIT/L (ref 10–44)
ANION GAP (OHS): 16 MMOL/L (ref 8–16)
ANION GAP (OHS): 17 MMOL/L (ref 8–16)
APTT PPP: 21.3 SECONDS (ref 21–32)
APTT PPP: 22.2 SECONDS (ref 21–32)
APTT PPP: 40.9 SECONDS (ref 21–32)
AST SERPL-CCNC: 26 UNIT/L (ref 11–45)
BASOPHILS # BLD AUTO: 0.02 K/UL
BASOPHILS # BLD AUTO: 0.03 K/UL
BASOPHILS NFR BLD AUTO: 0.1 %
BASOPHILS NFR BLD AUTO: 0.2 %
BILIRUB SERPL-MCNC: 1.4 MG/DL (ref 0.1–1)
BLD PROD TYP BPU: NORMAL
BLOOD UNIT EXPIRATION DATE: NORMAL
BLOOD UNIT TYPE CODE: 6200
BUN SERPL-MCNC: 32 MG/DL (ref 8–23)
BUN SERPL-MCNC: 36 MG/DL (ref 8–23)
CALCIUM SERPL-MCNC: 7.5 MG/DL (ref 8.7–10.5)
CALCIUM SERPL-MCNC: 7.9 MG/DL (ref 8.7–10.5)
CHLORIDE SERPL-SCNC: 100 MMOL/L (ref 95–110)
CHLORIDE SERPL-SCNC: 105 MMOL/L (ref 95–110)
CHOLEST SERPL-MCNC: 251 MG/DL (ref 120–199)
CHOLEST/HDLC SERPL: 8.4 {RATIO} (ref 2–5)
CO2 SERPL-SCNC: 24 MMOL/L (ref 23–29)
CO2 SERPL-SCNC: 31 MMOL/L (ref 23–29)
CREAT SERPL-MCNC: 1.4 MG/DL (ref 0.5–1.4)
CREAT SERPL-MCNC: 1.8 MG/DL (ref 0.5–1.4)
CROSSMATCH INTERPRETATION: NORMAL
DISPENSE STATUS: NORMAL
ERYTHROCYTE [DISTWIDTH] IN BLOOD BY AUTOMATED COUNT: 18.6 % (ref 11.5–14.5)
ERYTHROCYTE [DISTWIDTH] IN BLOOD BY AUTOMATED COUNT: 19 % (ref 11.5–14.5)
GFR SERPLBLD CREATININE-BSD FMLA CKD-EPI: 30 ML/MIN/1.73/M2
GFR SERPLBLD CREATININE-BSD FMLA CKD-EPI: 41 ML/MIN/1.73/M2
GLUCOSE SERPL-MCNC: 114 MG/DL (ref 70–110)
GLUCOSE SERPL-MCNC: 118 MG/DL (ref 70–110)
GLUCOSE SERPL-MCNC: 127 MG/DL (ref 70–110)
HCT VFR BLD AUTO: 19.6 % (ref 37–48.5)
HCT VFR BLD AUTO: 23.4 % (ref 37–48.5)
HDLC SERPL-MCNC: 30 MG/DL (ref 40–75)
HDLC SERPL: 12 % (ref 20–50)
HGB BLD-MCNC: 6.1 GM/DL (ref 12–16)
HGB BLD-MCNC: 7.3 GM/DL (ref 12–16)
IMM GRANULOCYTES # BLD AUTO: 0.18 K/UL (ref 0–0.04)
IMM GRANULOCYTES # BLD AUTO: 0.2 K/UL (ref 0–0.04)
IMM GRANULOCYTES NFR BLD AUTO: 1.2 % (ref 0–0.5)
IMM GRANULOCYTES NFR BLD AUTO: 1.4 % (ref 0–0.5)
INR PPP: 1.4 (ref 0.8–1.2)
LDLC SERPL CALC-MCNC: 186.4 MG/DL (ref 63–159)
LYMPHOCYTES # BLD AUTO: 1.45 K/UL (ref 1–4.8)
LYMPHOCYTES # BLD AUTO: 1.49 K/UL (ref 1–4.8)
MAGNESIUM SERPL-MCNC: 0.8 MG/DL (ref 1.6–2.6)
MCH RBC QN AUTO: 28 PG (ref 27–31)
MCH RBC QN AUTO: 28.5 PG (ref 27–31)
MCHC RBC AUTO-ENTMCNC: 31.1 G/DL (ref 32–36)
MCHC RBC AUTO-ENTMCNC: 31.2 G/DL (ref 32–36)
MCV RBC AUTO: 90 FL (ref 82–98)
MCV RBC AUTO: 91 FL (ref 82–98)
MRSA PCR SCRN (OHS): NOT DETECTED
NONHDLC SERPL-MCNC: 221 MG/DL
NUCLEATED RBC (/100WBC) (OHS): 1 /100 WBC
NUCLEATED RBC (/100WBC) (OHS): 1 /100 WBC
OHS QRS DURATION: 86 MS
OHS QTC CALCULATION: 516 MS
PHOSPHATE SERPL-MCNC: 2.4 MG/DL (ref 2.7–4.5)
PHOSPHATE SERPL-MCNC: 3.8 MG/DL (ref 2.7–4.5)
PLATELET # BLD AUTO: 158 K/UL (ref 150–450)
PLATELET # BLD AUTO: 170 K/UL (ref 150–450)
PMV BLD AUTO: 11.5 FL (ref 9.2–12.9)
PMV BLD AUTO: 11.8 FL (ref 9.2–12.9)
POCT GLUCOSE: 118 MG/DL (ref 70–110)
POTASSIUM SERPL-SCNC: 2.8 MMOL/L (ref 3.5–5.1)
POTASSIUM SERPL-SCNC: 3.6 MMOL/L (ref 3.5–5.1)
PROT SERPL-MCNC: 6.3 GM/DL (ref 6–8.4)
PROTHROMBIN TIME: 14.6 SECONDS (ref 9–12.5)
RBC # BLD AUTO: 2.18 M/UL (ref 4–5.4)
RBC # BLD AUTO: 2.56 M/UL (ref 4–5.4)
RELATIVE EOSINOPHIL (OHS): 0 %
RELATIVE EOSINOPHIL (OHS): 0 %
RELATIVE LYMPHOCYTE (OHS): 10.3 % (ref 18–48)
RELATIVE LYMPHOCYTE (OHS): 10.4 % (ref 18–48)
RELATIVE MONOCYTE (OHS): 6.4 % (ref 4–15)
RELATIVE MONOCYTE (OHS): 6.9 % (ref 4–15)
RELATIVE NEUTROPHIL (OHS): 81.1 % (ref 38–73)
RELATIVE NEUTROPHIL (OHS): 82 % (ref 38–73)
SARS-COV-2 RDRP RESP QL NAA+PROBE: NEGATIVE
SODIUM SERPL-SCNC: 146 MMOL/L (ref 136–145)
SODIUM SERPL-SCNC: 147 MMOL/L (ref 136–145)
TRIGL SERPL-MCNC: 173 MG/DL (ref 30–150)
TROPONIN I SERPL HS-MCNC: 44 NG/L
TROPONIN I SERPL HS-MCNC: 54 NG/L
UNIT NUMBER: NORMAL
WBC # BLD AUTO: 13.93 K/UL (ref 3.9–12.7)
WBC # BLD AUTO: 14.41 K/UL (ref 3.9–12.7)

## 2025-05-24 PROCEDURE — 85730 THROMBOPLASTIN TIME PARTIAL: CPT | Performed by: HOSPITALIST

## 2025-05-24 PROCEDURE — 83735 ASSAY OF MAGNESIUM: CPT | Performed by: INTERNAL MEDICINE

## 2025-05-24 PROCEDURE — 84484 ASSAY OF TROPONIN QUANT: CPT | Performed by: INTERNAL MEDICINE

## 2025-05-24 PROCEDURE — 87641 MR-STAPH DNA AMP PROBE: CPT | Performed by: INTERNAL MEDICINE

## 2025-05-24 PROCEDURE — 63600175 PHARM REV CODE 636 W HCPCS: Performed by: INTERNAL MEDICINE

## 2025-05-24 PROCEDURE — 25000003 PHARM REV CODE 250: Performed by: INTERNAL MEDICINE

## 2025-05-24 PROCEDURE — P9016 RBC LEUKOCYTES REDUCED: HCPCS | Performed by: INTERNAL MEDICINE

## 2025-05-24 PROCEDURE — 25000003 PHARM REV CODE 250: Performed by: STUDENT IN AN ORGANIZED HEALTH CARE EDUCATION/TRAINING PROGRAM

## 2025-05-24 PROCEDURE — 63600175 PHARM REV CODE 636 W HCPCS: Performed by: STUDENT IN AN ORGANIZED HEALTH CARE EDUCATION/TRAINING PROGRAM

## 2025-05-24 PROCEDURE — 63600175 PHARM REV CODE 636 W HCPCS

## 2025-05-24 PROCEDURE — 84100 ASSAY OF PHOSPHORUS: CPT

## 2025-05-24 PROCEDURE — 25000003 PHARM REV CODE 250: Performed by: HOSPITALIST

## 2025-05-24 PROCEDURE — U0002 COVID-19 LAB TEST NON-CDC: HCPCS | Performed by: HOSPITALIST

## 2025-05-24 PROCEDURE — 99223 1ST HOSP IP/OBS HIGH 75: CPT | Mod: 25,,, | Performed by: STUDENT IN AN ORGANIZED HEALTH CARE EDUCATION/TRAINING PROGRAM

## 2025-05-24 PROCEDURE — 94761 N-INVAS EAR/PLS OXIMETRY MLT: CPT

## 2025-05-24 PROCEDURE — 86920 COMPATIBILITY TEST SPIN: CPT | Performed by: INTERNAL MEDICINE

## 2025-05-24 PROCEDURE — BF101ZZ FLUOROSCOPY OF BILE DUCTS USING LOW OSMOLAR CONTRAST: ICD-10-PCS | Performed by: STUDENT IN AN ORGANIZED HEALTH CARE EDUCATION/TRAINING PROGRAM

## 2025-05-24 PROCEDURE — 85025 COMPLETE CBC W/AUTO DIFF WBC: CPT | Performed by: INTERNAL MEDICINE

## 2025-05-24 PROCEDURE — 36415 COLL VENOUS BLD VENIPUNCTURE: CPT

## 2025-05-24 PROCEDURE — 99223 1ST HOSP IP/OBS HIGH 75: CPT | Mod: GC,,, | Performed by: HOSPITALIST

## 2025-05-24 PROCEDURE — 82565 ASSAY OF CREATININE: CPT | Performed by: INTERNAL MEDICINE

## 2025-05-24 PROCEDURE — 0F2BX0Z CHANGE DRAINAGE DEVICE IN HEPATOBILIARY DUCT, EXTERNAL APPROACH: ICD-10-PCS | Performed by: STUDENT IN AN ORGANIZED HEALTH CARE EDUCATION/TRAINING PROGRAM

## 2025-05-24 PROCEDURE — 85730 THROMBOPLASTIN TIME PARTIAL: CPT

## 2025-05-24 PROCEDURE — 85730 THROMBOPLASTIN TIME PARTIAL: CPT | Performed by: INTERNAL MEDICINE

## 2025-05-24 PROCEDURE — 20600001 HC STEP DOWN PRIVATE ROOM

## 2025-05-24 PROCEDURE — 85610 PROTHROMBIN TIME: CPT | Performed by: INTERNAL MEDICINE

## 2025-05-24 PROCEDURE — 83718 ASSAY OF LIPOPROTEIN: CPT | Performed by: INTERNAL MEDICINE

## 2025-05-24 PROCEDURE — 30233N1 TRANSFUSION OF NONAUTOLOGOUS RED BLOOD CELLS INTO PERIPHERAL VEIN, PERCUTANEOUS APPROACH: ICD-10-PCS | Performed by: HOSPITALIST

## 2025-05-24 PROCEDURE — 84100 ASSAY OF PHOSPHORUS: CPT | Performed by: INTERNAL MEDICINE

## 2025-05-24 PROCEDURE — 36430 TRANSFUSION BLD/BLD COMPNT: CPT

## 2025-05-24 PROCEDURE — 84484 ASSAY OF TROPONIN QUANT: CPT

## 2025-05-24 RX ORDER — MIDAZOLAM HYDROCHLORIDE 1 MG/ML
INJECTION, SOLUTION INTRAMUSCULAR; INTRAVENOUS
Status: COMPLETED | OUTPATIENT
Start: 2025-05-24 | End: 2025-05-24

## 2025-05-24 RX ORDER — LEVETIRACETAM 500 MG/5ML
1500 INJECTION, SOLUTION, CONCENTRATE INTRAVENOUS ONCE
Status: COMPLETED | OUTPATIENT
Start: 2025-05-24 | End: 2025-05-24

## 2025-05-24 RX ORDER — MUPIROCIN 20 MG/G
OINTMENT TOPICAL 2 TIMES DAILY
Status: DISCONTINUED | OUTPATIENT
Start: 2025-05-24 | End: 2025-05-28 | Stop reason: HOSPADM

## 2025-05-24 RX ORDER — POTASSIUM CHLORIDE 20 MEQ/1
40 TABLET, EXTENDED RELEASE ORAL ONCE
Status: COMPLETED | OUTPATIENT
Start: 2025-05-24 | End: 2025-05-24

## 2025-05-24 RX ORDER — POTASSIUM CHLORIDE 7.45 MG/ML
10 INJECTION INTRAVENOUS
Status: COMPLETED | OUTPATIENT
Start: 2025-05-24 | End: 2025-05-24

## 2025-05-24 RX ORDER — MAGNESIUM SULFATE HEPTAHYDRATE 40 MG/ML
2 INJECTION, SOLUTION INTRAVENOUS ONCE
Status: COMPLETED | OUTPATIENT
Start: 2025-05-24 | End: 2025-05-24

## 2025-05-24 RX ORDER — PROCHLORPERAZINE EDISYLATE 5 MG/ML
5 INJECTION INTRAMUSCULAR; INTRAVENOUS EVERY 6 HOURS PRN
Status: DISCONTINUED | OUTPATIENT
Start: 2025-05-24 | End: 2025-05-28 | Stop reason: HOSPADM

## 2025-05-24 RX ORDER — LIDOCAINE HYDROCHLORIDE 10 MG/ML
INJECTION, SOLUTION EPIDURAL; INFILTRATION; INTRACAUDAL; PERINEURAL
Status: COMPLETED | OUTPATIENT
Start: 2025-05-24 | End: 2025-05-24

## 2025-05-24 RX ORDER — FENTANYL CITRATE 50 UG/ML
INJECTION, SOLUTION INTRAMUSCULAR; INTRAVENOUS
Status: COMPLETED | OUTPATIENT
Start: 2025-05-24 | End: 2025-05-24

## 2025-05-24 RX ORDER — GLUCAGON 1 MG
1 KIT INJECTION
Status: DISCONTINUED | OUTPATIENT
Start: 2025-05-24 | End: 2025-05-28 | Stop reason: HOSPADM

## 2025-05-24 RX ORDER — INSULIN ASPART 100 [IU]/ML
0-5 INJECTION, SOLUTION INTRAVENOUS; SUBCUTANEOUS
Status: DISCONTINUED | OUTPATIENT
Start: 2025-05-24 | End: 2025-05-28 | Stop reason: HOSPADM

## 2025-05-24 RX ORDER — IBUPROFEN 200 MG
16 TABLET ORAL
Status: DISCONTINUED | OUTPATIENT
Start: 2025-05-24 | End: 2025-05-28 | Stop reason: HOSPADM

## 2025-05-24 RX ORDER — IBUPROFEN 200 MG
24 TABLET ORAL
Status: DISCONTINUED | OUTPATIENT
Start: 2025-05-24 | End: 2025-05-28 | Stop reason: HOSPADM

## 2025-05-24 RX ADMIN — POTASSIUM CHLORIDE 10 MEQ: 7.46 INJECTION, SOLUTION INTRAVENOUS at 07:05

## 2025-05-24 RX ADMIN — FENTANYL CITRATE 25 MCG: 0.05 INJECTION, SOLUTION INTRAMUSCULAR; INTRAVENOUS at 02:05

## 2025-05-24 RX ADMIN — CEFEPIME 1 G: 1 INJECTION, POWDER, FOR SOLUTION INTRAMUSCULAR; INTRAVENOUS at 12:05

## 2025-05-24 RX ADMIN — LIDOCAINE HYDROCHLORIDE 10 ML: 10 SOLUTION INTRAVENOUS at 02:05

## 2025-05-24 RX ADMIN — POTASSIUM CHLORIDE 10 MEQ: 7.46 INJECTION, SOLUTION INTRAVENOUS at 12:05

## 2025-05-24 RX ADMIN — POTASSIUM CHLORIDE 40 MEQ: 1500 TABLET, EXTENDED RELEASE ORAL at 07:05

## 2025-05-24 RX ADMIN — MAGNESIUM SULFATE HEPTAHYDRATE 2 G: 40 INJECTION, SOLUTION INTRAVENOUS at 07:05

## 2025-05-24 RX ADMIN — METRONIDAZOLE 500 MG: 5 INJECTION, SOLUTION INTRAVENOUS at 07:05

## 2025-05-24 RX ADMIN — MAGNESIUM SULFATE HEPTAHYDRATE 2 G: 40 INJECTION, SOLUTION INTRAVENOUS at 09:05

## 2025-05-24 RX ADMIN — METRONIDAZOLE 500 MG: 5 INJECTION, SOLUTION INTRAVENOUS at 05:05

## 2025-05-24 RX ADMIN — MUPIROCIN: 20 OINTMENT TOPICAL at 12:05

## 2025-05-24 RX ADMIN — HEPARIN SODIUM 14 UNITS/KG/HR: 10000 INJECTION, SOLUTION INTRAVENOUS at 06:05

## 2025-05-24 RX ADMIN — MUPIROCIN: 20 OINTMENT TOPICAL at 09:05

## 2025-05-24 RX ADMIN — CYPROHEPTADINE HYDROCHLORIDE 4 MG: 4 TABLET ORAL at 05:05

## 2025-05-24 RX ADMIN — MIDAZOLAM HYDROCHLORIDE 0.5 MG: 2 INJECTION, SOLUTION INTRAMUSCULAR; INTRAVENOUS at 02:05

## 2025-05-24 RX ADMIN — POTASSIUM CHLORIDE 10 MEQ: 7.46 INJECTION, SOLUTION INTRAVENOUS at 09:05

## 2025-05-24 RX ADMIN — POTASSIUM CHLORIDE 40 MEQ: 1500 TABLET, EXTENDED RELEASE ORAL at 12:05

## 2025-05-24 RX ADMIN — CEFTRIAXONE SODIUM 1 G: 500 INJECTION, POWDER, FOR SOLUTION INTRAMUSCULAR; INTRAVENOUS at 02:05

## 2025-05-24 RX ADMIN — SODIUM CHLORIDE: 9 INJECTION, SOLUTION INTRAVENOUS at 01:05

## 2025-05-24 RX ADMIN — METRONIDAZOLE 500 MG: 5 INJECTION, SOLUTION INTRAVENOUS at 01:05

## 2025-05-24 RX ADMIN — LEVETIRACETAM 1500 MG: 500 TABLET, FILM COATED ORAL at 12:05

## 2025-05-24 RX ADMIN — CYPROHEPTADINE HYDROCHLORIDE 4 MG: 4 TABLET ORAL at 09:05

## 2025-05-24 RX ADMIN — HEPARIN SODIUM 12 UNITS/KG/HR: 10000 INJECTION, SOLUTION INTRAVENOUS at 01:05

## 2025-05-24 RX ADMIN — POLYETHYLENE GLYCOL 3350 17 G: 17 POWDER, FOR SOLUTION ORAL at 07:05

## 2025-05-24 RX ADMIN — SODIUM CHLORIDE: 9 INJECTION, SOLUTION INTRAVENOUS at 04:05

## 2025-05-24 RX ADMIN — OLANZAPINE 5 MG: 5 TABLET, FILM COATED ORAL at 12:05

## 2025-05-24 RX ADMIN — LEVETIRACETAM 1500 MG: 100 INJECTION INTRAVENOUS at 09:05

## 2025-05-24 RX ADMIN — POTASSIUM CHLORIDE 10 MEQ: 7.46 INJECTION, SOLUTION INTRAVENOUS at 11:05

## 2025-05-25 LAB
ABSOLUTE EOSINOPHIL (OHS): 0.01 K/UL
ABSOLUTE MONOCYTE (OHS): 1 K/UL (ref 0.3–1)
ABSOLUTE NEUTROPHIL COUNT (OHS): 12.69 K/UL (ref 1.8–7.7)
ALBUMIN SERPL BCP-MCNC: 2.1 G/DL (ref 3.5–5.2)
ALP SERPL-CCNC: 231 UNIT/L (ref 40–150)
ALT SERPL W/O P-5'-P-CCNC: 10 UNIT/L (ref 10–44)
ANION GAP (OHS): 15 MMOL/L (ref 8–16)
APTT PPP: 27 SECONDS (ref 21–32)
APTT PPP: 53.4 SECONDS (ref 21–32)
APTT PPP: 68.3 SECONDS (ref 21–32)
AST SERPL-CCNC: 23 UNIT/L (ref 11–45)
BASOPHILS # BLD AUTO: 0.02 K/UL
BASOPHILS NFR BLD AUTO: 0.1 %
BILIRUB SERPL-MCNC: 1 MG/DL (ref 0.1–1)
BUN SERPL-MCNC: 30 MG/DL (ref 8–23)
CALCIUM SERPL-MCNC: 8 MG/DL (ref 8.7–10.5)
CHLORIDE SERPL-SCNC: 107 MMOL/L (ref 95–110)
CO2 SERPL-SCNC: 26 MMOL/L (ref 23–29)
CREAT SERPL-MCNC: 1.4 MG/DL (ref 0.5–1.4)
ERYTHROCYTE [DISTWIDTH] IN BLOOD BY AUTOMATED COUNT: 18.5 % (ref 11.5–14.5)
GFR SERPLBLD CREATININE-BSD FMLA CKD-EPI: 41 ML/MIN/1.73/M2
GLUCOSE SERPL-MCNC: 138 MG/DL (ref 70–110)
HCT VFR BLD AUTO: 24.2 % (ref 37–48.5)
HGB BLD-MCNC: 7.8 GM/DL (ref 12–16)
IMM GRANULOCYTES # BLD AUTO: 0.24 K/UL (ref 0–0.04)
IMM GRANULOCYTES NFR BLD AUTO: 1.6 % (ref 0–0.5)
LYMPHOCYTES # BLD AUTO: 1.15 K/UL (ref 1–4.8)
MAGNESIUM SERPL-MCNC: 1.6 MG/DL (ref 1.6–2.6)
MCH RBC QN AUTO: 29.1 PG (ref 27–31)
MCHC RBC AUTO-ENTMCNC: 32.2 G/DL (ref 32–36)
MCV RBC AUTO: 90 FL (ref 82–98)
NUCLEATED RBC (/100WBC) (OHS): 0 /100 WBC
OSMOLALITY UR: 453 MOSM/KG (ref 50–1200)
PHOSPHATE SERPL-MCNC: 2.7 MG/DL (ref 2.7–4.5)
PLATELET # BLD AUTO: 184 K/UL (ref 150–450)
PMV BLD AUTO: 11.5 FL (ref 9.2–12.9)
POCT GLUCOSE: 118 MG/DL (ref 70–110)
POCT GLUCOSE: 127 MG/DL (ref 70–110)
POCT GLUCOSE: 146 MG/DL (ref 70–110)
POTASSIUM SERPL-SCNC: 3.4 MMOL/L (ref 3.5–5.1)
PROT SERPL-MCNC: 6.5 GM/DL (ref 6–8.4)
RBC # BLD AUTO: 2.68 M/UL (ref 4–5.4)
RELATIVE EOSINOPHIL (OHS): 0.1 %
RELATIVE LYMPHOCYTE (OHS): 7.6 % (ref 18–48)
RELATIVE MONOCYTE (OHS): 6.6 % (ref 4–15)
RELATIVE NEUTROPHIL (OHS): 84 % (ref 38–73)
SODIUM SERPL-SCNC: 148 MMOL/L (ref 136–145)
SODIUM UR-SCNC: 32 MMOL/L (ref 20–250)
WBC # BLD AUTO: 15.11 K/UL (ref 3.9–12.7)

## 2025-05-25 PROCEDURE — 84100 ASSAY OF PHOSPHORUS: CPT | Performed by: HOSPITALIST

## 2025-05-25 PROCEDURE — 25000003 PHARM REV CODE 250

## 2025-05-25 PROCEDURE — 36415 COLL VENOUS BLD VENIPUNCTURE: CPT | Performed by: HOSPITALIST

## 2025-05-25 PROCEDURE — 83735 ASSAY OF MAGNESIUM: CPT | Performed by: HOSPITALIST

## 2025-05-25 PROCEDURE — 20600001 HC STEP DOWN PRIVATE ROOM

## 2025-05-25 PROCEDURE — 85025 COMPLETE CBC W/AUTO DIFF WBC: CPT | Performed by: HOSPITALIST

## 2025-05-25 PROCEDURE — 80053 COMPREHEN METABOLIC PANEL: CPT | Performed by: HOSPITALIST

## 2025-05-25 PROCEDURE — 25000003 PHARM REV CODE 250: Performed by: INTERNAL MEDICINE

## 2025-05-25 PROCEDURE — 83935 ASSAY OF URINE OSMOLALITY: CPT | Performed by: INTERNAL MEDICINE

## 2025-05-25 PROCEDURE — 85730 THROMBOPLASTIN TIME PARTIAL: CPT | Performed by: HOSPITALIST

## 2025-05-25 PROCEDURE — 25000003 PHARM REV CODE 250: Performed by: HOSPITALIST

## 2025-05-25 PROCEDURE — 63600175 PHARM REV CODE 636 W HCPCS: Performed by: INTERNAL MEDICINE

## 2025-05-25 PROCEDURE — 25500020 PHARM REV CODE 255: Performed by: HOSPITALIST

## 2025-05-25 PROCEDURE — 63600175 PHARM REV CODE 636 W HCPCS

## 2025-05-25 PROCEDURE — 99233 SBSQ HOSP IP/OBS HIGH 50: CPT | Mod: GC,,, | Performed by: HOSPITALIST

## 2025-05-25 PROCEDURE — 84300 ASSAY OF URINE SODIUM: CPT | Performed by: INTERNAL MEDICINE

## 2025-05-25 RX ORDER — MORPHINE SULFATE 2 MG/ML
2 INJECTION, SOLUTION INTRAMUSCULAR; INTRAVENOUS EVERY 6 HOURS PRN
Status: DISCONTINUED | OUTPATIENT
Start: 2025-05-25 | End: 2025-05-28 | Stop reason: HOSPADM

## 2025-05-25 RX ORDER — LEVETIRACETAM 100 MG/ML
1500 SOLUTION ORAL NIGHTLY
Status: DISCONTINUED | OUTPATIENT
Start: 2025-05-25 | End: 2025-05-28 | Stop reason: HOSPADM

## 2025-05-25 RX ORDER — TRAMADOL HYDROCHLORIDE 50 MG/1
50 TABLET, FILM COATED ORAL EVERY 6 HOURS PRN
Status: DISCONTINUED | OUTPATIENT
Start: 2025-05-25 | End: 2025-05-28 | Stop reason: HOSPADM

## 2025-05-25 RX ORDER — ACETAMINOPHEN 325 MG/1
650 TABLET ORAL EVERY 6 HOURS PRN
Status: DISCONTINUED | OUTPATIENT
Start: 2025-05-25 | End: 2025-05-28 | Stop reason: HOSPADM

## 2025-05-25 RX ORDER — MAGNESIUM SULFATE HEPTAHYDRATE 40 MG/ML
2 INJECTION, SOLUTION INTRAVENOUS ONCE
Status: COMPLETED | OUTPATIENT
Start: 2025-05-25 | End: 2025-05-25

## 2025-05-25 RX ADMIN — METRONIDAZOLE 500 MG: 5 INJECTION, SOLUTION INTRAVENOUS at 12:05

## 2025-05-25 RX ADMIN — ONDANSETRON 4 MG: 2 INJECTION INTRAMUSCULAR; INTRAVENOUS at 02:05

## 2025-05-25 RX ADMIN — APIXABAN 10 MG: 5 TABLET, FILM COATED ORAL at 10:05

## 2025-05-25 RX ADMIN — POTASSIUM BICARBONATE 50 MEQ: 977.5 TABLET, EFFERVESCENT ORAL at 03:05

## 2025-05-25 RX ADMIN — CYPROHEPTADINE HYDROCHLORIDE 4 MG: 4 TABLET ORAL at 08:05

## 2025-05-25 RX ADMIN — LEVETIRACETAM 1500 MG: 500 SOLUTION ORAL at 08:05

## 2025-05-25 RX ADMIN — IOHEXOL 75 ML: 350 INJECTION, SOLUTION INTRAVENOUS at 05:05

## 2025-05-25 RX ADMIN — METRONIDAZOLE 500 MG: 5 INJECTION, SOLUTION INTRAVENOUS at 08:05

## 2025-05-25 RX ADMIN — APIXABAN 10 MG: 5 TABLET, FILM COATED ORAL at 08:05

## 2025-05-25 RX ADMIN — OLANZAPINE 5 MG: 5 TABLET, FILM COATED ORAL at 08:05

## 2025-05-25 RX ADMIN — MUPIROCIN: 20 OINTMENT TOPICAL at 09:05

## 2025-05-25 RX ADMIN — CYPROHEPTADINE HYDROCHLORIDE 4 MG: 4 TABLET ORAL at 02:05

## 2025-05-25 RX ADMIN — MAGNESIUM SULFATE HEPTAHYDRATE 2 G: 40 INJECTION, SOLUTION INTRAVENOUS at 03:05

## 2025-05-25 RX ADMIN — CEFEPIME 1 G: 1 INJECTION, POWDER, FOR SOLUTION INTRAMUSCULAR; INTRAVENOUS at 12:05

## 2025-05-26 DIAGNOSIS — K83.1 BILIARY OBSTRUCTION: Primary | ICD-10-CM

## 2025-05-26 LAB
ABSOLUTE EOSINOPHIL (OHS): 0.01 K/UL
ABSOLUTE MONOCYTE (OHS): 1.09 K/UL (ref 0.3–1)
ABSOLUTE NEUTROPHIL COUNT (OHS): 12.75 K/UL (ref 1.8–7.7)
ALBUMIN SERPL BCP-MCNC: 2.3 G/DL (ref 3.5–5.2)
ALP SERPL-CCNC: 232 UNIT/L (ref 40–150)
ALT SERPL W/O P-5'-P-CCNC: 10 UNIT/L (ref 10–44)
ANION GAP (OHS): 18 MMOL/L (ref 8–16)
AORTIC SIZE INDEX (SOV): 1.7 CM/M2
AORTIC SIZE INDEX: 1.7 CM/M2
ASCENDING AORTA: 2.7 CM
AST SERPL-CCNC: 15 UNIT/L (ref 11–45)
AV AREA BY CONTINUOUS VTI: 2.2 CM2
AV INDEX (PROSTH): 0.69
AV LVOT MEAN GRADIENT: 1 MMHG
AV LVOT PEAK GRADIENT: 2 MMHG
AV MEAN GRADIENT: 3 MMHG
AV PEAK GRADIENT: 5 MMHG
AV VALVE AREA BY VELOCITY RATIO: 2 CM²
AV VALVE AREA: 2.2 CM2
AV VELOCITY RATIO: 0.64
BASOPHILS # BLD AUTO: 0.03 K/UL
BASOPHILS NFR BLD AUTO: 0.2 %
BILIRUB SERPL-MCNC: 0.7 MG/DL (ref 0.1–1)
BSA FOR ECHO PROCEDURE: 1.59 M2
BUN SERPL-MCNC: 26 MG/DL (ref 8–23)
CALCIUM SERPL-MCNC: 9.3 MG/DL (ref 8.7–10.5)
CHLORIDE SERPL-SCNC: 108 MMOL/L (ref 95–110)
CO2 SERPL-SCNC: 21 MMOL/L (ref 23–29)
CREAT SERPL-MCNC: 1.4 MG/DL (ref 0.5–1.4)
CV ECHO LV RWT: 0.55 CM
DOP CALC AO PEAK VEL: 1.1 M/S
DOP CALC AO VTI: 19.7 CM
DOP CALC LVOT AREA: 3.1 CM2
DOP CALC LVOT DIAMETER: 2 CM
DOP CALC LVOT PEAK VEL: 0.7 M/S
DOP CALC LVOT STROKE VOLUME: 42.4 CM3
DOP CALCLVOT PEAK VEL VTI: 13.5 CM
E/E' RATIO: 7 M/S
ECHO EF ESTIMATED: 51 %
ECHO LV POSTERIOR WALL: 1.1 CM (ref 0.6–1.1)
EJECTION FRACTION: 50 %
ERYTHROCYTE [DISTWIDTH] IN BLOOD BY AUTOMATED COUNT: 18.6 % (ref 11.5–14.5)
FRACTIONAL SHORTENING: 27.5 % (ref 28–44)
GFR SERPLBLD CREATININE-BSD FMLA CKD-EPI: 41 ML/MIN/1.73/M2
GLUCOSE SERPL-MCNC: 146 MG/DL (ref 70–110)
HCT VFR BLD AUTO: 26 % (ref 37–48.5)
HGB BLD-MCNC: 8.4 GM/DL (ref 12–16)
IMM GRANULOCYTES # BLD AUTO: 0.29 K/UL (ref 0–0.04)
IMM GRANULOCYTES NFR BLD AUTO: 1.8 % (ref 0–0.5)
INTERVENTRICULAR SEPTUM: 1.2 CM (ref 0.6–1.1)
LA MAJOR: 5.6 CM
LA MINOR: 4.6 CM
LA WIDTH: 3.4 CM
LEFT ATRIUM SIZE: 3.4 CM
LEFT ATRIUM VOLUME INDEX: 32 ML/M2
LEFT ATRIUM VOLUME: 50 CM3
LEFT INTERNAL DIMENSION IN SYSTOLE: 2.9 CM (ref 2.1–4)
LEFT VENTRICLE DIASTOLIC VOLUME INDEX: 43.59 ML/M2
LEFT VENTRICLE DIASTOLIC VOLUME: 68 ML
LEFT VENTRICLE MASS INDEX: 99.6 G/M2
LEFT VENTRICLE SYSTOLIC VOLUME INDEX: 21.2 ML/M2
LEFT VENTRICLE SYSTOLIC VOLUME: 33 ML
LEFT VENTRICULAR INTERNAL DIMENSION IN DIASTOLE: 4 CM (ref 3.5–6)
LEFT VENTRICULAR MASS: 155.4 G
LV LATERAL E/E' RATIO: 5.1 M/S
LV SEPTAL E/E' RATIO: 10.3 M/S
LYMPHOCYTES # BLD AUTO: 2 K/UL (ref 1–4.8)
MAGNESIUM SERPL-MCNC: 1.9 MG/DL (ref 1.6–2.6)
MCH RBC QN AUTO: 28.8 PG (ref 27–31)
MCHC RBC AUTO-ENTMCNC: 32.3 G/DL (ref 32–36)
MCV RBC AUTO: 89 FL (ref 82–98)
MV PEAK E VEL: 0.41 M/S
NUCLEATED RBC (/100WBC) (OHS): 0 /100 WBC
OHS CV RV/LV RATIO: 0.55 CM
PATHOLOGIST REVIEW - CBC/DIFF (OHS): NORMAL
PISA TR MAX VEL: 2.7 M/S
PLATELET # BLD AUTO: 254 K/UL (ref 150–450)
PLATELET BLD QL SMEAR: NORMAL
PMV BLD AUTO: 11.7 FL (ref 9.2–12.9)
POCT GLUCOSE: 120 MG/DL (ref 70–110)
POCT GLUCOSE: 133 MG/DL (ref 70–110)
POCT GLUCOSE: 181 MG/DL (ref 70–110)
POCT GLUCOSE: 257 MG/DL (ref 70–110)
POTASSIUM SERPL-SCNC: 3.5 MMOL/L (ref 3.5–5.1)
PROT SERPL-MCNC: 6.9 GM/DL (ref 6–8.4)
RA MAJOR: 4.01 CM
RA WIDTH: 2.8 CM
RBC # BLD AUTO: 2.92 M/UL (ref 4–5.4)
RELATIVE EOSINOPHIL (OHS): 0.1 %
RELATIVE LYMPHOCYTE (OHS): 12.4 % (ref 18–48)
RELATIVE MONOCYTE (OHS): 6.7 % (ref 4–15)
RELATIVE NEUTROPHIL (OHS): 78.8 % (ref 38–73)
RIGHT VENTRICLE DIASTOLIC BASEL DIMENSION: 2.2 CM
SINUS: 2.69 CM
SODIUM SERPL-SCNC: 147 MMOL/L (ref 136–145)
STJ: 2.5 CM
TDI LATERAL: 0.08 M/S
TDI SEPTAL: 0.04 M/S
TDI: 0.06 M/S
TRICUSPID ANNULAR PLANE SYSTOLIC EXCURSION: 1.9 CM
TV PEAK GRADIENT: 29 MMHG
WBC # BLD AUTO: 16.17 K/UL (ref 3.9–12.7)
Z-SCORE OF LEFT VENTRICULAR DIMENSION IN END DIASTOLE: -1.18
Z-SCORE OF LEFT VENTRICULAR DIMENSION IN END SYSTOLE: 0.3

## 2025-05-26 PROCEDURE — 63600175 PHARM REV CODE 636 W HCPCS

## 2025-05-26 PROCEDURE — 99233 SBSQ HOSP IP/OBS HIGH 50: CPT | Mod: GC,,, | Performed by: HOSPITALIST

## 2025-05-26 PROCEDURE — 25000003 PHARM REV CODE 250

## 2025-05-26 PROCEDURE — 80053 COMPREHEN METABOLIC PANEL: CPT

## 2025-05-26 PROCEDURE — 83735 ASSAY OF MAGNESIUM: CPT

## 2025-05-26 PROCEDURE — 25000003 PHARM REV CODE 250: Performed by: HOSPITALIST

## 2025-05-26 PROCEDURE — 97161 PT EVAL LOW COMPLEX 20 MIN: CPT

## 2025-05-26 PROCEDURE — 20600001 HC STEP DOWN PRIVATE ROOM

## 2025-05-26 PROCEDURE — 97530 THERAPEUTIC ACTIVITIES: CPT

## 2025-05-26 PROCEDURE — 36415 COLL VENOUS BLD VENIPUNCTURE: CPT

## 2025-05-26 PROCEDURE — 25000003 PHARM REV CODE 250: Performed by: INTERNAL MEDICINE

## 2025-05-26 PROCEDURE — 85025 COMPLETE CBC W/AUTO DIFF WBC: CPT

## 2025-05-26 PROCEDURE — 99232 SBSQ HOSP IP/OBS MODERATE 35: CPT | Mod: ,,, | Performed by: PHYSICIAN ASSISTANT

## 2025-05-26 RX ORDER — AMOXICILLIN 250 MG
1 CAPSULE ORAL DAILY PRN
Status: DISCONTINUED | OUTPATIENT
Start: 2025-05-26 | End: 2025-05-26

## 2025-05-26 RX ORDER — MAGNESIUM SULFATE HEPTAHYDRATE 40 MG/ML
2 INJECTION, SOLUTION INTRAVENOUS ONCE
Status: COMPLETED | OUTPATIENT
Start: 2025-05-26 | End: 2025-05-26

## 2025-05-26 RX ORDER — POLYETHYLENE GLYCOL 3350 17 G/17G
17 POWDER, FOR SOLUTION ORAL 2 TIMES DAILY PRN
Status: DISCONTINUED | OUTPATIENT
Start: 2025-05-26 | End: 2025-05-28 | Stop reason: HOSPADM

## 2025-05-26 RX ORDER — SODIUM CHLORIDE, SODIUM LACTATE, POTASSIUM CHLORIDE, CALCIUM CHLORIDE 600; 310; 30; 20 MG/100ML; MG/100ML; MG/100ML; MG/100ML
INJECTION, SOLUTION INTRAVENOUS CONTINUOUS
Status: ACTIVE | OUTPATIENT
Start: 2025-05-26 | End: 2025-05-27

## 2025-05-26 RX ADMIN — SODIUM CHLORIDE, POTASSIUM CHLORIDE, SODIUM LACTATE AND CALCIUM CHLORIDE: 600; 310; 30; 20 INJECTION, SOLUTION INTRAVENOUS at 09:05

## 2025-05-26 RX ADMIN — MUPIROCIN: 20 OINTMENT TOPICAL at 09:05

## 2025-05-26 RX ADMIN — LEVETIRACETAM 1500 MG: 500 SOLUTION ORAL at 09:05

## 2025-05-26 RX ADMIN — MAGNESIUM SULFATE HEPTAHYDRATE 2 G: 40 INJECTION, SOLUTION INTRAVENOUS at 05:05

## 2025-05-26 RX ADMIN — APIXABAN 10 MG: 5 TABLET, FILM COATED ORAL at 08:05

## 2025-05-26 RX ADMIN — POTASSIUM BICARBONATE 50 MEQ: 977.5 TABLET, EFFERVESCENT ORAL at 05:05

## 2025-05-26 RX ADMIN — INSULIN ASPART 3 UNITS: 100 INJECTION, SOLUTION INTRAVENOUS; SUBCUTANEOUS at 12:05

## 2025-05-26 RX ADMIN — OLANZAPINE 5 MG: 5 TABLET, FILM COATED ORAL at 09:05

## 2025-05-26 RX ADMIN — CYPROHEPTADINE HYDROCHLORIDE 4 MG: 4 TABLET ORAL at 08:05

## 2025-05-27 ENCOUNTER — TELEPHONE (OUTPATIENT)
Dept: HEMATOLOGY/ONCOLOGY | Facility: CLINIC | Age: 70
End: 2025-05-27
Payer: MEDICARE

## 2025-05-27 PROBLEM — E83.39 HYPOPHOSPHATEMIA: Status: ACTIVE | Noted: 2025-05-27

## 2025-05-27 PROBLEM — Z91.89 AT RISK FOR MALNUTRITION: Status: ACTIVE | Noted: 2025-05-27

## 2025-05-27 PROBLEM — E87.0 HYPERNATREMIA: Status: ACTIVE | Noted: 2025-05-27

## 2025-05-27 PROBLEM — D68.59 HYPERCOAGULOPATHY: Status: ACTIVE | Noted: 2025-05-27

## 2025-05-27 PROBLEM — J98.11 ATELECTASIS: Status: ACTIVE | Noted: 2025-05-27

## 2025-05-27 PROBLEM — D84.9 IMMUNOCOMPROMISED: Status: ACTIVE | Noted: 2025-05-27

## 2025-05-27 PROBLEM — Z74.09 OTHER REDUCED MOBILITY: Status: ACTIVE | Noted: 2025-05-27

## 2025-05-27 LAB
ABSOLUTE EOSINOPHIL (OHS): 0.01 K/UL
ABSOLUTE MONOCYTE (OHS): 0.84 K/UL (ref 0.3–1)
ABSOLUTE NEUTROPHIL COUNT (OHS): 8.79 K/UL (ref 1.8–7.7)
ALBUMIN SERPL BCP-MCNC: 2.1 G/DL (ref 3.5–5.2)
ALP SERPL-CCNC: 222 UNIT/L (ref 40–150)
ALT SERPL W/O P-5'-P-CCNC: 7 UNIT/L (ref 10–44)
ANION GAP (OHS): 11 MMOL/L (ref 8–16)
AST SERPL-CCNC: 12 UNIT/L (ref 11–45)
BASOPHILS # BLD AUTO: 0.01 K/UL
BASOPHILS NFR BLD AUTO: 0.1 %
BILIRUB SERPL-MCNC: 0.7 MG/DL (ref 0.1–1)
BUN SERPL-MCNC: 24 MG/DL (ref 8–23)
CALCIUM SERPL-MCNC: 9 MG/DL (ref 8.7–10.5)
CHLORIDE SERPL-SCNC: 105 MMOL/L (ref 95–110)
CO2 SERPL-SCNC: 29 MMOL/L (ref 23–29)
CREAT SERPL-MCNC: 1.4 MG/DL (ref 0.5–1.4)
ERYTHROCYTE [DISTWIDTH] IN BLOOD BY AUTOMATED COUNT: 18.6 % (ref 11.5–14.5)
GFR SERPLBLD CREATININE-BSD FMLA CKD-EPI: 41 ML/MIN/1.73/M2
GLUCOSE SERPL-MCNC: 103 MG/DL (ref 70–110)
HCT VFR BLD AUTO: 23.3 % (ref 37–48.5)
HGB BLD-MCNC: 7.2 GM/DL (ref 12–16)
IMM GRANULOCYTES # BLD AUTO: 0.16 K/UL (ref 0–0.04)
IMM GRANULOCYTES NFR BLD AUTO: 1.4 % (ref 0–0.5)
INDIRECT COOMBS: NORMAL
LYMPHOCYTES # BLD AUTO: 1.47 K/UL (ref 1–4.8)
MAGNESIUM SERPL-MCNC: 2.2 MG/DL (ref 1.6–2.6)
MCH RBC QN AUTO: 28.6 PG (ref 27–31)
MCHC RBC AUTO-ENTMCNC: 30.9 G/DL (ref 32–36)
MCV RBC AUTO: 93 FL (ref 82–98)
NUCLEATED RBC (/100WBC) (OHS): 0 /100 WBC
PHOSPHATE SERPL-MCNC: 3.1 MG/DL (ref 2.7–4.5)
PLATELET # BLD AUTO: 248 K/UL (ref 150–450)
PMV BLD AUTO: 11.3 FL (ref 9.2–12.9)
POCT GLUCOSE: 113 MG/DL (ref 70–110)
POCT GLUCOSE: 143 MG/DL (ref 70–110)
POCT GLUCOSE: 159 MG/DL (ref 70–110)
POTASSIUM SERPL-SCNC: 2.7 MMOL/L (ref 3.5–5.1)
PROT SERPL-MCNC: 6.4 GM/DL (ref 6–8.4)
RBC # BLD AUTO: 2.52 M/UL (ref 4–5.4)
RELATIVE EOSINOPHIL (OHS): 0.1 %
RELATIVE LYMPHOCYTE (OHS): 13 % (ref 18–48)
RELATIVE MONOCYTE (OHS): 7.4 % (ref 4–15)
RELATIVE NEUTROPHIL (OHS): 78 % (ref 38–73)
RH BLD: NORMAL
SODIUM SERPL-SCNC: 145 MMOL/L (ref 136–145)
SPECIMEN OUTDATE: NORMAL
WBC # BLD AUTO: 11.28 K/UL (ref 3.9–12.7)

## 2025-05-27 PROCEDURE — 84100 ASSAY OF PHOSPHORUS: CPT

## 2025-05-27 PROCEDURE — 97530 THERAPEUTIC ACTIVITIES: CPT

## 2025-05-27 PROCEDURE — 20600001 HC STEP DOWN PRIVATE ROOM

## 2025-05-27 PROCEDURE — 63600175 PHARM REV CODE 636 W HCPCS: Performed by: RADIOLOGY

## 2025-05-27 PROCEDURE — BF101ZZ FLUOROSCOPY OF BILE DUCTS USING LOW OSMOLAR CONTRAST: ICD-10-PCS | Performed by: RADIOLOGY

## 2025-05-27 PROCEDURE — 86850 RBC ANTIBODY SCREEN: CPT | Performed by: HOSPITALIST

## 2025-05-27 PROCEDURE — 25500020 PHARM REV CODE 255: Performed by: HOSPITALIST

## 2025-05-27 PROCEDURE — 85025 COMPLETE CBC W/AUTO DIFF WBC: CPT

## 2025-05-27 PROCEDURE — 63600175 PHARM REV CODE 636 W HCPCS: Performed by: INTERNAL MEDICINE

## 2025-05-27 PROCEDURE — 0F2BX0Z CHANGE DRAINAGE DEVICE IN HEPATOBILIARY DUCT, EXTERNAL APPROACH: ICD-10-PCS | Performed by: RADIOLOGY

## 2025-05-27 PROCEDURE — 99233 SBSQ HOSP IP/OBS HIGH 50: CPT | Mod: GC,,, | Performed by: HOSPITALIST

## 2025-05-27 PROCEDURE — 94761 N-INVAS EAR/PLS OXIMETRY MLT: CPT

## 2025-05-27 PROCEDURE — 25000003 PHARM REV CODE 250: Performed by: INTERNAL MEDICINE

## 2025-05-27 PROCEDURE — 97165 OT EVAL LOW COMPLEX 30 MIN: CPT

## 2025-05-27 PROCEDURE — 25000003 PHARM REV CODE 250

## 2025-05-27 PROCEDURE — 80053 COMPREHEN METABOLIC PANEL: CPT

## 2025-05-27 PROCEDURE — 63600175 PHARM REV CODE 636 W HCPCS

## 2025-05-27 PROCEDURE — 83735 ASSAY OF MAGNESIUM: CPT

## 2025-05-27 RX ORDER — POTASSIUM CHLORIDE 7.45 MG/ML
10 INJECTION INTRAVENOUS ONCE
Status: COMPLETED | OUTPATIENT
Start: 2025-05-27 | End: 2025-05-27

## 2025-05-27 RX ORDER — MIDAZOLAM HYDROCHLORIDE 1 MG/ML
INJECTION, SOLUTION INTRAMUSCULAR; INTRAVENOUS
Status: DISCONTINUED | OUTPATIENT
Start: 2025-05-27 | End: 2025-05-28 | Stop reason: HOSPADM

## 2025-05-27 RX ORDER — CEFTRIAXONE 1 G/1
INJECTION, POWDER, FOR SOLUTION INTRAMUSCULAR; INTRAVENOUS
Status: DISCONTINUED | OUTPATIENT
Start: 2025-05-27 | End: 2025-05-28

## 2025-05-27 RX ORDER — POTASSIUM CHLORIDE 7.45 MG/ML
10 INJECTION INTRAVENOUS
Status: DISPENSED | OUTPATIENT
Start: 2025-05-27 | End: 2025-05-27

## 2025-05-27 RX ORDER — LIDOCAINE HYDROCHLORIDE 10 MG/ML
INJECTION, SOLUTION INFILTRATION; PERINEURAL
Status: DISCONTINUED | OUTPATIENT
Start: 2025-05-27 | End: 2025-05-28 | Stop reason: HOSPADM

## 2025-05-27 RX ORDER — FENTANYL CITRATE 50 UG/ML
INJECTION, SOLUTION INTRAMUSCULAR; INTRAVENOUS
Status: DISCONTINUED | OUTPATIENT
Start: 2025-05-27 | End: 2025-05-28 | Stop reason: HOSPADM

## 2025-05-27 RX ORDER — SODIUM CHLORIDE 0.9 % (FLUSH) 0.9 %
5 SYRINGE (ML) INJECTION EVERY 6 HOURS PRN
Qty: 420 ML | Refills: 1 | Status: SHIPPED | OUTPATIENT
Start: 2025-05-27

## 2025-05-27 RX ADMIN — POTASSIUM CHLORIDE 10 MEQ: 7.46 INJECTION, SOLUTION INTRAVENOUS at 06:05

## 2025-05-27 RX ADMIN — CEFTRIAXONE 1 G: 1 INJECTION, POWDER, FOR SOLUTION INTRAMUSCULAR; INTRAVENOUS at 01:05

## 2025-05-27 RX ADMIN — MIDAZOLAM 0.5 MG: 1 INJECTION INTRAMUSCULAR; INTRAVENOUS at 01:05

## 2025-05-27 RX ADMIN — POTASSIUM CHLORIDE 10 MEQ: 7.46 INJECTION, SOLUTION INTRAVENOUS at 10:05

## 2025-05-27 RX ADMIN — OLANZAPINE 5 MG: 5 TABLET, FILM COATED ORAL at 09:05

## 2025-05-27 RX ADMIN — IOHEXOL 15 ML: 300 INJECTION, SOLUTION INTRAVENOUS at 01:05

## 2025-05-27 RX ADMIN — POTASSIUM BICARBONATE 50 MEQ: 977.5 TABLET, EFFERVESCENT ORAL at 04:05

## 2025-05-27 RX ADMIN — LEVETIRACETAM 1500 MG: 500 SOLUTION ORAL at 09:05

## 2025-05-27 RX ADMIN — POTASSIUM CHLORIDE 10 MEQ: 7.46 INJECTION, SOLUTION INTRAVENOUS at 12:05

## 2025-05-27 RX ADMIN — FENTANYL CITRATE 50 MCG: 50 INJECTION, SOLUTION INTRAMUSCULAR; INTRAVENOUS at 01:05

## 2025-05-27 RX ADMIN — FENTANYL CITRATE 25 MCG: 50 INJECTION, SOLUTION INTRAMUSCULAR; INTRAVENOUS at 01:05

## 2025-05-27 RX ADMIN — POTASSIUM CHLORIDE 10 MEQ: 7.46 INJECTION, SOLUTION INTRAVENOUS at 11:05

## 2025-05-27 RX ADMIN — POTASSIUM CHLORIDE 10 MEQ: 7.46 INJECTION, SOLUTION INTRAVENOUS at 08:05

## 2025-05-27 RX ADMIN — APIXABAN 10 MG: 5 TABLET, FILM COATED ORAL at 09:05

## 2025-05-27 RX ADMIN — POTASSIUM CHLORIDE 10 MEQ: 7.46 INJECTION, SOLUTION INTRAVENOUS at 05:05

## 2025-05-27 RX ADMIN — MUPIROCIN: 20 OINTMENT TOPICAL at 09:05

## 2025-05-27 RX ADMIN — LIDOCAINE HYDROCHLORIDE 5 ML: 10 INJECTION, SOLUTION INFILTRATION; PERINEURAL at 01:05

## 2025-05-27 RX ADMIN — MUPIROCIN: 20 OINTMENT TOPICAL at 08:05

## 2025-05-27 NOTE — TELEPHONE ENCOUNTER
Spoke with the patients son.I initially called to inquire about the reason for the missed appointment. During our conversation, I learned that the patient is currently hospitalized. The son confirmed that his mother is still in the hospital at this time.

## 2025-05-28 VITALS
OXYGEN SATURATION: 99 % | TEMPERATURE: 98 F | SYSTOLIC BLOOD PRESSURE: 129 MMHG | BODY MASS INDEX: 27.01 KG/M2 | RESPIRATION RATE: 20 BRPM | HEIGHT: 59 IN | WEIGHT: 134 LBS | DIASTOLIC BLOOD PRESSURE: 84 MMHG | HEART RATE: 94 BPM

## 2025-05-28 PROBLEM — E44.0 MODERATE PROTEIN-CALORIE MALNUTRITION: Status: ACTIVE | Noted: 2025-05-28

## 2025-05-28 LAB
ABO + RH BLD: NORMAL
ABSOLUTE EOSINOPHIL (OHS): 0.03 K/UL
ABSOLUTE MONOCYTE (OHS): 0.96 K/UL (ref 0.3–1)
ABSOLUTE NEUTROPHIL COUNT (OHS): 8.99 K/UL (ref 1.8–7.7)
ALBUMIN SERPL BCP-MCNC: 2.1 G/DL (ref 3.5–5.2)
ALP SERPL-CCNC: 222 UNIT/L (ref 40–150)
ALT SERPL W/O P-5'-P-CCNC: 9 UNIT/L (ref 10–44)
ANION GAP (OHS): 9 MMOL/L (ref 8–16)
AST SERPL-CCNC: 11 UNIT/L (ref 11–45)
BACTERIA BLD CULT: NORMAL
BACTERIA BLD CULT: NORMAL
BASOPHILS # BLD AUTO: 0.01 K/UL
BASOPHILS NFR BLD AUTO: 0.1 %
BILIRUB SERPL-MCNC: 0.6 MG/DL (ref 0.1–1)
BLD PROD TYP BPU: NORMAL
BLOOD UNIT EXPIRATION DATE: NORMAL
BLOOD UNIT TYPE CODE: 6200
BUN SERPL-MCNC: 22 MG/DL (ref 8–23)
CALCIUM SERPL-MCNC: 8.9 MG/DL (ref 8.7–10.5)
CHLORIDE SERPL-SCNC: 109 MMOL/L (ref 95–110)
CO2 SERPL-SCNC: 28 MMOL/L (ref 23–29)
CREAT SERPL-MCNC: 1.3 MG/DL (ref 0.5–1.4)
CROSSMATCH INTERPRETATION: NORMAL
DISPENSE STATUS: NORMAL
ERYTHROCYTE [DISTWIDTH] IN BLOOD BY AUTOMATED COUNT: 18.8 % (ref 11.5–14.5)
GFR SERPLBLD CREATININE-BSD FMLA CKD-EPI: 44 ML/MIN/1.73/M2
GLUCOSE SERPL-MCNC: 113 MG/DL (ref 70–110)
HCT VFR BLD AUTO: 22.2 % (ref 37–48.5)
HGB BLD-MCNC: 6.9 GM/DL (ref 12–16)
IMM GRANULOCYTES # BLD AUTO: 0.24 K/UL (ref 0–0.04)
IMM GRANULOCYTES NFR BLD AUTO: 2 % (ref 0–0.5)
LYMPHOCYTES # BLD AUTO: 1.49 K/UL (ref 1–4.8)
MAGNESIUM SERPL-MCNC: 1.8 MG/DL (ref 1.6–2.6)
MCH RBC QN AUTO: 29 PG (ref 27–31)
MCHC RBC AUTO-ENTMCNC: 31.1 G/DL (ref 32–36)
MCV RBC AUTO: 93 FL (ref 82–98)
NUCLEATED RBC (/100WBC) (OHS): 0 /100 WBC
PHOSPHATE SERPL-MCNC: 2.2 MG/DL (ref 2.7–4.5)
PLATELET # BLD AUTO: 266 K/UL (ref 150–450)
PMV BLD AUTO: 11.1 FL (ref 9.2–12.9)
POCT GLUCOSE: 129 MG/DL (ref 70–110)
POCT GLUCOSE: 131 MG/DL (ref 70–110)
POTASSIUM SERPL-SCNC: 3.8 MMOL/L (ref 3.5–5.1)
PROT SERPL-MCNC: 6.4 GM/DL (ref 6–8.4)
RBC # BLD AUTO: 2.38 M/UL (ref 4–5.4)
RELATIVE EOSINOPHIL (OHS): 0.3 %
RELATIVE LYMPHOCYTE (OHS): 12.7 % (ref 18–48)
RELATIVE MONOCYTE (OHS): 8.2 % (ref 4–15)
RELATIVE NEUTROPHIL (OHS): 76.7 % (ref 38–73)
SODIUM SERPL-SCNC: 146 MMOL/L (ref 136–145)
UNIT NUMBER: NORMAL
WBC # BLD AUTO: 11.72 K/UL (ref 3.9–12.7)

## 2025-05-28 PROCEDURE — 63600175 PHARM REV CODE 636 W HCPCS

## 2025-05-28 PROCEDURE — 63600175 PHARM REV CODE 636 W HCPCS: Mod: JZ,TB | Performed by: INTERNAL MEDICINE

## 2025-05-28 PROCEDURE — 86920 COMPATIBILITY TEST SPIN: CPT

## 2025-05-28 PROCEDURE — 25000003 PHARM REV CODE 250

## 2025-05-28 PROCEDURE — 99239 HOSP IP/OBS DSCHRG MGMT >30: CPT | Mod: ,,, | Performed by: HOSPITALIST

## 2025-05-28 PROCEDURE — 36430 TRANSFUSION BLD/BLD COMPNT: CPT

## 2025-05-28 PROCEDURE — P9016 RBC LEUKOCYTES REDUCED: HCPCS

## 2025-05-28 PROCEDURE — 83735 ASSAY OF MAGNESIUM: CPT

## 2025-05-28 PROCEDURE — 80053 COMPREHEN METABOLIC PANEL: CPT

## 2025-05-28 PROCEDURE — 85025 COMPLETE CBC W/AUTO DIFF WBC: CPT

## 2025-05-28 PROCEDURE — 84100 ASSAY OF PHOSPHORUS: CPT

## 2025-05-28 RX ORDER — SODIUM,POTASSIUM PHOSPHATES 280-250MG
2 POWDER IN PACKET (EA) ORAL ONCE
Status: COMPLETED | OUTPATIENT
Start: 2025-05-28 | End: 2025-05-28

## 2025-05-28 RX ORDER — MAGNESIUM SULFATE HEPTAHYDRATE 40 MG/ML
2 INJECTION, SOLUTION INTRAVENOUS ONCE
Status: COMPLETED | OUTPATIENT
Start: 2025-05-28 | End: 2025-05-28

## 2025-05-28 RX ORDER — HYDROCODONE BITARTRATE AND ACETAMINOPHEN 500; 5 MG/1; MG/1
TABLET ORAL
Status: DISCONTINUED | OUTPATIENT
Start: 2025-05-28 | End: 2025-05-28 | Stop reason: HOSPADM

## 2025-05-28 RX ADMIN — APIXABAN 10 MG: 5 TABLET, FILM COATED ORAL at 08:05

## 2025-05-28 RX ADMIN — MUPIROCIN: 20 OINTMENT TOPICAL at 08:05

## 2025-05-28 RX ADMIN — POTASSIUM & SODIUM PHOSPHATES POWDER PACK 280-160-250 MG 2 PACKET: 280-160-250 PACK at 09:05

## 2025-05-28 RX ADMIN — MAGNESIUM SULFATE HEPTAHYDRATE 2 G: 40 INJECTION, SOLUTION INTRAVENOUS at 09:05

## 2025-05-28 RX ADMIN — ALTEPLASE 2 MG: 2.2 INJECTION, POWDER, LYOPHILIZED, FOR SOLUTION INTRAVENOUS at 05:05

## 2025-05-29 ENCOUNTER — PATIENT OUTREACH (OUTPATIENT)
Dept: ADMINISTRATIVE | Facility: CLINIC | Age: 70
End: 2025-05-29
Payer: MEDICARE

## 2025-05-30 ENCOUNTER — DOCUMENTATION ONLY (OUTPATIENT)
Dept: HEMATOLOGY/ONCOLOGY | Facility: CLINIC | Age: 70
End: 2025-05-30
Payer: MEDICARE

## 2025-05-30 NOTE — PROGRESS NOTES
MISTY received a call from pt's son, Jose [phone]. He was asking for information regarding a program where he could get paid to be pt's caregiver. However, pt does not have Medicaid which would make her eligible for a waiver program. Jose said he would like to work nights at [employer] while he's here caring for his mother, but he does not want to leave her unattended. MISTY explained that the only options would be to have a friend/family member/neighbor cover for him when he works, or to pay for sitters out of pocket.     Jose expressed wanting to know pt's wishes, as far as what she wants when she dies (financially). MISTY encouraged a family discussion in which Jose asks patient to explain to him what she has organized and planned for that. Pt had discussed this with MISTY when she was in hospital and had wanted to talk to Jose about it.    MISTY is available to support pt and family as needed.    Aminata Vieyra, [facility]  Oncology Social Worker  [facility]  [phone]

## 2025-06-02 ENCOUNTER — TELEPHONE (OUTPATIENT)
Dept: INTERNAL MEDICINE | Facility: CLINIC | Age: 70
End: 2025-06-02
Payer: MEDICARE

## 2025-06-02 ENCOUNTER — TELEPHONE (OUTPATIENT)
Dept: INTERVENTIONAL RADIOLOGY/VASCULAR | Facility: CLINIC | Age: 70
End: 2025-06-02
Payer: MEDICARE

## 2025-06-05 ENCOUNTER — TELEPHONE (OUTPATIENT)
Dept: PALLIATIVE MEDICINE | Facility: CLINIC | Age: 70
End: 2025-06-05
Payer: MEDICARE

## 2025-06-05 ENCOUNTER — OFFICE VISIT (OUTPATIENT)
Dept: HEMATOLOGY/ONCOLOGY | Facility: CLINIC | Age: 70
End: 2025-06-05
Payer: MEDICARE

## 2025-06-05 ENCOUNTER — LAB VISIT (OUTPATIENT)
Dept: LAB | Facility: HOSPITAL | Age: 70
End: 2025-06-05
Payer: MEDICARE

## 2025-06-05 VITALS
BODY MASS INDEX: 25.2 KG/M2 | SYSTOLIC BLOOD PRESSURE: 100 MMHG | DIASTOLIC BLOOD PRESSURE: 76 MMHG | HEART RATE: 108 BPM | TEMPERATURE: 98 F | HEIGHT: 59 IN | WEIGHT: 125 LBS | OXYGEN SATURATION: 97 % | RESPIRATION RATE: 18 BRPM

## 2025-06-05 DIAGNOSIS — D84.81 IMMUNODEFICIENCY SECONDARY TO NEOPLASM: ICD-10-CM

## 2025-06-05 DIAGNOSIS — E44.0 MODERATE MALNUTRITION: ICD-10-CM

## 2025-06-05 DIAGNOSIS — R63.0 ANOREXIA: ICD-10-CM

## 2025-06-05 DIAGNOSIS — D63.0 ANEMIA IN NEOPLASTIC DISEASE: ICD-10-CM

## 2025-06-05 DIAGNOSIS — I10 ESSENTIAL HYPERTENSION: ICD-10-CM

## 2025-06-05 DIAGNOSIS — Z79.69 IMMUNODEFICIENCY SECONDARY TO CHEMOTHERAPY: ICD-10-CM

## 2025-06-05 DIAGNOSIS — C77.2 SECONDARY MALIGNANT NEOPLASM OF INTRA-ABDOMINAL LYMPH NODES: ICD-10-CM

## 2025-06-05 DIAGNOSIS — T45.1X5A IMMUNODEFICIENCY SECONDARY TO CHEMOTHERAPY: ICD-10-CM

## 2025-06-05 DIAGNOSIS — K83.1 BILIARY OBSTRUCTION: ICD-10-CM

## 2025-06-05 DIAGNOSIS — D84.821 IMMUNODEFICIENCY SECONDARY TO CHEMOTHERAPY: ICD-10-CM

## 2025-06-05 DIAGNOSIS — R53.83 FATIGUE, UNSPECIFIED TYPE: ICD-10-CM

## 2025-06-05 DIAGNOSIS — C22.1 CHOLANGIOCARCINOMA: Primary | ICD-10-CM

## 2025-06-05 DIAGNOSIS — R11.2 INTRACTABLE NAUSEA AND VOMITING: ICD-10-CM

## 2025-06-05 DIAGNOSIS — C22.1 CHOLANGIOCARCINOMA: ICD-10-CM

## 2025-06-05 DIAGNOSIS — D49.9 IMMUNODEFICIENCY SECONDARY TO NEOPLASM: ICD-10-CM

## 2025-06-05 DIAGNOSIS — D53.9 NUTRITIONAL ANEMIA: ICD-10-CM

## 2025-06-05 DIAGNOSIS — E11.9 TYPE 2 DIABETES MELLITUS WITHOUT COMPLICATION, WITHOUT LONG-TERM CURRENT USE OF INSULIN: ICD-10-CM

## 2025-06-05 LAB
ABSOLUTE EOSINOPHIL (OHS): 0.01 K/UL
ABSOLUTE MONOCYTE (OHS): 0.81 K/UL (ref 0.3–1)
ABSOLUTE NEUTROPHIL COUNT (OHS): 12.09 K/UL (ref 1.8–7.7)
ALBUMIN SERPL BCP-MCNC: 2.5 G/DL (ref 3.5–5.2)
ALP SERPL-CCNC: 580 UNIT/L (ref 40–150)
ALT SERPL W/O P-5'-P-CCNC: 22 UNIT/L (ref 10–44)
ANION GAP (OHS): 17 MMOL/L (ref 8–16)
AST SERPL-CCNC: 46 UNIT/L (ref 11–45)
BASOPHILS # BLD AUTO: 0.05 K/UL
BASOPHILS NFR BLD AUTO: 0.3 %
BILIRUB SERPL-MCNC: 1 MG/DL (ref 0.1–1)
BUN SERPL-MCNC: 18 MG/DL (ref 8–23)
CALCIUM SERPL-MCNC: 8.5 MG/DL (ref 8.7–10.5)
CHLORIDE SERPL-SCNC: 98 MMOL/L (ref 95–110)
CO2 SERPL-SCNC: 23 MMOL/L (ref 23–29)
CREAT SERPL-MCNC: 1.3 MG/DL (ref 0.5–1.4)
ERYTHROCYTE [DISTWIDTH] IN BLOOD BY AUTOMATED COUNT: 17 % (ref 11.5–14.5)
FERRITIN SERPL-MCNC: 5407 NG/ML (ref 20–300)
FOLATE SERPL-MCNC: 3.7 NG/ML (ref 4–24)
GFR SERPLBLD CREATININE-BSD FMLA CKD-EPI: 44 ML/MIN/1.73/M2
GLUCOSE SERPL-MCNC: 107 MG/DL (ref 70–110)
HCT VFR BLD AUTO: 30 % (ref 37–48.5)
HGB BLD-MCNC: 9.3 GM/DL (ref 12–16)
IMM GRANULOCYTES # BLD AUTO: 0.18 K/UL (ref 0–0.04)
IMM GRANULOCYTES NFR BLD AUTO: 1.2 % (ref 0–0.5)
INDIRECT COOMBS: NORMAL
IRON SATN MFR SERPL: 19 % (ref 20–50)
IRON SERPL-MCNC: 33 UG/DL (ref 30–160)
LYMPHOCYTES # BLD AUTO: 1.51 K/UL (ref 1–4.8)
MCH RBC QN AUTO: 28.9 PG (ref 27–31)
MCHC RBC AUTO-ENTMCNC: 31 G/DL (ref 32–36)
MCV RBC AUTO: 93 FL (ref 82–98)
NUCLEATED RBC (/100WBC) (OHS): 0 /100 WBC
PLATELET # BLD AUTO: 268 K/UL (ref 150–450)
PMV BLD AUTO: 10.8 FL (ref 9.2–12.9)
POTASSIUM SERPL-SCNC: 3.3 MMOL/L (ref 3.5–5.1)
PROT SERPL-MCNC: 7.7 GM/DL (ref 6–8.4)
RBC # BLD AUTO: 3.22 M/UL (ref 4–5.4)
RELATIVE EOSINOPHIL (OHS): 0.1 %
RELATIVE LYMPHOCYTE (OHS): 10.3 % (ref 18–48)
RELATIVE MONOCYTE (OHS): 5.5 % (ref 4–15)
RELATIVE NEUTROPHIL (OHS): 82.6 % (ref 38–73)
RH BLD: NORMAL
SODIUM SERPL-SCNC: 138 MMOL/L (ref 136–145)
SPECIMEN OUTDATE: NORMAL
TIBC SERPL-MCNC: 172 UG/DL (ref 250–450)
TRANSFERRIN SERPL-MCNC: 116 MG/DL (ref 200–375)
TSH SERPL-ACNC: 3.36 UIU/ML (ref 0.4–4)
VIT B12 SERPL-MCNC: 1480 PG/ML (ref 210–950)
WBC # BLD AUTO: 14.65 K/UL (ref 3.9–12.7)

## 2025-06-05 PROCEDURE — 3078F DIAST BP <80 MM HG: CPT | Mod: CPTII,S$GLB,, | Performed by: REGISTERED NURSE

## 2025-06-05 PROCEDURE — 82746 ASSAY OF FOLIC ACID SERUM: CPT

## 2025-06-05 PROCEDURE — 36415 COLL VENOUS BLD VENIPUNCTURE: CPT

## 2025-06-05 PROCEDURE — 99215 OFFICE O/P EST HI 40 MIN: CPT | Mod: S$GLB,,, | Performed by: REGISTERED NURSE

## 2025-06-05 PROCEDURE — 4010F ACE/ARB THERAPY RXD/TAKEN: CPT | Mod: CPTII,S$GLB,, | Performed by: REGISTERED NURSE

## 2025-06-05 PROCEDURE — 1111F DSCHRG MED/CURRENT MED MERGE: CPT | Mod: CPTII,S$GLB,, | Performed by: REGISTERED NURSE

## 2025-06-05 PROCEDURE — 1101F PT FALLS ASSESS-DOCD LE1/YR: CPT | Mod: CPTII,S$GLB,, | Performed by: REGISTERED NURSE

## 2025-06-05 PROCEDURE — 3066F NEPHROPATHY DOC TX: CPT | Mod: CPTII,S$GLB,, | Performed by: REGISTERED NURSE

## 2025-06-05 PROCEDURE — 82728 ASSAY OF FERRITIN: CPT

## 2025-06-05 PROCEDURE — G2211 COMPLEX E/M VISIT ADD ON: HCPCS | Mod: S$GLB,,, | Performed by: REGISTERED NURSE

## 2025-06-05 PROCEDURE — 3044F HG A1C LEVEL LT 7.0%: CPT | Mod: CPTII,S$GLB,, | Performed by: REGISTERED NURSE

## 2025-06-05 PROCEDURE — 3072F LOW RISK FOR RETINOPATHY: CPT | Mod: CPTII,S$GLB,, | Performed by: REGISTERED NURSE

## 2025-06-05 PROCEDURE — 3061F NEG MICROALBUMINURIA REV: CPT | Mod: CPTII,S$GLB,, | Performed by: REGISTERED NURSE

## 2025-06-05 PROCEDURE — 3008F BODY MASS INDEX DOCD: CPT | Mod: CPTII,S$GLB,, | Performed by: REGISTERED NURSE

## 2025-06-05 PROCEDURE — 1125F AMNT PAIN NOTED PAIN PRSNT: CPT | Mod: CPTII,S$GLB,, | Performed by: REGISTERED NURSE

## 2025-06-05 PROCEDURE — 3288F FALL RISK ASSESSMENT DOCD: CPT | Mod: CPTII,S$GLB,, | Performed by: REGISTERED NURSE

## 2025-06-05 PROCEDURE — 99999 PR PBB SHADOW E&M-EST. PATIENT-LVL IV: CPT | Mod: PBBFAC,,, | Performed by: REGISTERED NURSE

## 2025-06-05 PROCEDURE — 84443 ASSAY THYROID STIM HORMONE: CPT

## 2025-06-05 PROCEDURE — 83540 ASSAY OF IRON: CPT

## 2025-06-05 PROCEDURE — 80053 COMPREHEN METABOLIC PANEL: CPT

## 2025-06-05 PROCEDURE — 85025 COMPLETE CBC W/AUTO DIFF WBC: CPT

## 2025-06-05 PROCEDURE — 1159F MED LIST DOCD IN RCRD: CPT | Mod: CPTII,S$GLB,, | Performed by: REGISTERED NURSE

## 2025-06-05 PROCEDURE — 3074F SYST BP LT 130 MM HG: CPT | Mod: CPTII,S$GLB,, | Performed by: REGISTERED NURSE

## 2025-06-05 PROCEDURE — 82607 VITAMIN B-12: CPT

## 2025-06-05 PROCEDURE — 1160F RVW MEDS BY RX/DR IN RCRD: CPT | Mod: CPTII,S$GLB,, | Performed by: REGISTERED NURSE

## 2025-06-05 PROCEDURE — 86900 BLOOD TYPING SEROLOGIC ABO: CPT | Performed by: INTERNAL MEDICINE

## 2025-06-05 RX ORDER — DIPHENHYDRAMINE HYDROCHLORIDE 50 MG/ML
50 INJECTION, SOLUTION INTRAMUSCULAR; INTRAVENOUS ONCE AS NEEDED
OUTPATIENT
Start: 2025-06-05

## 2025-06-05 RX ORDER — SODIUM CHLORIDE 0.9 % (FLUSH) 0.9 %
10 SYRINGE (ML) INJECTION
OUTPATIENT
Start: 2025-06-05

## 2025-06-05 RX ORDER — HEPARIN 100 UNIT/ML
500 SYRINGE INTRAVENOUS
OUTPATIENT
Start: 2025-06-05

## 2025-06-05 RX ORDER — EPINEPHRINE 0.3 MG/.3ML
0.3 INJECTION SUBCUTANEOUS ONCE AS NEEDED
OUTPATIENT
Start: 2025-06-05

## 2025-06-10 ENCOUNTER — INFUSION (OUTPATIENT)
Dept: INFUSION THERAPY | Facility: HOSPITAL | Age: 70
End: 2025-06-10
Payer: MEDICARE

## 2025-06-10 VITALS
WEIGHT: 125 LBS | BODY MASS INDEX: 25.2 KG/M2 | TEMPERATURE: 98 F | HEIGHT: 59 IN | RESPIRATION RATE: 18 BRPM | SYSTOLIC BLOOD PRESSURE: 113 MMHG | HEART RATE: 109 BPM | DIASTOLIC BLOOD PRESSURE: 58 MMHG

## 2025-06-10 DIAGNOSIS — E53.8 FOLATE DEFICIENCY: Primary | ICD-10-CM

## 2025-06-10 DIAGNOSIS — C22.1 CHOLANGIOCARCINOMA: Primary | ICD-10-CM

## 2025-06-10 PROCEDURE — 25000003 PHARM REV CODE 250: Performed by: REGISTERED NURSE

## 2025-06-10 PROCEDURE — 96413 CHEMO IV INFUSION 1 HR: CPT

## 2025-06-10 PROCEDURE — 96417 CHEMO IV INFUS EACH ADDL SEQ: CPT

## 2025-06-10 PROCEDURE — 63600175 PHARM REV CODE 636 W HCPCS: Performed by: INTERNAL MEDICINE

## 2025-06-10 PROCEDURE — 63600175 PHARM REV CODE 636 W HCPCS: Mod: JZ,TB | Performed by: REGISTERED NURSE

## 2025-06-10 PROCEDURE — 96375 TX/PRO/DX INJ NEW DRUG ADDON: CPT

## 2025-06-10 PROCEDURE — 96367 TX/PROPH/DG ADDL SEQ IV INF: CPT

## 2025-06-10 RX ORDER — DIPHENHYDRAMINE HYDROCHLORIDE 50 MG/ML
50 INJECTION, SOLUTION INTRAMUSCULAR; INTRAVENOUS ONCE AS NEEDED
Status: DISCONTINUED | OUTPATIENT
Start: 2025-06-10 | End: 2025-06-10 | Stop reason: HOSPADM

## 2025-06-10 RX ORDER — SODIUM CHLORIDE 0.9 % (FLUSH) 0.9 %
10 SYRINGE (ML) INJECTION
Status: DISCONTINUED | OUTPATIENT
Start: 2025-06-10 | End: 2025-06-10 | Stop reason: HOSPADM

## 2025-06-10 RX ORDER — ONDANSETRON HYDROCHLORIDE 2 MG/ML
8 INJECTION, SOLUTION INTRAVENOUS
Status: COMPLETED | OUTPATIENT
Start: 2025-06-10 | End: 2025-06-10

## 2025-06-10 RX ORDER — HEPARIN 100 UNIT/ML
500 SYRINGE INTRAVENOUS
Status: DISCONTINUED | OUTPATIENT
Start: 2025-06-10 | End: 2025-06-10 | Stop reason: HOSPADM

## 2025-06-10 RX ORDER — FOLIC ACID 1 MG/1
1 TABLET ORAL DAILY
Qty: 90 TABLET | Refills: 3 | Status: SHIPPED | OUTPATIENT
Start: 2025-06-10 | End: 2026-06-10

## 2025-06-10 RX ORDER — ONDANSETRON HYDROCHLORIDE 2 MG/ML
8 INJECTION, SOLUTION INTRAVENOUS
Status: CANCELLED
Start: 2025-06-10

## 2025-06-10 RX ORDER — EPINEPHRINE 0.3 MG/.3ML
0.3 INJECTION SUBCUTANEOUS ONCE AS NEEDED
Status: DISCONTINUED | OUTPATIENT
Start: 2025-06-10 | End: 2025-06-10 | Stop reason: HOSPADM

## 2025-06-10 RX ADMIN — GEMCITABINE 1600 MG: 38 INJECTION, SOLUTION INTRAVENOUS at 12:06

## 2025-06-10 RX ADMIN — ONDANSETRON 8 MG: 2 INJECTION INTRAMUSCULAR; INTRAVENOUS at 12:06

## 2025-06-10 RX ADMIN — SODIUM CHLORIDE 1500 MG: 9 INJECTION, SOLUTION INTRAVENOUS at 11:06

## 2025-06-10 RX ADMIN — SODIUM CHLORIDE 1000 ML: 9 INJECTION, SOLUTION INTRAVENOUS at 09:06

## 2025-06-10 RX ADMIN — SODIUM CHLORIDE 500 ML: 0.9 INJECTION, SOLUTION INTRAVENOUS at 01:06

## 2025-06-10 RX ADMIN — POTASSIUM CHLORIDE 500 ML/HR: 2 INJECTION, SOLUTION, CONCENTRATE INTRAVENOUS at 10:06

## 2025-06-10 NOTE — PLAN OF CARE
1430 pt tolerated tx. Pt d/c from clinic by w/c with son. Pt informed of medication folic acid tablet po daily, medication sent to pt pharmacy. Pt instructed to take medication daily. Pt verbalized understanding. Pt d/c from clinic.

## 2025-06-13 ENCOUNTER — TELEPHONE (OUTPATIENT)
Dept: INTERNAL MEDICINE | Facility: CLINIC | Age: 70
End: 2025-06-13
Payer: MEDICARE

## 2025-06-13 DIAGNOSIS — C22.1 CHOLANGIOCARCINOMA: ICD-10-CM

## 2025-06-13 DIAGNOSIS — R11.2 INTRACTABLE NAUSEA AND VOMITING: ICD-10-CM

## 2025-06-13 RX ORDER — OLANZAPINE 5 MG/1
5 TABLET, FILM COATED ORAL NIGHTLY
Qty: 90 TABLET | Refills: 2 | Status: SHIPPED | OUTPATIENT
Start: 2025-06-13

## 2025-06-13 NOTE — TELEPHONE ENCOUNTER
Staff called patient to reschedule for missed appt. There was no answer, staff left pt voicemail and call back # stating to give call back to get rescheduled.

## 2025-06-24 PROBLEM — D62 ACUTE BLOOD LOSS ANEMIA: Status: ACTIVE | Noted: 2025-01-01

## 2025-06-24 PROBLEM — D64.9 ANEMIA: Status: ACTIVE | Noted: 2025-01-01

## 2025-06-24 PROBLEM — G40.909 SEIZURE DISORDER: Status: ACTIVE | Noted: 2025-01-01

## 2025-06-24 PROBLEM — E72.20 HYPERAMMONEMIA: Status: ACTIVE | Noted: 2025-01-01

## 2025-06-24 PROBLEM — J96.01 ACUTE HYPOXEMIC RESPIRATORY FAILURE: Status: ACTIVE | Noted: 2025-01-01

## 2025-06-24 PROBLEM — G93.40 ENCEPHALOPATHY: Status: ACTIVE | Noted: 2025-01-01

## 2025-06-24 PROBLEM — R57.9 SHOCK: Status: ACTIVE | Noted: 2025-01-01

## 2025-06-24 NOTE — PROVIDER PROGRESS NOTES - EMERGENCY DEPT.
Repeat EKG - STEMI Decision  Initial Reading: No STEMI present. Tachycardic.   Response: recommended rooming patient.

## 2025-06-24 NOTE — PROVIDER PROGRESS NOTES - EMERGENCY DEPT.
EKG - STEMI Decision  Initial Reading: No STEMI present. Tachycardic.  Recommend rooming patient.

## 2025-06-24 NOTE — ED PROVIDER NOTES
Encounter Date: 6/24/2025       History     Chief Complaint   Patient presents with    Altered Mental Status     Hx Cholangiocarcinoma, on chemo. Per pt's son, pt found on the floor. Pt more altered, pt ripped out IV en route      69-year-old female with Graves disease, cholangiocarcinoma on chemotherapy, diabetes, seizures, hypertension, hyperlipidemia presents via EMS for altered mental status.  History is provided by her son who reports that she seemed normal around 1:00 p.m., he went to go do some errands and came back and found that she had fallen down off of the toilet and stated that she hit her head.  She was very confused which prompted him to activate EMS.  He reports that she has been very weak with her cancer treatment but normally is alert and oriented and this is significantly off her baseline.  He reports that she has chronic nausea and vomiting associated with her chemotherapy which is unchanged.  No known fever.  History is significantly limited as the patient is altered.      Review of patient's allergies indicates:   Allergen Reactions    Codeine Nausea Only     Past Medical History:   Diagnosis Date    Cataract     Empty sella syndrome     GHD (growth hormone deficiency)     Glaucoma     Graves' disease with exophthalmos     Hyperlipidemia     Hypertension     Seizures     Thyroid nodule     Thyroid nodule     Type II or unspecified type diabetes mellitus without mention of complication, uncontrolled      Past Surgical History:   Procedure Laterality Date    BREAST BIOPSY      CATARACT EXTRACTION W/  INTRAOCULAR LENS IMPLANT Right 3/15/2021    Procedure: EXTRACTION, CATARACT, WITH IOL INSERTION;  Surgeon: Cj Caban MD;  Location: [facility] OR;  Service: Ophthalmology;  Laterality: Right;    CATARACT EXTRACTION W/  INTRAOCULAR LENS IMPLANT Left 3/29/2021    Procedure: EXTRACTION, CATARACT, WITH IOL INSERTION;  Surgeon: Cj Caban MD;  Location: [facility];  Service: Ophthalmology;  Laterality:  Left;    CHOLECYSTECTOMY      ENDOSCOPIC ULTRASOUND OF UPPER GASTROINTESTINAL TRACT N/A 3/10/2025    Procedure: ULTRASOUND, UPPER GI TRACT, ENDOSCOPIC;  Surgeon: Tim Caban MD;  Location: [facility] ENDO (2ND FLR);  Service: Endoscopy;  Laterality: N/A;  2/28: EUS for FNA of lymph nodes-instructions emailed-ch    HYSTERECTOMY      one ovary intact    INJECTION OF JOINT Left 2/6/2024    Procedure: INJECTION, JOINT LEFT KNEE WITH STEROID;  Surgeon: She Schwartz MD;  Location: [facility] PAIN MGT;  Service: Pain Management;  Laterality: Left;  [phone]    INSERTION OF TUNNELED CENTRAL VENOUS CATHETER (CVC) WITH SUBCUTANEOUS PORT N/A 3/25/2025    Procedure: INSERTION, SINGLE LUMEN CATHETER WITH PORT, WITH FLUOROSCOPIC GUIDANCE;  Surgeon: Zaire Glynn MD;  Location: [facility] OR [address] [place] [address];  Service: General;  Laterality: N/A;    KNEE SURGERY      LYMPH NODE BIOPSY  3010    OOPHORECTOMY       Family History   Problem Relation Name Age of Onset    Cancer Mother      Cataracts Mother      Glaucoma Mother          shunt    Heart disease Mother      Tremor Mother      Breast cancer Mother  78    Heart disease Father 64   chf     Heart disease Sister      No Known Problems Sister      Breast cancer Sister  60    Hypertension Brother      No Known Problems Brother      No Known Problems Maternal Aunt      No Known Problems Maternal Uncle      No Known Problems Paternal Aunt      No Known Problems Paternal Uncle      No Known Problems Maternal Grandmother      No Known Problems Maternal Grandfather      No Known Problems Paternal Grandmother      No Known Problems Paternal Grandfather      No Known Problems Son      No Known Problems Other       Social History[1]  Review of Systems    Physical Exam     Initial Vitals   BP Pulse Resp Temp SpO2   06/24/25 1526 06/24/25 1526 06/24/25 1526 06/24/25 1528 06/24/25 1526   104/70 (!) 140 (!) 28 98.8 °F (37.1 °C) 99 %      MAP       --                Physical Exam    Nursing note and  vitals reviewed.  Constitutional: She appears listless. She is not diaphoretic. She appears ill. She appears distressed.   HENT:   Head: Normocephalic and atraumatic.   Eyes:   Pupils even, round, dilated and sluggishly reactive bilaterally   Cardiovascular:  Regular rhythm, normal heart sounds and intact distal pulses.     Exam reveals no gallop and no friction rub.       No murmur heard.  Tachycardic   Pulmonary/Chest: Breath sounds normal. No respiratory distress. She has no wheezes. She has no rhonchi. She has no rales. She exhibits no tenderness.   Abdominal: Abdomen is soft. Bowel sounds are normal. She exhibits no distension and no mass. There is no abdominal tenderness.   Biliary tube There is no rebound and no guarding.   Genitourinary: Rectum:      Guaiac result positive.   Guaiac positive stool.    Genitourinary Comments: Brown stool around the rectum.  No annabella blood or melena.  Guaiac positive     Musculoskeletal:         General: Normal range of motion.     Neurological: She appears listless. GCS eye subscore is 3. GCS verbal subscore is 3. GCS motor subscore is 5.   No facial droop.  No slurred speech.  Patient occasionally answers questions appropriately but also mumbles words.  She does follow some commands but not consistently.    Not compliant with cranial nerve testing    She is moving all extremities independently   Skin: Skin is warm and dry.         ED Course   Critical Care    Date/Time: 6/24/2025 7:00 PM    Performed by: Shasta Campos PA-C  Authorized by: Cuba Marr MD  Direct patient critical care time: 45 minutes  Additional history critical care time: 5 minutes  Ordering / reviewing critical care time: 5 minutes  Documentation critical care time: 5 minutes  Consulting other physicians critical care time: 5 minutes  Consult with family critical care time: 5 minutes  Other critical care time: 0 minutes  Total critical care time (exclusive of procedural time) : 70  minutes  Critical care time was exclusive of separately billable procedures and treating other patients and teaching time.  Critical care was necessary to treat or prevent imminent or life-threatening deterioration of the following conditions: metabolic crisis, sepsis, trauma, shock and dehydration.  Critical care was time spent personally by me on the following activities: development of treatment plan with patient or surrogate, discussions with consultants, interpretation of cardiac output measurements, evaluation of patient's response to treatment, examination of patient, obtaining history from patient or surrogate, ordering and performing treatments and interventions, ordering and review of laboratory studies, ordering and review of radiographic studies, pulse oximetry, re-evaluation of patient's condition and review of old charts.      Intubation    Date/Time: 6/25/2025 11:17 AM  Location procedure was performed: [facility] EMERGENCY DEPARTMENT    Performed by: Cuba Marr MD  Authorized by: Titi Sanchez MD  Consent Done: Emergent Situation  Indications: respiratory distress, respiratory failure and airway protection  Intubation method: video-assisted  Patient status: paralyzed (RSI)  Preoxygenation: nonrebreather mask  Sedatives: etomidate  Paralytic: rocuronium  Laryngoscope size: Glide 4  Tube size: 7.5 mm  Tube type: cuffed  Number of attempts: 1  Cords visualized: yes  Post-procedure assessment: CO2 detector  Breath sounds: reduced on left  Cuff inflated: yes  ETT to lip: 25 cm  Tube secured with: ETT wang  Chest x-ray interpreted by me.  Chest x-ray findings: endotracheal tube too low  Tube repositioned: tube repositioned successfully  Patient tolerance: Patient tolerated the procedure well with no immediate complications  Comments: RSI meds initially administered through left AC.  No response to sedation, suspect blown line.  Readministered through right hand with appropriate response        Labs  Reviewed   COMPREHENSIVE METABOLIC PANEL - Abnormal       Result Value    Sodium 145      Potassium 3.7      Chloride 101      CO2 5 (*)     Glucose 28 (*)     BUN 24 (*)     Creatinine 2.1 (*)     Calcium 8.1 (*)     Protein Total 6.7      Albumin 2.0 (*)     Bilirubin Total 1.6 (*)      (*)     AST 76 (*)     ALT 19      Anion Gap 39 (*)     eGFR 25 (*)    CBC WITH DIFFERENTIAL - Abnormal    WBC 14.85 (*)     RBC 2.01 (*)     HGB 5.8 (*)     HCT 20.2 (*)      (*)     MCH 28.9      MCHC 28.7 (*)     RDW 17.7 (*)     Platelet Count 229      MPV 10.9      Nucleated RBC 0      Neut % 82.3 (*)     Lymph % 9.6 (*)     Mono % 5.3      Eos % 0.1      Basophil % 0.1      Imm Grans % 2.6 (*)     Neut # 12.23 (*)     Lymph # 1.43      Mono # 0.78      Eos # 0.01      Baso # 0.02      Imm Grans # 0.38 (*)    LACTIC ACID, PLASMA - Abnormal    Lactic Acid Level >12.0 (*)     Narrative:     Falsely low lactic acid results can be found in samples containing >=13.0 mg/dL total bilirubin and/or >=3.5 mg/dL direct bilirubin.    PROTIME-INR - Abnormal    PT 26.1 (*)     INR 2.5 (*)    AMMONIA - Abnormal    Ammonia 163 (*)    SALICYLATE LEVEL - Abnormal    Salicylate Level 6.3 (*)    ISTAT PROCEDURE - Abnormal    POC Glucose 30 (*)     POC BUN 19      POC Creatinine 2.1 (*)     POC Sodium 139      POC Potassium 3.5      POC Chloride 105      POC TCO2 (MEASURED) 8 (*)     POC Ionized Calcium 0.96 (*)     POC Hematocrit 18 (*)     Sample ROSA     ISTAT PROCEDURE - Abnormal    POC Glucose 174 (*)     POC BUN 25      POC Creatinine 2.0 (*)     POC Sodium 137      POC Potassium 3.9      POC Chloride 103      POC TCO2 (MEASURED) 8 (*)     POC Ionized Calcium 0.91 (*)     POC Hematocrit 15 (*)     Sample ROSA     ISTAT LACTATE - Abnormal    POC Lactate >20.00 (*)     Sample VENOUS      Site Other      Allens Test N/A     POCT GLUCOSE - Abnormal    POCT Glucose 149 (*)    TSH - Normal    TSH 3.584     CULTURE, BLOOD   CBC W/ AUTO  DIFFERENTIAL    Narrative:     The following orders were created for panel order CBC auto differential.  Procedure                               Abnormality         Status                     ---------                               -----------         ------                     CBC with Differential[[id number]]       Abnormal            Final result                 Please view results for these tests on the individual orders.   CORTISOL, RANDOM    Cortisol 73.80     LEVETIRACETAM  (KEPPRA) LEVEL   [place] URINE HOLD   TYPE & SCREEN    Specimen Outdate 06/27/2025 23:59      Group & Rh A POS      Indirect [place] NEG     ISTAT CHEM8   ISTAT CHEM8     EKG Readings: (Independently Interpreted)   Initial Reading: No STEMI. Rhythm: Sinus Tachycardia. Heart Rate: 143. ST Segments: Normal ST Segments. Clinical Impression: Sinus Tachycardia     ECG Results              EKG 12-lead (Final result)        Collection Time Result Time QRS Duration OHS QTC Calculation    06/24/25 16:07:17 06/25/25 08:25:58 84 463                     Final result by Interface, Lab In [facility] (06/25/25 08:26:03)                   Narrative:    Test Reason : R41.82,    Vent. Rate : 131 BPM     Atrial Rate : 131 BPM     P-R Int : 130 ms          QRS Dur :  84 ms      QT Int : 314 ms       P-R-T Axes :  56  -7 132 degrees    QTcB Int : 463 ms    Sinus tachycardia with Fusion complexes  Nonspecific ST and T wave abnormality  Abnormal ECG  When compared with ECG of 24-Jun-2025 15:39,  Fusion complexes are now Present  Confirmed by Ash Parham ([id number]) on 6/25/2025 8:25:56 AM    Referred By: AAAREFERRAL SELF           Confirmed By: Ash Parham                                     EKG 12-lead (Final result)        Collection Time Result Time QRS Duration OHS QTC Calculation    06/24/25 15:39:40 06/24/25 17:04:10 76 432                     Final result by Interface, Lab In [facility] (06/24/25 17:04:15)                   Narrative:    Test Reason :  Z13.6,    Vent. Rate : 143 BPM     Atrial Rate : 143 BPM     P-R Int : 116 ms          QRS Dur :  76 ms      QT Int : 280 ms       P-R-T Axes :  69 -19 134 degrees    QTcB Int : 432 ms    Sinus tachycardia  LVH with repolarization abnormality ( R in aVL )  Abnormal ECG  PVCs not noted  Confirmed by Audie Ziegler ([id number]) on 6/24/2025 5:04:04 PM    Referred By:            Confirmed By: Audie Ziegler                                  Imaging Results              X-Ray Chest AP Portable (Final result)  Result time 06/24/25 22:06:30      Final result by Adair Marvin MD (06/24/25 22:06:30)                   Impression:      Interval adequate intubation.      Electronically signed by: Adair Marvin  Date:    06/24/2025  Time:    22:06               Narrative:    EXAMINATION:  XR CHEST AP PORTABLE    CLINICAL HISTORY:  Encounter for other preprocedural examination    TECHNIQUE:  Single frontal view of the chest was performed.    COMPARISON:  Radiograph of the chest from 06/24/2025    FINDINGS:  There has been interval intubation with the endotracheal tube terminating in the right mainstem bronchus with near complete consolidation of the left hemithorax, likely secondary to atelectasis.  A subsequent film was obtained in which the endotracheal tube was readjusted, terminating superior to the silvia, with reinflation of the left lung.  A right IJ central venous catheter with port is present which terminates in the superior vena cava.  Heart size and pulmonary vasculature are within normal limits.  Atherosclerotic calcification is present involving the thoracic aorta.  No evidence of focal consolidation, pneumothorax, or pleural effusion.  No evidence of acute osseous process.                                       CTA Chest Abdomen Pelvis (XPD) (Final result)  Result time 06/24/25 23:06:21      Final result by Chapin Ramachandran MD (06/24/25 23:06:21)                   Impression:      Chest CTA, Abdomen CTA, and Pelvis  CTA:    Small pulmonary thromboembolism within a subsegmental pulmonary artery branch in the right lower lobe, similar to prior CTA chest 05/23/2025.    Innumerable hypoattenuating lesions within the liver compatible with history of cholangiocarcinoma, significantly progressed from prior CT 05/25/2025.  Innumerable nodules throughout the abdomen compatible with peritoneal carcinomatosis, significantly progressed from prior.    Bilateral transhepatic biliary drains in stable position with similar intrahepatic biliary ductal dilation.    Not included in the preliminary radiology report: There is a new irregularly-shaped rim-enhancing 5.8 x 2.5 x 3.8 cm, approximately 25 HU right pelvic fluid collection.  Leading differential diagnostic considerations include abscess, resolving hematoma, seroma, or perhaps biloma.    Other observations as detailed in the body of the report.    Jordan Acevedo MD notified Gabe Lopez NP of these findings via [facility] Secure Chat at 21:05 on 06/24/2025.  Patient name and medical record number were specified/linked in the Secure Chat message.    This final report has been modified from the preliminary report.  Please review this final report for changes.  The preliminary report does remain available in the Epic Chart.    Chapin Ramachandran MD, sent notification detailing the differences between the preliminary and final reports to Gabe Hanley NP, via [employer] Secure Chat message, on 06/24/2025 at 23:04.  Patient name and medical record number were specified/linked in the message.    The recipient immediately responded, acknowledging receipt of the information.    Electronically signed by resident: Jordan Acevedo  Date:    06/24/2025  Time:    20:42    Electronically signed by: Chapin Ramachandran  Date:    06/24/2025  Time:    23:06               Narrative:    EXAMINATION:  CTA CHEST ABDOMEN PELVIS (XPD)    CLINICAL HISTORY:  Aortic dissection suspected    TECHNIQUE:  Using 75 mL of  Omnipaque 350 IV contrast, and multi-detector helical CT technique, axial CT angiogram images of the chest abdomen, and pelvis were obtained from the lung apices through the pelvis.    COMPARISON:  CTA chest 05/23/2025, CT abdomen pelvis 05/25/2025    FINDINGS:  Thoracic soft tissues: Soft tissues of the thorax are unremarkable.    Mediastinum/hilum: No lymphadenopathy.    Heart: Cardiomegaly.  No pericardial effusion.Hypoattenuation of blood pool suggesting anemia.    Pulmonary vasculature: Distribute normally.  Again seen is pulmonary embolus within a subsegmental artery in the right lower lobe (series 4, image 254, similar to slightly improved when compared to CTA chest 05/23/2025    Lungs: Bandlike and dependent atelectasis in the left lower lobe.    Liver: Innumerable hypoattenuating lesions within the liver without significant enhancement compatible with metastatic lesions in this patient with history of cholangiocarcinoma, significantly progressed from CT abdomen pelvis 05/25/2025.    Gallbladder: Surgically absent    Bile Ducts: Bilateral transhepatic biliary drains in place terminating in the duodenum.Mild intrahepatic biliary ductal dilation, similar to prior.    Pancreas: Few soft tissue nodules abutting the pancreas favored represent lymph nodes.    Spleen: Multiple soft tissue nodules abutting the spleen concerning for peritoneal metastases.    Adrenals: Thickening and enhancement of the bilateral adrenal glands.    Urinary Tract: Simple cyst in the right kidney.Normal concentration of contrast.  No stones.  Muniz catheter in the bladder.  Bladder is decompressed limiting evaluation.    Reproductive organs: Hysterectomy.    GI tract/Mesentery: Stomach is normal appearance.  Visualized loops of small and large bowel are normal in caliber without evidence for inflammation or obstruction.    Peritoneal Space: There are innumerable peritoneal/mesenteric nodules concerning for peritoneal carcinomatosis,  significantly progressed from prior for example: 2.4 cm left upper quadrant nodule (series 4, image 493).  Small volume ascites.    There is an irregularly-shaped rim-enhancing fluid collection deep in the right side of the pelvis, measuring approximately 5.8 x 2.5 x 3.8 cm in dimension and approximately 25 HU in internal attenuation.    Retroperitoneum: Numerous enlarged retroperitoneal nodes for example periaortic node measuring 1 cm short axis (series 4, image 191).    Abdominal wall: Small fat containing umbilical hernia.  Anasarca.    Bones: Degenerative changes of the spine.  Grade 1 anterolisthesis of L4 on L5.  No fractures.  No destructive osseous lesions are seen.    Vasculature: Aorta is normal in caliber with mild calcific atherosclerosis.  No aortic aneurysm or dissection.                        Preliminary result by Jordan Acevedo MD (06/24/25 21:24:37)                   Impression:      Small pulmonary embolism within a subsegmental artery in the right lower lobe, similar to prior CTA chest 05/23/2025.    Innumerable hypoattenuating lesions within the liver compatible with history of cholangiocarcinoma, significantly progressed from prior CT 05/25/2025.  Innumerable nodules throughout the abdomen compatible with peritoneal carcinomatosis, significantly progressed from prior.    Bilateral transhepatic biliary drains in stable position with similar intrahepatic biliary ductal dilation.    Incidental findings above.    Jordan Acevedo MD notified Gabe Lopez NP of these findings via secure chat at 21:05 on 06/24/2025    Electronically signed by resident: Jordan Acevedo  Date:    06/24/2025  Time:    20:42                 Narrative:    EXAMINATION:  CTA CHEST ABDOMEN PELVIS (XPD)    CLINICAL HISTORY:  Aortic dissection suspected;    TECHNIQUE:  Using 75 cc of  Omnipaque 350 IV contrast, and multi-detector helical CT technique, axial CT angiogram images of the chest abdomen, and pelvis  were obtained from the lung apices through the pelvis.    COMPARISON:  CTA chest 05/23/2025, CT abdomen pelvis 05/25/2025    FINDINGS:  Thoracic soft tissues: Soft tissues of the thorax are unremarkable.    Mediastinum/hilum: No lymphadenopathy.    Heart: Cardiomegaly.  No pericardial effusion.Hypoattenuation of blood pool suggesting anemia.    Pulmonary vasculature: Distribute normally.  Again seen is pulmonary embolus within a subsegmental artery in the right lower lobe (series 4, image 254, similar to slightly improved when compared to CTA chest 05/23/2025    Lungs: Bandlike and dependent atelectasis in the left lower lobe.    Liver: Innumerable hypoattenuating lesions within the liver without significant enhancement compatible with metastatic lesions in this patient with history of cholangiocarcinoma, significantly progressed from CT abdomen pelvis 05/25/2025.    Gallbladder: Surgically absent    Bile Ducts: Bilateral transhepatic biliary drains in place terminating in the duodenum.Mild intrahepatic biliary ductal dilation, similar to prior.    Pancreas: Few soft tissue nodules abutting the pancreas favored represent lymph nodes.    Spleen: Multiple soft tissue nodules abutting the spleen concerning for peritoneal metastases.    Adrenals: Thickening and enhancement of the bilateral adrenal glands.    Urinary Tract: Simple cyst in the right kidney.Normal concentration of contrast.  No stones.  Muniz catheter in the bladder.  Bladder is decompressed limiting evaluation.    Reproductive organs: Hysterectomy.    GI tract/Mesentery: Stomach is normal appearance.  Visualized loops of small and large bowel are normal in caliber without evidence for inflammation or obstruction.    Peritoneal Space: There are innumerable peritoneal/mesenteric nodules concerning for peritoneal carcinomatosis, significantly progressed from prior for example: 2.4 cm left upper quadrant nodule (series 4, image 493).  Small volume  ascites.    Retroperitoneum: Numerous enlarged retroperitoneal nodes for example periaortic node measuring 1 cm short axis (series 4, image 191).    Abdominal wall: Small fat containing umbilical hernia.  Anasarca.    Bones: Degenerative changes of the spine.  Grade 1 anterolisthesis of L4 on L5.  No fractures.  No destructive osseous lesions are seen.    Vasculature: Aorta is normal in caliber with mild calcific atherosclerosis.  No aortic aneurysm or dissection.                                        CT Head Without Contrast (Final result)  Result time 06/24/25 18:30:09      Final result by Richard Cm MD (06/24/25 18:30:09)                   Impression:      1. No acute intracranial process.  2. No acute fracture or traumatic subluxation the cervical spine.  3. There is minimal increased density posterior to C2 odontoid process and body. This could represent minimal extradural hemorrhage associated with strain or partial tear of the transverse ligament on the left near axial 63 of series 5. No significant displacement.  Chronic ligamentous calcifications could appear similar. No prior studies for comparison. MRI may better characterize. Recommend neuro surgical consultation.  4. This report was flagged in Epic as abnormal.      Electronically signed by: Richard Cm  Date:    06/24/2025  Time:    18:30               Narrative:    EXAMINATION:  CT CERVICAL SPINE WITHOUT CONTRAST; CT HEAD WITHOUT CONTRAST    CLINICAL HISTORY:  Neck trauma (Age >= 65y);; Mental status change, unknown cause;    TECHNIQUE:  Low dose axial CT images through the cervical spine, with sagittal and coronal reformations.  Contrast was not administered.    CT head without contrast submitted under CT C-spine    COMPARISON:  05/25/2025    FINDINGS:  Head:    The brain appears normally formed.  No evidence of any focal mass or hemorrhage.  No midline shift or hydrocephalus.    Moderate involutional changes.  Mild probable chronic  microvascular ischemic changes in the periventricular white matter.    No evidence of calvarial fracture.  Paranasal sinuses appear adequately maintained.    No evidence of edema or major vascular infarction.    Cervical spine:    No acute fractures of the cervical spine.  Alignment is satisfactory.    Disc spaces appear adequately maintained.  Multilevel mild diffuse posterior disc osteophyte complex.    Mild central disc protrusion at C2-3.  Mild central canal narrowing.    No significant central canal narrowing elsewhere.    Severe bilateral foraminal narrowing at T1-2 bilaterally.  No significant foraminal narrowing in the cervical spine.    Limited evaluation of the intraspinal contents demonstrates no hematoma or mass.Paraspinal soft tissues exhibit no acute abnormalities.    There is minimal increased density posterior to C2 odontoid process and body.  This could represent minimal hemorrhage associated with strain or partial tear of the transverse ligament on the left near axial 63 of series 5.  No significant displacement.  Chronic ligamentous calcifications could appear similar.  No prior studies for comparison.  MRI may better characterize.  Recommend neuro surgical consultation.                                        CT Cervical Spine Without Contrast (Final result)  Result time 06/24/25 18:30:09      Final result by Richard Cm MD (06/24/25 18:30:09)                   Impression:      1. No acute intracranial process.  2. No acute fracture or traumatic subluxation the cervical spine.  3. There is minimal increased density posterior to C2 odontoid process and body. This could represent minimal extradural hemorrhage associated with strain or partial tear of the transverse ligament on the left near axial 63 of series 5. No significant displacement.  Chronic ligamentous calcifications could appear similar. No prior studies for comparison. MRI may better characterize. Recommend neuro surgical  consultation.  4. This report was flagged in Epic as abnormal.      Electronically signed by: Richard Drake  Date:    06/24/2025  Time:    18:30               Narrative:    EXAMINATION:  CT CERVICAL SPINE WITHOUT CONTRAST; CT HEAD WITHOUT CONTRAST    CLINICAL HISTORY:  Neck trauma (Age >= 65y);; Mental status change, unknown cause;    TECHNIQUE:  Low dose axial CT images through the cervical spine, with sagittal and coronal reformations.  Contrast was not administered.    CT head without contrast submitted under CT C-spine    COMPARISON:  05/25/2025    FINDINGS:  Head:    The brain appears normally formed.  No evidence of any focal mass or hemorrhage.  No midline shift or hydrocephalus.    Moderate involutional changes.  Mild probable chronic microvascular ischemic changes in the periventricular white matter.    No evidence of calvarial fracture.  Paranasal sinuses appear adequately maintained.    No evidence of edema or major vascular infarction.    Cervical spine:    No acute fractures of the cervical spine.  Alignment is satisfactory.    Disc spaces appear adequately maintained.  Multilevel mild diffuse posterior disc osteophyte complex.    Mild central disc protrusion at C2-3.  Mild central canal narrowing.    No significant central canal narrowing elsewhere.    Severe bilateral foraminal narrowing at T1-2 bilaterally.  No significant foraminal narrowing in the cervical spine.    Limited evaluation of the intraspinal contents demonstrates no hematoma or mass.Paraspinal soft tissues exhibit no acute abnormalities.    There is minimal increased density posterior to C2 odontoid process and body.  This could represent minimal hemorrhage associated with strain or partial tear of the transverse ligament on the left near axial 63 of series 5.  No significant displacement.  Chronic ligamentous calcifications could appear similar.  No prior studies for comparison.  MRI may better characterize.  Recommend neuro surgical  consultation.                                       X-Ray Chest AP Portable (Final result)  Result time 06/24/25 17:23:08      Final result by Kaushik Giron MD (06/24/25 17:23:08)                   Impression:      No detrimental change or radiographic acute intrathoracic process seen on this single view.  Specifically, no consolidative process.      Electronically signed by: Kaushik Giron MD  Date:    06/24/2025  Time:    17:23               Narrative:    EXAMINATION:  XR CHEST AP PORTABLE    CLINICAL HISTORY:  Sepsis;    TECHNIQUE:  Single frontal view of the chest was performed.    COMPARISON:  Chest radiograph and chest CT 05/23/2025    FINDINGS:  Monitoring leads overlie the chest.  Patient is rotated.    Right upper chest vascular port overlies the cavoatrial junction region.  Cardiomediastinal silhouette is midline with similar calcification and mild tortuosity of the aorta.  Heart is not significantly enlarged.  Pulmonary and hilar contours are within normal limits.  Bibasilar minimal platelike scarring versus atelectasis the otherwise symmetrically well expanded without consolidation, pleural effusion or pneumothorax.  Two upper abdominal pigtail drainage type catheters at the medial right upper quadrant are again noted.  No acute osseous process seen.  PA and lateral views can be obtained.                                       Medications   0.9%  NaCl infusion (for blood administration) (has no administration in time range)   NORepinephrine 4 mg in dextrose 5% 250 mL infusion (premix) (0.16 mcg/kg/min × 56.7 kg Intravenous Verify Only 6/25/25 1000)   sodium chloride 0.9% flush 10 mL (has no administration in time range)   piperacillin-tazobactam (ZOSYN) 4.5 g in D5W 100 mL IVPB (MB+) (0 g Intravenous Stopped 6/25/25 0629)   hydrocortisone sodium succinate injection 100 mg (100 mg Intravenous Given 6/25/25 0521)   fludrocortisone tablet 100 mcg (100 mcg Per OG tube Given 6/25/25 0833)   vancomycin - pharmacy  to dose (has no administration in time range)   levETIRAcetam injection 750 mg (750 mg Intravenous Given 6/25/25 0833)   propofoL (DIPRIVAN) 10 mg/mL infusion (0 mcg/kg/min  Hold 6/24/25 2100)   lactulose 20 gram/30 mL solution Soln 30 g (30 g Per OG tube Given 6/25/25 0833)   sodium bicarbonate 100 mEq in D5W 1,000 mL infusion ( Intravenous Verify Only 6/25/25 1000)   pantoprazole injection 80 mg (80 mg Intravenous Given 6/24/25 2245)     Followed by   pantoprazole injection 40 mg (40 mg Intravenous Given 6/25/25 0834)   propofol (DIPRIVAN) 10 mg/mL infusion ( Intravenous Canceled Entry 6/24/25 2145)   0.9%  NaCl infusion (for blood administration) (has no administration in time range)   dextrose 10 % infusion (has no administration in time range)   dextrose 50% injection 12.5 g (has no administration in time range)   dextrose 50% injection 25 g (has no administration in time range)   glucagon (human recombinant) injection 1 mg (has no administration in time range)   insulin aspart U-100 pen 0-5 Units (5 Units Subcutaneous Given 6/25/25 1010)   magnesium sulfate 2g in water 50mL IVPB (premix) (0 g Intravenous Stopped 6/25/25 0941)     Followed by   magnesium sulfate 2g in water 50mL IVPB (premix) ( Intravenous Verify Only 6/25/25 1000)   0.9%  NaCl infusion (CRRT USE ONLY) (has no administration in time range)   sodium phosphate 20.1 mmol in D5W 250 mL IVPB (has no administration in time range)   sodium phosphate 30 mmol in D5W 250 mL IVPB (has no administration in time range)   sodium phosphate 39.9 mmol in D5W 250 mL IVPB (has no administration in time range)   magnesium sulfate 2g in water 50mL IVPB (premix) (has no administration in time range)   lactated ringers bolus 1,464 mL (0 mLs Intravenous Stopped 6/24/25 2018)   dextrose 50 % in water (D50W) injection 25 g (25 g Intravenous Given 6/24/25 1801)   sodium bicarbonate 8.4 % (1 mEq/mL) injection 50 mEq (50 mEq Intravenous Given 6/24/25 1929)   etomidate  injection 20 mg (20 mg Intravenous Given 6/24/25 1936)   rocuronium injection 70 mg (70 mg Intravenous Given 6/24/25 1936)   PHENYLephrine HCl in 0.9% NaCl 1 mg/10 mL (100 mcg/mL) syringe (  Return to Cabinet 6/24/25 1945)   iohexoL (OMNIPAQUE 350) injection 75 mL (75 mLs Intravenous Given 6/24/25 2013)   lactated ringers bolus 1,000 mL (0 mLs Intravenous Stopped 6/24/25 2334)   vancomycin 1,250 mg in 0.9% NaCl 250 mL IVPB (admixture device) (0 mg Intravenous Stopped 6/25/25 0041)   potassium chloride 40 mEq in 100 mL IVPB (FOR CENTRAL LINE ADMINISTRATION ONLY) (0 mEq Intravenous Stopped 6/25/25 0614)     Medical Decision Making  69-year-old female presenting for altered mental status.  She is tachycardic and tachypneic with otherwise normal vitals.  She appears ill is moaning and confused.    Differential diagnosis:  Initial concern for ICH, possibly due to trauma versus bleeding brain metastases  Sepsis  I did consider CVA, however she has no focal deficits to suggest this  Dehydration  Electrolyte derangement    Will check labs, obtain imaging, give fluids, broad-spectrum antibiotics reassess    Workup is notable for hypoglycemia, metabolic acidosis, anemia, lactic acidosis.  No improvement in mental status after receiving D50, fluids, antibiotics.  Blood transfusion ordered.  Patient's mental status continued to decline and became agitated requiring intubation.  Case discussed with MICU and nephrology and will be admitted to MICU for further management.  Family comfortable with admission.  I discussed this patient with my supervising physician who also evaluated this patient.    Amount and/or Complexity of Data Reviewed  Labs: ordered. Decision-making details documented in ED Course.  Radiology: ordered and independent interpretation performed. Decision-making details documented in ED Course.  ECG/medicine tests: ordered and independent interpretation performed.    Risk  Prescription drug management.  Decision  "regarding hospitalization.              Attending Attestation:   Physician Attestation Statement for Resident:  As the supervising MD   Physician Attestation Statement: I have personally seen and examined this patient.   I agree with the above history.  -: Patient found altered.  Critically ill.  Hypotensive, hypoglycemic, profoundly altered.  CT negative.  Noted cervical spine findings.  C-collar placed.  Patient intubated for worsening delirium.  Started on pressors.  Admit to ICU.  Sepsis versus encephalopathy versus head bleed.  CT head without evidence of bleed.   As the supervising MD I agree with the above PE.     As the supervising MD I agree with the above treatment, course, plan, and disposition.   I was personally present during the entire procedure.  I have reviewed and agree with the residents interpretation of the following: lab data, x-rays and CT scans.  I have reviewed the following: old records at this facility, old EKGs and old x-rays.                ED Course as of 06/25/25 1121   e Jun 24, 2025   1647 Patient seen and evaluated by Dr. Marr.  No stroke code at this time.  Will do stat CT [CC]   1704 X-Ray Chest AP Portable  Per my independent interpretation, no consolidation   [CC]   1833 POC Glucose(!!): 30 [CC]   1834 POC Creatinine(!): 2.1 [CC]   1834 POC Hematocrit(!!): 18 [CC]   1837 POC Creatinine(!): 2.1 [CC]   1837 POC TCO2 (MEASURED)(!): 8 [CC]   1841 CT Cervical Spine Without Contrast(!)  Will place in C-collar [CC]   1848 I discussed this patient with Jonny Cerrato, her son  At [phone].  He states that they have not discussed code status, however "she is not ready to go yet" and would like for everything to be done      [CC]   1913 Informed of critical lactate greater than 12, hemoglobin of 5.8.  Will call son for consent for blood transfusion [CC]   1917 I attempted to call the son to discuss blood transfusion intubation, no answer.  Voicemail left.  Will call again [CC] "   1918 Lactic Acid Level(!!): >12.0 [CC]   1923 I attempted to call son again, no answer [CC]   1925 I spoke with the patient's niece, Marylin Padilla  At [phone] to update her on plan of care. Informed her that the patient is critically ill requiring intubation and that I have not been able to contact her son again.  She will update family.      [CC]   1945 POC Lactate(!): >20.00 [CC]   1947 Noted severe metabolic acidosis. Concern for ischemia. Discussed with RT that CTA is to be done regardless of creatinine. [GS]   1948 Case discussed with MICU who will evaluate the patient [CC]   1956 Case discussed with Nephrology, recommends giving 2 amps of bicarb and a L of D5W [CC]      ED Course User Index  [CC] Shasta Campos PA-C  [GS] Cuba Marr MD                           Clinical Impression:  Final diagnoses:  [R41.82] Altered mental status  [Z13.6] Screening for cardiovascular condition  [Z01.818] Encounter for intubation  [E87.20] Metabolic acidosis  [Z01.818] Encounter for intubation - For second image taken          ED Disposition Condition    Admit                     Shasta Campos PA-C  06/24/25 2116         [1]   Social History  Tobacco Use    Smoking status: Never    Smokeless tobacco: Never   Substance Use Topics    Alcohol use: No     Comment: none    Drug use: No        Cuba Marr MD  06/25/25 1121

## 2025-06-25 PROBLEM — N88.9 CERVICAL LESION: Status: ACTIVE | Noted: 2025-01-01

## 2025-06-25 NOTE — SUBJECTIVE & OBJECTIVE
Prescriptions Prior to Admission[1]    Review of patient's allergies indicates:   Allergen Reactions    Codeine Nausea Only       Past Medical History:   Diagnosis Date    Cataract     Empty sella syndrome     GHD (growth hormone deficiency)     Glaucoma     Graves' disease with exophthalmos     Hyperlipidemia     Hypertension     Seizures     Thyroid nodule     Thyroid nodule     Type II or unspecified type diabetes mellitus without mention of complication, uncontrolled      Past Surgical History:   Procedure Laterality Date    BREAST BIOPSY      CATARACT EXTRACTION W/  INTRAOCULAR LENS IMPLANT Right 3/15/2021    Procedure: EXTRACTION, CATARACT, WITH IOL INSERTION;  Surgeon: Cj Caban MD;  Location: [facility] OR;  Service: Ophthalmology;  Laterality: Right;    CATARACT EXTRACTION W/  INTRAOCULAR LENS IMPLANT Left 3/29/2021    Procedure: EXTRACTION, CATARACT, WITH IOL INSERTION;  Surgeon: Cj Caban MD;  Location: [facility] OR;  Service: Ophthalmology;  Laterality: Left;    CHOLECYSTECTOMY      ENDOSCOPIC ULTRASOUND OF UPPER GASTROINTESTINAL TRACT N/A 3/10/2025    Procedure: ULTRASOUND, UPPER GI TRACT, ENDOSCOPIC;  Surgeon: Tim Caban MD;  Location: [facility] ENDO (2ND FLR);  Service: Endoscopy;  Laterality: N/A;  2/28: EUS for FNA of lymph nodes-instructions emailed-    HYSTERECTOMY      one ovary intact    INJECTION OF JOINT Left 2/6/2024    Procedure: INJECTION, JOINT LEFT KNEE WITH STEROID;  Surgeon: She Schwartz MD;  Location: [facility] PAIN MGT;  Service: Pain Management;  Laterality: Left;  [phone]    INSERTION OF TUNNELED CENTRAL VENOUS CATHETER (CVC) WITH SUBCUTANEOUS PORT N/A 3/25/2025    Procedure: INSERTION, SINGLE LUMEN CATHETER WITH PORT, WITH FLUOROSCOPIC GUIDANCE;  Surgeon: Zaire Glynn MD;  Location: [facility] 2ND FLR;  Service: General;  Laterality: N/A;    KNEE SURGERY      LYMPH NODE BIOPSY  3010    OOPHORECTOMY       Family History       Problem Relation (Age of Onset)    Breast  cancer Mother (78), Sister (60)    Cancer Mother    Cataracts Mother    Glaucoma Mother    Heart disease Mother, Father, Sister    Hypertension Brother    No Known Problems Sister, Brother, Maternal Aunt, Maternal Uncle, Paternal Aunt, Paternal Uncle, Maternal Grandmother, Maternal Grandfather, Paternal Grandmother, Paternal Grandfather, Son, Other    Tremor Mother          Tobacco Use    Smoking status: Never    Smokeless tobacco: Never   Substance and Sexual Activity    Alcohol use: No     Comment: none    Drug use: No    Sexual activity: Yes     Partners: Male     Comment: m  works in dietary,  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  Vital Signs (Most Recent):  Temp: 99.1 °F (37.3 °C) (06/25/25 1430)  Pulse: 103 (06/25/25 1430)  Resp: (!) 6 (06/25/25 1430)  BP: (!) 157/93 (06/24/25 2243)  SpO2: 100 % (06/25/25 1400) Vital Signs (24h Range):  Temp:  [88.9 °F (31.6 °C)-99.9 °F (37.7 °C)] 99.1 °F (37.3 °C)  Pulse:  [] 103  Resp:  [1-33] 6  SpO2:  [96 %-100 %] 100 %  BP: ()/(39-93) 157/93  Arterial Line BP: ()/(57-76) 132/76     Date 06/25/25 0700 - 06/26/25 0659   Shift 5773-9750 2028-8056 7309-1188 24 Hour Total   INTAKE   I.V.(mL/kg) 661.6(11.7)   661.6(11.7)   IV Piggyback 17.2   17.2   Shift Total(mL/kg) 678.8(12)   678.8(12)   OUTPUT   Urine(mL/kg/hr) 25(0.1)   25   Shift Total(mL/kg) 25(0.4)   25(0.4)   Weight (kg) 56.7 56.7 56.7 56.7              Vent Mode: A/C  Oxygen Concentration (%):  [21-50] 21  Resp Rate Total:  [22 br/min-33 br/min] 22 br/min  Vt Set:  [375 mL-400 mL] 375 mL  PEEP/CPAP:  [5 cmH20] 5 cmH20  Mean Airway Pressure:  [11 qcH74-53 cmH20] 12 cmH20             Biliary Tube 05/24/25 1410 VTCB biliary 12 Fr. RUQ (Active)            Biliary Tube 05/27/25 1329 VTCB biliary 12 Fr. LUQ (Active)            Urethral Catheter 06/24/25 1930 Temperature probe 16 Fr.  "(Active)   Site Assessment Clean;Intact 06/25/25 0730   Collection Container Urimeter 06/25/25 0730   Securement Method secured to top of thigh w/ adhesive device 06/25/25 0730   Catheter Care Performed yes 06/25/25 0730   Reason for Continuing Urinary Catheterization Critically ill in ICU and requiring hourly monitoring of intake/output 06/25/25 0730   CAUTI Prevention Bundle Securement Device in place with 1" slack;Intact seal between catheter & drainage tubing;Drainage bag/urimeter off the floor;Sheeting clip in use;No dependent loops or kinks;Drainage bag/urimeter not overfilled (<2/3 full);Drainage bag/urimeter below bladder 06/25/25 0730   Output (mL) 25 mL 06/25/25 1027       Trialysis (Dialysis) Catheter 06/24/25 2225 right femoral (Active)   $ Dialysis Supplies Trialysis Catheter (Supply) 06/24/25 2227   Line Necessity Review Hemodynamic instability 06/25/25 0730   Verification by X-ray No 06/25/25 0730   Site Assessment No swelling;No warmth;No redness;No drainage 06/25/25 0730   Line Securement Device Secured with sutures 06/25/25 0730   Dressing Type CHG impregnated dressing/sponge;Central line dressing 06/25/25 0730   Dressing Status Clean;Dry;Intact 06/25/25 0730   Dressing Intervention First dressing 06/25/25 0730   Date on Dressing 06/24/25 06/25/25 0730   Dressing Due to be Changed 07/01/25 06/25/25 0730   Venous Patency/Care infusing 06/25/25 0730   Arterial Patency/Care infusing 06/25/25 0730   Distal Patency/Care intermittent infusion cap applied 06/25/25 0730   Waveform Not being transduced 06/25/25 0301       Neurosurgery Physical Exam  General: Well developed, well nourished, with ET tube.  Head: Normocephalic.  GCS: Motor: 6/Verbal: T/Eyes: 3 GCS Total: 10 T  Eyes: Pupils equal, round, reactive to light with accomodation, EOMI.   Motor Strength: Moves all extremities to commands and non focal.  Daley: Absent.  Clonus: Absent.  Pulses: 2+ and symmetric radial and dorsalis pedis.  Skin: Skin " is warm, dry and intact.    Significant Labs:  Recent Labs   Lab 06/25/25  0318 06/25/25  0754 06/25/25  1418   * 301* 356*    137 135*   K 4.0 4.3 3.5    98 95   CO2 5* 8* 16*   BUN 24* 24* 26*   CREATININE 1.9* 2.1* 2.2*   CALCIUM 6.9* 6.9* 6.7*   MG 1.0*  --  2.0     Recent Labs   Lab 06/25/25  0133 06/25/25  0754 06/25/25  1418   WBC 11.45 13.03* 10.08   HGB 5.9* 9.2* 8.6*   HCT 19.4* 27.5* 24.4*   * 151 143*     Recent Labs   Lab 06/24/25  1907 06/24/25  2024   INR 2.5* 2.7*     Microbiology Results (last 7 days)       Procedure Component Value Units Date/Time    MRSA Screen by PCR [[id number]]  (Normal) Collected: 06/24/25 2139    Order Status: Completed Specimen: Nasal Swab Updated: 06/25/25 1258     MRSA PCR Screen Not Detected    Culture, Respiratory with Gram Stain [[id number]] Updated: 06/25/25 1138    Order Status: Sent Specimen: Respiratory     Blood culture x two cultures. Draw prior to antibiotics. [[id number]]  (Normal) Collected: 06/24/25 1746    Order Status: Completed Specimen: Blood from Peripheral, Hand, Right Updated: 06/25/25 0302     Blood Culture No Growth After 6 Hours    Respiratory Infection Panel (PCR), Nasopharyngeal [[id number]] Collected: 06/24/25 2139    Order Status: Completed Specimen: Nasopharyngeal Swab Updated: 06/24/25 2252     Respiratory Infection Panel Source Nasopharyngeal Swab     Adenovirus Not Detected     Coronavirus 229E, Common Cold Virus Not Detected     Coronavirus HKU1, Common Cold Virus Not Detected     Coronavirus NL63, Common Cold Virus Not Detected     Coronavirus OC43, Common Cold Virus Not Detected     SARS-CoV2 (COVID-19) Qualitative PCR Not Detected     Human Metapneumovirus Not Detected     Human Rhinovirus/Enterovirus Not Detected     Influenza A Not Detected     Influenza B Not Detected     Parainfluenza Virus 1 Not Detected     Parainfluenza Virus 2 Not Detected     Parainfluenza Virus 3 Not Detected     Parainfluenza Virus 4  Not Detected     Respiratory Syncytial Virus Not Detected     Bordetella Parapertussis (KI5628) Not Detected     Bordetella pertussis (ptxP) Not Detected     Chlamydia pneumoniae Not Detected     Mycoplasma pneumoniae Not Detected    Urine Culture High Risk [[id number]] Collected: 06/24/25 2139    Order Status: Sent Specimen: Urine, Bladder     Blood culture [[id number]]     Order Status: Canceled Specimen: Blood     Blood culture x two cultures. Draw prior to antibiotics. [[id number]] Collected: 06/24/25 1808    Order Status: Sent Specimen: Blood from Peripheral, Hand, Left           All pertinent labs from the last 24 hours have been reviewed.    Significant Diagnostics:  I have reviewed and interpreted all pertinent imaging results/findings within the past 24 hours.       [1]   Medications Prior to Admission   Medication Sig Dispense Refill Last Dose/Taking    apixaban (ELIQUIS) 5 mg Tab Take 2 tablets (10 mg total) by mouth 2 (two) times daily for 5 days (until 5/31/2025), THEN starting on 6/1/2025 take 1 tablet (5 mg total) 2 (two) times daily. 70 tablet 2     blood sugar diagnostic Strp To check BG 1 times daily, to use with insurance preferred meter 100 each 3     blood-glucose meter kit To check BG 1 times daily, to use with insurance preferred meter 1 each 0     cyproheptadine (PERIACTIN) 4 mg tablet Take 1 tablet (4 mg total) by mouth 3 (three) times daily. 90 tablet 3     folic acid (FOLVITE) 1 MG tablet Take 1 tablet (1 mg total) by mouth once daily. 90 tablet 3     HYDROmorphone (DILAUDID) 2 MG tablet Take 1 tablet (2 mg total) by mouth 2 (two) times daily as needed for Pain. 14 tablet 0     lancets Misc To check BG 1 times daily, to use with insurance preferred meter 100 each 3     latanoprost 0.005 % ophthalmic solution INSTILL 1 DROP INTO BOTH EYES EVERY EVENING 7.5 mL 3     levETIRAcetam (KEPPRA) 750 MG Tab TAKE 2 TABLETS BY MOUTH ONCE  DAILY (Patient taking differently: Take 1,500 mg by mouth  nightly.) 180 tablet 1     [Paused] metoprolol succinate (TOPROL-XL) 50 MG 24 hr tablet TAKE 1 TABLET BY MOUTH EVERY DAY IN THE EVENING 90 tablet 3     multivitamin-minerals-lutein Tab Take 1 tablet by mouth once daily once daily.       mv-mn/iron/folic acid/herb 190 (VITAMIN D3 COMPLETE ORAL) Take 1 tablet by mouth once daily.       OLANZapine (ZYPREXA) 5 MG tablet TAKE 1 TABLET BY MOUTH EVERY DAY AT NIGHT 90 tablet 2     ondansetron (ZOFRAN-ODT) 4 MG TbDL Take 1 tablet (4 mg total) by mouth 2 (two) times daily as needed (Nausea). 60 tablet 1     promethazine (PHENERGAN) 25 MG tablet Take 1 tablet (25 mg total) by mouth every 6 (six) hours as needed for Nausea. 120 tablet 2     sodium chloride 0.9% (NORMAL SALINE FLUSH) injection 3 mLs by Intra-Catheter route every 6 (six) hours as needed (use 2-3 times daily as needed). 60 mL 11     sodium chloride 0.9% (NORMAL SALINE FLUSH) injection Inject 5 mLs into the vein every 6 (six) hours as needed. (discard remainder of syringe after each use) 420 mL 1

## 2025-06-25 NOTE — ASSESSMENT & PLAN NOTE
Chronic biliary drains in place, last exchanged 5/24/25. Imaging this admission showed: Bilateral transhepatic biliary drains in stable position with similar intrahepatic biliary ductal dilation.

## 2025-06-25 NOTE — CONSULTS
Patient seen and evaluated by critical care medicine. To be admitted to ICU for further management. Full H&P to follow.     LAURA More, [facility]  Pulmonary Critical Care Medicine   06/24/2025

## 2025-06-25 NOTE — HPI
Katey Cerrato is a 69 y.o. female with a PMHx of empty sella syndrome, growth hormone deficiency, grave's disease with exophthalmos, HTN, seizures, PE on eliquis, T2DM, unresectable hilar cholangiocarcinoma with metastasis who presented with encephalopathy. She was found on the ground beside the toilet by her son and altered. She presented to the ED and required intubation upon arrival. CT of her C spine was negative for fractures but showed increased density posterior to C2. She was placed in a c-collar. MRI C spine shows partially calcified ligament and calcified disc protrusion at C2/3. On exam, she is able to move all four extremities to command, non focal. Shakes head no when asked about pain.

## 2025-06-25 NOTE — ED NOTES
I-STAT Chem-8+ Results:   Value Reference Range   Sodium 137 136-145 mmol/L   Potassium  3.9 3.5-5.1 mmol/L   Chloride 103  mmol/L   Ionized Calcium 0.91 1.06-1.42 mmol/L   CO2 (measured) 8 23-29 mmol/L   Glucose 174  mg/dL   BUN 25 6-30 mg/dL   Creatinine 2.0 0.5-1.4 mg/dL   Hematocrit 15 36-54%

## 2025-06-25 NOTE — PROGRESS NOTES
Pharmacokinetic Assessment Follow Up: IV Vancomycin    Vancomycin serum concentration assessment(s):    The random level was drawn correctly and can be used to guide therapy at this time. The measurement is above the desired definitive target range of 15 to 20 mcg/mL.    Vancomycin Regimen Plan:    Patient received Vancomycin 1250 mg IVPB x1 @ 2300 on 6/24.        12-hour post-dose random level = 25.6.       Scr elevated at 2.1 (baseline 1.4), plan for SLED procedure at some point today.        Will order repeat level tomorrow with AM labs.       Drug levels (last 3 results):  Recent Labs   Lab Result Units 06/25/25  1034   Vancomycin Random ug/ml 25.6       Pharmacy will continue to follow and monitor vancomycin.  [id number]  Please contact pharmacy at extension [phone] for questions regarding this assessment.    Thank you for the consult,   Noy Briscoe       Patient brief summary:  Katey Cerrato is a 69 y.o. female initiated on antimicrobial therapy with IV Vancomycin for treatment of sepsis      Drug Allergies:   Review of patient's allergies indicates:   Allergen Reactions    Codeine Nausea Only       Actual Body Weight:   56.7 kg    Renal Function:   Estimated Creatinine Clearance: 19.2 mL/min (A) (based on SCr of 2.1 mg/dL (H)).,     Dialysis Method (if applicable):  None regularly, SLED today    CBC (last 72 hours):  Recent Labs   Lab Result Units 06/24/25 1746 06/24/25 2242 06/25/25  0133 06/25/25  0754   WBC K/uL 14.85* 13.67* 11.45 13.03*   HGB gm/dL 5.8* 5.6* 5.9* 9.2*   Hemoglobin A1c %  --  5.3  --   --    HCT % 20.2* 19.7* 19.4* 27.5*   Platelet Count K/uL 229 169 142* 151   Lymph % % 9.6* 5.8* 5.3* 8.1*   Mono % % 5.3 5.7 5.0 4.2   Eos % % 0.1 0.0 0.0 0.0   Basophil % % 0.1 0.2 0.0 0.1       Metabolic Panel (last 72 hours):  Recent Labs   Lab Result Units 06/24/25  1746 06/24/25 2023 06/24/25 2242 06/25/25  0133 06/25/25  0318 06/25/25  0754   Sodium mmol/L 145  --  142 142 140 137   Urine Sodium  mmol/L  --   --   --  66  --   --    Potassium mmol/L 3.7  --  3.4* 3.4* 4.0 4.3   Chloride mmol/L 101  --  101 100 100 98   CO2 mmol/L 5*  --  <5* 6* 5* 8*   Glucose mg/dL 28*  --  165* 221* 247* 301*   Glucose, UA   --  Negative  --   --   --   --    BUN mg/dL 24*  --  22 25* 24* 24*   Creatinine mg/dL 2.1*  --  1.9* 1.9* 1.9* 2.1*   Urine Creatinine mg/dL  --   --   --  59.0  --   --    Albumin g/dL 2.0*  --   --   --  1.4*  --    Bilirubin Total mg/dL 1.6*  --   --   --  2.0*  --    ALP unit/L 509*  --   --   --  434*  --    AST unit/L 76*  --   --   --  604*  --    ALT unit/L 19  --   --   --  83*  --    Magnesium  mg/dL  --   --   --   --  1.0*  --    Phosphorus Level mg/dL  --   --   --   --  5.6*  --        Vancomycin Administrations:  vancomycin given in the last 96 hours                     vancomycin 1,250 mg in 0.9% NaCl 250 mL IVPB (admixture device) (mg) 1,250 mg New Bag 06/24/25 2311                    Microbiologic Results:  Microbiology Results (last 7 days)       Procedure Component Value Units Date/Time    MRSA Screen by PCR [[id number]]  (Normal) Collected: 06/24/25 2139    Order Status: Completed Specimen: Nasal Swab Updated: 06/25/25 1258     MRSA PCR Screen Not Detected    Culture, Respiratory with Gram Stain [[id number]] Updated: 06/25/25 1138    Order Status: Sent Specimen: Respiratory     Blood culture x two cultures. Draw prior to antibiotics. [[id number]]  (Normal) Collected: 06/24/25 1746    Order Status: Completed Specimen: Blood from Peripheral, Hand, Right Updated: 06/25/25 0302     Blood Culture No Growth After 6 Hours    Respiratory Infection Panel (PCR), Nasopharyngeal [[id number]] Collected: 06/24/25 2139    Order Status: Completed Specimen: Nasopharyngeal Swab Updated: 06/24/25 3230     Respiratory Infection Panel Source Nasopharyngeal Swab     Adenovirus Not Detected     Coronavirus 229E, Common Cold Virus Not Detected     Coronavirus HKU1, Common Cold Virus Not Detected      Coronavirus NL63, Common Cold Virus Not Detected     Coronavirus OC43, Common Cold Virus Not Detected     SARS-CoV2 (COVID-19) Qualitative PCR Not Detected     Human Metapneumovirus Not Detected     Human Rhinovirus/Enterovirus Not Detected     Influenza A Not Detected     Influenza B Not Detected     Parainfluenza Virus 1 Not Detected     Parainfluenza Virus 2 Not Detected     Parainfluenza Virus 3 Not Detected     Parainfluenza Virus 4 Not Detected     Respiratory Syncytial Virus Not Detected     Bordetella Parapertussis (FC3571) Not Detected     Bordetella pertussis (ptxP) Not Detected     Chlamydia pneumoniae Not Detected     Mycoplasma pneumoniae Not Detected    Urine Culture High Risk [[id number]] Collected: 06/24/25 2139    Order Status: Sent Specimen: Urine, Bladder     Blood culture [[id number]]     Order Status: Canceled Specimen: Blood     Blood culture x two cultures. Draw prior to antibiotics. [[id number]] Collected: 06/24/25 1808    Order Status: Sent Specimen: Blood from Peripheral, Hand, Left

## 2025-06-25 NOTE — PROCEDURES
"Katey Cerrato is a 69 y.o. female patient.    Temp: (!) 89.6 °F (32 °C) (06/24/25 2243)  Pulse: 105 (06/24/25 2243)  Resp: (!) 32 (06/24/25 2243)  BP: (!) 157/93 (06/24/25 2243)  SpO2: 100 % (06/24/25 2243)  Weight: 56.7 kg (125 lb) (06/24/25 1526)  Height: 4' 11" (149.9 cm) (06/24/25 2000)       Central Line    Date/Time: 6/24/2025 10:57 PM    Performed by: Gabe Hanley NP  Authorized by: Gabe Hanley NP    Location procedure was performed:  [facility]  Assisting Provider:  Jaskaran Quiñones DO  Pre-operative diagnosis:  Metabolic Acidosis  Post-operative diagnosis:  Metabolic Acidosis  Consent Done ?:  Yes  Time out complete?: Verified correct patient, procedure, equipment, staff, and site/side    Indications:  Med administration, hemodialysis and vascular access  Anesthesia:  See MAR for details  Preparation:  Skin prepped with chlorhexidine (without alcohol)  Skin prep agent dried: Skin prep agent completely dried prior to procedure    Sterile barriers: All five maximal sterile barriers used - gloves, gown, cap, mask and large sterile sheet    Hand hygiene: Hand hygiene performed immediately prior to central venous catheter insertion    Location:  Right femoral  Site selection rationale:  Cervical collar given C2 injury pending NSGY evaluation  Catheter type:  Trialysis  Catheter size:  13 Fr  Inserted Catheter Length (cm):  24  Ultrasound guidance: Yes    Vessel Caliber:  Medium   patent  Comprressibility:  Normal  Needle advanced into vessel with real time ultrasound guidance.    Guidewire confirmed in vessel.    Steril sheath on probe.    Sterile gel used.  Manometry: Yes    Number of attempts:  2  Securement:  Line sutured, chlorhexidine patch, sterile dressing applied and blood return through all ports  Complications: No    Estimated blood loss (mL):  5  Specimens: No    Implants: No    Guidewire: guidewire removed intact    Adverse Events:  None    ÁNGELA Hanley, MSN, " BRUNILDA, [facility]  Pulmonary Critical Care Medicine   06/24/2025

## 2025-06-25 NOTE — TRANSFER OF CARE
"Anesthesia Transfer of Care Note    Patient: Katey Cerrato    Procedure(s) Performed: * No procedures listed *    Patient location: ICU    Anesthesia Type: general    Transport from OR: Transported from OR intubated on 100% O2 by AMBU with assisted ventilation    Post pain: adequate analgesia    Post assessment: no apparent anesthetic complications    Post vital signs: stable    Level of consciousness: sedated and unresponsive    Nausea/Vomiting: no nausea/vomiting    Complications: none    Transfer of care protocol was followedComments: Patient transported back to ICU with no complications. VSS; report given to RN. See ICU flowsheet      Last vitals: Visit Vitals  BP (!) 157/93   Pulse 86   Temp 37.2 °C (99 °F)   Resp 14   Ht 4' 11" (1.499 m)   Wt 56.7 kg (125 lb)   SpO2 (!) 94%   BMI 25.25 kg/m²     "

## 2025-06-25 NOTE — CONSULTS
Palliative Medicine  Consult Note     Patient Name: Katey Cerrato   MRN: [id number]     Admission Date: 6/24/2025   Hospital Length of Stay: 1     Attending Provider: Titi Sanchez MD   Consulting Provider: Ajith Wing MD  Primary Care Physician: Shay Castellano MD     Patient information was obtained from relative(s), past medical records, and primary team.    Assessment/Plan:   Katey Cerrato is a 69 y.o. female on whom Palliative Care is consulted for assistance with clarification of goals of care as it pertains to their diagnoses of metastatic cholangiocarcinoma in the setting of fall at home with head strike and subsequent treatment for acute hypoxemic respiratory failure (s/p intubation), and unspecified shock.    Impression:  Patient unable to participate in GoC conversation. Son wants patient to be comfortable and recognizes her QoL is not what she would find acceptable and that he is suffering. Simultaneously he cannot bring himself at this time to discontinue life-sustaining treatments or place limits on care including DNR. Will continue to explore.    Billing:  Complexity Data to Review Risk   1 acute / chronic illness posing a threat to life or bodily function including  Metastatic cholangiocarcinoma Reviewed prior external notes from Hematology/Oncology, ICU, and Nephrology    Reviewed investigative results including CBC, CMP, and lactic acid    Discussed management of patient with primary team Discussed goals-of-care as it pertains to life-limiting illness and Discussed decision regarding Code Status     During today's encounter I spent 30min of time on voluntary advance care planning and/or goals of care discussion, providing emotional support, discussing prognosis, and exploring the burden vs benefit of various treatment options.     Recommendations:  No Palliative treatments indicated at this time. Will defer management of current medical conditions including symptomatic complaints to  "primary team.   Continue to explore GoC relevant medical decision maker  Case discussed with Palliative Care  given concern existential distress / Lutheran beliefs influencing medical-decision making    #GOC Discussions  Advance Care Planning   6/25/2025: Ajith Wing MD  Contacted patient's son Jonny Cerrato ([phone]) to provide support and explore GoC; patient sedated and intubated at the time. Jonny shares that he moved in with his mother 2-3 months ago from [place] to be her caregiver. She spends most of her day sleeping. She has not been tolerating chemotherapy well. He agrees that currently her quality of life is not one she would find acceptable. He believes that she is suffering and wants to prioritize comfort. She has told him that she is tired and worn out. When explained the difference between ongoing disease-directed care vs comfort-focused care, "which would be what we call Hospice," he stated the latter is "what she needs." However, he does not agree with removal of life-sustaining treatments stating that "only God can decide who lives or dies." Similarly he does not agree with changing Code Status to DNR because "you're not God...only God knows if [CPR] will be successful." Son will be visiting in person tomorrow, but doesn't want to watch her die.     Code status: Full Code      Recommendations communicated directly to primary team on 06/25/2025 via telephone and EPIC chat.    Subjective:   Brief HPI:   Katey Cerrato is a 69 y.o. female with a PMH of cholangiocarcinoma c/b peritoneal carcinomatosis (significantly progressed from prior imaging one month prior) currently receiving Palliative Chemotherapy, seizure-disorder, and Graves disease BIB EMS for AMS s/p unwitnessed fall at home with head strike. Patient intubated int he ED and during initial workup found to have SOFIA, elevated lactic acid,     Chemotherapy has been difficult for patient to tolerate resulting in weakness and " "intractable nausea/vomiting.     Initial Assessment:   Patient intubated and sedated; unable to participate in GoC conversation. No objective signs of discomfort or distress.     Contacted patient's son Jonny Cerrato ([phone]) to provide support and explore GoC;  see ACP note above for details.  There is a disconnect between his values and desire for patient to be comfortable, acknowledging that she is currently suffering, yet still wanting to pursue all life-prolonging interventions without limits.     Alger Symptom Assessment (ESAS):   Pain: No objective signs of discomfort or distress  Dyspnea: No objective signs of discomfort or distress; currently intubated  Anxiety: No objective signs of discomfort or distress  Nausea: Unable to assess  Depression: Unable to assess  Anorexia: Unable to assess  Fatigue: Unable to assess  Insomnia: Unable to assess  Restlessness: None  Agitation: None    Karnofsky Performance Scale: 20% - Very sick, hospitalization necessary: active treatment necessary    Biopsychosocial History:  Social History:  Housing: son moved in 2-3 months ago to live with patient and be her caregiver    Family:  Spouse/Partner: ;   1 year ago  Children: 1 son (Jonny), 1 adult son who lives in [place]    Self-identified support system: son    Functional status:   Sleeps all day; spends all day in bed  Dependent on son for ADLs    Activities / Hobbies / Interests:   All she enjoys these days is sleeping    Substance use:  Social History     Tobacco Use    Smoking status: Never    Smokeless tobacco: Never   Substance and Sexual Activity    Alcohol use: No     Comment: none    Drug use: No    Sexual activity: Yes     Partners: Male     Comment: m  works in dietary,  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     Spirituality:  Martine: Restoration  Influence / importance: "very" ; lots of Christian language used during conversation with son  Case to be discussed with Palliative Care Thelma " Gabino    Objective:     Past Medical History:   Diagnosis Date    Cataract     Empty sella syndrome     GHD (growth hormone deficiency)     Glaucoma     Graves' disease with exophthalmos     Hyperlipidemia     Hypertension     Seizures     Thyroid nodule     Thyroid nodule     Type II or unspecified type diabetes mellitus without mention of complication, uncontrolled      Past Surgical History:   Procedure Laterality Date    BREAST BIOPSY      CATARACT EXTRACTION W/  INTRAOCULAR LENS IMPLANT Right 3/15/2021    Procedure: EXTRACTION, CATARACT, WITH IOL INSERTION;  Surgeon: Cj Caban MD;  Location: [facility] OR;  Service: Ophthalmology;  Laterality: Right;    CATARACT EXTRACTION W/  INTRAOCULAR LENS IMPLANT Left 3/29/2021    Procedure: EXTRACTION, CATARACT, WITH IOL INSERTION;  Surgeon: Cj Caban MD;  Location: [facility] OR;  Service: Ophthalmology;  Laterality: Left;    CHOLECYSTECTOMY      ENDOSCOPIC ULTRASOUND OF UPPER GASTROINTESTINAL TRACT N/A 3/10/2025    Procedure: ULTRASOUND, UPPER GI TRACT, ENDOSCOPIC;  Surgeon: Tim Caban MD;  Location: [facility] ENDO (2ND FLR);  Service: Endoscopy;  Laterality: N/A;  2/28: EUS for FNA of lymph nodes-instructions emailed-    HYSTERECTOMY      one ovary intact    INJECTION OF JOINT Left 2/6/2024    Procedure: INJECTION, JOINT LEFT KNEE WITH STEROID;  Surgeon: She Schwartz MD;  Location: [facility] PAIN MGT;  Service: Pain Management;  Laterality: Left;  [phone]    INSERTION OF TUNNELED CENTRAL VENOUS CATHETER (CVC) WITH SUBCUTANEOUS PORT N/A 3/25/2025    Procedure: INSERTION, SINGLE LUMEN CATHETER WITH PORT, WITH FLUOROSCOPIC GUIDANCE;  Surgeon: Zaire Glynn MD;  Location: [facility] 2ND FLR;  Service: General;  Laterality: N/A;    KNEE SURGERY      LYMPH NODE BIOPSY  3010    OOPHORECTOMY       Family History   Problem Relation Name Age of Onset    Cancer Mother      Cataracts Mother      Glaucoma Mother          shunt    Heart disease Mother      Tremor Mother       Breast cancer Mother  78    Heart disease Father 64   chf     Heart disease Sister      No Known Problems Sister      Breast cancer Sister  60    Hypertension Brother      No Known Problems Brother      No Known Problems Maternal Aunt      No Known Problems Maternal Uncle      No Known Problems Paternal Aunt      No Known Problems Paternal Uncle      No Known Problems Maternal Grandmother      No Known Problems Maternal Grandfather      No Known Problems Paternal Grandmother      No Known Problems Paternal Grandfather      No Known Problems Son      No Known Problems Other       Review of patient's allergies indicates:   Allergen Reactions    Codeine Nausea Only       Medications:  Continuous Infusions:    sodium chloride 0.9%   Intravenous Continuous        D10W   Intravenous Continuous PRN        NORepinephrine bitartrate-D5W  0-3 mcg/kg/min Intravenous Continuous 34 mL/hr at 06/25/25 1000 0.16 mcg/kg/min at 06/25/25 1000    propofoL  0-50 mcg/kg/min Intravenous Continuous        sodium bicarbonate 150 mEq in D5W 1,000 mL infusion   Intravenous Continuous           Scheduled Meds:    fludrocortisone  100 mcg Per OG tube Daily    hydrocortisone sodium succinate  100 mg Intravenous Q8H    lactulose  30 g Per OG tube TID    levETIRAcetam (Keppra) IV (PEDS and ADULTS)  750 mg Intravenous Q12H    pantoprazole  40 mg Intravenous BID    piperacillin-tazobactam (Zosyn) IV (PEDS and ADULTS) (extended infusion is not appropriate)  4.5 g Intravenous Q12H       PRN Meds:  Current Facility-Administered Medications:     0.9%  NaCl infusion (for blood administration), , Intravenous, Q24H PRN    0.9%  NaCl infusion (for blood administration), , Intravenous, Q24H PRN    D10W, , Intravenous, Continuous PRN    dextrose 50%, 12.5 g, Intravenous, PRN    dextrose 50%, 25 g, Intravenous, PRN    glucagon (human recombinant), 1 mg, Intramuscular, PRN    insulin aspart U-100, 0-5 Units, Subcutaneous, Q4H PRN    magnesium sulfate 2 g IVPB,  2 g, Intravenous, PRN    sodium chloride 0.9%, 10 mL, Intravenous, PRN    sodium phosphate 20.1 mmol in D5W 250 mL IVPB, 20.1 mmol, Intravenous, PRN    sodium phosphate 30 mmol in D5W 250 mL IVPB, 30 mmol, Intravenous, PRN    sodium phosphate 39.9 mmol in D5W 250 mL IVPB, 39.9 mmol, Intravenous, PRN    Pharmacy to dose Vancomycin consult, , , Once **AND** vancomycin - pharmacy to dose, , Intravenous, pharmacy to manage frequency     Vitals: Temp: 99.7 °F (37.6 °C) (06/25/25 1126)  Pulse: 103 (06/25/25 1126)  Resp: 11 (06/25/25 1027)  BP: (!) 157/93 (06/24/25 2243)  SpO2: 100 % (06/25/25 1126)    Physical Exam:  Constitutional:       General: NAD. They appear comfortable. Sedated.      Appearance: They are ill-appearing. They are not diaphoretic.   HENT:      Head: Normocephalic and atraumatic.   Pulmonary:      Effort: No respiratory distress. No increased work of breathing.      RR 18; intubated  Skin:     General: Skin is warm and dry.      Coloration: Skin is not jaundiced.      Findings: Bruising present on extremities.   Neurological:      General: Patient is sedated.     Labs:   Creatinine   Date Value Ref Range Status   06/25/2025 2.1 (H) 0.5 - 1.4 mg/dL Final     POC Creatinine   Date Value Ref Range Status   06/24/2025 2.0 (H) 0.5 - 1.4 mg/dL Final      Hemoglobin   Date Value Ref Range Status   03/19/2025 10.5 (L) 12.0 - 16.0 g/dL Final     HGB   Date Value Ref Range Status   06/25/2025 9.2 (L) 12.0 - 16.0 gm/dL Final      Albumin   Date Value Ref Range Status   06/25/2025 1.4 (L) 3.5 - 5.2 g/dL Final   06/24/2025 2.0 (L) 3.5 - 5.2 g/dL Final   06/05/2025 2.5 (L) 3.5 - 5.2 g/dL Final   03/19/2025 2.2 (L) 3.5 - 5.2 g/dL Final   02/26/2025 2.7 (L) 3.5 - 5.2 g/dL Final   02/19/2025 2.3 (L) 3.5 - 5.2 g/dL Final        Imaging & Investigations: reviewed on 06/25/2025    Thank you for your consult. I will follow-up with patient. Please contact us if you have any additional questions.    Signed,  Ajith NIELSEN  MD Maciej  Palliative Medicine & Psychiatry  [facility]

## 2025-06-25 NOTE — ANESTHESIA PREPROCEDURE EVALUATION
[facility]  Anesthesia Pre-Operative Evaluation         Patient Name: Katey Cerrato  YOB: 1955  MRN: [id number]    SUBJECTIVE:     Pre-operative evaluation for * IR ABSCESS ASPIRATION (XPD) *     2025    Katey Cerrato is a 69 y.o. 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 She was acidotic with a lactate >12, hypoglycemic to 28, CO2 < 5, anemic to 5.8, had a leukocytosis to 15, SOFIA with Cr of 2.1 (baseline-1.4), elevated ammonia to 163. AB.9//51/6. She is currently admitted to MICU, intubated on levo @ 0.16. S/p 2u pRBC's, H/H 9.2/27.5. Not on sedation    CTA C/A/P on 2025 which revealed a new irregularly-shaped rim-enhancing 5.8 x 2.5 x 3.8 cm right pelvic fluid collection.     Patient in C-collar. Pending MRI for C-spine clearance (fell at home prior to arrival) so C-collar can be removed to allow prone positioning for IR procedure.    Patient now presents for the above procedure(s).    Patient's son Jonny completed consent via phone. PAPER Consent in patient's chart.    Results for orders placed during the hospital encounter of 25    Echo    Left Ventricle: The left ventricle is normal in size. Mildly increased wall thickness. There is concentric remodeling. Regional wall motion abnormalities present. See diagram for wall motion findings. Septal motion is abnormal. There is mildly reduced systolic function with a visually estimated ejection fraction of 40 - 45%. Ejection fraction is approximately 43%.    Right Ventricle: Right ventricle  wall motion has global hypokinesis. Systolic function is mild- moderately reduced.    Aortic Valve: There is trace aortic regurgitation.    Tricuspid Valve: There is moderate to severe regurgitation.    Pulmonary Artery: Pulmonary artery pressure could not be accurately determined ( 30 plus RAP)    IVC/SVC: Patient is ventilated, cannot use IVC diameter to estimate right atrial pressure.    Vent Mode: A/C  Oxygen Concentration (%):  [21-50] 21  Resp Rate Total:  [30 br/min-33 br/min] 32 br/min  Vt Set:  [375 mL-400 mL] 375 mL  PEEP/CPAP:  [5 cmH20] 5 cmH20  Mean Airway Pressure:  [11 kpP84-11 cmH20] 11 cmH20    LDA:        PowerPort A Cath Single Lumen 06/10/25 0930 Atrial Right (Active)   Site Assessment No drainage;No redness;No swelling;No warmth 06/25/25 0730   Line Securement Device Secured with sutureless device 06/25/25 0730   Dressing Type CHG impregnated dressing/sponge;Central line dressing 06/25/25 0730   Dressing Status Clean;Dry;Intact 06/25/25 0730   Dressing Intervention Integrity maintained 06/25/25 0730   Date on Dressing 06/24/25 06/25/25 0730   Dressing Due to be Changed 07/01/25 06/25/25 0730   Patency/Care infusing 06/25/25 0730   Status Accessed 06/25/25 0730   Accessed by: Megan 06/24/25 1957   Needle Insertion Date 06/24/25 06/24/25 1957   Needle Insertion Time 1958 06/24/25 1957   Type of Needle PowerHuber 06/24/25 1957   Gauge 20 06/10/25 0915   Needle Length 3/4 in 06/24/25 1957   Needle Status Removed 06/10/25 1430   Flush Performed No 06/10/25 1430   Needle Removal Date 06/10/25 06/10/25 1430   Needle Removal Time 1530 06/10/25 1430   Deaccessed By AltieB 06/10/25 1430   Number of days: 15       Trialysis (Dialysis) Catheter 06/24/25 2225 right femoral (Active)   $ Dialysis Supplies Trialysis Catheter (Supply) 06/24/25 2227   Line Necessity Review Hemodynamic instability 06/25/25 0730   Verification by X-ray No 06/25/25 0343   Site Assessment No swelling;No warmth;No redness;No  drainage 06/25/25 0730   Line Securement Device Secured with sutures 06/25/25 0730   Dressing Type CHG impregnated dressing/sponge;Central line dressing 06/25/25 0730   Dressing Status Clean;Dry;Intact 06/25/25 0730   Dressing Intervention First dressing 06/25/25 0730   Date on Dressing 06/24/25 06/25/25 0730   Dressing Due to be Changed 07/01/25 06/25/25 0730   Venous Patency/Care infusing 06/25/25 0730   Arterial Patency/Care infusing 06/25/25 0730   Distal Patency/Care intermittent infusion cap applied 06/25/25 0730   Waveform Not being transduced 06/25/25 0301   Number of days: 0       Peripheral IV Single Lumen 06/24/25 22 G Posterior;Right Hand (Active)   Site Assessment Clean;Dry;Intact 06/25/25 0730   Line Securement Device Secured with sutureless device 06/24/25 2301   Extremity Assessment Distal to IV No abnormal discoloration;No redness;No swelling;No warmth 06/25/25 0730   Line Status Flushed;Saline locked 06/25/25 0730   Dressing Status Clean;Dry;Intact 06/25/25 0730   Dressing Intervention Integrity maintained 06/25/25 0730   Dressing Change Due 06/28/25 06/25/25 0301   Site Change Due 06/29/25 06/25/25 0730   Reason Not Rotated Not due 06/25/25 0730   Number of days: 1       Arterial Line 06/24/25 2230 Left Radial (Active)   $ Arterial Line Charges (Upon Insertion) Bedside Insertion Performed 06/24/25 2233   Site Assessment Clean;Dry;Intact;No redness;No swelling 06/25/25 0730   Line Status Pulsatile blood flow 06/25/25 0730   Art Line Waveform Appropriate 06/25/25 0730   Arterial Line Interventions Leveled;Flushed per protocol 06/25/25 0730   Color/Movement/Sensation Capillary refill less than 3 sec 06/25/25 0730   Dressing Type CHG impregnated dressing/sponge;Central line dressing 06/25/25 0730   Dressing Status Clean;Dry;Intact 06/25/25 0730   Dressing Intervention First dressing 06/25/25 0730   Dressing Change Due 07/01/25 06/25/25 0730   Tubing Change Due 06/28/25 06/25/25 0730   Number of days:  "0            Biliary Tube 05/24/25 1410 VTCB biliary 12 Fr. RUQ (Active)   Site Description Healing 05/28/25 1605   Dressing Status Dry;Intact;Clean 05/28/25 1605   Drainage Color Green 05/28/25 0949   Drain Status Clamped 05/28/25 1605   Tube Output(mL)(Include Discarded Residual) 50 mL 05/28/25 0949   Number of days: 31            Biliary Tube 05/27/25 1329 VTCB biliary 12 Fr. LUQ (Active)   Site Description Healing 05/28/25 1606   Dressing Status Intact;Dry;Clean 05/28/25 1606   Drainage Color Green 05/28/25 0949   Drain Status Clamped 05/28/25 1606   Tube Output(mL)(Include Discarded Residual) 100 mL 05/28/25 0949   Number of days: 28            Urethral Catheter 06/24/25 1930 Temperature probe 16 Fr. (Active)   Site Assessment Clean;Intact 06/25/25 0730   Collection Container Urimeter 06/25/25 0730   Securement Method secured to top of thigh w/ adhesive device 06/25/25 0730   Catheter Care Performed yes 06/25/25 0730   Reason for Continuing Urinary Catheterization Critically ill in ICU and requiring hourly monitoring of intake/output 06/25/25 0730   CAUTI Prevention Bundle Securement Device in place with 1" slack;Intact seal between catheter & drainage tubing;Drainage bag/urimeter off the floor;Sheeting clip in use;No dependent loops or kinks;Drainage bag/urimeter not overfilled (<2/3 full);Drainage bag/urimeter below bladder 06/25/25 0730   Output (mL) 25 mL 06/25/25 1027   Number of days: 0       Prev airway: 2/8/2025    Mask Ventilation:  Easy mask    Attempts:  1    Method of Intubation:  Video laryngoscopy    Blade:  De Guzman 3    Laryngeal View Grade: Grade I - full view of cords      Difficult Airway Encountered?: No      Complications:  None    Airway Device:  Oral endotracheal tube    Airway Device Size:  7.5    Style/Cuff Inflation:  Cuffed (inflated to minimal occlusive pressure)    Inflation Amount (mL):  7    Tube secured:  21    Secured at:  The lips    Placement Verified By:  Capnometry    " Complicating Factors:  Obesity and short neck    Findings Post-Intubation:  BS equal bilateral and atraumatic/condition of teeth unchanged    Drips: None documented.      Problem List[1]    Review of patient's allergies indicates:   Allergen Reactions    Codeine Nausea Only       Current Inpatient Medications:      Medications Ordered Prior to Encounter[2]    Past Surgical History:   Procedure Laterality Date    BREAST BIOPSY      CATARACT EXTRACTION W/  INTRAOCULAR LENS IMPLANT Right 3/15/2021    Procedure: EXTRACTION, CATARACT, WITH IOL INSERTION;  Surgeon: Cj Caban MD;  Location: [facility] OR;  Service: Ophthalmology;  Laterality: Right;    CATARACT EXTRACTION W/  INTRAOCULAR LENS IMPLANT Left 3/29/2021    Procedure: EXTRACTION, CATARACT, WITH IOL INSERTION;  Surgeon: Cj Caban MD;  Location: [facility] OR;  Service: Ophthalmology;  Laterality: Left;    CHOLECYSTECTOMY      ENDOSCOPIC ULTRASOUND OF UPPER GASTROINTESTINAL TRACT N/A 3/10/2025    Procedure: ULTRASOUND, UPPER GI TRACT, ENDOSCOPIC;  Surgeon: Tim Caban MD;  Location: [facility] ENDO (2ND FLR);  Service: Endoscopy;  Laterality: N/A;  2/28: EUS for FNA of lymph nodes-instructions emailed-    HYSTERECTOMY      one ovary intact    INJECTION OF JOINT Left 2/6/2024    Procedure: INJECTION, JOINT LEFT KNEE WITH STEROID;  Surgeon: She Schwartz MD;  Location: [facility] PAIN MGT;  Service: Pain Management;  Laterality: Left;  [phone]    INSERTION OF TUNNELED CENTRAL VENOUS CATHETER (CVC) WITH SUBCUTANEOUS PORT N/A 3/25/2025    Procedure: INSERTION, SINGLE LUMEN CATHETER WITH PORT, WITH FLUOROSCOPIC GUIDANCE;  Surgeon: Zaire Glynn MD;  Location: [facility] OR 2ND FLR;  Service: General;  Laterality: N/A;    KNEE SURGERY      LYMPH NODE BIOPSY  3010    OOPHORECTOMY         Social History[3]    OBJECTIVE:     Vital Signs Range (Last 24H):  Temp:  [31.6 °C (88.9 °F)-37.7 °C (99.9 °F)]   Pulse:  []   Resp:  [1-33]   BP: ()/(39-93)   SpO2:  [96  %-100 %]   Arterial Line BP: ()/(57-66)       Significant Labs:  Lab Results   Component Value Date    WBC 13.03 (H) 06/25/2025    HGB 9.2 (L) 06/25/2025    HCT 27.5 (L) 06/25/2025     06/25/2025    CHOL 251 (H) 05/24/2025    TRIG 173 (H) 05/24/2025    HDL 30 (L) 05/24/2025    ALT 83 (H) 06/25/2025     (H) 06/25/2025     06/25/2025    K 4.3 06/25/2025    CL 98 06/25/2025    CREATININE 2.1 (H) 06/25/2025    BUN 24 (H) 06/25/2025    CO2 8 (LL) 06/25/2025    TSH 3.584 06/24/2025    INR 2.7 (H) 06/24/2025    HGBA1C 5.3 06/24/2025       Diagnostic Studies: No relevant studies.    EKG:   Results for orders placed or performed during the hospital encounter of 06/24/25   EKG 12-lead    Collection Time: 06/24/25  4:07 PM   Result Value Ref Range    QRS Duration 84 ms    [facility] QTC Calculation 463 ms    Narrative    Test Reason : R41.82,    Vent. Rate : 131 BPM     Atrial Rate : 131 BPM     P-R Int : 130 ms          QRS Dur :  84 ms      QT Int : 314 ms       P-R-T Axes :  56  -7 132 degrees    QTcB Int : 463 ms    Sinus tachycardia with Fusion complexes  Nonspecific ST and T wave abnormality  Abnormal ECG  When compared with ECG of 24-Jun-2025 15:39,  Fusion complexes are now Present  Confirmed by Ash Parham ([id number]) on 6/25/2025 8:25:56 AM    Referred By: AAAREFERRAL SELF           Confirmed By: Ash Parham       2D ECHO:  TTE:  Results for orders placed or performed during the hospital encounter of 06/24/25   Echo   Result Value Ref Range    LV Diastolic Volume 51 mL    Echo EF Estimated 54 %    LV Systolic Volume 23 mL    IVS 1.0 0.6 - 1.1 cm    LVIDd 3.5 3.5 - 6.0 cm    LVIDs 2.6 2.1 - 4.0 cm    LVOT diameter 2.0 cm    PW 1.1 0.6 - 1.1 cm    AV LVOT peak gradient 2 mmHg    LVOT mn grad 1 mmHg    LVOT peak alyssa 0.8 m/s    LVOT peak VTI 10.0 cm    RV S' 11.38 cm/s    LA size 2.1 cm    Left Atrium Major Axis 3.8 cm    Left Atrium Minor Axis 3.4 cm    LA Vol (MOD) 27 mL    RA Major Axis 4.20 cm     RA Area 15.6 cm2    AV valve area 3.0 cm2    AV area by cont VTI 3.0 cm2    AV peak gradient 3 mmHg    AV mean gradient 2 mmHg    Ao peak chi 0.9 m/s    Ao VTI 10.4 cm    MV Peak E Chi 0.79 m/s    TDI LATERAL 0.08 m/s    TDI SEPTAL 0.07 m/s    TV peak gradient 28 mmHg    TR Max Chi 2.7 m/s    Ascending aorta 3.0 cm    STJ 2.9 cm    IVC diameter 0.90 cm    Sinus 3.3 cm    LA WIDTH 2.6 cm    RA Width 3.75 cm    LVOT stroke volume 31.4 cm3    LV Systolic Volume Index 15.2 mL/m2    LV Diastolic Volume Index 33.77 mL/m2    LVOT area 3.1 cm2    FS 25.7 (A) 28 - 44 %    Left Ventricle Relative Wall Thickness 0.63 cm    LV mass 111.0 g    LV Mass Index 73.5 g/m2    E/E' ratio 11 m/s    LV SEPTAL E/E' RATIO 11.3 m/s    BUTCH 11 mL/m2    LV LATERAL E/E' RATIO 9.9 m/s    LA Vol 17 cm3    BUTCH (MOD) 18 mL/m2    AV Velocity Ratio 0.89     AV index (prosthetic) 0.96     MARCOS by Velocity Ratio 2.8 cm²    ASI 2.2 cm/m2    ASI 2.0 cm/m2    Mean e' 0.08 m/s    ZLVIDS -0.46     ZLVIDD -2.38     RV- champagne basal diam 3.9 cm    RV/LV Ratio 1.11 cm    EF 43 %    Narrative      Left Ventricle: The left ventricle is normal in size. Mildly increased   wall thickness. There is concentric remodeling. Regional wall motion   abnormalities present. See diagram for wall motion findings. Septal motion   is abnormal. There is mildly reduced systolic function with a visually   estimated ejection fraction of 40 - 45%. Ejection fraction is   approximately 43%.    Right Ventricle: Right ventricle wall motion has global hypokinesis.   Systolic function is mild- moderately reduced.    Aortic Valve: There is trace aortic regurgitation.    Tricuspid Valve: There is moderate to severe regurgitation.    Pulmonary Artery: Pulmonary artery pressure could not be accurately   determined ( 30 plus RAP)    IVC/SVC: Patient is ventilated, cannot use IVC diameter to estimate   right atrial pressure.         DINA:  No results found. However, due to the size of the patient  record, not all encounters were searched. Please check Results Review for a complete set of results.    ASSESSMENT/PLAN:           Pre-op Assessment    I have reviewed the Patient Summary Reports.     I have reviewed the Nursing Notes. I have reviewed the NPO Status.   I have reviewed the Medications.     Review of Systems  Anesthesia Hx:  No problems with previous Anesthesia   History of prior surgery of interest to airway management or planning:          Denies Family Hx of Anesthesia complications.    Denies Personal Hx of Anesthesia complications.                    Social:  Non-Smoker, No Alcohol Use       Hematology/Oncology:       -- Anemia:                  Current/Recent Cancer.                Cardiovascular:     Denies Pacemaker. Hypertension Valvular problems/Murmurs  Denies MI.  Denies CAD.    Denies CABG/stent.    CHF   PVD hyperlipidemia                               Pulmonary:    Denies COPD.  Denies Asthma.                    Renal/:  Chronic Renal Disease, ARF                Hepatic/GI:      Denies GERD.    Not Taking GLP-1 Agonists            Musculoskeletal:  Arthritis               Neurological:    Denies CVA.    Seizures                                Endocrine:  Diabetes, type 2  Hyperthyroidism       Denies Obesity / BMI > 30, Denies Morbid Obesity / BMI > 40      Physical Exam  General: Cooperative  Intubated, awake, and following commands  Airway:  Mallampati: unable to assess   Pre-Existing Airway: Oral Endotracheal tube    Chest/Lungs:  Normal Respiratory Rate  Mechanically ventilated  Heart:  Rate: Normal  Rhythm: Regular Rhythm        Anesthesia Plan  Type of Anesthesia, risks & benefits discussed:    Anesthesia Type: Gen ETT  Intra-op Monitoring Plan: Standard ASA Monitors and Art Line  Post Op Pain Control Plan: multimodal analgesia and IV/PO Opioids PRN  Induction:  IV  Informed Consent: Informed consent signed with the Patient representative and all parties understand the risks and  agree with anesthesia plan.  All questions answered.   ASA Score: 4  Day of Surgery Review of History & Physical: H&P Update referred to the surgeon/provider.    Ready For Surgery From Anesthesia Perspective.     .           [1]   Patient Active Problem List  Diagnosis    Generalized convulsive epilepsy with intractable epilepsy    Hypertension associated with diabetes    Diabetes mellitus type 2 in obese    Obesity (BMI 30-39.9)    Essential hypertension    Graves' disease with exophthalmos    Empty sella syndrome    Mixed hyperlipidemia    Type 2 diabetes mellitus without complication, without long-term current use of insulin    Vaginal vault prolapse, posthysterectomy    Urge incontinence    Carpal tunnel syndrome of right wrist    Nuclear sclerosis, bilateral    Post-operative state    Nuclear sclerotic cataract of left eye    Post-traumatic osteoarthritis of left knee    Peripheral vascular disease, unspecified    Primary osteoarthritis of both knees    Decreased range of motion (ROM) of left knee    Decreased strength of lower extremity    Biliary obstruction    Nausea and vomiting    Fever    Symptomatic anemia    Cholangiocarcinoma    SOFIA (acute kidney injury)    Dehydration    Malnutrition    Hypokalemia    Pulmonary embolus    Fall    Thrombocytopenia    Encounter for biliary drainage tube placement    Abscess    Other reduced mobility    At risk for malnutrition    Immunocompromised    Hypercoagulopathy    Atelectasis    Hypernatremia    Hypophosphatemia    Moderate protein-calorie malnutrition    Encephalopathy    Acute hypoxemic respiratory failure    Shock    Hyperammonemia    Acute blood loss anemia    Anemia    Seizure disorder    Cervical lesion   [2]   Current Facility-Administered Medications on File Prior to Visit   Medication Dose Route Frequency Provider Last Rate Last Admin    0.9%  NaCl infusion (for blood administration)   Intravenous Q24H PRN Shasta Campos PA-C        0.9%  NaCl  infusion (for blood administration)   Intravenous Q24H PRN Gabe Hanley NP        dextrose 10 % infusion   Intravenous Continuous PRN Gabe Hanley NP        dextrose 50% injection 12.5 g  12.5 g Intravenous PRN Gabe Hanley NP        dextrose 50% injection 25 g  25 g Intravenous PRN Gabe Hanley NP        fludrocortisone tablet 100 mcg  100 mcg Per OG tube Daily Gabe Hanley NP   100 mcg at 06/25/25 0833    glucagon (human recombinant) injection 1 mg  1 mg Intramuscular PRN Gabe Hanley NP        hydrocortisone sodium succinate injection 100 mg  100 mg Intravenous Q8H Gabe Hanley NP   100 mg at 06/25/25 0521    insulin aspart U-100 pen 0-5 Units  0-5 Units Subcutaneous Q4H PRN Gabe Hanley NP   5 Units at 06/25/25 1010    lactulose 20 gram/30 mL solution Soln 30 g  30 g Per OG tube TID Gabe Hanley NP   30 g at 06/25/25 0833    levETIRAcetam injection 750 mg  750 mg Intravenous Q12H Gabe Hanley NP   750 mg at 06/25/25 0833    magnesium sulfate 2g in water 50mL IVPB (premix)  2 g Intravenous Once Gabe Hanley NP 25 mL/hr at 06/25/25 1000 Rate Verify at 06/25/25 1000    NORepinephrine 4 mg in dextrose 5% 250 mL infusion (premix)  0-3 mcg/kg/min Intravenous Continuous Cuba Marr MD 34 mL/hr at 06/25/25 1000 0.16 mcg/kg/min at 06/25/25 1000    pantoprazole injection 40 mg  40 mg Intravenous BID Gabe Hanley NP   40 mg at 06/25/25 0834    piperacillin-tazobactam (ZOSYN) 4.5 g in D5W 100 mL IVPB (MB+)  4.5 g Intravenous Q12H Gabe Hanley NP   Stopped at 06/25/25 0629    propofol (DIPRIVAN) 10 mg/mL infusion  0-50 mcg/kg/min Intravenous Continuous Gabe Hanley NP        sodium bicarbonate 100 mEq in D5W 1,000 mL infusion   Intravenous Continuous Luis E Valenzuela  mL/hr at 06/25/25 1000 Rate Verify at 06/25/25 1000    sodium chloride 0.9% flush 10 mL  10 mL Intravenous  PRN Gabe Hanley NP        vancomycin - pharmacy to dose   Intravenous pharmacy to manage frequency Gabe Hanley NP         Current Outpatient Medications on File Prior to Visit   Medication Sig Dispense Refill    apixaban (ELIQUIS) 5 mg Tab Take 2 tablets (10 mg total) by mouth 2 (two) times daily for 5 days (until 5/31/2025), THEN starting on 6/1/2025 take 1 tablet (5 mg total) 2 (two) times daily. 70 tablet 2    blood sugar diagnostic Strp To check BG 1 times daily, to use with insurance preferred meter 100 each 3    blood-glucose meter kit To check BG 1 times daily, to use with insurance preferred meter 1 each 0    cyproheptadine (PERIACTIN) 4 mg tablet Take 1 tablet (4 mg total) by mouth 3 (three) times daily. 90 tablet 3    folic acid (FOLVITE) 1 MG tablet Take 1 tablet (1 mg total) by mouth once daily. 90 tablet 3    HYDROmorphone (DILAUDID) 2 MG tablet Take 1 tablet (2 mg total) by mouth 2 (two) times daily as needed for Pain. 14 tablet 0    lancets Misc To check BG 1 times daily, to use with insurance preferred meter 100 each 3    latanoprost 0.005 % ophthalmic solution INSTILL 1 DROP INTO BOTH EYES EVERY EVENING 7.5 mL 3    levETIRAcetam (KEPPRA) 750 MG Tab TAKE 2 TABLETS BY MOUTH ONCE  DAILY (Patient taking differently: Take 1,500 mg by mouth nightly.) 180 tablet 1    [Paused] metoprolol succinate (TOPROL-XL) 50 MG 24 hr tablet TAKE 1 TABLET BY MOUTH EVERY DAY IN THE EVENING 90 tablet 3    multivitamin-minerals-lutein Tab Take 1 tablet by mouth once daily once daily.      mv-mn/iron/folic acid/herb 190 (VITAMIN D3 COMPLETE ORAL) Take 1 tablet by mouth once daily.      OLANZapine (ZYPREXA) 5 MG tablet TAKE 1 TABLET BY MOUTH EVERY DAY AT NIGHT 90 tablet 2    ondansetron (ZOFRAN-ODT) 4 MG TbDL Take 1 tablet (4 mg total) by mouth 2 (two) times daily as needed (Nausea). 60 tablet 1    promethazine (PHENERGAN) 25 MG tablet Take 1 tablet (25 mg total) by mouth every 6 (six) hours as needed for  Nausea. 120 tablet 2    sodium chloride 0.9% (NORMAL SALINE FLUSH) injection 3 mLs by Intra-Catheter route every 6 (six) hours as needed (use 2-3 times daily as needed). 60 mL 11    sodium chloride 0.9% (NORMAL SALINE FLUSH) injection Inject 5 mLs into the vein every 6 (six) hours as needed. 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     Social Drivers of Health     Financial Resource Strain: Patient Unable To Answer (6/24/2025)    Overall Financial Resource Strain (CARDIA)     Difficulty of Paying Living Expenses: Patient unable to answer   Food Insecurity: Patient Unable To Answer (6/24/2025)    Hunger Vital Sign     Worried About Running Out of Food in the Last Year: Patient unable to answer     Ran Out of Food in the Last Year: Patient unable to answer   Transportation Needs: Patient Unable To Answer (6/24/2025)    PRAPARE - Transportation     Lack of Transportation (Medical): Patient unable to answer     Lack of Transportation (Non-Medical): Patient unable to answer   Physical Activity: Patient Unable To Answer (6/24/2025)    Exercise Vital Sign     Days of Exercise per Week: Patient unable to answer     Minutes of Exercise per Session: Patient unable to answer   Stress: Patient Unable To Answer (6/24/2025)    [employer] [place] [employer] - Occupational Stress Questionnaire     Feeling of Stress : Patient unable to answer   Housing Stability: Patient Unable To Answer (6/24/2025)    Housing Stability Vital Sign     Unable to Pay for Housing in the Last Year: Patient unable to answer     Homeless in  the Last Year: Patient unable to answer

## 2025-06-25 NOTE — PROCEDURES
RAPID RESPONSE VASCULAR ACCESS NOTE       Bed:7096/7096 A    Single lumen 20G, 2.25IN AccuCath placed in the left brachial vein. Needle advanced into the vessel under real time ultrasound guidance.    Attempts: 1  Max dwell date: 7/5/2025  Lot number: [id number]    Call [phone] for concerns or assistance.    Nicole Bernabe RN               [employer] Pregnancy And Lactation Text: This medication is Pregnancy Category X and is absolutely contraindicated during pregnancy. It is unknown if it is excreted in breast milk.

## 2025-06-25 NOTE — PLAN OF CARE
Post procedure report called to primary nurse, Viola. Pt to be transferred back to inpt room via bed on monitor w/ RN & CRNA.

## 2025-06-25 NOTE — SUBJECTIVE & OBJECTIVE
Past Medical History:   Diagnosis Date    Cataract     Empty sella syndrome     GHD (growth hormone deficiency)     Glaucoma     Graves' disease with exophthalmos     Hyperlipidemia     Hypertension     Seizures     Thyroid nodule     Thyroid nodule     Type II or unspecified type diabetes mellitus without mention of complication, uncontrolled        Past Surgical History:   Procedure Laterality Date    BREAST BIOPSY      CATARACT EXTRACTION W/  INTRAOCULAR LENS IMPLANT Right 3/15/2021    Procedure: EXTRACTION, CATARACT, WITH IOL INSERTION;  Surgeon: Cj Caban MD;  Location: [facility], [place], operated by [facility] OR;  Service: Ophthalmology;  Laterality: Right;    CATARACT EXTRACTION W/  INTRAOCULAR LENS IMPLANT Left 3/29/2021    Procedure: EXTRACTION, CATARACT, WITH IOL INSERTION;  Surgeon: Cj Caban MD;  Location: [facility], [place], operated by [facility] OR;  Service: Ophthalmology;  Laterality: Left;    CHOLECYSTECTOMY      ENDOSCOPIC ULTRASOUND OF UPPER GASTROINTESTINAL TRACT N/A 3/10/2025    Procedure: ULTRASOUND, UPPER GI TRACT, ENDOSCOPIC;  Surgeon: Tim Caban MD;  Location: [facility] ENDO (2ND FLR);  Service: Endoscopy;  Laterality: N/A;  2/28: EUS for FNA of lymph nodes-instructions emailed-    HYSTERECTOMY      one ovary intact    INJECTION OF JOINT Left 2/6/2024    Procedure: INJECTION, JOINT LEFT KNEE WITH STEROID;  Surgeon: She Schwartz MD;  Location: [facility], [place], operated by [facility] PAIN MGT;  Service: Pain Management;  Laterality: Left;  [phone]    INSERTION OF TUNNELED CENTRAL VENOUS CATHETER (CVC) WITH SUBCUTANEOUS PORT N/A 3/25/2025    Procedure: INSERTION, SINGLE LUMEN CATHETER WITH PORT, WITH FLUOROSCOPIC GUIDANCE;  Surgeon: Zaire Glynn MD;  Location: [facility] 2ND FLR;  Service: General;  Laterality: N/A;    KNEE SURGERY      LYMPH NODE BIOPSY  3010    OOPHORECTOMY         Review of patient's allergies indicates:   Allergen Reactions    Codeine Nausea Only       Family History       Problem Relation (Age of Onset)    Breast cancer Mother (78), Sister (60)     Cancer Mother    Cataracts Mother    Glaucoma Mother    Heart disease Mother, Father, Sister    Hypertension Brother    No Known Problems Sister, Brother, Maternal Aunt, Maternal Uncle, Paternal Aunt, Paternal Uncle, Maternal Grandmother, Maternal Grandfather, Paternal Grandmother, Paternal Grandfather, Son, Other    Tremor Mother          Tobacco Use    Smoking status: Never    Smokeless tobacco: Never   Substance and Sexual Activity    Alcohol use: No     Comment: none    Drug use: No    Sexual activity: Yes     Partners: Male     Comment: m  works in dietary,  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      Review of Systems   Unable to perform ROS: Mental status change     Objective:     Vital Signs (Most Recent):  Temp: (!) 94 °F (34.4 °C) (06/24/25 2100)  Pulse: 101 (06/24/25 2100)  Resp: (!) 28 (06/24/25 2100)  BP: 109/64 (06/24/25 2100)  SpO2: 100 % (06/24/25 2100) Vital Signs (24h Range):  Temp:  [91.9 °F (33.3 °C)-98.8 °F (37.1 °C)] 94 °F (34.4 °C)  Pulse:  [101-140] 101  Resp:  [21-33] 28  SpO2:  [99 %-100 %] 100 %  BP: ()/(39-72) 109/64   Weight: 56.7 kg (125 lb)  Body mass index is 25.25 kg/m².      Intake/Output Summary (Last 24 hours) at 6/24/2025 2116  Last data filed at 6/24/2025 2030  Gross per 24 hour   Intake 12.82 ml   Output --   Net 12.82 ml          Physical Exam  Vitals and nursing note reviewed.   Constitutional:       Appearance: She is ill-appearing and toxic-appearing.      Interventions: She is sedated, intubated and restrained.   HENT:      Head: Normocephalic.      Mouth/Throat:      Mouth: Mucous membranes are dry.      Dentition: Abnormal dentition.      Pharynx: Oropharynx is clear. No oropharyngeal exudate.   Eyes:      General: No scleral icterus.     Pupils: Pupils are equal, round, and reactive to light.   Cardiovascular:      Rate and Rhythm: Regular rhythm. Tachycardia present.      Pulses: Normal pulses.   Pulmonary:      Effort: She is intubated.      Breath sounds: Normal air entry.    Abdominal:      General: Bowel sounds are normal.      Palpations: Abdomen is soft.   Musculoskeletal:      Right lower leg: No edema.      Left lower leg: No edema.   Skin:     General: Skin is warm and dry.      Findings: Bruising present.   Neurological:      GCS: GCS eye subscore is 1. GCS verbal subscore is 1. GCS motor subscore is 1.            Vents:  Vent Mode: A/C (06/24/25 2100)  Set Rate: 30 BPM (06/24/25 2100)  Vt Set: 375 mL (06/24/25 2100)  PEEP/CPAP: 5 cmH20 (06/24/25 2100)  Oxygen Concentration (%): 50 (06/24/25 2100)  Peak Airway Pressure: 22 cmH20 (06/24/25 2100)  Plateau Pressure: 18 cmH20 (06/24/25 2100)  Total Ve: 11.5 L/m (06/24/25 2100)  Negative Inspiratory Force (cm H2O): 0 (06/24/25 2100)  F/VT Ratio<105 (RSBI): (!) 73.3 (06/24/25 2100)  Lines/Drains/Airways       Central Venous Catheter Line  Duration                  PowerPort A Cath Single Lumen 06/10/25 0930 Atrial Right 14 days              Drain  Duration                  Biliary Tube 05/24/25 1410 VTCB biliary 12 Fr. RUQ 31 days         Biliary Tube 05/27/25 1329 VTCB biliary 12 Fr. LUQ 28 days         Urethral Catheter 06/24/25 1930 Temperature probe 16 Fr. <1 day              Airway  Duration                  Airway - Non-Surgical 06/24/25 2053 Endotracheal Tube <1 day              Peripheral Intravenous Line  Duration             Peripheral IV Single Lumen 06/24/25 22 G Posterior;Right Hand <1 day                  Significant Labs:    CBC/Anemia Profile:  Recent Labs   Lab 06/24/25  1746 06/24/25  1808 06/24/25  1909 06/24/25  1910   WBC 14.85*  --   --   --    HGB 5.8*  --   --   --    HCT 20.2* 18* 15* 16.7*     --   --   --    *  --   --   --    RDW 17.7*  --   --   --         Chemistries:  Recent Labs   Lab 06/24/25  1746      K 3.7      CO2 5*   BUN 24*   CREATININE 2.1*   CALCIUM 8.1*   ALBUMIN 2.0*   PROT 6.7   BILITOT 1.6*   ALKPHOS 509*   ALT 19   AST 76*       All pertinent labs within the  past 24 hours have been reviewed.    Significant Imaging: I have reviewed all pertinent imaging results/findings within the past 24 hours.

## 2025-06-25 NOTE — CARE UPDATE
Attempted to call patient son at provided number. Voicemail left with direct contact for MICU. Asked to please call ICU as soon as message received. Will continue to attempt to contact patient son.     ÁNGELA Hanley, MSN, APRN, [facility]-BC  Pulmonary Critical Care Medicine   06/24/2025

## 2025-06-25 NOTE — PROVIDER PROGRESS NOTES - EMERGENCY DEPT.
Encounter Date: 6/24/2025    ED Physician Progress Notes          CTA ordered to evaluate for vascular pathology.  Although the patient's GFR is less than 30, I think the benefits of this imaging outweigh the risks of possible contrast induced nephropathy.  Unfortunately, patient not consolable at this time and no relative present at bedside for consent.

## 2025-06-25 NOTE — CONSULTS
[facility] - Medical ICU  Neurosurgery  Consult Note    Inpatient consult to Neurosurgery  Consult performed by: Marley Beckett PA-C  Consult ordered by: Vandana Fournier DNP        Subjective:     Chief Complaint/Reason for Admission: encephalopathy    History of Present Illness: Katey Cerrato is a 69 y.o. female with a PMHx of empty sella syndrome, growth hormone deficiency, grave's disease with exophthalmos, HTN, seizures, PE on eliquis, T2DM, unresectable hilar cholangiocarcinoma with metastasis who presented with encephalopathy. She was found on the ground beside the toilet by her son and altered. She presented to the ED and required intubation upon arrival. CT of her C spine was negative for fractures but showed increased density posterior to C2. She was placed in a c-collar. MRI C spine shows partially calcified ligament and calcified disc protrusion at C2/3. On exam, she is able to move all four extremities to command, non focal. Shakes head no when asked about pain.     Prescriptions Prior to Admission[1]    Review of patient's allergies indicates:   Allergen Reactions    Codeine Nausea Only       Past Medical History:   Diagnosis Date    Cataract     Empty sella syndrome     GHD (growth hormone deficiency)     Glaucoma     Graves' disease with exophthalmos     Hyperlipidemia     Hypertension     Seizures     Thyroid nodule     Thyroid nodule     Type II or unspecified type diabetes mellitus without mention of complication, uncontrolled      Past Surgical History:   Procedure Laterality Date    BREAST BIOPSY      CATARACT EXTRACTION W/  INTRAOCULAR LENS IMPLANT Right 3/15/2021    Procedure: EXTRACTION, CATARACT, WITH IOL INSERTION;  Surgeon: Cj Caban MD;  Location: [facility];  Service: Ophthalmology;  Laterality: Right;    CATARACT EXTRACTION W/  INTRAOCULAR LENS IMPLANT Left 3/29/2021    Procedure: EXTRACTION, CATARACT, WITH IOL INSERTION;  Surgeon: Cj Caban MD;  Location: [facility];   Service: Ophthalmology;  Laterality: Left;    CHOLECYSTECTOMY      ENDOSCOPIC ULTRASOUND OF UPPER GASTROINTESTINAL TRACT N/A 3/10/2025    Procedure: ULTRASOUND, UPPER GI TRACT, ENDOSCOPIC;  Surgeon: Tim Caban MD;  Location: [facility] ENDO (2ND FLR);  Service: Endoscopy;  Laterality: N/A;  2/28: EUS for FNA of lymph nodes-instructions emailed-ch    HYSTERECTOMY      one ovary intact    INJECTION OF JOINT Left 2/6/2024    Procedure: INJECTION, JOINT LEFT KNEE WITH STEROID;  Surgeon: She Schwartz MD;  Location: [facility] PAIN MGT;  Service: Pain Management;  Laterality: Left;  [phone]    INSERTION OF TUNNELED CENTRAL VENOUS CATHETER (CVC) WITH SUBCUTANEOUS PORT N/A 3/25/2025    Procedure: INSERTION, SINGLE LUMEN CATHETER WITH PORT, WITH FLUOROSCOPIC GUIDANCE;  Surgeon: Zaire Glynn MD;  [facility];  Service: General;  Laterality: N/A;    KNEE SURGERY      LYMPH NODE BIOPSY  3010    OOPHORECTOMY       Family History       Problem Relation (Age of Onset)    Breast cancer Mother (78), Sister (60)    Cancer Mother    Cataracts Mother    Glaucoma Mother    Heart disease Mother, Father, Sister    Hypertension Brother    No Known Problems Sister, Brother, Maternal Aunt, Maternal Uncle, Paternal Aunt, Paternal Uncle, Maternal Grandmother, Maternal Grandfather, Paternal Grandmother, Paternal Grandfather, Son, Other    Tremor Mother          Tobacco Use    Smoking status: Never    Smokeless tobacco: Never   Substance and Sexual Activity    Alcohol use: No     Comment: none    Drug use: No    Sexual activity: Yes     Partners: Male     Comment: m  works in dietary,  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  Vital Signs (Most Recent):  Temp: 99.1 °F (37.3 °C) (06/25/25 1430)  Pulse: 103 (06/25/25 1430)  Resp: (!) 6 (06/25/25 1430)  BP: (!) 157/93 (06/24/25 2243)  SpO2: 100 % (06/25/25  "1400) Vital Signs (24h Range):  Temp:  [88.9 °F (31.6 °C)-99.9 °F (37.7 °C)] 99.1 °F (37.3 °C)  Pulse:  [] 103  Resp:  [1-33] 6  SpO2:  [96 %-100 %] 100 %  BP: ()/(39-93) 157/93  Arterial Line BP: ()/(57-76) 132/76     Date 06/25/25 0700 - 06/26/25 0659   Shift 9344-7676 0679-8651 9176-8925 24 Hour Total   INTAKE   I.V.(mL/kg) 661.6(11.7)   661.6(11.7)   IV Piggyback 17.2   17.2   Shift Total(mL/kg) 678.8(12)   678.8(12)   OUTPUT   Urine(mL/kg/hr) 25(0.1)   25   Shift Total(mL/kg) 25(0.4)   25(0.4)   Weight (kg) 56.7 56.7 56.7 56.7              Vent Mode: A/C  Oxygen Concentration (%):  [21-50] 21  Resp Rate Total:  [22 br/min-33 br/min] 22 br/min  Vt Set:  [375 mL-400 mL] 375 mL  PEEP/CPAP:  [5 cmH20] 5 cmH20  Mean Airway Pressure:  [11 suQ29-34 cmH20] 12 cmH20             Biliary Tube 05/24/25 1410 VTCB biliary 12 Fr. RUQ (Active)            Biliary Tube 05/27/25 1329 VTCB biliary 12 Fr. LUQ (Active)            Urethral Catheter 06/24/25 1930 Temperature probe 16 Fr. (Active)   Site Assessment Clean;Intact 06/25/25 0730   Collection Container Urimeter 06/25/25 0730   Securement Method secured to top of thigh w/ adhesive device 06/25/25 0730   Catheter Care Performed yes 06/25/25 0730   Reason for Continuing Urinary Catheterization Critically ill in ICU and requiring hourly monitoring of intake/output 06/25/25 0730   CAUTI Prevention Bundle Securement Device in place with 1" slack;Intact seal between catheter & drainage tubing;Drainage bag/urimeter off the floor;Sheeting clip in use;No dependent loops or kinks;Drainage bag/urimeter not overfilled (<2/3 full);Drainage bag/urimeter below bladder 06/25/25 0730   Output (mL) 25 mL 06/25/25 1027       Trialysis (Dialysis) Catheter 06/24/25 2225 right femoral (Active)   $ Dialysis Supplies Trialysis Catheter (Supply) 06/24/25 2227   Line Necessity Review Hemodynamic instability 06/25/25 0730   Verification by X-ray No 06/25/25 0730   Site Assessment No " swelling;No warmth;No redness;No drainage 06/25/25 0730   Line Securement Device Secured with sutures 06/25/25 0730   Dressing Type CHG impregnated dressing/sponge;Central line dressing 06/25/25 0730   Dressing Status Clean;Dry;Intact 06/25/25 0730   Dressing Intervention First dressing 06/25/25 0730   Date on Dressing 06/24/25 06/25/25 0730   Dressing Due to be Changed 07/01/25 06/25/25 0730   Venous Patency/Care infusing 06/25/25 0730   Arterial Patency/Care infusing 06/25/25 0730   Distal Patency/Care intermittent infusion cap applied 06/25/25 0730   Waveform Not being transduced 06/25/25 0301       Neurosurgery Physical Exam  General: Well developed, well nourished, with ET tube.  Head: Normocephalic.  GCS: Motor: 6/Verbal: T/Eyes: 3 GCS Total: 10 T  Eyes: Pupils equal, round, reactive to light with accomodation, EOMI.   Motor Strength: Moves all extremities to commands and non focal.  Daley: Absent.  Clonus: Absent.  Pulses: 2+ and symmetric radial and dorsalis pedis.  Skin: Skin is warm, dry and intact.    Significant Labs:  Recent Labs   Lab 06/25/25  0318 06/25/25  0754 06/25/25  1418   * 301* 356*    137 135*   K 4.0 4.3 3.5    98 95   CO2 5* 8* 16*   BUN 24* 24* 26*   CREATININE 1.9* 2.1* 2.2*   CALCIUM 6.9* 6.9* 6.7*   MG 1.0*  --  2.0     Recent Labs   Lab 06/25/25  0133 06/25/25  0754 06/25/25  1418   WBC 11.45 13.03* 10.08   HGB 5.9* 9.2* 8.6*   HCT 19.4* 27.5* 24.4*   * 151 143*     Recent Labs   Lab 06/24/25  1907 06/24/25 2024   INR 2.5* 2.7*     Microbiology Results (last 7 days)       Procedure Component Value Units Date/Time    MRSA Screen by PCR [[id number]]  (Normal) Collected: 06/24/25 2139    Order Status: Completed Specimen: Nasal Swab Updated: 06/25/25 1258     MRSA PCR Screen Not Detected    Culture, Respiratory with Gram Stain [[id number]] Updated: 06/25/25 1138    Order Status: Sent Specimen: Respiratory     Blood culture x two cultures. Draw prior to  antibiotics. [[id number]]  (Normal) Collected: 06/24/25 1746    Order Status: Completed Specimen: Blood from Peripheral, Hand, Right Updated: 06/25/25 0302     Blood Culture No Growth After 6 Hours    Respiratory Infection Panel (PCR), Nasopharyngeal [[id number]] Collected: 06/24/25 2139    Order Status: Completed Specimen: Nasopharyngeal Swab Updated: 06/24/25 2252     Respiratory Infection Panel Source Nasopharyngeal Swab     Adenovirus Not Detected     Coronavirus 229E, Common Cold Virus Not Detected     Coronavirus HKU1, Common Cold Virus Not Detected     Coronavirus NL63, Common Cold Virus Not Detected     Coronavirus OC43, Common Cold Virus Not Detected     SARS-CoV2 (COVID-19) Qualitative PCR Not Detected     Human Metapneumovirus Not Detected     Human Rhinovirus/Enterovirus Not Detected     Influenza A Not Detected     Influenza B Not Detected     Parainfluenza Virus 1 Not Detected     Parainfluenza Virus 2 Not Detected     Parainfluenza Virus 3 Not Detected     Parainfluenza Virus 4 Not Detected     Respiratory Syncytial Virus Not Detected     Bordetella Parapertussis (WS9821) Not Detected     Bordetella pertussis (ptxP) Not Detected     Chlamydia pneumoniae Not Detected     Mycoplasma pneumoniae Not Detected    Urine Culture High Risk [[id number]] Collected: 06/24/25 2139    Order Status: Sent Specimen: Urine, Bladder     Blood culture [[id number]]     Order Status: Canceled Specimen: Blood     Blood culture x two cultures. Draw prior to antibiotics. [[id number]] Collected: 06/24/25 1808    Order Status: Sent Specimen: Blood from Peripheral, Hand, Left           All pertinent labs from the last 24 hours have been reviewed.    Significant Diagnostics:  I have reviewed and interpreted all pertinent imaging results/findings within the past 24 hours.    Assessment/Plan:     Cervical lesion  Katey Cerrato is a 69 y.o. female was found on the ground beside the toilet by her son and altered. She presented to  the ED and required intubation upon arrival. CT of her C spine was negative for fractures but showed increased density posterior to C2. She was placed in a c-collar. MRI C spine shows partially calcified ligament and calcified disc protrusion at C2/3.     She moves all four extremities to command. Non focal.     Imaging reviewed, appears to be a calcified ligament and disc behind C2. No acute fractures. No high grade spinal canal stenosis. Okay to discontinue c-collar precautions. No neurosurgical intervention indicated.    Discussed with Dr. Gunderson.        Thank you for your consult. I will sign off. Please contact us if you have any additional questions.    Marley Beckett PA-C  Neurosurgery  [facility] - Medical ICU       [1]   Medications Prior to Admission   Medication Sig Dispense Refill Last Dose/Taking    apixaban (ELIQUIS) 5 mg Tab Take 2 tablets (10 mg total) by mouth 2 (two) times daily for 5 days (until 5/31/2025), THEN starting on 6/1/2025 take 1 tablet (5 mg total) 2 (two) times daily. 70 tablet 2     blood sugar diagnostic Strp To check BG 1 times daily, to use with insurance preferred meter 100 each 3     blood-glucose meter kit To check BG 1 times daily, to use with insurance preferred meter 1 each 0     cyproheptadine (PERIACTIN) 4 mg tablet Take 1 tablet (4 mg total) by mouth 3 (three) times daily. 90 tablet 3     folic acid (FOLVITE) 1 MG tablet Take 1 tablet (1 mg total) by mouth once daily. 90 tablet 3     HYDROmorphone (DILAUDID) 2 MG tablet Take 1 tablet (2 mg total) by mouth 2 (two) times daily as needed for Pain. 14 tablet 0     lancets Misc To check BG 1 times daily, to use with insurance preferred meter 100 each 3     latanoprost 0.005 % ophthalmic solution INSTILL 1 DROP INTO BOTH EYES EVERY EVENING 7.5 mL 3     levETIRAcetam (KEPPRA) 750 MG Tab TAKE 2 TABLETS BY MOUTH ONCE  DAILY (Patient taking differently: Take 1,500 mg by mouth nightly.) 180 tablet 1     [Paused] metoprolol succinate  (TOPROL-XL) 50 MG 24 hr tablet TAKE 1 TABLET BY MOUTH EVERY DAY IN THE EVENING 90 tablet 3     multivitamin-minerals-lutein Tab Take 1 tablet by mouth once daily once daily.       mv-mn/iron/folic acid/herb 190 (VITAMIN D3 COMPLETE ORAL) Take 1 tablet by mouth once daily.       OLANZapine (ZYPREXA) 5 MG tablet TAKE 1 TABLET BY MOUTH EVERY DAY AT NIGHT 90 tablet 2     ondansetron (ZOFRAN-ODT) 4 MG TbDL Take 1 tablet (4 mg total) by mouth 2 (two) times daily as needed (Nausea). 60 tablet 1     promethazine (PHENERGAN) 25 MG tablet Take 1 tablet (25 mg total) by mouth every 6 (six) hours as needed for Nausea. 120 tablet 2     sodium chloride 0.9% (NORMAL SALINE FLUSH) injection 3 mLs by Intra-Catheter route every 6 (six) hours as needed (use 2-3 times daily as needed). 60 mL 11     sodium chloride 0.9% (NORMAL SALINE FLUSH) injection Inject 5 mLs into the vein every 6 (six) hours as needed. (discard remainder of syringe after each use) 420 mL 1

## 2025-06-25 NOTE — ASSESSMENT & PLAN NOTE
Hypotensive at the time of admission with hypothermia and tachycardia. Although; hypotension occurred after RSI. Concern for septic shock vs hypovolemic in the setting of anemia. Lactic acid >12. Given worsening tumor burden and peritoneal carcinomatosis, may also have necrotic tumor contributing to shock.     CT A/P showed: New irregularly-shaped rim-enhancing 5.8 x 2.5 x 3.8 cm, approximately 25 HU right pelvic fluid collection. Leading differential diagnostic considerations include abscess, resolving hematoma, seroma, or perhaps biloma.     -Levophed for MAP >65, titrate as indicated  -Echo pending  -Vanc/zosyn; de escalate as appropriate   -Follow up blood, urine, and sputum cultures   -RIP negative   -Stress dose steroids   -IR consulted to address fluid collection

## 2025-06-25 NOTE — ASSESSMENT & PLAN NOTE
Hgb of 5.8 and repeat of 5.6.    -2 u pRBCS ordered  -Trend Hgb q6h  -No active bleeding visualized, but will place on IV PPI BID to cover for possible GIB. Consult GI if indicated  -Holding AC

## 2025-06-25 NOTE — H&P
[facility]  Critical Care Medicine  History & Physical    Patient Name: Katey Cerrato  MRN: [id number]  Admission Date: 2025  Hospital Length of Stay: 1 days  Code Status: Full Code  Attending Physician: Titi Sanchez MD   Primary Care Provider: Shay Castellano MD   Principal Problem: <principal problem not specified>    Subjective:     HPI:  69 year old female with empty sella syndrome, growth hormone deficiency, grave's disease with exophthalmos, HTN, seizures,  PE on eliquis, T2DM, unresectable hilar cholangiocarcinoma with metastasis who presented with encephalopathy. She had reportedly been in her usual state of health prior to being found on the floor near the toilet. When she was found, there was concern that she hit her head and she was significantly altered. History was limited due to patient condition and no witnesses present at bedside.     Of note she received chemo/immunotherapy last on 6/10 (durvalumab, gemicitabine, and cisplatin)    On arrival, she encephalopathic and agitated, leading to intubation for airway protection. She was acidotic with a lactate >12, hypoglycemic to 28, CO2 < 5, anemic to 5.8, had a leukocytosis to 15, SOFIA with Cr of 2.1 (baseline-1.4), elevated ammonia to 163. AB.9/20/51/6. Head and neck imaging did not show any acute intracranial finidngs but was concerning for minimal increased density posterior to C2 odontoid process and body. This could represent minimal extradural hemorrhage associated with strain or partial tear of the transverse ligament. CXR was without any focal consolidations or evidence of pulmonary edema. CTA C/A/P showed: Small pulmonary embolism within a subsegmental artery in the right lower lobe (previously known), Innumerable hypoattenuating lesions within the liver compatible with history of cholangiocarcinoma, significantly progressed from prior CT 2025.  Innumerable nodules throughout the abdomen compatible with peritoneal  carcinomatosis, significantly progressed from prior, Bilateral transhepatic biliary drains in stable position with similar intrahepatic biliary ductal dilation.    In the ED she was intubated and sedated. She was started of levophed for vasopressor support. 1 u of pRBCs was also ordered. She was given 1 amp of HCO3.    Upon admission to critical care, continuous bicarbonate infusion ordered, nephrology consulted, arterial and central access obtained.     Hospital/ICU Course:  No notes on file     No new subjective & objective note has been filed under this hospital service since the last note was generated.    Assessment/Plan:     Neuro  Seizure disorder  -Continue home Keppra 750 mg BID  -EEG pending  -Follow up prolactin and CK    Encephalopathy  69 yoF with metastatic cholangiocarcinoma who presents with encephalopathy. She was reportedly at her baseline around 1 PM and after her son ran some errands, he found her encephalopathic and on the floor next to the toilet. There were concerns she had hit her head.  On arrival she was encepahlopathic and agitated, which lead to her intubation. She has been hypothermic, tachycardic and hypotensive as low as 73/43. She was acidotic with lactic >12. ABG 6.9/20.6/51/6. Hgb found to be 5.8 with leukocytosis to 14.8. Ammonia elevated to 163 and glucose of 28. She received 1 u PRBCs and was started on levophed for vasopressor support. RIP negative    CT head/cervical spine: No acute intracranial process seen but did show minimal increased density posterior to C2 odontoid process and body. This could represent minimal extradural hemorrhage associated with strain or partial tear of the transverse ligament on the left. CXR prior to intubation was without any consolidations or evidence of edema.    Plan:  -Currently intubated and sedated, wean as tolerated   Daily SAT/SBT  -Lactulose; titrate to 2-3 BM/day   -Treating for infection; see shock plan  -Addressing acidosis with bicard  gtt; nephrology following if HD is needed  -MRI brain pending (to further investigate cervical lesion but will also provide additional neurologic information)    Pulmonary  Acute hypoxemic respiratory failure  Patient with Hypoxic Respiratory failure which is Acute.  she is not on home oxygen. Supplemental oxygen was provided and noted- Vent Mode: A/C  Oxygen Concentration (%):  [24-50] 24  Resp Rate Total:  [30 br/min-32 br/min] 32 br/min  Vt Set:  [375 mL-400 mL] 375 mL  PEEP/CPAP:  [5 cmH20] 5 cmH20  Mean Airway Pressure:  [11 mbV00-99 cmH20] 11 cmH20    .   Signs/symptoms of respiratory failure include- tachypnea. Contributing diagnoses includes - encephalopathy  Labs and images were reviewed. Patient Has recent ABG, which has been reviewed. Will treat underlying causes and adjust management of respiratory failure as follows-     -Intubated on 6/25; wean as tolerated  -See shock plan    Cardiac/Vascular  Shock  Hypotensive at the time of admission with hypothermia and tachycardia. Although; hypotension occurred after RSI. Concern for septic shock vs hypovolemic in the setting of anemia. Lactic acid >12. Given worsening tumor burden and peritoneal carcinomatosis, may also have necrotic tumor contributing to shock.     CT A/P showed: New irregularly-shaped rim-enhancing 5.8 x 2.5 x 3.8 cm, approximately 25 HU right pelvic fluid collection. Leading differential diagnostic considerations include abscess, resolving hematoma, seroma, or perhaps biloma.     -Levophed for MAP >65, titrate as indicated  -Trend lactic  -Echo pending  -Vanc/zosyn; de escalate as appropriate   -Follow up blood, urine, and sputum cultures   -RIP negative   -Stress dose steroids   -IR consulted to address fluid collection     Renal/  Cervical lesion  Pt found on the floor encephalopathic the near toilet, son was concerned she had hit her head. CT cervical spine showed: density posterior to C2 odontoid process and body. This could represent  minimal extradural hemorrhage associated with strain or partial tear of the transverse ligament on the left near axial 63 of series 5. No significant displacement. Chronic ligamentous calcifications could appear similar. No prior studies for comparison.     -Neurosurgery consulted; will obtain MRI cervical spine without contast     SOFIA (acute kidney injury)  Cr on admission 2.1 with baseline of 1.4. Suspect pre renal etiology in the setting of shock.    -Follow up urine studies  -Maintain MAP >65  -Strict I/Os  -Trialysis line placed in the event she requires HD  -Nephrology following; appreciate recs   -Bicarb 100 mEq in 1L D5W    Hematology  Pulmonary embolus  Diagnosed ion May 2025. Had been taking eliquis at home.    CTA chest 6/34: small pulmonary thromboembolism within a subsegmental pulmonary artery branch in the right lower lobe, similar to prior CTA chest 05/23/2025.     -Holding anticoagulation with new onset anemia requiring transfusion; resume as appropriate     Oncology  Anemia  Hgb of 5.8 and repeat of 5.6.    -2 u pRBCS ordered  -Trend Hgb q6h  -No active bleeding visualized, but will place on IV PPI BID to cover for possible GIB. Consult GI if indicated  -Holding AC      Cholangiocarcinoma  Patient with metastatic cholangiocarcinoma, last received durvalumab, cisplatin, gemcitabine on 6/10.     CT C/A/P pelvis on 6/24: Innumerable hypoattenuating lesions within the liver compatible with history of cholangiocarcinoma, significantly progressed from prior CT 05/25/2025.  Innumerable nodules throughout the abdomen compatible with peritoneal carcinomatosis, significantly progressed from prior.       Endocrine  Hyperammonemia  Ammonia 163 at admission    -Lactulose    GI  Biliary obstruction  Chronic biliary drains in place, last exchanged 5/24/25. Imaging this admission showed: Bilateral transhepatic biliary drains in stable position with similar intrahepatic biliary ductal dilation.         Critical Care  Daily Checklist:    A: Awake: RASS Goal/Actual Goal:    Actual:     B: Spontaneous Breathing Trial Performed?     C: SAT & SBT Coordinated?                        D: Delirium: CAM-ICU     E: Early Mobility Performed? No   F: Feeding Goal:    Status:     Current Diet Order   Procedures    Diet NPO      AS: Analgesia/Sedation propofol   T: Thromboembolic Prophylaxis None given anemia   H: HOB > 300 Yes   U: Stress Ulcer Prophylaxis (if needed) PPI   G: Glucose Control none   B: Bowel Function     I: Indwelling Catheter (Lines & Chau) Necessity Port, a line, trialysis, chau   D: De-escalation of Antimicrobials/Pharmacotherapies Vanc/zosyn    Plan for the day/ETD     Code Status:  Family/Goals of Care: Full Code         Critical secondary to Patient has a condition that poses threat to life and bodily function: Severe Respiratory Distress, shock    Critical care was time spent personally by me on the following activities: development of treatment plan with patient or surrogate and bedside caregivers, discussions with consultants, evaluation of patient's response to treatment, examination of patient, ordering and performing treatments and interventions, ordering and review of laboratory studies, ordering and review of radiographic studies, pulse oximetry, re-evaluation of patient's condition. This critical care time did not overlap with that of any other provider or involve time for any procedures.     Jaskaran Quiñones,   Critical Care Medicine  [facility] - Medical ICU

## 2025-06-25 NOTE — ASSESSMENT & PLAN NOTE
69 yoF with metastatic cholangiocarcinoma who presents with encephalopathy. She was reportedly at her baseline around 1 PM and after her son ran some errands, he found her encephalopathic and on the floor next to the toilet. There were concerns she had hit her head.  On arrival she was encepahlopathic and agitated, which lead to her intubation. She has been hypothermic, tachycardic and hypotensive as low as 73/43. She was acidotic with lactic >12. ABG 6.9/20.6/51/6. Hgb found to be 5.8 with leukocytosis to 14.8. Ammonia elevated to 163 and glucose of 28. She received 1 u PRBCs and was started on levophed for vasopressor support. RIP negative    CT head/cervical spine: No acute intracranial process seen but did show minimal increased density posterior to C2 odontoid process and body. This could represent minimal extradural hemorrhage associated with strain or partial tear of the transverse ligament on the left. CXR prior to intubation was without any consolidations or evidence of edema.    Plan:  -Currently intubated and sedated, wean as tolerated   Daily SAT/SBT  -Lactulose; titrate to 2-3 BM/day   -Treating for infection; see shock plan  -Addressing acidosis with bicard gtt; nephrology following if HD is needed  -MRI brain pending (to further investigate cervical lesion but will also provide additional neurologic information)

## 2025-06-25 NOTE — ED NOTES
Received report from JAYLEEN Garcia and JAYLEEN Oneill. Pt care assumed by this nurse and JAYLEEN Brooks.

## 2025-06-25 NOTE — SUBJECTIVE & OBJECTIVE
Objective:     Vital Signs (Most Recent):  Temp: 99 °F (37.2 °C) (06/25/25 0715)  Pulse: (!) 132 (06/25/25 0715)  Resp: 17 (06/25/25 0715)  BP: (!) 157/93 (06/24/25 2243)  SpO2: 97 % (06/25/25 0715) Vital Signs (24h Range):  Temp:  [88.9 °F (31.6 °C)-99 °F (37.2 °C)] 99 °F (37.2 °C)  Pulse:  [] 132  Resp:  [7-33] 17  SpO2:  [97 %-100 %] 97 %  BP: ()/(39-93) 157/93  Arterial Line BP: ()/(57-66) 83/57     Weight: 56.7 kg (125 lb) (06/24/25 1526)  Body mass index is 25.25 kg/m².  Body surface area is 1.54 meters squared.    I/O last 3 completed shifts:  In: 4106.1 [I.V.:786.6; Blood:393.8; IV Piggyback:2925.7]  Out: 175 [Urine:175]     Physical Exam  Constitutional:       General: She is not in acute distress.     Appearance: She is ill-appearing.   HENT:      Head: Normocephalic and atraumatic.      Right Ear: External ear normal.      Left Ear: External ear normal.      Nose: Nose normal.      Mouth/Throat:      Mouth: Mucous membranes are dry.      Comments: ET tube  Eyes:      Extraocular Movements: Extraocular movements intact.      Pupils: Pupils are equal, round, and reactive to light.   Cardiovascular:      Rate and Rhythm: Normal rate and regular rhythm.      Pulses: Normal pulses.      Heart sounds: Normal heart sounds.   Pulmonary:      Effort: Pulmonary effort is normal. No respiratory distress.      Breath sounds: Normal breath sounds. No wheezing, rhonchi or rales.   Abdominal:      General: Abdomen is flat. There is no distension.      Palpations: Abdomen is soft.   Musculoskeletal:      Cervical back: Normal range of motion.      Right lower leg: No edema.      Left lower leg: No edema.   Skin:     General: Skin is warm.      Capillary Refill: Capillary refill takes 2 to 3 seconds.          Significant Labs:  CBC:   Recent Labs   Lab 06/25/25  0133   WBC 11.45   RBC 2.00*   HGB 5.9*   HCT 19.4*   *   MCV 97   MCH 29.5   MCHC 30.4*     CMP:   Recent Labs   Lab 06/25/25  4532    *   CALCIUM 6.9*   ALBUMIN 1.4*   PROT 4.7*      K 4.0   CO2 5*      BUN 24*   CREATININE 1.9*   ALKPHOS 434*   ALT 83*   *   BILITOT 2.0*     All labs within the past 24 hours have been reviewed.    Significant Imaging:  CT: Reviewed

## 2025-06-25 NOTE — CONSULTS
[facility]  Nephrology  Consult Note    Patient Name: Katey Cerrato  MRN: [id number]  Admission Date: 6/24/2025  Hospital Length of Stay: 0 days  Attending Provider: Titi Sanchez MD   Primary Care Physician: Shay Castellano MD  Principal Problem:<principal problem not specified>    Inpatient consult to Nephrology  Consult performed by: David Richey MD  Consult ordered by: Shasta Campos PA-C  Reason for consult: SOFIA        Subjective:     HPI: Patient is a 69-year-old female with Graves disease, cholangiocarcinoma on chemotherapy, diabetes, seizures, hypertension, hyperlipidemia presents via EMS for altered mental status. History is provided by her son who reports that she seemed normal around 1:00 p.m. He went to go do some errands and came back and found that she had fallen down off of the toilet and stated that she hit her head. She was very confused which prompted him to activate EMS. He reports that she has been very weak with her cancer treatment but normally is alert and oriented and this is significantly off her baseline. He reports that she has chronic nausea and vomiting associated with her chemotherapy which is unchanged. No known fever.     Patient required intubated in the ER due to AMS. Labs on arrival showed a hemoglobin of  5.8, WBC of 14.85, Na of 145, K= 3.7, Bicarb of 5, BUN of 24, and a creatinine of 2.1 (1.3 on 6/5/25), ammonia of 163, lactic acid >20.0, and pH of 6.97. She was given one amp of bicarb and lactacted Ringer's solution with improvement in her pH of 7.11.     Past Medical History:   Diagnosis Date    Cataract     Empty sella syndrome     GHD (growth hormone deficiency)     Glaucoma     Graves' disease with exophthalmos     Hyperlipidemia     Hypertension     Seizures     Thyroid nodule     Thyroid nodule     Type II or unspecified type diabetes mellitus without mention of complication, uncontrolled        Past Surgical History:   Procedure Laterality  Date    BREAST BIOPSY      CATARACT EXTRACTION W/  INTRAOCULAR LENS IMPLANT Right 3/15/2021    Procedure: EXTRACTION, CATARACT, WITH IOL INSERTION;  Surgeon: Cj Caban MD;  Location: [facility] OR;  Service: Ophthalmology;  Laterality: Right;    CATARACT EXTRACTION W/  INTRAOCULAR LENS IMPLANT Left 3/29/2021    Procedure: EXTRACTION, CATARACT, WITH IOL INSERTION;  Surgeon: Cj Caban MD;  Location: [facility] OR;  Service: Ophthalmology;  Laterality: Left;    CHOLECYSTECTOMY      ENDOSCOPIC ULTRASOUND OF UPPER GASTROINTESTINAL TRACT N/A 3/10/2025    Procedure: ULTRASOUND, UPPER GI TRACT, ENDOSCOPIC;  Surgeon: Tim Caban MD;  Location: [facility] ENDO (2ND FLR);  Service: Endoscopy;  Laterality: N/A;  2/28: EUS for FNA of lymph nodes-instructions emailed-    HYSTERECTOMY      one ovary intact    INJECTION OF JOINT Left 2/6/2024    Procedure: INJECTION, JOINT LEFT KNEE WITH STEROID;  Surgeon: She Schwartz MD;  Location: [facility] PAIN MGT;  Service: Pain Management;  Laterality: Left;  [phone]    INSERTION OF TUNNELED CENTRAL VENOUS CATHETER (CVC) WITH SUBCUTANEOUS PORT N/A 3/25/2025    Procedure: INSERTION, SINGLE LUMEN CATHETER WITH PORT, WITH FLUOROSCOPIC GUIDANCE;  Surgeon: Zaire Glynn MD;  Location: [facility] OR 2ND FLR;  Service: General;  Laterality: N/A;    KNEE SURGERY      LYMPH NODE BIOPSY  3010    OOPHORECTOMY         Review of patient's allergies indicates:   Allergen Reactions    Codeine Nausea Only     Current Facility-Administered Medications   Medication Frequency    0.9%  NaCl infusion (for blood administration) Q24H PRN    fludrocortisone tablet 100 mcg Daily    hydrocortisone sodium succinate injection 100 mg Q8H    lactated ringers bolus 1,000 mL Once    lactulose 20 gram/30 mL solution Soln 30 g TID    [START ON 6/25/2025] levETIRAcetam injection 750 mg Q12H    NORepinephrine 4 mg in dextrose 5% 250 mL infusion (premix) Continuous    pantoprazole injection 80 mg Once    Followed by     [START ON 6/25/2025] pantoprazole injection 40 mg BID    [START ON 6/25/2025] piperacillin-tazobactam (ZOSYN) 4.5 g in D5W 100 mL IVPB (MB+) Q12H    propofoL (DIPRIVAN) 10 mg/mL infusion     propofol (DIPRIVAN) 10 mg/mL infusion Continuous    sodium bicarbonate 100 mEq in D5W 1,000 mL infusion Continuous    sodium chloride 0.9% flush 10 mL PRN    vancomycin - pharmacy to dose pharmacy to manage frequency    vancomycin 1,250 mg in 0.9% NaCl 250 mL IVPB (admixture device) Once     Family History       Problem Relation (Age of Onset)    Breast cancer Mother (78), Sister (60)    Cancer Mother    Cataracts Mother    Glaucoma Mother    Heart disease Mother, Father, Sister    Hypertension Brother    No Known Problems Sister, Brother, Maternal Aunt, Maternal Uncle, Paternal Aunt, Paternal Uncle, Maternal Grandmother, Maternal Grandfather, Paternal Grandmother, Paternal Grandfather, Son, Other    Tremor Mother          Tobacco Use    Smoking status: Never    Smokeless tobacco: Never   Substance and Sexual Activity    Alcohol use: No     Comment: none    Drug use: No    Sexual activity: Yes     Partners: Male     Comment: m  works in dietary,  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     Review of Systems   Reason unable to perform ROS: Patient intubated.     Objective:     Vital Signs (Most Recent):  Temp: (!) 94 °F (34.4 °C) (06/24/25 2100)  Pulse: 101 (06/24/25 2100)  Resp: (!) 28 (06/24/25 2100)  BP: 109/64 (06/24/25 2100)  SpO2: 100 % (06/24/25 2100) Vital Signs (24h Range):  Temp:  [91.9 °F (33.3 °C)-98.8 °F (37.1 °C)] 94 °F (34.4 °C)  Pulse:  [101-140] 101  Resp:  [21-33] 28  SpO2:  [99 %-100 %] 100 %  BP: ()/(39-72) 109/64     Weight: 56.7 kg (125 lb) (06/24/25 1526)  Body mass index is 25.25 kg/m².  Body surface area is 1.54 meters squared.    No intake/output data recorded.     Physical Exam  Constitutional:       General: She is not in acute distress.     Appearance: She is ill-appearing.   HENT:      Head: Normocephalic  and atraumatic.      Right Ear: External ear normal.      Left Ear: External ear normal.      Nose: Nose normal.      Mouth/Throat:      Mouth: Mucous membranes are dry.      Comments: ET tube  Eyes:      Extraocular Movements: Extraocular movements intact.      Pupils: Pupils are equal, round, and reactive to light.   Cardiovascular:      Rate and Rhythm: Normal rate and regular rhythm.      Pulses: Normal pulses.      Heart sounds: Normal heart sounds.   Pulmonary:      Effort: Pulmonary effort is normal. No respiratory distress.      Breath sounds: Normal breath sounds. No wheezing, rhonchi or rales.   Abdominal:      General: Abdomen is flat. There is no distension.      Palpations: Abdomen is soft.   Musculoskeletal:      Cervical back: Normal range of motion.      Right lower leg: No edema.      Left lower leg: No edema.   Skin:     General: Skin is warm.      Capillary Refill: Capillary refill takes 2 to 3 seconds.          Significant Labs:  CBC:   Recent Labs   Lab 06/24/25  1746 06/24/25  1808 06/24/25  1910   WBC 14.85*  --   --    RBC 2.01*  --   --    HGB 5.8*  --   --    HCT 20.2*   < > 16.7*     --   --    *  --   --    MCH 28.9  --   --    MCHC 28.7*  --   --     < > = values in this interval not displayed.     CMP:   Recent Labs   Lab 06/24/25  1746   GLU 28*   CALCIUM 8.1*   ALBUMIN 2.0*   PROT 6.7      K 3.7   CO2 5*      BUN 24*   CREATININE 2.1*   ALKPHOS 509*   ALT 19   AST 76*   BILITOT 1.6*     All labs within the past 24 hours have been reviewed.    Significant Imaging:  CT: Reviewed    Assessment/Plan:     -High Anion Gap Metabolic acidosis due to lactic acidosis  -Anemia  -Cholangiocarcinoma  -Diabetes  -Seizures  -Hypotension requiring Levophed support  -hyperlipidemia    Plan  -Patient's pH is improving with IV fluids and bicarb push. Recommend to start bicarb drip with 2 amps of bicarb in 1L of D5W.   -Q4H ABGs  -Patient is currently on minimal ventilator  settings and appears dry on exam, Patient will likely benefit from volume expansion  -Recommend blood transfusion to target a hemoglobin of greater than 7.0   -No acute indication for Renal Replacement Therapy at this time, however dialysis consent was obtained from her son.     Thank you for your consult. I will follow-up with patient. Please contact us if you have any additional questions.    David Richey MD  Nephrology  [facility] - Medical ICU

## 2025-06-25 NOTE — ASSESSMENT & PLAN NOTE
Hypotensive at the time of admission with hypothermia and tachycardia. Although; hypotension occurred after RSI. Concern for septic shock vs hypovolemic in the setting of anemia. Lactic acid >12. Given worsening tumor burden and peritoneal carcinomatosis, may also have necrotic tumor contributing to shock.     CT A/P showed: New irregularly-shaped rim-enhancing 5.8 x 2.5 x 3.8 cm, approximately 25 HU right pelvic fluid collection. Leading differential diagnostic considerations include abscess, resolving hematoma, seroma, or perhaps biloma.     -Levophed + Vaso + SDS for MAP >65, titrate as indicated  -Trend lactic, remains elevated    -Echo completed with EF 40-45%  -Vanc/zosyn; de escalate as appropriate   -Follow up blood, urine, sputum, and IR abscess cultures, currently NGTD  -RIP negative

## 2025-06-25 NOTE — CONSULTS
Interventional Radiology  Consult/History & Physical Note    Consult Requested By: Jaskaran Quiñones DO/ Titi Sanchez MD  Reason for Consult: aspiration    SUBJECTIVE:     Chief Complaint:  pelvic fluid collection    History of Present Illness:  Katey Cerrato is a 69 y.o. female with a PMHx of empty sella syndrome, growth hormone deficiency, grave's disease with exophthalmos, HTN, seizures, PE on eliquis, T2DM, unresectable hilar cholangiocarcinoma with metastasis who was admitted on 6/24/2025 for altered mental status. Hospital course notable for ICU admission with intubation. Interventional Radiology has been consulted for image guided percutaneous drain placement/aspiration for management of pelvic fluid collection. Pt has had recent imaging including a CTA C/A/P on 6/24/2025 which revealed a new irregularly-shaped rim-enhancing 5.8 x 2.5 x 3.8 cm, approximately 25 HU right pelvic fluid collection. The pt's WBC is 13.03 and is stable. Blood cultures drawn on 6/24/2025 reveal no growth after 6 hours. Pt is afebrile and hemodynamically stable. She is intubated and sedated.    Review of Systems   Unable to perform ROS: Intubated       Scheduled Meds:   fludrocortisone  100 mcg Per OG tube Daily    hydrocortisone sodium succinate  100 mg Intravenous Q8H    lactulose  30 g Per OG tube TID    levETIRAcetam (Keppra) IV (PEDS and ADULTS)  750 mg Intravenous Q12H    magnesium sulfate 2 g IVPB  2 g Intravenous Once    Followed by    magnesium sulfate 2 g IVPB  2 g Intravenous Once    pantoprazole  40 mg Intravenous BID    piperacillin-tazobactam (Zosyn) IV (PEDS and ADULTS) (extended infusion is not appropriate)  4.5 g Intravenous Q12H     Continuous Infusions:   D10W   Intravenous Continuous PRN        NORepinephrine bitartrate-D5W  0-3 mcg/kg/min Intravenous Continuous 25.5 mL/hr at 06/25/25 0601 0.12 mcg/kg/min at 06/25/25 0601    propofoL  0-50 mcg/kg/min Intravenous Continuous        sodium bicarbonate 100 mEq in D5W 1,000  mL infusion   Intravenous Continuous 125 mL/hr at 06/25/25 0805 New Bag at 06/25/25 0805     PRN Meds:  Current Facility-Administered Medications:     0.9%  NaCl infusion (for blood administration), , Intravenous, Q24H PRN    0.9%  NaCl infusion (for blood administration), , Intravenous, Q24H PRN    D10W, , Intravenous, Continuous PRN    dextrose 50%, 12.5 g, Intravenous, PRN    dextrose 50%, 25 g, Intravenous, PRN    glucagon (human recombinant), 1 mg, Intramuscular, PRN    insulin aspart U-100, 0-5 Units, Subcutaneous, Q4H PRN    sodium chloride 0.9%, 10 mL, Intravenous, PRN    Pharmacy to dose Vancomycin consult, , , Once **AND** vancomycin - pharmacy to dose, , Intravenous, pharmacy to manage frequency    Review of patient's allergies indicates:   Allergen Reactions    Codeine Nausea Only       Past Medical History:   Diagnosis Date    Cataract     Empty sella syndrome     GHD (growth hormone deficiency)     Glaucoma     Graves' disease with exophthalmos     Hyperlipidemia     Hypertension     Seizures     Thyroid nodule     Thyroid nodule     Type II or unspecified type diabetes mellitus without mention of complication, uncontrolled      Past Surgical History:   Procedure Laterality Date    BREAST BIOPSY      CATARACT EXTRACTION W/  INTRAOCULAR LENS IMPLANT Right 3/15/2021    Procedure: EXTRACTION, CATARACT, WITH IOL INSERTION;  Surgeon: Cj Caban MD;  Location: [facility];  Service: Ophthalmology;  Laterality: Right;    CATARACT EXTRACTION W/  INTRAOCULAR LENS IMPLANT Left 3/29/2021    Procedure: EXTRACTION, CATARACT, WITH IOL INSERTION;  Surgeon: Cj Caban MD;  Location: [facility] OR;  Service: Ophthalmology;  Laterality: Left;    CHOLECYSTECTOMY      ENDOSCOPIC ULTRASOUND OF UPPER GASTROINTESTINAL TRACT N/A 3/10/2025    Procedure: ULTRASOUND, UPPER GI TRACT, ENDOSCOPIC;  Surgeon: Tim Caban MD;  Location: [facility] ENDO [address]);  Service: Endoscopy;  Laterality: N/A;  2/28: EUS for FNA of lymph  nodes-instructions emailed-    HYSTERECTOMY      one ovary intact    INJECTION OF JOINT Left 2/6/2024    Procedure: INJECTION, JOINT LEFT KNEE WITH STEROID;  Surgeon: She Schwartz MD;  Location: [facility];  Service: Pain Management;  Laterality: Left;  [phone]    INSERTION OF TUNNELED CENTRAL VENOUS CATHETER (CVC) WITH SUBCUTANEOUS PORT N/A 3/25/2025    Procedure: INSERTION, SINGLE LUMEN CATHETER WITH PORT, WITH FLUOROSCOPIC GUIDANCE;  Surgeon: Zaire Glynn MD;  Location: [facility] OR [address] [place] [address];  Service: General;  Laterality: N/A;    KNEE SURGERY      LYMPH NODE BIOPSY  3010    OOPHORECTOMY       Family History   Problem Relation Name Age of Onset    Cancer Mother      Cataracts Mother      Glaucoma Mother          shunt    Heart disease Mother      Tremor Mother      Breast cancer Mother  78    Heart disease Father 64   chf     Heart disease Sister      No Known Problems Sister      Breast cancer Sister  60    Hypertension Brother      No Known Problems Brother      No Known Problems Maternal Aunt      No Known Problems Maternal Uncle      No Known Problems Paternal Aunt      No Known Problems Paternal Uncle      No Known Problems Maternal Grandmother      No Known Problems Maternal Grandfather      No Known Problems Paternal Grandmother      No Known Problems Paternal Grandfather      No Known Problems Son      No Known Problems Other       Social History[1]    OBJECTIVE:     Vital Signs (Most Recent)  Temp: 99.9 °F (37.7 °C) (06/25/25 0815)  Pulse: (!) 128 (06/25/25 0815)  Resp: (!) 6 (06/25/25 0815)  BP: (!) 157/93 (06/24/25 2243)  SpO2: 97 % (06/25/25 0815)    Physical Exam:  Physical Exam  Constitutional:       General: She is not in acute distress.     Interventions: She is intubated.   HENT:      Head: Normocephalic.   Cardiovascular:      Rate and Rhythm: Tachycardia present.   Pulmonary:      Effort: She is intubated.   Abdominal:      General: There is no distension.   Neurological:       Comments: sedated         Laboratory  I have reviewed all pertinent lab results within the past 24 hours.    ASA/Mallampati  ASA: 3  Mallampati: intubated    Imaging:  Recent imaging studies including CTA C/A/P on 6/24/2025 which was independently reviewed by Dr. Maguire.       ASSESSMENT/PLAN:     Assessment:  69 y.o. female with a PMHx of empty sella syndrome, growth hormone deficiency, grave's disease with exophthalmos, HTN, seizures, PE on eliquis, T2DM, unresectable hilar cholangiocarcinoma with metastasis who has been referred to IR for image guided percutaneous drain placement/aspiration for management of pelvic fluid collection. The procedure was discussed in great detail with the patient's son, Jonny Cerrato, including thorough explanations of the potential risks and benefits of percutaneous drain placement/aspiration. Risks include but are not limited to sepsis, severe infection, hemorrhage, damage to surrounding structures, catheter malfunction, inability to remove catheter, catheter dislodgment and need for additional procedures. Discussed increased risk of bleeding due to INR with patient's son, Jonny Cerrato and Vandana Fournier DNP. The patient is a candidate for image guided percutaneous drain placement/aspiration under general anesthesia. Plan discussed with ordering physician and patient's son who verbalized understanding of the plan and would like to proceed.    Plan:  Will proceed with image guided percutaneous drain placement/aspiration under general anesthesia on 6/25/2025.   Please keep pt NPO.  Primary team is responsible for ordering any labs/cultures to be drawn on fluid sample collected during the procedure.  Anticoagulation history reviewed.   Coagulation labs reviewed. INR 2.7 on 6/24/2025 and plt count 151 on 6/25/2025.  Thank you for the consult. Please contact with questions via [employer] secure chat     Time spent during patient care today was 75 minutes. This includes time spent before  the visit reviewing the chart, discussing case with staff physician and ordering provider, time spent during the face to face patient visit, and time spent after the visit on documentation. Time excludes procedure time.     BRUNILDA Tsang, FNP  Interventional Radiology         [1]   Social History  Tobacco Use    Smoking status: Never    Smokeless tobacco: Never   Substance Use Topics    Alcohol use: No     Comment: none    Drug use: No

## 2025-06-25 NOTE — BRIEF OP NOTE
Radiology Post-Procedure Note    Pre Op Diagnosis: fluid collection    Post Op Diagnosis: same    Procedure: aspiration     Procedure performed by: Kirsty Oscar MD    Written Informed Consent Obtained: Yes    Specimen Removed: YES     Estimated Blood Loss: Minimal    Findings:   Pelvic fluid aspiration with 25 mL removed    Patient tolerated procedure well.    Kirsty Oscar MD  Interventional Radiology

## 2025-06-25 NOTE — ANESTHESIA POSTPROCEDURE EVALUATION
Anesthesia Post Evaluation    Patient: Katey Cerrato    Procedure(s) Performed: * No procedures listed *    Final Anesthesia Type: general      Patient location during evaluation: ICU  Patient participation: No - Unable to Participate, Intubation  Level of consciousness: sedated  Post-procedure vital signs: reviewed and stable      Anesthetic complications: no      Cardiovascular status: hemodynamically stable  Respiratory status: intubated and ventilator  Follow-up not needed.              Vitals Value Taken Time   /100 06/25/25 18:15   Temp 17.8 °C (64.04 °F) 06/25/25 18:14   Pulse 103 06/25/25 18:14   Resp 13 06/25/25 18:13   SpO2 98 % 06/25/25 18:14   Vitals shown include unfiled device data.      No case tracking events are documented in the log.      Pain/Sarita Score: Pain Rating Prior to Med Admin: 8 (6/25/2025  3:43 PM)

## 2025-06-25 NOTE — ASSESSMENT & PLAN NOTE
Pt found on the floor encephalopathic the near toilet, son was concerned she had hit her head. CT cervical spine showed: density posterior to C2 odontoid process and body. This could represent minimal extradural hemorrhage associated with strain or partial tear of the transverse ligament on the left near axial 63 of series 5. No significant displacement. Chronic ligamentous calcifications could appear similar. No prior studies for comparison.     -Neurosurgery consulted; will obtain MRI cervical spine without contast

## 2025-06-25 NOTE — EICU
Virtual ICU Quality Rounds    Admit Date: 6/24/2025  Hospital Day: 1    ICU Day: 15h    24H Vital Sign Range:  Temp:  [88.9 °F (31.6 °C)-99.9 °F (37.7 °C)]   Pulse:  []   Resp:  [1-33]   BP: ()/(39-93)   SpO2:  [96 %-100 %]   Arterial Line BP: ()/(57-66)     VICU Surveillance Screening    Daily review of  line necessity(optional): Central line(s) in place and Urinary catheter in place    Central line type (optional): Trialysis    Central line indications : Vasopressors (in past 24/hrs) and Dialysis/CRRT    Muniz Indications : Critically ill in the intensive care unit requiring hourly urine output monitoring     LDA reconciliation : Yes

## 2025-06-25 NOTE — EICU
Intervention Initiated From:  Bedside    Parisa intervened regarding:  Time-Out    Called to the room for Time out    [x] Verified patient name   [x] Verified patient   [x] Read from armband    Central line and Arterial line    Procedure checklist: Time out flowsheet complete, LDA added, Bedside procedure charge ordered, Other supply charges, and Patient tolerated well.  Trialysis per W Hanley/ I Tobal. Arterial line per H Crista/ W Khanna.

## 2025-06-25 NOTE — ASSESSMENT & PLAN NOTE
Patient with Hypoxic Respiratory failure which is Acute.  she is not on home oxygen. Supplemental oxygen was provided and noted- Vent Mode: A/C  Oxygen Concentration (%):  [24-50] 24  Resp Rate Total:  [30 br/min-32 br/min] 32 br/min  Vt Set:  [375 mL-400 mL] 375 mL  PEEP/CPAP:  [5 cmH20] 5 cmH20  Mean Airway Pressure:  [11 mjE22-51 cmH20] 11 cmH20    .   Signs/symptoms of respiratory failure include- tachypnea. Contributing diagnoses includes - encephalopathy  Labs and images were reviewed. Patient Has recent ABG, which has been reviewed. Will treat underlying causes and adjust management of respiratory failure as follows-     -Intubated on 6/25; wean as tolerated  -See shock plan

## 2025-06-25 NOTE — ASSESSMENT & PLAN NOTE
Patient with metastatic cholangiocarcinoma, last received durvalumab, cisplatin, gemtri on 6/10.     CT C/A/P pelvis on 6/24: Innumerable hypoattenuating lesions within the liver compatible with history of cholangiocarcinoma, significantly progressed from prior CT 05/25/2025.  Innumerable nodules throughout the abdomen compatible with peritoneal carcinomatosis, significantly progressed from prior.

## 2025-06-25 NOTE — ASSESSMENT & PLAN NOTE
Cr on admission 2.1 with baseline of 1.4. Suspect pre renal etiology in the setting of shock.    -Follow up urine studies  -Maintain MAP >65  -Strict I/Os  -Trialysis line placed in the event she requires HD  -Nephrology following; appreciate recs   -Bicarb 100 mEq in 1L D5W

## 2025-06-25 NOTE — PROGRESS NOTES
Pharmacokinetic Initial Assessment: IV Vancomycin    Assessment/Plan:    Initiate intravenous vancomycin with loading dose of 1250 mg once with subsequent doses when random concentrations are less than 20 mcg/mL  Desired empiric serum trough concentration is 10 to 20 mcg/mL  Draw vancomycin random level on 6/25 at 1100, roughly ~12 hours post-dose.  Pharmacy will continue to follow and monitor vancomycin.      Please contact pharmacy at extension [phone] with any questions regarding this assessment.     Thank you for the consult,   Ta Salgado       Patient brief summary:  Katey Cerrato is a 69 y.o. female initiated on antimicrobial therapy with IV Vancomycin for treatment of suspected sepsis    Drug Allergies:   Review of patient's allergies indicates:   Allergen Reactions    Codeine Nausea Only       Actual Body Weight:   56.7 kg    Renal Function:   Estimated Creatinine Clearance: 21.2 mL/min (A) (based on SCr of 1.9 mg/dL (H)).,     Dialysis Method (if applicable):  N/A

## 2025-06-25 NOTE — PLAN OF CARE
[facility] - Medical ICU  Initial Discharge Assessment       Primary Care Provider: Shay Castellano MD    Admission Diagnosis: Altered mental status [R41.82]  Metabolic acidosis [E87.20]  Screening for cardiovascular condition [Z13.6]  Encounter for intubation [Z01.818]    Admission Date: 6/24/2025  Expected Discharge Date:          Payor: [employer] MCARE [facility] / Plan: [employer] CHOICES / Product Type: Medicare Advantage /     Extended Emergency Contact Information  Primary Emergency Contact: AMIE CAM  Mobile Phone: [phone]  Relation: Relative  Preferred language: English   needed? No  Secondary Emergency Contact: Mellisa Keys  Mobile Phone: [phone]  Relation: Relative    Discharge Plan A: (P) Other (on going)  Discharge Plan B: (P) Other (on going)      [id number] - [place] - [address]  [address]  [place] [address]  Phone: [phone] Fax: [phone]    [facility] [place], [place] - [address]  [address]  [facility] [address]  Phone: [phone] Fax: [phone]    [facility] - [place], [place] - [address]  [facility] [address]  Phone: [phone] Fax: [phone]    [facility] - [place], [place] - [address]  [address]  [facility] [address]  Phone: [phone] Fax: [phone]    [facility] - [place], [place] - [address]  [address]  [facility] [address]  Phone: [phone] Fax: [phone]    [facility] - [place], [place] - [address]  [address]  [address]  [facility] [address]  Phone: [phone] Fax: [phone]      Initial Assessment (most recent)       Adult Discharge Assessment - 06/24/25 2145          Discharge Assessment    Assessment Type Discharge Planning Assessment (P)      Confirmed/corrected address, phone number and  insurance No (P)      Reason No family to provide information/patient unable to answer (P)      Source of Information health record (P)      Current cognitive status: Coma/Sedated/Intubated (P)    intubated    Patient currently being followed by outpatient case management? Unable to determine (comments) (P)      Do you have any problems affording any of your prescribed medications? TBD (P)      Discharge Plan A Other (P)    on going    Discharge Plan B Other (P)    on going       Physical Activity    On average, how many days per week do you engage in moderate to strenuous exercise (like a brisk walk)? Patient unable to answer (P)      On average, how many minutes do you engage in exercise at this level? Patient unable to answer (P)         Financial Resource Strain    How hard is it for you to pay for the very basics like food, housing, medical care, and heating? Patient unable to answer (P)         Housing Stability    In the last 12 months, was there a time when you were not able to pay the mortgage or rent on time? Patient unable to answer (P)      At any time in the past 12 months, were you homeless or living in a shelter (including now)? Patient unable to answer (P)         Transportation Needs    In the past 12 months, has lack of transportation kept you from medical appointments or from getting medications? Patient unable to answer (P)      In the past 12 months, has lack of transportation kept you from meetings, work, or from getting things needed for daily living? Patient unable to answer (P)         Food Insecurity    Within the past 12 months, you worried that your food would run out before you got the money to buy more. Patient unable to answer (P)      Within the past 12 months, the food you bought just didn't last and you didn't have money to get more. Patient unable to answer (P)         Stress    Do you feel stress - tense, restless, nervous, or anxious, or unable to sleep at night because your mind  is troubled all the time - these days? Patient unable to answer (P)         Social Isolation    How often do you feel lonely or isolated from those around you?  Patient unable to answer (P)         Alcohol Use    Q1: How often do you have a drink containing alcohol? Patient unable to answer (P)      Q2: How many drinks containing alcohol do you have on a typical day when you are drinking? Patient unable to answer (P)      Q3: How often do you have six or more drinks on one occasion? Patient unable to answer (P)         Utilities    In the past 12 months has the electric, gas, oil, or water company threatened to shut off services in your home? Patient unable to answer (P)         Health Literacy    How often do you need to have someone help you when you read instructions, pamphlets, or other written material from your doctor or pharmacy? Patient unable to respond (P)

## 2025-06-25 NOTE — PLAN OF CARE
69-year-old female with Graves disease, cholangiocarcinoma on chemotherapy, diabetes, seizures, hypertension, hyperlipidemia presents via EMS for altered mental status. History is provided by her son who reports that she seemed normal around 1:00 p.m., he went to go do some errands and came back and found that she had fallen down off of the toilet and stated that she hit her head. She was very confused which prompted him to activate EMS. He reports that she has been very weak with her cancer treatment but normally is alert and oriented and this is significantly off her baseline. He reports that she has chronic nausea and vomiting associated with her chemotherapy which is unchanged. No known fever. History is significantly limited as the patient is altered.     NSGY consulted for CT c spine showing no acute fractures, no subluxation, normal ABELARDO, and slight hyperattenuation posterior to the odontoid favoring pannus.  There appears to be no high grade central canal stenosis.    Rec MRI c spine w/o contrast, will review once complete.  Further care per MICU team.

## 2025-06-25 NOTE — HPI
69 year old female with empty sella syndrome, growth hormone deficiency, grave's disease with exophthalmos, HTN, seizures,  PE on eliquis, T2DM, unresectable hilar cholangiocarcinoma with metastasis who presented with encephalopathy. She had reportedly been in her usual state of health prior to being found on the floor near the toilet. When she was found, there was concern that she hit her head and she was significantly altered. History was limited due to patient condition and no witnesses present at bedside.     Of note she received chemo/immunotherapy last on 6/10 (durvalumab, gemicitabine, and cisplatin)    On arrival, she encephalopathic and agitated, leading to intubation for airway protection. She was acidotic with a lactate >12, hypoglycemic to 28, CO2 < 5, anemic to 5.8, had a leukocytosis to 15, SOFIA with Cr of 2.1 (baseline-1.4), elevated ammonia to 163. AB.9/20/51/6. Head and neck imaging did not show any acute intracranial finidngs but was concerning for minimal increased density posterior to C2 odontoid process and body. This could represent minimal extradural hemorrhage associated with strain or partial tear of the transverse ligament. CXR was without any focal consolidations or evidence of pulmonary edema. CTA C/A/P showed: Small pulmonary embolism within a subsegmental artery in the right lower lobe (previously known), Innumerable hypoattenuating lesions within the liver compatible with history of cholangiocarcinoma, significantly progressed from prior CT 2025.  Innumerable nodules throughout the abdomen compatible with peritoneal carcinomatosis, significantly progressed from prior, Bilateral transhepatic biliary drains in stable position with similar intrahepatic biliary ductal dilation.    In the ED she was intubated and sedated. She was started of levophed for vasopressor support. 1 u of pRBCs was also ordered. She was given 1 amp of HCO3.    Upon admission to critical care, continuous  bicarbonate infusion ordered, nephrology consulted, arterial and central access obtained.

## 2025-06-25 NOTE — PLAN OF CARE
Discharge Planning Assessment:    Patient admitted on: 6-24-25  Chart reviewed today  Care plan discussed with treatment team   ER attending, Dr Padron    Current Dispo: on going.  On mechanical vent and soft restraints currently.  Will transfer to [address] [place] [address], bed 7099  Case management to follow tomorrow

## 2025-06-25 NOTE — PROGRESS NOTES
[facility] - Medical ICU  Nephrology  Progress Note    Patient Name: Katey Cerrato  MRN: [id number]  Admission Date: 6/24/2025  Hospital Length of Stay: 1 days  Attending Provider: Titi Sanchez MD   Primary Care Physician: Shay Castellano MD  Principal Problem:<principal problem not specified>    Subjective:     HPI: Patient is a 69-year-old female with Graves disease, cholangiocarcinoma on chemotherapy, diabetes, seizures, hypertension, hyperlipidemia presents via EMS for altered mental status. History is provided by her son who reports that she seemed normal around 1:00 p.m. He went to go do some errands and came back and found that she had fallen down off of the toilet and stated that she hit her head. She was very confused which prompted him to activate EMS. He reports that she has been very weak with her cancer treatment but normally is alert and oriented and this is significantly off her baseline. He reports that she has chronic nausea and vomiting associated with her chemotherapy which is unchanged. No known fever.     Patient required intubated in the ER due to AMS. Labs on arrival showed a hemoglobin of  5.8, WBC of 14.85, Na of 145, K= 3.7, Bicarb of 5, BUN of 24, and a creatinine of 2.1 (1.3 on 6/5/25), ammonia of 163, lactic acid >20.0, and pH of 6.97. She was given one amp of bicarb and lactacted Ringer's solution with improvement in her pH of 7.11.       Objective:     Vital Signs (Most Recent):  Temp: 99 °F (37.2 °C) (06/25/25 0715)  Pulse: (!) 132 (06/25/25 0715)  Resp: 17 (06/25/25 0715)  BP: (!) 157/93 (06/24/25 2243)  SpO2: 97 % (06/25/25 0715) Vital Signs (24h Range):  Temp:  [88.9 °F (31.6 °C)-99 °F (37.2 °C)] 99 °F (37.2 °C)  Pulse:  [] 132  Resp:  [7-33] 17  SpO2:  [97 %-100 %] 97 %  BP: ()/(39-93) 157/93  Arterial Line BP: ()/(57-66) 83/57     Weight: 56.7 kg (125 lb) (06/24/25 1526)  Body mass index is 25.25 kg/m².  Body surface area is 1.54 meters squared.    I/O last 3  completed shifts:  In: 4106.1 [I.V.:786.6; Blood:393.8; IV Piggyback:2925.7]  Out: 175 [Urine:175]     Physical Exam  Constitutional:       General: She is not in acute distress.     Appearance: She is ill-appearing.   HENT:      Head: Normocephalic and atraumatic.      Right Ear: External ear normal.      Left Ear: External ear normal.      Nose: Nose normal.      Mouth/Throat:      Mouth: Mucous membranes are dry.      Comments: ET tube  Eyes:      Extraocular Movements: Extraocular movements intact.      Pupils: Pupils are equal, round, and reactive to light.   Cardiovascular:      Rate and Rhythm: Normal rate and regular rhythm.      Pulses: Normal pulses.      Heart sounds: Normal heart sounds.   Pulmonary:      Effort: Pulmonary effort is normal. No respiratory distress.      Breath sounds: Normal breath sounds. No wheezing, rhonchi or rales.   Abdominal:      General: Abdomen is flat. There is no distension.      Palpations: Abdomen is soft.   Musculoskeletal:      Cervical back: Normal range of motion.      Right lower leg: No edema.      Left lower leg: No edema.   Skin:     General: Skin is warm.      Capillary Refill: Capillary refill takes 2 to 3 seconds.          Significant Labs:  CBC:   Recent Labs   Lab 06/25/25  0133   WBC 11.45   RBC 2.00*   HGB 5.9*   HCT 19.4*   *   MCV 97   MCH 29.5   MCHC 30.4*     CMP:   Recent Labs   Lab 06/25/25  0318   *   CALCIUM 6.9*   ALBUMIN 1.4*   PROT 4.7*      K 4.0   CO2 5*      BUN 24*   CREATININE 1.9*   ALKPHOS 434*   ALT 83*   *   BILITOT 2.0*     All labs within the past 24 hours have been reviewed.    Significant Imaging:  CT: Reviewed    Assessment/Plan:   High Anion Gap Metabolic acidosis due to lactic acidosis (probably from anemia)  Severe anemia  Acute hypoxic resp failure req intubation  Cholangiocarcinoma - on chemo/immunotherapy  DM2  Seizures  Shock  Hyperlipidemia  Empty sella syndrome  GH deficiency  Graves disease      -68 yo woman found down on floor, intub for airway protection, found to have lactic 12, SOFIA, hyperammoniemia, severely anemic and on pressors.   -IO 4106/175. Most likely this is ATN from her hemodynamic instability. She is almost 5 L positive. UOP remains poor. We will plan to initiate short SLED session for persistent acidemia and volume control. Dialysis consent taken from son.   -cont bicarb drip. pH 7.277, improved  -transfuse blood    Thank you for your consult. I will follow-up with patient. Please contact us if you have any additional questions.    Benjamin Rhodes MD  Nephrology  [facility] - Medical ICU

## 2025-06-25 NOTE — ASSESSMENT & PLAN NOTE
Diagnosed ion May 2025. Had been taking eliquis at home.    CTA chest 6/34: small pulmonary thromboembolism within a subsegmental pulmonary artery branch in the right lower lobe, similar to prior CTA chest 05/23/2025.     -Holding anticoagulation with new onset anemia requiring transfusion; resume as appropriate

## 2025-06-25 NOTE — ASSESSMENT & PLAN NOTE
Katey Cerrato is a 69 y.o. female was found on the ground beside the toilet by her son and altered. She presented to the ED and required intubation upon arrival. CT of her C spine was negative for fractures but showed increased density posterior to C2. She was placed in a c-collar. MRI C spine shows partially calcified ligament and calcified disc protrusion at C2/3.     She moves all four extremities to command. Non focal.     Imaging reviewed, appears to be a calcified ligament and disc behind C2. No acute fractures. No high grade spinal canal stenosis. Okay to discontinue c-collar precautions. No neurosurgical intervention indicated.    Discussed with Dr. Gunderson.

## 2025-06-25 NOTE — SUBJECTIVE & OBJECTIVE
Past Medical History:   Diagnosis Date    Cataract     Empty sella syndrome     GHD (growth hormone deficiency)     Glaucoma     Graves' disease with exophthalmos     Hyperlipidemia     Hypertension     Seizures     Thyroid nodule     Thyroid nodule     Type II or unspecified type diabetes mellitus without mention of complication, uncontrolled        Past Surgical History:   Procedure Laterality Date    BREAST BIOPSY      CATARACT EXTRACTION W/  INTRAOCULAR LENS IMPLANT Right 3/15/2021    Procedure: EXTRACTION, CATARACT, WITH IOL INSERTION;  Surgeon: Cj Caban MD;  Location: [facility] OR;  Service: Ophthalmology;  Laterality: Right;    CATARACT EXTRACTION W/  INTRAOCULAR LENS IMPLANT Left 3/29/2021    Procedure: EXTRACTION, CATARACT, WITH IOL INSERTION;  Surgeon: Cj Caban MD;  Location: [facility] OR;  Service: Ophthalmology;  Laterality: Left;    CHOLECYSTECTOMY      ENDOSCOPIC ULTRASOUND OF UPPER GASTROINTESTINAL TRACT N/A 3/10/2025    Procedure: ULTRASOUND, UPPER GI TRACT, ENDOSCOPIC;  Surgeon: Tim Caban MD;  Location: [facility] ENDO (2ND FLR);  Service: Endoscopy;  Laterality: N/A;  2/28: EUS for FNA of lymph nodes-instructions emailed-    HYSTERECTOMY      one ovary intact    INJECTION OF JOINT Left 2/6/2024    Procedure: INJECTION, JOINT LEFT KNEE WITH STEROID;  Surgeon: She Schwartz MD;  Location: [facility] PAIN MGT;  Service: Pain Management;  Laterality: Left;  [phone]    INSERTION OF TUNNELED CENTRAL VENOUS CATHETER (CVC) WITH SUBCUTANEOUS PORT N/A 3/25/2025    Procedure: INSERTION, SINGLE LUMEN CATHETER WITH PORT, WITH FLUOROSCOPIC GUIDANCE;  Surgeon: Zaire Glynn MD;  Location: [facility] 2ND FLR;  Service: General;  Laterality: N/A;    KNEE SURGERY      LYMPH NODE BIOPSY  3010    OOPHORECTOMY         Review of patient's allergies indicates:   Allergen Reactions    Codeine Nausea Only     Current Facility-Administered Medications   Medication Frequency    0.9%  NaCl infusion (for blood  administration) Q24H PRN    fludrocortisone tablet 100 mcg Daily    hydrocortisone sodium succinate injection 100 mg Q8H    lactated ringers bolus 1,000 mL Once    lactulose 20 gram/30 mL solution Soln 30 g TID    [START ON 6/25/2025] levETIRAcetam injection 750 mg Q12H    NORepinephrine 4 mg in dextrose 5% 250 mL infusion (premix) Continuous    pantoprazole injection 80 mg Once    Followed by    [START ON 6/25/2025] pantoprazole injection 40 mg BID    [START ON 6/25/2025] piperacillin-tazobactam (ZOSYN) 4.5 g in D5W 100 mL IVPB (MB+) Q12H    propofoL (DIPRIVAN) 10 mg/mL infusion     propofol (DIPRIVAN) 10 mg/mL infusion Continuous    sodium bicarbonate 100 mEq in D5W 1,000 mL infusion Continuous    sodium chloride 0.9% flush 10 mL PRN    vancomycin - pharmacy to dose pharmacy to manage frequency    vancomycin 1,250 mg in 0.9% NaCl 250 mL IVPB (admixture device) Once     Family History       Problem Relation (Age of Onset)    Breast cancer Mother (78), Sister (60)    Cancer Mother    Cataracts Mother    Glaucoma Mother    Heart disease Mother, Father, Sister    Hypertension Brother    No Known Problems Sister, Brother, Maternal Aunt, Maternal Uncle, Paternal Aunt, Paternal Uncle, Maternal Grandmother, Maternal Grandfather, Paternal Grandmother, Paternal Grandfather, Son, Other    Tremor Mother          Tobacco Use    Smoking status: Never    Smokeless tobacco: Never   Substance and Sexual Activity    Alcohol use: No     Comment: none    Drug use: No    Sexual activity: Yes     Partners: Male     Comment: m  works in dietary,  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     Review of Systems   Reason unable to perform ROS: Patient intubated.     Objective:     Vital Signs (Most Recent):  Temp: (!) 94 °F (34.4 °C) (06/24/25 2100)  Pulse: 101 (06/24/25 2100)  Resp: (!) 28 (06/24/25 2100)  BP: 109/64 (06/24/25 2100)  SpO2: 100 % (06/24/25 2100) Vital Signs (24h Range):  Temp:  [91.9 °F (33.3 °C)-98.8 °F (37.1 °C)] 94 °F (34.4  °C)  Pulse:  [101-140] 101  Resp:  [21-33] 28  SpO2:  [99 %-100 %] 100 %  BP: ()/(39-72) 109/64     Weight: 56.7 kg (125 lb) (06/24/25 1526)  Body mass index is 25.25 kg/m².  Body surface area is 1.54 meters squared.    No intake/output data recorded.     Physical Exam  Constitutional:       General: She is not in acute distress.     Appearance: She is ill-appearing.   HENT:      Head: Normocephalic and atraumatic.      Right Ear: External ear normal.      Left Ear: External ear normal.      Nose: Nose normal.      Mouth/Throat:      Mouth: Mucous membranes are dry.      Comments: ET tube  Eyes:      Extraocular Movements: Extraocular movements intact.      Pupils: Pupils are equal, round, and reactive to light.   Cardiovascular:      Rate and Rhythm: Normal rate and regular rhythm.      Pulses: Normal pulses.      Heart sounds: Normal heart sounds.   Pulmonary:      Effort: Pulmonary effort is normal. No respiratory distress.      Breath sounds: Normal breath sounds. No wheezing, rhonchi or rales.   Abdominal:      General: Abdomen is flat. There is no distension.      Palpations: Abdomen is soft.   Musculoskeletal:      Cervical back: Normal range of motion.      Right lower leg: No edema.      Left lower leg: No edema.   Skin:     General: Skin is warm.      Capillary Refill: Capillary refill takes 2 to 3 seconds.          Significant Labs:  CBC:   Recent Labs   Lab 06/24/25  1746 06/24/25  1808 06/24/25  1910   WBC 14.85*  --   --    RBC 2.01*  --   --    HGB 5.8*  --   --    HCT 20.2*   < > 16.7*     --   --    *  --   --    MCH 28.9  --   --    MCHC 28.7*  --   --     < > = values in this interval not displayed.     CMP:   Recent Labs   Lab 06/24/25  1746   GLU 28*   CALCIUM 8.1*   ALBUMIN 2.0*   PROT 6.7      K 3.7   CO2 5*      BUN 24*   CREATININE 2.1*   ALKPHOS 509*   ALT 19   AST 76*   BILITOT 1.6*     All labs within the past 24 hours have been reviewed.    Significant  Imaging:  CT: Reviewed

## 2025-06-25 NOTE — PROCEDURES
"Katey Cerrato is a 69 y.o. female patient.    Temp: (!) 89.1 °F (31.7 °C) (25)  Pulse: 95 (25)  Resp: (!) 32 (25)  BP: (!) 157/93 (25 2243)  SpO2: 100 % (25)  Weight: 56.7 kg (125 lb) (25 1526)  Height: 4' 11" (149.9 cm) (25)       Arterial Line    Date/Time: 2025 11:45 PM  Location procedure was performed: [facility] CRITICAL CARE MEDICINE    Performed by: Thomas Tobin MD  Authorized by: Thomas Tobin MD  Consent Done: Yes  Consent: Written consent obtained  Risks and benefits: risks, benefits and alternatives were discussed  Consent given by: son.  Patient understanding: patient states understanding of the procedure being performed  Patient consent: the patient's understanding of the procedure matches consent given  Procedure consent: procedure consent matches procedure scheduled  Relevant documents: relevant documents present and verified  Test results: test results available and properly labeled  Site marked: the operative site was marked  Imaging studies: imaging studies available  Required items: required blood products, implants, devices, and special equipment available  Patient identity confirmed:  and name  Time out: Immediately prior to procedure a "time out" was called to verify the correct patient, procedure, equipment, support staff and site/side marked as required.  Preparation: Patient was prepped and draped in the usual sterile fashion.  Indications: multiple ABGs, respiratory failure and hemodynamic monitoring  Location: left radial  Benjamin's test normal: yes  Needle gauge: 20  Seldinger technique: Seldinger technique used  Number of attempts: 2  Complications: No  Post-procedure: line sutured and dressing applied  Post-procedure CMS: unchanged  Patient tolerance: Patient tolerated the procedure well with no immediate complications          2025    "

## 2025-06-25 NOTE — EICU
"Intervention Initiated From:  [facility] / EICU    Parisa intervened regarding:  Rounding (Video assessment)    Virtual ICU Admission    Admit Date: 2025  LOS: 0  Code Status: Full Code   : 1955  Bed: 7096/7096 A:     Diagnosis: <principal problem not specified>    Patient  has a past medical history of Cataract, Empty sella syndrome, GHD (growth hormone deficiency), Glaucoma, Graves' disease with exophthalmos, Hyperlipidemia, Hypertension, Seizures, Thyroid nodule, Thyroid nodule, and Type II or unspecified type diabetes mellitus without mention of complication, uncontrolled.    Last VS: BP (!) 157/93   Pulse 95   Temp (!) 89.1 °F (31.7 °C)   Resp (!) 32   Ht 4' 11" (1.499 m)   Wt 56.7 kg (125 lb)   SpO2 100%   BMI 25.25 kg/m²       VICU Review    VICU nurse assessment :  Ione completed, LDA documentation reconciliation completed, Skin care/wounds LDA reconciliation, and VTE prophylaxis review        Nursing orders placed : IP ELISA Central Line Care and IP ELISA Peripheral IV Access           "

## 2025-06-25 NOTE — HPI
Patient is a 69-year-old female with Graves disease, cholangiocarcinoma on chemotherapy, diabetes, seizures, hypertension, hyperlipidemia presents via EMS for altered mental status. History is provided by her son who reports that she seemed normal around 1:00 p.m. He went to go do some errands and came back and found that she had fallen down off of the toilet and stated that she hit her head. She was very confused which prompted him to activate EMS. He reports that she has been very weak with her cancer treatment but normally is alert and oriented and this is significantly off her baseline. He reports that she has chronic nausea and vomiting associated with her chemotherapy which is unchanged. No known fever.     Patient required intubated in the ER due to AMS. Labs on arrival showed a hemoglobin of  5.8, WBC of 14.85, Na of 145, K= 3.7, Bicarb of 5, BUN of 24, and a creatinine of 2.1 (1.3 on 6/5/25), ammonia of 163, lactic acid >20.0, and pH of 6.97. She was given one amp of bicarb and lactacted Ringer's solution with improvement in her pH of 7.11.

## 2025-06-26 NOTE — SUBJECTIVE & OBJECTIVE
Objective:     Vital Signs (Most Recent):  Temp: (!) 95.5 °F (35.3 °C) (06/26/25 0708)  Pulse: (!) 111 (06/26/25 0708)  Resp: (!) 22 (06/26/25 0708)  BP: (!) 104/57 (06/26/25 0500)  SpO2: 97 % (06/26/25 0708) Vital Signs (24h Range):  Temp:  [64 °F (17.8 °C)-99.9 °F (37.7 °C)] 95.5 °F (35.3 °C)  Pulse:  [] 111  Resp:  [0-32] 22  SpO2:  [87 %-100 %] 97 %  BP: (101-129)/(57-93) 104/57  Arterial Line BP: ()/(54-83) 88/58     Weight: 56.7 kg (125 lb) (06/24/25 1526)  Body mass index is 25.25 kg/m².  Body surface area is 1.54 meters squared.    I/O last 3 completed shifts:  In: 9625.7 [I.V.:6116.1; Blood:393.8; IV Piggyback:3115.8]  Out: 1242 [Urine:250; Other:992]     Physical Exam  Constitutional:       General: She is not in acute distress.     Appearance: She is ill-appearing.   HENT:      Head: Normocephalic and atraumatic.      Right Ear: External ear normal.      Left Ear: External ear normal.      Nose: Nose normal.      Mouth/Throat:      Mouth: Mucous membranes are dry.      Comments: ET tube  Eyes:      Extraocular Movements: Extraocular movements intact.      Pupils: Pupils are equal, round, and reactive to light.   Cardiovascular:      Rate and Rhythm: Normal rate and regular rhythm.      Pulses: Normal pulses.      Heart sounds: Normal heart sounds.   Pulmonary:      Effort: Pulmonary effort is normal. No respiratory distress.      Breath sounds: Normal breath sounds. No wheezing, rhonchi or rales.   Abdominal:      General: Abdomen is flat. There is no distension.      Palpations: Abdomen is soft.   Musculoskeletal:      Cervical back: Normal range of motion.      Right lower leg: No edema.      Left lower leg: No edema.   Skin:     General: Skin is warm.      Capillary Refill: Capillary refill takes 2 to 3 seconds.          Significant Labs:  CBC:   Recent Labs   Lab 06/26/25  0243   WBC 10.14   RBC 2.77*   HGB 8.0*   HCT 22.7*   *   MCV 82   MCH 28.9   MCHC 35.2     CMP:   Recent  Labs   Lab 06/26/25  0534   *  139*   CALCIUM 6.1*  6.1*   ALBUMIN 1.3*  1.3*   PROT 4.3*   *  134*   K 3.2*  3.2*   CO2 22*  22*   CL 93*  93*   BUN 13  13   CREATININE 1.4  1.4   ALKPHOS 1,018*   *   AST 1,349*   BILITOT 1.4*     All labs within the past 24 hours have been reviewed.    Significant Imaging:  CT: Reviewed

## 2025-06-26 NOTE — ASSESSMENT & PLAN NOTE
Patient with Hypoxic Respiratory failure which is Acute.  she is not on home oxygen. Supplemental oxygen was provided and noted- Vent Mode: A/C  Oxygen Concentration (%):  [21] 21  Resp Rate Total:  [21 br/min-27 br/min] 21 br/min  Vt Set:  [375 mL] 375 mL  PEEP/CPAP:  [5 cmH20] 5 cmH20  Mean Airway Pressure:  [9.2 jxL24-44 cmH20] 10 cmH20    .   Signs/symptoms of respiratory failure include- tachypnea. Contributing diagnoses includes - encephalopathy  Labs and images were reviewed. Patient Has recent ABG, which has been reviewed. Will treat underlying causes and adjust management of respiratory failure as follows-     -Intubated on 6/25; wean as tolerated  -See shock plan

## 2025-06-26 NOTE — ASSESSMENT & PLAN NOTE
Pt found on the floor encephalopathic the near toilet, son was concerned she had hit her head. CT cervical spine showed: density posterior to C2 odontoid process and body. This could represent minimal extradural hemorrhage associated with strain or partial tear of the transverse ligament on the left near axial 63 of series 5. No significant displacement. Chronic ligamentous calcifications could appear similar. No prior studies for comparison.     -Neurosurgery consulted  - MRI completed  - C collar removed

## 2025-06-26 NOTE — PROGRESS NOTES
06/26/25 1515   Treatment   Treatment Type SLED   Treatment Status Restart   Dialysis Machine Number [id number]   Solutions Labeled and Current  Yes   Access Temporary Cath;Right;Femoral   Catheter Dressing Intact  Yes   Alarms Engaged Yes   CRRT Comments 12 hours Sled tx initiated     Report given to primary nurse.

## 2025-06-26 NOTE — HOSPITAL COURSE
Ms. Cerrato was admitted to [facility] for encaphalopathy of unknown origin and shock requiring vasopressors. Remains intubated on 2 vasopressors. S/P IR drainage of fluid collection in pelvis, Cx pending. Remains on BS Abx. Nephrology consulted and RRT initiated. Palliative care consulted. CT head negative for intracranial abnormalities and EEG negative for epileptiform activity. Patient not tolerating dialysis with increasing vasopressors. Code status change to DNR. Vasopressor requirements continued to worsen with difficulty obtaining blood pressure readings. Goals of care discussion held with sons. Expressed concern patient is actively dying and recommending comfort measures. Family in agreement and orders were placed.

## 2025-06-26 NOTE — ASSESSMENT & PLAN NOTE
Cr on admission 2.1 with baseline of 1.4. Suspect pre renal etiology in the setting of shock.    -Follow up urine studies  -Maintain MAP >65  -Strict I/Os  -Nephrology following, started on CRRT but unable to tolerated due to hemodynamic instability

## 2025-06-26 NOTE — PLAN OF CARE
[facility]  Initial Discharge Assessment       Primary Care Provider: Shay Castellano MD    Admission Diagnosis: Altered mental status [R41.82]  Metabolic acidosis [E87.20]  Screening for cardiovascular condition [Z13.6]  Encounter for intubation [Z01.818]    Admission Date: 6/24/2025  Expected Discharge Date: 6/28/2025    SW contacted pt son Jonny [phone] to complete assessment.  Per son, he has recently moved from [place] to stay with pt to help her while she has been sick.  Per son, she has been ill for the last few months.      Per son pt has not had HD at baseline but [place] [facility]   SW discussed Palliative Care consult with son, and the decisions he will have to make.  Per Jonny, he has a brother in school in [place], but Jonny is the primary decision maker.  SW discussed hospice care with son and DNR/DNI status.      D/c dispo tbd pending further GoC discussions and pt's HD requirements.      Transition of Care Barriers: (P) None    Payor: [employer] [facility] / Plan: [employer] CHOICES / Product Type: Medicare Advantage /     Extended Emergency Contact Information  Primary Emergency Contact: Jonny Cerrato  Mobile Phone: [phone]  Relation: Son  Secondary Emergency Contact: AMIE CAM  Mobile Phone: [phone]  Relation: Relative  Preferred language: English   needed? No    Discharge Plan A: (P) Other (tbd)  Discharge Plan B: (P) Other      [id number] - [place], [place] - [address]  [address]  [place] [address]  Phone: [phone] Fax: [phone]    [facility] [place] - [place], [place] - [address]  [address]  [facility] [address]  Phone: [phone] Fax: [phone]    [facility] - [place], [place] - [address]  [address]  [facility] [address]  Phone: [phone] Fax: [phone]    [facility] - [place], [place] - [address]  [address]  [facility] [address]  Phone: [phone] Fax: [phone]    [facility] - [place], [place] - [address]  [address]  [address]  [facility] [address]  Phone: [phone] Fax: [phone]    [facility] - [place], [place] - [address]  [address]  [address]  [facility] [address]  Phone: [phone] Fax: [phone]      Initial Assessment (most recent)       Adult Discharge Assessment - 06/26/25 1132          Discharge Assessment    Assessment Type Discharge Planning Assessment (P)      Confirmed/corrected address, phone number and insurance Yes (P)      Confirmed Demographics Correct on Facesheet (P)      Source of Information family (P)      If unable to respond/provide information was family/caregiver contacted? Yes (P)      Contact Name/Number samir Fuller [phone] (P)      Communicated ISHMAEL with patient/caregiver Yes (P)      People in Home child(mary ellen), adult (P)      Name(s) of People in Home samir Fuller (P)      Facility Arrived From: family/friends (P)      Do you expect to return to your current living situation? Other (see comments) (P)    tbd    Do you have help at home or someone to help you manage your care at home? No (P)      Prior to hospitilization cognitive status: Alert/Oriented;No Deficits (P)      Current cognitive status: Coma/Sedated/Intubated (P)      Walking or Climbing Stairs Difficulty yes (P)      Walking or Climbing Stairs ambulation difficulty, requires equipment (P)      Mobility Management walker (P)      Dressing/Bathing Difficulty no (P)      Home Accessibility stairs to enter home (P)      Number of Stairs, Main Entrance seven (P)      Stair Railings, Main Entrance none (P)      Home Layout Able to live on 1st floor (P)      Equipment Currently Used at Home walker, standard (P)      Patient currently being followed by outpatient case management? No (P)      Do you currently have  service(s) that help you manage your care at home? No (P)      Do you take prescription medications? Yes (P)      Do you have prescription coverage? Yes (P)      Do you have any problems affording any of your prescribed medications? No (P)      Is the patient taking medications as prescribed? yes (P)      Who is going to help you get home at discharge? family/friends (P)      How do you get to doctors appointments? family or friend will provide (P)      Are you on dialysis? No (P)      Do you take coumadin? No (P)      Discharge Plan A Other (P)    tbd    Discharge Plan B Other (P)      DME Needed Upon Discharge  other (see comments) (P)    tbd    Discharge Plan discussed with: Adult children (P)      Transition of Care Barriers None (P)         Physical Activity    On average, how many days per week do you engage in moderate to strenuous exercise (like a brisk walk)? 0 days (P)      On average, how many minutes do you engage in exercise at this level? 0 min (P)         Financial Resource Strain    How hard is it for you to pay for the very basics like food, housing, medical care, and heating? Not very hard (P)         Housing Stability    In the last 12 months, was there a time when you were not able to pay the mortgage or rent on time? No (P)      At any time in the past 12 months, were you homeless or living in a shelter (including now)? No (P)         Transportation Needs    In the past 12 months, has lack of transportation kept you from medical appointments or from getting medications? No (P)      In the past 12 months, has lack of transportation kept you from meetings, work, or from getting things needed for daily living? No (P)         Food Insecurity    Within the past 12 months, you worried that your food would run out before you got the money to buy more. Never true (P)      Within the past 12 months, the food you bought just didn't last and you didn't have money to get more. Never true (P)         Stress     Do you feel stress - tense, restless, nervous, or anxious, or unable to sleep at night because your mind is troubled all the time - these days? Only a little (P)         Social Isolation    How often do you feel lonely or isolated from those around you?  Never (P)         Alcohol Use    Q1: How often do you have a drink containing alcohol? Never (P)      Q2: How many drinks containing alcohol do you have on a typical day when you are drinking? Patient does not drink (P)      Q3: How often do you have six or more drinks on one occasion? Never (P)         Utilities    In the past 12 months has the electric, gas, oil, or water company threatened to shut off services in your home? No (P)         Health Literacy    How often do you need to have someone help you when you read instructions, pamphlets, or other written material from your doctor or pharmacy? Rarely (P)                    Discharge Plan A and Plan B have been determined by review of patient's clinical status, future medical and therapeutic needs, and coverage/benefits for post-acute care in coordination with multidisciplinary team members.    Gwendolyn Koehler CD, MSW, LMSW, RSW   Case Management  [facility]  Email: [email or web address]

## 2025-06-26 NOTE — PROGRESS NOTES
[facility]  Nephrology  Progress Note    Patient Name: Katey Cerrato  MRN: [id number]  Admission Date: 6/24/2025  Hospital Length of Stay: 2 days  Attending Provider: Titi Sanchez MD   Primary Care Physician: Shay Castellano MD  Principal Problem:<principal problem not specified>    Subjective:     HPI: Patient is a 69-year-old female with Graves disease, cholangiocarcinoma on chemotherapy, diabetes, seizures, hypertension, hyperlipidemia presents via EMS for altered mental status. History is provided by her son who reports that she seemed normal around 1:00 p.m. He went to go do some errands and came back and found that she had fallen down off of the toilet and stated that she hit her head. She was very confused which prompted him to activate EMS. He reports that she has been very weak with her cancer treatment but normally is alert and oriented and this is significantly off her baseline. He reports that she has chronic nausea and vomiting associated with her chemotherapy which is unchanged. No known fever.     Patient required intubated in the ER due to AMS. Labs on arrival showed a hemoglobin of  5.8, WBC of 14.85, Na of 145, K= 3.7, Bicarb of 5, BUN of 24, and a creatinine of 2.1 (1.3 on 6/5/25), ammonia of 163, lactic acid >20.0, and pH of 6.97. She was given one amp of bicarb and lactacted Ringer's solution with improvement in her pH of 7.11.       Objective:     Vital Signs (Most Recent):  Temp: (!) 95.5 °F (35.3 °C) (06/26/25 0708)  Pulse: (!) 111 (06/26/25 0708)  Resp: (!) 22 (06/26/25 0708)  BP: (!) 104/57 (06/26/25 0500)  SpO2: 97 % (06/26/25 0708) Vital Signs (24h Range):  Temp:  [64 °F (17.8 °C)-99.9 °F (37.7 °C)] 95.5 °F (35.3 °C)  Pulse:  [] 111  Resp:  [0-32] 22  SpO2:  [87 %-100 %] 97 %  BP: (101-129)/(57-93) 104/57  Arterial Line BP: ()/(54-83) 88/58     Weight: 56.7 kg (125 lb) (06/24/25 1526)  Body mass index is 25.25 kg/m².  Body surface area is 1.54 meters  squared.    I/O last 3 completed shifts:  In: 9625.7 [I.V.:6116.1; Blood:393.8; IV Piggyback:3115.8]  Out: 1242 [Urine:250; Other:992]     Physical Exam  Constitutional:       General: She is not in acute distress.     Appearance: She is ill-appearing.   HENT:      Head: Normocephalic and atraumatic.      Right Ear: External ear normal.      Left Ear: External ear normal.      Nose: Nose normal.      Mouth/Throat:      Mouth: Mucous membranes are dry.      Comments: ET tube  Eyes:      Extraocular Movements: Extraocular movements intact.      Pupils: Pupils are equal, round, and reactive to light.   Cardiovascular:      Rate and Rhythm: Normal rate and regular rhythm.      Pulses: Normal pulses.      Heart sounds: Normal heart sounds.   Pulmonary:      Effort: Pulmonary effort is normal. No respiratory distress.      Breath sounds: Normal breath sounds. No wheezing, rhonchi or rales.   Abdominal:      General: Abdomen is flat. There is no distension.      Palpations: Abdomen is soft.   Musculoskeletal:      Cervical back: Normal range of motion.      Right lower leg: No edema.      Left lower leg: No edema.   Skin:     General: Skin is warm.      Capillary Refill: Capillary refill takes 2 to 3 seconds.          Significant Labs:  CBC:   Recent Labs   Lab 06/26/25  0243   WBC 10.14   RBC 2.77*   HGB 8.0*   HCT 22.7*   *   MCV 82   MCH 28.9   MCHC 35.2     CMP:   Recent Labs   Lab 06/26/25  0534   *  139*   CALCIUM 6.1*  6.1*   ALBUMIN 1.3*  1.3*   PROT 4.3*   *  134*   K 3.2*  3.2*   CO2 22*  22*   CL 93*  93*   BUN 13  13   CREATININE 1.4  1.4   ALKPHOS 1,018*   *   AST 1,349*   BILITOT 1.4*     All labs within the past 24 hours have been reviewed.    Significant Imaging:  CT: Reviewed    Assessment/Plan:   High Anion Gap Metabolic acidosis due to lactic acidosis  Severe anemia  Acute hypoxic resp failure req intubation  Cholangiocarcinoma - on  chemo/immunotherapy  DM2  Seizures  Shock  Hyperlipidemia  Empty sella syndrome  GH deficiency  Graves disease     -68 yo woman found down on floor, intub for airway protection, found to have lactic 12, SOFIA, hyperammoniemia, severely anemic and on pressors.  -currently on levophed 0.12. Insulin drip. FiO2 21% PEEP 5.   -IO 5519/1067. Most likely this is ATN from her hemodynamic instability. She is 8.3 L positive over the last two days. UOP remains minimal. Resume SLED today 12 hr for volume control.     Thank you for your consult. I will follow-up with patient. Please contact us if you have any additional questions.    Benjamin Rhodes MD  Nephrology  [facility]

## 2025-06-26 NOTE — PROGRESS NOTES
Pt did not tolerate ccrt tx and rinsed back by primary RN per primary team order.Only run for 38min. Nephrology informed.

## 2025-06-26 NOTE — ASSESSMENT & PLAN NOTE
69 yoF with metastatic cholangiocarcinoma who presents with encephalopathy. She was reportedly at her baseline around 1 PM and after her son ran some errands, he found her encephalopathic and on the floor next to the toilet. There were concerns she had hit her head.  On arrival she was encepahlopathic and agitated, which lead to her intubation. She has been hypothermic, tachycardic and hypotensive as low as 73/43. She was acidotic with lactic >12. ABG 6.9/20.6/51/6. Hgb found to be 5.8 with leukocytosis to 14.8. Ammonia elevated to 163 and glucose of 28. She received 1 u PRBCs and was started on levophed for vasopressor support. RIP negative    CT head/cervical spine: No acute intracranial process seen but did show minimal increased density posterior to C2 odontoid process and body. This could represent minimal extradural hemorrhage associated with strain or partial tear of the transverse ligament on the left. CXR prior to intubation was without any consolidations or evidence of edema.    Plan:  -Currently intubated and sedated, wean as tolerated   Daily SAT/SBT  -Lactulose; titrate to 2-3 BM/day   -Treating for infection; see shock plan  -On EEG, preliminary read negative for seizure activity  -MRI brain completed to further investigate cervical lesion, NSGY consulted and given cervical spine clearance

## 2025-06-26 NOTE — PLAN OF CARE
Per chart review, son was not contacted Jonny [phone] to complete assessment.  SW contacted son Jonny and son confirmed that he was not contacted to complete assessment.  Per son, he lives with patient and has for the last couple of months.  Per son he is walking into [facility] to come and be with pt; SW to follow-up with son to complete assessment and d/c plan      Discharge Plan A and Plan B have been determined by review of patient's clinical status, future medical and therapeutic needs, and coverage/benefits for post-acute care in coordination with multidisciplinary team members.    Gwendolyn Koehler, [place], MSW, LMSW, RSW   Case Management  [facility]  Email: [email or web address]

## 2025-06-26 NOTE — SUBJECTIVE & OBJECTIVE
Interval History/Significant Events: Remains in two vasopressor shock. Attempted to restart CRRT but had to discontinue due to BP instability. GOC ongoing.     Review of Systems   Unable to perform ROS: Intubated     Objective:     Vital Signs (Most Recent):  Temp: 99.5 °F (37.5 °C) (06/26/25 1500)  Pulse: 109 (06/26/25 1500)  Resp: (!) 22 (06/26/25 1500)  BP: (!) 104/57 (06/26/25 0500)  SpO2: 97 % (06/26/25 1500) Vital Signs (24h Range):  Temp:  [64 °F (17.8 °C)-99.5 °F (37.5 °C)] 99.5 °F (37.5 °C)  Pulse:  [] 109  Resp:  [1-27] 22  SpO2:  [94 %-99 %] 97 %  BP: (101-129)/(57-93) 104/57  Arterial Line BP: ()/(56-83) 120/74   Weight: 56.7 kg (125 lb)  Body mass index is 25.25 kg/m².      Intake/Output Summary (Last 24 hours) at 6/26/2025 1625  Last data filed at 6/26/2025 1515  Gross per 24 hour   Intake 8332.15 ml   Output 1152 ml   Net 7180.15 ml          Physical Exam  Vitals and nursing note reviewed.   Constitutional:       Appearance: She is ill-appearing.      Interventions: She is sedated, intubated and restrained.   HENT:      Head: Normocephalic.   Cardiovascular:      Rate and Rhythm: Regular rhythm. Tachycardia present.      Pulses: Normal pulses.   Pulmonary:      Effort: She is intubated.      Breath sounds: Normal air entry.   Chest:      Comments: Port a cath   Abdominal:      General: Bowel sounds are normal.      Palpations: Abdomen is soft.      Comments: RUQ and LUQ biliary drains in place    Genitourinary:     Comments: Muniz catheter in place  Skin:     General: Skin is warm and dry.      Comments: R femoral trialysis in place   Arterial line in place      Neurological:      Mental Status: She is lethargic.      GCS: GCS eye subscore is 1. GCS verbal subscore is 1. GCS motor subscore is 1.      Comments: Lethargic, difficult to arouse off sedation  Spontaneously moving all extremities  EEG in place             Vents:  Vent Mode: A/C (06/26/25 8976)  Ventilator Initiated: Yes  (06/24/25 2028)  Set Rate: 22 BPM (06/26/25 1148)  Vt Set: 375 mL (06/26/25 1148)  PEEP/CPAP: 5 cmH20 (06/26/25 1148)  Oxygen Concentration (%): 21 (06/26/25 1500)  Peak Airway Pressure: 28 cmH20 (06/26/25 1148)  Plateau Pressure: 19 cmH20 (06/26/25 1148)  Total Ve: 8.24 L/m (06/26/25 1148)  Negative Inspiratory Force (cm H2O): 0 (06/26/25 1148)  F/VT Ratio<105 (RSBI): (!) 15.96 (06/26/25 1148)  Lines/Drains/Airways       Central Venous Catheter Line  Duration                  PowerPort A Cath Single Lumen 06/10/25 0930 Atrial Right 16 days    Trialysis (Dialysis) Catheter 06/24/25 2225 right femoral 1 day              Drain  Duration                  Biliary Tube 05/24/25 1410 VTCB biliary 12 Fr. RUQ 33 days         Biliary Tube 05/27/25 1329 VTCB biliary 12 Fr. LUQ 30 days         Urethral Catheter 06/24/25 1930 Temperature probe 16 Fr. 1 day              Airway  Duration                  Airway - Non-Surgical 06/24/25 1937 Endotracheal Tube 1 day              Arterial Line  Duration             Arterial Line 06/24/25 2230 Left Radial 1 day              Peripheral Intravenous Line  Duration             Peripheral IV Single Lumen 06/24/25 22 G Posterior;Right Hand 2 days    Peripheral IV Single Lumen 06/25/25 1824 20 G 2 1/4 in Long PIVC Anterior;Left Upper Arm <1 day                  Significant Labs:    CBC/Anemia Profile:  Recent Labs   Lab 06/26/25  0243 06/26/25  0913 06/26/25  1306   WBC 10.14 10.30 10.41   HGB 8.0* 7.6* 8.7*   HCT 22.7* 21.7* 24.9*   * 80* 80*   MCV 82 82 82   RDW 15.2* 15.6* 15.4*        Chemistries:  Recent Labs   Lab 06/24/25  1746 06/24/25  2242 06/25/25  0318 06/25/25  0754 06/25/25  2322 06/26/25  0243 06/26/25  0534 06/26/25  0913 06/26/25  1306      < > 140   < > 136   < > 134*  134* 135* 136   K 3.7   < > 4.0   < > 4.0   < > 3.2*  3.2* 3.8 3.4*      < > 100   < > 98   < > 93*  93* 91* 90*   CO2 5*   < > 5*   < > 23   < > 22*  22* 24 25   BUN 24*   < > 24*   <  > 9   < > 13  13 13 14   CREATININE 2.1*   < > 1.9*   < > 0.8   < > 1.4  1.4 1.4 1.5*   CALCIUM 8.1*   < > 6.9*   < > 6.3*   < > 6.1*  6.1* 5.9* 6.5*   ALBUMIN 2.0*  --  1.4*   < > 1.5*  --  1.3*  1.3*  --  1.3*   PROT 6.7  --  4.7*  --   --   --  4.3*  --   --    BILITOT 1.6*  --  2.0*  --   --   --  1.4*  --   --    ALKPHOS 509*  --  434*  --   --   --  1,018*  --   --    ALT 19  --  83*  --   --   --  183*  --   --    AST 76*  --  604*  --   --   --  1,349*  --   --    MG  --   --  1.0*   < > 1.8  --  1.5*  --  2.0   PHOS  --   --  5.6*   < > 1.3*  --  <1.0*  --  3.2    < > = values in this interval not displayed.       All pertinent labs within the past 24 hours have been reviewed.    Significant Imaging:  I have reviewed all pertinent imaging results/findings within the past 24 hours.

## 2025-06-26 NOTE — PROGRESS NOTES
[facility] - Medical ICU  Critical Care Medicine  Progress Note    Patient Name: Katey Cerrato  MRN: [id number]  Admission Date: 2025  Hospital Length of Stay: 2 days  Code Status: Full Code  Attending Provider: Titi Sanchez MD  Primary Care Provider: Shay Castellano MD   Principal Problem: Shock    Subjective:     HPI:  69 year old female with empty sella syndrome, growth hormone deficiency, grave's disease with exophthalmos, HTN, seizures,  PE on eliquis, T2DM, unresectable hilar cholangiocarcinoma with metastasis who presented with encephalopathy. She had reportedly been in her usual state of health prior to being found on the floor near the toilet. When she was found, there was concern that she hit her head and she was significantly altered. History was limited due to patient condition and no witnesses present at bedside.     Of note she received chemo/immunotherapy last on 6/10 (durvalumab, gemicitabine, and cisplatin)    On arrival, she encephalopathic and agitated, leading to intubation for airway protection. She was acidotic with a lactate >12, hypoglycemic to 28, CO2 < 5, anemic to 5.8, had a leukocytosis to 15, SOFIA with Cr of 2.1 (baseline-1.4), elevated ammonia to 163. AB.9/20/51/6. Head and neck imaging did not show any acute intracranial finidngs but was concerning for minimal increased density posterior to C2 odontoid process and body. This could represent minimal extradural hemorrhage associated with strain or partial tear of the transverse ligament. CXR was without any focal consolidations or evidence of pulmonary edema. CTA C/A/P showed: Small pulmonary embolism within a subsegmental artery in the right lower lobe (previously known), Innumerable hypoattenuating lesions within the liver compatible with history of cholangiocarcinoma, significantly progressed from prior CT 2025.  Innumerable nodules throughout the abdomen compatible with peritoneal carcinomatosis, significantly  progressed from prior, Bilateral transhepatic biliary drains in stable position with similar intrahepatic biliary ductal dilation.    In the ED she was intubated and sedated. She was started of levophed for vasopressor support. 1 u of pRBCs was also ordered. She was given 1 amp of HCO3.    Upon admission to critical care, continuous bicarbonate infusion ordered, nephrology consulted, arterial and central access obtained.     Hospital/ICU Course:  Ms. Cerrato was admitted to [facility] for encaphalopathy of unknown origin and shock requiring vasopressors. Remains intubated on 2 vasopressors. S/P IR drainage of fluid collection in pelvis, Cx pending. Remains on BS Abx. Nephrology consulted and RRT initiated. Palliative care consulted.     Interval History/Significant Events: Remains in two vasopressor shock. Attempted to restart CRRT but had to discontinue due to BP instability. GOC ongoing.     Review of Systems   Unable to perform ROS: Intubated     Objective:     Vital Signs (Most Recent):  Temp: 99.5 °F (37.5 °C) (06/26/25 1500)  Pulse: 109 (06/26/25 1500)  Resp: (!) 22 (06/26/25 1500)  BP: (!) 104/57 (06/26/25 0500)  SpO2: 97 % (06/26/25 1500) Vital Signs (24h Range):  Temp:  [64 °F (17.8 °C)-99.5 °F (37.5 °C)] 99.5 °F (37.5 °C)  Pulse:  [] 109  Resp:  [1-27] 22  SpO2:  [94 %-99 %] 97 %  BP: (101-129)/(57-93) 104/57  Arterial Line BP: ()/(56-83) 120/74   Weight: 56.7 kg (125 lb)  Body mass index is 25.25 kg/m².      Intake/Output Summary (Last 24 hours) at 6/26/2025 1625  Last data filed at 6/26/2025 1515  Gross per 24 hour   Intake 8332.15 ml   Output 1152 ml   Net 7180.15 ml          Physical Exam  Vitals and nursing note reviewed.   Constitutional:       Appearance: She is ill-appearing.      Interventions: She is sedated, intubated and restrained.   HENT:      Head: Normocephalic.   Cardiovascular:      Rate and Rhythm: Regular rhythm. Tachycardia present.      Pulses: Normal pulses.   Pulmonary:       Effort: She is intubated.      Breath sounds: Normal air entry.   Chest:      Comments: Port a cath   Abdominal:      General: Bowel sounds are normal.      Palpations: Abdomen is soft.      Comments: RUQ and LUQ biliary drains in place    Genitourinary:     Comments: Muniz catheter in place  Skin:     General: Skin is warm and dry.      Comments: R femoral trialysis in place   Arterial line in place      Neurological:      Mental Status: She is lethargic.      GCS: GCS eye subscore is 1. GCS verbal subscore is 1. GCS motor subscore is 1.      Comments: Lethargic, difficult to arouse off sedation  Spontaneously moving all extremities  EEG in place             Vents:  Vent Mode: A/C (06/26/25 1148)  Ventilator Initiated: Yes (06/24/25 2028)  Set Rate: 22 BPM (06/26/25 1148)  Vt Set: 375 mL (06/26/25 1148)  PEEP/CPAP: 5 cmH20 (06/26/25 1148)  Oxygen Concentration (%): 21 (06/26/25 1500)  Peak Airway Pressure: 28 cmH20 (06/26/25 1148)  Plateau Pressure: 19 cmH20 (06/26/25 1148)  Total Ve: 8.24 L/m (06/26/25 1148)  Negative Inspiratory Force (cm H2O): 0 (06/26/25 1148)  F/VT Ratio<105 (RSBI): (!) 15.96 (06/26/25 1148)  Lines/Drains/Airways       Central Venous Catheter Line  Duration                  PowerPort A Cath Single Lumen 06/10/25 0930 Atrial Right 16 days    Trialysis (Dialysis) Catheter 06/24/25 2225 right femoral 1 day              Drain  Duration                  Biliary Tube 05/24/25 1410 VTCB biliary 12 Fr. RUQ 33 days         Biliary Tube 05/27/25 1329 VTCB biliary 12 Fr. LUQ 30 days         Urethral Catheter 06/24/25 1930 Temperature probe 16 Fr. 1 day              Airway  Duration                  Airway - Non-Surgical 06/24/25 1937 Endotracheal Tube 1 day              Arterial Line  Duration             Arterial Line 06/24/25 2230 Left Radial 1 day              Peripheral Intravenous Line  Duration             Peripheral IV Single Lumen 06/24/25 22 G Posterior;Right Hand 2 days    Peripheral IV Single  Lumen 06/25/25 1824 20 G 2 1/4 in Long PIVC Anterior;Left Upper Arm <1 day                  Significant Labs:    CBC/Anemia Profile:  Recent Labs   Lab 06/26/25  0243 06/26/25  0913 06/26/25  1306   WBC 10.14 10.30 10.41   HGB 8.0* 7.6* 8.7*   HCT 22.7* 21.7* 24.9*   * 80* 80*   MCV 82 82 82   RDW 15.2* 15.6* 15.4*        Chemistries:  Recent Labs   Lab 06/24/25  1746 06/24/25  2242 06/25/25  0318 06/25/25  0754 06/25/25  2322 06/26/25  0243 06/26/25  0534 06/26/25  0913 06/26/25  1306      < > 140   < > 136   < > 134*  134* 135* 136   K 3.7   < > 4.0   < > 4.0   < > 3.2*  3.2* 3.8 3.4*      < > 100   < > 98   < > 93*  93* 91* 90*   CO2 5*   < > 5*   < > 23   < > 22*  22* 24 25   BUN 24*   < > 24*   < > 9   < > 13  13 13 14   CREATININE 2.1*   < > 1.9*   < > 0.8   < > 1.4  1.4 1.4 1.5*   CALCIUM 8.1*   < > 6.9*   < > 6.3*   < > 6.1*  6.1* 5.9* 6.5*   ALBUMIN 2.0*  --  1.4*   < > 1.5*  --  1.3*  1.3*  --  1.3*   PROT 6.7  --  4.7*  --   --   --  4.3*  --   --    BILITOT 1.6*  --  2.0*  --   --   --  1.4*  --   --    ALKPHOS 509*  --  434*  --   --   --  1,018*  --   --    ALT 19  --  83*  --   --   --  183*  --   --    AST 76*  --  604*  --   --   --  1,349*  --   --    MG  --   --  1.0*   < > 1.8  --  1.5*  --  2.0   PHOS  --   --  5.6*   < > 1.3*  --  <1.0*  --  3.2    < > = values in this interval not displayed.       All pertinent labs within the past 24 hours have been reviewed.    Significant Imaging:  I have reviewed all pertinent imaging results/findings within the past 24 hours.    [facility]  Recent Labs   Lab 06/26/25  1242   PH 7.627*   PO2 88   PCO2 25.3*   HCO3 26.5   BE 5*     Assessment/Plan:     Neuro  Seizure disorder  -Continue home Keppra 750 mg BID  -EEG in progress, preliminary reading negative for seizures       Encephalopathy  69 yoF with metastatic cholangiocarcinoma who presents with encephalopathy. She was reportedly at her baseline around 1 PM and after her son ran some  errands, he found her encephalopathic and on the floor next to the toilet. There were concerns she had hit her head.  On arrival she was encepahlopathic and agitated, which lead to her intubation. She has been hypothermic, tachycardic and hypotensive as low as 73/43. She was acidotic with lactic >12. ABG 6.9/20.6/51/6. Hgb found to be 5.8 with leukocytosis to 14.8. Ammonia elevated to 163 and glucose of 28. She received 1 u PRBCs and was started on levophed for vasopressor support. RIP negative    CT head/cervical spine: No acute intracranial process seen but did show minimal increased density posterior to C2 odontoid process and body. This could represent minimal extradural hemorrhage associated with strain or partial tear of the transverse ligament on the left. CXR prior to intubation was without any consolidations or evidence of edema.    Plan:  -Currently intubated and sedated, wean as tolerated   Daily SAT/SBT  -Lactulose; titrate to 2-3 BM/day   -Treating for infection; see shock plan  -On EEG, preliminary read negative for seizure activity  -MRI brain completed to further investigate cervical lesion, NSGY consulted and given cervical spine clearance    Pulmonary  Acute hypoxemic respiratory failure  Patient with Hypoxic Respiratory failure which is Acute.  she is not on home oxygen. Supplemental oxygen was provided and noted- Vent Mode: A/C  Oxygen Concentration (%):  [21] 21  Resp Rate Total:  [21 br/min-27 br/min] 21 br/min  Vt Set:  [375 mL] 375 mL  PEEP/CPAP:  [5 cmH20] 5 cmH20  Mean Airway Pressure:  [9.2 hlJ23-96 cmH20] 10 cmH20    .   Signs/symptoms of respiratory failure include- tachypnea. Contributing diagnoses includes - encephalopathy  Labs and images were reviewed. Patient Has recent ABG, which has been reviewed. Will treat underlying causes and adjust management of respiratory failure as follows-     -Intubated on 6/25; wean as tolerated  -See shock plan    Cardiac/Vascular  * Shock  Hypotensive  at the time of admission with hypothermia and tachycardia. Although; hypotension occurred after RSI. Concern for septic shock vs hypovolemic in the setting of anemia. Lactic acid >12. Given worsening tumor burden and peritoneal carcinomatosis, may also have necrotic tumor contributing to shock.     CT A/P showed: New irregularly-shaped rim-enhancing 5.8 x 2.5 x 3.8 cm, approximately 25 HU right pelvic fluid collection. Leading differential diagnostic considerations include abscess, resolving hematoma, seroma, or perhaps biloma.     -Levophed + Vaso + SDS for MAP >65, titrate as indicated  -Trend lactic, remains elevated    -Echo completed with EF 40-45%  -Vanc/zosyn; de escalate as appropriate   -Follow up blood, urine, sputum, and IR abscess cultures, currently NGTD  -RIP negative     Renal/  Cervical lesion  Pt found on the floor encephalopathic the near toilet, son was concerned she had hit her head. CT cervical spine showed: density posterior to C2 odontoid process and body. This could represent minimal extradural hemorrhage associated with strain or partial tear of the transverse ligament on the left near axial 63 of series 5. No significant displacement. Chronic ligamentous calcifications could appear similar. No prior studies for comparison.     -Neurosurgery consulted  - MRI completed  - C collar removed     SOFIA (acute kidney injury)  Cr on admission 2.1 with baseline of 1.4. Suspect pre renal etiology in the setting of shock.    -Follow up urine studies  -Maintain MAP >65  -Strict I/Os  -Nephrology following, started on CRRT but unable to tolerated due to hemodynamic instability      Hematology  Pulmonary embolus  Diagnosed ion May 2025. Had been taking eliquis at home.    CTA chest 6/34: small pulmonary thromboembolism within a subsegmental pulmonary artery branch in the right lower lobe, similar to prior CTA chest 05/23/2025.     -Holding anticoagulation with new onset anemia requiring transfusion,  elevated INR    Oncology  Anemia  Hgb of 5.8 and repeat of 5.6.    -2 u pRBCS ordered  -Trend Hgb q6h  -No active bleeding visualized, but will place on IV PPI BID to cover for possible GIB. Consult GI if indicated  -Holding AC      Cholangiocarcinoma  Patient with metastatic cholangiocarcinoma, last received durvalumab, cisplatin, gemtri on 6/10.     CT C/A/P pelvis on 6/24: Innumerable hypoattenuating lesions within the liver compatible with history of cholangiocarcinoma, significantly progressed from prior CT 05/25/2025.  Innumerable nodules throughout the abdomen compatible with peritoneal carcinomatosis, significantly progressed from prior.       Endocrine  Hyperammonemia  Ammonia 163 at admission    -Lactulose    GI  Biliary obstruction  Chronic biliary drains in place, last exchanged 5/24/25. Imaging this admission showed: Bilateral transhepatic biliary drains in stable position with similar intrahepatic biliary ductal dilation.        Critical Care Daily Checklist:    A: Awake: RASS Goal/Actual Goal:    Actual:     B: Spontaneous Breathing Trial Performed?  Yes   C: SAT & SBT Coordinated?  Yes                 D: Delirium: CAM-ICU     E: Early Mobility Performed? No   F: Feeding Goal:    Status:     Current Diet Order   Procedures    Diet NPO      AS: Analgesia/Sedation None   T: Thromboembolic Prophylaxis Held at this time   H: HOB > 300 Yes   U: Stress Ulcer Prophylaxis (if needed) protonix   G: Glucose Control SSI   B: Bowel Function Unmeasured Stool Occurrence: 2   I: Indwelling Catheter (Lines & Muniz) Necessity Port a cath   Trialysis  Arterial line   D: De-escalation of Antimicrobials/Pharmacotherapies Continue Abx    Plan for the day/ETD CRRT  Sat/sbt  Ongoing GOC    Code Status:  Family/Goals of Care: Full Code  Sons and nephew updated on POC     Critical Care Time: 70 minutes  Critical secondary to Patient has a condition that poses threat to life and bodily function: Septic Shock      Critical care  was time spent personally by me on the following activities: development of treatment plan with patient or surrogate and bedside caregivers, discussions with consultants, evaluation of patient's response to treatment, examination of patient, ordering and performing treatments and interventions, ordering and review of laboratory studies, ordering and review of radiographic studies, pulse oximetry, re-evaluation of patient's condition. This critical care time did not overlap with that of any other provider or involve time for any procedures.     Vandana Fournier, JOSE GUADALUPE  Critical Care Medicine  [facility]

## 2025-06-26 NOTE — EICU
Virtual ICU Quality Rounds    Admit Date: 6/24/2025  Hospital Day: 2    ICU Day: 1d 13h    24H Vital Sign Range:  Temp:  [64 °F (17.8 °C)-99.7 °F (37.6 °C)]   Pulse:  []   Resp:  [0-32]   BP: (101-129)/(57-93)   SpO2:  [87 %-100 %]   Arterial Line BP: ()/(56-83)     VICU Surveillance Screening    Daily review of  line necessity(optional): Central line(s) in place and Urinary catheter in place    Central line type (optional): Trialysis and Port    Central line indications : Vasopressors (in past 24/hrs) and Dialysis/CRRT    Muniz Indications : Critically ill in the intensive care unit requiring hourly urine output monitoring     LDA reconciliation : Yes

## 2025-06-26 NOTE — PROGRESS NOTES
06/25/25 2331   Treatment   Treatment Type SLED   Treatment Status Discontinued treatment;Blood returned   Dialysis Machine Number [id number]   Dialyzer Time (hours) 4.51   BVP (Liters) 41.5 L   CRRT Comments CRRT completed     CRRT completed w/o issues

## 2025-06-26 NOTE — ASSESSMENT & PLAN NOTE
Diagnosed ion May 2025. Had been taking eliquis at home.    CTA chest 6/34: small pulmonary thromboembolism within a subsegmental pulmonary artery branch in the right lower lobe, similar to prior CTA chest 05/23/2025.     -Holding anticoagulation with new onset anemia requiring transfusion, elevated INR

## 2025-06-26 NOTE — PLAN OF CARE
06/26/25 1429   Rounds   Attendance Provider;Nurse ;;Charge nurse;Physical therapist   Discharge Plan A Other  (tbd pending further goals of care discussion w)   Why the patient remains in the hospital Requires continued medical care   Transition of Care Barriers None     Gwendolyn Koehler CD, MSW, LMSW, RSW   Case Management  [facility]  Email: [email or web address]

## 2025-06-26 NOTE — PLAN OF CARE
MICU DAILY GOALS     Family/Goals of care/Code Status   Code Status: Full Code    24H Vital Sign Range  Temp:  [64 °F (17.8 °C)-99.9 °F (37.7 °C)]   Pulse:  []   Resp:  [0-32]   BP: ()/(57-93)   SpO2:  [87 %-100 %]   Arterial Line BP: ()/(54-83)      Shift Events (include procedures and significant events)   Pt brought to MRI cervical spine. Taken to IR for pelvic fluid retrieval by anesthesia. Returned to MICU and started on 3 hour SLED (to review need for continued SLED by 3 hour trell). Started on insulin gtt.     AWAKE RASS: Goal -    Actual - RASS ([place] Agitation-Sedation Scale): drowsy    Restraint necessity: Removing medical devices   BREATHE SBT: Not attempted    Coordinate A & B, analgesics/sedatives Pain: managed w/ PRN 50mcg fentanyl pushes.   SAT: follows commands on ventilator (wiggle toes)   Delirium CAM-ICU:     Early(intubated/ Progressive (non-intubated) Mobility MOVE Screen (INTUBATED ONLY): Fail    Activity: Activity Management: Patient unable to perform activities   Feeding/Nutrition Diet order: Diet/Nutrition Received: NPO,     Thrombus DVT prophylaxis: VTE Core Measure: Pharmacological prophylaxis initiated/maintained   HOB Elevation Head of Bed (HOB) Positioning: HOB at 30 degrees   Ulcer Prophylaxis GI: yes   Glucose control uncontrolled Glycemic Management: blood glucose monitored   Skin Skin assessment:     Sacrum intact/not altered? Yes  Heels intact/not altered? Yes  Surgical wound? Yes    CHECK ONE!   (no altered skin or altered skin) and sub boxes:  [x] No Altered Skin Integrity Present    [x]Prevention Measures Documented    [] Altered Skin Integrity Present or Discovered   [] LDA already present in EPIC, daily doc completed              [] LDA added if not already in EPIC (describe/stage wound).               [] Wound Image Taken (required on admit,                   transfer/discharge and every Tuesday)    Wound Care Consulted? No   Bowel Function No BM     Indwelling Catheter Necessity      Urethral Catheter 06/24/25 1930 Temperature probe 16 Fr.-Reason for Continuing Urinary Catheterization: Critically ill in ICU and requiring hourly monitoring of intake/output    Trialysis (Dialysis) Catheter 06/24/25 2225 right femoral-Line Necessity Review: Hemodynamic instability     De-escalation Antibiotics No        VS and assessment per flow sheet, patient progressing towards goals as tolerated, plan of care reviewed with patient and son.  Problem: Adult Inpatient Plan of Care  Goal: Plan of Care Review  Outcome: Progressing  Goal: Optimal Comfort and Wellbeing  Outcome: Progressing     Problem: Diabetes Comorbidity  Goal: Blood Glucose Level Within Targeted Range  Outcome: Not Progressing     Problem: Acute Kidney Injury/Impairment  Goal: Fluid and Electrolyte Balance  Outcome: Not Progressing

## 2025-06-26 NOTE — PROGRESS NOTES
Pharmacokinetic Assessment Follow Up: IV Vancomycin    Vancomycin serum concentration assessment(s):    The random level was drawn correctly and can be used to guide therapy at this time. The measurement is within the desired definitive target range of 10 to 20 mcg/mL.    Last dose Vanco 1250 mg on 6/24 @ 2300.  -Random 12 hour label = 25.6, then repeat ~27-hour level = 13.4.   -Patient underwent 4.5 hour SLED treatment overnight   -Scr 1.4, still fluctuating, will continue to pulse dose.       Vancomycin Regimen Plan:    Will re-dose with Vancomycin 750 mg today, and repeat 24-hour random level tomorrow with AM labs.    Drug levels (last 3 results):  Recent Labs   Lab Result Units 06/25/25  1034 06/26/25  0243   Vancomycin Random ug/ml 25.6 13.4       Pharmacy will continue to follow and monitor vancomycin.    Please contact pharmacy at extension [phone] for questions regarding this assessment.    Thank you for the consult,   Noy Briscoe       Patient brief summary:  Katey Cerrato is a 69 y.o. female initiated on antimicrobial therapy with IV Vancomycin for treatment of sepsis    The patient's current regimen is pulse dosing based on fluctuating renal function    Drug Allergies:   Review of patient's allergies indicates:   Allergen Reactions    Codeine Nausea Only       Actual Body Weight:   56.7 kg    Renal Function:   Estimated Creatinine Clearance: 28.8 mL/min (based on SCr of 1.4 mg/dL).,     Dialysis Method (if applicable):  SLED PRN  Underwent 4.5 hour SLED treatment overnight (6/25)    CBC (last 72 hours):  Recent Labs   Lab Result Units 06/24/25  1746 06/24/25  2242 06/25/25  0133 06/25/25  0754 06/25/25  1418 06/25/25  1942 06/26/25  0243   WBC K/uL 14.85* 13.67* 11.45 13.03* 10.08 8.07 10.14   HGB gm/dL 5.8* 5.6* 5.9* 9.2* 8.6* 8.5* 8.0*   Hemoglobin A1c %  --  5.3  --   --   --   --   --    HCT % 20.2* 19.7* 19.4* 27.5* 24.4* 23.8* 22.7*   Platelet Count K/uL 229 169 142* 151 143* 124* 114*   Lymph % %  9.6* 5.8* 5.3* 8.1* 9.5* 12.4* 13.9*   Mono % % 5.3 5.7 5.0 4.2 7.2 4.8 6.1   Eos % % 0.1 0.0 0.0 0.0 0.0 0.0 0.0   Basophil % % 0.1 0.2 0.0 0.1 0.2 0.1 0.2       Metabolic Panel (last 72 hours):  Recent Labs   Lab Result Units 06/24/25  1746 06/24/25  2023 06/24/25  2242 06/25/25  0133 06/25/25  0318 06/25/25  0754 06/25/25  1418 06/25/25  1744 06/25/25  1942 06/25/25  2322 06/26/25  0243 06/26/25  0534   Sodium mmol/L 145  --  142 142 140 137 135*  --  135* 136 136 134*  134*   Urine Sodium mmol/L  --   --   --  66  --   --   --   --   --   --   --   --    Potassium mmol/L 3.7  --  3.4* 3.4* 4.0 4.3 3.5 3.3* 3.5 4.0 3.6 3.2*  3.2*   Chloride mmol/L 101  --  101 100 100 98 95  --  96 98 96 93*  93*   CO2 mmol/L 5*  --  <5* 6* 5* 8* 16*  --  22* 23 22* 22*  22*   Glucose mg/dL 28*  --  165* 221* 247* 301* 356*  --  267* 195* 179* 139*  139*   Glucose, UA   --  Negative  --   --   --   --   --   --   --   --   --   --    BUN mg/dL 24*  --  22 25* 24* 24* 26*  --  16 9 13 13  13   Creatinine mg/dL 2.1*  --  1.9* 1.9* 1.9* 2.1* 2.2*  --  1.3 0.8 1.3 1.4  1.4   Urine Creatinine mg/dL  --   --   --  59.0  --   --   --   --   --   --   --   --    Albumin g/dL 2.0*  --   --   --  1.4*  --  1.5*  --   --  1.5*  --  1.3*  1.3*   Bilirubin Total mg/dL 1.6*  --   --   --  2.0*  --   --   --   --   --   --  1.4*   ALP unit/L 509*  --   --   --  434*  --   --   --   --   --   --  1,018*   AST unit/L 76*  --   --   --  604*  --   --   --   --   --   --  1,349*   ALT unit/L 19  --   --   --  83*  --   --   --   --   --   --  183*   Magnesium  mg/dL  --   --   --   --  1.0*  --  2.0  --   --  1.8  --  1.5*   Phosphorus Level mg/dL  --   --   --   --  5.6*  --  3.5  --   --  1.3*  --  <1.0*       Vancomycin Administrations:  vancomycin given in the last 96 hours                     vancomycin 1,250 mg in 0.9% NaCl 250 mL IVPB (admixture device) (mg) 1,250 mg New Bag 06/24/25 6714                    Microbiologic  Results:  Microbiology Results (last 7 days)       Procedure Component Value Units Date/Time    Culture, Respiratory with Gram Stain [[id number]] Collected: 06/25/25 1934    Order Status: Sent Specimen: Respiratory from Endobronchial Updated: 06/26/25 0213    Aerobic culture [[id number]] Collected: 06/25/25 1723    Order Status: Sent Specimen: Abscess from Pelvis Updated: 06/25/25 2312    Culture, Anaerobe [[id number]] Collected: 06/25/25 1723    Order Status: Sent Specimen: Abscess from Pelvis Updated: 06/25/25 2312    Blood culture x two cultures. Draw prior to antibiotics. [[id number]]  (Normal) Collected: 06/24/25 1746    Order Status: Completed Specimen: Blood from Peripheral, Hand, Right Updated: 06/25/25 2100     Blood Culture No Growth After 24 Hours    MRSA Screen by PCR [[id number]]  (Normal) Collected: 06/24/25 2139    Order Status: Completed Specimen: Nasal Swab Updated: 06/25/25 1258     MRSA PCR Screen Not Detected    Respiratory Infection Panel (PCR), Nasopharyngeal [[id number]] Collected: 06/24/25 2139    Order Status: Completed Specimen: Nasopharyngeal Swab Updated: 06/24/25 2252     Respiratory Infection Panel Source Nasopharyngeal Swab     Adenovirus Not Detected     Coronavirus 229E, Common Cold Virus Not Detected     Coronavirus HKU1, Common Cold Virus Not Detected     Coronavirus NL63, Common Cold Virus Not Detected     Coronavirus OC43, Common Cold Virus Not Detected     SARS-CoV2 (COVID-19) Qualitative PCR Not Detected     Human Metapneumovirus Not Detected     Human Rhinovirus/Enterovirus Not Detected     Influenza A Not Detected     Influenza B Not Detected     Parainfluenza Virus 1 Not Detected     Parainfluenza Virus 2 Not Detected     Parainfluenza Virus 3 Not Detected     Parainfluenza Virus 4 Not Detected     Respiratory Syncytial Virus Not Detected     Bordetella Parapertussis ([id number]) Not Detected     Bordetella pertussis (ptxP) Not Detected     Chlamydia pneumoniae Not Detected      Mycoplasma pneumoniae Not Detected    Urine Culture High Risk [[id number]] Collected: 06/24/25 2139    Order Status: Sent Specimen: Urine, Bladder     Blood culture [[id number]]     Order Status: Canceled Specimen: Blood     Blood culture x two cultures. Draw prior to antibiotics. [[id number]] Collected: 06/24/25 1808    Order Status: Sent Specimen: Blood from Peripheral, Hand, Left

## 2025-06-27 PROBLEM — G93.41 METABOLIC ENCEPHALOPATHY: Status: ACTIVE | Noted: 2025-01-01

## 2025-06-27 PROBLEM — R41.82 ALTERED MENTAL STATUS: Status: ACTIVE | Noted: 2025-01-01

## 2025-06-27 PROBLEM — Z71.89 GOALS OF CARE, COUNSELING/DISCUSSION: Status: ACTIVE | Noted: 2025-01-01

## 2025-06-27 NOTE — ASSESSMENT & PLAN NOTE
Patient with Hypoxic Respiratory failure which is Acute.  she is not on home oxygen. Supplemental oxygen was provided and noted- Vent Mode: Spont  Oxygen Concentration (%):  [21] 21  Resp Rate Total:  [17 br/min-31 br/min] 22 br/min  PEEP/CPAP:  [5 cmH20] 5 cmH20  Pressure Support:  [5 cmH20] 5 cmH20  Mean Airway Pressure:  [7.6 bkM14-90 cmH20] 7.6 cmH20    Signs/symptoms of respiratory failure include- tachypnea. Contributing diagnoses includes - encephalopathy  Labs and images were reviewed. Patient Has recent ABG, which has been reviewed. Will treat underlying causes and adjust management of respiratory failure as follows-     -Intubated on 6/25  -will extubate 6/27 when family ready for comfort measures only  -See shock plan

## 2025-06-27 NOTE — PROGRESS NOTES
Palliative Medicine  Consult Note     Patient Name: Katey Cerrato   MRN: [id number]     Admission Date: 6/24/2025   Hospital Length of Stay: 3     Attending Provider: Titi Sanchez MD   Consulting Provider: Juani Bang MD  Primary Care Physician: Shay Castellano MD     Patient information was obtained from relative(s), past medical records, and primary team.    Assessment/Plan:   Katey Cerrato is a 69 y.o. female on whom Palliative Care is consulted for assistance with clarification of goals of care as it pertains to their diagnoses of metastatic cholangiocarcinoma in the setting of fall at home with head strike and subsequent treatment for acute hypoxemic respiratory failure (s/p intubation), shock, non-reversing encephalopathy and acute anuric renal failure.    Impression:  Patient unable to participate in GoC conversation. Son wants patient to be comfortable and recognizes her QoL is not what she would find acceptable and that he is suffering. Simultaneously he cannot bring himself at this time to discontinue life-sustaining treatments or place limits on care including DNR. Will continue to explore.    Billing:  Complexity Data to Review Risk   1 acute / chronic illness posing a threat to life or bodily function including  Metastatic cholangiocarcinoma Reviewed prior external notes from ICU and Nephrology    Reviewed investigative results including CBC, CMP, and lactic acid    Discussed management of patient with primary team Discussed goals-of-care as it pertains to life-limiting illness     During today's encounter I spent 30min of time on voluntary advance care planning and/or goals of care discussion, providing emotional support, discussing prognosis, and exploring the burden vs benefit of various treatment options.     Recommendations:  Code status discussed by MICU team and older son Jonny, changed to DNR today (6/27). Agree that this would be consistent with family's interpretation of goals to  minimize unnecessary suffering.  Her sons struggle with withdrawing artificial life support despite the context of how it is either helpful or non-beneficial (just as how they are struggling with the idea of liberating her from the ventilator at the end of life according to Dr. Wing's prior conversation). Please do not add additional forms of life support that would not likely bridge her to recovery, as family will likely be against withdrawing additional forms of life support out of a place of deep anticipatory grief. Dialysis would be a form of life support that would not bridge her to recovery of her prior functional status and I would not recommend committing her to this artificial life support if it is going to prolong her dying process and severe debility.  Agree with time-limited trial of CRRT today (6/27). If hypotension occurs despite artificial pressor support on CRRT, agree with Nephrology's recommendations to then indefinitely withhold renal replacement therapy as to avoid unnecessary harm.  At that point, this would be a declaration of her body's end of life despite the heroic, aggressive measures provided by her medical team and reiterate recommendation of optimizing comfort and freeing her from artificial life support that would prolong her suffering and dying process.    #[facility] Discussions  Advance Care Planning   6/27/2025: Juani Bang MD  I met Mrs. Cerrato's nephew, two nieces (including Jontelle) and then eventually youngest son (not Jonny) at bedside. I learned from her family that Mrs. Cerrato lost her  in September, and the family continues to grieve this loss. Seeing Mrs. Cerrato this sick is difficult to watch and youngest son admits that he is not doing well because of this. I shared that I learned that the MICU team is aggressively exploring all ways to get her better, including re-imaging her brain with an MRI. I shared that I spoke with Nephrology as well and learned that in an  "attempt to dialyze her yesterday, her blood pressure dropped dramatically and they had to stop dialysis. They will reattempt to perform CRRT today, and are worried that if she continues to have her blood pressures drop dramatically despite artificial pressor support (both norepinephrine at 0.24 and vasopressin) then they cannot proceed with this life-sustaining treatment. Her nephew and nieces express deep gratefulness for the excellent, aggressive care delivered by the MICU team, and know that if her decline is a reflection of her body shutting down despite these heroic attempts to save her life, then it is ultimately what family would have to accept. Emotional support provided. I shared that while I will not be present over the weekend, will follow up back on Monday, 6/30. Family expressed understanding and sincere gratitude.  6/25/2025: Ajith Wing MD  Contacted patient's son Jonny Cerrato ([phone]) to provide support and explore GoC; patient sedated and intubated at the time. Jonny shares that he moved in with his mother 2-3 months ago from [place] to be her caregiver. She spends most of her day sleeping. She has not been tolerating chemotherapy well. He agrees that currently her quality of life is not one she would find acceptable. He believes that she is suffering and wants to prioritize comfort. She has told him that she is tired and worn out. When explained the difference between ongoing disease-directed care vs comfort-focused care, "which would be what we call Hospice," he stated the latter is "what she needs." However, he does not agree with removal of life-sustaining treatments stating that "only God can decide who lives or dies." Similarly he does not agree with changing Code Status to DNR because "you're not God...only God knows if [CPR] will be successful." Son will be visiting in person tomorrow, but doesn't want to watch her die.     Code status: DNR     Recommendations communicated " directly to primary team on 2025 via in-person handoff.    Subjective:   Brief HPI:   Katey Cerrato is a 69 y.o. female with a PMH of cholangiocarcinoma c/b peritoneal carcinomatosis (significantly progressed from prior imaging one month prior) currently receiving Palliative Chemotherapy, seizure-disorder, and Graves disease BIB EMS for AMS s/p unwitnessed fall at home with head strike. Patient intubated int he ED and during initial workup found to have SOFIA, elevated lactic acid,     Chemotherapy has been difficult for patient to tolerate resulting in weakness and intractable nausea/vomiting.     Initial Assessment:   Patient intubated and sedated; unable to participate in GoC conversation. No objective signs of discomfort or distress.     Contacted patient's son Jonny Cerrato ([phone]) to provide support and explore GoC;  see ACP note above for details.  There is a disconnect between his values and desire for patient to be comfortable, acknowledging that she is currently suffering, yet still wanting to pursue all life-prolonging interventions without limits.     Abell Symptom Assessment (ESAS):   Pain: No objective signs of discomfort or distress  Dyspnea: No objective signs of discomfort or distress; currently intubated  Anxiety: No objective signs of discomfort or distress  Nausea: Unable to assess  Depression: Unable to assess  Anorexia: Unable to assess  Fatigue: Unable to assess  Insomnia: Unable to assess  Restlessness: None  Agitation: None    Karnofsky Performance Scale: 20% - Very sick, hospitalization necessary: active treatment necessary    Biopsychosocial History:  Social History:  Housing: son moved in 2-3 months ago to live with patient and be her caregiver    Family:  Spouse/Partner: ;   1 year ago  Children: 1 son (Jonny), 1 adult son who lives in [place]    Self-identified support system: son    Functional status:   Sleeps all day; spends all day in bed  Dependent  "on son for ADLs    Activities / Hobbies / Interests:   All she enjoys these days is sleeping    Substance use:  Social History     Tobacco Use    Smoking status: Never    Smokeless tobacco: Never   Substance and Sexual Activity    Alcohol use: No     Comment: none    Drug use: No    Sexual activity: Yes     Partners: Male     Comment: m  works in dietary,  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     Spirituality:  Martine: Amish  Influence / importance: "very" ; lots of Latter day language used during conversation with son  Case to be discussed with Palliative Care , Thelma Lloyd    Objective:     Past Medical History:   Diagnosis Date    Cataract     Empty sella syndrome     GHD (growth hormone deficiency)     Glaucoma     Graves' disease with exophthalmos     Hyperlipidemia     Hypertension     Seizures     Thyroid nodule     Thyroid nodule     Type II or unspecified type diabetes mellitus without mention of complication, uncontrolled      Past Surgical History:   Procedure Laterality Date    BREAST BIOPSY      CATARACT EXTRACTION W/  INTRAOCULAR LENS IMPLANT Right 3/15/2021    Procedure: EXTRACTION, CATARACT, WITH IOL INSERTION;  Surgeon: Cj Caban MD;  Location: [facility];  Service: Ophthalmology;  Laterality: Right;    CATARACT EXTRACTION W/  INTRAOCULAR LENS IMPLANT Left 3/29/2021    Procedure: EXTRACTION, CATARACT, WITH IOL INSERTION;  Surgeon: Cj Caban MD;  Location: [facility] OR;  Service: Ophthalmology;  Laterality: Left;    CHOLECYSTECTOMY      ENDOSCOPIC ULTRASOUND OF UPPER GASTROINTESTINAL TRACT N/A 3/10/2025    Procedure: ULTRASOUND, UPPER GI TRACT, ENDOSCOPIC;  Surgeon: Tim Caban MD;  Location: [facility] ENDO ([address] [place] [address]);  Service: Endoscopy;  Laterality: N/A;  2/28: EUS for FNA of lymph nodes-instructions emailed-ch    HYSTERECTOMY      one ovary intact    INJECTION OF JOINT Left 2/6/2024    Procedure: INJECTION, JOINT LEFT KNEE WITH STEROID;  Surgeon: She Schwartz MD;  Location: [facility];  Service: Pain Management;  Laterality: Left;  [phone]    INSERTION OF TUNNELED CENTRAL VENOUS CATHETER (CVC) WITH SUBCUTANEOUS PORT N/A 3/25/2025    Procedure: INSERTION, SINGLE LUMEN CATHETER WITH PORT, WITH FLUOROSCOPIC GUIDANCE;  Surgeon: Zaire Glynn MD;  Location: [facility] OR [address] [place] [address];  Service: General;  Laterality: N/A;    KNEE SURGERY      LYMPH NODE BIOPSY  3010    OOPHORECTOMY       Family History   Problem Relation Name Age of Onset    Cancer Mother      Cataracts Mother      Glaucoma Mother          shunt    Heart disease Mother      Tremor Mother      Breast cancer Mother  78    Heart disease Father 64   chf     Heart disease Sister      No Known Problems Sister      Breast cancer Sister  60    Hypertension Brother      No Known Problems Brother      No Known Problems Maternal Aunt      No Known Problems Maternal Uncle      No Known Problems Paternal Aunt      No Known Problems Paternal Uncle      No Known Problems Maternal Grandmother      No Known Problems Maternal Grandfather      No Known Problems Paternal Grandmother      No Known Problems Paternal Grandfather      No Known Problems Son      No Known Problems Other       Review of patient's allergies indicates:   Allergen Reactions    Codeine Nausea Only       Medications:  Continuous Infusions:    sodium chloride 0.9%   Intravenous Continuous        NORepinephrine bitartrate-D5W  0-3 mcg/kg/min Intravenous Continuous 7.4 mL/hr at 06/27/25 1546 0.28 mcg/kg/min at 06/27/25 1546    vasopressin  0.04 Units/min Intravenous Continuous 12 mL/hr at 06/27/25 1019 0.04 Units/min at 06/27/25 1019       Scheduled Meds:    artificial tears  1 drop Both Eyes TID    fludrocortisone  100 mcg Per OG tube Daily    hydrocortisone sodium succinate  100 mg Intravenous Q8H    lactulose  30 g Per OG tube TID    levETIRAcetam (Keppra) IV (PEDS and ADULTS)  750 mg Intravenous Q12H    pantoprazole  40 mg Intravenous BID    piperacillin-tazobactam (Zosyn) IV  (PEDS and ADULTS) (extended infusion is not appropriate)  4.5 g Intravenous Q12H       PRN Meds:  Current Facility-Administered Medications:     0.9%  NaCl infusion (for blood administration), , Intravenous, Q24H PRN    0.9%  NaCl infusion (for blood administration), , Intravenous, Q24H PRN    dextrose 50%, 12.5 g, Intravenous, PRN    glucagon (human recombinant), 1 mg, Intramuscular, PRN    insulin aspart U-100, 0-5 Units, Subcutaneous, Q6H PRN    magnesium sulfate 2 g IVPB, 2 g, Intravenous, PRN    sodium chloride 0.9%, 10 mL, Intravenous, PRN    sodium phosphate 20.1 mmol in D5W 250 mL IVPB, 20.1 mmol, Intravenous, PRN    sodium phosphate 30 mmol in D5W 250 mL IVPB, 30 mmol, Intravenous, PRN    sodium phosphate 39.9 mmol in D5W 250 mL IVPB, 39.9 mmol, Intravenous, PRN    Pharmacy to dose Vancomycin consult, , , Once **AND** vancomycin - pharmacy to dose, , Intravenous, pharmacy to manage frequency     Vitals: Temp: 99.7 °F (37.6 °C) (06/27/25 1330)  Pulse: 78 (06/27/25 1615)  Resp: (!) 26 (06/27/25 1615)  BP: (!) 104/57 (06/26/25 0500)  SpO2: (!) 84 % (06/27/25 1615)    Physical Exam:  Constitutional:       General: NAD. They appear comfortable. Sedated.      Appearance: They are ill-appearing. They are not diaphoretic.   HENT:      Head: Normocephalic and atraumatic.   Pulmonary:      Effort: No respiratory distress. No increased work of breathing.      RR 18; intubated  Skin:     General: Skin is warm and dry.      Coloration: Skin is not jaundiced.      Findings: Bruising present on extremities.   Neurological:      General: Patient is sedated.     Labs:   Creatinine   Date Value Ref Range Status   06/27/2025 2.2 (H) 0.5 - 1.4 mg/dL Final     POC Creatinine   Date Value Ref Range Status   06/24/2025 2.0 (H) 0.5 - 1.4 mg/dL Final      Hemoglobin   Date Value Ref Range Status   03/19/2025 10.5 (L) 12.0 - 16.0 g/dL Final     HGB   Date Value Ref Range Status   06/27/2025 8.1 (L) 12.0 - 16.0 gm/dL Final       Albumin   Date Value Ref Range Status   06/27/2025 1.3 (L) 3.5 - 5.2 g/dL Final   06/27/2025 1.3 (L) 3.5 - 5.2 g/dL Final   06/27/2025 1.3 (L) 3.5 - 5.2 g/dL Final   06/27/2025 1.3 (L) 3.5 - 5.2 g/dL Final   03/19/2025 2.2 (L) 3.5 - 5.2 g/dL Final   02/26/2025 2.7 (L) 3.5 - 5.2 g/dL Final   02/19/2025 2.3 (L) 3.5 - 5.2 g/dL Final        Imaging & Investigations: reviewed on 06/27/2025    Thank you for your consult. Will not plan to see over the weekend.    SignedJuani MD

## 2025-06-27 NOTE — CARE UPDATE
Attempted to call son to give update regarding the patient's inability to tolerate CRRT today. No answer and voicemail full. Will try again.    Lucy Arambula [facility] HospitalLISSY-BC  Pulmonary Critical Care  06/26/2025

## 2025-06-27 NOTE — PROGRESS NOTES
[facility] Medical [facility]  Critical Care Medicine  Progress Note    Patient Name: Katey Cerrato  MRN: [id number]  Admission Date: 2025  Hospital Length of Stay: 3 days  Code Status: DNR  Attending Provider: Titi Sanchez MD  Primary Care Provider: Shay Castellano MD   Principal Problem: Shock    Subjective:     HPI:  69 year old female with empty sella syndrome, growth hormone deficiency, grave's disease with exophthalmos, HTN, seizures,  PE on eliquis, T2DM, unresectable hilar cholangiocarcinoma with metastasis who presented with encephalopathy. She had reportedly been in her usual state of health prior to being found on the floor near the toilet. When she was found, there was concern that she hit her head and she was significantly altered. History was limited due to patient condition and no witnesses present at bedside.     Of note she received chemo/immunotherapy last on 6/10 (durvalumab, gemicitabine, and cisplatin)    On arrival, she encephalopathic and agitated, leading to intubation for airway protection. She was acidotic with a lactate >12, hypoglycemic to 28, CO2 < 5, anemic to 5.8, had a leukocytosis to 15, SOFIA with Cr of 2.1 (baseline-1.4), elevated ammonia to 163. AB.9/20/51/6. Head and neck imaging did not show any acute intracranial finidngs but was concerning for minimal increased density posterior to C2 odontoid process and body. This could represent minimal extradural hemorrhage associated with strain or partial tear of the transverse ligament. CXR was without any focal consolidations or evidence of pulmonary edema. CTA C/A/P showed: Small pulmonary embolism within a subsegmental artery in the right lower lobe (previously known), Innumerable hypoattenuating lesions within the liver compatible with history of cholangiocarcinoma, significantly progressed from prior CT 2025.  Innumerable nodules throughout the abdomen compatible with peritoneal carcinomatosis, significantly progressed  from prior, Bilateral transhepatic biliary drains in stable position with similar intrahepatic biliary ductal dilation.    In the ED she was intubated and sedated. She was started of levophed for vasopressor support. 1 u of pRBCs was also ordered. She was given 1 amp of HCO3.    Upon admission to critical care, continuous bicarbonate infusion ordered, nephrology consulted, arterial and central access obtained.     Hospital/ICU Course:  Ms. Cerrato was admitted to [facility] for encaphalopathy of unknown origin and shock requiring vasopressors. Remains intubated on 2 vasopressors. S/P IR drainage of fluid collection in pelvis, Cx pending. Remains on BS Abx. Nephrology consulted and RRT initiated. Palliative care consulted. CT head negative for intracranial abnormalities and EEG negative for epileptiform activity. Patient not tolerating dialysis with increasing vasopressors. Code status change to DNR. Vasopressor requirements continued to worsen with difficulty obtaining blood pressure readings. Goals of care discussion held with sons. Expressed concern patient is actively dying and recommending comfort measures. Family in agreement and orders were placed.     Interval History/Significant Events: See hospital course for recent events.     Review of Systems   Unable to perform ROS: Intubated     Objective:     Vital Signs (Most Recent):  Temp: 99.7 °F (37.6 °C) (06/27/25 1330)  Pulse: (!) 135 (06/27/25 1715)  Resp: (!) 22 (06/27/25 1715)  BP: (!) 104/57 (06/26/25 0500)  SpO2: 98 % (06/27/25 1700) Vital Signs (24h Range):  Temp:  [99 °F (37.2 °C)-100.2 °F (37.9 °C)] 99.7 °F (37.6 °C)  Pulse:  [] 135  Resp:  [2-29] 22  SpO2:  [90 %-100 %] 98 %  Arterial Line BP: ()/(-8-85) 94/83   Weight: 56.7 kg (125 lb)  Body mass index is 25.25 kg/m².      Intake/Output Summary (Last 24 hours) at 6/27/2025 1727  Last data filed at 6/27/2025 1709  Gross per 24 hour   Intake 858.47 ml   Output 755 ml   Net 103.47 ml           Physical Exam  Vitals and nursing note reviewed.   Constitutional:       Appearance: She is ill-appearing.      Interventions: She is sedated, intubated and restrained.   HENT:      Head: Normocephalic.   Cardiovascular:      Rate and Rhythm: Regular rhythm. Tachycardia present.      Pulses: Normal pulses.   Pulmonary:      Effort: She is intubated.      Breath sounds: Normal air entry.   Chest:      Comments: Port a cath   Abdominal:      General: Bowel sounds are normal.      Palpations: Abdomen is soft.      Comments: RUQ and LUQ biliary drains in place    Skin:     General: Skin is warm and dry.      Comments: R femoral trialysis in place   Arterial line in place      Neurological:      Comments: Breathing over the ventilator. No withdrawal to pain in all four extremities.             Vents:  Vent Mode: Spont (06/27/25 1155)  Ventilator Initiated: Yes (06/24/25 2028)  Set Rate: 14 BPM (06/27/25 0723)  Vt Set: 375 mL (06/26/25 1242)  Pressure Support: 5 cmH20 (06/27/25 1155)  PEEP/CPAP: 5 cmH20 (06/27/25 1155)  Oxygen Concentration (%): 21 (06/27/25 1715)  Peak Airway Pressure: 11 cmH20 (06/27/25 1155)  Plateau Pressure: 19 cmH20 (06/27/25 1155)  Total Ve: 16.3 L/m (06/27/25 1155)  Negative Inspiratory Force (cm H2O): 0 (06/27/25 1155)  F/VT Ratio<105 (RSBI): (!) 28.5 (06/27/25 1155)  Lines/Drains/Airways       Central Venous Catheter Line  Duration                  PowerPort A Cath Single Lumen 06/10/25 0930 Atrial Right 17 days    Trialysis (Dialysis) Catheter 06/24/25 2225 right femoral 2 days              Drain  Duration                  Biliary Tube 05/24/25 1410 VTCB biliary 12 Fr. RUQ 34 days         Biliary Tube 05/27/25 1329 VTCB biliary 12 Fr. LUQ 31 days         Rectal Tube 06/26/25 1100 rectal tube w/ balloon (indicate number of mLs) 1 day              Airway  Duration                  Airway - Non-Surgical 06/24/25 1937 Endotracheal Tube 2 days              Arterial Line  Duration              Arterial Line 06/24/25 2230 Left Radial 2 days              Peripheral Intravenous Line  Duration             Peripheral IV Single Lumen 06/24/25 22 G Posterior;Right Hand 3 days    Peripheral IV Single Lumen 06/25/25 1824 20 G 2 1/4 in Long PIVC Anterior;Left Upper Arm 1 day                  Significant Labs:    CBC/Anemia Profile:  Recent Labs   Lab 06/26/25  2303 06/27/25  0241 06/27/25  0809 06/27/25  1324   WBC 13.38* 13.07*  --  15.50*   HGB 8.5* 8.2*  --  8.1*   HCT 24.8* 23.9*  --  23.9*   PLT 73* 66*  --  62*   MCV 85 85  --  85   RDW 16.0* 16.1*  --  16.6*   RETIC  --   --  2.0  --         Chemistries:  Recent Labs   Lab 06/26/25  2303 06/27/25  0241 06/27/25  1100 06/27/25  1324 06/27/25  1559   * 136  136 136 136  --    K 4.7 4.8  4.8 5.2* 5.2*  --    CL 95 94*  94* 94* 96  --    CO2 16* 14*  14* 10* 8*  --    BUN 15 16  16 19 20  --    CREATININE 1.7* 1.8*  1.8* 2.1* 2.2*  --    CALCIUM 6.5* 6.7*  6.7* 6.7* 6.6*  --    ALBUMIN 1.3* 1.3*  1.3* 1.3* 1.3* 1.3*   PROT  --  4.8* 4.8*  --  4.7*   BILITOT  --  1.7* 1.6*  --  1.6*   ALKPHOS  --  1,256* 1,241*  --  1,147*   ALT  --  191* 171*  --  170*   AST  --  853* 756*  --  722*   MG 1.9 1.9  --  2.0  --    PHOS 3.3 3.7  --  5.1*  --        All pertinent labs within the past 24 hours have been reviewed.    Significant Imaging:  I have reviewed all pertinent imaging results/findings within the past 24 hours.    ABG  Recent Labs   Lab 06/27/25  1550   PH 7.300*   PO2 107*   PCO2 12.2*   HCO3 6.0*   BE -20*     Assessment/Plan:     Neuro  Seizure disorder  -Continue home Keppra 750 mg BID  -EEG in progress, preliminary reading negative for seizures       Metabolic encephalopathy  69 yoF with metastatic cholangiocarcinoma who presents with encephalopathy. She was reportedly at her baseline around 1 PM and after her son ran some errands, he found her encephalopathic and on the floor next to the toilet. There were concerns she had hit her head.  On  arrival she was encepahlopathic and agitated, which lead to her intubation. She has been hypothermic, tachycardic and hypotensive as low as 73/43. She was acidotic with lactic >12. ABG 6.9/20.6/51/6. Hgb found to be 5.8 with leukocytosis to 14.8. Ammonia elevated to 163 and glucose of 28. She received 1 u PRBCs and was started on levophed for vasopressor support. RIP negative    CT head/cervical spine: No acute intracranial process seen but did show minimal increased density posterior to C2 odontoid process and body. This could represent minimal extradural hemorrhage associated with strain or partial tear of the transverse ligament on the left. CXR prior to intubation was without any consolidations or evidence of edema.    Plan:  -Currently intubated  -Lactulose; titrate to 2-3 BM/day   -Treating for infection; see shock plan  -On EEG, preliminary read negative for seizure activity  -MRI cervical completed to further investigate cervical lesion, NSGY consulted and given cervical spine clearance    Pulmonary  Acute hypoxemic respiratory failure  Patient with Hypoxic Respiratory failure which is Acute.  she is not on home oxygen. Supplemental oxygen was provided and noted- Vent Mode: Spont  Oxygen Concentration (%):  [21] 21  Resp Rate Total:  [17 br/min-31 br/min] 22 br/min  PEEP/CPAP:  [5 cmH20] 5 cmH20  Pressure Support:  [5 cmH20] 5 cmH20  Mean Airway Pressure:  [7.6 qcK53-41 cmH20] 7.6 cmH20    Signs/symptoms of respiratory failure include- tachypnea. Contributing diagnoses includes - encephalopathy  Labs and images were reviewed. Patient Has recent ABG, which has been reviewed. Will treat underlying causes and adjust management of respiratory failure as follows-     -Intubated on 6/25  -will extubate 6/27 when family ready for comfort measures only  -See shock plan    Cardiac/Vascular  * Shock  Hypotensive at the time of admission with hypothermia and tachycardia. Although; hypotension occurred after RSI.  Concern for septic shock vs hypovolemic in the setting of anemia. Lactic acid >12. Given worsening tumor burden and peritoneal carcinomatosis, may also have necrotic tumor contributing to shock.     CT A/P showed: New irregularly-shaped rim-enhancing 5.8 x 2.5 x 3.8 cm, approximately 25 HU right pelvic fluid collection. Leading differential diagnostic considerations include abscess, resolving hematoma, seroma, or perhaps biloma.     -Levophed + Vaso + SDS for MAP >65, rapidly increasing requirements 6/27  -Trend lactic, remains elevated    -Echo completed with EF 40-45%  -Vanc/zosyn  -Follow up blood, urine, sputum, and IR abscess cultures, currently NGTD  -RIP negative     Renal/  Cervical lesion  Pt found on the floor encephalopathic the near toilet, son was concerned she had hit her head. CT cervical spine showed: density posterior to C2 odontoid process and body. This could represent minimal extradural hemorrhage associated with strain or partial tear of the transverse ligament on the left near axial 63 of series 5. No significant displacement. Chronic ligamentous calcifications could appear similar. No prior studies for comparison.     -Neurosurgery consulted  - MRI completed  - C collar removed     SOFIA (acute kidney injury)  Cr on admission 2.1 with baseline of 1.4. Suspect pre renal etiology in the setting of shock.    -Follow up urine studies  -Maintain MAP >65  -Strict I/Os  -Nephrology following, started on CRRT but unable to tolerated due to hemodynamic instability      Hematology  Pulmonary embolus  Diagnosed ion May 2025. Had been taking eliquis at home.    CTA chest 6/34: small pulmonary thromboembolism within a subsegmental pulmonary artery branch in the right lower lobe, similar to prior CTA chest 05/23/2025.     -Holding anticoagulation with new onset anemia requiring transfusion, elevated INR    Oncology  Anemia  Hgb of 5.8 and repeat of 5.6.    -2 u pRBCS ordered  -Trend Hgb q6h  -No active  bleeding visualized, but will place on IV PPI BID to cover for possible GIB. Consult GI if indicated  -Holding AC      Cholangiocarcinoma  Patient with metastatic cholangiocarcinoma, last received durvalumab, cisplatin, gemtri on 6/10.     CT C/A/P pelvis on 6/24: Innumerable hypoattenuating lesions within the liver compatible with history of cholangiocarcinoma, significantly progressed from prior CT 05/25/2025.  Innumerable nodules throughout the abdomen compatible with peritoneal carcinomatosis, significantly progressed from prior.       Endocrine  Hyperammonemia  Ammonia 163 at admission    -Lactulose    GI  Biliary obstruction  Chronic biliary drains in place, last exchanged 5/24/25. Imaging this admission showed: Bilateral transhepatic biliary drains in stable position with similar intrahepatic biliary ductal dilation.     Palliative Care  Goals of care, counseling/discussion  Advance Care Planning  Code status changed to DNR after discussion with the sons. Patient with rapidly increasing vasopressor requirements with worsening acidosis throughout the day. Ongoing goals of care discussion with family at the bedside. I expressed concern that despite our maximum efforts, the patient's clinical status continues to decline. I discussed that the patient is experiencing multi-organ failure and requiring multiple forms of life support. At this time, I recommended against re-initiating dialysis due to hemodynamic instability. I shared my concerns that patient is actively dying and recommended we focus on comfort at this time. Sons appropriately upset but state understanding and in agreement. Will order opioids and benzodiazepines for symptom management. Once symptoms are controlled will palliatively extubate and discontinue vasopressors. Condolences offered and  notified.            Discussed with Dr. Sanchez. Family updated multiple times throughout the day.     Critical Care Time: 70 minutes  Critical secondary  to Patient has a condition that poses threat to life and bodily function: multi organ failure on vasopressors and mechanical ventilation      Critical care was time spent personally by me on the following activities: development of treatment plan with patient or surrogate and bedside caregivers, discussions with consultants, evaluation of patient's response to treatment, examination of patient, ordering and performing treatments and interventions, ordering and review of laboratory studies, ordering and review of radiographic studies, pulse oximetry, re-evaluation of patient's condition. This critical care time did not overlap with that of any other provider or involve time for any procedures.     Sherry Rowley PA-C  Critical Care Medicine  [facility] - Medical ICU

## 2025-06-27 NOTE — PROGRESS NOTES
Pharmacokinetic Sign-off: IV Vancomycin    Therapy with vancomycin complete and/or consult discontinued by provider.  Pharmacy will sign off, please re-consult as needed.    Lisy Shultz, PharmD, BCPS  EXT [phone]

## 2025-06-27 NOTE — SUBJECTIVE & OBJECTIVE
Objective:     Vital Signs (Most Recent):  Temp: 99.3 °F (37.4 °C) (06/27/25 0600)  Pulse: 101 (06/27/25 0600)  Resp: (!) 26 (06/27/25 0600)  BP: (!) 104/57 (06/26/25 0500)  SpO2: 98 % (06/27/25 0600) Vital Signs (24h Range):  Temp:  [96.4 °F (35.8 °C)-100.2 °F (37.9 °C)] 99.3 °F (37.4 °C)  Pulse:  [] 101  Resp:  [1-29] 26  SpO2:  [94 %-98 %] 98 %  Arterial Line BP: ()/(56-79) 102/66     Weight: 56.7 kg (125 lb) (06/24/25 1526)  Body mass index is 25.25 kg/m².  Body surface area is 1.54 meters squared.    I/O last 3 completed shifts:  In: 7062.8 [I.V.:5937.1; IV Piggyback:1125.8]  Out: 2194 [Urine:330; Other:1114; Stool:750]     Physical Exam  Constitutional:       General: She is not in acute distress.     Appearance: She is ill-appearing.   HENT:      Head: Normocephalic and atraumatic.      Right Ear: External ear normal.      Left Ear: External ear normal.      Nose: Nose normal.      Mouth/Throat:      Mouth: Mucous membranes are dry.      Comments: ET tube  Eyes:      Extraocular Movements: Extraocular movements intact.      Pupils: Pupils are equal, round, and reactive to light.   Cardiovascular:      Rate and Rhythm: Normal rate and regular rhythm.      Pulses: Normal pulses.      Heart sounds: Normal heart sounds.   Pulmonary:      Effort: Pulmonary effort is normal. No respiratory distress.      Breath sounds: Normal breath sounds. No wheezing, rhonchi or rales.   Abdominal:      General: Abdomen is flat. There is no distension.      Palpations: Abdomen is soft.   Musculoskeletal:      Cervical back: Normal range of motion.      Right lower leg: No edema.      Left lower leg: No edema.   Skin:     General: Skin is warm.      Capillary Refill: Capillary refill takes 2 to 3 seconds.          Significant Labs:  CBC:   Recent Labs   Lab 06/27/25  0241   WBC 13.07*   RBC 2.83*   HGB 8.2*   HCT 23.9*   PLT 66*   MCV 85   MCH 29.0   MCHC 34.3     CMP:   Recent Labs   Lab 06/27/25  0241   GLU 81  81    CALCIUM 6.7*  6.7*   ALBUMIN 1.3*  1.3*   PROT 4.8*     136   K 4.8  4.8   CO2 14*  14*   CL 94*  94*   BUN 16  16   CREATININE 1.8*  1.8*   ALKPHOS 1,256*   *   *   BILITOT 1.7*     All labs within the past 24 hours have been reviewed.    Significant Imaging:  CT: Reviewed

## 2025-06-27 NOTE — PLAN OF CARE
06/27/25 1410   Rounds   Attendance Provider;Nurse ;;Charge nurse;Physical therapist   Discharge Plan A Other   Why the patient remains in the hospital Requires continued medical care   Transition of Care Barriers None     Gwendolyn Koehler CD, MSW, LMSW, RSW   Case Management  [facility]  Email: [email or web address]

## 2025-06-27 NOTE — PROGRESS NOTES
[facility] Medical [facility]  Nephrology  Progress Note    Patient Name: Katey Cerrato  MRN: [id number]  Admission Date: 6/24/2025  Hospital Length of Stay: 3 days  Attending Provider: Titi Sanchez MD   Primary Care Physician: Shay Castellano MD  Principal Problem:Shock    Subjective:     HPI: Patient is a 69-year-old female with Graves disease, cholangiocarcinoma on chemotherapy, diabetes, seizures, hypertension, hyperlipidemia presents via EMS for altered mental status. History is provided by her son who reports that she seemed normal around 1:00 p.m. He went to go do some errands and came back and found that she had fallen down off of the toilet and stated that she hit her head. She was very confused which prompted him to activate EMS. He reports that she has been very weak with her cancer treatment but normally is alert and oriented and this is significantly off her baseline. He reports that she has chronic nausea and vomiting associated with her chemotherapy which is unchanged. No known fever.     Patient required intubated in the ER due to AMS. Labs on arrival showed a hemoglobin of  5.8, WBC of 14.85, Na of 145, K= 3.7, Bicarb of 5, BUN of 24, and a creatinine of 2.1 (1.3 on 6/5/25), ammonia of 163, lactic acid >20.0, and pH of 6.97. She was given one amp of bicarb and lactacted Ringer's solution with improvement in her pH of 7.11.       Objective:     Vital Signs (Most Recent):  Temp: 99.3 °F (37.4 °C) (06/27/25 0600)  Pulse: 101 (06/27/25 0600)  Resp: (!) 26 (06/27/25 0600)  BP: (!) 104/57 (06/26/25 0500)  SpO2: 98 % (06/27/25 0600) Vital Signs (24h Range):  Temp:  [96.4 °F (35.8 °C)-100.2 °F (37.9 °C)] 99.3 °F (37.4 °C)  Pulse:  [] 101  Resp:  [1-29] 26  SpO2:  [94 %-98 %] 98 %  Arterial Line BP: ()/(56-79) 102/66     Weight: 56.7 kg (125 lb) (06/24/25 1526)  Body mass index is 25.25 kg/m².  Body surface area is 1.54 meters squared.    I/O last 3 completed shifts:  In: 7062.8 [I.V.:5937.1; IV  Piggyback:1125.8]  Out: 2194 [Urine:330; Other:1114; Stool:750]     Physical Exam  Constitutional:       General: She is not in acute distress.     Appearance: She is ill-appearing.   HENT:      Head: Normocephalic and atraumatic.      Right Ear: External ear normal.      Left Ear: External ear normal.      Nose: Nose normal.      Mouth/Throat:      Mouth: Mucous membranes are dry.      Comments: ET tube  Eyes:      Extraocular Movements: Extraocular movements intact.      Pupils: Pupils are equal, round, and reactive to light.   Cardiovascular:      Rate and Rhythm: Normal rate and regular rhythm.      Pulses: Normal pulses.      Heart sounds: Normal heart sounds.   Pulmonary:      Effort: Pulmonary effort is normal. No respiratory distress.      Breath sounds: Normal breath sounds. No wheezing, rhonchi or rales.   Abdominal:      General: Abdomen is flat. There is no distension.      Palpations: Abdomen is soft.   Musculoskeletal:      Cervical back: Normal range of motion.      Right lower leg: No edema.      Left lower leg: No edema.   Skin:     General: Skin is warm.      Capillary Refill: Capillary refill takes 2 to 3 seconds.          Significant Labs:  CBC:   Recent Labs   Lab 06/27/25  0241   WBC 13.07*   RBC 2.83*   HGB 8.2*   HCT 23.9*   PLT 66*   MCV 85   MCH 29.0   MCHC 34.3     CMP:   Recent Labs   Lab 06/27/25  0241   GLU 81  81   CALCIUM 6.7*  6.7*   ALBUMIN 1.3*  1.3*   PROT 4.8*     136   K 4.8  4.8   CO2 14*  14*   CL 94*  94*   BUN 16  16   CREATININE 1.8*  1.8*   ALKPHOS 1,256*   *   *   BILITOT 1.7*     All labs within the past 24 hours have been reviewed.    Significant Imaging:  CT: Reviewed    Assessment/Plan:   High Anion Gap Metabolic acidosis due to lactic acidosis  Severe anemia  Acute hypoxic resp failure req intubation  Cholangiocarcinoma - on chemo/immunotherapy  DM2  Seizures  Shock  Hyperlipidemia  Empty sella syndrome  GH deficiency  Graves disease      -68 yo woman found down on floor, intub for airway protection, found to have lactic 12, SOFIA, hyperammoniemia, severely anemic and on pressors.  -currently on levophed + vasopressin. FiO2 21% PEEP 5.   -IO 4063/1152. She is net positive over 11 L since arrival.   -urine micro showed large number of granular casts, consistent w ATN. She made a bit more urine yesterday 280 mL. She did not tolerate SLED. Repeat labs and ABG today. Her lactic remains significantly elevated. If we are to proceed w medical management, we will need to try another SLED session today for volume control since she remains oliguric.     Thank you for your consult. I will follow-up with patient. Please contact us if you have any additional questions.    Benjamin Rhodes MD  Nephrology  [facility] - Medical ICU

## 2025-06-27 NOTE — EICU
Virtual ICU Quality Rounds    Admit Date: 6/24/2025  Hospital Day: 3    ICU Day: 2d 15h    24H Vital Sign Range:  Temp:  [98.1 °F (36.7 °C)-100.2 °F (37.9 °C)]   Pulse:  []   Resp:  [1-29]   SpO2:  [90 %-100 %]   Arterial Line BP: ()/(50-79)     VICU Surveillance Screening    Daily review of  line necessity(optional): Central line(s) in place    Central line type (optional): Trialysis and Port    Central line indications : Vasopressors (in past 24/hrs) and Dialysis/CRRT                        Pressure injury prevention panel (order)

## 2025-06-27 NOTE — CHAPLAIN
Patient: Katey Cerrato  MRN: [id number]  : 1955  Age: 69 y.o.  Legal sex: female   Hospital Length of Stay: 3 days  Code Status: DNR   Attending Provider: Titi Sanchez MD  Principal Problem: Shock  Patient's Advent: Christian  Length of my visit: 5 min  Purpose of visit:   Yandel      attempted to visit - patient non-verbal, no family at bedside.         Rev. Tobias Dimas, [facility], [facility], Phone [phone]  Advanced Practice Board-Certified  in Hospice and Palliative Care, Korean+     support is available and on-site . Please call the on-call  for any emergent spiritual care needs @ [phone]

## 2025-06-27 NOTE — EICU
Intervention Initiated From:  COR / JLU    Parisa intervened regarding:  Rounding (Video assessment)    VICU Night Rounds Checklist  24H Vital Sign Range:  Temp:  [94.6 °F (34.8 °C)-100.2 °F (37.9 °C)]   Pulse:  []   Resp:  [1-29]   BP: (104-113)/(57-74)   SpO2:  [94 %-98 %]   Arterial Line BP: ()/(56-83)     Video rounds and LDA reconciliation

## 2025-06-27 NOTE — PROCEDURES
EEG    Date/Time: 6/27/2025 9:34 AM    Performed by: Artie Kirk MD  Authorized by: Gabe Hanley NP      ICU EEG/VIDEO MONITORING REPORT    DATE OF SERVICE:   EEG NUMBER:   REQUESTED BY:   LOCATION OF SERVICE:     METHODOLOGY   Electroencephalographic (EEG) recording is with electrodes placed according to the International 10-20 placement system.  Thirty two (32) channels of digital signal are simultaneously recorded from the scalp and may include EKG, EMG, and/or eye monitors.   Recording band pass was 0.1 to 512 hz.  Digital video recording of the patient is simultaneously recorded with the EEG.  The nursing staff report clinical symptoms and may press an event button when the patient has symptoms of clinical interest to the treating physicians.  EEG and video recording is stored and archived in digital format.  The entire recording is visually reviewed and the times identified by computer analysis as being spikes or seizures are reviewed again.  Activation procedures which include photic stimulation, hyperventilation and instructing patients to perform simple task are done in selected patients.   Compresses spectral analysis (CSA) is also performed on the activity recorded from each individual channel.  This is displayed as a power display of frequencies from 0 to 30 Hz over time.   The CSA analysis is done and displayed continuously.  This is reviewed for asymmetries in power between homologous areas of the scalp and for presence of changes in power which canbe seen when seizures occur.  Sections of suspected abnormalities on the CSA is then compared with the original EEG recording.     [employer] software was also utilized in the review of this study.  This software suite analyzes the EEG recording in multiple domains.  Coherence and rhythmicity is computed to identify EEG sections which may contain organized seizures.  Each channel undergoes analysis to detect presence of spike and sharp waves which  have special and morphological characteristic of epileptic activity.  The routine EEG recording is converted from spacial into frequency domain.  This is then displayed comparing homologous areas to identify areas of significant asymmetry.  Algorithm to identify non-cortically generated artifact is used to separate eye movement, EMG and other artifact from the EEG.      Recording Times  Start on mm/dd/yy at hh:mm:ss  Stop on mm/dd/yy at hh:mm:ss  A total of HH hours and MM minutes of EEG was recorded.    ICU EEG/VIDEO MONITORING REPORT     DATE OF SERVICE: 6/26/25-6/27/25  EEG NUMBER: [id number]  REQUESTED BY: Daniel  [place] [facility] [place]              Electroencephalographic (EEG) recording is with electrodes placed according to the International 10-20 placement system.  Thirty two (32) channels of digital signal are simultaneously recorded from the scalp and may include EKG, EMG, and/or eye monitors.   Recording band pass was 0.1 to 512 hz.  Digital video recording of the patient is simultaneously recorded with the EEG.  The nursing staff report clinical symptoms and may press an event button when the patient has symptoms of clinical interest to the treating physicians.  EEG and video recording is stored and archived in digital format.  The entire recording is visually reviewed and the times identified by computer analysis as being spikes or seizures are reviewed again.  Activation procedures which include photic stimulation, hyperventilation and instructing patients to perform simple task are done in selected patients.   Compresses spectral analysis (CSA) is also performed on the activity recorded from each individual channel.  This is displayed as a power display of frequencies from 0 to 30 Hz over time.   The CSA analysis is done and displayed continuously.  This is reviewed for asymmetries in power between homologous areas of the scalp and for presence of changes in power which canbe seen  when seizures occur.  Sections of suspected abnormalities on the CSA is then compared with the original EEG recording.                [employer] software was also utilized in the review of this study.  This software suite analyzes the EEG recording in multiple domains.  Coherence and rhythmicity is computed to identify EEG sections which may contain organized seizures.  Each channel undergoes analysis to detect presence of spike and sharp waves which have special and morphological characteristic of epileptic activity.  The routine EEG recording is converted from spacial into frequency domain.  This is then displayed comparing homologous areas to identify areas of significant asymmetry.  Algorithm to identify non-cortically generated artifact is used to separate eye movement, EMG and other artifact from the EEG.       Recording Times  Start on 6/26/25 at 09:26  Stop on 6/27/25  at 07:00  A total of 22 hours of EEG was recorded.     EEG FINDINGS  The record shows a poor organization at rest,with no discernible posterior dominant rhythm with poor reactivity. There is mild bilateral beta activity. There is continuous diffuse delta and theta range background slowing.      Drowsiness is characterized by attenuation of the background, vertex waves, and bilateral theta slowing. Well formed sleep architecture is not seen.     Provocative maneuvers including hyperventilation and photic stimulation were not performed.      EKG recording shows a regular rhythm.     There is no push button or clinical event.     IMPRESSION:  Abnormal study due to severe  diffuse background slowing consistent with diffuse cerebral dysfunction and encephalopathy which may be on the basis of toxic, metabolic, or primary neuronal disorder.

## 2025-06-27 NOTE — ASSESSMENT & PLAN NOTE
69 yoF with metastatic cholangiocarcinoma who presents with encephalopathy. She was reportedly at her baseline around 1 PM and after her son ran some errands, he found her encephalopathic and on the floor next to the toilet. There were concerns she had hit her head.  On arrival she was encepahlopathic and agitated, which lead to her intubation. She has been hypothermic, tachycardic and hypotensive as low as 73/43. She was acidotic with lactic >12. ABG 6.9/20.6/51/6. Hgb found to be 5.8 with leukocytosis to 14.8. Ammonia elevated to 163 and glucose of 28. She received 1 u PRBCs and was started on levophed for vasopressor support. RIP negative    CT head/cervical spine: No acute intracranial process seen but did show minimal increased density posterior to C2 odontoid process and body. This could represent minimal extradural hemorrhage associated with strain or partial tear of the transverse ligament on the left. CXR prior to intubation was without any consolidations or evidence of edema.    Plan:  -Currently intubated  -Lactulose; titrate to 2-3 BM/day   -Treating for infection; see shock plan  -On EEG, preliminary read negative for seizure activity  -MRI cervical completed to further investigate cervical lesion, NSGY consulted and given cervical spine clearance

## 2025-06-27 NOTE — SUBJECTIVE & OBJECTIVE
Interval History/Significant Events: See hospital course for recent events.     Review of Systems   Unable to perform ROS: Intubated     Objective:     Vital Signs (Most Recent):  Temp: 99.7 °F (37.6 °C) (06/27/25 1330)  Pulse: (!) 135 (06/27/25 1715)  Resp: (!) 22 (06/27/25 1715)  BP: (!) 104/57 (06/26/25 0500)  SpO2: 98 % (06/27/25 1700) Vital Signs (24h Range):  Temp:  [99 °F (37.2 °C)-100.2 °F (37.9 °C)] 99.7 °F (37.6 °C)  Pulse:  [] 135  Resp:  [2-29] 22  SpO2:  [90 %-100 %] 98 %  Arterial Line BP: ()/(-8-85) 94/83   Weight: 56.7 kg (125 lb)  Body mass index is 25.25 kg/m².      Intake/Output Summary (Last 24 hours) at 6/27/2025 1727  Last data filed at 6/27/2025 1709  Gross per 24 hour   Intake 858.47 ml   Output 755 ml   Net 103.47 ml          Physical Exam  Vitals and nursing note reviewed.   Constitutional:       Appearance: She is ill-appearing.      Interventions: She is sedated, intubated and restrained.   HENT:      Head: Normocephalic.   Cardiovascular:      Rate and Rhythm: Regular rhythm. Tachycardia present.      Pulses: Normal pulses.   Pulmonary:      Effort: She is intubated.      Breath sounds: Normal air entry.   Chest:      Comments: Port a cath   Abdominal:      General: Bowel sounds are normal.      Palpations: Abdomen is soft.      Comments: RUQ and LUQ biliary drains in place    Skin:     General: Skin is warm and dry.      Comments: R femoral trialysis in place   Arterial line in place      Neurological:      Comments: Breathing over the ventilator. No withdrawal to pain in all four extremities.             Vents:  Vent Mode: Spont (06/27/25 1155)  Ventilator Initiated: Yes (06/24/25 2028)  Set Rate: 14 BPM (06/27/25 0723)  Vt Set: 375 mL (06/26/25 1242)  Pressure Support: 5 cmH20 (06/27/25 1155)  PEEP/CPAP: 5 cmH20 (06/27/25 1155)  Oxygen Concentration (%): 21 (06/27/25 1715)  Peak Airway Pressure: 11 cmH20 (06/27/25 1155)  Plateau Pressure: 19 cmH20 (06/27/25 1155)  Total  Ve: 16.3 L/m (06/27/25 1155)  Negative Inspiratory Force (cm H2O): 0 (06/27/25 1155)  F/VT Ratio<105 (RSBI): (!) 28.5 (06/27/25 1155)  Lines/Drains/Airways       Central Venous Catheter Line  Duration                  PowerPort A Cath Single Lumen 06/10/25 0930 Atrial Right 17 days    Trialysis (Dialysis) Catheter 06/24/25 2225 right femoral 2 days              Drain  Duration                  Biliary Tube 05/24/25 1410 VTCB biliary 12 Fr. RUQ 34 days         Biliary Tube 05/27/25 1329 VTCB biliary 12 Fr. LUQ 31 days         Rectal Tube 06/26/25 1100 rectal tube w/ balloon (indicate number of mLs) 1 day              Airway  Duration                  Airway - Non-Surgical 06/24/25 1937 Endotracheal Tube 2 days              Arterial Line  Duration             Arterial Line 06/24/25 2230 Left Radial 2 days              Peripheral Intravenous Line  Duration             Peripheral IV Single Lumen 06/24/25 22 G Posterior;Right Hand 3 days    Peripheral IV Single Lumen 06/25/25 1824 20 G 2 1/4 in Long PIVC Anterior;Left Upper Arm 1 day                  Significant Labs:    CBC/Anemia Profile:  Recent Labs   Lab 06/26/25  2303 06/27/25  0241 06/27/25  0809 06/27/25  1324   WBC 13.38* 13.07*  --  15.50*   HGB 8.5* 8.2*  --  8.1*   HCT 24.8* 23.9*  --  23.9*   PLT 73* 66*  --  62*   MCV 85 85  --  85   RDW 16.0* 16.1*  --  16.6*   RETIC  --   --  2.0  --         Chemistries:  Recent Labs   Lab 06/26/25  2303 06/27/25  0241 06/27/25  1100 06/27/25  1324 06/27/25  1559   * 136  136 136 136  --    K 4.7 4.8  4.8 5.2* 5.2*  --    CL 95 94*  94* 94* 96  --    CO2 16* 14*  14* 10* 8*  --    BUN 15 16  16 19 20  --    CREATININE 1.7* 1.8*  1.8* 2.1* 2.2*  --    CALCIUM 6.5* 6.7*  6.7* 6.7* 6.6*  --    ALBUMIN 1.3* 1.3*  1.3* 1.3* 1.3* 1.3*   PROT  --  4.8* 4.8*  --  4.7*   BILITOT  --  1.7* 1.6*  --  1.6*   ALKPHOS  --  1,256* 1,241*  --  1,147*   ALT  --  191* 171*  --  170*   AST  --  853* 756*  --  722*   MG 1.9 1.9   --  2.0  --    PHOS 3.3 3.7  --  5.1*  --        All pertinent labs within the past 24 hours have been reviewed.    Significant Imaging:  I have reviewed all pertinent imaging results/findings within the past 24 hours.

## 2025-06-27 NOTE — CONSULTS
Epilepsy Team    CC: EEG placed for concern for epileptiform activity/seizures    HPI: 69 year old female with a PMHx of HTN, DM2, empty sella syndrome, growth hormone deficiency, Grave's disease with exophthalmos, seizure disorder on Levetiracetam 750 mg BID, PE on Eliquis, unresectable hilar cholangiocarcinoma with metastasis who presented to the ER on 6/24 for altered mental status. HPI per chart review as patient intubated. Per chart, patient found on the floor near toilet with AMS. Patient was intubated for airway protection and required vasopressor support. Patient admitted to MICU for further management of shock, acute respiratory failure, and SOFIA. Hospital course complicated by encephalopathy, SOFIA requiring RRT which patient unable to tolerate overnight. EEG placed 6/26; Epilepsy following for assistance in management.    Unable to obtain patient's family and social history due to patient intubated.    Patient is currently maintained on home AED: Levetiracetam 750 mg BID.      Review of systems:  Not performed secondary to patient intubated.    Physical Exam  General: Intubated, afebrile, ill-appearing.  HEENT: Normocephalic, atraumatic. EEG removed.  Pulmonary: Effort normal, no respiratory distress.  Cardiac: Normal rate.   Skin: Warm and dry.  Psych: Unable to assess.   Neuro:  Comatose   Pupils sluggish  Exophthalmos  Corneal intact OU   No spontaneous eye opening   Face symmetric   Tongue midline   No spontaneous movements  Does not follow commands  Triple flexion in BLE    Exam findings to suggest seizures:  Myoclonus - no   eye twitching - no   Nystagmus - no   gaze deviation - no   waxy rigidity - no      EEG reviewed by Epileptologist, findings include severe encephalopathy, no epileptiform activity, no electrographic seizures; see EEG report for details.     Encephalopathy  Recommendations: Encephalopathy is nonspecific in regards to etiology. No indication for escalation of home AED at this time.  Recommend discontinuation of EEG. Findings and recommendations discussed with MICU team. No family at bedside on my rounds.    Shock  - On Vanc, Zosyn  - Continues to require vasopressor support  - Blood cx NGTD  - Management per MICU    Seizure disorder  Hx of seizure disorder, on monotherapy Levetiracetam 750 mg BID  - No epileptiform activity on EEG  - On home Levetiracetam  - Seizure precautions  - Avoid medications that lower seizure threshold    Acute hypoxemic respiratory failure  - Intubated  - Vent management per MICU      We will remove EEG today and sign off. Please do not hesitate to call us back with any questions or concerns.      Lucy Kirk PA-C  Neurology-Epilepsy  [facility] - MICU  Staff: Dr. Kirk

## 2025-06-27 NOTE — ASSESSMENT & PLAN NOTE
Hypotensive at the time of admission with hypothermia and tachycardia. Although; hypotension occurred after RSI. Concern for septic shock vs hypovolemic in the setting of anemia. Lactic acid >12. Given worsening tumor burden and peritoneal carcinomatosis, may also have necrotic tumor contributing to shock.     CT A/P showed: New irregularly-shaped rim-enhancing 5.8 x 2.5 x 3.8 cm, approximately 25 HU right pelvic fluid collection. Leading differential diagnostic considerations include abscess, resolving hematoma, seroma, or perhaps biloma.     -Levophed + Vaso + SDS for MAP >65, rapidly increasing requirements 6/27  -Trend lactic, remains elevated    -Echo completed with EF 40-45%  -Vanc/zosyn  -Follow up blood, urine, sputum, and IR abscess cultures, currently NGTD  -RIP negative

## 2025-06-28 VITALS
OXYGEN SATURATION: 98 % | HEIGHT: 59 IN | TEMPERATURE: 99 F | SYSTOLIC BLOOD PRESSURE: 104 MMHG | DIASTOLIC BLOOD PRESSURE: 57 MMHG | WEIGHT: 125 LBS | BODY MASS INDEX: 25.2 KG/M2 | RESPIRATION RATE: 9 BRPM

## 2025-06-28 LAB
BACTERIA SPEC CULT: NORMAL
BACTERIA UR CULT: NO GROWTH
GRAM STN SPEC: NORMAL

## 2025-06-28 PROCEDURE — 27100171 HC OXYGEN HIGH FLOW UP TO 24 HOURS

## 2025-06-28 PROCEDURE — 99900035 HC TECH TIME PER 15 MIN (STAT)

## 2025-06-28 PROCEDURE — 94761 N-INVAS EAR/PLS OXIMETRY MLT: CPT

## 2025-06-28 NOTE — SIGNIFICANT EVENT
Called to bedside by patient's nurse. Nursing supervisor notified. Family at bedside.  has been called and is also at bedside.    Patient is not responding to verbal or tactile stimuli. Patient does not have a pupillary or corneal reflex. Her pupils are fixed and dilated. No heart or breath sounds on auscultation. No respirations. No palpable pulses.     Date and Time of Death: 6/27/2025 @ 20:35    Cause of Death: Respiratory failure, Shock     LEERS email: [email or web address]    Jaskaran Quiñones DO  Medical Resident - Internal Medicine  [facility]

## 2025-06-28 NOTE — NURSING
Pt palliatively extubated by RT. Vasopressor support discontinued. Per provider orders. Comfort maintained

## 2025-06-28 NOTE — DISCHARGE SUMMARY
Death Note  Critical Care Medicine      Admit Date: 2025    Date of Death: 2025    Time of Death:     Attending Physician: Titi Sanchez MD    Principal Diagnoses: Shock    Preliminary Cause of Death: Shock    Secondary Diagnoses:   Active Hospital Problems    Diagnosis  POA    *Shock [R57.9]  Yes    Goals of care, counseling/discussion [Z71.89]  Not Applicable    Cervical lesion [N88.9]  Yes    Metabolic encephalopathy [G93.41]  Yes    Acute hypoxemic respiratory failure [J96.01]  Yes    Hyperammonemia [E72.20]  Yes    Anemia [D64.9]  Yes    Seizure disorder [G40.909]  Yes    SOFIA (acute kidney injury) [N17.9]  Yes    Pulmonary embolus [I26.99]  Yes    Cholangiocarcinoma [C22.1]  Yes    Biliary obstruction [K83.1]  Yes      Resolved Hospital Problems   No resolved problems to display.        Discharged Condition:     HPI:  69 year old female with empty sella syndrome, growth hormone deficiency, grave's disease with exophthalmos, HTN, seizures,  PE on eliquis, T2DM, unresectable hilar cholangiocarcinoma with metastasis who presented with encephalopathy. She had reportedly been in her usual state of health prior to being found on the floor near the toilet. When she was found, there was concern that she hit her head and she was significantly altered. History was limited due to patient condition and no witnesses present at bedside.     Of note she received chemo/immunotherapy last on 6/10 (durvalumab, gemicitabine, and cisplatin)    On arrival, she encephalopathic and agitated, leading to intubation for airway protection. She was acidotic with a lactate >12, hypoglycemic to 28, CO2 < 5, anemic to 5.8, had a leukocytosis to 15, SOFIA with Cr of 2.1 (baseline-1.4), elevated ammonia to 163. AB.9//51/6. Head and neck imaging did not show any acute intracranial finidngs but was concerning for minimal increased density posterior to C2 odontoid process and body. This could represent minimal extradural  hemorrhage associated with strain or partial tear of the transverse ligament. CXR was without any focal consolidations or evidence of pulmonary edema. CTA C/A/P showed: Small pulmonary embolism within a subsegmental artery in the right lower lobe (previously known), Innumerable hypoattenuating lesions within the liver compatible with history of cholangiocarcinoma, significantly progressed from prior CT 05/25/2025.  Innumerable nodules throughout the abdomen compatible with peritoneal carcinomatosis, significantly progressed from prior, Bilateral transhepatic biliary drains in stable position with similar intrahepatic biliary ductal dilation.    In the ED she was intubated and sedated. She was started of levophed for vasopressor support. 1 u of pRBCs was also ordered. She was given 1 amp of HCO3.    Upon admission to critical care, continuous bicarbonate infusion ordered, nephrology consulted, arterial and central access obtained.     Hospital/ICU Course:  Ms. Cerrato was admitted to [facility] for encaphalopathy of unknown origin and shock requiring vasopressors. Remains intubated on 2 vasopressors. S/P IR drainage of fluid collection in pelvis, Cx pending. Remains on BS Abx. Nephrology consulted and RRT initiated. Palliative care consulted. CT head negative for intracranial abnormalities and EEG negative for epileptiform activity. Patient not tolerating dialysis with increasing vasopressors. Code status change to DNR. Vasopressor requirements continued to worsen with difficulty obtaining blood pressure readings. Goals of care discussion held with sons. Expressed concern patient is actively dying and recommending comfort measures. Family in agreement and orders were placed.       Consultations were held with the family regarding the patient's expected poor prognosis. At the direction of the family, the patient was extubated and measures to ensure the comfort of the patient including, but not limited to, morphine as needed  for pain and air hunger as well as benzodiazepines as needed for agitation. The patient was subsequently declared dead at 2035. Condolences and emotional support provided to the family.     RODRIGUEZ Butt-BC  Pulmonary Critical Care  06/27/2025

## 2025-06-28 NOTE — NURSING
Pt is DNR with comfort measures only orders in place. Multiple family members at bedside and plan of care was discussed with them per providers. Provider came to bedside again to reiterate plan of care; awaiting palliative extubation. Currently pt is on ordered max dose of levophed at .9 mcg/kg/min until extubation takes place. Comfort to be ensured. Pt assessed. Support offered to family

## 2025-06-28 NOTE — PLAN OF CARE
[facility] - Medical ICU  Discharge Final Note    Primary Care Provider: Shay Castellano MD    Admit Date: 2025     Date of Death: 2025    Time of Death:       Cause of Death: Respiratory failure, Shock      Final Discharge Note (most recent)       Final Note - 25 0740          Final Note    Assessment Type Final Discharge Note     Anticipated Discharge Disposition                    Yenifer Rocha RN  Weekend  - [facility] [place] [facility]  Ext. [phone]

## 2025-06-29 LAB — BACTERIA BLD CULT: NORMAL

## 2025-06-30 LAB — BACTERIA SPEC AEROBE CULT: NO GROWTH

## 2025-07-03 LAB — BACTERIA SPEC ANAEROBE CULT: NORMAL

## 2025-07-04 LAB
POCT GLUCOSE: 123 MG/DL (ref 70–110)
POCT GLUCOSE: 124 MG/DL (ref 70–110)
POCT GLUCOSE: 130 MG/DL (ref 70–110)
POCT GLUCOSE: 185 MG/DL (ref 70–110)
POCT GLUCOSE: 194 MG/DL (ref 70–110)
POCT GLUCOSE: 199 MG/DL (ref 70–110)
POCT GLUCOSE: 25 MG/DL (ref 70–110)
POCT GLUCOSE: 432 MG/DL (ref 70–110)
POCT GLUCOSE: 58 MG/DL (ref 70–110)
POCT GLUCOSE: 63 MG/DL (ref 70–110)
POCT GLUCOSE: 95 MG/DL (ref 70–110)

## 2025-07-22 ENCOUNTER — EXTERNAL HOME HEALTH (OUTPATIENT)
Dept: HOME HEALTH SERVICES | Facility: HOSPITAL | Age: 70
End: 2025-07-22
Payer: MEDICARE

## 2025-07-24 DIAGNOSIS — E11.9 TYPE 2 DIABETES MELLITUS WITHOUT COMPLICATION, WITHOUT LONG-TERM CURRENT USE OF INSULIN: ICD-10-CM

## 2025-07-24 RX ORDER — DULAGLUTIDE 3 MG/.5ML
INJECTION, SOLUTION SUBCUTANEOUS
Refills: 0 | OUTPATIENT
Start: 2025-07-24

## 2025-08-28 ENCOUNTER — DOCUMENT SCAN (OUTPATIENT)
Dept: HOME HEALTH SERVICES | Facility: HOSPITAL | Age: 70
End: 2025-08-28
Payer: MEDICARE

## (undated) DEVICE — SOL BETADINE 5%

## (undated) DEVICE — SYR 10CC LUER LOCK

## (undated) DEVICE — Device

## (undated) DEVICE — SUT VICRYL 3-0 27 SH

## (undated) DEVICE — DRAPE C-ARM ELAS CLIP 42X120IN

## (undated) DEVICE — SYR ONLY LUER LOCK 20CC

## (undated) DEVICE — GLASSES EYE PROTECTIVE

## (undated) DEVICE — GLOVE BIOGEL SKINSENSE PI 7.5

## (undated) DEVICE — SUT MCRYL PLUS 4-0 PS2 27IN

## (undated) DEVICE — CASSETTE INFINITI

## (undated) DEVICE — SOL NACL 0.9% INJ PF/50151

## (undated) DEVICE — CONTAINER SPECIMEN OR STER 4OZ

## (undated) DEVICE — SYR SLIP TIP 1CC

## (undated) DEVICE — TIP YANKAUERS BULB NO VENT

## (undated) DEVICE — ADHESIVE DERMABOND ADVANCED

## (undated) DEVICE — DECANTER FLUID TRNSF WHITE 9IN

## (undated) DEVICE — ELECTRODE REM PLYHSV RETURN 9

## (undated) DEVICE — DRESSING ANTIMICROBIAL 1 INCH

## (undated) DEVICE — SYR DISP LL 5CC

## (undated) DEVICE — SUT PROLENE 2-0 30 SH

## (undated) DEVICE — NDL HYPO REG 25G X 1 1/2

## (undated) DEVICE — ELECTRODE MEGADYNE RETURN DUAL

## (undated) DEVICE — SUT VICRYL CTD 2-0 GI 27 SH

## (undated) DEVICE — PENCIL ROCKER SWITCH 10FT CORD

## (undated) DEVICE — DRAPE T THYROID STERILE

## (undated) DEVICE — TRAY MINOR GEN SURG OMC

## (undated) DEVICE — APPLICATOR CHLORAPREP ORN 26ML

## (undated) DEVICE — BLADE SURG CARBON STEEL SZ11